# Patient Record
Sex: FEMALE | Race: WHITE | NOT HISPANIC OR LATINO | Employment: FULL TIME | URBAN - METROPOLITAN AREA
[De-identification: names, ages, dates, MRNs, and addresses within clinical notes are randomized per-mention and may not be internally consistent; named-entity substitution may affect disease eponyms.]

---

## 2017-05-05 ENCOUNTER — TRANSCRIBE ORDERS (OUTPATIENT)
Dept: ADMINISTRATIVE | Facility: HOSPITAL | Age: 42
End: 2017-05-05

## 2017-05-05 DIAGNOSIS — R06.81 APNEA: Primary | ICD-10-CM

## 2017-08-02 ENCOUNTER — OFFICE VISIT (OUTPATIENT)
Dept: URGENT CARE | Facility: CLINIC | Age: 42
End: 2017-08-02
Payer: MEDICARE

## 2017-08-02 DIAGNOSIS — N39.0 URINARY TRACT INFECTION: ICD-10-CM

## 2017-08-02 PROCEDURE — 81002 URINALYSIS NONAUTO W/O SCOPE: CPT

## 2017-08-02 PROCEDURE — G0463 HOSPITAL OUTPT CLINIC VISIT: HCPCS

## 2017-08-02 PROCEDURE — 99203 OFFICE O/P NEW LOW 30 MIN: CPT

## 2017-08-03 ENCOUNTER — APPOINTMENT (OUTPATIENT)
Dept: LAB | Facility: HOSPITAL | Age: 42
End: 2017-08-03
Payer: MEDICARE

## 2017-08-03 DIAGNOSIS — N39.0 URINARY TRACT INFECTION: ICD-10-CM

## 2017-08-03 PROCEDURE — 87086 URINE CULTURE/COLONY COUNT: CPT

## 2017-08-05 LAB — BACTERIA UR CULT: NORMAL

## 2017-11-30 ENCOUNTER — ALLSCRIPTS OFFICE VISIT (OUTPATIENT)
Dept: OTHER | Facility: OTHER | Age: 42
End: 2017-11-30

## 2017-11-30 DIAGNOSIS — N39.0 URINARY TRACT INFECTION: ICD-10-CM

## 2017-11-30 LAB
BILIRUB UR QL STRIP: NORMAL
CLARITY UR: NORMAL
COLOR UR: YELLOW
GLUCOSE (HISTORICAL): NORMAL
HGB UR QL STRIP.AUTO: NORMAL
KETONES UR STRIP-MCNC: NORMAL MG/DL
LEUKOCYTE ESTERASE UR QL STRIP: NORMAL
NITRITE UR QL STRIP: NORMAL
PH UR STRIP.AUTO: 5 [PH]
PROT UR STRIP-MCNC: NORMAL MG/DL
SP GR UR STRIP.AUTO: 1.02

## 2017-12-05 NOTE — CONSULTS
Assessment    1  Acute UTI (599 0) (N39 0)   2  Single   3  Non-smoker (V49 89) (Z78 9)   4  Social alcohol use (Z78 9)   5  History of Arthroscopy Knee   6  History of Foot Surgery    Plan  Acute UTI    · (1) URINALYSIS (will reflex a microscopy if leukocytes, occult blood, protein or nitrites are  not within normal limits); Status:Active; Requested for:30Nov2017;    Perform:[facility]; Due:30Nov2018; Ordered; For:Acute UTI; Ordered By:Swapna Gaytan;   · (1) URINE CULTURE; Source:Urine, Clean Catch; Status:Active; Requested  for:30Nov2017;    Perform:[facility]; Due:30Nov2018; Ordered; For:Acute UTI; Ordered By:Swapna Gaytan;   · Cystourethroscopy - POC; Status:Active - Perform Order; Requested for:30Nov2017;    Perform: In Office; Christin Bradshaw; Ordered; For:Acute UTI; Ordered By:Swapna Gaytan;   · Urine Dip Non-Automated- POC; Status:Complete - Retrospective By Protocol  Authorization;   Done: [id number] 09:21AM   Performed: In Office; Christin Bradshaw; Last Updated By:Dev Welsh; 11/30/2017 9:31:10 AM;Ordered; For:Acute UTI; Ordered By:Swapna Gaytan;   · US RETROPERITONEAL COMPLETE; Status:Hold For - Scheduling; Requested  for:30Nov2017;    Perform:[facility]; Order Comments:Ultrasound kidneys and bladder with postvoid residual assessment; Due:30Nov2018; Ordered; For:Acute UTI; Ordered By:Swapna Gaytan; Discussion/Summary  Discussion Summary:   My impression is recurrent urinary tract infection, stress incontinence  Her urine dip in the office today is clean  I will obtain a surveillance urinalysis and urine culture in the next few weeks  She was instructed to go immediately for urine culture if she believes that she is symptomatic  I am unsure if she is truly having culture documented infections  I will obtain a retroperitoneal ultrasound of the kidneys and bladder with postvoid residual assessment   I also recommend performing flexible cystoscopy to assess her anatomy and to evaluate her better for stress incontinence  Self Referrals:   Self Referrals: No PCP-Dr Zane Franklin      Chief Complaint  Chief Complaint Free Text Note Form: New patient consult for UTI's      History of Present Illness  HPI: Amparo is a 72-year-old female who gives a longstanding history of stress urinary incontinence  Interestingly she has never been pregnant before  She also reports recurrent urinary tract infections  There is only 1 urine culture available for my review from August 2017 which shows only mixed contaminants  She describes variable lower urinary tract symptoms when she believes that she has an infection  Although she states she has significant stress incontinence, she does not wear any pads on a daily basis  She denies ever seeing gross hematuria  She denies urgency of urination or urge incontinence    Past medical and surgical history is remarkable for diffuse endometriosis, depression, bipolar, anxiety, headaches  Medical, surgical, family, and social histories were reviewed in [employer]  Review of Systems  Complete-Female Urology:   Constitutional: No fever, no chills, feels well, no tiredness, no recent weight gain or weight loss  Respiratory: No complaints of shortness of breath, no wheezing, no cough, no SOB on exertion, no orthopnea, no PND  Cardiovascular: No complaints of slow heart rate, no fast heart rate, no chest pain, no palpitations, no leg claudication, no lower extremity edema  Gastrointestinal: No complaints of abdominal pain, no constipation, no nausea or vomiting, no diarrhea, no bloody stools  Genitourinary: Patient does not need pads for her leakage-only occasional, Empty sensation, incontinence and stream quality good, but as noted in HPI, no dysuria, no feelings of urinary urgency and no hematuria    The patient presents with complaints of occasional episodes of urinary hesitancy  The patient presents with complaints of 1 episode of nocturia  Musculoskeletal: No complaints of arthralgias, no myalgias, no joint swelling or stiffness, no limb pain or swelling  Integumentary: No complaints of skin rash or lesions, no itching, no skin wounds, no breast pain or lump  Hematologic/Lymphatic: No complaints of swollen glands, no swollen glands in the neck, does not bleed easily, does not bruise easily  Neurological: No complaints of headache, no confusion, no convulsions, no numbness, no dizziness or fainting, no tingling, no limb weakness, no difficulty walking  ROS Reviewed:   ROS reviewed  Active Problems    1  Acute UTI (599 0) (N39 0)   2  Bipolar disorder (296 80) (F31 9)   3  Constipation (564 00) (K59 00)   4  Heartburn (787 1) (R12)   5  Nausea (787 02) (R11 0)   6  Ovarian cyst (620 2) (N83 20)    Past Medical History    1  History of intestinal obstruction (V12 79) (Z87 19)   2  History of Oral contraceptive prescribed (V25 01) (Z30 011)  Active Problems And Past Medical History Reviewed: The active problems and past medical history were reviewed and updated today  Surgical History    1  History of Arthroscopy Knee   2  History of Breast Surgery Reduction Procedure   3  History of Exploratory Laparotomy   4  History of Foot Surgery  Surgical History Reviewed: The surgical history was reviewed and updated today  Family History  Maternal Grandmother    1  Family history of Breast Cancer (V16 3)  Family History Reviewed: The family history was reviewed and updated today  Social History    · Being A Social Drinker   · Never A Smoker   · Non-smoker (V49 89) (Z78 9)   · Single   · Social alcohol use (Z78 9)  Social History Reviewed: The social history was reviewed and updated today  The social history was reviewed and is unchanged  Current Meds   1  Feosol TABS; Therapy: (Recorded:30Nov2017) to Recorded   2  Gabapentin 400 MG Oral Capsule; Therapy: (Recorded:30Nov2017) to Recorded   3   Mobic 15 MG Oral Tablet; Therapy: (Recorded:30Nov2017) to Recorded   4  Omeprazole 40 MG Oral Capsule Delayed Release; Therapy: (Recorded:[id number]) to Recorded   5  Pristiq 100 MG Oral Tablet Extended Release 24 Hour; Therapy: (Recorded:30Nov2017) to Recorded   6  Topiramate 25 MG Oral Tablet; 3 tablets (75 mg) twice daily; Therapy: (Recorded:[id number]) to Recorded   7  Xanax 1 MG Oral Tablet; Therapy: (Recorded:30Nov2017) to Recorded  Medication List Reviewed: The medication list was reviewed and updated today  Allergies    1  LaMICtal TABS    Vitals  Vital Signs    Recorded: [id number] 09:14AM   Heart Rate 76   Systolic 555   Diastolic 92   Height 5 ft 5 in   Weight 194 lb 6 oz   BMI Calculated 32 35   BSA Calculated 1 95     Physical Exam    Additional Exam:  On examination she is in no acute distress  Her abdomen is soft nontender nondistended   examination reveals no CVA tenderness  Skin is warm  Extremities without edema   Neurologic is grossly intact and nonfocal  Gait normal  Affect normal       Results/Data  Urine Dip Non-Automated- POC [id number] 09:21AM Venita Henry     Test Name Result Flag Reference   Color Yellow     Clarity Transparent     Leukocytes -     Nitrite -     Blood -     Bilirubin -     Protein -     Ph 5     Specific Gravity 1 020     Ketone -     Glucose -         Signatures   Electronically signed by : Alina Taylor MD; Nov 30 2017  9:55AM EST                       (Author)

## 2018-01-12 VITALS
WEIGHT: 194.38 LBS | SYSTOLIC BLOOD PRESSURE: 146 MMHG | HEART RATE: 76 BPM | DIASTOLIC BLOOD PRESSURE: 92 MMHG | BODY MASS INDEX: 32.39 KG/M2 | HEIGHT: 65 IN

## 2018-01-24 ENCOUNTER — OFFICE VISIT (OUTPATIENT)
Dept: URGENT CARE | Facility: CLINIC | Age: 43
End: 2018-01-24
Payer: MEDICARE

## 2018-01-24 VITALS
BODY MASS INDEX: 30.82 KG/M2 | RESPIRATION RATE: 18 BRPM | OXYGEN SATURATION: 98 % | WEIGHT: 185 LBS | TEMPERATURE: 99.2 F | HEART RATE: 72 BPM | HEIGHT: 65 IN

## 2018-01-24 DIAGNOSIS — J01.90 ACUTE NON-RECURRENT SINUSITIS, UNSPECIFIED LOCATION: Primary | ICD-10-CM

## 2018-01-24 PROCEDURE — G0463 HOSPITAL OUTPT CLINIC VISIT: HCPCS | Performed by: PHYSICIAN ASSISTANT

## 2018-01-24 PROCEDURE — 99213 OFFICE O/P EST LOW 20 MIN: CPT | Performed by: PHYSICIAN ASSISTANT

## 2018-01-24 RX ORDER — ALPRAZOLAM 1 MG/1
1 TABLET ORAL
COMMUNITY
End: 2020-08-24 | Stop reason: HOSPADM

## 2018-01-24 RX ORDER — GABAPENTIN 100 MG/1
CAPSULE ORAL
COMMUNITY
Start: 2017-01-27 | End: 2018-07-26 | Stop reason: ALTCHOICE

## 2018-01-24 RX ORDER — FERROUS SULFATE 325(65) MG
325 TABLET ORAL
COMMUNITY
End: 2018-09-02

## 2018-01-24 RX ORDER — DESVENLAFAXINE 100 MG/1
100 TABLET, EXTENDED RELEASE ORAL DAILY
COMMUNITY
End: 2019-07-29 | Stop reason: HOSPADM

## 2018-01-24 RX ORDER — GABAPENTIN 300 MG/1
CAPSULE ORAL DAILY
COMMUNITY
End: 2019-01-04 | Stop reason: SDUPTHER

## 2018-01-24 RX ORDER — TOPIRAMATE 100 MG/1
25 TABLET, FILM COATED ORAL DAILY
COMMUNITY
End: 2019-06-27 | Stop reason: ALTCHOICE

## 2018-01-24 RX ORDER — VALACYCLOVIR HYDROCHLORIDE 500 MG/1
500 TABLET, FILM COATED ORAL DAILY
COMMUNITY
End: 2019-01-04 | Stop reason: ALTCHOICE

## 2018-01-24 RX ORDER — AMOXICILLIN AND CLAVULANATE POTASSIUM 875; 125 MG/1; MG/1
1 TABLET, FILM COATED ORAL 2 TIMES DAILY
Qty: 14 TABLET | Refills: 0 | Status: SHIPPED | OUTPATIENT
Start: 2018-01-24 | End: 2018-01-31

## 2018-01-24 RX ORDER — METHYLPREDNISOLONE 4 MG/1
TABLET ORAL
Qty: 1 TABLET | Refills: 0 | Status: SHIPPED | OUTPATIENT
Start: 2018-01-24 | End: 2018-04-16

## 2018-01-24 RX ORDER — CARBAMAZEPINE 200 MG/1
200 TABLET ORAL
COMMUNITY
End: 2018-04-16

## 2018-01-25 NOTE — PROGRESS NOTES
Assessment/Plan:      Diagnoses and all orders for this visit:    Acute non-recurrent sinusitis, unspecified location  -     amoxicillin-clavulanate (AUGMENTIN) 875-125 mg per tablet; Take 1 tablet by mouth 2 (two) times a day for 7 days  -     methylprednisolone (MEDROL) 4 mg tablet; Take as directed on dose pack    Other orders  -     gabapentin (NEURONTIN) 100 mg capsule; TAKE ONE CAPSULE BY MOUTH EVERY DAY AT BEDTIME  -     gabapentin (NEURONTIN) 400 mg capsule; Take by mouth  -     desvenlafaxine (PRISTIQ) 100 mg 24 hr tablet; Take by mouth  -     ferrous sulfate (FEOSOL) 325 (65 Fe) mg tablet; Take by mouth  -     carBAMazepine (TEGretol) 200 mg tablet; Take 200 mg by mouth  -     valACYclovir (VALTREX) 500 mg tablet; Take 500 mg by mouth  -     topiramate (TOPAMAX) 100 mg tablet; Take 100 mg by mouth  -     ALPRAZolam (XANAX) 1 mg tablet; Take by mouth          Subjective:     Patient ID: Filemon Patel is a 38 y o  female  Patient presents with a one-week history of cold symptoms  The past few days she is having excessive dryness in her nasal passages  She was prescribed Flonase by her PCP and just started today  She has a lot of sinus pressure and pain hand has occasional drainage from        Review of Systems   Constitutional: Negative for chills and fever  HENT: Positive for postnasal drip, sneezing and sore throat  Negative for ear pain, mouth sores, rhinorrhea, sinus pain, sinus pressure and trouble swallowing  Eyes: Negative for discharge  Respiratory: Negative for cough, chest tightness, shortness of breath and wheezing  Cardiovascular: Negative for chest pain  Gastrointestinal: Negative for diarrhea, nausea and vomiting  Musculoskeletal: Negative for myalgias  Skin: Negative for rash  Neurological: Negative for dizziness  Hematological: Negative for adenopathy  Objective:     Physical Exam   Constitutional: She is oriented to person, place, and time   She appears well-developed and well-nourished  HENT:   Head: Normocephalic and atraumatic  Right Ear: External ear normal    Left Ear: External ear normal    Nose: Nose normal    Mouth/Throat: Oropharynx is clear and moist    Eyes: Conjunctivae are normal    Neck: Normal range of motion  Neck supple  Cardiovascular: Normal rate, regular rhythm and normal heart sounds  Pulmonary/Chest: Effort normal and breath sounds normal    Musculoskeletal: She exhibits no edema  Lymphadenopathy:     She has no cervical adenopathy  Neurological: She is alert and oriented to person, place, and time  Skin: Skin is warm and dry  No rash noted  Psychiatric: She has a normal mood and affect

## 2018-01-25 NOTE — PATIENT INSTRUCTIONS

## 2018-03-22 LAB
ALBUMIN SERPL BCP-MCNC: 4.7 G/DL (ref 3.5–5.7)
ALP SERPL-CCNC: 63 IU/L (ref 40–150)
ALT SERPL W P-5'-P-CCNC: 16 IU/L (ref 0–50)
AMYLASE (HISTORICAL): 32 U/L (ref 29–103)
ANION GAP SERPL CALCULATED.3IONS-SCNC: 14.9 MM/L
APTT PPP: 29.7 SEC (ref 24.4–37.6)
AST SERPL W P-5'-P-CCNC: 14 U/L (ref 7–26)
BASOPHILS # BLD AUTO: 0.1 X3/UL (ref 0–0.3)
BASOPHILS # BLD AUTO: 0.4 % (ref 0–2)
BILIRUB SERPL-MCNC: 0.3 MG/DL (ref 0.3–1)
BUN SERPL-MCNC: 16 MG/DL (ref 7–25)
CALCIUM SERPL-MCNC: 10 MG/DL (ref 8.6–10.5)
CHLORIDE SERPL-SCNC: 105 MM/L (ref 98–107)
CO2 SERPL-SCNC: 21 MM/L (ref 21–31)
CREAT SERPL-MCNC: 0.88 MG/DL (ref 0.6–1.2)
DEPRECATED RDW RBC AUTO: 13.8 % (ref 11.5–14.5)
EGFR (HISTORICAL): > 60 GFR
EGFR AFRICAN AMERICAN (HISTORICAL): > 60 GFR
EOSINOPHIL # BLD AUTO: 0 X3/UL (ref 0–0.5)
EOSINOPHIL NFR BLD AUTO: 0.2 % (ref 0–5)
GLUCOSE (HISTORICAL): 131 MG/DL (ref 65–99)
HCT VFR BLD AUTO: 49.7 % (ref 37–47)
HGB BLD-MCNC: 17 G/DL (ref 12–16)
INR PPP: 0.99 (ref 0.9–1.5)
LIPASE SERPL-CCNC: 53 U/L (ref 11–82)
LYMPHOCYTES # BLD AUTO: 2.5 X3/UL (ref 1.2–4.2)
LYMPHOCYTES NFR BLD AUTO: 16.8 % (ref 20.5–51.1)
MCH RBC QN AUTO: 31.7 PG (ref 26–34)
MCHC RBC AUTO-ENTMCNC: 34.3 G/DL (ref 31–36)
MCV RBC AUTO: 92.4 FL (ref 81–99)
MONOCYTES # BLD AUTO: 1.2 X3/UL (ref 0–1)
MONOCYTES NFR BLD AUTO: 8 % (ref 1.7–12)
NEUTROPHILS # BLD AUTO: 10.9 X3/UL (ref 1.4–6.5)
NEUTS SEG NFR BLD AUTO: 74.6 % (ref 42.2–75.2)
OSMOLALITY, SERUM (HISTORICAL): 277 MOSM (ref 262–291)
PLATELET # BLD AUTO: 304 X3/UL (ref 130–400)
PMV BLD AUTO: 8.1 FL (ref 8.6–11.7)
POTASSIUM SERPL-SCNC: 3.9 MM/L (ref 3.5–5.5)
PROTHROMBIN TIME (HISTORICAL): 11.5 SEC (ref 10.1–12.9)
RBC # BLD AUTO: 5.38 X6/UL (ref 3.9–5.2)
SODIUM SERPL-SCNC: 137 MM/L (ref 134–143)
TOTAL PROTEIN (HISTORICAL): 7.8 G/DL (ref 6.4–8.9)
WBC # BLD AUTO: 14.6 X3/UL (ref 4.8–10.8)

## 2018-03-23 LAB
ALBUMIN SERPL BCP-MCNC: 3.8 G/DL (ref 3.5–5.7)
ALP SERPL-CCNC: 61 IU/L (ref 40–150)
ALT SERPL W P-5'-P-CCNC: 14 IU/L (ref 0–50)
AMYLASE (HISTORICAL): 33 U/L (ref 29–103)
ANION GAP SERPL CALCULATED.3IONS-SCNC: 12.5 MM/L
AST SERPL W P-5'-P-CCNC: 12 U/L (ref 7–26)
BASOPHILS # BLD AUTO: 0 X3/UL (ref 0–0.3)
BASOPHILS # BLD AUTO: 0.4 % (ref 0–2)
BILIRUB SERPL-MCNC: 0.6 MG/DL (ref 0.3–1)
BUN SERPL-MCNC: 11 MG/DL (ref 7–25)
CALCIUM SERPL-MCNC: 8.6 MG/DL (ref 8.6–10.5)
CHLORIDE SERPL-SCNC: 108 MM/L (ref 98–107)
CO2 SERPL-SCNC: 21 MM/L (ref 21–31)
CREAT SERPL-MCNC: 0.77 MG/DL (ref 0.6–1.2)
DEPRECATED RDW RBC AUTO: 13.9 %
EGFR (HISTORICAL): > 60 GFR
EGFR AFRICAN AMERICAN (HISTORICAL): > 60 GFR
EOSINOPHIL # BLD AUTO: 0.2 X3/UL (ref 0–0.5)
EOSINOPHIL NFR BLD AUTO: 2.1 % (ref 0–5)
GLUCOSE (HISTORICAL): 95 MG/DL (ref 65–99)
HCT VFR BLD AUTO: 42.2 % (ref 37–47)
HGB BLD-MCNC: 14.5 G/DL (ref 12–16)
LACTATE DEHYDROGENASE FLUID (HISTORICAL): 0.5 MM/L (ref 0.5–2)
LIPASE SERPL-CCNC: 50 U/L (ref 11–82)
LYMPHOCYTES # BLD AUTO: 2.1 X3/UL (ref 1.2–4.2)
LYMPHOCYTES NFR BLD AUTO: 22.9 % (ref 20.5–51.1)
MCH RBC QN AUTO: 31.8 PG (ref 26–34)
MCHC RBC AUTO-ENTMCNC: 34.4 G/DL (ref 31–37)
MCV RBC AUTO: 92.5 FL (ref 81–99)
MONOCYTES # BLD AUTO: 0.8 X3/UL (ref 0–1)
MONOCYTES NFR BLD AUTO: 8.9 % (ref 1.7–12)
NEUTROPHILS # BLD AUTO: 6.1 X3/UL (ref 1.4–6.5)
NEUTS SEG NFR BLD AUTO: 65.7 % (ref 42.2–75.2)
OSMOLALITY, SERUM (HISTORICAL): 275 MOSM (ref 262–291)
PLATELET # BLD AUTO: 214 X3/UL (ref 130–400)
PMV BLD AUTO: 8 FL
POTASSIUM SERPL-SCNC: 3.5 MM/L (ref 3.5–5.5)
RBC # BLD AUTO: 4.57 X6/UL (ref 3.9–5.2)
SODIUM SERPL-SCNC: 138 MM/L (ref 134–143)
TOTAL PROTEIN (HISTORICAL): 6.1 G/DL (ref 6.4–8.9)
WBC # BLD AUTO: 9.3 X3/UL (ref 4.8–10.8)

## 2018-03-24 LAB
ALBUMIN SERPL BCP-MCNC: 3.8 G/DL (ref 3.5–5.7)
ALP SERPL-CCNC: 54 IU/L (ref 40–150)
ALT SERPL W P-5'-P-CCNC: 15 IU/L (ref 0–50)
ANION GAP SERPL CALCULATED.3IONS-SCNC: 13.3 MM/L
AST SERPL W P-5'-P-CCNC: 12 U/L (ref 7–26)
BASOPHILS # BLD AUTO: 0 X3/UL (ref 0–0.3)
BASOPHILS # BLD AUTO: 0.4 % (ref 0–2)
BILIRUB SERPL-MCNC: 0.2 MG/DL (ref 0.3–1)
BUN SERPL-MCNC: 10 MG/DL (ref 7–25)
CALCIUM SERPL-MCNC: 8.5 MG/DL (ref 8.6–10.5)
CHLORIDE SERPL-SCNC: 112 MM/L (ref 98–107)
CO2 SERPL-SCNC: 18 MM/L (ref 21–31)
CREAT SERPL-MCNC: 0.76 MG/DL (ref 0.6–1.2)
DEPRECATED RDW RBC AUTO: 13.6 %
EGFR (HISTORICAL): > 60 GFR
EGFR AFRICAN AMERICAN (HISTORICAL): > 60 GFR
EOSINOPHIL # BLD AUTO: 0.2 X3/UL (ref 0–0.5)
EOSINOPHIL NFR BLD AUTO: 2.6 % (ref 0–5)
GLUCOSE (HISTORICAL): 109 MG/DL (ref 65–99)
HCT VFR BLD AUTO: 40.5 % (ref 37–47)
HGB BLD-MCNC: 13.9 G/DL (ref 12–16)
LYMPHOCYTES # BLD AUTO: 2.1 X3/UL (ref 1.2–4.2)
LYMPHOCYTES NFR BLD AUTO: 23.4 % (ref 20.5–51.1)
MCH RBC QN AUTO: 32.2 PG (ref 26–34)
MCHC RBC AUTO-ENTMCNC: 34.3 G/DL (ref 31–37)
MCV RBC AUTO: 93.8 FL (ref 81–99)
MONOCYTES # BLD AUTO: 0.7 X3/UL (ref 0–1)
MONOCYTES NFR BLD AUTO: 8.1 % (ref 1.7–12)
NEUTROPHILS # BLD AUTO: 6 X3/UL (ref 1.4–6.5)
NEUTS SEG NFR BLD AUTO: 65.5 % (ref 42.2–75.2)
OSMOLALITY, SERUM (HISTORICAL): 279 MOSM (ref 262–291)
PLATELET # BLD AUTO: 221 X3/UL (ref 130–400)
PMV BLD AUTO: 8.2 FL
POTASSIUM SERPL-SCNC: 3.3 MM/L (ref 3.5–5.5)
RBC # BLD AUTO: 4.32 X6/UL (ref 3.9–5.2)
SODIUM SERPL-SCNC: 140 MM/L (ref 134–143)
TOTAL PROTEIN (HISTORICAL): 6.3 G/DL (ref 6.4–8.9)
WBC # BLD AUTO: 9.1 X3/UL (ref 4.8–10.8)

## 2018-04-16 ENCOUNTER — PROCEDURE VISIT (OUTPATIENT)
Dept: UROLOGY | Facility: CLINIC | Age: 43
End: 2018-04-16
Payer: MEDICARE

## 2018-04-16 VITALS
SYSTOLIC BLOOD PRESSURE: 132 MMHG | DIASTOLIC BLOOD PRESSURE: 78 MMHG | HEART RATE: 72 BPM | HEIGHT: 65 IN | BODY MASS INDEX: 29.66 KG/M2 | WEIGHT: 178 LBS

## 2018-04-16 DIAGNOSIS — N39.3 STRESS INCONTINENCE OF URINE: Primary | ICD-10-CM

## 2018-04-16 LAB
SL AMB  POCT GLUCOSE, UA: NORMAL
SL AMB LEUKOCYTE ESTERASE,UA: NORMAL
SL AMB POCT BILIRUBIN,UA: NORMAL
SL AMB POCT BLOOD,UA: NORMAL
SL AMB POCT CLARITY,UA: CLEAR
SL AMB POCT COLOR,UA: YELLOW
SL AMB POCT KETONES,UA: NORMAL
SL AMB POCT NITRITE,UA: NORMAL
SL AMB POCT PH,UA: 5
SL AMB POCT SPECIFIC GRAVITY,UA: 1
SL AMB POCT URINE PROTEIN: NORMAL
SL AMB POCT UROBILINOGEN: NORMAL

## 2018-04-16 PROCEDURE — 81002 URINALYSIS NONAUTO W/O SCOPE: CPT | Performed by: UROLOGY

## 2018-04-16 PROCEDURE — 52000 CYSTOURETHROSCOPY: CPT | Performed by: UROLOGY

## 2018-04-16 RX ORDER — PANTOPRAZOLE SODIUM 40 MG/1
40 TABLET, DELAYED RELEASE ORAL 2 TIMES DAILY
Status: ON HOLD | COMMUNITY
End: 2021-06-02 | Stop reason: ALTCHOICE

## 2018-04-16 RX ORDER — CEPHALEXIN 500 MG/1
500 CAPSULE ORAL
COMMUNITY
Start: 2018-04-13 | End: 2018-04-18

## 2018-04-16 RX ORDER — HYDROCODONE BITARTRATE AND ACETAMINOPHEN 5; 325 MG/1; MG/1
1-2 TABLET ORAL EVERY 6 HOURS
COMMUNITY
End: 2018-07-26 | Stop reason: ALTCHOICE

## 2018-04-16 RX ORDER — TRAMADOL HYDROCHLORIDE 50 MG/1
50 TABLET ORAL AS NEEDED
Refills: 0 | COMMUNITY
Start: 2018-03-24 | End: 2019-01-18 | Stop reason: ALTCHOICE

## 2018-04-16 RX ORDER — LINACLOTIDE 145 UG/1
CAPSULE, GELATIN COATED ORAL
Refills: 2 | COMMUNITY
Start: 2018-03-24 | End: 2018-07-26 | Stop reason: ALTCHOICE

## 2018-04-16 NOTE — PROGRESS NOTES
Amparo is a 57-year-old female who complains of stress incontinence  She states that she leaks regularly with stress maneuvers  She presents to the office today to undergo flexible cystoscopy to better evaluate the anatomy and to assess for incontinence  She underwent a retroperitoneal ultrasound performed at [facility] in February 2018  The ultrasound reveals normal kidneys without significant postvoid residual        Cystoscopy Procedure note    Risk and benefits of flexible cystoscopy were discussed  Informed consent was obtained  A urine dip is adequate for cystoscopy  The patient was placed into the modified supine position  Her genitalia was prepped and draped in a sterile fashion  Viscous lidocaine was instilled into the urethra  Flexible cystoscopy was then performed  The bladder was thoroughly inspected  Both ureteral orifices were visualized with clear efflux of urine  The bladder mucosa was thoroughly inspected  There was no evidence of mucosal abnormalities, stones, or lesions  Retroflexion was normal   Overall this was a negative cystoscopy  A female office staff member was present throughout the entire procedure  With a full bladder, the patient was asked to cough and Valsalva with force  There was no incontinence identified  The patient was then asked to stand and performed the same maneuvers  She reported a very slight amount of leakage but none was visualized  My impression is mild stress incontinence  I had a lengthy discussion with the patient in the office today informing her that objective measurements for stress incontinence indicate that she would not be a good candidate for mid urethral sling insertion at this time  I recommend continuing Kegel exercises  The patient will return in follow-up in 6 months for reassessment  The patient was instructed to call sooner if needed

## 2018-05-14 LAB
ANION GAP SERPL CALCULATED.3IONS-SCNC: 20.7 MM/L
APTT PPP: 30 SEC (ref 24.4–37.6)
BACTERIA UR QL AUTO: ABNORMAL
BASOPHILS # BLD AUTO: 0 X3/UL (ref 0–0.3)
BASOPHILS # BLD AUTO: 0.3 % (ref 0–2)
BILIRUB UR QL STRIP: ABNORMAL
BUN SERPL-MCNC: 15 MG/DL (ref 7–25)
CALCIUM SERPL-MCNC: 10.5 MG/DL (ref 8.6–10.5)
CHLORIDE SERPL-SCNC: 99 MM/L (ref 98–107)
CLARITY UR: ABNORMAL
CO2 SERPL-SCNC: 19 MM/L (ref 21–31)
COLOR UR: YELLOW
CREAT SERPL-MCNC: 0.96 MG/DL (ref 0.6–1.2)
DEPRECATED RDW RBC AUTO: 13.5 % (ref 11.5–14.5)
EGFR (HISTORICAL): > 60 GFR
EGFR AFRICAN AMERICAN (HISTORICAL): > 60 GFR
EOSINOPHIL # BLD AUTO: 0 X3/UL (ref 0–0.5)
EOSINOPHIL NFR BLD AUTO: 0.2 % (ref 0–5)
GLUCOSE (HISTORICAL): 118 MG/DL (ref 65–99)
GLUCOSE UR STRIP-MCNC: NEGATIVE MG/DL
HCT VFR BLD AUTO: 51.8 % (ref 37–47)
HGB BLD-MCNC: 17.1 G/DL (ref 12–16)
HGB UR QL STRIP.AUTO: ABNORMAL
INR PPP: 0.98 (ref 0.9–1.5)
KETONES UR STRIP-MCNC: ABNORMAL MG/DL
LEUKOCYTE ESTERASE UR QL STRIP: NEGATIVE
LYMPHOCYTES # BLD AUTO: 2 X3/UL (ref 1.2–4.2)
LYMPHOCYTES NFR BLD AUTO: 14.4 % (ref 20.5–51.1)
MCH RBC QN AUTO: 30.8 PG (ref 26–34)
MCHC RBC AUTO-ENTMCNC: 33 G/DL (ref 31–36)
MCV RBC AUTO: 93.2 FL (ref 81–99)
MONOCYTES # BLD AUTO: 0.9 X3/UL (ref 0–1)
MONOCYTES NFR BLD AUTO: 6.6 % (ref 1.7–12)
MUCUS THREADS (HISTORICAL): PRESENT /HPF
NEUTROPHILS # BLD AUTO: 11 X3/UL (ref 1.4–6.5)
NEUTS SEG NFR BLD AUTO: 78.5 % (ref 42.2–75.2)
NITRITE UR QL STRIP: NEGATIVE
NON-SQ EPI CELLS URNS QL MICRO: ABNORMAL /HPF
OSMOLALITY, SERUM (HISTORICAL): 272 MOSM (ref 262–291)
PH UR STRIP.AUTO: 6 [PH] (ref 4.5–8)
PLATELET # BLD AUTO: 338 X3/UL (ref 130–400)
PMV BLD AUTO: 8.1 FL (ref 8.6–11.7)
POTASSIUM SERPL-SCNC: 3.7 MM/L (ref 3.5–5.5)
PREGNANCY TEST URINE (HISTORICAL): NEGATIVE
PROT UR STRIP-MCNC: ABNORMAL MG/DL
PROTHROMBIN TIME (HISTORICAL): 11.4 SEC (ref 10.1–12.9)
RBC # BLD AUTO: 5.56 X6/UL (ref 3.9–5.2)
RBC #/AREA URNS AUTO: ABNORMAL /HPF
SODIUM SERPL-SCNC: 135 MM/L (ref 134–143)
SP GR UR STRIP.AUTO: 1.02 (ref 1–1.03)
SP GR UR STRIP.AUTO: 1.02 (ref 1–1.03)
UROBILINOGEN UR QL STRIP.AUTO: 0.2 EU/DL (ref 0.2–8)
WBC # BLD AUTO: 14 X3/UL (ref 4.8–10.8)
WBC #/AREA URNS AUTO: ABNORMAL /HPF

## 2018-05-15 LAB
ALBUMIN SERPL BCP-MCNC: 3.5 G/DL (ref 3.5–5.7)
ALP SERPL-CCNC: 44 IU/L (ref 40–150)
ALT SERPL W P-5'-P-CCNC: 10 IU/L (ref 0–50)
ANION GAP SERPL CALCULATED.3IONS-SCNC: 13.2 MM/L
AST SERPL W P-5'-P-CCNC: 16 U/L (ref 7–26)
BASOPHILS # BLD AUTO: 0 X3/UL (ref 0–0.3)
BASOPHILS # BLD AUTO: 0.5 % (ref 0–2)
BILIRUB SERPL-MCNC: 0.6 MG/DL (ref 0.3–1)
BUN SERPL-MCNC: 13 MG/DL (ref 7–25)
CALCIUM SERPL-MCNC: 8.4 MG/DL (ref 8.6–10.5)
CHLORIDE SERPL-SCNC: 109 MM/L (ref 98–107)
CO2 SERPL-SCNC: 19 MM/L (ref 21–31)
CREAT SERPL-MCNC: 0.63 MG/DL (ref 0.6–1.2)
DEPRECATED RDW RBC AUTO: 13.5 % (ref 11.5–14.5)
EGFR (HISTORICAL): > 60 GFR
EGFR AFRICAN AMERICAN (HISTORICAL): > 60 GFR
EOSINOPHIL # BLD AUTO: 0 X3/UL (ref 0–0.5)
EOSINOPHIL NFR BLD AUTO: 0.4 % (ref 0–5)
GLUCOSE (HISTORICAL): 79 MG/DL (ref 65–99)
HCT VFR BLD AUTO: 38.5 % (ref 37–47)
HGB BLD-MCNC: 12.7 G/DL (ref 12–16)
LYMPHOCYTES # BLD AUTO: 1.3 X3/UL (ref 1.2–4.2)
LYMPHOCYTES NFR BLD AUTO: 19.8 % (ref 20.5–51.1)
MCH RBC QN AUTO: 30.9 PG (ref 26–34)
MCHC RBC AUTO-ENTMCNC: 32.9 G/DL (ref 31–36)
MCV RBC AUTO: 93.7 FL (ref 81–99)
MONOCYTES # BLD AUTO: 0.6 X3/UL (ref 0–1)
MONOCYTES NFR BLD AUTO: 9.1 % (ref 1.7–12)
NEUTROPHILS # BLD AUTO: 4.7 X3/UL (ref 1.4–6.5)
NEUTS SEG NFR BLD AUTO: 70.2 % (ref 42.2–75.2)
OSMOLALITY, SERUM (HISTORICAL): 273 MOSM (ref 262–291)
PLATELET # BLD AUTO: 205 X3/UL (ref 130–400)
PMV BLD AUTO: 8.5 FL (ref 8.6–11.7)
POTASSIUM SERPL-SCNC: 4.2 MM/L (ref 3.5–5.5)
RBC # BLD AUTO: 4.11 X6/UL (ref 3.9–5.2)
SODIUM SERPL-SCNC: 137 MM/L (ref 134–143)
TOTAL PROTEIN (HISTORICAL): 5.5 G/DL (ref 6.4–8.9)
WBC # BLD AUTO: 6.7 X3/UL (ref 4.8–10.8)

## 2018-05-17 LAB
ALBUMIN SERPL BCP-MCNC: 3.7 G/DL (ref 3.5–5.7)
ALP SERPL-CCNC: 48 IU/L (ref 40–150)
ALT SERPL W P-5'-P-CCNC: 12 IU/L (ref 0–50)
ANION GAP SERPL CALCULATED.3IONS-SCNC: 12.6 MM/L
AST SERPL W P-5'-P-CCNC: 13 U/L (ref 7–26)
BASOPHILS # BLD AUTO: 0 X3/UL (ref 0–0.3)
BASOPHILS # BLD AUTO: 0.5 % (ref 0–2)
BILIRUB SERPL-MCNC: 0.3 MG/DL (ref 0.3–1)
BUN SERPL-MCNC: 4 MG/DL (ref 7–25)
CALCIUM SERPL-MCNC: 8.7 MG/DL (ref 8.6–10.5)
CHLORIDE SERPL-SCNC: 110 MM/L (ref 98–107)
CO2 SERPL-SCNC: 19 MM/L (ref 21–31)
CREAT SERPL-MCNC: 0.67 MG/DL (ref 0.6–1.2)
DEPRECATED RDW RBC AUTO: 13.2 % (ref 11.5–14.5)
EGFR (HISTORICAL): > 60 GFR
EGFR AFRICAN AMERICAN (HISTORICAL): > 60 GFR
EOSINOPHIL # BLD AUTO: 0 X3/UL (ref 0–0.5)
EOSINOPHIL NFR BLD AUTO: 0.4 % (ref 0–5)
GLUCOSE (HISTORICAL): 87 MG/DL (ref 65–99)
HCT VFR BLD AUTO: 40.6 % (ref 37–47)
HGB BLD-MCNC: 13.4 G/DL (ref 12–16)
LYMPHOCYTES # BLD AUTO: 1.3 X3/UL (ref 1.2–4.2)
LYMPHOCYTES NFR BLD AUTO: 28.7 % (ref 20.5–51.1)
MCH RBC QN AUTO: 30.9 PG (ref 26–34)
MCHC RBC AUTO-ENTMCNC: 33.1 G/DL (ref 31–36)
MCV RBC AUTO: 93.6 FL (ref 81–99)
MONOCYTES # BLD AUTO: 0.5 X3/UL (ref 0–1)
MONOCYTES NFR BLD AUTO: 10.5 % (ref 1.7–12)
NEUTROPHILS # BLD AUTO: 2.7 X3/UL (ref 1.4–6.5)
NEUTS SEG NFR BLD AUTO: 59.9 % (ref 42.2–75.2)
OSMOLALITY, SERUM (HISTORICAL): 272 MOSM (ref 262–291)
PLATELET # BLD AUTO: 219 X3/UL (ref 130–400)
PMV BLD AUTO: 8.5 FL (ref 8.6–11.7)
POTASSIUM SERPL-SCNC: 3.6 MM/L (ref 3.5–5.5)
RBC # BLD AUTO: 4.34 X6/UL (ref 3.9–5.2)
SODIUM SERPL-SCNC: 138 MM/L (ref 134–143)
TOTAL PROTEIN (HISTORICAL): 5.8 G/DL (ref 6.4–8.9)
WBC # BLD AUTO: 4.5 X3/UL (ref 4.8–10.8)

## 2018-05-24 LAB
PREGNANCY TEST URINE (HISTORICAL): NEGATIVE
SP GR UR STRIP.AUTO: >= 1.03 (ref 1–1.03)

## 2018-05-24 PROCEDURE — 88304 TISSUE EXAM BY PATHOLOGIST: CPT | Performed by: PATHOLOGY

## 2018-05-25 ENCOUNTER — LAB REQUISITION (OUTPATIENT)
Dept: LAB | Facility: HOSPITAL | Age: 43
End: 2018-05-25
Payer: MEDICARE

## 2018-05-25 DIAGNOSIS — K82.8 OTHER SPECIFIED DISEASES OF GALLBLADDER: ICD-10-CM

## 2018-05-25 LAB — SURGICAL PATHOLOGY (HISTORICAL): NORMAL

## 2018-07-26 ENCOUNTER — OFFICE VISIT (OUTPATIENT)
Dept: NEUROLOGY | Facility: CLINIC | Age: 43
End: 2018-07-26
Payer: MEDICARE

## 2018-07-26 VITALS
RESPIRATION RATE: 18 BRPM | HEART RATE: 73 BPM | DIASTOLIC BLOOD PRESSURE: 96 MMHG | HEIGHT: 65 IN | WEIGHT: 165.6 LBS | SYSTOLIC BLOOD PRESSURE: 142 MMHG | BODY MASS INDEX: 27.59 KG/M2

## 2018-07-26 DIAGNOSIS — G43.009 MIGRAINE WITHOUT AURA AND WITHOUT STATUS MIGRAINOSUS, NOT INTRACTABLE: Primary | ICD-10-CM

## 2018-07-26 DIAGNOSIS — R40.0 SOMNOLENCE: ICD-10-CM

## 2018-07-26 DIAGNOSIS — G44.209 TENSION HEADACHE: ICD-10-CM

## 2018-07-26 PROCEDURE — 99214 OFFICE O/P EST MOD 30 MIN: CPT | Performed by: PSYCHIATRY & NEUROLOGY

## 2018-07-26 RX ORDER — OXCARBAZEPINE 300 MG/1
300 TABLET, FILM COATED ORAL 2 TIMES DAILY
Refills: 2 | COMMUNITY
Start: 2018-07-21 | End: 2020-01-29

## 2018-07-26 RX ORDER — MEDROXYPROGESTERONE ACETATE 150 MG/ML
INJECTION, SUSPENSION INTRAMUSCULAR
Refills: 0 | COMMUNITY
Start: 2018-06-20 | End: 2018-09-02

## 2018-07-26 NOTE — ASSESSMENT & PLAN NOTE
On her topiramate, could nightly at 100 mg twice daily, she has continued to do exceedingly well with regard to her migraine  In fact, she has had no migraine episodes in the recall recent past   However, given the borderline white count and CO2/chloride on recent blood work, and in view of her fine control, would like at this point to reduce her topiramate, and hopefully with time given her polypharmacy, eliminate if possible  --arbitrary reduction in topiramate to 50 mg in a m  and 100 mg in p m  daily  --check CBC and CMP
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
## 2018-07-26 NOTE — LETTER
July 26, 2018     Lisa Barraza DO  [address] [address]    Patient: Esdras Jim   YOB: 1975   Date of Visit: 7/26/2018       Dear Dr Marcos Fernandez: Thank you for referring Alivia Joshua to me for evaluation  Below are my notes for this consultation  If you have questions, please do not hesitate to call me  I look forward to following your patient along with you  Sincerely,        Mona Hawkins MD        CC: MD Mona Hines MD  7/26/2018  9:42 AM  Sign at close encounter  Patient is here today for a follow up for her headaches    Patient ID: Esdras Jim is a 38 y o  female  Assessment/Plan:    No problem-specific Assessment & Plan notes found for this encounter        She will follow up in ***    Subjective:    HPI  ***    Past Medical History:   Diagnosis Date    Bipolar 1 disorder ([facility])     with bordeline personality    Depression     Depression     GERD (gastroesophageal reflux disease)     Incontinence     Migraine     Urinary tract infection      Past Surgical History:   Procedure Laterality Date    FOOT SURGERY      KNEE ARTHROSCOPY      OVARIAN CYST REMOVAL      STOMACH SURGERY       Social History     Social History    Marital status: Single     Spouse name: N/A    Number of children: N/A    Years of education: N/A     Social History Main Topics    Smoking status: Never Smoker    Smokeless tobacco: Never Used    Alcohol use Yes    Drug use: No    Sexual activity: Not Asked     Other Topics Concern    None     Social History Narrative    None     Family History   Problem Relation Age of Onset    Hyperlipidemia Father     Heart disease Mother     Depression Family      Allergies   Allergen Reactions    Lamotrigine Rash and Hives     Other reaction(s): rash and itching       Current Outpatient Prescriptions:     ALPRAZolam (XANAX) 1 mg tablet, Take 1 mg by mouth daily at bedtime as needed  , Disp: , Rfl:     desvenlafaxine (PRISTIQ) 100 mg 24 hr tablet, Take 100 mg by mouth daily  , Disp: , Rfl:     ferrous sulfate (FEOSOL) 325 (65 Fe) mg tablet, Take 325 mg by mouth daily with breakfast  , Disp: , Rfl:     gabapentin (NEURONTIN) 400 mg capsule, Take 500 mg by mouth  , Disp: , Rfl:     medroxyPROGESTERone acetate (DEPO-PROVERA SYRINGE) 150 mg/mL injection, , Disp: , Rfl: 0    OXcarbazepine (TRILEPTAL) 300 mg tablet, Take 300 mg by mouth 2 (two) times a day, Disp: , Rfl: 2    pantoprazole (PROTONIX) 40 mg tablet, Take 40 mg by mouth 2 (two) times a day  , Disp: , Rfl:     PROAIR  (90 Base) MCG/ACT inhaler, , Disp: , Rfl:     topiramate (TOPAMAX) 100 mg tablet, Take 100 mg by mouth 2 (two) times a day  , Disp: , Rfl:     traMADol (ULTRAM) 50 mg tablet, 50 mg as needed  , Disp: , Rfl: 0    valACYclovir (VALTREX) 500 mg tablet, Take 500 mg by mouth, Disp: , Rfl:     Objective:    Blood pressure 142/96, pulse 73, resp  rate 18, height 5' 5" (1 651 m), weight 75 1 kg (165 lb 9 6 oz)  Physical Exam  ***    Neurological Exam  ***    ROS:    Review of Systems   Constitutional: Positive for fatigue  HENT: Negative  Eyes: Negative  Respiratory: Negative  Cardiovascular: Negative  Gastrointestinal: Negative  Endocrine: Negative  Genitourinary: Negative  Musculoskeletal: Negative  Skin: Negative for rash  Allergic/Immunologic: Negative  Neurological: Positive for headaches  As above  Hematological: Negative  Psychiatric/Behavioral: Negative  I personally reviewed the ROS that was entered by the medical assistant

## 2018-07-26 NOTE — PATIENT INSTRUCTIONS
Change gabapentin to 100 mg capsules 1 capsule 3 times daily  Arbitrary reduction in topiramate to 100 mg tablets 1/2 tablet in a m  and 1 full tablet in p m  Daily  Please discuss with your psychiatrist additional potential medication changes in view of your described sleepiness  Maintain your driving restriction until cleared by your primary physician  Please call the office in 1 week with a status report as to how you are doing with the medication changes  Continue to maintain a headache diary

## 2018-07-26 NOTE — ASSESSMENT & PLAN NOTE
She does have daily tension-type headaches, but more in keeping with her problematic dental situation  She has chronic significant pain issues involving both upper and lower dental work and is scheduled for full extraction uppers and partial extraction lowers and the feeling is that this is the primary problem with regard to this particular ongoing headache complaint  --given her already extensive medication list, no additional medications at this time  --she will continue her ongoing work with hurt a dentist       I have asked that she call the office here in 1 week with a status report as to how she is doing with the medication will change  She will also continue to maintain a headache diary

## 2018-07-30 ENCOUNTER — APPOINTMENT (OUTPATIENT)
Dept: LAB | Facility: HOSPITAL | Age: 43
End: 2018-07-30
Payer: MEDICARE

## 2018-07-30 ENCOUNTER — TRANSCRIBE ORDERS (OUTPATIENT)
Dept: ADMINISTRATIVE | Facility: HOSPITAL | Age: 43
End: 2018-07-30

## 2018-07-30 DIAGNOSIS — R40.0 SOMNOLENCE: ICD-10-CM

## 2018-07-30 DIAGNOSIS — G43.009 MIGRAINE WITHOUT AURA AND WITHOUT STATUS MIGRAINOSUS, NOT INTRACTABLE: ICD-10-CM

## 2018-07-30 DIAGNOSIS — G43.009 MIGRAINE WITHOUT AURA AND WITHOUT STATUS MIGRAINOSUS, NOT INTRACTABLE: Primary | ICD-10-CM

## 2018-07-30 LAB
ALBUMIN SERPL BCP-MCNC: 4.5 G/DL (ref 3.5–5.7)
ALP SERPL-CCNC: 56 U/L (ref 40–150)
ALT SERPL W P-5'-P-CCNC: 13 U/L (ref 7–52)
ANION GAP SERPL CALCULATED.3IONS-SCNC: 7 MMOL/L (ref 4–13)
AST SERPL W P-5'-P-CCNC: 13 U/L (ref 13–39)
BASOPHILS # BLD AUTO: 0 THOUSANDS/ΜL (ref 0–0.1)
BASOPHILS NFR BLD AUTO: 1 % (ref 0–2)
BILIRUB SERPL-MCNC: 0.5 MG/DL (ref 0.2–1)
BUN SERPL-MCNC: 11 MG/DL (ref 7–25)
CALCIUM SERPL-MCNC: 9.7 MG/DL (ref 8.6–10.5)
CHLORIDE SERPL-SCNC: 108 MMOL/L (ref 98–107)
CO2 SERPL-SCNC: 24 MMOL/L (ref 21–31)
CREAT SERPL-MCNC: 0.83 MG/DL (ref 0.6–1.2)
EOSINOPHIL # BLD AUTO: 0 THOUSAND/ΜL (ref 0–0.61)
EOSINOPHIL NFR BLD AUTO: 0 % (ref 0–5)
ERYTHROCYTE [DISTWIDTH] IN BLOOD BY AUTOMATED COUNT: 13.8 % (ref 11.5–14.5)
GFR SERPL CREATININE-BSD FRML MDRD: 88 ML/MIN/1.73SQ M
GLUCOSE SERPL-MCNC: 92 MG/DL (ref 65–99)
HCT VFR BLD AUTO: 43.3 % (ref 34.8–46.1)
HGB BLD-MCNC: 14.7 G/DL (ref 12–16)
LYMPHOCYTES # BLD AUTO: 2.2 THOUSANDS/ΜL (ref 0.6–4.47)
LYMPHOCYTES NFR BLD AUTO: 35 % (ref 21–51)
MCH RBC QN AUTO: 32.5 PG (ref 26–34)
MCHC RBC AUTO-ENTMCNC: 34 G/DL (ref 31–37)
MCV RBC AUTO: 96 FL (ref 81–99)
MONOCYTES # BLD AUTO: 0.5 THOUSAND/ΜL (ref 0.17–1.22)
MONOCYTES NFR BLD AUTO: 8 % (ref 2–12)
NEUTROPHILS # BLD AUTO: 3.6 THOUSANDS/ΜL (ref 1.4–6.5)
NEUTS SEG NFR BLD AUTO: 56 % (ref 42–75)
NRBC BLD AUTO-RTO: 0 /100 WBCS
PLATELET # BLD AUTO: 255 THOUSANDS/UL (ref 149–390)
PMV BLD AUTO: 8.9 FL (ref 8.6–11.7)
POTASSIUM SERPL-SCNC: 4.8 MMOL/L (ref 3.5–5.5)
PROT SERPL-MCNC: 6.9 G/DL (ref 6.4–8.9)
RBC # BLD AUTO: 4.53 MILLION/UL (ref 3.9–5.2)
SODIUM SERPL-SCNC: 139 MMOL/L (ref 134–143)
TSH SERPL DL<=0.05 MIU/L-ACNC: 1.12 UIU/ML (ref 0.45–5.33)
WBC # BLD AUTO: 6.3 THOUSAND/UL (ref 4.8–10.8)

## 2018-07-30 PROCEDURE — 85025 COMPLETE CBC W/AUTO DIFF WBC: CPT

## 2018-07-30 PROCEDURE — 36415 COLL VENOUS BLD VENIPUNCTURE: CPT

## 2018-07-30 PROCEDURE — 84443 ASSAY THYROID STIM HORMONE: CPT

## 2018-07-30 PROCEDURE — 80053 COMPREHEN METABOLIC PANEL: CPT

## 2018-08-03 ENCOUNTER — TELEPHONE (OUTPATIENT)
Dept: NEUROLOGY | Facility: CLINIC | Age: 43
End: 2018-08-03

## 2018-08-03 NOTE — TELEPHONE ENCOUNTER
Patient called the office this afternoon stating that she was supposed to call and let you know how she was feeling since you had her stop taking gabapentin 300 mg in the evening and start taking Gabapentin 100 mg 3 times a day  She stated that she has had a lot more movement in her legs and tingling in her arms  She stated she saw her psych doctor yesterday and informed him that she has not been sleeping well and he informed her to talk to you about taking her Xanax and her 300 mg Gabapentin all together at bedtime

## 2018-08-06 ENCOUNTER — DOCUMENTATION (OUTPATIENT)
Dept: NEUROLOGY | Facility: CLINIC | Age: 43
End: 2018-08-06

## 2018-08-06 NOTE — PROGRESS NOTES
Spoke with patient this morning and relayed Dr Jonna Rico medication instructions   Patient satisfied

## 2018-08-06 NOTE — TELEPHONE ENCOUNTER
You could let her know that it is okay to take all 300 mg of gabapentin along with her Xanax as prescribed by the psychiatrist at bedtime nightly  Have her check in with a status update at the end of the week  Thanks

## 2018-09-01 ENCOUNTER — OFFICE VISIT (OUTPATIENT)
Dept: URGENT CARE | Facility: CLINIC | Age: 43
End: 2018-09-01
Payer: MEDICARE

## 2018-09-01 VITALS
WEIGHT: 165 LBS | BODY MASS INDEX: 27.49 KG/M2 | RESPIRATION RATE: 16 BRPM | SYSTOLIC BLOOD PRESSURE: 149 MMHG | TEMPERATURE: 98.2 F | OXYGEN SATURATION: 99 % | DIASTOLIC BLOOD PRESSURE: 86 MMHG | HEART RATE: 60 BPM | HEIGHT: 65 IN

## 2018-09-01 DIAGNOSIS — J01.10 ACUTE FRONTAL SINUSITIS, RECURRENCE NOT SPECIFIED: Primary | ICD-10-CM

## 2018-09-01 PROCEDURE — 99213 OFFICE O/P EST LOW 20 MIN: CPT | Performed by: NURSE PRACTITIONER

## 2018-09-01 PROCEDURE — G0463 HOSPITAL OUTPT CLINIC VISIT: HCPCS | Performed by: NURSE PRACTITIONER

## 2018-09-01 RX ORDER — OXYCODONE HYDROCHLORIDE AND ACETAMINOPHEN 5; 325 MG/1; MG/1
TABLET ORAL
COMMUNITY
End: 2018-09-02

## 2018-09-01 RX ORDER — MELOXICAM 15 MG/1
TABLET ORAL
COMMUNITY
End: 2018-09-02

## 2018-09-01 RX ORDER — PANTOPRAZOLE SODIUM 40 MG/1
TABLET, DELAYED RELEASE ORAL
COMMUNITY
End: 2018-09-02

## 2018-09-01 RX ORDER — OXCARBAZEPINE 300 MG/1
TABLET, FILM COATED ORAL
COMMUNITY
End: 2018-09-02

## 2018-09-01 RX ORDER — AMOXICILLIN 875 MG/1
875 TABLET, COATED ORAL 2 TIMES DAILY
Qty: 20 TABLET | Refills: 0 | Status: SHIPPED | OUTPATIENT
Start: 2018-09-01 | End: 2018-09-11

## 2018-09-01 RX ORDER — CARBAMAZEPINE 200 MG/1
TABLET ORAL
COMMUNITY
End: 2018-09-02

## 2018-09-01 RX ORDER — ALPRAZOLAM 1 MG/1
TABLET ORAL
COMMUNITY
End: 2018-09-02

## 2018-09-01 RX ORDER — MEDROXYPROGESTERONE ACETATE 150 MG/ML
INJECTION, SUSPENSION INTRAMUSCULAR
Status: ON HOLD | COMMUNITY
End: 2021-06-02 | Stop reason: ALTCHOICE

## 2018-09-02 ENCOUNTER — HOSPITAL ENCOUNTER (EMERGENCY)
Facility: HOSPITAL | Age: 43
Discharge: HOME | End: 2018-09-02
Payer: MEDICARE

## 2018-09-02 VITALS
HEART RATE: 90 BPM | RESPIRATION RATE: 20 BRPM | WEIGHT: 150 LBS | BODY MASS INDEX: 24.99 KG/M2 | TEMPERATURE: 99.2 F | OXYGEN SATURATION: 96 % | SYSTOLIC BLOOD PRESSURE: 126 MMHG | HEIGHT: 65 IN | DIASTOLIC BLOOD PRESSURE: 89 MMHG

## 2018-09-02 DIAGNOSIS — J02.9 PHARYNGITIS: Primary | ICD-10-CM

## 2018-09-02 LAB — S PYO AG THROAT QL: NEGATIVE

## 2018-09-02 PROCEDURE — 87430 STREP A AG IA: CPT | Performed by: PHYSICIAN ASSISTANT

## 2018-09-02 PROCEDURE — 99283 EMERGENCY DEPT VISIT LOW MDM: CPT

## 2018-09-02 NOTE — DISCHARGE INSTRUCTIONS
Pharyngitis   WHAT YOU NEED TO KNOW:   Pharyngitis, or sore throat, is inflammation of the tissues and structures in your pharynx (throat)  Pharyngitis is most often caused by bacteria  It may also be caused by a cold or flu virus  Other causes include smoking, allergies, or acid reflux  DISCHARGE INSTRUCTIONS:   Call 911 for any of the following:   · You have trouble breathing or swallowing because your throat is swollen or sore  Return to the emergency department if:   · You are drooling because it hurts too much to swallow  · Your fever is higher than 102? F (39?C) or lasts longer than 3 days  · You are confused  · You taste blood in your throat  Contact your healthcare provider if:   · Your throat pain gets worse  · You have a painful lump in your throat that does not go away after 5 days  · Your symptoms do not improve after 5 days  · You have questions or concerns about your condition or care  Medicines:  Viral pharyngitis will go away on its own without treatment  Your sore throat should start to feel better in 3 to 5 days for both viral and bacterial infections  You may need any of the following:  · Antibiotics  treat a bacterial infection  · NSAIDs , such as ibuprofen, help decrease swelling, pain, and fever  NSAIDs can cause stomach bleeding or kidney problems in certain people  If you take blood thinner medicine, always ask your healthcare provider if NSAIDs are safe for you  Always read the medicine label and follow directions  · Acetaminophen  decreases pain and fever  It is available without a doctor's order  Ask how much to take and how often to take it  Follow directions  Acetaminophen can cause liver damage if not taken correctly  · Take your medicine as directed  Contact your healthcare provider if you think your medicine is not helping or if you have side effects  Tell him or her if you are allergic to any medicine   Keep a list of the medicines, vitamins, and herbs you take  Include the amounts, and when and why you take them  Bring the list or the pill bottles to follow-up visits  Carry your medicine list with you in case of an emergency  Manage your symptoms:   · Gargle salt water  Mix ¼ teaspoon salt in an 8 ounce glass of warm water and gargle  This may help decrease swelling in your throat  · Drink liquids as directed  You may need to drink more liquids than usual  Liquids may help soothe your throat and prevent dehydration  Ask how much liquid to drink each day and which liquids are best for you  · Use a cool-steam humidifier  to help moisten the air in your room and calm your cough  · Soothe your throat  with cough drops, ice, soft foods, or popsicles  Prevent the spread of pharyngitis:  Cover your mouth and nose when you cough or sneeze  Do not share food or drinks  Wash your hands often  Use soap and water  If soap and water are unavailable, use an alcohol based hand   Follow up with your healthcare provider as directed:  Write down your questions so you remember to ask them during your visits  © 2017 [address] [facility] Information is for End User's use only and may not be sold, redistributed or otherwise used for commercial purposes  All illustrations and images included in CareNotes® are the copyrighted property of A D A M , Inc  or Dain Wahl  The above information is an  only  It is not intended as medical advice for individual conditions or treatments  Talk to your doctor, nurse or pharmacist before following any medical regimen to see if it is safe and effective for you  Pharyngitis   WHAT YOU NEED TO KNOW:   What is pharyngitis? Pharyngitis, or sore throat, is inflammation of the tissues and structures in your pharynx (throat)  Pharyngitis is most often caused by bacteria  It may also be caused by a cold or flu virus  Other causes include smoking, allergies, or acid reflux     What signs and symptoms may occur with pharyngitis? · Sore throat or pain when you swallow    · Fever, chills, and body aches    · Hoarse or raspy voice    · Cough, runny or stuffy nose, itchy or watery eyes    · Headache    · Upset stomach and loss of appetite    · Mild neck stiffness    · Swollen glands that feel like hard lumps when you touch your neck    · White and yellow pus-filled blisters in the back of your throat  How is pharyngitis diagnosed? Tell your healthcare provider about your symptoms  He may look inside your throat and feel your neck  You may also need the following tests:  · A throat culture  may show which germ is causing your sore throat  A cotton swab is rubbed against the back of your throat  · Blood tests  may be used to show if another medical condition is causing your sore throat  How is pharyngitis treated? Viral pharyngitis will go away on its own without treatment  Your sore throat should start to feel better in 3 to 5 days for both viral and bacterial infections  You may need any of the following:  · Antibiotics  treat a bacterial infection  · NSAIDs , such as ibuprofen, help decrease swelling, pain, and fever  NSAIDs can cause stomach bleeding or kidney problems in certain people  If you take blood thinner medicine, always ask your healthcare provider if NSAIDs are safe for you  Always read the medicine label and follow directions  · Acetaminophen  decreases pain and fever  It is available without a doctor's order  Ask how much to take and how often to take it  Follow directions  Acetaminophen can cause liver damage if not taken correctly  How can I manage my symptoms? · Gargle salt water  Mix ¼ teaspoon salt in an 8 ounce glass of warm water and gargle  This may help decrease swelling in your throat  · Drink liquids as directed  You may need to drink more liquids than usual  Liquids may help soothe your throat and prevent dehydration   Ask how much liquid to drink each day and which liquids are best for you  · Use a cool-steam humidifier  to help moisten the air in your room and decrease your cough  · Soothe your throat  with cough drops, ice, soft foods, or popsicles  How can I prevent the spread of pharyngitis? Cover your mouth and nose when you cough or sneeze  Do not share food or drinks  Wash your hands often  Use soap and water  If soap and water are unavailable, use an alcohol based hand   Call 911 for any of the following:   · You have trouble breathing or swallowing because your throat is swollen or sore  When should I seek immediate care? · You are drooling because it hurts too much to swallow  · Your fever is higher than 102? F (39?C) or lasts longer than 3 days  · You are confused  · You taste blood in your throat  When should I contact my healthcare provider? · Your throat pain gets worse  · You have a painful lump in your throat that does not go away after 5 days  · Your symptoms do not improve after 5 days  · You have questions or concerns about your condition or care  CARE AGREEMENT:   You have the right to help plan your care  Learn about your health condition and how it may be treated  Discuss treatment options with your caregivers to decide what care you want to receive  You always have the right to refuse treatment  The above information is an  only  It is not intended as medical advice for individual conditions or treatments  Talk to your doctor, nurse or pharmacist before following any medical regimen to see if it is safe and effective for you  © 2017 [address] Aaron [employer] Information is for End User's use only and may not be sold, redistributed or otherwise used for commercial purposes  All illustrations and images included in CareNotes® are the copyrighted property of A D A M , Inc  or aDin Wahl      Pharyngitis   AMBULATORY CARE:   Pharyngitis , or sore throat, is inflammation of the tissues and structures in your pharynx (throat)  Pharyngitis is most often caused by bacteria  It may also be caused by a cold or flu virus  Other causes include smoking, allergies, or acid reflux  Signs and symptoms that may occur with pharyngitis:   · Sore throat or pain when you swallow    · Fever, chills, and body aches    · Hoarse or raspy voice    · Cough, runny or stuffy nose, itchy or watery eyes    · Headache    · Upset stomach and loss of appetite    · Mild neck stiffness    · Swollen glands that feel like hard lumps when you touch your neck    · White and yellow pus-filled blisters in the back of your throat  Call 911 for any of the following:   · You have trouble breathing or swallowing because your throat is swollen or sore  Seek care immediately if:   · You are drooling because it hurts too much to swallow  · Your fever is higher than 102? F (39?C) or lasts longer than 3 days  · You are confused  · You taste blood in your throat  Contact your healthcare provider if:   · Your throat pain gets worse  · You have a painful lump in your throat that does not go away after 5 days  · Your symptoms do not improve after 5 days  · You have questions or concerns about your condition or care  Treatment for pharyngitis:  Viral pharyngitis will go away on its own without treatment  Your sore throat should start to feel better in 3 to 5 days for both viral and bacterial infections  You may need any of the following:  · Antibiotics  treat a bacterial infection  · NSAIDs , such as ibuprofen, help decrease swelling, pain, and fever  NSAIDs can cause stomach bleeding or kidney problems in certain people  If you take blood thinner medicine, always ask your healthcare provider if NSAIDs are safe for you  Always read the medicine label and follow directions  · Acetaminophen  decreases pain and fever  It is available without a doctor's order   Ask how much to take and how often to take it  Follow directions  Acetaminophen can cause liver damage if not taken correctly  Manage your symptoms:   · Gargle salt water  Mix ¼ teaspoon salt in an 8 ounce glass of warm water and gargle  This may help decrease swelling in your throat  · Drink liquids as directed  You may need to drink more liquids than usual  Liquids may help soothe your throat and prevent dehydration  Ask how much liquid to drink each day and which liquids are best for you  · Use a cool-steam humidifier  to help moisten the air in your room and calm your cough  · Soothe your throat  with cough drops, ice, soft foods, or popsicles  Prevent the spread of pharyngitis:  Cover your mouth and nose when you cough or sneeze  Do not share food or drinks  Wash your hands often  Use soap and water  If soap and water are unavailable, use an alcohol based hand   Follow up with your healthcare provider as directed:  Write down your questions so you remember to ask them during your visits  © 2017 [facility] [employer] Information is for End User's use only and may not be sold, redistributed or otherwise used for commercial purposes  All illustrations and images included in CareNotes® are the copyrighted property of A D A M , Inc  or [employer]  The above information is an  only  It is not intended as medical advice for individual conditions or treatments  Talk to your doctor, nurse or pharmacist before following any medical regimen to see if it is safe and effective for you  Pharyngitis   Andrew LOPES, Lori Abdullahi, & Skip A: Appropriate Antibiotic Use for Acute Respiratory Tract Infection in Adults: Advice for High-Value Care From the [facility] [place] [address] for Disease Control and Prevention  Millie Intern Med 2016; Epub:Epub  [address] [facility] B: Pharyngitis  In: Rebel BENTON, ed  The 5-Minute Clinical Consult Standard 2016, 24th ed   [address] [facility],[address] [facility], [address] [place] [address], 1, pp [phone]  [address] BJ: Pharyngitis  In: Sissy Pritchard, Clint RM, [employer], et al, eds  [address] Emergency Medicine Consult, 5th ed  [address] [facility],[address] [facility], [address] [place] [address], 1, pp [phone]  Danielle ALEJO: Pharyngitis  In: Alverna Crimes, ed  [place] [facility] of Emergency Medicine, 6th ed  [address] [facility],7[address] Floor [facility], [address] [place] [address], 3819, pp 1218  Sony DE: Pharyngitis  In: Rebel FJ, ed  The 5-Minute Clinical Consult 2012, 20th ed  [address] [place] [address],[address] [place] [address], [address] [place] [address], 2012  Gela RM & Cely R: Avoiding sore throat morbidity and mortality: when is it not "just a sore throat?"  Am Fam Physician 2011; 83(1):26, 28-  [place] & Rosanna DJ: Pharyngitis/Tonsillitis  In: Dex FF, ed  Dex's Clinical Advisor 2012, [address] [facility], [address] [place] [address], 2012  Minesh R: Pharyngitis  In: Ana M ET, Hailey Greene RD, ed  [facility] Current Therapy 2012, [address], [address] [place] [address], 2012 © 2017 [address]  Information is for End User's use only and may not be sold, redistributed or otherwise used for commercial purposes  All illustrations and images included in CareNotes® are the copyrighted property of [employer] M , Inc  or Dain Wahl  The above information is an  only  It is not intended as medical advice for individual conditions or treatments  Talk to your doctor, nurse or pharmacist before following any medical regimen to see if it is safe and effective for you

## 2018-09-02 NOTE — ED PROVIDER NOTES
History  Chief Complaint   Patient presents with    Rash     placed on amoxicillin and believes she is having a reaction to the medication     Patient reports that she was seen yesterday at urgent care  Patient reports that they did not do strep test   Patient had poor mode [place] of  Set it is pain insert deep  Her air ways she Dr  [place] and is concerned that it is strep  Prior to Admission Medications   Prescriptions Last Dose Informant Patient Reported? Taking?    ALPRAZolam (XANAX) 1 mg tablet 2018 at Unknown time Self Yes Yes   Sig: Take 1 mg by mouth daily at bedtime as needed     OXcarbazepine (TRILEPTAL) 300 mg tablet 2018 at Unknown time  Yes Yes   Sig: Take 300 mg by mouth 2 (two) times a day   PROAIR  (90 Base) MCG/ACT inhaler  Self Yes Yes   amoxicillin (AMOXIL) 875 mg tablet 2018 at Unknown time  No Yes   Sig: Take 1 tablet (875 mg total) by mouth 2 (two) times a day for 10 days   desvenlafaxine (PRISTIQ) 100 mg 24 hr tablet 2018 at Unknown time Self Yes Yes   Sig: Take 100 mg by mouth daily     gabapentin (NEURONTIN) 300 mg capsule 2018 at Unknown time Self Yes Yes   Sig: Take 300 mg by mouth daily at bedtime     medroxyPROGESTERone (DEPO-PROVERA) 150 mg/mL injection 2018  Yes Yes   Sig: medroxyprogesterone 150 mg/mL intramuscular suspension   pantoprazole (PROTONIX) 40 mg tablet 2018 at Unknown time Self Yes Yes   Sig: Take 40 mg by mouth 2 (two) times a day     topiramate (TOPAMAX) 100 mg tablet 2018 at Unknown time Self Yes Yes   Sig: Take by mouth 2 (two) times a day     traMADol (ULTRAM) 50 mg tablet  Self Yes Yes   Si mg as needed     valACYclovir (VALTREX) 500 mg tablet 2018 at Unknown time Self Yes Yes   Sig: Take 500 mg by mouth daily        Facility-Administered Medications: None       Past Medical History:   Diagnosis Date    Bipolar 1 disorder ([facility] [address] )     with bordeline personality    Depression     Depression     GERD (gastroesophageal reflux disease)     Incontinence     Migraine     Urinary tract infection        Past Surgical History:   Procedure Laterality Date    CHOLECYSTECTOMY  05/24/2018    FOOT SURGERY      KNEE ARTHROSCOPY      OVARIAN CYST REMOVAL      STOMACH SURGERY         Family History   Problem Relation Age of Onset    Hyperlipidemia Father     Heart disease Mother     Depression Family      I have reviewed and agree with the history as documented  Social History   Substance Use Topics    Smoking status: Never Smoker    Smokeless tobacco: Never Used    Alcohol use Yes      Comment: rarely        Review of Systems   Constitutional: Negative for activity change, chills, fatigue and fever  HENT: Positive for mouth sores and sore throat  Negative for congestion, dental problem, rhinorrhea and sinus pain  Eyes: Negative for discharge, redness and itching  Respiratory: Negative for apnea, cough and shortness of breath  Cardiovascular: Negative for chest pain and leg swelling  Gastrointestinal: Negative for abdominal distention, constipation, diarrhea and vomiting  Endocrine: Negative for cold intolerance and heat intolerance  Genitourinary: Negative for difficulty urinating and frequency  Musculoskeletal: Negative for arthralgias and gait problem  Skin: Negative for color change and pallor  Neurological: Negative for dizziness and facial asymmetry  Hematological: Negative for adenopathy  Psychiatric/Behavioral: Negative for agitation, behavioral problems, confusion and sleep disturbance  Physical Exam  Physical Exam   Constitutional: She appears well-developed and well-nourished  HENT:   Head: Normocephalic  Right Ear: External ear normal    Left Ear: External ear normal    Nose: Nose normal    Mouth/Throat: Oropharynx is clear and moist    Patient has small red dots on the top of her mouth  There is no exudate  There is no drainage     Eyes: Conjunctivae and EOM are normal  Pupils are equal, round, and reactive to light  Cardiovascular: Normal rate, regular rhythm and normal heart sounds  Pulmonary/Chest: Effort normal and breath sounds normal    Abdominal: Soft  Bowel sounds are normal    Musculoskeletal: Normal range of motion  Neurological: She is alert  Skin: Skin is warm and dry  Capillary refill takes less than 2 seconds  Psychiatric: She has a normal mood and affect  Her behavior is normal  Judgment and thought content normal        Vital Signs  ED Triage Vitals [09/02/18 1341]   Temperature Pulse Respirations Blood Pressure SpO2   99 2 °F (37 3 °C) 90 20 126/89 96 %      Temp Source Heart Rate Source Patient Position - Orthostatic VS BP Location FiO2 (%)   Temporal Monitor Sitting Left arm --      Pain Score       5           Vitals:    09/02/18 1341   BP: 126/89   Pulse: 90   Patient Position - Orthostatic VS: Sitting       Visual Acuity      ED Medications  Medications - No data to display    Diagnostic Studies  Results Reviewed     None                 No orders to display              Procedures  Procedures       Phone Contacts  ED Phone Contact    ED Course                               [facility]  CritCare Time    Disposition  Final diagnoses:   None     ED Disposition     None      Follow-up Information    None         Patient's Medications   Discharge Prescriptions    No medications on file     No discharge procedures on file      ED Provider  Electronically Signed by           Kecia Beach PA-C  09/02/18 5268

## 2018-09-11 ENCOUNTER — TRANSCRIBE ORDERS (OUTPATIENT)
Dept: SLEEP CENTER | Facility: HOSPITAL | Age: 43
End: 2018-09-11

## 2018-09-11 DIAGNOSIS — R06.83 SNORING: ICD-10-CM

## 2018-09-11 DIAGNOSIS — G25.81 RESTLESS LEGS: ICD-10-CM

## 2018-09-11 DIAGNOSIS — G47.33 OSA (OBSTRUCTIVE SLEEP APNEA): Primary | ICD-10-CM

## 2018-09-11 DIAGNOSIS — G47.19 EXCESSIVE DAYTIME SLEEPINESS: Primary | ICD-10-CM

## 2018-09-13 ENCOUNTER — HOSPITAL ENCOUNTER (OUTPATIENT)
Dept: SLEEP CENTER | Facility: HOSPITAL | Age: 43
Discharge: HOME | End: 2018-09-13

## 2018-09-13 DIAGNOSIS — G47.19 EXCESSIVE DAYTIME SLEEPINESS: ICD-10-CM

## 2018-09-13 DIAGNOSIS — G25.81 RESTLESS LEGS: ICD-10-CM

## 2018-09-13 DIAGNOSIS — R06.83 SNORING: ICD-10-CM

## 2018-09-22 ENCOUNTER — HOSPITAL ENCOUNTER (EMERGENCY)
Facility: HOSPITAL | Age: 43
Discharge: HOME | End: 2018-09-22
Attending: INTERNAL MEDICINE | Admitting: INTERNAL MEDICINE
Payer: MEDICARE

## 2018-09-22 ENCOUNTER — APPOINTMENT (EMERGENCY)
Dept: RADIOLOGY | Facility: HOSPITAL | Age: 43
End: 2018-09-22
Payer: MEDICARE

## 2018-09-22 VITALS
OXYGEN SATURATION: 97 % | SYSTOLIC BLOOD PRESSURE: 144 MMHG | WEIGHT: 153 LBS | BODY MASS INDEX: 25.49 KG/M2 | RESPIRATION RATE: 18 BRPM | HEIGHT: 65 IN | HEART RATE: 68 BPM | DIASTOLIC BLOOD PRESSURE: 80 MMHG | TEMPERATURE: 97.5 F

## 2018-09-22 DIAGNOSIS — S83.90XA KNEE SPRAIN: Primary | ICD-10-CM

## 2018-09-22 PROCEDURE — 96374 THER/PROPH/DIAG INJ IV PUSH: CPT

## 2018-09-22 PROCEDURE — 96375 TX/PRO/DX INJ NEW DRUG ADDON: CPT

## 2018-09-22 PROCEDURE — 73560 X-RAY EXAM OF KNEE 1 OR 2: CPT

## 2018-09-22 PROCEDURE — 99283 EMERGENCY DEPT VISIT LOW MDM: CPT

## 2018-09-22 RX ORDER — IBUPROFEN 600 MG/1
600 TABLET ORAL EVERY 6 HOURS PRN
Qty: 20 TABLET | Refills: 0 | Status: SHIPPED | OUTPATIENT
Start: 2018-09-22 | End: 2019-01-18 | Stop reason: ALTCHOICE

## 2018-09-22 RX ORDER — KETOROLAC TROMETHAMINE 30 MG/ML
60 INJECTION, SOLUTION INTRAMUSCULAR; INTRAVENOUS ONCE
Status: COMPLETED | OUTPATIENT
Start: 2018-09-22 | End: 2018-09-22

## 2018-09-22 RX ORDER — TOPIRAMATE 50 MG/1
50 TABLET, FILM COATED ORAL EVERY MORNING
COMMUNITY
End: 2018-11-29 | Stop reason: CLARIF

## 2018-09-22 RX ORDER — OXYCODONE HYDROCHLORIDE AND ACETAMINOPHEN 5; 325 MG/1; MG/1
2 TABLET ORAL EVERY 6 HOURS PRN
Qty: 20 TABLET | Refills: 0 | Status: SHIPPED | OUTPATIENT
Start: 2018-09-22 | End: 2018-10-04 | Stop reason: ALTCHOICE

## 2018-09-22 RX ORDER — MEPERIDINE HYDROCHLORIDE 50 MG/ML
50 INJECTION INTRAMUSCULAR; INTRAVENOUS; SUBCUTANEOUS ONCE
Status: COMPLETED | OUTPATIENT
Start: 2018-09-22 | End: 2018-09-22

## 2018-09-22 RX ADMIN — MEPERIDINE HYDROCHLORIDE 50 MG: 50 INJECTION INTRAMUSCULAR; INTRAVENOUS; SUBCUTANEOUS at 04:08

## 2018-09-22 RX ADMIN — KETOROLAC TROMETHAMINE 60 MG: 30 INJECTION, SOLUTION INTRAMUSCULAR; INTRAVENOUS at 04:08

## 2018-09-22 NOTE — ED PROVIDER NOTES
History  Chief Complaint   Patient presents with    Knee Pain     Patient c/o right knee pain, states "I think I over did it on the treadmill today "  Took 2 Percocet 5/325mg around , states "didn't help at all"  States pain is an 8/10 throbbing pain which shoots up and down leg     Patient has significant right knee pain  Over did on the treadmill she thinks, 2 Percocet to not help her pain  Has history of meniscal issues  Works have a daily basis  Does not remember any acute injury, but has been aggressively working out with running  He usually wears protective sleeve during her training  History provided by:  Patient   used: No        Prior to Admission Medications   Prescriptions Last Dose Informant Patient Reported? Taking?    ALPRAZolam (XANAX) 1 mg tablet  Self Yes No   Sig: Take 1 mg by mouth daily at bedtime as needed     OXcarbazepine (TRILEPTAL) 300 mg tablet   Yes No   Sig: Take 300 mg by mouth 2 (two) times a day   PROAIR  (90 Base) MCG/ACT inhaler  Self Yes No   desvenlafaxine (PRISTIQ) 100 mg 24 hr tablet  Self Yes No   Sig: Take 100 mg by mouth daily     gabapentin (NEURONTIN) 300 mg capsule  Self Yes No   Sig: Take 300 mg by mouth daily at bedtime     medroxyPROGESTERone (DEPO-PROVERA) 150 mg/mL injection   Yes No   Sig: medroxyprogesterone 150 mg/mL intramuscular suspension   pantoprazole (PROTONIX) 40 mg tablet  Self Yes No   Sig: Take 40 mg by mouth 2 (two) times a day     topiramate (TOPAMAX) 100 mg tablet  Self Yes No   Sig: Take by mouth 2 (two) times a day     traMADol (ULTRAM) 50 mg tablet  Self Yes No   Si mg as needed     valACYclovir (VALTREX) 500 mg tablet  Self Yes No   Sig: Take 500 mg by mouth daily        Facility-Administered Medications: None       Past Medical History:   Diagnosis Date    Bipolar 1 disorder (HCC)     with bordeline personality    Depression     Depression     GERD (gastroesophageal reflux disease)     Incontinence     Migraine     Urinary tract infection        Past Surgical History:   Procedure Laterality Date    CHOLECYSTECTOMY  05/24/2018    FOOT SURGERY      KNEE ARTHROSCOPY      OVARIAN CYST REMOVAL      STOMACH SURGERY         Family History   Problem Relation Age of Onset    Hyperlipidemia Father     Heart disease Mother     Depression Family      I have reviewed and agree with the history as documented  Social History   Substance Use Topics    Smoking status: Never Smoker    Smokeless tobacco: Never Used    Alcohol use Yes      Comment: rarely        Review of Systems   Constitutional: Negative for chills and fever  HENT: Negative for rhinorrhea and sore throat  Eyes: Negative for visual disturbance  Respiratory: Negative for cough and shortness of breath  Cardiovascular: Negative for chest pain and leg swelling  Gastrointestinal: Negative for abdominal pain, diarrhea, nausea and vomiting  Genitourinary: Negative for dysuria  Musculoskeletal: Positive for joint swelling  Negative for back pain and myalgias  Skin: Negative for rash  Neurological: Negative for dizziness and headaches  Psychiatric/Behavioral: Negative for confusion  All other systems reviewed and are negative  Physical Exam  Physical Exam   Constitutional: She is oriented to person, place, and time  She appears well-developed and well-nourished  HENT:   Nose: Nose normal    Mouth/Throat: Oropharynx is clear and moist  No oropharyngeal exudate  Eyes: Conjunctivae and EOM are normal  Pupils are equal, round, and reactive to light  No scleral icterus  Neck: Normal range of motion  Neck supple  No JVD present  No tracheal deviation present  Cardiovascular: Normal rate, regular rhythm and normal heart sounds  No murmur heard  Pulmonary/Chest: Effort normal and breath sounds normal  No respiratory distress  She has no wheezes  She has no rales  Abdominal: Soft   Bowel sounds are normal  There is no tenderness  There is no guarding  Musculoskeletal: She exhibits tenderness  She exhibits no edema  Swollen tender right knee, pelvic gait, brought in by wheelchair   Neurological: She is alert and oriented to person, place, and time  No cranial nerve deficit or sensory deficit  She exhibits normal muscle tone  5/5 motor, nl sens   Skin: Skin is warm and dry  Psychiatric: She has a normal mood and affect  Her behavior is normal    Nursing note and vitals reviewed  Vital Signs  ED Triage Vitals [09/22/18 0234]   Temperature Pulse Respirations Blood Pressure SpO2   97 5 °F (36 4 °C) 68 18 144/80 97 %      Temp Source Heart Rate Source Patient Position - Orthostatic VS BP Location FiO2 (%)   Temporal Right Sitting Left arm --      Pain Score       8           Vitals:    09/22/18 0234   BP: 144/80   Pulse: 68   Patient Position - Orthostatic VS: Sitting       Visual Acuity      ED Medications  Medications - No data to display    Diagnostic Studies  Results Reviewed     None                 No orders to display              Procedures  Procedures       Phone Contacts  ED Phone Contact    ED Course                               [facility]  CritCare Time    Disposition  Final diagnoses:   None     ED Disposition     None      Follow-up Information    None         Patient's Medications   Discharge Prescriptions    No medications on file     No discharge procedures on file      ED Provider  Electronically Signed by           Ino Blackburn DO  09/22/18 0263

## 2018-09-22 NOTE — DISCHARGE INSTRUCTIONS
Knee Sprain   WHAT YOU NEED TO KNOW:   A knee sprain occurs when one or more ligaments in your knee are suddenly stretched or torn  Ligaments are tissues that hold bones together  Ligaments support the knee and keep the joint and bones in the correct position  DISCHARGE INSTRUCTIONS:   Return to the emergency department if:   · Any part of your leg feels cold, numb, or looks pale     Contact your healthcare provider if:   · You have new or increased swelling, bruising, or pain in your knee  · Your symptoms do not improve within 6 weeks, even with treatment  · You have questions or concerns about your condition or care  Medicines:   · NSAIDs , such as ibuprofen, help decrease swelling, pain, and fever  This medicine is available with or without a doctor's order  NSAIDs can cause stomach bleeding or kidney problems in certain people  If you take blood thinner medicine, always ask your healthcare provider if NSAIDs are safe for you  Always read the medicine label and follow directions  · Acetaminophen  decreases pain and fever  It is available without a doctor's order  Ask how much to take and how often to take it  Follow directions  Read the labels of all other medicines you are using to see if they also contain acetaminophen, or ask your doctor or pharmacist  Acetaminophen can cause liver damage if not taken correctly  Do not use more than 4 grams (4,000 milligrams) total of acetaminophen in one day  · Prescription pain medicine  may be given  Ask how to take this medicine safely  · Take your medicine as directed  Contact your healthcare provider if you think your medicine is not helping or if you have side effects  Tell him or her if you are allergic to any medicine  Keep a list of the medicines, vitamins, and herbs you take  Include the amounts, and when and why you take them  Bring the list or the pill bottles to follow-up visits   Carry your medicine list with you in case of an emergency  Self-care:   · Rest  your knee and do not exercise  You may be told to keep weight off your knee  This means that you should not walk on your injured leg  Rest helps decrease swelling and allows the injury to heal  You can do gentle range of motion (ROM) exercises as directed  This will prevent stiffness  · Apply ice  on your knee for 15 to 20 minutes every hour or as directed  Use an ice pack, or put crushed ice in a plastic bag  Cover it with a towel  Ice helps prevent tissue damage and decreases swelling and pain  · Apply compression to your knee as directed  You may need to wear an elastic bandage  This helps keep your injured knee from moving too much while it heals  You can loosen or tighten the elastic bandage to make it comfortable  It should be tight enough for you to feel support  It should not be so tight that it causes your toes to feel numb or tingly  If you are wearing an elastic bandage, take it off and rewrap it once a day  · Elevate your knee  above the level of your heart as often as you can  This will help decrease swelling and pain  Prop your leg on pillows or blankets to keep it elevated comfortably  Do not put pillows directly behind your knee  · Use support devices as directed:  Support devices such as a splint or brace may be needed  These devices limit movement and protect your joint while it heals  You may be given crutches to use until you can stand on your injured leg without pain  Use devices as directed  Physical therapy:  A physical therapist teaches you exercises to help improve movement and strength, and to decrease pain  Prevent another knee sprain:  Exercise your legs to keep your muscles strong  Strong leg muscles help protect your knee and prevent strain  The following may also prevent a knee sprain:  · Slowly start your exercise or training program   Slowly increase the time, distance, and intensity of your exercise   Sudden increases in training may cause you to injure your knee again  · Wear protective braces and equipment as directed  Braces may prevent your knee from moving the wrong way and causing another sprain  Protective equipment may support your bones and ligaments to prevent injury  · Warm up and stretch before exercise  Warm up by walking or using an exercise bike before starting your regular exercise  Do gentle stretches after warming up  This helps to loosen your muscles and decrease stress on your knee  Cool down and stretch after you exercise  · Wear shoes that fit correctly and support your feet  Replace your running or exercise shoes before the padding or shock absorption is worn out  Ask your healthcare provider which exercise shoes are best for you  Ask if you should wear special shoe inserts  Shoe inserts can help support your heels and arches or keep your foot lined up correctly in your shoes  Exercise on flat surfaces  Follow up with your healthcare provider as directed:  Write down your questions so you remember to ask them during your visits  © 2017 [facility] Information is for End User's use only and may not be sold, redistributed or otherwise used for commercial purposes  All illustrations and images included in CareNotes® are the copyrighted property of [employer]  or Dain Wahl  The above information is an  only  It is not intended as medical advice for individual conditions or treatments  Talk to your doctor, nurse or pharmacist before following any medical regimen to see if it is safe and effective for you    Use crutches until seen  I saw regularly

## 2018-09-24 ENCOUNTER — TELEPHONE (OUTPATIENT)
Dept: SLEEP CENTER | Facility: HOSPITAL | Age: 43
End: 2018-09-24

## 2018-09-24 ENCOUNTER — TRANSCRIBE ORDERS (OUTPATIENT)
Dept: ADMINISTRATIVE | Facility: HOSPITAL | Age: 43
End: 2018-09-24

## 2018-09-24 DIAGNOSIS — M25.561 RIGHT KNEE PAIN, UNSPECIFIED CHRONICITY: ICD-10-CM

## 2018-09-24 DIAGNOSIS — R52 PAIN: Primary | ICD-10-CM

## 2018-09-26 ENCOUNTER — HOSPITAL ENCOUNTER (OUTPATIENT)
Dept: MRI IMAGING | Facility: HOSPITAL | Age: 43
Discharge: HOME | End: 2018-09-26
Payer: MEDICARE

## 2018-09-26 DIAGNOSIS — M25.561 RIGHT KNEE PAIN, UNSPECIFIED CHRONICITY: ICD-10-CM

## 2018-09-26 DIAGNOSIS — R52 PAIN: ICD-10-CM

## 2018-09-26 PROCEDURE — 73721 MRI JNT OF LWR EXTRE W/O DYE: CPT

## 2018-10-01 ENCOUNTER — TRANSCRIBE ORDERS (OUTPATIENT)
Dept: SLEEP CENTER | Facility: CLINIC | Age: 43
End: 2018-10-01

## 2018-10-01 DIAGNOSIS — G47.19 EXCESSIVE DAYTIME SLEEPINESS: Primary | ICD-10-CM

## 2018-10-04 ENCOUNTER — OFFICE VISIT (OUTPATIENT)
Dept: NEUROLOGY | Facility: CLINIC | Age: 43
End: 2018-10-04
Payer: MEDICARE

## 2018-10-04 VITALS
HEART RATE: 81 BPM | DIASTOLIC BLOOD PRESSURE: 83 MMHG | BODY MASS INDEX: 25.99 KG/M2 | SYSTOLIC BLOOD PRESSURE: 153 MMHG | HEIGHT: 65 IN | RESPIRATION RATE: 18 BRPM | WEIGHT: 156 LBS

## 2018-10-04 DIAGNOSIS — R20.9 SENSATION OF COLD IN LOWER EXTREMITY: ICD-10-CM

## 2018-10-04 DIAGNOSIS — G44.209 TENSION HEADACHE: ICD-10-CM

## 2018-10-04 DIAGNOSIS — G43.009 MIGRAINE WITHOUT AURA AND WITHOUT STATUS MIGRAINOSUS, NOT INTRACTABLE: Primary | ICD-10-CM

## 2018-10-04 DIAGNOSIS — R40.0 SOMNOLENCE: ICD-10-CM

## 2018-10-04 PROCEDURE — 99214 OFFICE O/P EST MOD 30 MIN: CPT | Performed by: PSYCHIATRY & NEUROLOGY

## 2018-10-04 NOTE — ASSESSMENT & PLAN NOTE
Daytime hypersomnolence remains problematic  This has continued despite significant reduction in her overall medications through this office  She is scheduled for further evaluation with sleep medicine  She has had a not too distant past polysomnogram performed which did not reveal evidence of obstructive sleep apnea  She is now going to be scheduled for an MSLT and subsequent follow up with Sleep Medicine, Dr Elma Saldaña  --to follow through with her scheduled MSLT and follow-up with Dr Elma Saldaña

## 2018-10-04 NOTE — PATIENT INSTRUCTIONS
Continue topiramate 50 mg twice daily  Continue your compromised gabapentin schedule at 300 mg at bedtime nightly  To call your primary physician today with regard to your "cold" right leg      Continue your ongoing work with dental

## 2018-10-04 NOTE — PROGRESS NOTES
Patient is here today for migraine's    Patient ID: Herbert Judge is a 38 y o  female  Assessment/Plan:    Migraine without aura and without status migrainosus, not intractable  From the standpoint of her migraine only, she has not done badly  In fact, since last seen she has had only 2 migraine episodes, despite the reduction in her topiramate delivery  She was reduced as result of distal paresthesia to now only 50 mg twice daily  The paresthesias have dissipated given the reduction  --to continue the lower topiramate schedule of 50 mg twice daily  Tension headache  Her tension-type headaches remain her more significant headache issue  I suspect at this point in time there are contributing factors which include malocclusion which is hopefully going to be 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, but also a perhaps aggravated in the presence of the potential for inadequate restorative sleep  --for now to continue her lowered schedule of gabapentin 300 mg at bedtime nightly  Will not consider increasing until her situation with regard to daytime hypersomnolence is further addressed through sleep medicine given the potential for gabapentin to enhance daytime sleepiness  Somnolence  Daytime hypersomnolence remains problematic  This has continued despite significant reduction in her overall medications through this office  She is scheduled for further evaluation with sleep medicine  She has had a not too distant past polysomnogram performed which did not reveal evidence of obstructive sleep apnea  She is now going to be scheduled for an MSLT and subsequent follow up with Sleep Medicine, Dr Linden Resendiz  --to follow through with her scheduled MSLT and follow-up with Dr Linden Resendiz  Sensation of cold in lower extremity  She brought to my attention today the fact that over the past 2 weeks or so she has felt as if her right leg is globally colder than her left leg  Indeed, by touch her right leg is cooler  Her examination w ould not suggest any typical neuropathy as in origin  I was able to palpate both posterior tibial pulses and there is no color differential but clearly a temperature differential   --she will be contacting her primary care practitioner today for further evaluation      She will follow up in 8 weeks  Subjective:    HPI  Patient, now 38years of age, is generally followed here for her headaches circumstance, consisting of both migraine without aura as well as tension-type headaches  With regard to her migraine type headaches, she has had only 2 such headaches since last seen here some 2 and half months ago, despite a reduction in her topiramate to now 50 mg twice daily  This reduction was undertaken as result of distal paresthesias which have subsided with the reduction  Her major complaint with regard to headaches is in reference to her tension-type headaches  These have taken on a virtual daily presence and are generally apparent on awakening in the morning  Because of difficulties with daytime hypersomnolence, her gabapentin has been reduced to now just 300 mg at bedtime nightly, with no change in that complaint of daytime sleepiness  Recent past polysomnogram demonstrated no support for obstructive sleep apnea  She is now scheduled because of her hypersomnolence for an MSLT followed by a formal appointment with Sleep Medicine, Dr Gris Alonzo  In addition, she continues to work with dental with regard to malocclusion which more than likely is also a contributor to her muscle contraction/tension type headaches  She brought to my attention today the fact that over the past 2 or so weeks she has felt that her right leg is colder than her left  This is unassociated with any overt sensory or motor difficulties  She does now walk on crutches as result of the need for meniscal surgery right knee            Past Medical History:   Diagnosis Date    Bipolar 1 disorder ([facility] [address] )     with arpita personality    Borderline personality disorder ([facility] [address] )     Chronic headaches     Depression     Depression     GERD (gastroesophageal reflux disease)     Incontinence     Migraine     Urinary tract infection      Past Surgical History:   Procedure Laterality Date    CHOLECYSTECTOMY  05/24/2018    FOOT SURGERY Right     KNEE ARTHROSCOPY      OVARIAN CYST REMOVAL      STOMACH SURGERY       Social History     Social History    Marital status: Single     Spouse name: N/A    Number of children: N/A    Years of education: N/A     Social History Main Topics    Smoking status: Never Smoker    Smokeless tobacco: Never Used    Alcohol use Yes      Comment: rarely    Drug use: No    Sexual activity: Not Asked     Other Topics Concern    None     Social History Narrative    None     Family History   Problem Relation Age of Onset    Hyperlipidemia Father     Heart disease Mother     Migraines Mother     Depression Family      Allergies   Allergen Reactions    Lamotrigine Rash and Hives     Other reaction(s): rash and itching       Current Outpatient Prescriptions:     ALPRAZolam (XANAX) 1 mg tablet, Take 1 mg by mouth daily at bedtime  , Disp: , Rfl:     desvenlafaxine (PRISTIQ) 100 mg 24 hr tablet, Take 100 mg by mouth daily  , Disp: , Rfl:     gabapentin (NEURONTIN) 300 mg capsule, Take by mouth daily  , Disp: , Rfl:     ibuprofen (MOTRIN) 600 mg tablet, Take 1 tablet (600 mg total) by mouth every 6 (six) hours as needed for mild pain or moderate pain for up to 20 doses, Disp: 20 tablet, Rfl: 0    medroxyPROGESTERone (DEPO-PROVERA) 150 mg/mL injection, medroxyprogesterone 150 mg/mL intramuscular suspension, Disp: , Rfl:     OXcarbazepine (TRILEPTAL) 300 mg tablet, Take 300 mg by mouth 2 (two) times a day, Disp: , Rfl: 2    pantoprazole (PROTONIX) 40 mg tablet, Take 40 mg by mouth 2 (two) times a day  , Disp: , Rfl:     PROAIR  (90 Base) MCG/ACT inhaler, Inhale every 4 (four) hours as needed (as needed)  , Disp: , Rfl:     topiramate (TOPAMAX) 100 mg tablet, Take by mouth daily at bedtime  , Disp: , Rfl:     topiramate (TOPAMAX) 50 MG tablet, Take 50 mg by mouth every morning, Disp: , Rfl:     traMADol (ULTRAM) 50 mg tablet, 50 mg as needed  , Disp: , Rfl: 0    valACYclovir (VALTREX) 500 mg tablet, Take 500 mg by mouth daily  , Disp: , Rfl:     Objective:    Blood pressure 153/83, pulse 81, resp  rate 18, height 5' 5" (1 651 m), weight 70 8 kg (156 lb), last menstrual period 09/22/2018, not currently breastfeeding  Physical Exam  Lungs clear to auscultation  Rhythm regular  Right lower extremity indeed cooler to touch than the left, globally so  This was unassociated with any color differential   No significant lower extremity edema noted  I was able to palpate posterior tibial pulses bilaterally  Neurological Exam  Alert  Pleasantly interactive  Fully oriented  Utilizing crutches for gait, as nonweightbearing on right leg  Cranial nerves 2 through 12 tested and grossly intact except for a right subtle facial asymmetry, unchanged from the past   Good symmetrical strength throughout the 4 extremities including fully retained capability  Pin and position intact throughout  Muscle stretch reflexes bilaterally 2 throughout the upper extremities, at the knees and at the ankles  Toe response is downgoing bilaterally  ROS:    Review of Systems   Constitutional: Negative  Negative for appetite change and fever  HENT: Positive for tinnitus (seeing and ENT)  Negative for hearing loss, trouble swallowing and voice change  Eyes: Negative  Negative for photophobia and pain  Respiratory: Negative  Negative for shortness of breath  Cardiovascular: Negative  Negative for palpitations  Gastrointestinal: Negative  Negative for nausea and vomiting  Endocrine: Negative  Negative for cold intolerance and heat intolerance  Genitourinary: Negative    Negative for dysuria, frequency and urgency  Musculoskeletal: Negative for myalgias and neck pain  Right knee torn meniscus and bone bruise   Skin: Negative  Negative for rash  Allergic/Immunologic: Negative  Neurological: Positive for headaches  Negative for dizziness, tremors, seizures, syncope, facial asymmetry, speech difficulty, weakness, light-headedness and numbness  Hematological: Negative  Does not bruise/bleed easily  Psychiatric/Behavioral: Positive for sleep disturbance (at times)  Negative for confusion and hallucinations  I personally reviewed the ROS that was entered by the medical assistant

## 2018-10-04 NOTE — ASSESSMENT & PLAN NOTE
From the standpoint of her migraine only, she has not done badly  In fact, since last seen she has had only 2 migraine episodes, despite the reduction in her topiramate delivery  She was reduced as result of distal paresthesia to now only 50 mg twice daily  The paresthesias have dissipated given the reduction  --to continue the lower topiramate schedule of 50 mg twice daily

## 2018-10-04 NOTE — ASSESSMENT & PLAN NOTE
She brought to my attention today the fact that over the past 2 weeks or so she has felt as if her right leg is globally colder than her left leg  Indeed, by touch her right leg is cooler  Her examination w ould not suggest any typical neuropathy as in origin     I was able to palpate both posterior tibial pulses and there is no color differential but clearly a temperature differential   --she will be contacting her primary care practitioner today for further evaluation

## 2018-10-04 NOTE — LETTER
October 4, 2018     Abhilash Michel, [address] [address]    Patient: Gurvinder Santo   YOB: 1975   Date of Visit: 10/4/2018       Dear Dr Tj Patel: Thank you for referring Lexi Dunnechristophe to me for evaluation  Below are my notes for this consultation  If you have questions, please do not hesitate to call me  I look forward to following your patient along with you  Sincerely,        Huma Lizama MD        CC: MD Huma Wilkinson MD  10/4/2018 10:46 AM  Sign at close encounter  Patient is here today for migraine's    Patient ID: Gurvinder Santo is a 38 y o  female  Assessment/Plan:    Migraine without aura and without status migrainosus, not intractable  From the standpoint of her migraine only, she has not done badly  In fact, since last seen she has had only 2 migraine episodes, despite the reduction in her topiramate delivery  She was reduced as result of distal paresthesia to now only 50 mg twice daily  The paresthesias have dissipated given the reduction  --to continue the lower topiramate schedule of 50 mg twice daily  Tension headache  Her tension-type headaches remain her more significant headache issue  I suspect at this point in time there are contributing factors which include malocclusion which is hopefully going to be 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, but also a perhaps aggravated in the presence of the potential for inadequate restorative sleep  --for now to continue her lowered schedule of gabapentin 300 mg at bedtime nightly  Will not consider increasing until her situation with regard to daytime hypersomnolence is further addressed through sleep medicine given the potential for gabapentin to enhance daytime sleepiness  Somnolence  Daytime hypersomnolence remains problematic  This has continued despite significant reduction in her overall medications through this office    She is scheduled for further evaluation with sleep medicine  She has had a not too distant past polysomnogram performed which did not reveal evidence of obstructive sleep apnea  She is now going to be scheduled for an MSLT and subsequent follow up with Sleep Medicine, Dr Angela Washburn  --to follow through with her scheduled MSLT and follow-up with Dr Angela Washburn  Sensation of cold in lower extremity  She brought to my attention today the fact that over the past 2 weeks or so she has felt as if her right leg is globally colder than her left leg  Indeed, by touch her right leg is cooler  Her examination w ould not suggest any typical neuropathy as in origin  I was able to palpate both posterior tibial pulses and there is no color differential but clearly a temperature differential   --she will be contacting her primary care practitioner today for further evaluation      She will follow up in 8 weeks  Subjective:    HPI  Patient, now 38years of age, is generally followed here for her headaches circumstance, consisting of both migraine without aura as well as tension-type headaches  With regard to her migraine type headaches, she has had only 2 such headaches since last seen here some 2 and half months ago, despite a reduction in her topiramate to now 50 mg twice daily  This reduction was undertaken as result of distal paresthesias which have subsided with the reduction  Her major complaint with regard to headaches is in reference to her tension-type headaches  These have taken on a virtual daily presence and are generally apparent on awakening in the morning  Because of difficulties with daytime hypersomnolence, her gabapentin has been reduced to now just 300 mg at bedtime nightly, with no change in that complaint of daytime sleepiness  Recent past polysomnogram demonstrated no support for obstructive sleep apnea    She is now scheduled because of her hypersomnolence for an MSLT followed by a formal appointment with Sleep Medicine, Dr Jamie Ariza  In addition, she continues to work with dental with regard to malocclusion which more than likely is also a contributor to her muscle contraction/tension type headaches  She brought to my attention today the fact that over the past 2 or so weeks she has felt that her right leg is colder than her left  This is unassociated with any overt sensory or motor difficulties  She does now walk on crutches as result of the need for meniscal surgery right knee            Past Medical History:   Diagnosis Date    Bipolar 1 disorder ([facility] )     with bordeline personality    Borderline personality disorder ([facility] )     Chronic headaches     Depression     Depression     GERD (gastroesophageal reflux disease)     Incontinence     Migraine     Urinary tract infection      Past Surgical History:   Procedure Laterality Date    CHOLECYSTECTOMY  05/24/2018    FOOT SURGERY Right     KNEE ARTHROSCOPY      OVARIAN CYST REMOVAL      STOMACH SURGERY       Social History     Social History    Marital status: Single     Spouse name: N/A    Number of children: N/A    Years of education: N/A     Social History Main Topics    Smoking status: Never Smoker    Smokeless tobacco: Never Used    Alcohol use Yes      Comment: rarely    Drug use: No    Sexual activity: Not Asked     Other Topics Concern    None     Social History Narrative    None     Family History   Problem Relation Age of Onset    Hyperlipidemia Father     Heart disease Mother     Migraines Mother     Depression Family      Allergies   Allergen Reactions    Lamotrigine Rash and Hives     Other reaction(s): rash and itching       Current Outpatient Prescriptions:     ALPRAZolam (XANAX) 1 mg tablet, Take 1 mg by mouth daily at bedtime  , Disp: , Rfl:     desvenlafaxine (PRISTIQ) 100 mg 24 hr tablet, Take 100 mg by mouth daily  , Disp: , Rfl:     gabapentin (NEURONTIN) 300 mg capsule, Take by mouth daily  , Disp: , Rfl:     ibuprofen (MOTRIN) 600 mg tablet, Take 1 tablet (600 mg total) by mouth every 6 (six) hours as needed for mild pain or moderate pain for up to 20 doses, Disp: 20 tablet, Rfl: 0    medroxyPROGESTERone (DEPO-PROVERA) 150 mg/mL injection, medroxyprogesterone 150 mg/mL intramuscular suspension, Disp: , Rfl:     OXcarbazepine (TRILEPTAL) 300 mg tablet, Take 300 mg by mouth 2 (two) times a day, Disp: , Rfl: 2    pantoprazole (PROTONIX) 40 mg tablet, Take 40 mg by mouth 2 (two) times a day  , Disp: , Rfl:     PROAIR  (90 Base) MCG/ACT inhaler, Inhale every 4 (four) hours as needed (as needed)  , Disp: , Rfl:     topiramate (TOPAMAX) 100 mg tablet, Take by mouth daily at bedtime  , Disp: , Rfl:     topiramate (TOPAMAX) 50 MG tablet, Take 50 mg by mouth every morning, Disp: , Rfl:     traMADol (ULTRAM) 50 mg tablet, 50 mg as needed  , Disp: , Rfl: 0    valACYclovir (VALTREX) 500 mg tablet, Take 500 mg by mouth daily  , Disp: , Rfl:     Objective:    Blood pressure 153/83, pulse 81, resp  rate 18, height 5' 5" (1 651 m), weight 70 8 kg (156 lb), last menstrual period 09/22/2018, not currently breastfeeding  Physical Exam  Lungs clear to auscultation  Rhythm regular  Right lower extremity indeed cooler to touch than the left, globally so  This was unassociated with any color differential   No significant lower extremity edema noted  I was able to palpate posterior tibial pulses bilaterally  Neurological Exam  Alert  Pleasantly interactive  Fully oriented  Utilizing crutches for gait, as nonweightbearing on right leg  Cranial nerves 2 through 12 tested and grossly intact except for a right subtle facial asymmetry, unchanged from the past   Good symmetrical strength throughout the 4 extremities including fully retained capability  Pin and position intact throughout  Muscle stretch reflexes bilaterally 2 throughout the upper extremities, at the knees and at the ankles    Toe response is downgoing bilaterally  ROS:    Review of Systems   Constitutional: Negative  Negative for appetite change and fever  HENT: Positive for tinnitus (seeing and ENT)  Negative for hearing loss, trouble swallowing and voice change  Eyes: Negative  Negative for photophobia and pain  Respiratory: Negative  Negative for shortness of breath  Cardiovascular: Negative  Negative for palpitations  Gastrointestinal: Negative  Negative for nausea and vomiting  Endocrine: Negative  Negative for cold intolerance and heat intolerance  Genitourinary: Negative  Negative for dysuria, frequency and urgency  Musculoskeletal: Negative for myalgias and neck pain  Right knee torn meniscus and bone bruise   Skin: Negative  Negative for rash  Allergic/Immunologic: Negative  Neurological: Positive for headaches  Negative for dizziness, tremors, seizures, syncope, facial asymmetry, speech difficulty, weakness, light-headedness and numbness  Hematological: Negative  Does not bruise/bleed easily  Psychiatric/Behavioral: Positive for sleep disturbance (at times)  Negative for confusion and hallucinations  I personally reviewed the ROS that was entered by the medical assistant

## 2018-10-04 NOTE — ASSESSMENT & PLAN NOTE
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

## 2018-10-11 ENCOUNTER — APPOINTMENT (OUTPATIENT)
Dept: LAB | Facility: HOSPITAL | Age: 43
End: 2018-10-11
Payer: MEDICARE

## 2018-10-11 ENCOUNTER — TRANSCRIBE ORDERS (OUTPATIENT)
Dept: ADMINISTRATIVE | Facility: HOSPITAL | Age: 43
End: 2018-10-11

## 2018-10-11 DIAGNOSIS — N39.0 URINARY TRACT INFECTION WITHOUT HEMATURIA, SITE UNSPECIFIED: ICD-10-CM

## 2018-10-11 DIAGNOSIS — R30.0 DYSURIA: Primary | ICD-10-CM

## 2018-10-11 DIAGNOSIS — R30.0 DYSURIA: ICD-10-CM

## 2018-10-11 LAB
BACTERIA UR QL AUTO: ABNORMAL /HPF
BILIRUB UR QL STRIP: NEGATIVE
CLARITY UR: CLEAR
COLOR UR: ABNORMAL
GLUCOSE UR STRIP-MCNC: NEGATIVE MG/DL
HGB UR QL STRIP.AUTO: ABNORMAL
KETONES UR STRIP-MCNC: NEGATIVE MG/DL
LEUKOCYTE ESTERASE UR QL STRIP: NEGATIVE
NITRITE UR QL STRIP: NEGATIVE
NON-SQ EPI CELLS URNS QL MICRO: ABNORMAL /HPF
PH UR STRIP.AUTO: 6 [PH] (ref 5–8)
PROT UR STRIP-MCNC: NEGATIVE MG/DL
RBC #/AREA URNS AUTO: ABNORMAL /HPF
SP GR UR STRIP.AUTO: <=1.005 (ref 1–1.03)
UROBILINOGEN UR QL STRIP.AUTO: 0.2 E.U./DL
WBC #/AREA URNS AUTO: ABNORMAL /HPF

## 2018-10-11 PROCEDURE — 81001 URINALYSIS AUTO W/SCOPE: CPT | Performed by: FAMILY MEDICINE

## 2018-10-11 PROCEDURE — 87086 URINE CULTURE/COLONY COUNT: CPT

## 2018-10-11 PROCEDURE — 87186 SC STD MICRODIL/AGAR DIL: CPT

## 2018-10-11 PROCEDURE — 87077 CULTURE AEROBIC IDENTIFY: CPT

## 2018-10-12 ENCOUNTER — OFFICE VISIT (OUTPATIENT)
Dept: URGENT CARE | Facility: CLINIC | Age: 43
End: 2018-10-12
Payer: MEDICARE

## 2018-10-12 VITALS
WEIGHT: 155 LBS | RESPIRATION RATE: 18 BRPM | HEART RATE: 76 BPM | OXYGEN SATURATION: 98 % | TEMPERATURE: 98 F | HEIGHT: 65 IN | SYSTOLIC BLOOD PRESSURE: 128 MMHG | DIASTOLIC BLOOD PRESSURE: 78 MMHG | BODY MASS INDEX: 25.83 KG/M2

## 2018-10-12 DIAGNOSIS — N30.00 ACUTE CYSTITIS WITHOUT HEMATURIA: Primary | ICD-10-CM

## 2018-10-12 PROCEDURE — 99213 OFFICE O/P EST LOW 20 MIN: CPT | Performed by: PHYSICIAN ASSISTANT

## 2018-10-12 PROCEDURE — G0463 HOSPITAL OUTPT CLINIC VISIT: HCPCS | Performed by: PHYSICIAN ASSISTANT

## 2018-10-12 RX ORDER — BUTALBITAL/ASPIRIN/CAFFEINE 50-325-40
1 CAPSULE ORAL EVERY 4 HOURS PRN
COMMUNITY
End: 2019-04-11 | Stop reason: ALTCHOICE

## 2018-10-12 RX ORDER — GABAPENTIN 100 MG/1
CAPSULE ORAL
Refills: 2 | COMMUNITY
Start: 2018-08-18 | End: 2018-11-02

## 2018-10-12 RX ORDER — TOPIRAMATE 100 MG/1
TABLET, FILM COATED ORAL
COMMUNITY
End: 2018-11-02

## 2018-10-12 RX ORDER — SULFAMETHOXAZOLE AND TRIMETHOPRIM 800; 160 MG/1; MG/1
1 TABLET ORAL 2 TIMES DAILY
Qty: 14 TABLET | Refills: 0 | Status: SHIPPED | OUTPATIENT
Start: 2018-10-12 | End: 2018-10-19

## 2018-10-12 NOTE — PATIENT INSTRUCTIONS
Urinary Tract Infection in Women   AMBULATORY CARE:   A urinary tract infection (UTI)  is caused by bacteria that get inside your urinary tract  Most bacteria that enter your urinary tract come out when you urinate  If the bacteria stay in your urinary tract, you may get an infection  Your urinary tract includes your kidneys, ureters, bladder, and urethra  Urine is made in your kidneys, and it flows from the ureters to the bladder  Urine leaves the bladder through the urethra  A UTI is more common in your lower urinary tract, which includes your bladder and urethra  Common symptoms include the following:   · Urinating more often or waking from sleep to urinate    · Pain or burning when you urinate    · Pain or pressure in your lower abdomen     · Urine that smells bad    · Blood in your urine    · Leaking urine  Seek care immediately if:   · You are urinating very little or not at all  · You have a high fever with shaking chills  · You have side or back pain that gets worse  Contact your healthcare provider if:   · You have a fever  · You do not feel better after 2 days of taking antibiotics  · You are vomiting  · You have questions or concerns about your condition or care  Treatment for a UTI  may include medicines to treat a bacterial infection  You may also need medicines to decrease pain and burning, or decrease the urge to urinate often  Prevent a UTI:   · Empty your bladder often  Urinate and empty your bladder as soon as you feel the need  Do not hold your urine for long periods of time  · Wipe from front to back after you urinate or have a bowel movement  This will help prevent germs from getting into your urinary tract through your urethra  · Drink liquids as directed  Ask how much liquid to drink each day and which liquids are best for you  You may need to drink more liquids than usual to help flush out the bacteria  Do not drink alcohol, caffeine, or citrus juices  These can irritate your bladder and increase your symptoms  Your healthcare provider may recommend cranberry juice to help prevent a UTI  · Urinate after you have sex  This can help flush out bacteria passed during sex  · Do not douche or use feminine deodorants  These can change the chemical balance in your vagina  · Change sanitary pads or tampons often  This will help prevent germs from getting into your urinary tract  · Do pelvic muscle exercises often  Pelvic muscle exercises may help you start and stop urinating  Strong pelvic muscles may help you empty your bladder easier  Squeeze these muscles tightly for 5 seconds like you are trying to hold back urine  Then relax for 5 seconds  Gradually work up to squeezing for 10 seconds  Do 3 sets of 15 repetitions a day, or as directed  Follow up with your healthcare provider as directed:  Write down your questions so you remember to ask them during your visits  © 2017 [employer] Information is for End User's use only and may not be sold, redistributed or otherwise used for commercial purposes  All illustrations and images included in CareNotes® are the copyrighted property of [employer]  or Dain Wahl  The above information is an  only  It is not intended as medical advice for individual conditions or treatments  Talk to your doctor, nurse or pharmacist before following any medical regimen to see if it is safe and effective for you

## 2018-10-12 NOTE — PROGRESS NOTES
[facility]        NAME: Kat Lindo is a 38 y o  female  : 1975    MRN: [id number]  DATE: 2018  TIME: 4:33 PM    Assessment and Plan   Acute cystitis without hematuria [N30 00]  1  Acute cystitis without hematuria  sulfamethoxazole-trimethoprim (BACTRIM DS) 800-160 mg per tablet         Patient Instructions       Follow up with PCP in 3-5 days  Proceed to  ER if symptoms worsen  Chief Complaint     Chief Complaint   Patient presents with    Possible UTI     right lower back pain for 2 days, burning on urination for 2 days         History of Present Illness       Patient presents with a burning on urination for the past 2 days  She also has an aching in her right low back  There are no fever or chills however she does have some nausea which she is taking Zofran  She denies any abdominal pain  Her family doctor did a urinalysis and culture which was done at [facility]  Review of Systems   Review of Systems   Constitutional: Negative for chills and fever  HENT: Negative for sore throat  Eyes: Negative for redness  Respiratory: Negative for cough  Cardiovascular: Negative for chest pain  Gastrointestinal: Positive for nausea  Negative for abdominal pain, diarrhea and vomiting  Genitourinary: Positive for dysuria and frequency  Negative for difficulty urinating, hematuria and urgency  Musculoskeletal: Positive for back pain  Skin: Negative for rash  Neurological: Negative for dizziness  Hematological: Negative for adenopathy           Current Medications       Current Outpatient Prescriptions:     ALPRAZolam (XANAX) 1 mg tablet, Take 1 mg by mouth daily at bedtime  , Disp: , Rfl:     butalbital-acetaminophen-caffeine-codeine (FIORICET WITH CODEINE) -63-30 MG per capsule, Take 1 capsule by mouth every 4 (four) hours as needed for headaches, Disp: , Rfl:     desvenlafaxine (PRISTIQ) 100 mg 24 hr tablet, Take 100 mg by mouth daily  , Disp: , Rfl:     gabapentin (NEURONTIN) 300 mg capsule, Take by mouth daily  , Disp: , Rfl:     medroxyPROGESTERone (DEPO-PROVERA) 150 mg/mL injection, medroxyprogesterone 150 mg/mL intramuscular suspension, Disp: , Rfl:     OXcarbazepine (TRILEPTAL) 300 mg tablet, Take 300 mg by mouth 2 (two) times a day, Disp: , Rfl: 2    pantoprazole (PROTONIX) 40 mg tablet, Take 40 mg by mouth 2 (two) times a day  , Disp: , Rfl:     topiramate (TOPAMAX) 100 mg tablet, Take by mouth daily at bedtime  , Disp: , Rfl:     topiramate (TOPAMAX) 50 MG tablet, Take 50 mg by mouth every morning, Disp: , Rfl:     traMADol (ULTRAM) 50 mg tablet, 50 mg as needed  , Disp: , Rfl: 0    valACYclovir (VALTREX) 500 mg tablet, Take 500 mg by mouth daily  , Disp: , Rfl:     gabapentin (NEURONTIN) 100 mg capsule, , Disp: , Rfl: 2    ibuprofen (MOTRIN) 600 mg tablet, Take 1 tablet (600 mg total) by mouth every 6 (six) hours as needed for mild pain or moderate pain for up to 20 doses (Patient not taking: Reported on 10/12/2018 ), Disp: 20 tablet, Rfl: 0    PROAIR  (90 Base) MCG/ACT inhaler, Inhale every 4 (four) hours as needed (as needed)  , Disp: , Rfl:     sulfamethoxazole-trimethoprim (BACTRIM DS) 800-160 mg per tablet, Take 1 tablet by mouth 2 (two) times a day for 7 days, Disp: 14 tablet, Rfl: 0    topiramate (TOPAMAX) 100 mg tablet, topiramate 100 mg tablet, Disp: , Rfl:     Current Allergies     Allergies as of 10/12/2018 - Reviewed 10/12/2018   Allergen Reaction Noted    Lamotrigine Rash and Hives 02/24/2010            The following portions of the patient's history were reviewed and updated as appropriate: allergies, current medications, past family history, past medical history, past social history, past surgical history and problem list      Past Medical History:   Diagnosis Date    Bipolar 1 disorder (HCC)     with bordeline personality    Borderline personality disorder (HCC)     Chronic headaches     Depression     Depression     GERD (gastroesophageal reflux disease)     Incontinence     Migraine     Urinary tract infection        Past Surgical History:   Procedure Laterality Date    CHOLECYSTECTOMY  05/24/2018    FOOT SURGERY Right     KNEE ARTHROSCOPY      OVARIAN CYST REMOVAL      STOMACH SURGERY         Family History   Problem Relation Age of Onset    Hyperlipidemia Father     Heart disease Mother     Migraines Mother     Depression Family          Medications have been verified  Objective   /78   Pulse 76   Temp 98 °F (36 7 °C) (Tympanic)   Resp 18   Ht 5' 5" (1 651 m)   Wt 70 3 kg (155 lb)   LMP 09/22/2018   SpO2 98%   BMI 25 79 kg/m²        Physical Exam     Physical Exam   Constitutional: She is oriented to person, place, and time  She appears well-developed and well-nourished  HENT:   Head: Normocephalic and atraumatic  Eyes: Pupils are equal, round, and reactive to light  Neck: No tracheal deviation present  Cardiovascular: Normal rate, regular rhythm and normal heart sounds  Pulmonary/Chest: Effort normal    Abdominal:   No CVA tenderness  Neurological: She is alert and oriented to person, place, and time  Skin: Skin is warm and dry  No rash noted  Psychiatric: She has a normal mood and affect  Her behavior is normal  Judgment and thought content normal    Nursing note and vitals reviewed

## 2018-10-13 LAB — BACTERIA UR CULT: ABNORMAL

## 2018-10-17 PROBLEM — N39.3 STRESS INCONTINENCE: Status: ACTIVE | Noted: 2018-10-17

## 2018-10-18 ENCOUNTER — OFFICE VISIT (OUTPATIENT)
Dept: UROLOGY | Facility: AMBULATORY SURGERY CENTER | Age: 43
End: 2018-10-18
Payer: MEDICARE

## 2018-10-18 VITALS
HEART RATE: 75 BPM | DIASTOLIC BLOOD PRESSURE: 82 MMHG | WEIGHT: 158 LBS | SYSTOLIC BLOOD PRESSURE: 134 MMHG | BODY MASS INDEX: 26.33 KG/M2 | HEIGHT: 65 IN

## 2018-10-18 DIAGNOSIS — N30.00 ACUTE CYSTITIS WITHOUT HEMATURIA: Primary | ICD-10-CM

## 2018-10-18 PROCEDURE — 99213 OFFICE O/P EST LOW 20 MIN: CPT | Performed by: UROLOGY

## 2018-10-18 NOTE — PROGRESS NOTES
10/18/2018    Amparo ALEJO Benji York  1975  [id number]        Assessment  Mild stress incontinence, UTI      Discussion  I recommend completing the Bactrim as prescribed for her pansensitive E coli UTI  The patient was provided with a prescription for a follow-up urinalysis and urine culture  As she reports this is her 3rd or 4th UTI within the last year I will obtain a retroperitoneal ultrasound of the kidneys and bladder  History of Present Illness  38 y o  female with a history of 3-4 UTIs over the course of the last year  Most recently she has a pansensitive E coli UTI  She is currently on Bactrim  She has 1 more day of antibiotics remaining  She denies seen hematuria  She reports urgency and frequency of urination  She again has very mild stress incontinence  She wears 1 pad or less per day  She previously had evaluation for stress incontinence and this did not proved to show demonstrable incontinence that would indicate placement of a mid urethral sling  She denies history of nephrolithiasis  AUA Symptom Score      Review of Systems  Review of Systems   Constitutional: Negative  HENT: Negative  Eyes: Negative  Respiratory: Negative  Cardiovascular: Negative  Gastrointestinal: Negative  Endocrine: Negative  Genitourinary: Negative  Musculoskeletal: Negative  Skin: Negative  Allergic/Immunologic: Negative  Neurological: Negative  Hematological: Negative  Psychiatric/Behavioral: Negative            Past Medical History  Past Medical History:   Diagnosis Date    B[facility] ([facility] )     with bordeline personality    Borderline personality disorder ([facility]     Chronic headaches     Depression     Depression     GERD (gastroesophageal reflux disease)     Incontinence     Migraine     Urinary tract infection        Past Social History  Past Surgical History:   Procedure Laterality Date    CHOLECYSTECTOMY  05/24/2018    FOOT SURGERY Right  KNEE ARTHROSCOPY      OVARIAN CYST REMOVAL      STOMACH SURGERY         Past Family History  Family History   Problem Relation Age of Onset    Hyperlipidemia Father     Heart disease Mother    [place] Migraines Mother     Depression Family        Past Social history  Social History     Social History    Marital status: Single     Spouse name: N/A    Number of children: N/A    Years of education: N/A     Occupational History    Not on file       Social History Main Topics    Smoking status: Never Smoker    Smokeless tobacco: Never Used    Alcohol use Yes      Comment: rarely    Drug use: No    Sexual activity: Not on file     Other Topics Concern    Not on file     Social History Narrative    No narrative on file       Current Medications  Current Outpatient Prescriptions   Medication Sig Dispense Refill    ALPRAZolam (XANAX) 1 mg tablet Take 1 mg by mouth daily at bedtime        butalbital-acetaminophen-caffeine-codeine (FIORICET WITH CODEINE) -21-30 MG per capsule Take 1 capsule by mouth every 4 (four) hours as needed for headaches      desvenlafaxine (PRISTIQ) 100 mg 24 hr tablet Take 100 mg by mouth daily        gabapentin (NEURONTIN) 300 mg capsule Take by mouth daily        ibuprofen (MOTRIN) 600 mg tablet Take 1 tablet (600 mg total) by mouth every 6 (six) hours as needed for mild pain or moderate pain for up to 20 doses 20 tablet 0    medroxyPROGESTERone (DEPO-PROVERA) 150 mg/mL injection medroxyprogesterone 150 mg/mL intramuscular suspension      OXcarbazepine (TRILEPTAL) 300 mg tablet Take 300 mg by mouth 2 (two) times a day  2    pantoprazole (PROTONIX) 40 mg tablet Take 40 mg by mouth 2 (two) times a day        sulfamethoxazole-trimethoprim (BACTRIM DS) 800-160 mg per tablet Take 1 tablet by mouth 2 (two) times a day for 7 days 14 tablet 0    topiramate (TOPAMAX) 100 mg tablet Take by mouth daily at bedtime        topiramate (TOPAMAX) 100 mg tablet topiramate 100 mg tablet  topiramate (TOPAMAX) 50 MG tablet Take 50 mg by mouth every morning      traMADol (ULTRAM) 50 mg tablet 50 mg as needed    0    valACYclovir (VALTREX) 500 mg tablet Take 500 mg by mouth daily        gabapentin (NEURONTIN) 100 mg capsule   2    PROAIR  (90 Base) MCG/ACT inhaler Inhale every 4 (four) hours as needed (as needed)         No current facility-administered medications for this visit  Allergies  Allergies   Allergen Reactions    Lamotrigine Rash and Hives     Other reaction(s): rash and itching       Past Medical History, Social History, Family History, medications and allergies were reviewed  Vitals  Vitals:    10/18/18 1301   BP: 134/82   BP Location: Left arm   Patient Position: Sitting   Cuff Size: Adult   Pulse: 75   Weight: 71 7 kg (158 lb)   Height: 5' 5" (1 651 m)       Physical Exam  Physical Exam  On examination she is anxious but otherwise in no acute distress  Her abdomen is soft nontender nondistended   examination reveals no CVA tenderness  Skin is warm  Extremities without edema   Neurologic is grossly intact and nonfocal   Gait normal   Affect normal      Results  No results found for: PSA  Lab Results   Component Value Date    CALCIUM 9 7 07/30/2018     07/30/2018    K 4 8 07/30/2018    CO2 24 07/30/2018     (H) 07/30/2018    BUN 11 07/30/2018    CREATININE 0 83 07/30/2018     Lab Results   Component Value Date    WBC 6 30 07/30/2018    HGB 14 7 07/30/2018    HCT 43 3 07/30/2018    MCV 96 07/30/2018     07/30/2018         Office Urine Dip  No results found for this or any previous visit (from the past 1 hour(s)) ]

## 2018-10-19 ENCOUNTER — TELEPHONE (OUTPATIENT)
Dept: OTOLARYNGOLOGY | Facility: CLINIC | Age: 43
End: 2018-10-19

## 2018-10-30 ENCOUNTER — APPOINTMENT (OUTPATIENT)
Dept: LAB | Facility: HOSPITAL | Age: 43
End: 2018-10-30
Payer: MEDICARE

## 2018-10-30 ENCOUNTER — TRANSCRIBE ORDERS (OUTPATIENT)
Dept: ADMINISTRATIVE | Facility: HOSPITAL | Age: 43
End: 2018-10-30

## 2018-10-30 DIAGNOSIS — N30.00 ACUTE CYSTITIS WITHOUT HEMATURIA: ICD-10-CM

## 2018-10-30 LAB
BACTERIA UR QL AUTO: ABNORMAL /HPF
BILIRUB UR QL STRIP: NEGATIVE
CLARITY UR: ABNORMAL
COLOR UR: YELLOW
GLUCOSE UR STRIP-MCNC: NEGATIVE MG/DL
HGB UR QL STRIP.AUTO: NEGATIVE
KETONES UR STRIP-MCNC: NEGATIVE MG/DL
LEUKOCYTE ESTERASE UR QL STRIP: NEGATIVE
NITRITE UR QL STRIP: NEGATIVE
NON-SQ EPI CELLS URNS QL MICRO: ABNORMAL /HPF
PH UR STRIP.AUTO: 5.5 [PH] (ref 5–8)
PROT UR STRIP-MCNC: NEGATIVE MG/DL
RBC #/AREA URNS AUTO: ABNORMAL /HPF
SP GR UR STRIP.AUTO: >=1.03 (ref 1–1.03)
UROBILINOGEN UR QL STRIP.AUTO: 0.2 E.U./DL
WBC #/AREA URNS AUTO: ABNORMAL /HPF

## 2018-10-30 PROCEDURE — 81001 URINALYSIS AUTO W/SCOPE: CPT | Performed by: UROLOGY

## 2018-10-30 PROCEDURE — 87086 URINE CULTURE/COLONY COUNT: CPT

## 2018-10-31 LAB — BACTERIA UR CULT: NORMAL

## 2018-11-02 ENCOUNTER — HOSPITAL ENCOUNTER (EMERGENCY)
Facility: HOSPITAL | Age: 43
Discharge: HOME | End: 2018-11-02
Attending: EMERGENCY MEDICINE | Admitting: EMERGENCY MEDICINE
Payer: COMMERCIAL

## 2018-11-02 VITALS
OXYGEN SATURATION: 96 % | TEMPERATURE: 98.5 F | DIASTOLIC BLOOD PRESSURE: 91 MMHG | HEIGHT: 65 IN | SYSTOLIC BLOOD PRESSURE: 153 MMHG | WEIGHT: 158.73 LBS | HEART RATE: 79 BPM | RESPIRATION RATE: 18 BRPM | BODY MASS INDEX: 26.45 KG/M2

## 2018-11-02 DIAGNOSIS — K13.79 ORAL BLEEDING: Primary | ICD-10-CM

## 2018-11-02 PROCEDURE — 99283 EMERGENCY DEPT VISIT LOW MDM: CPT

## 2018-11-02 RX ORDER — LIDOCAINE HYDROCHLORIDE AND EPINEPHRINE 10; 10 MG/ML; UG/ML
1 INJECTION, SOLUTION INFILTRATION; PERINEURAL ONCE
Status: COMPLETED | OUTPATIENT
Start: 2018-11-02 | End: 2018-11-02

## 2018-11-02 RX ADMIN — LIDOCAINE HYDROCHLORIDE AND EPINEPHRINE 1 ML: 10; 10 INJECTION, SOLUTION INFILTRATION; PERINEURAL at 16:40

## 2018-11-02 NOTE — DISCHARGE INSTRUCTIONS
Tooth Extraction   WHAT YOU NEED TO KNOW:   A tooth extraction is a procedure to remove 1 or more teeth  A tooth extraction can cause pain, swelling, and minor bleeding  It can also cause you to have trouble opening your mouth completely  DISCHARGE INSTRUCTIONS:   Return to the emergency department if:   · You have bleeding that has not decreased within 12 hours after your tooth extraction     Contact your dentist or oral surgeon if:   · You have a fever  · You have severe, throbbing pain and swelling that do not improve with treatment  · You have a foul taste or drainage coming from the extraction site  · You feel like your teeth have moved or that your teeth are not aligned  · You have numbness or tingling in your mouth  · You still cannot fully open your mouth 1 week after your tooth extraction  · You have questions or concerns about your condition or care  Medicines: You may need any of the following:  · Acetaminophen  decreases pain and fever  It is available without a doctor's order  Ask how much to take and how often to take it  Follow directions  Acetaminophen can cause liver damage if not taken correctly  · Prescription pain medicine  may be given  Ask how to take this medicine safely  · Take your medicine as directed  Contact your healthcare provider if you think your medicine is not helping or if you have side effects  Tell him or her if you are allergic to any medicine  Keep a list of the medicines, vitamins, and herbs you take  Include the amounts, and when and why you take them  Bring the list or the pill bottles to follow-up visits  Carry your medicine list with you in case of an emergency  Follow up with your dentist or oral surgeon as directed:  Write down your questions so you remember to ask them during your visits  Self-care:   · Keep your gauze in place for 2 hours after your procedure  This helps to control bleeding by forming a blood clot      · Avoid exercise as directed  Exercise can increase your blood pressure and make your extraction site bleed  · Do not smoke or drink from a straw for 3 days  You may develop a dry socket if you smoke or use a straw  · Do not rinse your mouth for 24 hours  This helps decrease your risk for dry socket  Dry socket is a condition in which a blood clot does not form on the extraction site as it should or it gets dislodged  Dry socket can cause severe pain  · Eat only soft foods and liquids for 24 hours  This helps control pain  · Apply ice on any swollen areas of your face for 15 to 20 minutes every hour or as directed  Use an ice pack, or put crushed ice in a plastic bag  Cover it with a towel  Ice helps prevent tissue damage and decreases swelling and pain  · To help decrease swelling, sleep with your head elevated  Avoid sleeping on your side  © 2017 [address]  Information is for End User's use only and may not be sold, redistributed or otherwise used for commercial purposes  All illustrations and images included in CareNotes® are the copyrighted property of [employer] AL[employer] , Day  or Dain Wahl  The above information is an  only  It is not intended as medical advice for individual conditions or treatments  Talk to your doctor, nurse or pharmacist before following any medical regimen to see if it is safe and effective for you

## 2018-11-02 NOTE — ED PROVIDER NOTES
History  Chief Complaint   Patient presents with    Dental Problem     had all of her tooth removed this AM  Pt is concerned because her gums are still bleeding  Oral bleeding after multiple tooth extractions today, is not anticoagulated, had been biting on gauze w/mild improvement  Prior to Admission Medications   Prescriptions Last Dose Informant Patient Reported? Taking?    ALPRAZolam (XANAX) 1 mg tablet  Self Yes Yes   Sig: Take 1 mg by mouth daily at bedtime     OXcarbazepine (TRILEPTAL) 300 mg tablet  Self Yes Yes   Sig: Take 300 mg by mouth 2 (two) times a day   PROAIR  (90 Base) MCG/ACT inhaler  Self Yes Yes   Sig: Inhale every 4 (four) hours as needed (as needed)     butalbital-acetaminophen-caffeine-codeine (FIORICET WITH CODEINE) -39-30 MG per capsule  Self Yes Yes   Sig: Take 1 capsule by mouth every 4 (four) hours as needed for headaches   desvenlafaxine (PRISTIQ) 100 mg 24 hr tablet  Self Yes Yes   Sig: Take 100 mg by mouth daily     gabapentin (NEURONTIN) 300 mg capsule  Self Yes Yes   Sig: Take by mouth daily     ibuprofen (MOTRIN) 600 mg tablet  Self No Yes   Sig: Take 1 tablet (600 mg total) by mouth every 6 (six) hours as needed for mild pain or moderate pain for up to 20 doses   medroxyPROGESTERone (DEPO-PROVERA) 150 mg/mL injection  Self Yes Yes   Sig: medroxyprogesterone 150 mg/mL intramuscular suspension   pantoprazole (PROTONIX) 40 mg tablet  Self Yes Yes   Sig: Take 40 mg by mouth 2 (two) times a day     topiramate (TOPAMAX) 100 mg tablet  Self Yes Yes   Sig: Take by mouth daily at bedtime     topiramate (TOPAMAX) 50 MG tablet  Self Yes Yes   Sig: Take 50 mg by mouth every morning   traMADol (ULTRAM) 50 mg tablet  Self Yes Yes   Si mg as needed     valACYclovir (VALTREX) 500 mg tablet  Self Yes Yes   Sig: Take 500 mg by mouth daily        Facility-Administered Medications: None       Past Medical History:   Diagnosis Date    Bipolar 1 disorder ([facility] [address] ) with bordeline personality    [facility]line [facility] [address] [facility]     Chronic headaches     Depression     Depression     GERD (gastroesophageal reflux disease)     Incontinence     Migraine     Urinary tract infection        Past Surgical History:   Procedure Laterality Date    CHOLECYSTECTOMY  05/24/2018    DENTAL SURGERY      FOOT SURGERY Right     KNEE ARTHROSCOPY      OVARIAN CYST REMOVAL      STOMACH SURGERY         Family History   Problem Relation Age of Onset    Hyperlipidemia Father     Heart disease Mother     Migraines Mother     Depression Family      I have reviewed and agree with the history as documented  Social History   Substance Use Topics    Smoking status: Never Smoker    Smokeless tobacco: Never Used    Alcohol use Yes      Comment: rarely        Review of Systems   Constitutional: Negative for chills and fatigue  HENT:        Has oral bleeding   Respiratory: Negative for chest tightness and shortness of breath  Cardiovascular: Negative for chest pain and palpitations  Neurological: Negative for dizziness and light-headedness  Physical Exam  Physical Exam   Constitutional: She appears well-developed and well-nourished  No distress  HENT:   Multiple clots/thrombus noted at multiple extraction sites w/mild oozing from site of left upper molar   Cardiovascular: Normal rate and regular rhythm  Pulmonary/Chest: No respiratory distress  She has no wheezes  Skin: Skin is warm and dry  No pallor         Vital Signs  ED Triage Vitals [11/02/18 1531]   Temperature Pulse Respirations Blood Pressure SpO2   (!) 97 3 °F (36 3 °C) 81 18 139/90 97 %      Temp Source Heart Rate Source Patient Position - Orthostatic VS BP Location FiO2 (%)   Tympanic Monitor -- -- --      Pain Score       6           Vitals:    11/02/18 1531 11/02/18 1551 11/02/18 1600   BP: 139/90  153/91   Pulse: 81 79        Visual Acuity      ED Medications  Medications   lidocaine-epinephrine (XYLOCAINE/EPINEPHRINE) 1 %-1:100,000 injection 1 mL (1 mL Infiltration Given 11/2/18 1640)       Diagnostic Studies  Results Reviewed     None                 No orders to display              Procedures  Procedures       Phone Contacts  ED Phone Contact    ED Course  ED Course as of Nov 02 1815 Fri Nov 02, 2018   1802 Improved, has very mild residual slow ooze of blood; will hold continued pressure at home w/gelfoam and f/u w/dentist; advised can return to ED PRN                                 MDM  Number of Diagnoses or Management Options  Diagnosis management comments: Impression: mild oral bleeding after multiple tooth extractions, no massive hemorrhage, not anticoagulated, tranexamic acid not available, will have patient continue to bit on gauze and re-evaluate    CritCare Time    Disposition  Final diagnoses:   Oral bleeding     Time reflects when diagnosis was documented in both MDM as applicable and the Disposition within this note     Time User Action Codes Description Comment    11/2/2018  6:04 PM Lockhart Angela Add [K13 79] Oral bleeding       ED Disposition     ED Disposition Condition Comment    Discharge  Amparo ALEJO [facility] discharge to home/self care      Condition at discharge: Stable        Follow-up Information     Follow up With Specialties Details Why [address] [facility]  In 3 days  [address]  [phone]          Discharge Medication List as of 11/2/2018  6:06 PM      CONTINUE these medications which have NOT CHANGED    Details   ALPRAZolam (XANAX) 1 mg tablet Take 1 mg by mouth daily at bedtime  , Historical Med      butalbital-acetaminophen-caffeine-codeine (FIORICET WITH CODEINE) -42-30 MG per capsule Take 1 capsule by mouth every 4 (four) hours as needed for headaches, Historical Med      desvenlafaxine (PRISTIQ) 100 mg 24 hr tablet Take 100 mg by mouth daily  , Historical Med      gabapentin (NEURONTIN) 300 mg capsule Take by mouth daily  , Historical Med      ibuprofen (MOTRIN) 600 mg tablet Take 1 tablet (600 mg total) by mouth every 6 (six) hours as needed for mild pain or moderate pain for up to 20 doses, Starting Sat 9/22/2018, Normal      medroxyPROGESTERone (DEPO-PROVERA) 150 mg/mL injection medroxyprogesterone 150 mg/mL intramuscular suspension, Historical Med      OXcarbazepine (TRILEPTAL) 300 mg tablet Take 300 mg by mouth 2 (two) times a day, Starting Sat 7/21/2018, Historical Med      pantoprazole (PROTONIX) 40 mg tablet Take 40 mg by mouth 2 (two) times a day  , Historical Med      PROAIR  (90 Base) MCG/ACT inhaler Inhale every 4 (four) hours as needed (as needed)  , Starting Mon 1/22/2018, Historical Med      !! topiramate (TOPAMAX) 100 mg tablet Take by mouth daily at bedtime  , Historical Med      !! topiramate (TOPAMAX) 50 MG tablet Take 50 mg by mouth every morning, Historical Med      traMADol (ULTRAM) 50 mg tablet 50 mg as needed  , Starting Sat 3/24/2018, Historical Med      valACYclovir (VALTREX) 500 mg tablet Take 500 mg by mouth daily  , Historical Med       !! - Potential duplicate medications found  Please discuss with provider  No discharge procedures on file      ED Provider  Electronically Signed by           Lulu Cruz MD  11/02/18 2474

## 2018-11-08 ENCOUNTER — HOSPITAL ENCOUNTER (OUTPATIENT)
Dept: SLEEP CENTER | Facility: HOSPITAL | Age: 43
Discharge: HOME | End: 2018-11-08
Payer: COMMERCIAL

## 2018-11-08 DIAGNOSIS — G47.19 EXCESSIVE DAYTIME SLEEPINESS: ICD-10-CM

## 2018-11-08 PROCEDURE — 95810 POLYSOM 6/> YRS 4/> PARAM: CPT

## 2018-11-09 ENCOUNTER — HOSPITAL ENCOUNTER (OUTPATIENT)
Dept: SLEEP CENTER | Facility: HOSPITAL | Age: 43
Discharge: HOME | End: 2018-11-09
Payer: COMMERCIAL

## 2018-11-09 PROBLEM — G47.19 EXCESSIVE DAYTIME SLEEPINESS: Status: ACTIVE | Noted: 2018-07-26

## 2018-11-09 PROCEDURE — 95805 MULTIPLE SLEEP LATENCY TEST: CPT

## 2018-11-09 NOTE — PROGRESS NOTES
Sleep Study Documentation    Pre-Sleep Study       Sleep testing procedure explained to patient:YES    Patient napped prior to study:NO    Caffeine:Dayshift worker after 12PM   Caffeine use:NO    Alcohol:Dayshift workers after 5PM: Alcohol use:NO    Typical day for patient:YES       Study Documentation  MSLT  Nap 1: Lights out:0827    Lights WQ:0669     Asleep:  yes    EPOCH:N/A    LVWV:1630*     REM:  no     EPOCH:N/A     TIME:N/A      Patient reports sleeping: YES  Patient reports dreaming: NO    Nap 2: Lights out:1009    Lights on:1035     Asleep:  yes    EPOCH:N/A    KOSK:0332*     REM:  no     EPOCH:N/A     TIME:N/A      Patient reports sleeping: YES  Patient reports dreaming: NO    Nap 3: Lights out:1155    Lights on:1218     Asleep:  yes    EPOCH:N/A    MBEE:7268*     REM:  no     EPOCH:N/A     TIME:N/A      Patient reports sleeping: NO   Patient reports dreaming: NO    Nap 4: Lights out:1341    Lights on:1413     Asleep:  yes    EPOCH:N/A    YOTW:8423*     REM:  no     EPOCH:N/A     TIME:N/A      Patient reports sleeping: YES  Patient reports dreaming: YES    Nap 5: Lights out:1501    Lights on:1521     Asleep:  no    EPOCH:N/a    TIME:N/A*     REM:  no     EPOCH:N/A     TIME:N/A      Patient reports sleeping: NO   Patient reports dreaming: NO      Mode of Therapy:    EKG abnormalities: no     EEG abnormalities: no    Study Terminated:no    Patient classification: disabled       Post-Sleep Study    Medication used at bedtime or during sleep study:NO    Patient reports time it took to fall asleep:Patient reports waking up during study:    Patient reports sleeping     Patient reports sleep during study:    Patient rated sleepiness:     PAP treatment:

## 2018-11-09 NOTE — PROGRESS NOTES
Sleep Study Documentation    Pre-Sleep Study       Sleep testing procedure explained to patient:YES    Patient napped prior to study:NO    Caffeine:Dayshift worker after 12PM   Caffeine use:NO    Alcohol:Dayshift workers after 5PM: Alcohol use:NO    Typical day for patient:YES       Study Documentation  Diagnostic   Snore:Mild  Supplemental O2: no    O2 flow rate (L/min) range n/a  O2 flow rate (L/min) final n/a  Minimum SaO2 94%  Baseline SaO2 97%      EKG abnormalities: no     EEG abnormalities: no    Study Terminated:no    Patient classification: unemployed       Post-Sleep Study    Medication used at bedtime or during sleep study:YES over the counter sleep aid and other prescription medications    Patient reports time it took to fall asleep:less than 20 minutes    Patient reports waking up during study:Denied    Patient reports sleeping 4 to 6 hours with dreaming  Patient reports sleep during study:typical    Patient rated sleepiness: Very sleepy or tired    PAP treatment:no

## 2018-11-10 ENCOUNTER — OFFICE VISIT (OUTPATIENT)
Dept: URGENT CARE | Facility: CLINIC | Age: 43
End: 2018-11-10
Payer: COMMERCIAL

## 2018-11-10 VITALS
TEMPERATURE: 98.3 F | DIASTOLIC BLOOD PRESSURE: 96 MMHG | OXYGEN SATURATION: 98 % | HEART RATE: 82 BPM | SYSTOLIC BLOOD PRESSURE: 136 MMHG | RESPIRATION RATE: 18 BRPM

## 2018-11-10 DIAGNOSIS — K08.89 PAIN, DENTAL: Primary | ICD-10-CM

## 2018-11-10 PROCEDURE — 99213 OFFICE O/P EST LOW 20 MIN: CPT | Performed by: PHYSICIAN ASSISTANT

## 2018-11-10 NOTE — PROGRESS NOTES
[address] [facility]  (office) [phone]  (fax) [phone]        NAME: Severo Landry is a 38 y o  female  : 1975    MRN: [id number]  DATE: November 10, 2018  TIME: 6:14 PM    Assessment and Plan   Pain, dental [K08 89]  1  Pain, dental         Patient Instructions   No signs of infection  Recommend alternating 600mg IBU and 500mg Tylenol every 4 hours for pain  To use warm salt water rinses every 3 hours for relief  To keep apt on Tuesday for further evaluation at dentist    To present to the ER if symptoms worsen  Chief Complaint     Chief Complaint   Patient presents with    Dental Pain     Dental pain after have 15 teeth extracted  History of Present Illness   Amparo Posadas Tommy presents to the clinic c/o    15 teeth extracted over a week ago  Finished week course of amox yesterday  Dental Pain    This is a new problem  The current episode started 1 to 4 weeks ago  The problem occurs constantly  The problem has been unchanged  The pain is moderate  Pertinent negatives include no difficulty swallowing, facial pain, fever, oral bleeding or sinus pressure  She has tried NSAIDs (vicodin) for the symptoms  The treatment provided mild relief  Review of Systems   Review of Systems   Constitutional: Negative for activity change, appetite change, chills, diaphoresis, fatigue and fever  HENT: Positive for dental problem  Negative for congestion, ear discharge, ear pain, facial swelling, rhinorrhea, sinus pain, sinus pressure, sneezing and sore throat  Eyes: Negative for photophobia, pain, discharge, redness, itching and visual disturbance  Respiratory: Negative for apnea, cough, chest tightness, shortness of breath and wheezing  Cardiovascular: Negative for chest pain  Gastrointestinal: Negative for abdominal distention, abdominal pain, anal bleeding, blood in stool, constipation, diarrhea, nausea and vomiting  Genitourinary: Negative for dysuria, flank pain, frequency, hematuria and urgency  Musculoskeletal: Negative for arthralgias, back pain, gait problem, joint swelling, myalgias, neck pain and neck stiffness  Skin: Negative for color change, rash and wound  Allergic/Immunologic: Negative for immunocompromised state  Neurological: Negative for dizziness, facial asymmetry and headaches  Hematological: Negative for adenopathy  Psychiatric/Behavioral: Negative for confusion and decreased concentration           Current Medications     Long-Term Prescriptions   Medication Sig Dispense Refill    ALPRAZolam (XANAX) 1 mg tablet Take 1 mg by mouth daily at bedtime        desvenlafaxine (PRISTIQ) 100 mg 24 hr tablet Take 100 mg by mouth daily        gabapentin (NEURONTIN) 300 mg capsule Take by mouth daily        ibuprofen (MOTRIN) 600 mg tablet Take 1 tablet (600 mg total) by mouth every 6 (six) hours as needed for mild pain or moderate pain for up to 20 doses 20 tablet 0    medroxyPROGESTERone (DEPO-PROVERA) 150 mg/mL injection medroxyprogesterone 150 mg/mL intramuscular suspension      OXcarbazepine (TRILEPTAL) 300 mg tablet Take 300 mg by mouth 2 (two) times a day  2    pantoprazole (PROTONIX) 40 mg tablet Take 40 mg by mouth 2 (two) times a day        topiramate (TOPAMAX) 100 mg tablet Take by mouth daily at bedtime        topiramate (TOPAMAX) 50 MG tablet Take 50 mg by mouth every morning      valACYclovir (VALTREX) 500 mg tablet Take 500 mg by mouth daily           Current Allergies     Allergies as of 11/10/2018 - Reviewed 11/10/2018   Allergen Reaction Noted    Lamotrigine Rash and Hives 02/24/2010            The following portions of the patient's history were reviewed and updated as appropriate: allergies, current medications, past family history, past medical history, past social history, past surgical history and problem list   Past Medical History:   Diagnosis Date    Bipolar 1 disorder [facility])     with bordeline personality    Borderline personality disorder ([address] )     Chronic headaches     Depression     Depression     GERD (gastroesophageal reflux disease)     Incontinence     Migraine     Urinary tract infection      Past Surgical History:   Procedure Laterality Date    CHOLECYSTECTOMY  05/24/2018    DENTAL SURGERY      FOOT SURGERY Right     KNEE ARTHROSCOPY      OVARIAN CYST REMOVAL      STOMACH SURGERY       Social History     Social History    Marital status: Single     Spouse name: N/A    Number of children: N/A    Years of education: N/A     Occupational History    Not on file  Social History Main Topics    Smoking status: Never Smoker    Smokeless tobacco: Never Used    Alcohol use Yes      Comment: rarely    Drug use: No    Sexual activity: Not on file     Other Topics Concern    Not on file     Social History Narrative    No narrative on file       Objective   /96   Pulse 82   Temp 98 3 °F (36 8 °C)   Resp 18   SpO2 98%      Physical Exam     Physical Exam   Constitutional: She is oriented to person, place, and time  She appears well-developed and well-nourished  No distress  HENT:   Head: Normocephalic and atraumatic  Right Ear: Tympanic membrane and external ear normal    Left Ear: Tympanic membrane and external ear normal    Nose: Nose normal    Mouth/Throat: Oropharynx is clear and moist  Abnormal dentition (multiple teeth recently removed, no signs of infection)  No oropharyngeal exudate or posterior oropharyngeal erythema  Eyes: Pupils are equal, round, and reactive to light  Conjunctivae and EOM are normal  Right eye exhibits no discharge  Left eye exhibits no discharge  No scleral icterus  Neck: Normal range of motion  Neck supple  No JVD present  No tracheal deviation present  No thyromegaly present  Cardiovascular: Normal rate, regular rhythm and normal heart sounds  Exam reveals no gallop and no friction rub      No murmur heard   Pulmonary/Chest: Effort normal and breath sounds normal  No stridor  No respiratory distress  She has no wheezes  She has no rales  She exhibits no tenderness  Musculoskeletal: Normal range of motion  She exhibits no tenderness or deformity  Lymphadenopathy:     She has no cervical adenopathy  Neurological: She is alert and oriented to person, place, and time  She has normal reflexes  Coordination normal    Skin: Skin is warm and dry  No rash noted  She is not diaphoretic  No erythema  No pallor  Psychiatric: She has a normal mood and affect  Her behavior is normal  Judgment and thought content normal    Nursing note and vitals reviewed        Lan Hurst PAGaybC

## 2018-11-29 ENCOUNTER — OFFICE VISIT (OUTPATIENT)
Dept: NEUROLOGY | Facility: CLINIC | Age: 43
End: 2018-11-29
Payer: COMMERCIAL

## 2018-11-29 VITALS
HEART RATE: 72 BPM | WEIGHT: 161.6 LBS | BODY MASS INDEX: 26.92 KG/M2 | HEIGHT: 65 IN | DIASTOLIC BLOOD PRESSURE: 78 MMHG | RESPIRATION RATE: 18 BRPM | SYSTOLIC BLOOD PRESSURE: 134 MMHG

## 2018-11-29 DIAGNOSIS — G44.209 TENSION HEADACHE: ICD-10-CM

## 2018-11-29 DIAGNOSIS — G47.19 EXCESSIVE DAYTIME SLEEPINESS: ICD-10-CM

## 2018-11-29 DIAGNOSIS — G43.009 MIGRAINE WITHOUT AURA AND WITHOUT STATUS MIGRAINOSUS, NOT INTRACTABLE: Primary | ICD-10-CM

## 2018-11-29 PROCEDURE — 99213 OFFICE O/P EST LOW 20 MIN: CPT | Performed by: PSYCHIATRY & NEUROLOGY

## 2018-11-29 RX ORDER — CHLORHEXIDINE GLUCONATE 0.12 MG/ML
RINSE ORAL
Refills: 0 | COMMUNITY
Start: 2018-11-13 | End: 2019-01-18 | Stop reason: ALTCHOICE

## 2018-11-29 NOTE — PROGRESS NOTES
Patient is here today for headaches    Patient ID: Rosy Gómez is a 38 y o  female  Assessment/Plan:    Migraine without aura and without status migrainosus, not intractable  Doing extremely well with no migraine headaches reported in the recent past   Given this overall improvement, and given the fact that she is on a number of medications, she would like to see if she can begin to slowly reduce the topiramate, which was initiated and continued for the purpose of migraine control  --to reduce topiramate to 50 mg twice daily  --to continue a migraine diary  --to call the office in 3-4 weeks with a status report and discuss possible further slow reduction in her topiramate schedule  She will call before then should she have any significant increase in her migraines with a reduction in her topiramate  Tension headache  Recently also not problematic  Excessive daytime sleepiness  Still problematic   --to continue her work with Sleep Medicine, Dr Ginger Cooney  She will follow up in three months or p r n     Subjective:    HPI  Patient, 38years of age, is followed here for her headache disorder, characterized by both migraine without aura as well as intermittent but tension-type headaches  From both standpoint she has done well  She has had no migraine episodes in the recent past   Her tension-type headaches are occurring once or twice a week and are short-lived and treated with the simple over-the-counter agents or a heating pad  She for migraine prevention has been on topiramate most recently at a schedule of 50 mg in a m  and 100 mg in p m  daily  Because of her improved situation and due to the fact that she is on a lot of other medication, she would like to see if she can get by with a lower schedule of topiramate or perhaps even come off the topiramate  She also has a history of daytime somnolence  She is followed by Sleep Medicine, Dr Ginger Cooney    Recent MSLT was performed and revealed no support for a daytime hypersomnolence syndrome  Question now raised as to a possible upper airway resistance syndrome  She will be seeing Dr Cervantes Husbands in the near future for further evaluation      Past Medical History:   Diagnosis Date    [facility]     with bordeline personality    Borderline personality disorder ([facility]     Chronic headaches     Depression     Depression     GERD (gastroesophageal reflux disease)     Incontinence     Migraine     Urinary tract infection      Past Surgical History:   Procedure Laterality Date    CHOLECYSTECTOMY  05/24/2018    DENTAL SURGERY      FOOT SURGERY Right     KNEE ARTHROSCOPY      KNEE ARTHROSCOPY Right 11/15/2018    MULTIPLE TOOTH EXTRACTIONS  11/02/2018    OVARIAN CYST REMOVAL      STOMACH SURGERY       Social History     Social History    Marital status: Single     Spouse name: N/A    Number of children: N/A    Years of education: N/A     Social History Main Topics    Smoking status: Never Smoker    Smokeless tobacco: Never Used    Alcohol use Yes      Comment: rarely    Drug use: No    Sexual activity: Not Asked     Other Topics Concern    None     Social History Narrative    None     Family History   Problem Relation Age of Onset    Hyperlipidemia Father     Heart disease Mother     Migraines Mother     Depression Family      Allergies   Allergen Reactions    Lamotrigine Rash and Hives     Other reaction(s): rash and itching       Current Outpatient Prescriptions:     ALPRAZolam (XANAX) 1 mg tablet, Take 1 mg by mouth daily at bedtime  , Disp: , Rfl:     desvenlafaxine (PRISTIQ) 100 mg 24 hr tablet, Take 100 mg by mouth daily  , Disp: , Rfl:     gabapentin (NEURONTIN) 300 mg capsule, Take by mouth daily  , Disp: , Rfl:     ibuprofen (MOTRIN) 600 mg tablet, Take 1 tablet (600 mg total) by mouth every 6 (six) hours as needed for mild pain or moderate pain for up to 20 doses, Disp: 20 tablet, Rfl: 0    medroxyPROGESTERone (DEPO-PROVERA) 150 mg/mL injection, medroxyprogesterone 150 mg/mL intramuscular suspension, Disp: , Rfl:     OXcarbazepine (TRILEPTAL) 300 mg tablet, Take 300 mg by mouth 2 (two) times a day, Disp: , Rfl: 2    pantoprazole (PROTONIX) 40 mg tablet, Take 40 mg by mouth 2 (two) times a day  , Disp: , Rfl:     PROAIR  (90 Base) MCG/ACT inhaler, Inhale every 4 (four) hours as needed (as needed)  , Disp: , Rfl:     topiramate (TOPAMAX) 100 mg tablet, Take by mouth daily at bedtime  , Disp: , Rfl:     valACYclovir (VALTREX) 500 mg tablet, Take 500 mg by mouth daily  , Disp: , Rfl:     butalbital-acetaminophen-caffeine-codeine (FIORICET WITH CODEINE) -56-30 MG per capsule, Take 1 capsule by mouth every 4 (four) hours as needed for headaches, Disp: , Rfl:     chlorhexidine (PERIDEX) 0 12 % solution, , Disp: , Rfl: 0    traMADol (ULTRAM) 50 mg tablet, 50 mg as needed  , Disp: , Rfl: 0    Objective:    Blood pressure 134/78, pulse 72, resp  rate 18, height 5' 4 5" (1 638 m), weight 73 3 kg (161 lb 9 6 oz), not currently breastfeeding  Physical Exam  Lungs clear to auscultation  Rhythm regular  Neurological Exam  Alert  Pleasantly interactive  Fully oriented  Unremarkable spontaneous gait  Cranial nerves 2-12 tested and grossly intact except for a subtle right lower facial asymmetry which is unchanged from past examinations  Funduscopic examination with bilaterally marginated discs  Good symmetrical strength throughout the four extremities with no upper extremity drift  Accurate finger-to-nose bilaterally  Muscle stretch reflexes bilaterally one throughout the upper extremities and at the knees and bilaterally two at the ankles  ROS:    Review of Systems   Constitutional: Positive for fatigue  Negative for appetite change and fever  HENT: Positive for tinnitus  Negative for hearing loss, trouble swallowing and voice change  Tube dysfunction in right ear   Eyes: Negative  Negative for photophobia and pain  Respiratory: Negative  Negative for shortness of breath  Cardiovascular: Negative  Negative for palpitations  Gastrointestinal: Positive for constipation and diarrhea  Negative for nausea and vomiting  Endocrine: Negative  Negative for cold intolerance and heat intolerance  Genitourinary: Negative  Negative for dysuria, frequency and urgency  Musculoskeletal: Negative  Negative for myalgias and neck pain  Skin: Negative  Negative for rash  Allergic/Immunologic: Negative  Neurological: Negative  Negative for dizziness, tremors, seizures, syncope, facial asymmetry, speech difficulty, weakness, light-headedness, numbness and headaches  Hematological: Negative  Does not bruise/bleed easily  Psychiatric/Behavioral: Positive for sleep disturbance  Negative for confusion and hallucinations  The patient is nervous/anxious  I personally reviewed the ROS that was entered by the medical assistant  *Please note this document was created using voice recognition software and may contain sound-alike word errors  *

## 2018-11-29 NOTE — PATIENT INSTRUCTIONS
Begin to slowly reduce your topiramate using 100 mg tablets and now taking 1/2 tablet twice daily  Keep a migraine headache diary and call in 3-4 weeks with a status report and to discuss possible further reduction  Call before then should you have any increasing migraine difficulties

## 2018-11-29 NOTE — ASSESSMENT & PLAN NOTE
Doing extremely well with no migraine headaches reported in the recent past   Given this overall improvement, and given the fact that she is on a number of medications, she would like to see if she can begin to slowly reduce the topiramate, which was initiated and continued for the purpose of migraine control  --to reduce topiramate to 50 mg twice daily  --to continue a migraine diary  --to call the office in 3-4 weeks with a status report and discuss possible further slow reduction in her topiramate schedule  She will call before then should she have any significant increase in her migraines with a reduction in her topiramate

## 2018-11-29 NOTE — LETTER
November 29, 2018     Magalislia Homans, [address] [facility] [address]    Patient: Romana Aceves   YOB: 1975   Date of Visit: 11/29/2018       Dear Dr Heri Graves: Thank you for referring Olga Cortez to me for evaluation  Below are my notes for this consultation  If you have questions, please do not hesitate to call me  I look forward to following your patient along with you  Sincerely,        Alejandra Vazquez MD        CC: MD Alejandra Sauer MD  11/29/2018 10:33 AM  Sign at close encounter  Patient is here today for headaches    Patient ID: Romana Aceves is a 38 y o  female  Assessment/Plan:    Migraine without aura and without status migrainosus, not intractable  Doing extremely well with no migraine headaches reported in the recent past   Given this overall improvement, and given the fact that she is on a number of medications, she would like to see if she can begin to slowly reduce the topiramate, which was initiated and continued for the purpose of migraine control  --to reduce topiramate to 50 mg twice daily  --to continue a migraine diary  --to call the office in 3-4 weeks with a status report and discuss possible further slow reduction in her topiramate schedule  She will call before then should she have any significant increase in her migraines with a reduction in her topiramate  Tension headache  Recently also not problematic  Excessive daytime sleepiness  Still problematic   --to continue her work with Sleep Medicine, Dr Christine Hernandez  She will follow up in three months or p r n     Subjective:    HPI  Patient, 38years of age, is followed here for her headache disorder, characterized by both migraine without aura as well as intermittent but tension-type headaches  From both standpoint she has done well    She has had no migraine episodes in the recent past   Her tension-type headaches are occurring once or twice a week and are short-lived and treated with the simple over-the-counter agents or a heating pad  She for migraine prevention has been on topiramate most recently at a schedule of 50 mg in a m  and 100 mg in p m  daily  Because of her improved situation and due to the fact that she is on a lot of other medication, she would like to see if she can get by with a lower schedule of topiramate or perhaps even come off the topiramate  She also has a history of daytime somnolence  She is followed by Sleep Medicine, Dr Glenford Denver  Recent MSLT was performed and revealed no support for a daytime hypersomnolence syndrome  Question now raised as to a possible upper airway resistance syndrome  She will be seeing Dr Glenford Denver in the near future for further evaluation      Past Medical History:   Diagnosis Date    [facility]     with bordeline personality    [facility]line [facility]     Chronic headaches     Depression     Depression     GERD (gastroesophageal reflux disease)     Incontinence     Migraine     Urinary tract infection      Past Surgical History:   Procedure Laterality Date    CHOLECYSTECTOMY  05/24/2018    DENTAL SURGERY      FOOT SURGERY Right     KNEE ARTHROSCOPY      KNEE ARTHROSCOPY Right 11/15/2018    MULTIPLE TOOTH EXTRACTIONS  11/02/2018    OVARIAN CYST REMOVAL      STOMACH SURGERY       Social History     Social History    Marital status: Single     Spouse name: N/A    Number of children: N/A    Years of education: N/A     Social History Main Topics    Smoking status: Never Smoker    Smokeless tobacco: Never Used    Alcohol use Yes      Comment: rarely    Drug use: No    Sexual activity: Not Asked     Other Topics Concern    None     Social History Narrative    None     Family History   Problem Relation Age of Onset    Hyperlipidemia Father     Heart disease Mother     Migraines Mother     Depression Family      Allergies   Allergen Reactions    Lamotrigine Rash and Hives     Other reaction(s): rash and itching       Current Outpatient Prescriptions:     ALPRAZolam (XANAX) 1 mg tablet, Take 1 mg by mouth daily at bedtime  , Disp: , Rfl:     desvenlafaxine (PRISTIQ) 100 mg 24 hr tablet, Take 100 mg by mouth daily  , Disp: , Rfl:     gabapentin (NEURONTIN) 300 mg capsule, Take by mouth daily  , Disp: , Rfl:     ibuprofen (MOTRIN) 600 mg tablet, Take 1 tablet (600 mg total) by mouth every 6 (six) hours as needed for mild pain or moderate pain for up to 20 doses, Disp: 20 tablet, Rfl: 0    medroxyPROGESTERone (DEPO-PROVERA) 150 mg/mL injection, medroxyprogesterone 150 mg/mL intramuscular suspension, Disp: , Rfl:     OXcarbazepine (TRILEPTAL) 300 mg tablet, Take 300 mg by mouth 2 (two) times a day, Disp: , Rfl: 2    pantoprazole (PROTONIX) 40 mg tablet, Take 40 mg by mouth 2 (two) times a day  , Disp: , Rfl:     PROAIR  (90 Base) MCG/ACT inhaler, Inhale every 4 (four) hours as needed (as needed)  , Disp: , Rfl:     topiramate (TOPAMAX) 100 mg tablet, Take by mouth daily at bedtime  , Disp: , Rfl:     valACYclovir (VALTREX) 500 mg tablet, Take 500 mg by mouth daily  , Disp: , Rfl:     butalbital-acetaminophen-caffeine-codeine (FIORICET WITH CODEINE) -21-30 MG per capsule, Take 1 capsule by mouth every 4 (four) hours as needed for headaches, Disp: , Rfl:     chlorhexidine (PERIDEX) 0 12 % solution, , Disp: , Rfl: 0    traMADol (ULTRAM) 50 mg tablet, 50 mg as needed  , Disp: , Rfl: 0    Objective:    Blood pressure 134/78, pulse 72, resp  rate 18, height 5' 4 5" (1 638 m), weight 73 3 kg (161 lb 9 6 oz), not currently breastfeeding  Physical Exam  Lungs clear to auscultation  Rhythm regular  Neurological Exam  Alert  Pleasantly interactive  Fully oriented  Unremarkable spontaneous gait  Cranial nerves 2-12 tested and grossly intact except for a subtle right lower facial asymmetry which is unchanged from past examinations    Funduscopic examination with bilaterally marginated discs  Good symmetrical strength throughout the four extremities with no upper extremity drift  Accurate finger-to-nose bilaterally  Muscle stretch reflexes bilaterally one throughout the upper extremities and at the knees and bilaterally two at the ankles  ROS:    Review of Systems   Constitutional: Positive for fatigue  Negative for appetite change and fever  HENT: Positive for tinnitus  Negative for hearing loss, trouble swallowing and voice change  Tube dysfunction in right ear   Eyes: Negative  Negative for photophobia and pain  Respiratory: Negative  Negative for shortness of breath  Cardiovascular: Negative  Negative for palpitations  Gastrointestinal: Positive for constipation and diarrhea  Negative for nausea and vomiting  Endocrine: Negative  Negative for cold intolerance and heat intolerance  Genitourinary: Negative  Negative for dysuria, frequency and urgency  Musculoskeletal: Negative  Negative for myalgias and neck pain  Skin: Negative  Negative for rash  Allergic/Immunologic: Negative  Neurological: Negative  Negative for dizziness, tremors, seizures, syncope, facial asymmetry, speech difficulty, weakness, light-headedness, numbness and headaches  Hematological: Negative  Does not bruise/bleed easily  Psychiatric/Behavioral: Positive for sleep disturbance  Negative for confusion and hallucinations  The patient is nervous/anxious  I personally reviewed the ROS that was entered by the medical assistant  *Please note this document was created using voice recognition software and may contain sound-alike word errors  *

## 2018-11-30 ENCOUNTER — APPOINTMENT (EMERGENCY)
Dept: RADIOLOGY | Facility: HOSPITAL | Age: 43
End: 2018-11-30
Payer: COMMERCIAL

## 2018-11-30 ENCOUNTER — APPOINTMENT (EMERGENCY)
Dept: CT IMAGING | Facility: HOSPITAL | Age: 43
End: 2018-11-30
Payer: COMMERCIAL

## 2018-11-30 ENCOUNTER — HOSPITAL ENCOUNTER (EMERGENCY)
Facility: HOSPITAL | Age: 43
Discharge: HOME | End: 2018-11-30
Payer: COMMERCIAL

## 2018-11-30 VITALS
BODY MASS INDEX: 27.31 KG/M2 | DIASTOLIC BLOOD PRESSURE: 79 MMHG | OXYGEN SATURATION: 95 % | HEART RATE: 88 BPM | HEIGHT: 64 IN | WEIGHT: 160 LBS | SYSTOLIC BLOOD PRESSURE: 119 MMHG | TEMPERATURE: 98.2 F | RESPIRATION RATE: 20 BRPM

## 2018-11-30 DIAGNOSIS — I10 HYPERTENSION, UNSPECIFIED TYPE: ICD-10-CM

## 2018-11-30 DIAGNOSIS — R42 DIZZINESS: Primary | ICD-10-CM

## 2018-11-30 LAB
ALBUMIN SERPL BCP-MCNC: 4.6 G/DL (ref 3.5–5.7)
ALP SERPL-CCNC: 60 U/L (ref 40–150)
ALT SERPL W P-5'-P-CCNC: 11 U/L (ref 7–52)
ANION GAP SERPL CALCULATED.3IONS-SCNC: 10 MMOL/L (ref 4–13)
APTT PPP: 35 SECONDS (ref 26–38)
AST SERPL W P-5'-P-CCNC: 15 U/L (ref 13–39)
ATRIAL RATE: 92 BPM
BASOPHILS # BLD AUTO: 0 THOUSANDS/ΜL (ref 0–0.1)
BASOPHILS NFR BLD AUTO: 1 % (ref 0–2)
BILIRUB SERPL-MCNC: 0.3 MG/DL (ref 0.2–1)
BUN SERPL-MCNC: 9 MG/DL (ref 7–25)
CALCIUM SERPL-MCNC: 9.4 MG/DL (ref 8.6–10.5)
CHLORIDE SERPL-SCNC: 108 MMOL/L (ref 98–107)
CO2 SERPL-SCNC: 19 MMOL/L (ref 21–31)
CREAT SERPL-MCNC: 0.67 MG/DL (ref 0.6–1.2)
EOSINOPHIL # BLD AUTO: 0.1 THOUSAND/ΜL (ref 0–0.61)
EOSINOPHIL NFR BLD AUTO: 1 % (ref 0–5)
ERYTHROCYTE [DISTWIDTH] IN BLOOD BY AUTOMATED COUNT: 12.7 % (ref 11.5–14.5)
GFR SERPL CREATININE-BSD FRML MDRD: 110 ML/MIN/1.73SQ M
GLUCOSE SERPL-MCNC: 109 MG/DL (ref 65–99)
HCG SERPL QL: NEGATIVE
HCT VFR BLD AUTO: 43.9 % (ref 34.8–46.1)
HGB BLD-MCNC: 15.1 G/DL (ref 12–16)
INR PPP: 0.97 (ref 0.9–1.5)
LYMPHOCYTES # BLD AUTO: 2.1 THOUSANDS/ΜL (ref 0.6–4.47)
LYMPHOCYTES NFR BLD AUTO: 32 % (ref 21–51)
MCH RBC QN AUTO: 31.9 PG (ref 26–34)
MCHC RBC AUTO-ENTMCNC: 34.4 G/DL (ref 31–37)
MCV RBC AUTO: 93 FL (ref 81–99)
MONOCYTES # BLD AUTO: 0.4 THOUSAND/ΜL (ref 0.17–1.22)
MONOCYTES NFR BLD AUTO: 6 % (ref 2–12)
NEUTROPHILS # BLD AUTO: 4 THOUSANDS/ΜL (ref 1.4–6.5)
NEUTS SEG NFR BLD AUTO: 60 % (ref 42–75)
NRBC BLD AUTO-RTO: 0 /100 WBCS
P AXIS: 14 DEGREES
PLATELET # BLD AUTO: 250 THOUSANDS/UL (ref 149–390)
PMV BLD AUTO: 7.6 FL (ref 8.6–11.7)
POTASSIUM SERPL-SCNC: 3.8 MMOL/L (ref 3.5–5.5)
PR INTERVAL: 94 MS
PROT SERPL-MCNC: 6.9 G/DL (ref 6.4–8.9)
PROTHROMBIN TIME: 11.3 SECONDS (ref 10.2–13)
QRS AXIS: 15 DEGREES
QRSD INTERVAL: 78 MS
QT INTERVAL: 366 MS
QTC INTERVAL: 452 MS
RBC # BLD AUTO: 4.75 MILLION/UL (ref 3.9–5.2)
SODIUM SERPL-SCNC: 137 MMOL/L (ref 134–143)
T WAVE AXIS: 35 DEGREES
TROPONIN I SERPL-MCNC: <0.03 NG/ML
VENTRICULAR RATE: 92 BPM
WBC # BLD AUTO: 6.7 THOUSAND/UL (ref 4.8–10.8)

## 2018-11-30 PROCEDURE — 84484 ASSAY OF TROPONIN QUANT: CPT

## 2018-11-30 PROCEDURE — 71045 X-RAY EXAM CHEST 1 VIEW: CPT

## 2018-11-30 PROCEDURE — 93005 ELECTROCARDIOGRAM TRACING: CPT

## 2018-11-30 PROCEDURE — 85730 THROMBOPLASTIN TIME PARTIAL: CPT

## 2018-11-30 PROCEDURE — 93010 ELECTROCARDIOGRAM REPORT: CPT | Performed by: INTERNAL MEDICINE

## 2018-11-30 PROCEDURE — 99284 EMERGENCY DEPT VISIT MOD MDM: CPT

## 2018-11-30 PROCEDURE — 70450 CT HEAD/BRAIN W/O DYE: CPT

## 2018-11-30 PROCEDURE — 96374 THER/PROPH/DIAG INJ IV PUSH: CPT

## 2018-11-30 PROCEDURE — 85025 COMPLETE CBC W/AUTO DIFF WBC: CPT

## 2018-11-30 PROCEDURE — 96375 TX/PRO/DX INJ NEW DRUG ADDON: CPT

## 2018-11-30 PROCEDURE — 36415 COLL VENOUS BLD VENIPUNCTURE: CPT

## 2018-11-30 PROCEDURE — 80053 COMPREHEN METABOLIC PANEL: CPT

## 2018-11-30 PROCEDURE — 84703 CHORIONIC GONADOTROPIN ASSAY: CPT

## 2018-11-30 PROCEDURE — 85610 PROTHROMBIN TIME: CPT

## 2018-11-30 RX ORDER — FLUTICASONE PROPIONATE 50 MCG
1 SPRAY, SUSPENSION (ML) NASAL DAILY
Status: ON HOLD | COMMUNITY
End: 2019-06-27

## 2018-11-30 RX ORDER — LORAZEPAM 2 MG/ML
1 INJECTION INTRAMUSCULAR ONCE
Status: COMPLETED | OUTPATIENT
Start: 2018-11-30 | End: 2018-11-30

## 2018-11-30 RX ORDER — MECLIZINE HYDROCHLORIDE 25 MG/1
25 TABLET ORAL 3 TIMES DAILY PRN
Qty: 12 TABLET | Refills: 0 | Status: SHIPPED | OUTPATIENT
Start: 2018-11-30 | End: 2019-01-18 | Stop reason: ALTCHOICE

## 2018-11-30 RX ORDER — LABETALOL HYDROCHLORIDE 5 MG/ML
10 INJECTION, SOLUTION INTRAVENOUS ONCE
Status: COMPLETED | OUTPATIENT
Start: 2018-11-30 | End: 2018-11-30

## 2018-11-30 RX ADMIN — LABETALOL 20 MG/4 ML (5 MG/ML) INTRAVENOUS SYRINGE 10 MG: at 12:14

## 2018-11-30 RX ADMIN — LORAZEPAM 1 MG: 2 INJECTION INTRAMUSCULAR; INTRAVENOUS at 10:07

## 2018-11-30 NOTE — DISCHARGE INSTRUCTIONS
Dizziness   WHAT YOU NEED TO KNOW:   Dizziness is a feeling of being off balance or unsteady  Common causes of dizziness are an inner ear fluid imbalance or a lack of oxygen in your blood  Dizziness may be acute (lasts 3 days or less) or chronic (lasts longer than 3 days)  You may have dizzy spells that last from seconds to a few hours  DISCHARGE INSTRUCTIONS:   Return to the emergency department if:   · You have a headache and a stiff neck  · You have shaking chills and a fever  · You vomit over and over with no relief  · Your vomit or bowel movements are red or black  · You have pain in your chest, back, or abdomen  · You have numbness, especially in your face, arms, or legs  · You have trouble moving your arms or legs  · You are confused  Contact your healthcare provider if:   · You have a fever  · Your symptoms do not get better with treatment  · You have questions or concerns about your condition or care  Manage your symptoms:   · Do not drive  or operate heavy machinery when you are dizzy  · Get up slowly  from sitting or lying down  · Drink plenty of liquids  Liquids help prevent dehydration  Ask how much liquid to drink each day and which liquids are best for you  Follow up with your healthcare provider as directed:  Write down your questions so you remember to ask them during your visits  © 2017 [employer]  Information is for End User's use only and may not be sold, redistributed or otherwise used for commercial purposes  All illustrations and images included in CareNotes® are the copyrighted property of [employer]  or [employer]ain Wahl  The above information is an  only  It is not intended as medical advice for individual conditions or treatments  Talk to your doctor, nurse or pharmacist before following any medical regimen to see if it is safe and effective for you      Dizziness   WHAT YOU NEED TO KNOW:   What is dizziness? Dizziness is a feeling of being off balance or unsteady  Common causes of dizziness are an inner ear fluid imbalance or a lack of oxygen in your blood  Dizziness may be acute (lasts 3 days or less) or chronic (lasts longer than 3 days)  You may have dizzy spells that last from seconds to a few hours  What increases my risk for dizziness? Dizziness may get worse during certain activities or when you move a certain way  The following may also increase your risk for dizziness:  · Older age    · An infection, ear surgery, or an inner ear condition, such as Ménière disease    · Stroke, a brain tumor, or a recent head trauma     · An injury that causes a large amount of blood loss    · Heart or blood pressure problems     · Exposure to chemicals, or long-term alcohol use     · Medicines used to treat high blood pressure, seizure disorders, or anxiety and depression     · A nerve disorder, such as multiple sclerosis  What signs and symptoms may happen with dizziness? · A feeling that your surroundings are moving even though you are standing still    · Ringing in your ears or hearing loss     · Feeling faint or lightheaded     · Weakness or unsteadiness     · Double vision or eye movements you cannot control    · Nausea or vomiting     · Confusion  How is the cause of dizziness diagnosed? Your healthcare provider may ask when the dizziness started  Tell the provider if you have dizzy spells, and how long they last  Tell him or her what happens before you become dizzy  The provider will ask if you have other health conditions and if you take any medicines  He or she will check your blood pressure and pulse to see if your dizziness may be related to your heart  Your balance, strength, reflexes, and the way you walk may also be checked  You may need any of the following tests to help find the cause of your dizziness:  · An EKG  records the electrical activity of your heart   An EKG can be used to check for an abnormal heart beat or heart damage  · Blood tests  will check your blood sugar level, infection, and your blood cell levels  · CT or MRI  pictures check for a stroke, head injury, or brain tumor  They also check for brain bleeding or swelling  You may be given contrast liquid to help your brain show up better in the pictures  Tell the healthcare provider if you have ever had an allergic reaction to contrast liquid  Do not enter the MRI room with anything metal  Metal can cause serious injury  Tell the healthcare provider if you have any metal in or on your body  How is dizziness treated? Treatment will depend on the cause of your dizziness  Your healthcare provider may give you oxygen or medicines to decrease your dizziness and nausea  He may also refer you to a specialist  Debra Curtis may need to be admitted to the hospital for treatment  How can I manage my symptoms? · Do not drive  or operate heavy machinery when you are dizzy  · Get up slowly  from sitting or lying down  · Drink plenty of liquids  Liquids help prevent dehydration  Ask how much liquid to drink each day and which liquids are best for you  When should I seek immediate care? · You have a headache and a stiff neck  · You have shaking chills and a fever  · You vomit over and over with no relief  · Your vomit or bowel movements are red or black  · You have pain in your chest, back, or abdomen  · You have numbness, especially in your face, arms, or legs  · You have trouble moving your arms or legs  · You are confused  When should I contact my healthcare provider? · You have a fever  · Your symptoms do not get better with treatment  · You have questions or concerns about your condition or care  CARE AGREEMENT:   You have the right to help plan your care  Learn about your health condition and how it may be treated   Discuss treatment options with your caregivers to decide what care you want to receive  You always have the right to refuse treatment  The above information is an  only  It is not intended as medical advice for individual conditions or treatments  Talk to your doctor, nurse or pharmacist before following any medical regimen to see if it is safe and effective for you  © 2017 [employer] Information is for End User's use only and may not be sold, redistributed or otherwise used for commercial purposes  All illustrations and images included in CareNotes® are the copyrighted property of [employer]  or Dain Wahl  [facility]   WHAT YOU NEED TO KNOW:   Hypertension is high blood pressure (BP)  Your BP is the force of your blood moving against the walls of your arteries  Normal BP is less than 120/80  Prehypertension is between 120/80 and 139/89  Hypertension is 140/90 or higher  Hypertension causes your BP to get so high that your heart has to work much harder than normal  This can damage your heart  You can control hypertension with a healthy lifestyle or medicines  A controlled blood pressure helps protect your organs, such as your heart, lungs, brain, and kidneys  DISCHARGE INSTRUCTIONS:   Call 911 for any of the following:   · You have discomfort in your chest that feels like squeezing, pressure, fullness, or pain  · You become confused or have difficulty speaking  · You suddenly feel lightheaded or have trouble breathing  · You have pain or discomfort in your back, neck, jaw, stomach, or arm  Return to the emergency department if:   · You have a severe headache or vision loss  · You have weakness in an arm or leg  Contact your healthcare provider if:   · You feel faint, dizzy, confused, or drowsy  · You have been taking your BP medicine and your BP is still higher than your healthcare provider says it should be  · You have questions or concerns about your condition or care  Medicines:   You may  need any of the following:  · Medicine  may be used to help lower your BP  You may need more than one type of medicine  Take the medicine exactly as directed  · Diuretics  help decrease extra fluid that collects in your body  This will help lower your BP  You may urinate more often while you take this medicine  · Cholesterol medicine  helps lower your cholesterol level  A low cholesterol level helps prevent heart disease and makes it easier to control your blood pressure  · Take your medicine as directed  Contact your healthcare provider if you think your medicine is not helping or if you have side effects  Tell him or her if you are allergic to any medicine  Keep a list of the medicines, vitamins, and herbs you take  Include the amounts, and when and why you take them  Bring the list or the pill bottles to follow-up visits  Carry your medicine list with you in case of an emergency  Follow up with your healthcare provider as directed: You will need to return to have your BP checked and to have other lab tests done  Write down your questions so you remember to ask them during your visits  Manage hypertension:  Talk with your healthcare provider about these and other ways to manage hypertension:  · Check your BP at home  Sit and rest for 5 minutes before you take your BP  Extend your arm and support it on a flat surface  Your arm should be at the same level as your heart  Follow the directions that came with your BP monitor  If possible, take at least 2 BP readings each time  Take your BP at least twice a day at the same times each day, such as morning and evening  Keep a record of your BP readings and bring it to your follow-up visits  Ask your healthcare provider what your BP should be  · Limit sodium (salt) as directed  Too much sodium can affect your fluid balance  Check labels to find low-sodium or no-salt-added foods  Some low-sodium foods use potassium salts for flavor   Too much potassium can also cause health problems  Your healthcare provider will tell you how much sodium and potassium are safe for you to have in a day  He or she may recommend that you limit sodium to 2,300 mg a day  · Follow the meal plan recommended by your healthcare provider  A dietitian or your provider can give you more information on low-sodium plans or the DASH (Dietary Approaches to Stop Hypertension) eating plan  The DASH plan is low in sodium, unhealthy fats, and total fat  It is high in potassium, calcium, and fiber  · Exercise to maintain a healthy weight  Exercise at least 30 minutes per day, on most days of the week  This will help decrease your blood pressure  Ask your healthcare provider about the best exercise plan for you  · Decrease stress  This may help lower your BP  Learn ways to relax, such as deep breathing or listening to music  · Limit alcohol  Women should limit alcohol to 1 drink a day  Men should limit alcohol to 2 drinks a day  A drink of alcohol is 12 ounces of beer, 5 ounces of wine, or 1½ ounces of liquor  · Do not smoke  Nicotine and other chemicals in cigarettes and cigars can increase your BP and also cause lung damage  Ask your healthcare provider for information if you currently smoke and need help to quit  E-cigarettes or smokeless tobacco still contain nicotine  Talk to your healthcare provider before you use these products  · Manage any other health conditions you have  Health conditions such as diabetes can increase your risk for hypertension  Follow your healthcare provider's instructions and take all your medicines as directed  © 2017 [employer] [employer] Information is for End User's use only and may not be sold, redistributed or otherwise used for commercial purposes  All illustrations and images included in CareNotes® are the copyrighted property of [employer]  or [employer]ain Wahl  The above information is an  only   It is not intended as medical advice for individual conditions or treatments  Talk to your doctor, nurse or pharmacist before following any medical regimen to see if it is safe and effective for you  Hypertension   WHAT YOU NEED TO KNOW:   What is hypertension? Hypertension is high blood pressure (BP)  Your BP is the force of your blood moving against the walls of your arteries  Normal BP is less than 120/80  Prehypertension is between 120/80 and 139/89  Hypertension is 140/90 or higher  Hypertension causes your BP to get so high that your heart has to work much harder than normal  This can damage your heart  You can control hypertension with a healthy lifestyle or medicines  A controlled blood pressure helps protect your organs, such as your heart, lungs, brain, and kidneys  What causes hypertension? The cause of hypertension may not be known  This type of hypertension is called essential or primary hypertension  Hypertension can sometimes be caused by other medical conditions, such as kidney disease  This type of hypertension is called secondary hypertension  What increases my risk for hypertension? · Age older than 54 years (men)    · Age older than 72 years (women)    · A family history of hypertension or heart disease     · Obesity or lack of exercise     · Too many high-sodium foods    · Stress     · Use of tobacco, alcohol, or illegal drugs     · A medical condition, such as diabetes, kidney disease, thyroid disease, or adrenal gland disorder     · Certain medicines, such as steroids or birth control pills  What are the signs and symptoms of hypertension? You may have no signs or symptoms, or you may have any of the following:  · Headache     · Blurred vision     · Chest pain     · Dizziness or weakness     · Trouble breathing    · Nosebleeds  How is hypertension diagnosed? Your healthcare provider will ask about your symptoms and the medicines you take   He or she will also ask if you have a family history of high blood pressure and about any health conditions you have  He or she will also check your blood pressure and weight and examine your heart, lungs, and eyes  You may need any of the following tests:  · Blood tests  may help healthcare providers find the cause of your hypertension  Blood tests can also help find other health problems caused by hypertension  · Urine tests  will be done to check your kidney function  Kidney problems can increase your risk for hypertension  How is hypertension treated? Your healthcare provider will recommend lifestyle changes to lower your BP  You may also need the following medicines:  · Medicine  may be used to help lower your BP  You may need more than one type of medicine  Take the medicine exactly as directed  · Diuretics  help decrease extra fluid that collects in your body  This will help lower your BP  You may urinate more often while you take this medicine  · Cholesterol medicine  helps lower your cholesterol level  A low cholesterol level helps prevent heart disease and makes it easier to control your blood pressure  What can I do to manage hypertension? Talk with your healthcare provider about these and other ways to manage hypertension:  · Check your BP at home  Sit and rest for 5 minutes before you take your BP  Extend your arm and support it on a flat surface  Your arm should be at the same level as your heart  Follow the directions that came with your BP monitor  If possible, take at least 2 BP readings each time  Take your BP at least twice a day at the same times each day, such as morning and evening  Keep a record of your BP readings and bring it to your follow-up visits  Ask your healthcare provider what your BP should be  · Limit sodium (salt) as directed  Too much sodium can affect your fluid balance  Check labels to find low-sodium or no-salt-added foods  Some low-sodium foods use potassium salts for flavor   Too much potassium can also cause health problems  Your healthcare provider will tell you how much sodium and potassium are safe for you to have in a day  He or she may recommend that you limit sodium to 2,300 mg a day  · Follow the meal plan recommended by your healthcare provider  A dietitian or your provider can give you more information on low-sodium plans or the DASH (Dietary Approaches to Stop Hypertension) eating plan  The DASH plan is low in sodium, unhealthy fats, and total fat  It is high in potassium, calcium, and fiber  · Exercise to maintain a healthy weight  Exercise at least 30 minutes per day, on most days of the week  This will help decrease your blood pressure  Ask your healthcare provider about the best exercise plan for you  · Decrease stress  This may help lower your BP  Learn ways to relax, such as deep breathing or listening to music  · Limit alcohol  Women should limit alcohol to 1 drink a day  Men should limit alcohol to 2 drinks a day  A drink of alcohol is 12 ounces of beer, 5 ounces of wine, or 1½ ounces of liquor  · Do not smoke  Nicotine and other chemicals in cigarettes and cigars can increase your BP and also cause lung damage  Ask your healthcare provider for information if you currently smoke and need help to quit  E-cigarettes or smokeless tobacco still contain nicotine  Talk to your healthcare provider before you use these products  · Manage any other health conditions you have  Health conditions such as diabetes can increase your risk for hypertension  Follow your healthcare provider's instructions and take all your medicines as directed  Call 911 for any of the following:   · You have discomfort in your chest that feels like squeezing, pressure, fullness, or pain  · You become confused or have difficulty speaking  · You suddenly feel lightheaded or have trouble breathing      · You have pain or discomfort in your back, neck, jaw, stomach, or arm   When should I seek immediate care? · You have a severe headache or vision loss  · You have weakness in an arm or leg  When should I contact my healthcare provider? · You feel faint, dizzy, confused, or drowsy  · You have been taking your BP medicine and your BP is still higher than your healthcare provider says it should be  · You have questions or concerns about your condition or care  CARE AGREEMENT:   You have the right to help plan your care  Learn about your health condition and how it may be treated  Discuss treatment options with your caregivers to decide what care you want to receive  You always have the right to refuse treatment  The above information is an  only  It is not intended as medical advice for individual conditions or treatments  Talk to your doctor, nurse or pharmacist before following any medical regimen to see if it is safe and effective for you  © 2017 [employer] [employer]  Information is for End User's use only and may not be sold, redistributed or otherwise used for commercial purposes  All illustrations and images included in CareNotes® are the copyrighted property of [employer]  or [employer]ain Wahl

## 2018-11-30 NOTE — ED PROVIDER NOTES
History  Chief Complaint   Patient presents with    High Blood Pressure     dizzziness and confused at times started yesterday     Natty Coello is a 53-year-old female who came to the emergency department accompanied by her significant other due to intermittent dizziness and at times confusion which has been going on for the last 24-48 hours  Patient also states that her blood pressure was elevated when she was in rehabilitation center today  She was subsequently referred to the emergency department for further evaluation and treatment  Patient denies any weakness or numbness on both upper or lower extremities  She denies chest pain or shortness of breath  History provided by:  Patient and significant other   used: No    Dizziness   Quality:  Lightheadedness  Severity:  Mild  Onset quality:  Gradual  Duration:  2 days  Timing:  Intermittent  Progression:  Unchanged  Chronicity:  New  Context: standing up    Context: not when bending over, not with bowel movement, not with ear pain, not with eye movement, not with head movement, not with inactivity, not with loss of consciousness, not with medication, not with physical activity and not when urinating    Relieved by:  Nothing  Worsened by:  Nothing  Ineffective treatments:  None tried  Associated symptoms: no blood in stool, no chest pain, no diarrhea, no headaches, no hearing loss, no nausea, no palpitations, no shortness of breath, no syncope, no tinnitus, no vision changes, no vomiting and no weakness    Risk factors: multiple medications        Prior to Admission Medications   Prescriptions Last Dose Informant Patient Reported? Taking?    ALPRAZolam (XANAX) 1 mg tablet  Self Yes Yes   Sig: Take 1 mg by mouth daily at bedtime     OXcarbazepine (TRILEPTAL) 300 mg tablet  Self Yes Yes   Sig: Take 300 mg by mouth 2 (two) times a day   PROAIR  (90 Base) MCG/ACT inhaler  Self Yes Yes   Sig: Inhale every 4 (four) hours as needed (as needed)     butalbital-acetaminophen-caffeine-codeine (FIORICET WITH CODEINE) -03-30 MG per capsule  Self Yes No   Sig: Take 1 capsule by mouth every 4 (four) hours as needed for headaches   chlorhexidine (PERIDEX) 0 12 % solution   Yes Yes   desvenlafaxine (PRISTIQ) 100 mg 24 hr tablet  Self Yes Yes   Sig: Take 100 mg by mouth daily     fluticasone (FLONASE) 50 mcg/act nasal spray   Yes Yes   Si spray into each nostril daily   gabapentin (NEURONTIN) 300 mg capsule  Self Yes Yes   Sig: Take by mouth daily     ibuprofen (MOTRIN) 600 mg tablet  Self No Yes   Sig: Take 1 tablet (600 mg total) by mouth every 6 (six) hours as needed for mild pain or moderate pain for up to 20 doses   medroxyPROGESTERone (DEPO-PROVERA) 150 mg/mL injection  Self Yes Yes   Sig: medroxyprogesterone 150 mg/mL intramuscular suspension   pantoprazole (PROTONIX) 40 mg tablet  Self Yes Yes   Sig: Take 40 mg by mouth 2 (two) times a day     topiramate (TOPAMAX) 100 mg tablet  Self Yes Yes   Sig: Take by mouth daily at bedtime     traMADol (ULTRAM) 50 mg tablet  Self Yes No   Si mg as needed     valACYclovir (VALTREX) 500 mg tablet  Self Yes Yes   Sig: Take 500 mg by mouth daily        Facility-Administered Medications: None       Past Medical History:   Diagnosis Date    Bipolar 1 disorder (HCC)     with bordeline personality    Borderline personality disorder (HCC)     Chronic headaches     Depression     Depression     GERD (gastroesophageal reflux disease)     Incontinence     Migraine     Urinary tract infection        Past Surgical History:   Procedure Laterality Date    CHOLECYSTECTOMY  2018    DENTAL SURGERY      FOOT SURGERY Right     KNEE ARTHROSCOPY      KNEE ARTHROSCOPY Right 11/15/2018    MULTIPLE TOOTH EXTRACTIONS  2018    OVARIAN CYST REMOVAL      STOMACH SURGERY         Family History   Problem Relation Age of Onset    Hyperlipidemia Father     Heart disease Mother     Migraines Mother     Depression Family      I have reviewed and agree with the history as documented  Social History   Substance Use Topics    Smoking status: Never Smoker    Smokeless tobacco: Never Used    Alcohol use Yes      Comment: rarely        Review of Systems   Constitutional: Negative  HENT: Negative for hearing loss and tinnitus  Eyes: Negative  Respiratory: Negative for shortness of breath  Cardiovascular: Negative for chest pain, palpitations and syncope  Gastrointestinal: Negative for blood in stool, diarrhea, nausea and vomiting  Endocrine: Negative  Genitourinary: Negative  Musculoskeletal: Negative  Allergic/Immunologic: Negative  Neurological: Positive for dizziness  Negative for weakness and headaches  Hematological: Negative  Psychiatric/Behavioral: Negative  Physical Exam  Physical Exam   Constitutional: She is oriented to person, place, and time  She appears well-developed and well-nourished  No distress  HENT:   Head: Normocephalic and atraumatic  Right Ear: External ear normal    Left Ear: External ear normal    Nose: Nose normal    Mouth/Throat: Oropharynx is clear and moist  No oropharyngeal exudate  Eyes: Pupils are equal, round, and reactive to light  Conjunctivae and EOM are normal  Right eye exhibits no discharge  Left eye exhibits no discharge  No scleral icterus  Neck: Normal range of motion  Neck supple  No tracheal deviation present  No thyromegaly present  Cardiovascular: Normal rate, regular rhythm, normal heart sounds and intact distal pulses  Pulmonary/Chest: Effort normal and breath sounds normal  No respiratory distress  She has no wheezes  Abdominal: Soft  Bowel sounds are normal  She exhibits no distension  There is no tenderness  Musculoskeletal: Normal range of motion  She exhibits no edema, tenderness or deformity  Lymphadenopathy:     She has no cervical adenopathy     Neurological: She is alert and oriented to person, place, and time  No cranial nerve deficit or sensory deficit  She exhibits normal muscle tone  Coordination normal    Skin: Skin is warm and dry  No rash noted  She is not diaphoretic  No erythema  No pallor  Psychiatric: She has a normal mood and affect  Her behavior is normal  Judgment and thought content normal    Nursing note and vitals reviewed        Vital Signs  ED Triage Vitals [11/30/18 0929]   Temperature Pulse Respirations Blood Pressure SpO2   97 8 °F (36 6 °C) 90 16 164/100 97 %      Temp Source Heart Rate Source Patient Position - Orthostatic VS BP Location FiO2 (%)   Temporal Monitor Lying Left arm --      Pain Score       No Pain           Vitals:    11/30/18 0929   BP: 164/100   Pulse: 90   Patient Position - Orthostatic VS: Lying       Visual Acuity  Visual Acuity      Most Recent Value   L Pupil Size (mm)  4   R Pupil Size (mm)  4          ED Medications  Medications   LORazepam (ATIVAN) 2 mg/mL injection 1 mg (1 mg Intravenous Given 11/30/18 1007)   labetalol (NORMODYNE) injection 10 mg (10 mg Intravenous Given 11/30/18 1214)       Diagnostic Studies  Results Reviewed     Procedure Component Value Units Date/Time    Troponin I [[id number]]     Lab Status:  No result Specimen:  Blood     hCG, qualitative pregnancy [[id number]]  (Normal) Collected:  11/30/18 1006    Lab Status:  Final result Specimen:  Blood Updated:  11/30/18 1041     Preg, Serum Negative    Comprehensive metabolic panel [[id number]]  (Abnormal) Collected:  11/30/18 1000    Lab Status:  Final result Specimen:  Blood from Hand, Right Updated:  11/30/18 1041     Sodium 137 mmol/L      Potassium 3 8 mmol/L      Chloride 108 (H) mmol/L      CO2 19 (L) mmol/L      ANION GAP 10 mmol/L      BUN 9 mg/dL      Creatinine 0 67 mg/dL      Glucose 109 (H) mg/dL      Calcium 9 4 mg/dL      AST 15 U/L      ALT 11 U/L      Alkaline Phosphatase 60 U/L      Total Protein 6 9 g/dL      Albumin 4 6 g/dL      Total Bilirubin 0 30 mg/dL      eGFR 110 ml/min/1 73sq m     Narrative:         National Kidney Disease Education Program recommendations are as follows:  GFR calculation is accurate only with a steady state creatinine  Chronic Kidney disease less than 60 ml/min/1 73 sq  meters  Kidney failure less than 15 ml/min/1 73 sq  meters  Troponin I [[id number]]  (Normal) Collected:  11/30/18 1000    Lab Status:  Final result Specimen:  Blood from Hand, Right Updated:  11/30/18 1041     Troponin I <0 03 ng/mL     Troponin I [[id number]]     Lab Status:  No result Specimen:  Blood     Protime-INR [[id number]]  (Normal) Collected:  11/30/18 1006    Lab Status:  Final result Specimen:  Blood Updated:  11/30/18 1027     Protime 11 3 seconds      INR 0 97    APTT [[id number]]  (Normal) Collected:  11/30/18 1006    Lab Status:  Final result Specimen:  Blood Updated:  11/30/18 1027     PTT 35 seconds     CBC and differential [[id number]]  (Abnormal) Collected:  11/30/18 1000    Lab Status:  Final result Specimen:  Blood from Hand, Right Updated:  11/30/18 1016     WBC 6 70 Thousand/uL      RBC 4 75 Million/uL      Hemoglobin 15 1 g/dL      Hematocrit 43 9 %      MCV 93 fL      MCH 31 9 pg      MCHC 34 4 g/dL      RDW 12 7 %      MPV 7 6 (L) fL      Platelets 960 Thousands/uL      nRBC 0 /100 WBCs      Neutrophils Relative 60 %      Lymphocytes Relative 32 %      Monocytes Relative 6 %      Eosinophils Relative 1 %      Basophils Relative 1 %      Neutrophils Absolute 4 00 Thousands/µL      Lymphocytes Absolute 2 10 Thousands/µL      Monocytes Absolute 0 40 Thousand/µL      Eosinophils Absolute 0 10 Thousand/µL      Basophils Absolute 0 00 Thousands/µL                  CT head without contrast   Final Result by Doreen Hannon (11/30 1150)   No acute process  Signed by ALESHA Andrews       XR chest 1 view portable   Final Result by Julee William (11/30 1106)   Exam limited by moderate underaeration  No visible active cardiopulmonary   pathology           Signed by Latosha Ye MD                 Procedures  ECG 12 Lead Documentation  Date/Time: 11/30/2018 10:21 AM  Performed by: Gage Moreno  Authorized by: Lisette DAVID     Indications / Diagnosis:  Dizziness  ECG reviewed by me, the ED Provider: yes    Patient location:  ED  Previous ECG:     Previous ECG:  Unavailable    Comparison to cardiac monitor: Yes    Interpretation:     Interpretation: normal    Rate:     ECG rate:  92 beats per minute    ECG rate assessment: normal    Rhythm:     Rhythm: sinus rhythm    Ectopy:     Ectopy: none    QRS:     QRS axis:  Normal    QRS intervals:  Normal  Conduction:     Conduction: normal    ST segments:     ST segments:  Normal  T waves:     T waves: normal             Phone Contacts  ED Phone Contact    ED Course  ED Course as of Nov 30 1219   Fri Nov 30, 2018   1206 Test results were discussed with the patient and her significant other at the bedside  Patient is resting comfortably on the stretcher and she is not in any distress                                  MDM  Number of Diagnoses or Management Options  Dizziness: new and requires workup  Hypertension, unspecified type: new and requires workup     Amount and/or Complexity of Data Reviewed  Clinical lab tests: ordered and reviewed  Tests in the radiology section of CPT®: ordered and reviewed  Tests in the medicine section of CPT®: ordered and reviewed  Decide to obtain previous medical records or to obtain history from someone other than the patient: yes  Obtain history from someone other than the patient: yes  Review and summarize past medical records: yes  Independent visualization of images, tracings, or specimens: yes    Risk of Complications, Morbidity, and/or Mortality  Presenting problems: low  Diagnostic procedures: low  Management options: low    Patient Progress  Patient progress: improved    CritCare Time    Disposition  Final diagnoses:   Dizziness   Hypertension, unspecified type     Time reflects when diagnosis was documented in both MDM as applicable and the Disposition within this note     Time User Action Codes Description Comment    11/30/2018 12:07 PM Jacekgala Stallingsl Add [R42] Dizziness     11/30/2018 12:07 PM Juma Loaiza Add [I10] Hypertension, unspecified type       ED Disposition     ED Disposition Condition Comment    Discharge  [facility] discharge to home/self care  Condition at discharge: Good        Follow-up Information     Follow up With Specialties Details Why Contact Info    Asha Parry,  Family Medicine In 3 days  [address] [place] [address]  [phone]            Patient's Medications   Discharge Prescriptions    MECLIZINE (ANTIVERT) 25 MG TABLET    Take 1 tablet (25 mg total) by mouth 3 (three) times a day as needed for dizziness for up to 12 doses       Start Date: 11/30/2018End Date: --       Order Dose: 25 mg       Quantity: 12 tablet    Refills: 0     No discharge procedures on file      ED Provider  Electronically Signed by           Rebekah Yee MD  11/30/18 9219

## 2018-12-05 ENCOUNTER — TRANSCRIBE ORDERS (OUTPATIENT)
Dept: ADMINISTRATIVE | Facility: HOSPITAL | Age: 43
End: 2018-12-05

## 2018-12-05 ENCOUNTER — APPOINTMENT (OUTPATIENT)
Dept: LAB | Facility: HOSPITAL | Age: 43
End: 2018-12-05
Attending: PSYCHIATRY & NEUROLOGY
Payer: COMMERCIAL

## 2018-12-05 DIAGNOSIS — Z11.2 SCREENING EXAMINATION FOR LEPROSY (HANSEN'S DISEASE): ICD-10-CM

## 2018-12-05 DIAGNOSIS — Z79.899 NEED FOR PROPHYLACTIC CHEMOTHERAPY: ICD-10-CM

## 2018-12-05 DIAGNOSIS — R53.83 OTHER FATIGUE: ICD-10-CM

## 2018-12-05 DIAGNOSIS — R53.83 OTHER FATIGUE: Primary | ICD-10-CM

## 2018-12-05 LAB
25(OH)D3 SERPL-MCNC: 29 NG/ML (ref 30–100)
ERYTHROCYTE [SEDIMENTATION RATE] IN BLOOD: 1 MM/HOUR (ref 0–20)
FERRITIN SERPL-MCNC: 64 NG/ML (ref 8–388)
FOLATE SERPL-MCNC: 13.4 NG/ML (ref 3.1–17.5)
TSH SERPL DL<=0.05 MIU/L-ACNC: 2.8 UIU/ML (ref 0.45–5.33)
VIT B12 SERPL-MCNC: 414 PG/ML (ref 100–900)

## 2018-12-05 PROCEDURE — 84443 ASSAY THYROID STIM HORMONE: CPT

## 2018-12-05 PROCEDURE — 82607 VITAMIN B-12: CPT

## 2018-12-05 PROCEDURE — 86038 ANTINUCLEAR ANTIBODIES: CPT

## 2018-12-05 PROCEDURE — 82306 VITAMIN D 25 HYDROXY: CPT

## 2018-12-05 PROCEDURE — 82728 ASSAY OF FERRITIN: CPT

## 2018-12-05 PROCEDURE — 36415 COLL VENOUS BLD VENIPUNCTURE: CPT

## 2018-12-05 PROCEDURE — 82746 ASSAY OF FOLIC ACID SERUM: CPT

## 2018-12-05 PROCEDURE — 86618 LYME DISEASE ANTIBODY: CPT

## 2018-12-05 PROCEDURE — 85652 RBC SED RATE AUTOMATED: CPT

## 2018-12-06 LAB
B BURGDOR IGG SER IA-ACNC: 0.06
B BURGDOR IGM SER IA-ACNC: 0.26

## 2018-12-07 LAB
ATRIAL RATE: 92 BPM
P AXIS: 14 DEGREES
PR INTERVAL: 94 MS
QRS AXIS: 15 DEGREES
QRSD INTERVAL: 78 MS
QT INTERVAL: 366 MS
QTC INTERVAL: 452 MS
RYE IGE QN: NEGATIVE
T WAVE AXIS: 35 DEGREES
VENTRICULAR RATE: 92 BPM

## 2018-12-07 PROCEDURE — 93010 ELECTROCARDIOGRAM REPORT: CPT | Performed by: INTERNAL MEDICINE

## 2018-12-22 ENCOUNTER — OFFICE VISIT (OUTPATIENT)
Dept: URGENT CARE | Facility: CLINIC | Age: 43
End: 2018-12-22
Payer: COMMERCIAL

## 2018-12-22 VITALS
HEIGHT: 64 IN | OXYGEN SATURATION: 97 % | BODY MASS INDEX: 27.31 KG/M2 | DIASTOLIC BLOOD PRESSURE: 82 MMHG | SYSTOLIC BLOOD PRESSURE: 150 MMHG | WEIGHT: 160 LBS | HEART RATE: 106 BPM | RESPIRATION RATE: 16 BRPM | TEMPERATURE: 99.9 F

## 2018-12-22 DIAGNOSIS — R68.89 FLU-LIKE SYMPTOMS: Primary | ICD-10-CM

## 2018-12-22 PROCEDURE — 87631 RESP VIRUS 3-5 TARGETS: CPT | Performed by: PHYSICIAN ASSISTANT

## 2018-12-22 PROCEDURE — 99213 OFFICE O/P EST LOW 20 MIN: CPT | Performed by: PHYSICIAN ASSISTANT

## 2018-12-22 RX ORDER — OSELTAMIVIR PHOSPHATE 75 MG/1
75 CAPSULE ORAL EVERY 12 HOURS SCHEDULED
Qty: 10 CAPSULE | Refills: 0 | Status: SHIPPED | OUTPATIENT
Start: 2018-12-22 | End: 2018-12-27

## 2018-12-22 RX ORDER — BENZONATATE 100 MG/1
CAPSULE ORAL
Qty: 30 CAPSULE | Refills: 0 | Status: SHIPPED | OUTPATIENT
Start: 2018-12-22 | End: 2019-01-18 | Stop reason: ALTCHOICE

## 2018-12-22 NOTE — PATIENT INSTRUCTIONS
Influenza   AMBULATORY CARE:   Influenza  (the flu) is an infection caused by the influenza virus  The flu is easily spread when an infected person coughs, sneezes, or has close contact with others  You may be able to spread the flu to others for 1 week or longer after signs or symptoms appear  Common signs and symptoms include the following:   · Fever and chills    · Headaches, body aches, and muscle or joint pain    · Cough, runny nose, and sore throat    · Loss of appetite, nausea, vomiting, or diarrhea    · Tiredness    · Trouble breathing  Call 911 for any of the following:   · You have trouble breathing, and your lips look purple or blue  · You have a seizure  Seek care immediately if:   · You are dizzy, or you are urinating less or not at all  · You have a headache with a stiff neck, and you feel tired or confused  · You have new pain or pressure in your chest     · Your symptoms, such as shortness of breath, vomiting, or diarrhea, get worse  · Your symptoms, such as fever and coughing, seem to get better, but then get worse  Contact your healthcare provider if:   · You have new muscle pain or weakness  · You have questions or concerns about your condition or care  Treatment for influenza  may include any of the following:  · Acetaminophen  decreases pain and fever  It is available without a doctor's order  Ask how much to take and how often to take it  Follow directions  Acetaminophen can cause liver damage if not taken correctly  · NSAIDs , such as ibuprofen, help decrease swelling, pain, and fever  This medicine is available with or without a doctor's order  NSAIDs can cause stomach bleeding or kidney problems in certain people  If you take blood thinner medicine, always ask your healthcare provider if NSAIDs are safe for you  Always read the medicine label and follow directions  · Antivirals  help fight a viral infection    Manage your symptoms:   · Rest  as much as you can to help you recover  · Drink liquids as directed  to help prevent dehydration  Ask how much liquid to drink each day and which liquids are best for you  Prevent the spread of the flu:   · Wash your hands often  Use soap and water  Wash your hands after you use the bathroom, change a child's diapers, or sneeze  Wash your hands before you prepare or eat food  Use gel hand cleanser when soap and water are not available  Do not touch your eyes, nose, or mouth unless you have washed your hands first            · Cover your mouth when you sneeze or cough  Cough into a tissue or the bend of your arm  · Clean shared items with a germ-killing   Clean table surfaces, doorknobs, and light switches  Do not share towels, silverware, and dishes with people who are sick  Wash bed sheets, towels, silverware, and dishes with soap and water  · Wear a mask  over your mouth and nose if you are sick or are near anyone who is sick  · Stay away from others  if you are sick  · Influenza vaccine  helps prevent influenza (flu)  Everyone older than 6 months should get a yearly influenza vaccine  Get the vaccine as soon as it is available, usually in September or October each year  Follow up with your healthcare provider as directed:  Write down your questions so you remember to ask them during your visits  © 2017 [address] [employer] Information is for End User's use only and may not be sold, redistributed or otherwise used for commercial purposes  All illustrations and images included in CareNotes® are the copyrighted property of A [employer] M [employer]  or Dain Wahl  The above information is an  only  It is not intended as medical advice for individual conditions or treatments  Talk to your doctor, nurse or pharmacist before following any medical regimen to see if it is safe and effective for you

## 2018-12-22 NOTE — PROGRESS NOTES
[facility]        NAME: Jaylin Harris is a 41 y o  female  : 1975    MRN: [id number]  DATE: 2018  TIME: 9:26 AM    Assessment and Plan   Flu-like symptoms [R68 89]  1  Flu-like symptoms  oseltamivir (TAMIFLU) 75 mg capsule    benzonatate (TESSALON PERLES) 100 mg capsule         Patient Instructions       Follow up with PCP in 3-5 days  Proceed to  ER if symptoms worsen  Chief Complaint     Chief Complaint   Patient presents with    Cough     x 1 day    Fever         History of Present Illness       Patient presents with a dry cough and a fever as high as 102 which started yesterday  Earlier this week she started with some body aches especially low back pain  She denies any urinary symptoms  Patient states the cough is dry but he does have a burning in her chest with coughing  She denies any purulent nasal drainage or sore throat nausea vomiting diarrhea  Review of Systems   Review of Systems   Constitutional: Positive for activity change, appetite change, chills and fever  HENT: Negative for congestion, ear pain, postnasal drip, rhinorrhea, sore throat and trouble swallowing  Eyes: Negative for redness  Respiratory: Positive for cough  Negative for chest tightness, shortness of breath and wheezing  Cardiovascular: Positive for chest pain (Burning in chest with cough)  Gastrointestinal: Negative for diarrhea, nausea and vomiting  Musculoskeletal: Positive for myalgias  Negative for arthralgias  Skin: Negative for rash  Neurological: Negative for dizziness and headaches  Hematological: Negative for adenopathy           Current Medications       Current Outpatient Prescriptions:     ALPRAZolam (XANAX) 1 mg tablet, Take 1 mg by mouth daily at bedtime  , Disp: , Rfl:     butalbital-acetaminophen-caffeine-codeine (FIORICET WITH CODEINE) -13-30 MG per capsule, Take 1 capsule by mouth every 4 (four) hours as needed for headaches, Disp: , Rfl:    chlorhexidine (PERIDEX) 0 12 % solution, , Disp: , Rfl: 0    desvenlafaxine (PRISTIQ) 100 mg 24 hr tablet, Take 100 mg by mouth daily  , Disp: , Rfl:     fluticasone (FLONASE) 50 mcg/act nasal spray, 1 spray into each nostril daily, Disp: , Rfl:     gabapentin (NEURONTIN) 300 mg capsule, Take by mouth daily  , Disp: , Rfl:     ibuprofen (MOTRIN) 600 mg tablet, Take 1 tablet (600 mg total) by mouth every 6 (six) hours as needed for mild pain or moderate pain for up to 20 doses, Disp: 20 tablet, Rfl: 0    meclizine (ANTIVERT) 25 mg tablet, Take 1 tablet (25 mg total) by mouth 3 (three) times a day as needed for dizziness for up to 12 doses, Disp: 12 tablet, Rfl: 0    OXcarbazepine (TRILEPTAL) 300 mg tablet, Take 300 mg by mouth 2 (two) times a day, Disp: , Rfl: 2    pantoprazole (PROTONIX) 40 mg tablet, Take 40 mg by mouth 2 (two) times a day  , Disp: , Rfl:     PROAIR  (90 Base) MCG/ACT inhaler, Inhale every 4 (four) hours as needed (as needed)  , Disp: , Rfl:     topiramate (TOPAMAX) 100 mg tablet, Take by mouth daily at bedtime  , Disp: , Rfl:     traMADol (ULTRAM) 50 mg tablet, 50 mg as needed  , Disp: , Rfl: 0    valACYclovir (VALTREX) 500 mg tablet, Take 500 mg by mouth daily  , Disp: , Rfl:     benzonatate (TESSALON PERLES) 100 mg capsule, 1-2 tabs every 8 hrs as needed for cough, Disp: 30 capsule, Rfl: 0    medroxyPROGESTERone (DEPO-PROVERA) 150 mg/mL injection, medroxyprogesterone 150 mg/mL intramuscular suspension, Disp: , Rfl:     oseltamivir (TAMIFLU) 75 mg capsule, Take 1 capsule (75 mg total) by mouth every 12 (twelve) hours for 5 days, Disp: 10 capsule, Rfl: 0    Current Allergies     Allergies as of 12/22/2018 - Reviewed 12/22/2018   Allergen Reaction Noted    Lamotrigine Rash and Hives 02/24/2010            The following portions of the patient's history were reviewed and updated as appropriate: allergies, current medications, past family history, past medical history, past social history, past surgical history and problem list      Past Medical History:   Diagnosis Date    Bipolar 1 disorder ([facility] [address] )     with bordeline personality    Borderline personality disorder ([facility] [address] )     Chronic headaches     Depression     Depression     GERD (gastroesophageal reflux disease)     Incontinence     Migraine     Urinary tract infection        Past Surgical History:   Procedure Laterality Date    CHOLECYSTECTOMY  05/24/2018    DENTAL SURGERY      FOOT SURGERY Right     KNEE ARTHROSCOPY      KNEE ARTHROSCOPY Right 11/15/2018    MULTIPLE TOOTH EXTRACTIONS  11/02/2018    OVARIAN CYST REMOVAL      STOMACH SURGERY         Family History   Problem Relation Age of Onset    Hyperlipidemia Father     Heart disease Mother     Migraines Mother     Depression Family          Medications have been verified  Objective   /82   Pulse (!) 106   Temp 99 9 °F (37 7 °C)   Resp 16   Ht 5' 4" (1 626 m)   Wt 72 6 kg (160 lb)   SpO2 97%   BMI 27 46 kg/m²        Physical Exam     Physical Exam   Constitutional: She is oriented to person, place, and time  She appears well-developed and well-nourished  HENT:   Head: Normocephalic and atraumatic  Right Ear: External ear normal    Left Ear: External ear normal    Nose: Nose normal    Mouth/Throat: Oropharynx is clear and moist    Eyes: Conjunctivae are normal    Neck: Neck supple  Cardiovascular: Normal rate, regular rhythm and normal heart sounds  Pulmonary/Chest: Effort normal and breath sounds normal    Lymphadenopathy:     She has no cervical adenopathy  Neurological: She is alert and oriented to person, place, and time  Skin: Skin is warm and dry  No rash noted  Psychiatric: She has a normal mood and affect  Her behavior is normal  Judgment and thought content normal    Nursing note and vitals reviewed

## 2018-12-23 LAB
FLUAV AG SPEC QL: DETECTED
FLUBV AG SPEC QL: ABNORMAL
RSV B RNA SPEC QL NAA+PROBE: ABNORMAL

## 2019-01-02 ENCOUNTER — HOSPITAL ENCOUNTER (OUTPATIENT)
Dept: ULTRASOUND IMAGING | Facility: HOSPITAL | Age: 44
Discharge: HOME | End: 2019-01-02
Payer: COMMERCIAL

## 2019-01-02 ENCOUNTER — APPOINTMENT (OUTPATIENT)
Dept: LAB | Facility: HOSPITAL | Age: 44
End: 2019-01-02
Payer: COMMERCIAL

## 2019-01-02 ENCOUNTER — TRANSCRIBE ORDERS (OUTPATIENT)
Dept: ADMINISTRATIVE | Facility: HOSPITAL | Age: 44
End: 2019-01-02

## 2019-01-02 DIAGNOSIS — N30.00 ACUTE CYSTITIS WITHOUT HEMATURIA: ICD-10-CM

## 2019-01-02 DIAGNOSIS — N30.00 ACUTE CYSTITIS WITHOUT HEMATURIA: Primary | ICD-10-CM

## 2019-01-02 DIAGNOSIS — N30.01 ACUTE CYSTITIS WITH HEMATURIA: Primary | ICD-10-CM

## 2019-01-02 LAB
BACTERIA UR QL AUTO: ABNORMAL /HPF
BILIRUB UR QL STRIP: NEGATIVE
CLARITY UR: CLEAR
COLOR UR: ABNORMAL
GLUCOSE UR STRIP-MCNC: NEGATIVE MG/DL
HGB UR QL STRIP.AUTO: NEGATIVE
KETONES UR STRIP-MCNC: NEGATIVE MG/DL
LEUKOCYTE ESTERASE UR QL STRIP: NEGATIVE
NITRITE UR QL STRIP: POSITIVE
NON-SQ EPI CELLS URNS QL MICRO: ABNORMAL /HPF
PH UR STRIP.AUTO: 5.5 [PH] (ref 5–8)
PROT UR STRIP-MCNC: NEGATIVE MG/DL
RBC #/AREA URNS AUTO: ABNORMAL /HPF
SP GR UR STRIP.AUTO: 1.02 (ref 1–1.03)
UROBILINOGEN UR QL STRIP.AUTO: 0.2 E.U./DL
WBC #/AREA URNS AUTO: ABNORMAL /HPF

## 2019-01-02 PROCEDURE — 76770 US EXAM ABDO BACK WALL COMP: CPT

## 2019-01-02 PROCEDURE — 81001 URINALYSIS AUTO W/SCOPE: CPT

## 2019-01-04 ENCOUNTER — OFFICE VISIT (OUTPATIENT)
Dept: URGENT CARE | Facility: CLINIC | Age: 44
End: 2019-01-04
Payer: COMMERCIAL

## 2019-01-04 VITALS
BODY MASS INDEX: 27.31 KG/M2 | HEIGHT: 64 IN | WEIGHT: 160 LBS | HEART RATE: 76 BPM | TEMPERATURE: 98.3 F | OXYGEN SATURATION: 98 % | DIASTOLIC BLOOD PRESSURE: 76 MMHG | SYSTOLIC BLOOD PRESSURE: 133 MMHG | RESPIRATION RATE: 16 BRPM

## 2019-01-04 DIAGNOSIS — H69.81 EUSTACHIAN TUBE DYSFUNCTION, RIGHT: ICD-10-CM

## 2019-01-04 DIAGNOSIS — J01.90 ACUTE NON-RECURRENT SINUSITIS, UNSPECIFIED LOCATION: Primary | ICD-10-CM

## 2019-01-04 PROCEDURE — 99213 OFFICE O/P EST LOW 20 MIN: CPT | Performed by: PHYSICIAN ASSISTANT

## 2019-01-04 RX ORDER — AMOXICILLIN 875 MG/1
875 TABLET, COATED ORAL 2 TIMES DAILY
Qty: 20 TABLET | Refills: 0 | Status: SHIPPED | OUTPATIENT
Start: 2019-01-04 | End: 2019-01-14

## 2019-01-04 RX ORDER — GABAPENTIN 300 MG/1
CAPSULE ORAL
COMMUNITY
Start: 2018-12-01 | End: 2019-01-18 | Stop reason: ALTCHOICE

## 2019-01-04 NOTE — PATIENT INSTRUCTIONS
Sinusitis   AMBULATORY CARE:   Sinusitis  is inflammation or infection of your sinuses  It is most often caused by a virus  Acute sinusitis may last up to 12 weeks  Chronic sinusitis lasts longer than 12 weeks  Recurrent sinusitis means you have 4 or more times in 1 year  Common symptoms include the following:   · Fever    · Pain, pressure, redness, or swelling around the forehead, cheeks, or eyes    · Thick yellow or green discharge from your nose    · Tenderness when you touch your face over your sinuses    · Dry cough that happens mostly at night or when you lie down    · Headache and face pain that is worse when you lean forward    · Tooth pain, or pain when you chew  Seek care immediately if:   · Your eye and eyelid are red, swollen, and painful  · You cannot open your eye  · You have vision changes, such as double vision  · Your eyeball bulges out or you cannot move your eye  · You are more sleepy than normal, or you notice changes in your ability to think, move, or talk  · You have a stiff neck, a fever, or a bad headache  · You have swelling of your forehead or scalp  Contact your healthcare provider if:   · Your symptoms do not improve after 3 days  · Your symptoms do not go away after 10 days  · You have nausea and are vomiting  · Your nose is bleeding  · You have questions or concerns about your condition or care  Treatment for sinusitis:  Your symptoms may go away on their own  Your healthcare provider may recommend watchful waiting for up to 10 days before starting antibiotics  You may  need any of the following:  · Acetaminophen  decreases pain and fever  It is available without a doctor's order  Ask how much to take and how often to take it  Follow directions  Read the labels of all other medicines you are using to see if they also contain acetaminophen, or ask your doctor or pharmacist  Acetaminophen can cause liver damage if not taken correctly   Do not use more than 4 grams (4,000 milligrams) total of acetaminophen in one day  · NSAIDs , such as ibuprofen, help decrease swelling, pain, and fever  This medicine is available with or without a doctor's order  NSAIDs can cause stomach bleeding or kidney problems in certain people  If you take blood thinner medicine, always ask your healthcare provider if NSAIDs are safe for you  Always read the medicine label and follow directions  · Nasal steroid sprays  may help decrease inflammation in your nose and sinuses  · Decongestants  help reduce swelling and drain mucus in the nose and sinuses  They may help you breathe easier  · Antihistamines  help dry mucus in the nose and relieve sneezing  · Antibiotics  help treat or prevent a bacterial infection  · Take your medicine as directed  Contact your healthcare provider if you think your medicine is not helping or if you have side effects  Tell him or her if you are allergic to any medicine  Keep a list of the medicines, vitamins, and herbs you take  Include the amounts, and when and why you take them  Bring the list or the pill bottles to follow-up visits  Carry your medicine list with you in case of an emergency  Self-care:   · Rinse your sinuses  Use a sinus rinse device to rinse your nasal passages with a saline (salt water) solution or distilled water  Do not use tap water  This will help thin the mucus in your nose and rinse away pollen and dirt  It will also help reduce swelling so you can breathe normally  Ask your healthcare provider how often to do this  · Breathe in steam   Heat a bowl of water until you see steam  Lean over the bowl and make a tent over your head with a large towel  Breathe deeply for about 20 minutes  Be careful not to get too close to the steam or burn yourself  Do this 3 times a day  You can also breathe deeply when you take a hot shower  · Sleep with your head elevated    Place an extra pillow under your head before you go to sleep to help your sinuses drain  · Drink liquids as directed  Ask your healthcare provider how much liquid to drink each day and which liquids are best for you  Liquids will thin the mucus in your nose and help it drain  Avoid drinks that contain alcohol or caffeine  · Do not smoke, and avoid secondhand smoke  Nicotine and other chemicals in cigarettes and cigars can make your symptoms worse  Ask your healthcare provider for information if you currently smoke and need help to quit  E-cigarettes or smokeless tobacco still contain nicotine  Talk to your healthcare provider before you use these products  Prevent the spread of germs that cause sinusitis:  Wash your hands often with soap and water  Wash your hands after you use the bathroom, change a child's diaper, or sneeze  Wash your hands before you prepare or eat food  Follow up with your healthcare provider as directed: You may be referred to an ear, nose, and throat specialist  Write down your questions so you remember to ask them during your visits  © 2017 [facility] Information is for End User's use only and may not be sold, redistributed or otherwise used for commercial purposes  All illustrations and images included in CareNotes® are the copyrighted property of [employer] , [employer]  or Dain Wahl  The above information is an  only  It is not intended as medical advice for individual conditions or treatments  Talk to your doctor, nurse or pharmacist before following any medical regimen to see if it is safe and effective for you

## 2019-01-04 NOTE — PROGRESS NOTES
[facility]        NAME: Aline Lancaster is a 41 y o  female  : 1975    MRN: [id number]  DATE: 2019  TIME: 4:44 PM    Assessment and Plan   Acute non-recurrent sinusitis, unspecified location [J01 90]  1  Acute non-recurrent sinusitis, unspecified location  amoxicillin (AMOXIL) 875 mg tablet   2  Eustachian tube dysfunction, right           Patient Instructions     Try taking Mucinex in the blue box 1 tablet every 12 hours which would help with thinning any sinus secretions  Follow up with PCP in 3-5 days  Proceed to  ER if symptoms worsen  Chief Complaint     Chief Complaint   Patient presents with    Sinusitis     x 2 days    Earache         History of Present Illness       Patient presents with sinus pressure pain for the past 2 days  Patient has been takingTylenol cold and Sinus without any relief  She denies any fever chills sore throat cough chest pain shortness of breath  She did recently get over influenza  Review of Systems   Review of Systems   Constitutional: Negative for chills and fever  HENT: Positive for ear pain (Right), sinus pain and sinus pressure  Negative for rhinorrhea, sore throat and trouble swallowing  Eyes: Negative for pain  Respiratory: Negative for cough, shortness of breath and wheezing  Cardiovascular: Negative for chest pain  Gastrointestinal: Negative for nausea and vomiting  Musculoskeletal: Negative for myalgias  Skin: Negative for rash  Neurological: Positive for headaches  Negative for dizziness  Hematological: Negative for adenopathy           Current Medications       Current Outpatient Prescriptions:     ALPRAZolam (XANAX) 1 mg tablet, Take 1 mg by mouth daily at bedtime  , Disp: , Rfl:     benzonatate (TESSALON PERLES) 100 mg capsule, 1-2 tabs every 8 hrs as needed for cough, Disp: 30 capsule, Rfl: 0    butalbital-acetaminophen-caffeine-codeine (FIORICET WITH CODEINE) -82-30 MG per capsule, Take 1 capsule by mouth every 4 (four) hours as needed for headaches, Disp: , Rfl:     chlorhexidine (PERIDEX) 0 12 % solution, , Disp: , Rfl: 0    desvenlafaxine (PRISTIQ) 100 mg 24 hr tablet, Take 100 mg by mouth daily  , Disp: , Rfl:     fluticasone (FLONASE) 50 mcg/act nasal spray, 1 spray into each nostril daily, Disp: , Rfl:     ibuprofen (MOTRIN) 600 mg tablet, Take 1 tablet (600 mg total) by mouth every 6 (six) hours as needed for mild pain or moderate pain for up to 20 doses, Disp: 20 tablet, Rfl: 0    meclizine (ANTIVERT) 25 mg tablet, Take 1 tablet (25 mg total) by mouth 3 (three) times a day as needed for dizziness for up to 12 doses, Disp: 12 tablet, Rfl: 0    medroxyPROGESTERone (DEPO-PROVERA) 150 mg/mL injection, medroxyprogesterone 150 mg/mL intramuscular suspension, Disp: , Rfl:     OXcarbazepine (TRILEPTAL) 300 mg tablet, Take 300 mg by mouth 2 (two) times a day, Disp: , Rfl: 2    pantoprazole (PROTONIX) 40 mg tablet, Take 40 mg by mouth 2 (two) times a day  , Disp: , Rfl:     PROAIR  (90 Base) MCG/ACT inhaler, Inhale every 4 (four) hours as needed (as needed)  , Disp: , Rfl:     topiramate (TOPAMAX) 100 mg tablet, Take by mouth daily at bedtime  , Disp: , Rfl:     traMADol (ULTRAM) 50 mg tablet, 50 mg as needed  , Disp: , Rfl: 0    amoxicillin (AMOXIL) 875 mg tablet, Take 1 tablet (875 mg total) by mouth 2 (two) times a day for 10 days, Disp: 20 tablet, Rfl: 0    gabapentin (NEURONTIN) 300 mg capsule, , Disp: , Rfl:     Current Allergies     Allergies as of 01/04/2019 - Reviewed 01/04/2019   Allergen Reaction Noted    Lamotrigine Rash and Hives 02/24/2010            The following portions of the patient's history were reviewed and updated as appropriate: allergies, current medications, past family history, past medical history, past social history, past surgical history and problem list      Past Medical History:   Diagnosis Date    Bipolar 1 disorder ([address] )     with arpita personality    Borderline [facility] disorder [address] [address]     Chronic headaches     Depression     Depression     GERD (gastroesophageal reflux disease)     Incontinence     Migraine     Urinary tract infection        Past Surgical History:   Procedure Laterality Date    CHOLECYSTECTOMY  05/24/2018    DENTAL SURGERY      FOOT SURGERY Right     KNEE ARTHROSCOPY      KNEE ARTHROSCOPY Right 11/15/2018    MULTIPLE TOOTH EXTRACTIONS  11/02/2018    OVARIAN CYST REMOVAL      STOMACH SURGERY         Family History   Problem Relation Age of Onset    Hyperlipidemia Father     Heart disease Mother     Migraines Mother     Depression Family          Medications have been verified  Objective   /76   Pulse 76   Temp 98 3 °F (36 8 °C)   Resp 16   Ht 5' 4" (1 626 m)   Wt 72 6 kg (160 lb)   SpO2 98%   BMI 27 46 kg/m²        Physical Exam     Physical Exam   Constitutional: She is oriented to person, place, and time  She appears well-developed and well-nourished  HENT:   Head: Normocephalic and atraumatic  Right Ear: External ear normal    Left Ear: External ear normal    Nose: Nose normal    Mouth/Throat: Oropharynx is clear and moist    Eyes: Conjunctivae are normal    Neck: Neck supple  Cardiovascular: Normal rate, regular rhythm and normal heart sounds  Pulmonary/Chest: Effort normal and breath sounds normal    Lymphadenopathy:     She has no cervical adenopathy  Neurological: She is alert and oriented to person, place, and time  Skin: Skin is warm and dry  No rash noted  Psychiatric: She has a normal mood and affect  Her behavior is normal  Judgment and thought content normal    Nursing note and vitals reviewed

## 2019-01-17 PROBLEM — N39.0 RECURRENT UTI (URINARY TRACT INFECTION): Status: ACTIVE | Noted: 2019-01-17

## 2019-01-17 NOTE — PROGRESS NOTES
1/18/2019      Chief Complaint   Patient presents with    Stress incotinence with hematuria     follow up       Assessment and Plan    41 y o  female managed by Dr Pedro Brown    1  Recurrent UTI  - U/S normal  - no current UTI symptoms  - urine testing placed in EPIC to obtain when the patient has symptoms, will notify us if she drops off any samples and will treat as needed  - start cranberry tablets  - given the patient is no significantly bothered by the number of infections she is having will hold on prophylactic antibiotics at this time and start them if she continues to have frequent UTIs    2  Mild stress incontinence  - not bothersome    FU 6 months       History of Present Illness  Amparo Frank is a 41 y o  female here for follow up evaluation of recurrent UTIs and mild stress incontinence  The patient had an ultrasound obtained which revealed no abnormalities  Per her report she has had 3-4 UTIs over the last year  The last time she was symptomatic she had a urine culture revealing mixed contaminants  Previous to this was E coli  She is not sexually active        Review of Systems      Past Medical History  Past Medical History:   Diagnosis Date    Bipolar 1 disorder [address] [address]     with bordeline personality    Borderline personality disorder ([facility] [address] )     Chronic headaches     Depression     Depression     GERD (gastroesophageal reflux disease)     Incontinence     Migraine     Urinary tract infection        Past Social History  Past Surgical History:   Procedure Laterality Date    CHOLECYSTECTOMY  05/24/2018    DENTAL SURGERY      FOOT SURGERY Right     KNEE ARTHROSCOPY      KNEE ARTHROSCOPY Right 11/15/2018    MULTIPLE TOOTH EXTRACTIONS  11/02/2018    OVARIAN CYST REMOVAL      STOMACH SURGERY       History   Smoking Status    Never Smoker   Smokeless Tobacco    Never Used       Past Family History  Family History   Problem Relation Age of Onset    Hyperlipidemia Father     Heart disease Mother     Migraines Mother     Depression Family        Past Social history  Social History     Social History    Marital status: Single     Spouse name: N/A    Number of children: N/A    Years of education: N/A     Occupational History    Not on file  Social History Main Topics    Smoking status: Never Smoker    Smokeless tobacco: Never Used    Alcohol use Yes      Comment: rarely    Drug use: No    Sexual activity: Not on file     Other Topics Concern    Not on file     Social History Narrative    No narrative on file       Current Medications  Current Outpatient Prescriptions   Medication Sig Dispense Refill    ALPRAZolam (XANAX) 1 mg tablet Take 1 mg by mouth daily at bedtime        desvenlafaxine (PRISTIQ) 100 mg 24 hr tablet Take 100 mg by mouth daily        fluticasone (FLONASE) 50 mcg/act nasal spray 1 spray into each nostril daily      medroxyPROGESTERone (DEPO-PROVERA) 150 mg/mL injection medroxyprogesterone 150 mg/mL intramuscular suspension      OXcarbazepine (TRILEPTAL) 300 mg tablet Take 300 mg by mouth 2 (two) times a day  2    pantoprazole (PROTONIX) 40 mg tablet Take 40 mg by mouth 2 (two) times a day        PROAIR  (90 Base) MCG/ACT inhaler Inhale every 4 (four) hours as needed (as needed)        topiramate (TOPAMAX) 100 mg tablet Take by mouth daily at bedtime        valACYclovir (VALTREX) 500 mg tablet Take 500 mg by mouth daily      butalbital-acetaminophen-caffeine-codeine (FIORICET WITH CODEINE) -09-30 MG per capsule Take 1 capsule by mouth every 4 (four) hours as needed for headaches       No current facility-administered medications for this visit          Allergies  Allergies   Allergen Reactions    Lamotrigine Rash and Hives     Other reaction(s): rash and itching         The following portions of the patient's history were reviewed and updated as appropriate: allergies, current medications, past medical history, past social history, past surgical history and problem list       Vitals  Vitals:    01/18/19 1250   BP: 110/80   BP Location: Left arm   Patient Position: Sitting   Cuff Size: Adult   Pulse: 64   Weight: 75 8 kg (167 lb)   Height: 5' 5" (1 651 m)           Physical Exam  Constitutional   General appearance: Patient is seated and in no acute distress, well appearing and well nourished  Head and Face   Head and face: Normal     Eyes   Conjunctiva and lids: No erythema, swelling or discharge  Ears, Nose, Mouth, and Throat   Hearing: Normal     Pulmonary   Respiratory effort: No increased work of breathing or signs of respiratory distress  Cardiovascular   Examination of extremities for edema and/or varicosities: Normal     Abdomen   Abdomen: Non-tender, no masses  Musculoskeletal   Gait and station: Normal     Skin   Skin and subcutaneous tissue: Warm, dry, and intact  No visible lesions or rashes  Psychiatric   Judgment and insight: Normal  Recent and remote memory:  Normal  Mood and affect: Normal      Results  No results found for this or any previous visit (from the past 1 hour(s))  ]  No results found for: PSA  Lab Results   Component Value Date    CALCIUM 9 4 11/30/2018     05/17/2018    K 3 8 11/30/2018    CO2 19 (L) 11/30/2018     (H) 11/30/2018    BUN 9 11/30/2018    CREATININE 0 67 11/30/2018     Lab Results   Component Value Date    WBC 6 70 11/30/2018    HGB 15 1 11/30/2018    HCT 43 9 11/30/2018    MCV 93 11/30/2018     11/30/2018       RENAL ULTRASOUND   INDICATION:   N30 00: Acute cystitis without hematuria  COMPARISON: None  TECHNIQUE:   Ultrasound of the retroperitoneum was performed with a curvilinear transducer utilizing volumetric sweeps and still imaging techniques       FINDINGS:     KIDNEYS:  Symmetric and normal size  Right kidney:  12 8 cm  Left kidney:  11 8 cm      Right kidney  Normal echogenicity and contour  No suspicious masses detected  No hydronephrosis    No shadowing calculi  No perinephric fluid collections      Left kidney  Normal echogenicity and contour  No suspicious masses detected  No hydronephrosis  No shadowing calculi  No perinephric fluid collections      URETERS:  Nonvisualized      BLADDER:   Normally distended  No focal thickening or mass lesions  Bilateral ureteral jets detected      IMPRESSION:  1   Normal        Orders  Orders Placed This Encounter   Procedures    Urine culture     Standing Status:   Future     Standing Expiration Date:   1/18/2020    Urine culture     Standing Status:   Future     Standing Expiration Date:   7/18/2020    Urinalysis with microscopic     Standing Status:   Future     Standing Expiration Date:   1/18/2020    Urinalysis with microscopic     Standing Status:   Future     Standing Expiration Date:   1/18/2020

## 2019-01-18 ENCOUNTER — OFFICE VISIT (OUTPATIENT)
Dept: UROLOGY | Facility: CLINIC | Age: 44
End: 2019-01-18
Payer: COMMERCIAL

## 2019-01-18 VITALS
SYSTOLIC BLOOD PRESSURE: 110 MMHG | DIASTOLIC BLOOD PRESSURE: 80 MMHG | HEART RATE: 64 BPM | BODY MASS INDEX: 27.82 KG/M2 | HEIGHT: 65 IN | WEIGHT: 167 LBS

## 2019-01-18 DIAGNOSIS — N39.0 RECURRENT UTI (URINARY TRACT INFECTION): Primary | ICD-10-CM

## 2019-01-18 PROCEDURE — 99213 OFFICE O/P EST LOW 20 MIN: CPT | Performed by: PHYSICIAN ASSISTANT

## 2019-01-18 RX ORDER — VALACYCLOVIR HYDROCHLORIDE 500 MG/1
500 TABLET, FILM COATED ORAL DAILY
COMMUNITY

## 2019-02-21 ENCOUNTER — OFFICE VISIT (OUTPATIENT)
Dept: NEUROLOGY | Facility: CLINIC | Age: 44
End: 2019-02-21
Payer: COMMERCIAL

## 2019-02-21 VITALS
RESPIRATION RATE: 18 BRPM | WEIGHT: 165.2 LBS | HEIGHT: 65 IN | BODY MASS INDEX: 27.52 KG/M2 | SYSTOLIC BLOOD PRESSURE: 152 MMHG | HEART RATE: 85 BPM | DIASTOLIC BLOOD PRESSURE: 90 MMHG

## 2019-02-21 DIAGNOSIS — G44.209 TENSION HEADACHE: ICD-10-CM

## 2019-02-21 DIAGNOSIS — G43.009 MIGRAINE WITHOUT AURA AND WITHOUT STATUS MIGRAINOSUS, NOT INTRACTABLE: Primary | ICD-10-CM

## 2019-02-21 PROCEDURE — 99213 OFFICE O/P EST LOW 20 MIN: CPT | Performed by: PSYCHIATRY & NEUROLOGY

## 2019-02-21 RX ORDER — HYDROCODONE BITARTRATE AND ACETAMINOPHEN 5; 325 MG/1; MG/1
1 TABLET ORAL EVERY 6 HOURS PRN
COMMUNITY
End: 2019-04-11 | Stop reason: ALTCHOICE

## 2019-02-21 RX ORDER — AMOXICILLIN 500 MG/1
500 TABLET, FILM COATED ORAL 3 TIMES DAILY
COMMUNITY
End: 2019-03-12 | Stop reason: ALTCHOICE

## 2019-02-21 NOTE — ASSESSMENT & PLAN NOTE
Doing extremely well as her topiramate schedule has been reduced  Continues to have only a very, very occasional migraine  Hopeful that she can continue to reduce and eventually discontinue the topiramate without any re-emerging headache issues  --beginning today to reduce topiramate to 50 mg each morning for one month  If at that point her headaches remain well controlled she will reduce to 25 mg each morning for one month  If at that point still well controlled she will discontinue the topiramate  --asked to call the office with a status update in one month or before then should she have any difficulties with a significant return of headaches

## 2019-02-21 NOTE — PROGRESS NOTES
Patient is here today for her headaches    Patient ID: Maria Antonia Cedeño is a 41 y o  female  Assessment/Plan:    Migraine without aura and without status migrainosus, not intractable  Doing extremely well as her topiramate schedule has been reduced  Continues to have only a very, very occasional migraine  Hopeful that she can continue to reduce and eventually discontinue the topiramate without any re-emerging headache issues  --beginning today to reduce topiramate to 50 mg each morning for one month  If at that point her headaches remain well controlled she will reduce to 25 mg each morning for one month  If at that point still well controlled she will discontinue the topiramate  --asked to call the office with a status update in one month or before then should she have any difficulties with a significant return of headaches  Tension headache  Non problematic  She will follow up in six months or p r n     Subjective:    HPI  Patient, 41years of age, continues her ongoing care here given her historical headache issues  She was accompanied today by her fiance  From the standpoint of her migraines, she has been able to reduce her topiramate to 50 mg twice daily with no return of migraine issues  In fact, she has only a very, very occasional migraine and is very pleased with that  She has had no issues with return of significant tension-type headaches  She is very much interested in reducing her overall medication burden  She is now also off her gabapentin      Past Medical History:   Diagnosis Date    Bipolar 1 disorder ([facility] [address] )     with bordeline personality    Borderline personality disorder ([facility] [address] )     Chronic headaches     Depression     Depression     GERD (gastroesophageal reflux disease)     Incontinence     Migraine     Urinary tract infection      Past Surgical History:   Procedure Laterality Date    CHOLECYSTECTOMY  05/24/2018    DENTAL SURGERY      FOOT SURGERY Right     KNEE ARTHROSCOPY      KNEE ARTHROSCOPY Right 11/15/2018    MULTIPLE TOOTH EXTRACTIONS  11/02/2018    OVARIAN CYST REMOVAL      STOMACH SURGERY       Social History     Socioeconomic History    Marital status: Single     Spouse name: None    Number of children: None    Years of education: None    Highest education level: None   Occupational History    None   Social Needs    Financial resource strain: None    Food insecurity:     Worry: None     Inability: None    Transportation needs:     Medical: None     Non-medical: None   Tobacco Use    Smoking status: Never Smoker    Smokeless tobacco: Never Used   Substance and Sexual Activity    Alcohol use: Yes     Comment: rarely    Drug use: No    Sexual activity: None   Lifestyle    Physical activity:     Days per week: None     Minutes per session: None    Stress: None   Relationships    Social connections:     Talks on phone: None     Gets together: None     Attends Yazdanism service: None     Active member of club or organization: None     Attends meetings of clubs or organizations: None     Relationship status: None    Intimate partner violence:     Fear of current or ex partner: None     Emotionally abused: None     Physically abused: None     Forced sexual activity: None   Other Topics Concern    None   Social History Narrative    None     Family History   Problem Relation Age of Onset    Hyperlipidemia Father     Heart disease Mother     Migraines Mother     Depression Family      Allergies   Allergen Reactions    Lamotrigine Rash and Hives     Other reaction(s): rash and itching       Current Outpatient Medications:     ALPRAZolam (XANAX) 1 mg tablet, Take 1 mg by mouth daily at bedtime  , Disp: , Rfl:     amoxicillin (AMOXIL) 500 MG tablet, Take 500 mg by mouth 3 (three) times a day, Disp: , Rfl:     butalbital-acetaminophen-caffeine-codeine (FIORICET WITH CODEINE) -56-30 MG per capsule, Take 1 capsule by mouth every 4 (four) hours as needed for headaches, Disp: , Rfl:     desvenlafaxine (PRISTIQ) 100 mg 24 hr tablet, Take 100 mg by mouth daily  , Disp: , Rfl:     fluticasone (FLONASE) 50 mcg/act nasal spray, 1 spray into each nostril daily, Disp: , Rfl:     HYDROcodone-acetaminophen (NORCO) 5-325 mg per tablet, Take 1 tablet by mouth every 6 (six) hours as needed for pain, Disp: , Rfl:     medroxyPROGESTERone (DEPO-PROVERA) 150 mg/mL injection, medroxyprogesterone 150 mg/mL intramuscular suspension, Disp: , Rfl:     OXcarbazepine (TRILEPTAL) 300 mg tablet, Take 300 mg by mouth 2 (two) times a day, Disp: , Rfl: 2    pantoprazole (PROTONIX) 40 mg tablet, Take 40 mg by mouth 2 (two) times a day  , Disp: , Rfl:     PROAIR  (90 Base) MCG/ACT inhaler, Inhale every 4 (four) hours as needed (as needed)  , Disp: , Rfl:     topiramate (TOPAMAX) 100 mg tablet, Take 50 mg by mouth 2 (two) times a day , Disp: , Rfl:     valACYclovir (VALTREX) 500 mg tablet, Take 500 mg by mouth daily, Disp: , Rfl:     Objective:    Blood pressure 152/90, pulse 85, resp  rate 18, height 5' 5" (1 651 m), weight 74 9 kg (165 lb 3 2 oz), not currently breastfeeding  Physical Exam  Lungs clear to auscultation  Rhythm regular  Neurological Exam  Alert  Fully oriented  Pleasantly interactive  Unremarkable spontaneous gait  Cranial nerves 2-12 tested and grossly intact except for right lower facial asymmetry, unchanged from prior examinations  Funduscopic examination with bilaterally marginated discs  Accurate with finger-to-nose bilaterally  Full symmetrical strength throughout the four extremities with no upper extremity drift  Muscle stretch reflexes bilaterally 1 throughout the upper extremities and at the knees and bilaterally 1+ at the ankles  ROS:    Review of Systems   Constitutional: Negative  Negative for appetite change and fever  HENT: Negative  Negative for hearing loss, tinnitus, trouble swallowing and voice change      Eyes: Negative  Negative for photophobia and pain  Respiratory: Negative  Negative for shortness of breath  Cardiovascular: Negative  Negative for palpitations  Gastrointestinal: Negative  Negative for nausea and vomiting  Endocrine: Negative  Negative for cold intolerance and heat intolerance  Genitourinary: Negative  Negative for dysuria, frequency and urgency  Musculoskeletal: Negative  Negative for myalgias and neck pain  Skin: Negative  Negative for rash  Allergic/Immunologic: Negative  Neurological: Negative  Negative for dizziness, tremors, seizures, syncope, facial asymmetry, speech difficulty, weakness, light-headedness, numbness and headaches  Hematological: Negative  Does not bruise/bleed easily  Psychiatric/Behavioral: Negative  Negative for confusion, hallucinations and sleep disturbance  I personally reviewed the ROS that was entered by the medical assistant  *Please note this document was created using voice recognition software and may contain sound-alike word errors  *

## 2019-02-21 NOTE — PATIENT INSTRUCTIONS
Beginning today reduce topiramate to 50 mg daily in the morning for one month  If at that point headaches are still well controlled reduced to 25 mg daily in the morning for one month  If at that point they are still controlled then stop the topiramate  --please call in one month with a status update or earlier should you have any significant return of headaches

## 2019-02-21 NOTE — LETTER
February 21, 2019     Sanjay Alves, [address] [facility] [place] [address]    Patient: Shana Montesinos   YOB: 1975   Date of Visit: 2/21/2019       Dear Dr Patsy Zepeda: Thank you for referring Tejal Harp to me for evaluation  Below are my notes for this consultation  If you have questions, please do not hesitate to call me  I look forward to following your patient along with you  Sincerely,        Billy Call MD        CC: No Recipients  Billy Call MD  2/21/2019  9:24 AM  Sign at close encounter  Patient is here today for her headaches    Patient ID: Shana Montesinos is a 41 y o  female  Assessment/Plan:    Migraine without aura and without status migrainosus, not intractable  Doing extremely well as her topiramate schedule has been reduced  Continues to have only a very, very occasional migraine  Hopeful that she can continue to reduce and eventually discontinue the topiramate without any re-emerging headache issues  --beginning today to reduce topiramate to 50 mg each morning for one month  If at that point her headaches remain well controlled she will reduce to 25 mg each morning for one month  If at that point still well controlled she will discontinue the topiramate  --asked to call the office with a status update in one month or before then should she have any difficulties with a significant return of headaches  Tension headache  Non problematic  She will follow up in six months or p r n     Subjective:    HPI  Patient, 41years of age, continues her ongoing care here given her historical headache issues  She was accompanied today by her fiance  From the standpoint of her migraines, she has been able to reduce her topiramate to 50 mg twice daily with no return of migraine issues  In fact, she has only a very, very occasional migraine and is very pleased with that  She has had no issues with return of significant tension-type headaches    She is very much interested in reducing her overall medication burden  She is now also off her gabapentin      Past Medical History:   Diagnosis Date    Bipolar 1 disorder (HCC)     with bordeline personality    Borderline personality disorder ([address] )     Chronic headaches     Depression     Depression     GERD (gastroesophageal reflux disease)     Incontinence     Migraine     Urinary tract infection      Past Surgical History:   Procedure Laterality Date    CHOLECYSTECTOMY  05/24/2018    DENTAL SURGERY      FOOT SURGERY Right     KNEE ARTHROSCOPY      KNEE ARTHROSCOPY Right 11/15/2018    MULTIPLE TOOTH EXTRACTIONS  11/02/2018    OVARIAN CYST REMOVAL      STOMACH SURGERY       Social History     Socioeconomic History    Marital status: Single     Spouse name: None    Number of children: None    Years of education: None    Highest education level: None   Occupational History    None   Social Needs    Financial resource strain: None    Food insecurity:     Worry: None     Inability: None    Transportation needs:     Medical: None     Non-medical: None   Tobacco Use    Smoking status: Never Smoker    Smokeless tobacco: Never Used   Substance and Sexual Activity    Alcohol use: Yes     Comment: rarely    Drug use: No    Sexual activity: None   Lifestyle    Physical activity:     Days per week: None     Minutes per session: None    Stress: None   Relationships    Social connections:     Talks on phone: None     Gets together: None     Attends Muslim service: None     Active member of club or organization: None     Attends meetings of clubs or organizations: None     Relationship status: None    Intimate partner violence:     Fear of current or ex partner: None     Emotionally abused: None     Physically abused: None     Forced sexual activity: None   Other Topics Concern    None   Social History Narrative    None     Family History   Problem Relation Age of Onset    Hyperlipidemia Father     Heart disease Mother     Migraines Mother     Depression Family      Allergies   Allergen Reactions    Lamotrigine Rash and Hives     Other reaction(s): rash and itching       Current Outpatient Medications:     ALPRAZolam (XANAX) 1 mg tablet, Take 1 mg by mouth daily at bedtime  , Disp: , Rfl:     amoxicillin (AMOXIL) 500 MG tablet, Take 500 mg by mouth 3 (three) times a day, Disp: , Rfl:     butalbital-acetaminophen-caffeine-codeine (FIORICET WITH CODEINE) -23-30 MG per capsule, Take 1 capsule by mouth every 4 (four) hours as needed for headaches, Disp: , Rfl:     desvenlafaxine (PRISTIQ) 100 mg 24 hr tablet, Take 100 mg by mouth daily  , Disp: , Rfl:     fluticasone (FLONASE) 50 mcg/act nasal spray, 1 spray into each nostril daily, Disp: , Rfl:     HYDROcodone-acetaminophen (NORCO) 5-325 mg per tablet, Take 1 tablet by mouth every 6 (six) hours as needed for pain, Disp: , Rfl:     medroxyPROGESTERone (DEPO-PROVERA) 150 mg/mL injection, medroxyprogesterone 150 mg/mL intramuscular suspension, Disp: , Rfl:     OXcarbazepine (TRILEPTAL) 300 mg tablet, Take 300 mg by mouth 2 (two) times a day, Disp: , Rfl: 2    pantoprazole (PROTONIX) 40 mg tablet, Take 40 mg by mouth 2 (two) times a day  , Disp: , Rfl:     PROAIR  (90 Base) MCG/ACT inhaler, Inhale every 4 (four) hours as needed (as needed)  , Disp: , Rfl:     topiramate (TOPAMAX) 100 mg tablet, Take 50 mg by mouth 2 (two) times a day , Disp: , Rfl:     valACYclovir (VALTREX) 500 mg tablet, Take 500 mg by mouth daily, Disp: , Rfl:     Objective:    Blood pressure 152/90, pulse 85, resp  rate 18, height 5' 5" (1 651 m), weight 74 9 kg (165 lb 3 2 oz), not currently breastfeeding  Physical Exam  Lungs clear to auscultation  Rhythm regular  Neurological Exam  Alert  Fully oriented  Pleasantly interactive  Unremarkable spontaneous gait    Cranial nerves 2-12 tested and grossly intact except for right lower facial asymmetry, unchanged from prior examinations  Funduscopic examination with bilaterally marginated discs  Accurate with finger-to-nose bilaterally  Full symmetrical strength throughout the four extremities with no upper extremity drift  Muscle stretch reflexes bilaterally 1 throughout the upper extremities and at the knees and bilaterally 1+ at the ankles  ROS:    Review of Systems   Constitutional: Negative  Negative for appetite change and fever  HENT: Negative  Negative for hearing loss, tinnitus, trouble swallowing and voice change  Eyes: Negative  Negative for photophobia and pain  Respiratory: Negative  Negative for shortness of breath  Cardiovascular: Negative  Negative for palpitations  Gastrointestinal: Negative  Negative for nausea and vomiting  Endocrine: Negative  Negative for cold intolerance and heat intolerance  Genitourinary: Negative  Negative for dysuria, frequency and urgency  Musculoskeletal: Negative  Negative for myalgias and neck pain  Skin: Negative  Negative for rash  Allergic/Immunologic: Negative  Neurological: Negative  Negative for dizziness, tremors, seizures, syncope, facial asymmetry, speech difficulty, weakness, light-headedness, numbness and headaches  Hematological: Negative  Does not bruise/bleed easily  Psychiatric/Behavioral: Negative  Negative for confusion, hallucinations and sleep disturbance  I personally reviewed the ROS that was entered by the medical assistant  *Please note this document was created using voice recognition software and may contain sound-alike word errors  *

## 2019-02-27 ENCOUNTER — TRANSCRIBE ORDERS (OUTPATIENT)
Dept: RADIOLOGY | Facility: CLINIC | Age: 44
End: 2019-02-27

## 2019-02-27 ENCOUNTER — APPOINTMENT (OUTPATIENT)
Dept: RADIOLOGY | Facility: CLINIC | Age: 44
End: 2019-02-27
Payer: COMMERCIAL

## 2019-02-27 DIAGNOSIS — R76.11 HISTORY OF MANTOUX POSITIVE, UNTREATED: ICD-10-CM

## 2019-02-27 DIAGNOSIS — R76.11 HISTORY OF MANTOUX POSITIVE, UNTREATED: Primary | ICD-10-CM

## 2019-02-27 PROCEDURE — 71046 X-RAY EXAM CHEST 2 VIEWS: CPT

## 2019-03-12 ENCOUNTER — HOSPITAL ENCOUNTER (EMERGENCY)
Facility: HOSPITAL | Age: 44
Discharge: HOME | End: 2019-03-12
Attending: INTERNAL MEDICINE | Admitting: INTERNAL MEDICINE
Payer: COMMERCIAL

## 2019-03-12 VITALS
SYSTOLIC BLOOD PRESSURE: 122 MMHG | BODY MASS INDEX: 25.66 KG/M2 | OXYGEN SATURATION: 100 % | HEART RATE: 84 BPM | DIASTOLIC BLOOD PRESSURE: 78 MMHG | HEIGHT: 65 IN | WEIGHT: 154 LBS | TEMPERATURE: 97.3 F | RESPIRATION RATE: 20 BRPM

## 2019-03-12 DIAGNOSIS — A08.4 VIRAL GASTROENTERITIS: ICD-10-CM

## 2019-03-12 LAB
ALBUMIN SERPL BCP-MCNC: 4.3 G/DL (ref 3.5–5.7)
ALP SERPL-CCNC: 68 U/L (ref 40–150)
ALT SERPL W P-5'-P-CCNC: 80 U/L (ref 7–52)
ANION GAP SERPL CALCULATED.3IONS-SCNC: 11 MMOL/L (ref 4–13)
APTT PPP: 34 SECONDS (ref 26–38)
AST SERPL W P-5'-P-CCNC: 40 U/L (ref 13–39)
BASOPHILS # BLD AUTO: 0 THOUSANDS/ΜL (ref 0–0.1)
BASOPHILS NFR BLD AUTO: 1 % (ref 0–2)
BILIRUB SERPL-MCNC: 0.3 MG/DL (ref 0.2–1)
BUN SERPL-MCNC: 11 MG/DL (ref 7–25)
CALCIUM SERPL-MCNC: 9.1 MG/DL (ref 8.6–10.5)
CHLORIDE SERPL-SCNC: 106 MMOL/L (ref 98–107)
CO2 SERPL-SCNC: 16 MMOL/L (ref 21–31)
CREAT SERPL-MCNC: 0.68 MG/DL (ref 0.6–1.2)
EOSINOPHIL # BLD AUTO: 0 THOUSAND/ΜL (ref 0–0.61)
EOSINOPHIL NFR BLD AUTO: 1 % (ref 0–5)
ERYTHROCYTE [DISTWIDTH] IN BLOOD BY AUTOMATED COUNT: 13.6 % (ref 11.5–14.5)
GFR SERPL CREATININE-BSD FRML MDRD: 109 ML/MIN/1.73SQ M
GLUCOSE SERPL-MCNC: 92 MG/DL (ref 65–99)
HCT VFR BLD AUTO: 47.9 % (ref 42–47)
HGB BLD-MCNC: 16.4 G/DL (ref 12–16)
INR PPP: 1.12 (ref 0.9–1.5)
LIPASE SERPL-CCNC: 15 U/L (ref 11–82)
LYMPHOCYTES # BLD AUTO: 0.9 THOUSANDS/ΜL (ref 0.6–4.47)
LYMPHOCYTES NFR BLD AUTO: 19 % (ref 21–51)
MCH RBC QN AUTO: 32.5 PG (ref 26–34)
MCHC RBC AUTO-ENTMCNC: 34.2 G/DL (ref 31–37)
MCV RBC AUTO: 95 FL (ref 81–99)
MONOCYTES # BLD AUTO: 0.4 THOUSAND/ΜL (ref 0.17–1.22)
MONOCYTES NFR BLD AUTO: 9 % (ref 2–12)
NEUTROPHILS # BLD AUTO: 3.4 THOUSANDS/ΜL (ref 1.4–6.5)
NEUTS SEG NFR BLD AUTO: 72 % (ref 42–75)
NRBC BLD AUTO-RTO: 0 /100 WBCS
PLATELET # BLD AUTO: 197 THOUSANDS/UL (ref 149–390)
PMV BLD AUTO: 7.7 FL (ref 8.6–11.7)
POTASSIUM SERPL-SCNC: 3.3 MMOL/L (ref 3.5–5.5)
PROT SERPL-MCNC: 6.9 G/DL (ref 6.4–8.9)
PROTHROMBIN TIME: 13 SECONDS (ref 10.2–13)
RBC # BLD AUTO: 5.05 MILLION/UL (ref 3.9–5.2)
SODIUM SERPL-SCNC: 133 MMOL/L (ref 134–143)
WBC # BLD AUTO: 4.8 THOUSAND/UL (ref 4.8–10.8)

## 2019-03-12 PROCEDURE — 99284 EMERGENCY DEPT VISIT MOD MDM: CPT

## 2019-03-12 PROCEDURE — 96361 HYDRATE IV INFUSION ADD-ON: CPT

## 2019-03-12 PROCEDURE — 96374 THER/PROPH/DIAG INJ IV PUSH: CPT

## 2019-03-12 PROCEDURE — 36415 COLL VENOUS BLD VENIPUNCTURE: CPT | Performed by: INTERNAL MEDICINE

## 2019-03-12 PROCEDURE — 83690 ASSAY OF LIPASE: CPT | Performed by: INTERNAL MEDICINE

## 2019-03-12 PROCEDURE — 80053 COMPREHEN METABOLIC PANEL: CPT | Performed by: INTERNAL MEDICINE

## 2019-03-12 PROCEDURE — 96375 TX/PRO/DX INJ NEW DRUG ADDON: CPT

## 2019-03-12 PROCEDURE — 85610 PROTHROMBIN TIME: CPT | Performed by: INTERNAL MEDICINE

## 2019-03-12 PROCEDURE — 87493 C DIFF AMPLIFIED PROBE: CPT | Performed by: INTERNAL MEDICINE

## 2019-03-12 PROCEDURE — 85025 COMPLETE CBC W/AUTO DIFF WBC: CPT | Performed by: INTERNAL MEDICINE

## 2019-03-12 PROCEDURE — 85730 THROMBOPLASTIN TIME PARTIAL: CPT | Performed by: INTERNAL MEDICINE

## 2019-03-12 RX ORDER — ONDANSETRON 4 MG/1
4 TABLET, ORALLY DISINTEGRATING ORAL ONCE
Status: COMPLETED | OUTPATIENT
Start: 2019-03-12 | End: 2019-03-12

## 2019-03-12 RX ORDER — ONDANSETRON 2 MG/ML
4 INJECTION INTRAMUSCULAR; INTRAVENOUS ONCE
Status: COMPLETED | OUTPATIENT
Start: 2019-03-12 | End: 2019-03-12

## 2019-03-12 RX ORDER — DIPHENOXYLATE HYDROCHLORIDE AND ATROPINE SULFATE 2.5; .025 MG/1; MG/1
1 TABLET ORAL 4 TIMES DAILY PRN
Qty: 10 TABLET | Refills: 0 | Status: SHIPPED | OUTPATIENT
Start: 2019-03-12 | End: 2019-03-22

## 2019-03-12 RX ORDER — ONDANSETRON 4 MG/1
4 TABLET, FILM COATED ORAL EVERY 8 HOURS PRN
Qty: 12 TABLET | Refills: 0 | Status: SHIPPED | OUTPATIENT
Start: 2019-03-12 | End: 2019-04-03 | Stop reason: ALTCHOICE

## 2019-03-12 RX ORDER — KETOROLAC TROMETHAMINE 30 MG/ML
30 INJECTION, SOLUTION INTRAMUSCULAR; INTRAVENOUS ONCE
Status: COMPLETED | OUTPATIENT
Start: 2019-03-12 | End: 2019-03-12

## 2019-03-12 RX ORDER — DIPHENOXYLATE HYDROCHLORIDE AND ATROPINE SULFATE 2.5; .025 MG/1; MG/1
1 TABLET ORAL ONCE
Status: COMPLETED | OUTPATIENT
Start: 2019-03-12 | End: 2019-03-12

## 2019-03-12 RX ADMIN — ONDANSETRON 4 MG: 2 INJECTION INTRAMUSCULAR; INTRAVENOUS at 20:04

## 2019-03-12 RX ADMIN — HYOSCYAMINE SULFATE 0.12 MG: 0.12 TABLET ORAL at 20:03

## 2019-03-12 RX ADMIN — SODIUM CHLORIDE 1000 ML: 0.9 INJECTION, SOLUTION INTRAVENOUS at 20:43

## 2019-03-12 RX ADMIN — KETOROLAC TROMETHAMINE 30 MG: 30 INJECTION, SOLUTION INTRAMUSCULAR; INTRAVENOUS at 20:06

## 2019-03-12 RX ADMIN — SODIUM CHLORIDE 1000 ML: 0.9 INJECTION, SOLUTION INTRAVENOUS at 20:03

## 2019-03-12 RX ADMIN — ONDANSETRON 4 MG: 4 TABLET, ORALLY DISINTEGRATING ORAL at 22:36

## 2019-03-12 RX ADMIN — DIPHENOXYLATE HYDROCHLORIDE AND ATROPINE SULFATE 1 TABLET: 2.5; .025 TABLET ORAL at 22:36

## 2019-03-12 NOTE — ED PROVIDER NOTES
History  Chief Complaint   Patient presents with    Abdominal Pain     vomiting yesterday and multiple episodes diarrhea today  41year-old started vomiting yesterday and today as a diarrhea the entire day  She feels like she has lost about 5 lb  Every time she with fluid into her mouth seems to run right through her  She was on antibiotics about 3 weeks ago for dental pain and dental surgical issues  She has had fevers and chills  No back pain or radiation of pain  She describes the pain as crampy  No recent travel  No obvious sick contacts  History provided by:  Patient   used: No        Prior to Admission Medications   Prescriptions Last Dose Informant Patient Reported? Taking?    ALPRAZolam (XANAX) 1 mg tablet 3/11/2019 at Unknown time Self Yes Yes   Sig: Take 1 mg by mouth daily at bedtime     HYDROcodone-acetaminophen (NORCO) 5-325 mg per tablet   Yes No   Sig: Take 1 tablet by mouth every 6 (six) hours as needed for pain   OXcarbazepine (TRILEPTAL) 300 mg tablet 3/12/2019 at Unknown time Self Yes Yes   Sig: Take 300 mg by mouth 2 (two) times a day   PROAIR  (90 Base) MCG/ACT inhaler Past Week at Unknown time Self Yes Yes   Sig: Inhale every 4 (four) hours as needed (as needed)     butalbital-acetaminophen-caffeine-codeine (FIORICET WITH CODEINE) -62-30 MG per capsule  Self Yes No   Sig: Take 1 capsule by mouth every 4 (four) hours as needed for headaches   desvenlafaxine (PRISTIQ) 100 mg 24 hr tablet 3/12/2019 at Unknown time Self Yes Yes   Sig: Take 100 mg by mouth daily     fluticasone (FLONASE) 50 mcg/act nasal spray 3/11/2019 at Unknown time  Yes Yes   Si spray into each nostril daily   medroxyPROGESTERone (DEPO-PROVERA) 150 mg/mL injection Past Week at Unknown time Self Yes Yes   Sig: medroxyprogesterone 150 mg/mL intramuscular suspension   pantoprazole (PROTONIX) 40 mg tablet 3/12/2019 at Unknown time Self Yes Yes   Sig: Take 40 mg by mouth 2 (two) times a day     topiramate (TOPAMAX) 100 mg tablet 3/12/2019 at Unknown time Self Yes Yes   Sig: Take 50 mg by mouth 2 (two) times a day    valACYclovir (VALTREX) 500 mg tablet 3/12/2019 at Unknown time  Yes Yes   Sig: Take 500 mg by mouth daily      Facility-Administered Medications: None       Past Medical History:   Diagnosis Date    Bipolar 1 disorder (HCC)     with bordeline personality    Borderline personality disorder ([address] )     Chronic headaches     Depression     Depression     GERD (gastroesophageal reflux disease)     Incontinence     Migraine     Urinary tract infection        Past Surgical History:   Procedure Laterality Date    CHOLECYSTECTOMY  05/24/2018    DENTAL SURGERY      FOOT SURGERY Right     KNEE ARTHROSCOPY      KNEE ARTHROSCOPY Right 11/15/2018    MULTIPLE TOOTH EXTRACTIONS  11/02/2018    OVARIAN CYST REMOVAL      STOMACH SURGERY         Family History   Problem Relation Age of Onset    Hyperlipidemia Father     Heart disease Mother     Migraines Mother     Depression Family      I have reviewed and agree with the history as documented  Social History     Tobacco Use    Smoking status: Never Smoker    Smokeless tobacco: Never Used   Substance Use Topics    Alcohol use: Yes     Comment: rarely    Drug use: No        Review of Systems   Constitutional: Positive for chills and fever  HENT: Negative for rhinorrhea and sore throat  Eyes: Negative for visual disturbance  Respiratory: Negative for cough and shortness of breath  Cardiovascular: Negative for chest pain and leg swelling  Gastrointestinal: Positive for abdominal pain, diarrhea, nausea and vomiting  Genitourinary: Negative for dysuria  Musculoskeletal: Negative for back pain and myalgias  Skin: Negative for rash  Neurological: Negative for dizziness and headaches  Psychiatric/Behavioral: Negative for confusion  All other systems reviewed and are negative        Physical Exam  Physical Exam   Constitutional: She is oriented to person, place, and time  She appears well-developed  She appears ill  HENT:   Nose: Nose normal    Mouth/Throat: No oropharyngeal exudate  Dry mucous membranes   Eyes: Pupils are equal, round, and reactive to light  Conjunctivae and EOM are normal  No scleral icterus  Neck: Normal range of motion  Neck supple  No JVD present  No tracheal deviation present  Cardiovascular: Normal rate, regular rhythm and normal heart sounds  No murmur heard  Pulmonary/Chest: Effort normal and breath sounds normal  No respiratory distress  She has no wheezes  She has no rales  Abdominal: Soft  Bowel sounds are normal  There is tenderness  There is no guarding  Minimal abdominal tenderness  Bowel sounds increased pitch   Musculoskeletal: Normal range of motion  She exhibits no edema or tenderness  Neurological: She is alert and oriented to person, place, and time  No cranial nerve deficit or sensory deficit  She exhibits normal muscle tone  5/5 motor, nl sens   Skin: Skin is warm and dry  There is pallor  Psychiatric: She has a normal mood and affect  Her behavior is normal    Nursing note and vitals reviewed        Vital Signs  ED Triage Vitals [03/12/19 1916]   Temperature Pulse Respirations Blood Pressure SpO2   (!) 97 3 °F (36 3 °C) 84 16 (!) 184/103 100 %      Temp Source Heart Rate Source Patient Position - Orthostatic VS BP Location FiO2 (%)   Temporal Monitor -- Left arm --      Pain Score       6           Vitals:    03/12/19 1916   BP: (!) 184/103   Pulse: 84       qSOFA     Row Name 03/12/19 1919 03/12/19 1916             Altered mental status GCS < 15  0  --       Respiratory Rate > / =22  --  0       Systolic BP < / =518  --  0       Q Sofa Score  0  0             Visual Acuity      ED Medications  Medications - No data to display    Diagnostic Studies  Results Reviewed     None                 No orders to display              Procedures  Procedures Phone Contacts  ED Phone Contact    ED Course                               MDM    Disposition  Final diagnoses:   None     ED Disposition     None      Follow-up Information    None         Patient's Medications   Discharge Prescriptions    No medications on file     No discharge procedures on file      ED Provider  Electronically Signed by           Adalgisa Fischer DO  03/13/19 020

## 2019-03-13 LAB — C DIFF TOX GENS STL QL NAA+PROBE: NORMAL

## 2019-03-24 ENCOUNTER — OFFICE VISIT (OUTPATIENT)
Dept: URGENT CARE | Facility: CLINIC | Age: 44
End: 2019-03-24
Payer: COMMERCIAL

## 2019-03-24 VITALS
DIASTOLIC BLOOD PRESSURE: 88 MMHG | OXYGEN SATURATION: 98 % | SYSTOLIC BLOOD PRESSURE: 157 MMHG | RESPIRATION RATE: 18 BRPM | HEART RATE: 82 BPM | TEMPERATURE: 97.9 F

## 2019-03-24 DIAGNOSIS — J02.9 SORE THROAT: Primary | ICD-10-CM

## 2019-03-24 LAB — S PYO AG THROAT QL: NEGATIVE

## 2019-03-24 PROCEDURE — 99213 OFFICE O/P EST LOW 20 MIN: CPT | Performed by: PHYSICIAN ASSISTANT

## 2019-03-24 PROCEDURE — 87070 CULTURE OTHR SPECIMN AEROBIC: CPT | Performed by: PHYSICIAN ASSISTANT

## 2019-03-24 RX ORDER — AMOXICILLIN 875 MG/1
875 TABLET, COATED ORAL 2 TIMES DAILY
Qty: 14 TABLET | Refills: 0 | Status: SHIPPED | OUTPATIENT
Start: 2019-03-24 | End: 2019-03-31

## 2019-03-24 RX ORDER — PREDNISONE 10 MG/1
TABLET ORAL
Qty: 26 TABLET | Refills: 0 | Status: SHIPPED | OUTPATIENT
Start: 2019-03-24 | End: 2019-04-03 | Stop reason: ALTCHOICE

## 2019-03-24 NOTE — PATIENT INSTRUCTIONS
Rapid strep negative  Clinically appears to be strep  Will treat  I have prescribed an antibiotic for the infection  Please take the antibiotic as prescribed and finish the entire prescription  I recommend that the patient takes an over the counter probiotic or eats yogurt with live cultures in it [place]) to keep good bacteria in the gut and help prevent diarrhea  Wash hands frequently to prevent the spread of infection  Can use over the counter cough and cold medications to help with symptoms  Ibuprofen and/or tylenol as needed for pain or fever  If not improving over the next 7-10 days, follow up with PCP

## 2019-03-24 NOTE — PROGRESS NOTES
[facility]    NAME: Bairon Gonzales is a 41 y o  female  : 1975    MRN: [id number]  DATE: 2019  TIME: 12:46 PM    Assessment and Plan   Sore throat [J02 9]  1  Sore throat  POCT rapid strepA    Throat culture    amoxicillin (AMOXIL) 875 mg tablet    predniSONE 10 mg tablet       Patient Instructions   Patient Instructions   Rapid strep negative  Clinically appears to be strep  Will treat  I have prescribed an antibiotic for the infection  Please take the antibiotic as prescribed and finish the entire prescription  I recommend that the patient takes an over the counter probiotic or eats yogurt with live cultures in it [place]) to keep good bacteria in the gut and help prevent diarrhea  Wash hands frequently to prevent the spread of infection  Can use over the counter cough and cold medications to help with symptoms  Ibuprofen and/or tylenol as needed for pain or fever  If not improving over the next 7-10 days, follow up with PCP  Chief Complaint     Chief Complaint   Patient presents with    Sore Throat     Pt c/o a sore throat and swollen glands since Friday  History of Present Illness   53-year-old female here with complaint of sore throat, neck pain, enlarged lymph nodes and headache the last 3 days  Not improving with over-the-counter medications  Denies any nasal congestion or cough  Has felt feverish and had chills  Review of Systems   Review of Systems   Constitutional: Positive for chills and fever  Negative for activity change, appetite change, diaphoresis, fatigue and unexpected weight change  HENT: Positive for sore throat  Negative for congestion, dental problem, hearing loss, sinus pressure, sneezing, tinnitus, trouble swallowing and voice change  Eyes: Negative for photophobia, redness and visual disturbance  Respiratory: Negative for apnea, cough, chest tightness, shortness of breath, wheezing and stridor      Cardiovascular: Negative for chest pain, palpitations and leg swelling  Gastrointestinal: Negative for abdominal distention, abdominal pain, blood in stool, constipation, diarrhea, nausea and vomiting  Endocrine: Negative for cold intolerance, heat intolerance, polydipsia, polyphagia and polyuria  Genitourinary: Negative for difficulty urinating, dysuria, flank pain, frequency, hematuria and urgency  Musculoskeletal: Positive for neck stiffness  Negative for arthralgias, back pain, gait problem, joint swelling and myalgias  Skin: Negative for pallor, rash and wound  Neurological: Positive for headaches  Negative for dizziness, tremors, seizures, speech difficulty and weakness  Hematological: Positive for adenopathy  Does not bruise/bleed easily  Psychiatric/Behavioral: Negative for agitation, confusion, dysphoric mood and sleep disturbance  The patient is not nervous/anxious  All other systems reviewed and are negative        Current Medications     Current Outpatient Medications:     ALPRAZolam (XANAX) 1 mg tablet, Take 1 mg by mouth daily at bedtime  , Disp: , Rfl:     amoxicillin (AMOXIL) 875 mg tablet, Take 1 tablet (875 mg total) by mouth 2 (two) times a day for 7 days, Disp: 14 tablet, Rfl: 0    butalbital-acetaminophen-caffeine-codeine (FIORICET WITH CODEINE) -03-30 MG per capsule, Take 1 capsule by mouth every 4 (four) hours as needed for headaches, Disp: , Rfl:     desvenlafaxine (PRISTIQ) 100 mg 24 hr tablet, Take 100 mg by mouth daily  , Disp: , Rfl:     fluticasone (FLONASE) 50 mcg/act nasal spray, 1 spray into each nostril daily, Disp: , Rfl:     HYDROcodone-acetaminophen (NORCO) 5-325 mg per tablet, Take 1 tablet by mouth every 6 (six) hours as needed for pain, Disp: , Rfl:     medroxyPROGESTERone (DEPO-PROVERA) 150 mg/mL injection, medroxyprogesterone 150 mg/mL intramuscular suspension, Disp: , Rfl:     ondansetron (ZOFRAN) 4 mg tablet, Take 1 tablet (4 mg total) by mouth every 8 (eight) hours as needed for nausea or vomiting (Patient not taking: Reported on 3/24/2019), Disp: 12 tablet, Rfl: 0    OXcarbazepine (TRILEPTAL) 300 mg tablet, Take 300 mg by mouth 2 (two) times a day, Disp: , Rfl: 2    pantoprazole (PROTONIX) 40 mg tablet, Take 40 mg by mouth 2 (two) times a day  , Disp: , Rfl:     predniSONE 10 mg tablet, Take 3 tabs BID X 2 days, 2 tabs BID X 2 days, 1 tab BID X 2 days, 1 tab daily X 2 days, Disp: 26 tablet, Rfl: 0    PROAIR  (90 Base) MCG/ACT inhaler, Inhale every 4 (four) hours as needed (as needed)  , Disp: , Rfl:     topiramate (TOPAMAX) 100 mg tablet, Take 50 mg by mouth 2 (two) times a day , Disp: , Rfl:     valACYclovir (VALTREX) 500 mg tablet, Take 500 mg by mouth daily, Disp: , Rfl:     Current Allergies     Allergies as of 03/24/2019 - Reviewed 03/24/2019   Allergen Reaction Noted    Lamotrigine Rash and Hives 02/24/2010          The following portions of the patient's history were reviewed and updated as appropriate: allergies, current medications, past family history, past medical history, past social history, past surgical history and problem list    Past Medical History:   Diagnosis Date    Bipolar 1 disorder ([facility] [address] )     with bordeline personality    Borderline personality disorder ([facility] [address] )     Chronic headaches     Depression     Depression     GERD (gastroesophageal reflux disease)     Incontinence     Migraine     Urinary tract infection      Past Surgical History:   Procedure Laterality Date    CHOLECYSTECTOMY  05/24/2018    DENTAL SURGERY      FOOT SURGERY Right     KNEE ARTHROSCOPY      KNEE ARTHROSCOPY Right 11/15/2018    MULTIPLE TOOTH EXTRACTIONS  11/02/2018    OVARIAN CYST REMOVAL      STOMACH SURGERY       Family History   Problem Relation Age of Onset    Hyperlipidemia Father     Heart disease Mother     Migraines Mother     Depression Family      Social History     Socioeconomic History    Marital status: Single     Spouse name: Not on file    Number of children: Not on file    Years of education: Not on file    Highest education level: Not on file   Occupational History    Not on file   Social Needs    Financial resource strain: Not on file    Food insecurity:     Worry: Not on file     Inability: Not on file    Transportation needs:     Medical: Not on file     Non-medical: Not on file   Tobacco Use    Smoking status: Never Smoker    Smokeless tobacco: Never Used   Substance and Sexual Activity    Alcohol use: Yes     Comment: rarely    Drug use: No    Sexual activity: Not on file   Lifestyle    Physical activity:     Days per week: Not on file     Minutes per session: Not on file    Stress: Not on file   Relationships    Social connections:     Talks on phone: Not on file     Gets together: Not on file     Attends Che[employer] service: Not on file     Active member of club or organization: Not on file     Attends meetings of clubs or organizations: Not on file     Relationship status: Not on file    Intimate partner violence:     Fear of current or ex partner: Not on file     Emotionally abused: Not on file     Physically abused: Not on file     Forced sexual activity: Not on file   Other Topics Concern    Not on file   Social History Narrative    Not on file     Medications have been verified  Objective   /88   Pulse 82   Temp 97 9 °F (36 6 °C)   Resp 18   SpO2 98%      Physical Exam   Physical Exam   Constitutional: She appears well-developed and well-nourished  No distress  HENT:   Head: Normocephalic  Right Ear: Tympanic membrane and external ear normal    Left Ear: Tympanic membrane and external ear normal    Nose: Mucosal edema present  Mouth/Throat: Posterior oropharyngeal erythema present  No oropharyngeal exudate  Neck: Normal range of motion  Neck supple  Cardiovascular: Normal rate, regular rhythm and normal heart sounds  No murmur heard    Pulmonary/Chest: Effort normal and breath sounds normal  No respiratory distress  She has no wheezes  She has no rales  Abdominal: Soft  Bowel sounds are normal  There is no tenderness  Musculoskeletal: Normal range of motion  Lymphadenopathy:     She has no cervical adenopathy  Skin: Skin is warm  No rash noted  Nursing note and vitals reviewed

## 2019-03-26 LAB — BACTERIA THROAT CULT: NORMAL

## 2019-04-03 ENCOUNTER — OFFICE VISIT (OUTPATIENT)
Dept: URGENT CARE | Facility: CLINIC | Age: 44
End: 2019-04-03
Payer: COMMERCIAL

## 2019-04-03 VITALS
SYSTOLIC BLOOD PRESSURE: 153 MMHG | DIASTOLIC BLOOD PRESSURE: 71 MMHG | RESPIRATION RATE: 16 BRPM | OXYGEN SATURATION: 98 % | HEART RATE: 82 BPM | WEIGHT: 154 LBS | TEMPERATURE: 98.3 F | BODY MASS INDEX: 25.66 KG/M2 | HEIGHT: 65 IN

## 2019-04-03 DIAGNOSIS — J02.9 SORE THROAT: Primary | ICD-10-CM

## 2019-04-03 LAB — S PYO AG THROAT QL: NEGATIVE

## 2019-04-03 PROCEDURE — 87430 STREP A AG IA: CPT | Performed by: PHYSICIAN ASSISTANT

## 2019-04-03 PROCEDURE — 99213 OFFICE O/P EST LOW 20 MIN: CPT | Performed by: PHYSICIAN ASSISTANT

## 2019-04-03 PROCEDURE — 87070 CULTURE OTHR SPECIMN AEROBIC: CPT | Performed by: PHYSICIAN ASSISTANT

## 2019-04-03 RX ORDER — PREDNISONE 10 MG/1
TABLET ORAL
Qty: 26 TABLET | Refills: 0 | Status: SHIPPED | OUTPATIENT
Start: 2019-04-03 | End: 2019-04-11 | Stop reason: ALTCHOICE

## 2019-04-03 RX ORDER — TOPIRAMATE 50 MG/1
25 TABLET, FILM COATED ORAL
COMMUNITY
End: 2019-04-11 | Stop reason: SDUPTHER

## 2019-04-06 LAB — BACTERIA THROAT CULT: NORMAL

## 2019-04-11 ENCOUNTER — HOSPITAL ENCOUNTER (EMERGENCY)
Facility: HOSPITAL | Age: 44
Discharge: HOME | End: 2019-04-11
Attending: EMERGENCY MEDICINE | Admitting: EMERGENCY MEDICINE
Payer: COMMERCIAL

## 2019-04-11 VITALS
RESPIRATION RATE: 16 BRPM | SYSTOLIC BLOOD PRESSURE: 126 MMHG | HEART RATE: 82 BPM | WEIGHT: 158 LBS | OXYGEN SATURATION: 98 % | HEIGHT: 65 IN | DIASTOLIC BLOOD PRESSURE: 79 MMHG | TEMPERATURE: 98.4 F | BODY MASS INDEX: 26.33 KG/M2

## 2019-04-11 DIAGNOSIS — J32.9 SINUSITIS, UNSPECIFIED CHRONICITY, UNSPECIFIED LOCATION: ICD-10-CM

## 2019-04-11 DIAGNOSIS — R11.0 NAUSEA: ICD-10-CM

## 2019-04-11 DIAGNOSIS — R51.9 ACUTE INTRACTABLE HEADACHE, UNSPECIFIED HEADACHE TYPE: Primary | ICD-10-CM

## 2019-04-11 LAB
ANION GAP SERPL CALCULATED.3IONS-SCNC: 10 MMOL/L (ref 4–13)
BASOPHILS # BLD AUTO: 0 THOUSANDS/ΜL (ref 0–0.1)
BASOPHILS NFR BLD AUTO: 0 % (ref 0–2)
BUN SERPL-MCNC: 16 MG/DL (ref 7–25)
CALCIUM SERPL-MCNC: 9.3 MG/DL (ref 8.6–10.5)
CHLORIDE SERPL-SCNC: 102 MMOL/L (ref 98–107)
CO2 SERPL-SCNC: 25 MMOL/L (ref 21–31)
CREAT SERPL-MCNC: 0.65 MG/DL (ref 0.6–1.2)
EOSINOPHIL # BLD AUTO: 0.1 THOUSAND/ΜL (ref 0–0.61)
EOSINOPHIL NFR BLD AUTO: 1 % (ref 0–5)
ERYTHROCYTE [DISTWIDTH] IN BLOOD BY AUTOMATED COUNT: 13.9 % (ref 11.5–14.5)
GFR SERPL CREATININE-BSD FRML MDRD: 111 ML/MIN/1.73SQ M
GLUCOSE SERPL-MCNC: 101 MG/DL (ref 65–99)
HCT VFR BLD AUTO: 48.1 % (ref 42–47)
HGB BLD-MCNC: 16.2 G/DL (ref 12–16)
LYMPHOCYTES # BLD AUTO: 1 THOUSANDS/ΜL (ref 0.6–4.47)
LYMPHOCYTES NFR BLD AUTO: 6 % (ref 21–51)
MCH RBC QN AUTO: 31.9 PG (ref 26–34)
MCHC RBC AUTO-ENTMCNC: 33.7 G/DL (ref 31–37)
MCV RBC AUTO: 94 FL (ref 81–99)
MONOCYTES # BLD AUTO: 1 THOUSAND/ΜL (ref 0.17–1.22)
MONOCYTES NFR BLD AUTO: 6 % (ref 2–12)
NEUTROPHILS # BLD AUTO: 14.2 THOUSANDS/ΜL (ref 1.4–6.5)
NEUTS SEG NFR BLD AUTO: 86 % (ref 42–75)
PLATELET # BLD AUTO: 315 THOUSANDS/UL (ref 149–390)
PMV BLD AUTO: 7.5 FL (ref 8.6–11.7)
POTASSIUM SERPL-SCNC: 3.4 MMOL/L (ref 3.5–5.5)
RBC # BLD AUTO: 5.09 MILLION/UL (ref 3.9–5.2)
SODIUM SERPL-SCNC: 137 MMOL/L (ref 134–143)
TROPONIN I SERPL-MCNC: <0.03 NG/ML
WBC # BLD AUTO: 16.4 THOUSAND/UL (ref 4.8–10.8)

## 2019-04-11 PROCEDURE — 93005 ELECTROCARDIOGRAM TRACING: CPT

## 2019-04-11 PROCEDURE — 96374 THER/PROPH/DIAG INJ IV PUSH: CPT

## 2019-04-11 PROCEDURE — 96361 HYDRATE IV INFUSION ADD-ON: CPT

## 2019-04-11 PROCEDURE — 80048 BASIC METABOLIC PNL TOTAL CA: CPT | Performed by: PHYSICIAN ASSISTANT

## 2019-04-11 PROCEDURE — 96375 TX/PRO/DX INJ NEW DRUG ADDON: CPT

## 2019-04-11 PROCEDURE — 85025 COMPLETE CBC W/AUTO DIFF WBC: CPT | Performed by: PHYSICIAN ASSISTANT

## 2019-04-11 PROCEDURE — 99284 EMERGENCY DEPT VISIT MOD MDM: CPT

## 2019-04-11 PROCEDURE — 84484 ASSAY OF TROPONIN QUANT: CPT | Performed by: PHYSICIAN ASSISTANT

## 2019-04-11 PROCEDURE — 36415 COLL VENOUS BLD VENIPUNCTURE: CPT | Performed by: PHYSICIAN ASSISTANT

## 2019-04-11 RX ORDER — ONDANSETRON 4 MG/1
4 TABLET, ORALLY DISINTEGRATING ORAL EVERY 8 HOURS PRN
Qty: 20 TABLET | Refills: 0 | Status: SHIPPED | OUTPATIENT
Start: 2019-04-11 | End: 2022-04-25

## 2019-04-11 RX ORDER — NAPROXEN 500 MG/1
500 TABLET ORAL 2 TIMES DAILY WITH MEALS
Qty: 15 TABLET | Refills: 0 | Status: SHIPPED | OUTPATIENT
Start: 2019-04-11 | End: 2020-01-29

## 2019-04-11 RX ORDER — DIPHENHYDRAMINE HYDROCHLORIDE 50 MG/ML
25 INJECTION INTRAMUSCULAR; INTRAVENOUS ONCE
Status: COMPLETED | OUTPATIENT
Start: 2019-04-11 | End: 2019-04-11

## 2019-04-11 RX ORDER — DOXYCYCLINE HYCLATE 100 MG/1
100 CAPSULE ORAL 2 TIMES DAILY
Qty: 14 CAPSULE | Refills: 0 | Status: SHIPPED | OUTPATIENT
Start: 2019-04-11 | End: 2019-04-18

## 2019-04-11 RX ORDER — DOXYCYCLINE HYCLATE 50 MG/1
100 CAPSULE ORAL ONCE
Status: COMPLETED | OUTPATIENT
Start: 2019-04-11 | End: 2019-04-11

## 2019-04-11 RX ORDER — KETOROLAC TROMETHAMINE 30 MG/ML
30 INJECTION, SOLUTION INTRAMUSCULAR; INTRAVENOUS ONCE
Status: COMPLETED | OUTPATIENT
Start: 2019-04-11 | End: 2019-04-11

## 2019-04-11 RX ORDER — POTASSIUM CHLORIDE 20 MEQ/1
20 TABLET, EXTENDED RELEASE ORAL ONCE
Status: COMPLETED | OUTPATIENT
Start: 2019-04-11 | End: 2019-04-11

## 2019-04-11 RX ORDER — METOCLOPRAMIDE HYDROCHLORIDE 5 MG/ML
10 INJECTION INTRAMUSCULAR; INTRAVENOUS ONCE
Status: COMPLETED | OUTPATIENT
Start: 2019-04-11 | End: 2019-04-11

## 2019-04-11 RX ADMIN — SODIUM CHLORIDE 1000 ML: 0.9 INJECTION, SOLUTION INTRAVENOUS at 16:35

## 2019-04-11 RX ADMIN — POTASSIUM CHLORIDE 20 MEQ: 1500 TABLET, EXTENDED RELEASE ORAL at 17:41

## 2019-04-11 RX ADMIN — DIPHENHYDRAMINE HYDROCHLORIDE 25 MG: 50 INJECTION, SOLUTION INTRAMUSCULAR; INTRAVENOUS at 16:34

## 2019-04-11 RX ADMIN — KETOROLAC TROMETHAMINE 30 MG: 30 INJECTION, SOLUTION INTRAMUSCULAR; INTRAVENOUS at 16:34

## 2019-04-11 RX ADMIN — DOXYCYCLINE HYCLATE 100 MG: 50 CAPSULE ORAL at 17:41

## 2019-04-11 RX ADMIN — METOCLOPRAMIDE 10 MG: 5 INJECTION, SOLUTION INTRAMUSCULAR; INTRAVENOUS at 16:35

## 2019-04-12 LAB
ATRIAL RATE: 83 BPM
PR INTERVAL: 160 MS
QRS AXIS: 12 DEGREES
QRSD INTERVAL: 84 MS
QT INTERVAL: 366 MS
QTC INTERVAL: 430 MS
T WAVE AXIS: 24 DEGREES
VENTRICULAR RATE: 83 BPM

## 2019-04-12 PROCEDURE — 93010 ELECTROCARDIOGRAM REPORT: CPT | Performed by: INTERNAL MEDICINE

## 2019-05-10 ENCOUNTER — TELEPHONE (OUTPATIENT)
Dept: NEUROLOGY | Facility: CLINIC | Age: 44
End: 2019-05-10

## 2019-06-11 ENCOUNTER — TRANSCRIBE ORDERS (OUTPATIENT)
Dept: ADMINISTRATIVE | Facility: HOSPITAL | Age: 44
End: 2019-06-11

## 2019-06-11 DIAGNOSIS — M76.62 TENDONITIS, ACHILLES, LEFT: Primary | ICD-10-CM

## 2019-06-11 DIAGNOSIS — M25.572 ACUTE LEFT ANKLE PAIN: ICD-10-CM

## 2019-06-11 DIAGNOSIS — M72.2 PLANTAR FASCIAL FIBROMATOSIS: ICD-10-CM

## 2019-06-17 ENCOUNTER — HOSPITAL ENCOUNTER (OUTPATIENT)
Dept: MRI IMAGING | Facility: HOSPITAL | Age: 44
Discharge: HOME | End: 2019-06-17
Payer: COMMERCIAL

## 2019-06-17 DIAGNOSIS — M72.2 PLANTAR FASCIAL FIBROMATOSIS: ICD-10-CM

## 2019-06-17 DIAGNOSIS — M25.572 ACUTE LEFT ANKLE PAIN: ICD-10-CM

## 2019-06-17 DIAGNOSIS — M76.62 TENDONITIS, ACHILLES, LEFT: ICD-10-CM

## 2019-06-17 PROCEDURE — 73721 MRI JNT OF LWR EXTRE W/O DYE: CPT

## 2019-06-27 ENCOUNTER — HOSPITAL ENCOUNTER (INPATIENT)
Facility: HOSPITAL | Age: 44
LOS: 3 days | Discharge: HOME | DRG: 390 | End: 2019-06-30
Attending: EMERGENCY MEDICINE | Admitting: SURGERY
Payer: COMMERCIAL

## 2019-06-27 ENCOUNTER — APPOINTMENT (EMERGENCY)
Dept: CT IMAGING | Facility: HOSPITAL | Age: 44
DRG: 390 | End: 2019-06-27
Payer: COMMERCIAL

## 2019-06-27 DIAGNOSIS — K56.609 SMALL BOWEL OBSTRUCTION (HCC): Primary | ICD-10-CM

## 2019-06-27 LAB
ALBUMIN SERPL BCP-MCNC: 4.7 G/DL (ref 3.5–5.7)
ALP SERPL-CCNC: 66 U/L (ref 40–150)
ALT SERPL W P-5'-P-CCNC: 15 U/L (ref 7–52)
ANION GAP SERPL CALCULATED.3IONS-SCNC: 13 MMOL/L (ref 4–13)
APTT PPP: 33 SECONDS (ref 23–37)
AST SERPL W P-5'-P-CCNC: 16 U/L (ref 13–39)
BACTERIA UR QL AUTO: ABNORMAL /HPF
BACTERIA UR QL AUTO: ABNORMAL /HPF
BASOPHILS # BLD AUTO: 0 THOUSANDS/ΜL (ref 0–0.1)
BASOPHILS NFR BLD AUTO: 0 % (ref 0–2)
BILIRUB SERPL-MCNC: 0.6 MG/DL (ref 0.2–1)
BILIRUB UR QL STRIP: ABNORMAL
BILIRUB UR QL STRIP: NEGATIVE
BUN SERPL-MCNC: 17 MG/DL (ref 7–25)
CALCIUM SERPL-MCNC: 9.5 MG/DL (ref 8.6–10.5)
CHLORIDE SERPL-SCNC: 103 MMOL/L (ref 98–107)
CLARITY UR: CLEAR
CLARITY UR: CLEAR
CO2 SERPL-SCNC: 22 MMOL/L (ref 21–31)
COLOR UR: YELLOW
COLOR UR: YELLOW
CREAT SERPL-MCNC: 0.67 MG/DL (ref 0.6–1.2)
EOSINOPHIL # BLD AUTO: 0 THOUSAND/ΜL (ref 0–0.61)
EOSINOPHIL NFR BLD AUTO: 0 % (ref 0–5)
ERYTHROCYTE [DISTWIDTH] IN BLOOD BY AUTOMATED COUNT: 13.7 % (ref 11.5–14.5)
EXT PREG TEST URINE: NEGATIVE
EXT. CONTROL ED NAV: NORMAL
GFR SERPL CREATININE-BSD FRML MDRD: 110 ML/MIN/1.73SQ M
GLUCOSE SERPL-MCNC: 116 MG/DL (ref 65–99)
GLUCOSE UR STRIP-MCNC: NEGATIVE MG/DL
GLUCOSE UR STRIP-MCNC: NEGATIVE MG/DL
HCT VFR BLD AUTO: 45.5 % (ref 42–47)
HGB BLD-MCNC: 15.3 G/DL (ref 12–16)
HGB UR QL STRIP.AUTO: NEGATIVE
HGB UR QL STRIP.AUTO: NEGATIVE
HYALINE CASTS #/AREA URNS LPF: ABNORMAL /LPF
INR PPP: 1.16 (ref 0.9–1.5)
KETONES UR STRIP-MCNC: ABNORMAL MG/DL
KETONES UR STRIP-MCNC: ABNORMAL MG/DL
LACTATE SERPL-SCNC: 0.6 MMOL/L (ref 0.5–2)
LEUKOCYTE ESTERASE UR QL STRIP: NEGATIVE
LEUKOCYTE ESTERASE UR QL STRIP: NEGATIVE
LIPASE SERPL-CCNC: 27 U/L (ref 11–82)
LYMPHOCYTES # BLD AUTO: 1.2 THOUSANDS/ΜL (ref 0.6–4.47)
LYMPHOCYTES NFR BLD AUTO: 9 % (ref 21–51)
MCH RBC QN AUTO: 31.7 PG (ref 26–34)
MCHC RBC AUTO-ENTMCNC: 33.6 G/DL (ref 31–37)
MCV RBC AUTO: 94 FL (ref 81–99)
MONOCYTES # BLD AUTO: 0.9 THOUSAND/ΜL (ref 0.17–1.22)
MONOCYTES NFR BLD AUTO: 7 % (ref 2–12)
NEUTROPHILS # BLD AUTO: 11.8 THOUSANDS/ΜL (ref 1.4–6.5)
NEUTS SEG NFR BLD AUTO: 84 % (ref 42–75)
NITRITE UR QL STRIP: NEGATIVE
NITRITE UR QL STRIP: NEGATIVE
NON-SQ EPI CELLS URNS QL MICRO: ABNORMAL /HPF
NON-SQ EPI CELLS URNS QL MICRO: ABNORMAL /HPF
PH UR STRIP.AUTO: 6.5 [PH]
PH UR STRIP.AUTO: 8 [PH]
PLATELET # BLD AUTO: 276 THOUSANDS/UL (ref 149–390)
PMV BLD AUTO: 8.5 FL (ref 8.6–11.7)
POTASSIUM SERPL-SCNC: 3.6 MMOL/L (ref 3.5–5.5)
PROT SERPL-MCNC: 7 G/DL (ref 6.4–8.9)
PROT UR STRIP-MCNC: ABNORMAL MG/DL
PROT UR STRIP-MCNC: ABNORMAL MG/DL
PROTHROMBIN TIME: 13.5 SECONDS (ref 10.2–13)
RBC # BLD AUTO: 4.82 MILLION/UL (ref 3.9–5.2)
RBC #/AREA URNS AUTO: ABNORMAL /HPF
RBC #/AREA URNS AUTO: ABNORMAL /HPF
SODIUM SERPL-SCNC: 138 MMOL/L (ref 134–143)
SP GR UR STRIP.AUTO: 1.01 (ref 1–1.03)
SP GR UR STRIP.AUTO: 1.01 (ref 1–1.03)
UROBILINOGEN UR QL STRIP.AUTO: 1 E.U./DL
UROBILINOGEN UR QL STRIP.AUTO: 1 E.U./DL
WBC # BLD AUTO: 14 THOUSAND/UL (ref 4.8–10.8)
WBC #/AREA URNS AUTO: ABNORMAL /HPF
WBC #/AREA URNS AUTO: ABNORMAL /HPF

## 2019-06-27 PROCEDURE — 0D9670Z DRAINAGE OF STOMACH WITH DRAINAGE DEVICE, VIA NATURAL OR ARTIFICIAL OPENING: ICD-10-PCS | Performed by: EMERGENCY MEDICINE

## 2019-06-27 PROCEDURE — 85610 PROTHROMBIN TIME: CPT | Performed by: EMERGENCY MEDICINE

## 2019-06-27 PROCEDURE — 83690 ASSAY OF LIPASE: CPT | Performed by: EMERGENCY MEDICINE

## 2019-06-27 PROCEDURE — 81025 URINE PREGNANCY TEST: CPT | Performed by: EMERGENCY MEDICINE

## 2019-06-27 PROCEDURE — 96375 TX/PRO/DX INJ NEW DRUG ADDON: CPT

## 2019-06-27 PROCEDURE — 80053 COMPREHEN METABOLIC PANEL: CPT | Performed by: EMERGENCY MEDICINE

## 2019-06-27 PROCEDURE — 96376 TX/PRO/DX INJ SAME DRUG ADON: CPT

## 2019-06-27 PROCEDURE — 74177 CT ABD & PELVIS W/CONTRAST: CPT

## 2019-06-27 PROCEDURE — 99222 1ST HOSP IP/OBS MODERATE 55: CPT | Performed by: SURGERY

## 2019-06-27 PROCEDURE — 83605 ASSAY OF LACTIC ACID: CPT | Performed by: SURGERY

## 2019-06-27 PROCEDURE — 36415 COLL VENOUS BLD VENIPUNCTURE: CPT | Performed by: EMERGENCY MEDICINE

## 2019-06-27 PROCEDURE — 96361 HYDRATE IV INFUSION ADD-ON: CPT

## 2019-06-27 PROCEDURE — 85730 THROMBOPLASTIN TIME PARTIAL: CPT | Performed by: EMERGENCY MEDICINE

## 2019-06-27 PROCEDURE — 81001 URINALYSIS AUTO W/SCOPE: CPT | Performed by: SURGERY

## 2019-06-27 PROCEDURE — 85025 COMPLETE CBC W/AUTO DIFF WBC: CPT | Performed by: EMERGENCY MEDICINE

## 2019-06-27 PROCEDURE — 81001 URINALYSIS AUTO W/SCOPE: CPT | Performed by: EMERGENCY MEDICINE

## 2019-06-27 PROCEDURE — 99285 EMERGENCY DEPT VISIT HI MDM: CPT

## 2019-06-27 PROCEDURE — 96374 THER/PROPH/DIAG INJ IV PUSH: CPT

## 2019-06-27 RX ORDER — LISINOPRIL 10 MG/1
10 TABLET ORAL DAILY
COMMUNITY

## 2019-06-27 RX ORDER — ONDANSETRON 2 MG/ML
4 INJECTION INTRAMUSCULAR; INTRAVENOUS ONCE
Status: COMPLETED | OUTPATIENT
Start: 2019-06-27 | End: 2019-06-27

## 2019-06-27 RX ORDER — SODIUM CHLORIDE 9 MG/ML
125 INJECTION, SOLUTION INTRAVENOUS CONTINUOUS
Status: DISCONTINUED | OUTPATIENT
Start: 2019-06-27 | End: 2019-06-27

## 2019-06-27 RX ORDER — SODIUM CHLORIDE, SODIUM LACTATE, POTASSIUM CHLORIDE, CALCIUM CHLORIDE 600; 310; 30; 20 MG/100ML; MG/100ML; MG/100ML; MG/100ML
125 INJECTION, SOLUTION INTRAVENOUS CONTINUOUS
Status: DISCONTINUED | OUTPATIENT
Start: 2019-06-27 | End: 2019-06-30

## 2019-06-27 RX ORDER — ONDANSETRON 2 MG/ML
4 INJECTION INTRAMUSCULAR; INTRAVENOUS EVERY 6 HOURS PRN
Status: DISCONTINUED | OUTPATIENT
Start: 2019-06-27 | End: 2019-06-30 | Stop reason: HOSPADM

## 2019-06-27 RX ORDER — HYDROMORPHONE HCL/PF 1 MG/ML
1 SYRINGE (ML) INJECTION ONCE
Status: COMPLETED | OUTPATIENT
Start: 2019-06-27 | End: 2019-06-27

## 2019-06-27 RX ORDER — PANTOPRAZOLE SODIUM 40 MG/1
40 INJECTION, POWDER, FOR SOLUTION INTRAVENOUS
Status: DISCONTINUED | OUTPATIENT
Start: 2019-06-28 | End: 2019-06-28

## 2019-06-27 RX ORDER — ONDANSETRON 2 MG/ML
1 INJECTION INTRAMUSCULAR; INTRAVENOUS ONCE
Status: DISCONTINUED | OUTPATIENT
Start: 2019-06-27 | End: 2019-06-27

## 2019-06-27 RX ORDER — ONDANSETRON 2 MG/ML
1 INJECTION INTRAMUSCULAR; INTRAVENOUS ONCE
Status: COMPLETED | OUTPATIENT
Start: 2019-06-27 | End: 2019-06-27

## 2019-06-27 RX ADMIN — HYDROMORPHONE HYDROCHLORIDE 1 MG: 1 INJECTION, SOLUTION INTRAMUSCULAR; INTRAVENOUS; SUBCUTANEOUS at 16:35

## 2019-06-27 RX ADMIN — ONDANSETRON 4 MG: 2 INJECTION INTRAMUSCULAR; INTRAVENOUS at 17:02

## 2019-06-27 RX ADMIN — ONDANSETRON 4 MG: 2 INJECTION INTRAMUSCULAR; INTRAVENOUS at 13:23

## 2019-06-27 RX ADMIN — IOHEXOL 100 ML: 350 INJECTION, SOLUTION INTRAVENOUS at 15:06

## 2019-06-27 RX ADMIN — SODIUM CHLORIDE 1000 ML: 0.9 INJECTION, SOLUTION INTRAVENOUS at 12:45

## 2019-06-27 RX ADMIN — SODIUM CHLORIDE, POTASSIUM CHLORIDE, SODIUM LACTATE AND CALCIUM CHLORIDE 125 ML/HR: 600; 310; 30; 20 INJECTION, SOLUTION INTRAVENOUS at 18:35

## 2019-06-27 RX ADMIN — HYDROMORPHONE HYDROCHLORIDE 1 MG: 1 INJECTION, SOLUTION INTRAMUSCULAR; INTRAVENOUS; SUBCUTANEOUS at 13:47

## 2019-06-27 RX ADMIN — MORPHINE SULFATE 2 MG: 2 INJECTION, SOLUTION INTRAMUSCULAR; INTRAVENOUS at 13:23

## 2019-06-27 NOTE — PLAN OF CARE
Problem: Potential for Falls  Goal: Patient will remain free of falls  Description  INTERVENTIONS:  - Assess patient frequently for physical needs  -  Identify cognitive and physical deficits and behaviors that affect risk of falls    -  [place] fall precautions as indicated by assessment   - Educate patient/family on patient safety including physical limitations  - Instruct patient to call for assistance with activity based on assessment  - Modify environment to reduce risk of injury  - Consider OT/PT consult to assist with strengthening/mobility  Outcome: Progressing     Problem: GASTROINTESTINAL - ADULT  Goal: Minimal or absence of nausea and/or vomiting  Description  INTERVENTIONS:  - Administer IV fluids as ordered to ensure adequate hydration  - Maintain NPO status until nausea and vomiting are resolved  - Nasogastric tube as ordered  - Administer ordered antiemetic medications as needed  - Provide nonpharmacologic comfort measures as appropriate  - Advance diet as tolerated, if ordered  - Nutrition services referral to assist patient with adequate nutrition and appropriate food choices  Outcome: Progressing  Goal: Maintains or returns to baseline bowel function  Description  INTERVENTIONS:  - Assess bowel function  - Encourage oral fluids to ensure adequate hydration  - Administer IV fluids as ordered to ensure adequate hydration  - Administer ordered medications as needed  - Encourage mobilization and activity  - Nutrition services referral to assist patient with appropriate food choices  Outcome: Progressing  Goal: Maintains adequate nutritional intake  Description  INTERVENTIONS:  - Monitor percentage of each meal consumed  - Identify factors contributing to decreased intake, treat as appropriate  - Assist with meals as needed  - Monitor I&O, WT and lab values  - Obtain nutrition services referral as needed  Outcome: Progressing  Goal: Establish and maintain optimal ostomy function  Description  INTERVENTIONS:  - Assess bowel function  - Encourage oral fluids to ensure adequate hydration  - Administer IV fluids as ordered to ensure adequate hydration  - Administer ordered medications as needed  - Encourage mobilization and activity  - Nutrition services referral to assist patient with appropriate food choices  - Assess stoma site  Outcome: Progressing     Problem: PAIN - ADULT  Goal: Verbalizes/displays adequate comfort level or baseline comfort level  Description  Interventions:  - Encourage patient to monitor pain and request assistance  - Assess pain using appropriate pain scale  - Administer analgesics based on type and severity of pain and evaluate response  - Implement non-pharmacological measures as appropriate and evaluate response  - Consider cultural and social influences on pain and pain management  - Notify physician/advanced practitioner if interventions unsuccessful or patient reports new pain  Outcome: Progressing     Problem: SAFETY ADULT  Goal: Maintain or return to baseline ADL function  Description  INTERVENTIONS:  -  Assess patient's ability to carry out ADLs; assess patient's baseline for ADL function and identify physical deficits which impact ability to perform ADLs (bathing, care of mouth/teeth, toileting, grooming, dressing, etc )  - Assess/evaluate cause of self-care deficits   - Assess range of motion  - Assess patient's mobility; develop plan if impaired  - Assess patient's need for assistive devices and provide as appropriate  - Encourage maximum independence but intervene and supervise when necessary  ¯ Involve family in performance of ADLs  ¯ Assess for home care needs following discharge   ¯ Request OT consult to assist with ADL evaluation and planning for discharge  ¯ Provide patient education as appropriate  Outcome: Progressing  Goal: Maintain or return mobility status to optimal level  Description  INTERVENTIONS:  - Assess patient's baseline mobility status (ambulation, transfers, stairs, etc )    - Identify cognitive and physical deficits and behaviors that affect mobility  - Identify mobility aids required to assist with transfers and/or ambulation (gait belt, sit-to-stand, lift, walker, cane, etc )  - [place] fall precautions as indicated by assessment  - Record patient progress and toleration of activity level on Mobility SBAR; progress patient to next Phase/Stage  - Instruct patient to call for assistance with activity based on assessment  - Request Rehabilitation consult to assist with strengthening/weightbearing, etc   Outcome: Progressing     Problem: DISCHARGE PLANNING  Goal: Discharge to home or other facility with appropriate resources  Description  INTERVENTIONS:  - Identify barriers to discharge w/patient and caregiver  - Arrange for needed discharge resources and transportation as appropriate  - Identify discharge learning needs (meds, wound care, etc )  - Arrange for interpretive services to assist at discharge as needed  - Refer to Case Management Department for coordinating discharge planning if the patient needs post-hospital services based on physician/advanced practitioner order or complex needs related to functional status, cognitive ability, or social support system  Outcome: Progressing     Problem: Knowledge Deficit  Goal: Patient/family/caregiver demonstrates understanding of disease process, treatment plan, medications, and discharge instructions  Description  Complete learning assessment and assess knowledge base    Interventions:  - Provide teaching at level of understanding  - Provide teaching via preferred learning methods  Outcome: Progressing

## 2019-06-27 NOTE — H&P
History and physical- General Surgery   Eliseo Dela Cruz 41 y o  female MRN: [id number]  Unit/Bed#: -01 Encounter: [id number]    Assessment/Plan     Assessment:  46-year-old female with history of exploratory laparotomy, small bowel obstruction, now admitted with recurrent small-bowel obstruction  NG tube in place  Plan:    Small-bowel obstruction  - IV fluids  - NG tube to low intermittent wall suction  - ambulate / at about  - obstruction series in the morning  - repeat labs in the morning  - obtain lactate level  - DVT prophylaxis  - pain control    GERD  - IV Protonix    Hypertension  - hold p o  Meds for now    Psychiatric morbidities  - hold meds for now as currently NPO    Bacteria within the urine  - patient currently asymptomatic  - urine sample showing moderate epithelial cells  - will repeat urine sample      History of Present Illness     HPI:  Eliseo Dela Cruz is a 41 y o  female with a history of GERD, hypertension, psychiatric issues to include bipolar disorder, history of foreign body ingestion with small bowel obstruction status post exploratory laparotomy many years ago, now presents to the ER with one-day history of worsening abdominal pain associated nausea vomiting  Patient felt nauseous 2 days ago but then last evening late at night the nausea was worse and she also had worsening abdominal pain  States the pain was more in the lower abdomen on the left-hand side  It was sharp/achy and did not radiate  At that time she felt that she was constipated as she had not gone to the bathroom in a couple days and therefore took Mag citrate to help her bowel movement  At that time she had a small bowel movement but no flatus  Subsequently she started to have severe nausea vomiting all throughout the night  As things become worse throughout the day she decided to go to the ER  While in the ER NG tube was placed she was given IV fluids  She denies any fevers or chills    Her nausea is improved  No chest pain or shortness of breath  Denies any dysuria  Consults    Review of Systems     A review systems was completed, all negative except as noted above HPI      Historical Information   Past Medical History:   Diagnosis Date    B[facility] 1 disorder ([facility] )     with bordeline personality    Borderline personality disorder ([facility] )     Chronic headaches     Depression     Depression     GERD (gastroesophageal reflux disease)     Hypertension     Incontinence     Migraine     Urinary tract infection      Past Surgical History:   Procedure Laterality Date    CHOLECYSTECTOMY  05/24/2018    DENTAL SURGERY      FOOT SURGERY Right     KNEE ARTHROSCOPY      KNEE ARTHROSCOPY Right 11/15/2018    MULTIPLE TOOTH EXTRACTIONS  11/02/2018    OVARIAN CYST REMOVAL      REDUCTION MAMMAPLASTY      STOMACH SURGERY       Social History   Social History     Substance and Sexual Activity   Alcohol Use Not Currently    Comment: rarely     Social History     Substance and Sexual Activity   Drug Use Not Currently     Social History     Tobacco Use   Smoking Status Never Smoker   Smokeless Tobacco Never Used     Family History: non-contributory    Meds/Allergies   all current active meds have been reviewed  Allergies   Allergen Reactions    Lamotrigine Rash and Hives     Other reaction(s): rash and itching  Other reaction(s): rash and itching  Other reaction(s): rash and itching       Objective   First Vitals:   Blood Pressure: 137/98 (06/27/19 1215)  Pulse: 81 (06/27/19 1215)  Temperature: 98 8 °F (37 1 °C) (06/27/19 1219)  Temp Source: Temporal (06/27/19 1219)  Respirations: 18 (06/27/19 1219)  Height: 5' 5" (165 1 cm) (06/27/19 1219)  Weight - Scale: 72 6 kg (160 lb) (06/27/19 1219)  SpO2: 94 % (06/27/19 1215)    Current Vitals:   Blood Pressure: 123/74 (06/27/19 1700)  Pulse: 68 (06/27/19 1700)  Temperature: (!) 97 1 °F (36 2 °C) (06/27/19 1700)  Temp Source: Tympanic (06/27/19 1700)  Respirations: 16 (06/27/19 1700)  Height: 5' 5" (165 1 cm) (06/27/19 1700)  Weight - Scale: 74 4 kg (164 lb 0 4 oz) (06/27/19 1700)  SpO2: 91 % (06/27/19 1700)      Intake/Output Summary (Last 24 hours) at 6/27/2019 1810  Last data filed at 6/27/2019 1700  Gross per 24 hour   Intake 1200 ml   Output --   Net 1200 ml       Invasive Devices     Peripheral Intravenous Line            Peripheral IV 06/27/19 Right Arm less than 1 day          Drain            NG/OG/Enteral Tube Nasogastric 16 Fr Left nares less than 1 day                Physical Exam  General:  No acute distress resting comfortably  HEENT:  Normocephalic, atraumatic, trachea midline, NG tube in place  CV:  S1-S2, regular rhythm no murmurs rubs  Pulmonary:  Clear auscultation bilaterally, no wheezes rales or rhonchi  GI:  Abdomen is soft, mildly distended, mild tender palpation in the left lower quadrant, no rebound or guarding  MSK:  Extremities without clubbing cyanosis or edema  Neurologic:  Alert oriented x3, cranial 2 through 12 grossly intact  Psych:  Mood and affect appropriate  Lab Results:   I have personally reviewed pertinent lab results    , CBC:   Lab Results   Component Value Date    WBC 14 00 (H) 06/27/2019    HGB 15 3 06/27/2019    HCT 45 5 06/27/2019    MCV 94 06/27/2019     06/27/2019    MCH 31 7 06/27/2019    MCHC 33 6 06/27/2019    RDW 13 7 06/27/2019    MPV 8 5 (L) 06/27/2019   , CMP:   Lab Results   Component Value Date    SODIUM 138 06/27/2019    K 3 6 06/27/2019     06/27/2019    CO2 22 06/27/2019    BUN 17 06/27/2019    CREATININE 0 67 06/27/2019    CALCIUM 9 5 06/27/2019    AST 16 06/27/2019    ALT 15 06/27/2019    ALKPHOS 66 06/27/2019    EGFR 110 06/27/2019   , Coagulation:   Lab Results   Component Value Date    INR 1 16 06/27/2019   , Urinalysis:   Lab Results   Component Value Date    COLORU Yellow 06/27/2019    CLARITYU Clear 06/27/2019    SPECGRAV 1 015 06/27/2019    PHUR 8 0 (A) 06/27/2019    LEUKOCYTESUR Negative 06/27/2019    NITRITE Negative 06/27/2019    GLUCOSEU Negative 06/27/2019    KETONESU 15 (1+) (A) 06/27/2019    BILIRUBINUR 1+ (A) 06/27/2019    BLOODU Negative 06/27/2019   , Amylase: No results found for: AMYLASE, Lipase:   Lab Results   Component Value Date    LIPASE 27 06/27/2019     Imaging: I have personally reviewed pertinent films in PACS  EKG, Pathology, and Other Studies: I have personally reviewed pertinent reports

## 2019-06-27 NOTE — ED PROVIDER NOTES
History  Chief Complaint   Patient presents with    Nausea     N/V, started overnight, "bile", no blood, w/left lower abdominal pain, no recent bowel/bladder changes  Pain in LLQ is moderate, dull, constant, non-radiating, w/o relieving factors  Has hx SBO w/prior surgery for intentional foreign body ingestion  Prior to Admission Medications   Prescriptions Last Dose Informant Patient Reported? Taking?    ALPRAZolam (XANAX) 1 mg tablet Past Week at Unknown time Self Yes Yes   Sig: Take 1 mg by mouth daily at bedtime     OXcarbazepine (TRILEPTAL) 300 mg tablet 6/26/2019 at Unknown time Self Yes Yes   Sig: Take 300 mg by mouth 2 (two) times a day   PROAIR  (90 Base) MCG/ACT inhaler Past Month at Unknown time Self Yes Yes   Sig: Inhale every 4 (four) hours as needed (as needed)     desvenlafaxine (PRISTIQ) 100 mg 24 hr tablet 6/26/2019 at Unknown time Self Yes Yes   Sig: Take 100 mg by mouth daily     lisinopril (ZESTRIL) 10 mg tablet   Yes Yes   Sig: Take 10 mg by mouth daily   medroxyPROGESTERone (DEPO-PROVERA) 150 mg/mL injection More than a month at Unknown time Self Yes No   Sig: medroxyprogesterone 150 mg/mL intramuscular suspension   naproxen (NAPROSYN) 500 mg tablet 6/26/2019 at Unknown time  No Yes   Sig: Take 1 tablet (500 mg total) by mouth 2 (two) times a day with meals   ondansetron (ZOFRAN-ODT) 4 mg disintegrating tablet 6/26/2019 at Unknown time  No Yes   Sig: Take 1 tablet (4 mg total) by mouth every 8 (eight) hours as needed for nausea or vomiting   pantoprazole (PROTONIX) 40 mg tablet 6/26/2019 at Unknown time Self Yes Yes   Sig: Take 40 mg by mouth 2 (two) times a day     valACYclovir (VALTREX) 500 mg tablet 6/26/2019 at Unknown time  Yes Yes   Sig: Take 500 mg by mouth daily      Facility-Administered Medications: None       Past Medical History:   Diagnosis Date    Bipolar 1 disorder (HCC)     with bordeline personality    Borderline personality disorder (HCC)     Chronic headaches     Depression     Depression     GERD (gastroesophageal reflux disease)     Incontinence     Migraine     Urinary tract infection        Past Surgical History:   Procedure Laterality Date    CHOLECYSTECTOMY  05/24/2018    DENTAL SURGERY      FOOT SURGERY Right     KNEE ARTHROSCOPY      KNEE ARTHROSCOPY Right 11/15/2018    MULTIPLE TOOTH EXTRACTIONS  11/02/2018    OVARIAN CYST REMOVAL      STOMACH SURGERY         Family History   Problem Relation Age of Onset    Hyperlipidemia Father     Heart disease Mother     Migraines Mother     Depression Family      I have reviewed and agree with the history as documented  Social History     Tobacco Use    Smoking status: Never Smoker    Smokeless tobacco: Never Used   Substance Use Topics    Alcohol use: Yes     Comment: rarely    Drug use: No        Review of Systems   Constitutional: Negative for chills and fever  HENT: Negative for rhinorrhea and sore throat  Eyes: Negative for visual disturbance  Respiratory: Negative for cough and shortness of breath  Cardiovascular: Negative for chest pain and leg swelling  Gastrointestinal: Positive for abdominal pain, nausea and vomiting  Negative for diarrhea  Genitourinary: Negative for dysuria  Musculoskeletal: Negative for back pain and myalgias  Skin: Negative for rash  Neurological: Negative for dizziness and headaches  Psychiatric/Behavioral: Negative for confusion  All other systems reviewed and are negative  Physical Exam  Physical Exam   Constitutional: She is oriented to person, place, and time  She appears well-developed and well-nourished  HENT:   Nose: Nose normal    Mouth/Throat: Oropharynx is clear and moist  No oropharyngeal exudate  Eyes: Pupils are equal, round, and reactive to light  Conjunctivae and EOM are normal  No scleral icterus  Neck: Normal range of motion  Neck supple  No JVD present  No tracheal deviation present     Cardiovascular: Normal rate, regular rhythm and normal heart sounds  No murmur heard  Pulmonary/Chest: Effort normal and breath sounds normal  No respiratory distress  She has no wheezes  She has no rales  Abdominal: Soft  Bowel sounds are normal  There is tenderness in the left lower quadrant  There is no guarding  Musculoskeletal: Normal range of motion  She exhibits no edema or tenderness  Neurological: She is alert and oriented to person, place, and time  No cranial nerve deficit or sensory deficit  She exhibits normal muscle tone  5/5 motor, nl sens   Skin: Skin is warm and dry  Psychiatric: She has a normal mood and affect  Her behavior is normal    Nursing note and vitals reviewed        Vital Signs  ED Triage Vitals   Temperature Pulse Respirations Blood Pressure SpO2   06/27/19 1219 06/27/19 1215 06/27/19 1219 06/27/19 1215 06/27/19 1215   98 8 °F (37 1 °C) 81 18 137/98 94 %      Temp Source Heart Rate Source Patient Position - Orthostatic VS BP Location FiO2 (%)   06/27/19 1219 06/27/19 1219 06/27/19 1219 06/27/19 1219 --   Temporal Monitor Lying Left arm       Pain Score       06/27/19 1219       8           Vitals:    06/27/19 1530 06/27/19 1600 06/27/19 1615 06/27/19 1700   BP:    123/74   Pulse: 72 86 100 68   Patient Position - Orthostatic VS:    Lying         Visual Acuity      ED Medications  Medications   sodium chloride 0 9 % infusion (has no administration in time range)   ondansetron (FOR EMS ONLY) (ZOFRAN) 4 mg/2 mL injection 4 mg (0 mg Does not apply Given to EMS 6/27/19 1224)   sodium chloride 0 9 % bolus 1,000 mL (0 mL Intravenous Stopped 6/27/19 1318)   ondansetron (ZOFRAN) injection 4 mg (4 mg Intravenous Given 6/27/19 1323)   morphine injection 2 mg (2 mg Intravenous Given 6/27/19 1323)   HYDROmorphone (DILAUDID) injection 1 mg (1 mg Intravenous Given 6/27/19 1347)   iohexol (OMNIPAQUE) 350 MG/ML injection (MULTI-DOSE) 100 mL (100 mL Intravenous Given 6/27/19 1506)   HYDROmorphone (DILAUDID) injection 1 mg (1 mg Intravenous Given 6/27/19 1635)   ondansetron (ZOFRAN) injection 4 mg (4 mg Intravenous Given 6/27/19 1702)       Diagnostic Studies  Results Reviewed     Procedure Component Value Units Date/Time    Comprehensive metabolic panel [[id number]]  (Abnormal) Collected:  06/27/19 1347    Lab Status:  Final result Specimen:  Blood from Arm, Right Updated:  06/27/19 1433     Sodium 138 mmol/L      Potassium 3 6 mmol/L      Chloride 103 mmol/L      CO2 22 mmol/L      ANION GAP 13 mmol/L      BUN 17 mg/dL      Creatinine 0 67 mg/dL      Glucose 116 mg/dL      Calcium 9 5 mg/dL      AST 16 U/L      ALT 15 U/L      Alkaline Phosphatase 66 U/L      Total Protein 7 0 g/dL      Albumin 4 7 g/dL      Total Bilirubin 0 60 mg/dL      eGFR 110 ml/min/1 73sq m     Narrative:       [place] guidelines for Chronic Kidney Disease (CKD):     Stage 1 with normal or high GFR (GFR > 90 mL/min/1 73 square meters)    Stage 2 Mild CKD (GFR = 60-89 mL/min/1 73 square meters)    Stage 3A Moderate CKD (GFR = 45-59 mL/min/1 73 square meters)    Stage 3B Moderate CKD (GFR = 30-44 mL/min/1 73 square meters)    Stage 4 Severe CKD (GFR = 15-29 mL/min/1 73 square meters)    Stage 5 End Stage CKD (GFR <15 mL/min/1 73 square meters)  Note: GFR calculation is accurate only with a steady state creatinine    Lipase [[id number]]  (Normal) Collected:  06/27/19 1347    Lab Status:  Final result Specimen:  Blood from Arm, Right Updated:  06/27/19 1433     Lipase 27 u/L     Urine Microscopic [[id number]]  (Abnormal) Collected:  06/27/19 1319    Lab Status:  Final result Specimen:  Urine, Clean Catch Updated:  06/27/19 1345     RBC, UA 2-4 /hpf      WBC, UA None Seen /hpf      Epithelial Cells Moderate /hpf      Bacteria, UA Moderate /hpf      Hyaline Casts, UA 2-4 /lpf     UA w Reflex to Microscopic [[id number]]  (Abnormal) Collected:  06/27/19 1319    Lab Status:  Final result Specimen:  Urine, Clean Catch Updated:  06/27/19 1331     Color, UA Yellow     Clarity, UA Clear     Specific Gravity, UA 1 015     pH, UA 8 0     Leukocytes, UA Negative     Nitrite, UA Negative     Protein, UA Trace mg/dl      Glucose, UA Negative mg/dl      Ketones, UA 15 (1+) mg/dl      Urobilinogen, UA 1 0 E U /dl      Bilirubin, UA 1+     Blood, UA Negative    POCT pregnancy, urine [[id number]]  (Normal) Resulted:  06/27/19 1319    Lab Status:  Final result Updated:  06/27/19 1319     EXT PREG TEST UR (Ref: Negative) negative     Control [id number] exp10/31/2020    CBC and differential [[id number]]  (Abnormal) Collected:  06/27/19 1249    Lab Status:  Final result Specimen:  Blood from Arm, Right Updated:  06/27/19 1319     WBC 14 00 Thousand/uL      RBC 4 82 Million/uL      Hemoglobin 15 3 g/dL      Hematocrit 45 5 %      MCV 94 fL      MCH 31 7 pg      MCHC 33 6 g/dL      RDW 13 7 %      MPV 8 5 fL      Platelets 001 Thousands/uL      Neutrophils Relative 84 %      Lymphocytes Relative 9 %      Monocytes Relative 7 %      Eosinophils Relative 0 %      Basophils Relative 0 %      Neutrophils Absolute 11 80 Thousands/µL      Lymphocytes Absolute 1 20 Thousands/µL      Monocytes Absolute 0 90 Thousand/µL      Eosinophils Absolute 0 00 Thousand/µL      Basophils Absolute 0 00 Thousands/µL     Protime-INR [[id number]]  (Abnormal) Collected:  06/27/19 1249    Lab Status:  Final result Specimen:  Blood from Arm, Right Updated:  06/27/19 1318     Protime 13 5 seconds      INR 1 16    APTT [[id number]]  (Normal) Collected:  06/27/19 1249    Lab Status:  Final result Specimen:  Blood from Arm, Right Updated:  06/27/19 1318     PTT 33 seconds                  CT abdomen pelvis with contrast   Final Result by Frandy Reinoso MD (06/27 1601)      1  Multiple dilated small bowel loops in the left hemiabdomen with transition point identified centrally suspicious for small bowel obstruction     2  Possible right breast nodule laterally, only partially visualized, measures up to 1 2 cm in size  Recommend follow-up with nonemergent mammographic imaging  I personally discussed this study with Mayra Dennis on 6/27/2019 at 3:51 PM                Workstation performed: [id number]                    Procedures  Procedures       ED Course  ED Course as of Jun 27 1731   u Jun 27, 2019   1257 Initial Impression/MDM: N/V w/abdominal pain, hx SBO, no fever; will screen with labs and treat symptomatically and get CT ab/pelvis to r/o recurrent SBO      1334 Can't tolerated PO contrast, will get CT ab/pelvis w/IV only contrast      1610 Has SBO; RN will place NGT and will get Surgical Consultation       [phone] CONSULT: Dr Chin Cummins (Surgery), agrees with NGT and will admit and see patient today                                  MDM    Disposition  Final diagnoses:   Small bowel obstruction ([address] )     Time reflects when diagnosis was documented in both MDM as applicable and the Disposition within this note     Time User Action Codes Description Comment    6/27/2019  4:29 PM Isabel Mayen [K56 609] Small bowel obstruction [facility])       ED Disposition     ED Disposition Condition Date/Time Comment    Admit Stable Thu Jun 27, 2019  4:29 PM Case was discussed with Dr Chin Cummins and the patient's admission status was agreed to be Admission Status: inpatient status to the service of Dr Chin Cummins           Follow-up Information    None         Current Discharge Medication List      CONTINUE these medications which have NOT CHANGED    Details   ALPRAZolam (XANAX) 1 mg tablet Take 1 mg by mouth daily at bedtime        desvenlafaxine (PRISTIQ) 100 mg 24 hr tablet Take 100 mg by mouth daily        lisinopril (ZESTRIL) 10 mg tablet Take 10 mg by mouth daily      naproxen (NAPROSYN) 500 mg tablet Take 1 tablet (500 mg total) by mouth 2 (two) times a day with meals  Qty: 15 tablet, Refills: 0    Associated Diagnoses: Acute intractable headache, unspecified headache type; Sinusitis, unspecified chronicity, unspecified location      ondansetron (ZOFRAN-ODT) 4 mg disintegrating tablet Take 1 tablet (4 mg total) by mouth every 8 (eight) hours as needed for nausea or vomiting  Qty: 20 tablet, Refills: 0    Associated Diagnoses: Nausea      OXcarbazepine (TRILEPTAL) 300 mg tablet Take 300 mg by mouth 2 (two) times a day  Refills: 2      pantoprazole (PROTONIX) 40 mg tablet Take 40 mg by mouth 2 (two) times a day        PROAIR  (90 Base) MCG/ACT inhaler Inhale every 4 (four) hours as needed (as needed)        valACYclovir (VALTREX) 500 mg tablet Take 500 mg by mouth daily      medroxyPROGESTERone (DEPO-PROVERA) 150 mg/mL injection medroxyprogesterone 150 mg/mL intramuscular suspension           No discharge procedures on file      ED Provider  Electronically Signed by           Gustabo Chiu MD  06/27/19 6204

## 2019-06-28 ENCOUNTER — APPOINTMENT (INPATIENT)
Dept: RADIOLOGY | Facility: HOSPITAL | Age: 44
DRG: 390 | End: 2019-06-28
Payer: COMMERCIAL

## 2019-06-28 LAB
ANION GAP SERPL CALCULATED.3IONS-SCNC: 8 MMOL/L (ref 4–13)
BASOPHILS # BLD AUTO: 0 THOUSANDS/ΜL (ref 0–0.1)
BASOPHILS NFR BLD AUTO: 1 % (ref 0–2)
BUN SERPL-MCNC: 16 MG/DL (ref 7–25)
CALCIUM SERPL-MCNC: 9 MG/DL (ref 8.6–10.5)
CHLORIDE SERPL-SCNC: 107 MMOL/L (ref 98–107)
CO2 SERPL-SCNC: 25 MMOL/L (ref 21–31)
CREAT SERPL-MCNC: 0.66 MG/DL (ref 0.6–1.2)
EOSINOPHIL # BLD AUTO: 0.2 THOUSAND/ΜL (ref 0–0.61)
EOSINOPHIL NFR BLD AUTO: 2 % (ref 0–5)
ERYTHROCYTE [DISTWIDTH] IN BLOOD BY AUTOMATED COUNT: 13.5 % (ref 11.5–14.5)
GFR SERPL CREATININE-BSD FRML MDRD: 110 ML/MIN/1.73SQ M
GLUCOSE SERPL-MCNC: 105 MG/DL (ref 65–99)
HCT VFR BLD AUTO: 41.6 % (ref 42–47)
HGB BLD-MCNC: 14.2 G/DL (ref 12–16)
LYMPHOCYTES # BLD AUTO: 1.6 THOUSANDS/ΜL (ref 0.6–4.47)
LYMPHOCYTES NFR BLD AUTO: 18 % (ref 21–51)
MAGNESIUM SERPL-MCNC: 2.2 MG/DL (ref 1.9–2.7)
MCH RBC QN AUTO: 32.2 PG (ref 26–34)
MCHC RBC AUTO-ENTMCNC: 34.1 G/DL (ref 31–37)
MCV RBC AUTO: 94 FL (ref 81–99)
MONOCYTES # BLD AUTO: 0.7 THOUSAND/ΜL (ref 0.17–1.22)
MONOCYTES NFR BLD AUTO: 8 % (ref 2–12)
NEUTROPHILS # BLD AUTO: 6.3 THOUSANDS/ΜL (ref 1.4–6.5)
NEUTS SEG NFR BLD AUTO: 71 % (ref 42–75)
PLATELET # BLD AUTO: 244 THOUSANDS/UL (ref 149–390)
PMV BLD AUTO: 8.6 FL (ref 8.6–11.7)
POTASSIUM SERPL-SCNC: 4.2 MMOL/L (ref 3.5–5.5)
RBC # BLD AUTO: 4.41 MILLION/UL (ref 3.9–5.2)
SODIUM SERPL-SCNC: 140 MMOL/L (ref 134–143)
WBC # BLD AUTO: 8.8 THOUSAND/UL (ref 4.8–10.8)

## 2019-06-28 PROCEDURE — 80048 BASIC METABOLIC PNL TOTAL CA: CPT | Performed by: SURGERY

## 2019-06-28 PROCEDURE — 99232 SBSQ HOSP IP/OBS MODERATE 35: CPT | Performed by: SPECIALIST

## 2019-06-28 PROCEDURE — C9113 INJ PANTOPRAZOLE SODIUM, VIA: HCPCS | Performed by: SPECIALIST

## 2019-06-28 PROCEDURE — C9113 INJ PANTOPRAZOLE SODIUM, VIA: HCPCS | Performed by: SURGERY

## 2019-06-28 PROCEDURE — 83735 ASSAY OF MAGNESIUM: CPT | Performed by: SURGERY

## 2019-06-28 PROCEDURE — 85025 COMPLETE CBC W/AUTO DIFF WBC: CPT | Performed by: SURGERY

## 2019-06-28 PROCEDURE — 74022 RADEX COMPL AQT ABD SERIES: CPT

## 2019-06-28 RX ORDER — LORAZEPAM 2 MG/ML
1 INJECTION INTRAMUSCULAR EVERY 6 HOURS PRN
Status: DISCONTINUED | OUTPATIENT
Start: 2019-06-28 | End: 2019-06-30 | Stop reason: HOSPADM

## 2019-06-28 RX ORDER — PANTOPRAZOLE SODIUM 40 MG/1
40 INJECTION, POWDER, FOR SOLUTION INTRAVENOUS EVERY 12 HOURS SCHEDULED
Status: DISCONTINUED | OUTPATIENT
Start: 2019-06-28 | End: 2019-06-30 | Stop reason: HOSPADM

## 2019-06-28 RX ORDER — MAGNESIUM CARB/ALUMINUM HYDROX 105-160MG
30 TABLET,CHEWABLE ORAL ONCE
Status: COMPLETED | OUTPATIENT
Start: 2019-06-28 | End: 2019-06-28

## 2019-06-28 RX ADMIN — Medication 2 SPRAY: at 21:33

## 2019-06-28 RX ADMIN — SODIUM CHLORIDE, POTASSIUM CHLORIDE, SODIUM LACTATE AND CALCIUM CHLORIDE 125 ML/HR: 600; 310; 30; 20 INJECTION, SOLUTION INTRAVENOUS at 13:15

## 2019-06-28 RX ADMIN — PANTOPRAZOLE SODIUM 40 MG: 40 INJECTION, POWDER, FOR SOLUTION INTRAVENOUS at 08:10

## 2019-06-28 RX ADMIN — SODIUM CHLORIDE, POTASSIUM CHLORIDE, SODIUM LACTATE AND CALCIUM CHLORIDE 125 ML/HR: 600; 310; 30; 20 INJECTION, SOLUTION INTRAVENOUS at 21:30

## 2019-06-28 RX ADMIN — LORAZEPAM 1 MG: 2 INJECTION INTRAMUSCULAR; INTRAVENOUS at 18:03

## 2019-06-28 RX ADMIN — ONDANSETRON 4 MG: 2 INJECTION INTRAMUSCULAR; INTRAVENOUS at 12:21

## 2019-06-28 RX ADMIN — MINERAL OIL 30 ML: 1000 SOLUTION ORAL at 18:09

## 2019-06-28 RX ADMIN — Medication 2 SPRAY: at 18:09

## 2019-06-28 RX ADMIN — ENOXAPARIN SODIUM 40 MG: 40 INJECTION SUBCUTANEOUS at 08:10

## 2019-06-28 RX ADMIN — PANTOPRAZOLE SODIUM 40 MG: 40 INJECTION, POWDER, LYOPHILIZED, FOR SOLUTION INTRAVENOUS at 21:30

## 2019-06-28 RX ADMIN — SODIUM CHLORIDE, POTASSIUM CHLORIDE, SODIUM LACTATE AND CALCIUM CHLORIDE 125 ML/HR: 600; 310; 30; 20 INJECTION, SOLUTION INTRAVENOUS at 03:46

## 2019-06-28 RX ADMIN — ONDANSETRON 4 MG: 2 INJECTION INTRAMUSCULAR; INTRAVENOUS at 00:12

## 2019-06-28 RX ADMIN — MORPHINE SULFATE 2 MG: 2 INJECTION, SOLUTION INTRAMUSCULAR; INTRAVENOUS at 05:45

## 2019-06-28 NOTE — PROGRESS NOTES
Progress Note - General Surgery   Phil [place] 41 y o  female MRN: [id number]  Unit/Bed#: -01 Encounter: [id number]    Assessment:  Resting quietly, visiting with family  Feels somewhat better with NGT, Did have some flatus earlier, but no BM  Obstruction series still with dilated small bowel, but some gas in the rectum    Plan:  Min[place] Oil via NG, and intermittently clamp tube  Requests Chloraseptic spray for NGT discomfort    Subjective/Objective   Chief Complaint: Irritation from the NG tube    Subjective: Appears to be in no distress    Objective:     Blood pressure 130/82, pulse 58, temperature 99 3 °F (37 4 °C), temperature source Tympanic, resp  rate 16, height 5' 5" (1 651 m), weight 74 kg (163 lb 2 3 oz), SpO2 97 %, not currently breastfeeding  ,Body mass index is 27 15 kg/m²  No intake or output data in the 24 hours ending 06/28/19 1835    Invasive Devices     Peripheral Intravenous Line            Peripheral IV 06/27/19 Left Hand 1 day          Drain            NG/OG/Enteral Tube Nasogastric 16 Fr Left nares 1 day                Physical Exam:   With bilious drainage from NG tube  Resting Quietly  Abdomen soft, with hypoactive BS, minimally tender to palpation    Lab, Imaging and other studies:  I have personally reviewed pertinent lab results    , CBC:   Lab Results   Component Value Date    WBC 8 80 06/28/2019    HGB 14 2 06/28/2019    HCT 41 6 (L) 06/28/2019    MCV 94 06/28/2019     06/28/2019    MCH 32 2 06/28/2019    MCHC 34 1 06/28/2019    RDW 13 5 06/28/2019    MPV 8 6 06/28/2019   , CMP:   Lab Results   Component Value Date    SODIUM 140 06/28/2019    K 4 2 06/28/2019     06/28/2019    CO2 25 06/28/2019    BUN 16 06/28/2019    CREATININE 0 66 06/28/2019    CALCIUM 9 0 06/28/2019    EGFR 110 06/28/2019     VTE Pharmacologic Prophylaxis: Enoxaparin (Lovenox)  VTE Mechanical Prophylaxis: sequential compression device

## 2019-06-28 NOTE — PLAN OF CARE
Problem: DISCHARGE PLANNING - CARE MANAGEMENT  Goal: Discharge to post-acute care or home with appropriate resources  Description  INTERVENTIONS:  - Conduct assessment to determine patient/family and health care team treatment goals, and need for post-acute services based on payer coverage, community resources, and patient preferences, and barriers to discharge  - Address psychosocial, clinical, and financial barriers to discharge as identified in assessment in conjunction with the patient/family and health care team  - Arrange appropriate level of post-acute services according to patients   needs and preference and payer coverage in collaboration with the physician and health care team  - Communicate with and update the patient/family, physician, and health care team regarding progress on the discharge plan  - Arrange appropriate transportation to post-acute venues  - Patients goal is to return home upon discharge   Outcome: Progressing

## 2019-06-28 NOTE — UTILIZATION REVIEW
Initial Clinical Review    Admission: Date/Time/Statement: 6/27/19 @ 1654     Orders Placed This Encounter   Procedures    Inpatient Admission (expected length of stay for this patient Order details is greater than two midnights)     Standing Status:   Standing     Number of Occurrences:   1     Order Specific Question:   Admitting Physician     Answer:   Milena Duvall [[id number]]     Order Specific Question:   Level of Care     Answer:   Med Surg [[id number]]     Order Specific Question:   Estimated length of stay     Answer:   More than 2 Midnights     Order Specific Question:   Certification     Answer:   I certify that inpatient services are medically necessary for this patient for a duration of greater than two midnights  See H&P and MD Progress Notes for additional information about the patient's course of treatment  ED Arrival Information     Expected Arrival Acuity Means of Arrival Escorted By Service Admission Type    - 6/27/2019 12:13 Urgent Ambulance [facility] Urgent    Arrival Complaint    -        Chief Complaint   Patient presents with    Nausea     Assessment/Plan: 40 y/o female with PMhx GERD, HTN, bipolar d/o with h/o foreign body ingestion with SBO s/p exploratory lap many years ago, now presents to ED with 1 day h/o worsening abdominal pain a/w N/V  In ED, WBC 14, CT a/p (+) SBO  NGT wss placed, IVF given  Dx:  SBO  Plan:  Admit to M/S unit under inpatient status, continue IVF, Npo, NGT to low intermittent wall suction, oob ambulate, repeat labs, obtain lactate level, analgesics prn, hold po bp & pscyh meds for now        ED Triage Vitals   Temperature Pulse Respirations Blood Pressure SpO2   06/27/19 1219 06/27/19 1215 06/27/19 1219 06/27/19 1215 06/27/19 1215   98 8 °F (37 1 °C) 81 18 137/98 94 %      Temp Source Heart Rate Source Patient Position - Orthostatic VS BP Location FiO2 (%)   06/27/19 1219 06/27/19 1219 06/27/19 1219 06/27/19 1219 --   Temporal Monitor Lying Left arm       Pain Score       06/27/19 1219       8        Wt Readings from Last 1 Encounters:   06/27/19 74 4 kg (164 lb 0 4 oz)     Additional Vital Signs:   Date/Time Temp Pulse  Resp  BP  SpO2  O2 Device   06/28/19 0815 -- --  --  --  --  None (Room air)   06/28/19 0750 99 °F  5  16  123/73  97 %  None (Room air)   06/27/19 2207 97 9 °F  64  16  99/57  94 %  None (Room air)   06/27/19 1700 97 1 °F  68  16  123/74  91 %  None (Room air)   06/27/19 1615 -- 100  --  --  98 %  --   06/27/19 1600 -- 86  --  --  96 %  --   06/27/19 1530 -- 72  --  --  94 %  --   06/27/19 1506 -- 78  16  137/83  97 %  None (Room air)   06/27/19 1427 -- 84  16  --  98 %  None (Room air)   06/27/19 1400 -- --  --  145/87  --  --   06/27/19 1349 -- 85  16  145/87  --  --   06/27/19 1330 -- 93  --  --  100 %  --   06/27/19 1245 -- 69  --  --  98 %  --   06/27/19 1230 -- 78  --  139/90  98 %  --   06/27/19 1226 -- --  --  --  --  None (Room air)   06/27/19 1219 98 8 °F  76  18  137/98  96 %  None (Room air)   06/27/19 1215 -- 81  --  137/98  94 %  --       Pertinent Labs/Diagnostic Test Results:   Results from last 7 days   Lab Units 06/28/19  0553 06/27/19  1249   WBC Thousand/uL 8 80 14 00*   HEMOGLOBIN g/dL 14 2 15 3   HEMATOCRIT % 41 6* 45 5   PLATELETS Thousands/uL 244 276   NEUTROS ABS Thousands/µL 6 30 11 80*     Results from last 7 days   Lab Units 06/28/19  0553 06/27/19  1347   SODIUM mmol/L 140 138   POTASSIUM mmol/L 4 2 3 6   CHLORIDE mmol/L 107 103   CO2 mmol/L 25 22   ANION GAP mmol/L 8 13   BUN mg/dL 16 17   CREATININE mg/dL 0 66 0 67   EGFR ml/min/1 73sq m 110 110   CALCIUM mg/dL 9 0 9 5   MAGNESIUM mg/dL 2 2  --      Results from last 7 days   Lab Units 06/27/19  1347   AST U/L 16   ALT U/L 15   ALK PHOS U/L 66   TOTAL PROTEIN g/dL 7 0   ALBUMIN g/dL 4 7   TOTAL BILIRUBIN mg/dL 0 60     Results from last 7 days   Lab Units 06/28/19  0553 06/27/19  1347   GLUCOSE RANDOM mg/dL 105* 116*     Results from last 7 days   Lab Units 06/27/19  1249   PROTIME seconds 13 5*   INR  1 16   PTT seconds 33     Results from last 7 days   Lab Units 06/27/19  1821   LACTIC ACID mmol/L 0 6     Results from last 7 days   Lab Units 06/27/19  1347   LIPASE u/L 27     Results from last 7 days   Lab Units 06/27/19  2122 06/27/19  1319   CLARITY UA  Clear Clear   COLOR UA  Yellow Yellow   SPEC GRAV UA  1 015 1 015   PH UA  6 5 8 0*   GLUCOSE UA mg/dl Negative Negative   KETONES UA mg/dl Trace* 15 (1+)*   BLOOD UA  Negative Negative   PROTEIN UA mg/dl Trace* Trace*   NITRITE UA  Negative Negative   BILIRUBIN UA  Negative 1+*   UROBILINOGEN UA E U /dl 1 0 1 0   LEUKOCYTES UA  Negative Negative   WBC UA /hpf 1-2* None Seen   RBC UA /hpf 1-2* 2-4*   BACTERIA UA /hpf Occasional Moderate*   EPITHELIAL CELLS WET PREP /hpf Moderate* Moderate*     CT a/p 6/27 -- 1  Multiple dilated small bowel loops in the left hemiabdomen with transition point identified centrally suspicious for small bowel obstruction  2  Possible right breast nodule laterally, only partially visualized, measures up to 1 2 cm in size  Recommend follow-up with nonemergent mammographic imaging        Intake/Output Summary (Last 24 hours) at 6/27/2019 1810  Last data filed at 6/27/2019 1700      Gross per 24 hour   Intake 1200 ml   Output --   Net 1200 ml       ED Treatment:   Medication Administration from 06/27/2019 1213 to 06/27/2019 1718       Date/Time Order Dose Route Action     06/27/2019 1245 sodium chloride 0 9 % bolus 1,000 mL 1,000 mL Intravenous New Bag     06/27/2019 1323 ondansetron (ZOFRAN) injection 4 mg 4 mg Intravenous Given     06/27/2019 1323 morphine injection 2 mg 2 mg Intravenous Given     06/27/2019 1347 HYDROmorphone (DILAUDID) injection 1 mg 1 mg Intravenous Given     06/27/2019 1635 HYDROmorphone (DILAUDID) injection 1 mg 1 mg Intravenous Given     06/27/2019 1702 ondansetron (ZOFRAN) injection 4 mg 4 mg Intravenous Given     Past Medical History:   Diagnosis Date    Bipolar 1 disorder (HCC)     with bordeline personality    [facility]     Chronic headaches     Depression     Depression     GERD (gastroesophageal reflux disease)     Hypertension     Incontinence     Migraine     Urinary tract infection      Admitting Diagnosis: Nausea [R11 0]  Small bowel obstruction ([facility] ) [K56 609]  Age/Sex: 41 y o  female  Admission Orders:  Npo  I/O  NGT to low intermittent wall suction  Up as [place]  SCD's  oob to ambulate    Current Facility-Administered Medications:  acetaminophen 650 mg Rectal Q4H PRN   enoxaparin 40 mg Subcutaneous Daily   lactated ringers 125 mL/hr Intravenous Continuous   morphine injection 2 mg Intravenous Q4H PRN  6/28 x1   ondansetron 4 mg Intravenous Q6H PRN  6/28 x1   pantoprazole 40 mg Intravenous Q24H [address]       IP CONSULT TO CASE MANAGEMENT    Network Utilization Review Department  Phone: [phone]; Fax [phone]  [email or web address]  ATTENTION: Please call with any questions or concerns to [phone]  and carefully listen to the prompts so that you are directed to the right person  Send all requests for admission clinical reviews, approved or denied determinations and any other requests to fax [phone]   All voicemails are confidential

## 2019-06-28 NOTE — SOCIAL WORK
CM met with pt at bedside and explained role  Pt lives alone in a 1st floor apartment; 0 FAY  Pt reports she is indpedendent with ADLs  She relies on her fiance' for transportation  Pt PCP is Dr Katherine Dominguez  She uses [facility] for medications  She denies any difficulty obtaining he medications  Pt reports she has support form her fianceMarti Crane  Pt has  History of Bipolar Disorder and is in medication management with psychiatrist Dr Ruma Pfeiffer at present  Pt also has an ICM through [address]  Pt reports a history of prior inpatient admission to [facility] in 2014  No other treatment reported  Pt denies any history of D&A treatment  She further denied any history of HHC or STR  Pt denies any discharge needs at this time  No discharge needs have been identified by the treatment team at this time  She indicates her fimiguel' will transport there home upon discharge  CM to follow  CM reviewed d/c planning process including the following: identifying help at home, patient preference for d/c planning needs, availability of treatment team to discuss questions or concerns patient and/or family may have regarding understanding medications and recognizing signs and symptoms once discharged  CM also encouraged patient to follow up with all recommended appointments after discharge  Patient advised of importance for patient and family to participate in managing patients medical well being  Patient/caregiver received discharge checklist   Content reviewed  Patient/caregiver encouraged to participate in discharge plan of care prior to discharge home

## 2019-06-29 ENCOUNTER — APPOINTMENT (INPATIENT)
Dept: RADIOLOGY | Facility: HOSPITAL | Age: 44
DRG: 390 | End: 2019-06-29
Payer: COMMERCIAL

## 2019-06-29 LAB
ANION GAP SERPL CALCULATED.3IONS-SCNC: 7 MMOL/L (ref 4–13)
BASOPHILS # BLD AUTO: 0.1 THOUSANDS/ΜL (ref 0–0.1)
BASOPHILS NFR BLD AUTO: 1 % (ref 0–2)
BUN SERPL-MCNC: 13 MG/DL (ref 7–25)
CALCIUM SERPL-MCNC: 9 MG/DL (ref 8.6–10.5)
CHLORIDE SERPL-SCNC: 105 MMOL/L (ref 98–107)
CO2 SERPL-SCNC: 27 MMOL/L (ref 21–31)
CREAT SERPL-MCNC: 0.64 MG/DL (ref 0.6–1.2)
EOSINOPHIL # BLD AUTO: 0.2 THOUSAND/ΜL (ref 0–0.61)
EOSINOPHIL NFR BLD AUTO: 3 % (ref 0–5)
ERYTHROCYTE [DISTWIDTH] IN BLOOD BY AUTOMATED COUNT: 13.2 % (ref 11.5–14.5)
GFR SERPL CREATININE-BSD FRML MDRD: 111 ML/MIN/1.73SQ M
GLUCOSE SERPL-MCNC: 67 MG/DL (ref 65–99)
HCT VFR BLD AUTO: 43 % (ref 42–47)
HGB BLD-MCNC: 14.6 G/DL (ref 12–16)
LYMPHOCYTES # BLD AUTO: 2.6 THOUSANDS/ΜL (ref 0.6–4.47)
LYMPHOCYTES NFR BLD AUTO: 37 % (ref 21–51)
MAGNESIUM SERPL-MCNC: 2 MG/DL (ref 1.9–2.7)
MCH RBC QN AUTO: 32.3 PG (ref 26–34)
MCHC RBC AUTO-ENTMCNC: 34 G/DL (ref 31–37)
MCV RBC AUTO: 95 FL (ref 81–99)
MONOCYTES # BLD AUTO: 0.6 THOUSAND/ΜL (ref 0.17–1.22)
MONOCYTES NFR BLD AUTO: 8 % (ref 2–12)
NEUTROPHILS # BLD AUTO: 3.5 THOUSANDS/ΜL (ref 1.4–6.5)
NEUTS SEG NFR BLD AUTO: 50 % (ref 42–75)
PLATELET # BLD AUTO: 209 THOUSANDS/UL (ref 149–390)
PMV BLD AUTO: 9.1 FL (ref 8.6–11.7)
POTASSIUM SERPL-SCNC: 3.8 MMOL/L (ref 3.5–5.5)
RBC # BLD AUTO: 4.53 MILLION/UL (ref 3.9–5.2)
SODIUM SERPL-SCNC: 139 MMOL/L (ref 134–143)
WBC # BLD AUTO: 6.9 THOUSAND/UL (ref 4.8–10.8)

## 2019-06-29 PROCEDURE — 99231 SBSQ HOSP IP/OBS SF/LOW 25: CPT | Performed by: SURGERY

## 2019-06-29 PROCEDURE — 85025 COMPLETE CBC W/AUTO DIFF WBC: CPT | Performed by: SURGERY

## 2019-06-29 PROCEDURE — 80048 BASIC METABOLIC PNL TOTAL CA: CPT | Performed by: SURGERY

## 2019-06-29 PROCEDURE — C9113 INJ PANTOPRAZOLE SODIUM, VIA: HCPCS | Performed by: SPECIALIST

## 2019-06-29 PROCEDURE — 74022 RADEX COMPL AQT ABD SERIES: CPT

## 2019-06-29 PROCEDURE — 83735 ASSAY OF MAGNESIUM: CPT | Performed by: SURGERY

## 2019-06-29 RX ORDER — DESVENLAFAXINE 50 MG/1
100 TABLET, EXTENDED RELEASE ORAL DAILY
Status: DISCONTINUED | OUTPATIENT
Start: 2019-06-29 | End: 2019-06-30 | Stop reason: HOSPADM

## 2019-06-29 RX ORDER — OXCARBAZEPINE 150 MG/1
300 TABLET, FILM COATED ORAL EVERY 12 HOURS SCHEDULED
Status: DISCONTINUED | OUTPATIENT
Start: 2019-06-29 | End: 2019-06-30 | Stop reason: HOSPADM

## 2019-06-29 RX ORDER — LISINOPRIL 10 MG/1
10 TABLET ORAL DAILY
Status: DISCONTINUED | OUTPATIENT
Start: 2019-06-29 | End: 2019-06-30 | Stop reason: HOSPADM

## 2019-06-29 RX ORDER — VALACYCLOVIR HYDROCHLORIDE 500 MG/1
500 TABLET, FILM COATED ORAL DAILY
Status: DISCONTINUED | OUTPATIENT
Start: 2019-06-29 | End: 2019-06-30 | Stop reason: HOSPADM

## 2019-06-29 RX ADMIN — SODIUM CHLORIDE, POTASSIUM CHLORIDE, SODIUM LACTATE AND CALCIUM CHLORIDE 125 ML/HR: 600; 310; 30; 20 INJECTION, SOLUTION INTRAVENOUS at 17:40

## 2019-06-29 RX ADMIN — OXCARBAZEPINE 300 MG: 150 TABLET, FILM COATED ORAL at 22:23

## 2019-06-29 RX ADMIN — LORAZEPAM 1 MG: 2 INJECTION INTRAMUSCULAR; INTRAVENOUS at 16:04

## 2019-06-29 RX ADMIN — SODIUM CHLORIDE, POTASSIUM CHLORIDE, SODIUM LACTATE AND CALCIUM CHLORIDE 125 ML/HR: 600; 310; 30; 20 INJECTION, SOLUTION INTRAVENOUS at 05:41

## 2019-06-29 RX ADMIN — VALACYCLOVIR HYDROCHLORIDE 500 MG: 500 TABLET, FILM COATED ORAL at 17:40

## 2019-06-29 RX ADMIN — PANTOPRAZOLE SODIUM 40 MG: 40 INJECTION, POWDER, LYOPHILIZED, FOR SOLUTION INTRAVENOUS at 11:55

## 2019-06-29 RX ADMIN — LISINOPRIL 10 MG: 10 TABLET ORAL at 17:40

## 2019-06-29 RX ADMIN — ONDANSETRON 4 MG: 2 INJECTION INTRAMUSCULAR; INTRAVENOUS at 14:41

## 2019-06-29 RX ADMIN — Medication 2 SPRAY: at 07:52

## 2019-06-29 RX ADMIN — DESVENLAFAXINE 100 MG: 50 TABLET, EXTENDED RELEASE ORAL at 17:40

## 2019-06-29 RX ADMIN — Medication 2 SPRAY: at 22:22

## 2019-06-29 RX ADMIN — Medication 2 SPRAY: at 02:48

## 2019-06-29 RX ADMIN — Medication 2 SPRAY: at 17:40

## 2019-06-29 RX ADMIN — PANTOPRAZOLE SODIUM 40 MG: 40 INJECTION, POWDER, LYOPHILIZED, FOR SOLUTION INTRAVENOUS at 22:23

## 2019-06-29 RX ADMIN — LORAZEPAM 1 MG: 2 INJECTION INTRAMUSCULAR; INTRAVENOUS at 02:48

## 2019-06-29 RX ADMIN — Medication 2 SPRAY: at 11:59

## 2019-06-29 RX ADMIN — LORAZEPAM 1 MG: 2 INJECTION INTRAMUSCULAR; INTRAVENOUS at 22:23

## 2019-06-29 NOTE — PLAN OF CARE
Problem: Potential for Falls  Goal: Patient will remain free of falls  Description  INTERVENTIONS:  - Assess patient frequently for physical needs  -  Identify cognitive and physical deficits and behaviors that affect risk of falls    -  [place] fall precautions as indicated by assessment   - Educate patient/family on patient safety including physical limitations  - Instruct patient to call for assistance with activity based on assessment  - Modify environment to reduce risk of injury  - Consider OT/PT consult to assist with strengthening/mobility  Outcome: Progressing     Problem: GASTROINTESTINAL - ADULT  Goal: Minimal or absence of nausea and/or vomiting  Description  INTERVENTIONS:  - Administer IV fluids as ordered to ensure adequate hydration  - Maintain NPO status until nausea and vomiting are resolved  - Nasogastric tube as ordered  - Administer ordered antiemetic medications as needed  - Provide nonpharmacologic comfort measures as appropriate  - Advance diet as tolerated, if ordered  - Nutrition services referral to assist patient with adequate nutrition and appropriate food choices  Outcome: Progressing  Goal: Maintains or returns to baseline bowel function  Description  INTERVENTIONS:  - Assess bowel function  - Encourage oral fluids to ensure adequate hydration  - Administer IV fluids as ordered to ensure adequate hydration  - Administer ordered medications as needed  - Encourage mobilization and activity  - Nutrition services referral to assist patient with appropriate food choices  Outcome: Progressing  Goal: Maintains adequate nutritional intake  Description  INTERVENTIONS:  - Monitor percentage of each meal consumed  - Identify factors contributing to decreased intake, treat as appropriate  - Assist with meals as needed  - Monitor I&O, WT and lab values  - Obtain nutrition services referral as needed  Outcome: Progressing  Goal: Establish and maintain optimal ostomy function  Description  INTERVENTIONS:  - Assess bowel function  - Encourage oral fluids to ensure adequate hydration  - Administer IV fluids as ordered to ensure adequate hydration  - Administer ordered medications as needed  - Encourage mobilization and activity  - Nutrition services referral to assist patient with appropriate food choices  - Assess stoma site  Outcome: Progressing     Problem: PAIN - ADULT  Goal: Verbalizes/displays adequate comfort level or baseline comfort level  Description  Interventions:  - Encourage patient to monitor pain and request assistance  - Assess pain using appropriate pain scale  - Administer analgesics based on type and severity of pain and evaluate response  - Implement non-pharmacological measures as appropriate and evaluate response  - Consider cultural and social influences on pain and pain management  - Notify physician/advanced practitioner if interventions unsuccessful or patient reports new pain  Outcome: Progressing     Problem: SAFETY ADULT  Goal: Maintain or return to baseline ADL function  Description  INTERVENTIONS:  -  Assess patient's ability to carry out ADLs; assess patient's baseline for ADL function and identify physical deficits which impact ability to perform ADLs (bathing, care of mouth/teeth, toileting, grooming, dressing, etc )  - Assess/evaluate cause of self-care deficits   - Assess range of motion  - Assess patient's mobility; develop plan if impaired  - Assess patient's need for assistive devices and provide as appropriate  - Encourage maximum independence but intervene and supervise when necessary  ¯ Involve family in performance of ADLs  ¯ Assess for home care needs following discharge   ¯ Request OT consult to assist with ADL evaluation and planning for discharge  ¯ Provide patient education as appropriate  Outcome: Progressing  Goal: Maintain or return mobility status to optimal level  Description  INTERVENTIONS:  - Assess patient's baseline mobility status (ambulation, transfers, stairs, etc )    - Identify cognitive and physical deficits and behaviors that affect mobility  - Identify mobility aids required to assist with transfers and/or ambulation (gait belt, sit-to-stand, lift, walker, cane, etc )  - [place] fall precautions as indicated by assessment  - Record patient progress and toleration of activity level on Mobility SBAR; progress patient to next Phase/Stage  - Instruct patient to call for assistance with activity based on assessment  - Request Rehabilitation consult to assist with strengthening/weightbearing, etc   Outcome: Progressing     Problem: DISCHARGE PLANNING  Goal: Discharge to home or other facility with appropriate resources  Description  INTERVENTIONS:  - Identify barriers to discharge w/patient and caregiver  - Arrange for needed discharge resources and transportation as appropriate  - Identify discharge learning needs (meds, wound care, etc )  - Arrange for interpretive services to assist at discharge as needed  - Refer to Case Management Department for coordinating discharge planning if the patient needs post-hospital services based on physician/advanced practitioner order or complex needs related to functional status, cognitive ability, or social support system  Outcome: Progressing     Problem: Knowledge Deficit  Goal: Patient/family/caregiver demonstrates understanding of disease process, treatment plan, medications, and discharge instructions  Description  Complete learning assessment and assess knowledge base    Interventions:  - Provide teaching at level of understanding  - Provide teaching via preferred learning methods  Outcome: Progressing     Problem: DISCHARGE PLANNING - CARE MANAGEMENT  Goal: Discharge to post-acute care or home with appropriate resources  Description  INTERVENTIONS:  - Conduct assessment to determine patient/family and health care team treatment goals, and need for post-acute services based on payer coverage, community resources, and patient preferences, and barriers to discharge  - Address psychosocial, clinical, and financial barriers to discharge as identified in assessment in conjunction with the patient/family and health care team  - Arrange appropriate level of post-acute services according to patients   needs and preference and payer coverage in collaboration with the physician and health care team  - Communicate with and update the patient/family, physician, and health care team regarding progress on the discharge plan  - Arrange appropriate transportation to post-acute venues  Outcome: Progressing     Problem: Nutrition/Hydration-ADULT  Goal: Nutrient/Hydration intake appropriate for improving, restoring or maintaining nutritional needs  Description  Monitor and assess patient's nutrition/hydration status for malnutrition (ex- brittle hair, bruises, dry skin, pale skin and conjunctiva, muscle wasting, smooth red tongue, and disorientation)  Collaborate with interdisciplinary team and initiate plan and interventions as ordered  Monitor patient's weight and dietary intake as ordered or per policy  Utilize nutrition screening tool and intervene per policy  Determine patient's food preferences and provide high-protein, high-caloric foods as appropriate       INTERVENTIONS:  - Monitor oral intake, urinary output, labs, and treatment plans  - Assess nutrition and hydration status and recommend course of action  - Evaluate amount of meals eaten  - Assist patient with eating if necessary   - Allow adequate time for meals  - Recommend/ encourage appropriate diets, oral nutritional supplements, and vitamin/mineral supplements  - Order, calculate, and assess calorie counts as needed  - Recommend, monitor, and adjust tube feedings and TPN/PPN based on assessed needs  - Assess need for intravenous fluids  - Provide specific nutrition/hydration education as appropriate  - Include patient/family/caregiver in decisions related to nutrition  Outcome: Progressing

## 2019-06-29 NOTE — PROGRESS NOTES
Progress Note - General Surgery   Harvinder Monahan 41 y o  female MRN: [id number]  Unit/Bed#: -01 Encounter: [id number]    Assessment:  Hospital day 2 with resolving small bowel obstruction  The patient reports passage of small amount of flatus per rectum  She denies nausea or vomiting  She denies pain at rest   She does note increased abdominal discomfort with movement  On physical examination her T-max over the past 24 hours was 100 2 currently 99 9 vital signs otherwise stable  She is resting comfortably in her bed  She is awake alert and oriented  She is competent reliable historian  Her abdomen is soft with mild tenderness to palpation in 4 quadrants in a nonspecific pattern  No true guarding rebound or peritoneal signs  Review of the obstruction series from today reveals large amount of retained stool in the colon  No evidence for mechanical small-bowel obstruction  Plan:  In light of the patient's clinical radiographic stability I believe she is safe to have the nasogastric tube removed in the morning in her diet advanced  Subjective/Objective   Chief Complaint:  Small-bowel obstruction    Subjective:  Patient reports feeling relatively better post placement of the nasogastric tube 2 days ago    Objective:  Abdomen soft with mild nonspecific tenderness to percussion and palpation no true guarding rebound or peritoneal signs  No masses noted no hernias appreciated  Blood pressure 156/75, pulse 72, temperature 99 9 °F (37 7 °C), temperature source Temporal, resp  rate 20, height 5' 5" (1 651 m), weight 74 kg (163 lb 2 3 oz), SpO2 98 %, not currently breastfeeding  ,Body mass index is 27 15 kg/m²        Intake/Output Summary (Last 24 hours) at 6/29/2019 1846  Last data filed at 6/29/2019 0618  Gross per 24 hour   Intake --   Output 100 ml   Net -100 ml       Invasive Devices     Peripheral Intravenous Line            Peripheral IV 06/27/19 Left Hand 2 days          Drain NG/OG/Enteral Tube Nasogastric 16 Fr Left nares 2 days                Physical Exam: /75 (BP Location: Left arm)   Pulse 72   Temp 99 9 °F (37 7 °C) (Temporal)   Resp 20   Ht 5' 5" (1 651 m)   Wt 74 kg (163 lb 2 3 oz)   SpO2 98%   Breastfeeding? No   BMI 27 15 kg/m²     General Appearance:    Alert, cooperative, no distress, appears stated age   Head:    Normocephalic, without obvious abnormality, atraumatic   Eyes:    PERRL, conjunctiva/corneas clear, EOM's intact, fundi     benign, both eyes   Ears:    Normal TM's and external ear canals, both ears   Nose:   Nares normal, septum midline, mucosa normal, no drainage    or sinus tenderness   Throat:   Lips, mucosa, and tongue normal; teeth and gums normal   Neck:   Supple, symmetrical, trachea midline, no adenopathy;     thyroid:  no enlargement/tenderness/nodules; no carotid    bruit or JVD   Back:     Symmetric, no curvature, ROM normal, no CVA tenderness   Lungs:     Clear to auscultation bilaterally, respirations unlabored   Chest Wall:    No tenderness or deformity    Heart:    Regular rate and rhythm, S1 and S2 normal, no murmur, rub   or gallop                   Extremities:   Extremities normal, atraumatic, no cyanosis or edema   Pulses:   2+ and symmetric all extremities   Skin:   Skin color, texture, turgor normal, no rashes or lesions   Lymph nodes:   Cervical, supraclavicular, and axillary nodes normal   Neurologic:   CNII-XII intact, normal strength, sensation and reflexes     throughout       Lab, Imaging and other studies:I have personally reviewed pertinent lab results

## 2019-06-29 NOTE — NURSING NOTE
Patient assisted to bathroom and NG tube disconnected momentarily  NG Tube draining brown stomach contents  Patient denies pain or discomfort at current time  Chloroseptic spray administered per physician order and patient request  Will continue to monitor

## 2019-06-30 VITALS
RESPIRATION RATE: 16 BRPM | SYSTOLIC BLOOD PRESSURE: 139 MMHG | BODY MASS INDEX: 27.18 KG/M2 | OXYGEN SATURATION: 96 % | DIASTOLIC BLOOD PRESSURE: 83 MMHG | TEMPERATURE: 98.4 F | HEIGHT: 65 IN | HEART RATE: 84 BPM | WEIGHT: 163.14 LBS

## 2019-06-30 LAB
ANION GAP SERPL CALCULATED.3IONS-SCNC: 12 MMOL/L (ref 4–13)
BASOPHILS # BLD AUTO: 0.1 THOUSANDS/ΜL (ref 0–0.1)
BASOPHILS NFR BLD AUTO: 1 % (ref 0–2)
BUN SERPL-MCNC: 13 MG/DL (ref 7–25)
CALCIUM SERPL-MCNC: 8.8 MG/DL (ref 8.6–10.5)
CHLORIDE SERPL-SCNC: 104 MMOL/L (ref 98–107)
CO2 SERPL-SCNC: 22 MMOL/L (ref 21–31)
CREAT SERPL-MCNC: 0.6 MG/DL (ref 0.6–1.2)
EOSINOPHIL # BLD AUTO: 0.2 THOUSAND/ΜL (ref 0–0.61)
EOSINOPHIL NFR BLD AUTO: 3 % (ref 0–5)
ERYTHROCYTE [DISTWIDTH] IN BLOOD BY AUTOMATED COUNT: 12.9 % (ref 11.5–14.5)
GFR SERPL CREATININE-BSD FRML MDRD: 114 ML/MIN/1.73SQ M
GLUCOSE SERPL-MCNC: 58 MG/DL (ref 65–99)
HCT VFR BLD AUTO: 43.8 % (ref 42–47)
HGB BLD-MCNC: 15 G/DL (ref 12–16)
LYMPHOCYTES # BLD AUTO: 2 THOUSANDS/ΜL (ref 0.6–4.47)
LYMPHOCYTES NFR BLD AUTO: 28 % (ref 21–51)
MAGNESIUM SERPL-MCNC: 2 MG/DL (ref 1.9–2.7)
MCH RBC QN AUTO: 31.8 PG (ref 26–34)
MCHC RBC AUTO-ENTMCNC: 34.2 G/DL (ref 31–37)
MCV RBC AUTO: 93 FL (ref 81–99)
MONOCYTES # BLD AUTO: 0.5 THOUSAND/ΜL (ref 0.17–1.22)
MONOCYTES NFR BLD AUTO: 8 % (ref 2–12)
NEUTROPHILS # BLD AUTO: 4.3 THOUSANDS/ΜL (ref 1.4–6.5)
NEUTS SEG NFR BLD AUTO: 60 % (ref 42–75)
PLATELET # BLD AUTO: 228 THOUSANDS/UL (ref 149–390)
PMV BLD AUTO: 8.6 FL (ref 8.6–11.7)
POTASSIUM SERPL-SCNC: 3.8 MMOL/L (ref 3.5–5.5)
RBC # BLD AUTO: 4.7 MILLION/UL (ref 3.9–5.2)
SODIUM SERPL-SCNC: 138 MMOL/L (ref 134–143)
WBC # BLD AUTO: 7.1 THOUSAND/UL (ref 4.8–10.8)

## 2019-06-30 PROCEDURE — 85025 COMPLETE CBC W/AUTO DIFF WBC: CPT | Performed by: SURGERY

## 2019-06-30 PROCEDURE — 99238 HOSP IP/OBS DSCHRG MGMT 30/<: CPT | Performed by: SURGERY

## 2019-06-30 PROCEDURE — 80048 BASIC METABOLIC PNL TOTAL CA: CPT | Performed by: SURGERY

## 2019-06-30 PROCEDURE — C9113 INJ PANTOPRAZOLE SODIUM, VIA: HCPCS | Performed by: SPECIALIST

## 2019-06-30 PROCEDURE — 83735 ASSAY OF MAGNESIUM: CPT | Performed by: SURGERY

## 2019-06-30 PROCEDURE — NC001 PR NO CHARGE: Performed by: SURGERY

## 2019-06-30 RX ORDER — SODIUM CHLORIDE, SODIUM LACTATE, POTASSIUM CHLORIDE, CALCIUM CHLORIDE 600; 310; 30; 20 MG/100ML; MG/100ML; MG/100ML; MG/100ML
50 INJECTION, SOLUTION INTRAVENOUS CONTINUOUS
Status: DISCONTINUED | OUTPATIENT
Start: 2019-06-30 | End: 2019-06-30

## 2019-06-30 RX ADMIN — SODIUM CHLORIDE, POTASSIUM CHLORIDE, SODIUM LACTATE AND CALCIUM CHLORIDE 50 ML/HR: 600; 310; 30; 20 INJECTION, SOLUTION INTRAVENOUS at 12:05

## 2019-06-30 RX ADMIN — DESVENLAFAXINE 100 MG: 50 TABLET, EXTENDED RELEASE ORAL at 09:33

## 2019-06-30 RX ADMIN — SODIUM CHLORIDE, POTASSIUM CHLORIDE, SODIUM LACTATE AND CALCIUM CHLORIDE 125 ML/HR: 600; 310; 30; 20 INJECTION, SOLUTION INTRAVENOUS at 03:33

## 2019-06-30 RX ADMIN — OXCARBAZEPINE 300 MG: 150 TABLET, FILM COATED ORAL at 09:33

## 2019-06-30 RX ADMIN — Medication 2 SPRAY: at 09:33

## 2019-06-30 RX ADMIN — VALACYCLOVIR HYDROCHLORIDE 500 MG: 500 TABLET, FILM COATED ORAL at 09:33

## 2019-06-30 RX ADMIN — Medication 2 SPRAY: at 05:04

## 2019-06-30 RX ADMIN — PANTOPRAZOLE SODIUM 40 MG: 40 INJECTION, POWDER, LYOPHILIZED, FOR SOLUTION INTRAVENOUS at 09:33

## 2019-06-30 RX ADMIN — LISINOPRIL 10 MG: 10 TABLET ORAL at 09:33

## 2019-06-30 NOTE — DISCHARGE SUMMARY
Discharge Summary - Monetta Koyanagi 41 y o  female MRN: [id number]    Unit/Bed#: -01 Encounter: [id number]    Admission Date:   Admission Orders (From admission, onward)    Ordered        06/27/19 1654  Inpatient Admission (expected length of stay for this patient Order details is greater than two midnights)  Once               Admitting Diagnosis: Nausea [R11 0]  Small bowel obstruction ([address] ) [K56 609]    HPI:  Patient was admitted with signs and symptoms of small-bowel obstruction for which inpatient admission was indicated  Procedures Performed: No orders of the defined types were placed in this encounter  Summary of Hospital Course:  Nasogastric tube placed  The GI tract decompressed  She responded to this intervention  Her abdomen is soft and  She began passing flatus and stool per rectum  The nasogastric tube removed  Her diet advanced which she tolerated well  On the evening of June 30th she was tolerating regular diet ambulating independently and discharged home in stable condition  Significant Findings, Care, Treatment and Services Provided:  Nasogastric tube decompression of the GI tract    Complications:  None    Discharge Diagnosis:  Small bowel obstruction    Resolved Problems  Date Reviewed: 6/30/2019    None          Condition at Discharge: good         Discharge instructions/Information to patient and family:   See after visit summary for information provided to patient and family  Provisions for Follow-Up Care:  See after visit summary for information related to follow-up care and any pertinent home health orders  PCP: Devan Ayers DO    Disposition: Home    Planned Readmission: No    Discharge Statement   I spent 30 minutes discharging the patient  This time was spent on the day of discharge  I had direct contact with the patient on the day of discharge  Additional documentation is required if more than 30 minutes were spent on discharge       Discharge Medications:  See after visit summary for reconciled discharge medications provided to patient and family

## 2019-06-30 NOTE — NURSING NOTE
Patient tolerated clear liquid tray for lunch  Patient requesting regular diet and if tolerated asked if she can be discharged  Dr Jazzy Jensen called  Diet upgraded to regular and he is agreeable to sending patient home if diet tolerated and stated he will put in the orders  Regular tray ordered and given to patient  Patient tolerating well  Will discharge patient when order in and discharge instructions ready

## 2019-06-30 NOTE — DISCHARGE INSTRUCTIONS
General Surgery discharge instructions    1  No driving 1 week     2  No strenuous activity for 2 weeks  3   If you have a dressing on your skin, remove it in 2 days and then you may shower over the wound with soap and water  4   Notify the office for development of fevers, chills, worsening pain, loss of appetite  5   Call Dr Maranda Carlos with any questions or concerns

## 2019-06-30 NOTE — NURSING NOTE
NG tube removed  No output noted this shift  Clear liquids ordered and given to patient  Patient denies any nausea or abdominal discomfort at current time  Will continue to monitor

## 2019-07-01 NOTE — UTILIZATION REVIEW
Additional clinical as requested  Date: 7/1/19           (Ad;mitted 6/27-6/30/19)             Patient Class:  Inpatient  Level of Care: Med Surg    HPI:43 y o  female initially admitted on 6/27 with small bowel obstruction  NPO, NG tube to low intermittent wall suction, IV fluid  6/28 Surgery Assessment/Plan: Obstruction series still with dilated small bowel, but some gas in rectum  Bilious drainage from NG tube  Abdomen soft with hypoactive bowel sounds  Feels somewhat better with NGT  Mineral oil via NG tube and intermittently clamp tube  6/29 Surgery Assessment/Plan: Hospital Day 2 with resolving small bowel obstruction  The patient reports passage of small amount of flatus per rectum  She denies nausea or vomiting  She denies pain at rest   She does note increased abdominal discomfort with movement  On physical examination her T-max over the past 24 hours was 100 2 currently 99 9 vital signs otherwise stable  She is resting comfortably in her bed  She is awake alert and oriented  She is competent reliable historian  Her abdomen is soft with mild tenderness to palpation in 4 quadrants in a nonspecific pattern  No true guarding rebound or peritoneal signs  Review of the obstruction series from today reveals large amount of retained stool in the colon  No evidence for mechanical small-bowel obstruction  Plan: remove NG tube in the a m and advance diet  On 6/30, patient reported passage of flatus and stool per rectum  NGT was removed, patient tolerated clear liquids, then advanced to Regular diet, tolerated well  Discharged to home  Pertinent Labs/Diagnostic Results:     6/29 Abdominal Obstruction Series:   Resolution of small bowel obstruction  Moderate constipation  6/28 Abdominal Obstruction Series: Dilated proximal small bowel loops in the left upper abdomen on prior CT are no longer seen  Large amount of colonic stool      Results from last 7 days   Lab Units 06/30/19  0450 06/29/19  0445 06/28/19  0553 06/27/19  1249   WBC Thousand/uL 7 10 6 90 8 80 14 00*   HEMOGLOBIN g/dL 15 0 14 6 14 2 15 3   HEMATOCRIT % 43 8 43 0 41 6* 45 5   PLATELETS Thousands/uL 228 209 244 276   NEUTROS ABS Thousands/µL 4 30 3 50 6 30 11 80*         Results from last 7 days   Lab Units 06/30/19  0456 06/29/19  0445 06/28/19  0553 06/27/19  1347   SODIUM mmol/L 138 139 140 138   POTASSIUM mmol/L 3 8 3 8 4 2 3 6   CHLORIDE mmol/L 104 105 107 103   CO2 mmol/L 22 27 25 22   ANION GAP mmol/L 12 7 8 13   BUN mg/dL 13 13 16 17   CREATININE mg/dL 0 60 0 64 0 66 0 67   EGFR ml/min/1 73sq m 114 111 110 110   CALCIUM mg/dL 8 8 9 0 9 0 9 5   MAGNESIUM mg/dL 2 0 2 0 2 2  --      Results from last 7 days   Lab Units 06/27/19  1347   AST U/L 16   ALT U/L 15   ALK PHOS U/L 66   TOTAL PROTEIN g/dL 7 0   ALBUMIN g/dL 4 7   TOTAL BILIRUBIN mg/dL 0 60         Results from last 7 days   Lab Units 06/30/19  0456 06/29/19  0445 06/28/19  0553 06/27/19  1347   GLUCOSE RANDOM mg/dL 58* 67 105* 116*     Results from last 7 days   Lab Units 06/27/19  1249   PROTIME seconds 13 5*   INR  1 16   PTT seconds 33         Results from last 7 days   Lab Units 06/27/19  1821   LACTIC ACID mmol/L 0 6       Results from last 7 days   Lab Units 06/27/19  1347   LIPASE u/L 27         Vital Signs:     Date/Time  Temp  Pulse  Resp  BP  SpO2    06/30/19 1542  98 4   84  16  139/83  96 %    06/30/19 0717  99   72  18  121/74  97 %    06/29/19 2348  99 5   84  20  140/77  96 %    06/29/19 1527  99 9   72  20  156/75  98 %    06/29/19 0945  --  --  --  --  --    06/29/19 0750  100 2   98  17  144/93  98 %    06/29/19 0359  99 1   57  16  136/74  99 %    06/28/19 2238  99 9   60  16  114/66  96 %        Medications:   Scheduled Meds:       enoxaparin (LOVENOX) subcutaneous injection 40 mg   Dose: 40 mg  Freq: Daily Route: SC  Start: 06/28/19 0900 End: 06/30/19 2116    desvenlafaxine succinate (PRISTIQ) 24 hr tablet 100 mg   Dose: 100 mg  Freq: Daily Route: PO  Start: 06/29/19 1600 End: 06/30/19 2116    lisinopril (ZESTRIL) tablet 10 mg   Dose: 10 mg  Freq: Daily Route: PO  Start: 06/29/19 1600 End: 06/30/19 2116    mineral oil liquid 30 mL   Dose: 30 mL  Freq: Once Route: PO  Indications of Use: CONSTIPATION  Start: 06/28/19 1745 End: 06/28/19 1809    OXcarbazepine (TRILEPTAL) tablet 300 mg   Dose: 300 mg  Freq: Every 12 hours scheduled Route: PO  Start: 06/29/19 2100 End: 06/30/19 2116    pantoprazole (PROTONIX) injection 40 mg   Dose: 40 mg  Freq: Every 12 hours scheduled Route: IV  Start: 06/28/19 2100 End: 06/30/19 2116    valACYclovir (VALTREX) tablet 500 mg   Dose: 500 mg  Freq: Daily Route: PO  Start: 06/29/19 1600 End: 06/30/19 2116    LORazepam (ATIVAN) 2 mg/mL injection 1 mg x 1 dose 6/28 and 3 doses 6/29  Dose: 1 mg  Freq: Every 6 hours PRN Route: IV  PRN Reasons: anxiety,agitation  Start: 06/28/19 1728 End: 06/30/19 2116    ondansetron (ZOFRAN) injection 4 mg x 2 doses 6/28 and one dose 6/29  Dose: 4 mg  Freq: Every 6 hours PRN Route: IV  PRN Reasons: nausea,vomiting  Start: 06/27/19 1826 End: 06/30/19 2116      morphine injection 2 mg x 1 dose 6/28  Dose: 2 mg  Freq: Every 4 hours PRN Route: IV  PRN Reason: severe pain  Start: 06/27/19 1829 End: 06/30/19 2116  Continuous Infusion:    lactated ringers infusion   Rate: 50 mL/hr Dose: 50 mL/hr  Freq: Continuous Route: IV  Indications of Use: IV Hydration  Last Dose: 50 mL/hr (06/30/19 1205)  Start: 06/30/19 1115 End: 06/30/19 1801      lactated ringers infusion   Rate: 125 mL/hr Dose: 125 mL/hr  Freq: Continuous Route: IV  Last Dose: 125 mL/hr (06/30/19 0333)  Start: 06/27/19 1845 End: 06/30/19 1114              Network Utilization Review Department  Phone: [phone]; Fax [phone]  [email or web address]  [email or web address]  ATTENTION: Please call with any questions or concerns to [phone]  and carefully listen to the prompts so that you are directed to the right person     Send all requests for admission clinical reviews, approved or denied determinations and any other requests to fax [phone]   All voicemails are confidential

## 2019-07-01 NOTE — UTILIZATION REVIEW
Notification of Discharge  This is a Notification of Discharge from our facility [address]  Please be advised that this patient has been discharge from our facility  Below you will find the admission and discharge date and time including the patients disposition  PRESENTATION DATE: 6/27/2019 12:13 PM  OBS ADMISSION DATE: [unfilled]  IP ADMISSION DATE: [unfilled] [unfilled]  DISCHARGE DATE: 6/30/2019  7:10 PM  DISPOSITION: Home/Self Care  Admission Orders (From admission, onward)    Ordered        06/27/19 1654  Inpatient Admission (expected length of stay for this patient Order details is greater than two midnights)  Once               Please contact the UR Department if additional information is required to close this patient's authorization/case  [address]  Utilization Review Department  Phone: [phone]; Fax [phone]  [email or web address]  ATTENTION: Please call with any questions or concerns to [phone]  and carefully listen to the prompts so that you are directed to the right person  Send all requests for admission clinical reviews, approved or denied determinations and any other requests to fax [phone]   All voicemails are confidential

## 2019-07-23 ENCOUNTER — HOSPITAL ENCOUNTER (INPATIENT)
Facility: HOSPITAL | Age: 44
LOS: 6 days | Discharge: HOME | DRG: 885 | End: 2019-07-29
Attending: PSYCHIATRY & NEUROLOGY | Admitting: PSYCHIATRY & NEUROLOGY
Payer: COMMERCIAL

## 2019-07-23 ENCOUNTER — HOSPITAL ENCOUNTER (EMERGENCY)
Facility: HOSPITAL | Age: 44
Discharge: OTHER INSTITUTION | DRG: 885 | End: 2019-07-23
Attending: EMERGENCY MEDICINE | Admitting: PSYCHIATRY & NEUROLOGY
Payer: COMMERCIAL

## 2019-07-23 VITALS
RESPIRATION RATE: 20 BRPM | WEIGHT: 160 LBS | HEART RATE: 88 BPM | SYSTOLIC BLOOD PRESSURE: 132 MMHG | BODY MASS INDEX: 26.66 KG/M2 | HEIGHT: 65 IN | TEMPERATURE: 98.4 F | OXYGEN SATURATION: 99 % | DIASTOLIC BLOOD PRESSURE: 86 MMHG

## 2019-07-23 DIAGNOSIS — I10 HYPERTENSION: ICD-10-CM

## 2019-07-23 DIAGNOSIS — F33.2 SEVERE EPISODE OF RECURRENT MAJOR DEPRESSIVE DISORDER, WITHOUT PSYCHOTIC FEATURES (HCC): Primary | ICD-10-CM

## 2019-07-23 DIAGNOSIS — R45.851 DEPRESSION WITH SUICIDAL IDEATION: Primary | ICD-10-CM

## 2019-07-23 DIAGNOSIS — F32.A DEPRESSION WITH SUICIDAL IDEATION: Primary | ICD-10-CM

## 2019-07-23 DIAGNOSIS — Z00.8 MEDICAL CLEARANCE FOR PSYCHIATRIC ADMISSION: ICD-10-CM

## 2019-07-23 DIAGNOSIS — F41.9 ANXIETY: ICD-10-CM

## 2019-07-23 DIAGNOSIS — Z00.8 MEDICAL CLEARANCE FOR PSYCHIATRIC ADMISSION: Primary | ICD-10-CM

## 2019-07-23 LAB
AMPHETAMINES SERPL QL SCN: NEGATIVE
BARBITURATES UR QL: NEGATIVE
BENZODIAZ UR QL: POSITIVE
COCAINE UR QL: NEGATIVE
ETHANOL SERPL-MCNC: <10 MG/DL
EXT PREG TEST URINE: NEGATIVE
EXT. CONTROL ED NAV: NORMAL
METHADONE UR QL: NEGATIVE
OPIATES UR QL SCN: NEGATIVE
PCP UR QL: NEGATIVE
THC UR QL: NEGATIVE

## 2019-07-23 PROCEDURE — 80320 DRUG SCREEN QUANTALCOHOLS: CPT | Performed by: PHYSICIAN ASSISTANT

## 2019-07-23 PROCEDURE — 36415 COLL VENOUS BLD VENIPUNCTURE: CPT | Performed by: PHYSICIAN ASSISTANT

## 2019-07-23 PROCEDURE — 99285 EMERGENCY DEPT VISIT HI MDM: CPT

## 2019-07-23 PROCEDURE — 81025 URINE PREGNANCY TEST: CPT | Performed by: PHYSICIAN ASSISTANT

## 2019-07-23 PROCEDURE — 80307 DRUG TEST PRSMV CHEM ANLYZR: CPT | Performed by: PHYSICIAN ASSISTANT

## 2019-07-23 RX ORDER — DESVENLAFAXINE 50 MG/1
100 TABLET, EXTENDED RELEASE ORAL DAILY
Status: CANCELLED | OUTPATIENT
Start: 2019-07-24

## 2019-07-23 RX ORDER — OLANZAPINE 10 MG/1
5 INJECTION, POWDER, LYOPHILIZED, FOR SOLUTION INTRAMUSCULAR
Status: CANCELLED | OUTPATIENT
Start: 2019-07-23

## 2019-07-23 RX ORDER — BENZTROPINE MESYLATE 1 MG/ML
1 INJECTION INTRAMUSCULAR; INTRAVENOUS EVERY 6 HOURS PRN
Status: CANCELLED | OUTPATIENT
Start: 2019-07-23

## 2019-07-23 RX ORDER — MAGNESIUM HYDROXIDE/ALUMINUM HYDROXICE/SIMETHICONE 120; 1200; 1200 MG/30ML; MG/30ML; MG/30ML
30 SUSPENSION ORAL EVERY 4 HOURS PRN
Status: CANCELLED | OUTPATIENT
Start: 2019-07-23

## 2019-07-23 RX ORDER — TRAZODONE HYDROCHLORIDE 50 MG/1
25 TABLET ORAL
Status: CANCELLED | OUTPATIENT
Start: 2019-07-23

## 2019-07-23 RX ORDER — ACETAMINOPHEN 325 MG/1
975 TABLET ORAL EVERY 6 HOURS PRN
Status: CANCELLED | OUTPATIENT
Start: 2019-07-23

## 2019-07-23 RX ORDER — RISPERIDONE 1 MG/1
1 TABLET, ORALLY DISINTEGRATING ORAL EVERY 8 HOURS PRN
Status: CANCELLED | OUTPATIENT
Start: 2019-07-23

## 2019-07-23 RX ORDER — LISINOPRIL 10 MG/1
10 TABLET ORAL DAILY
Status: CANCELLED | OUTPATIENT
Start: 2019-07-24

## 2019-07-23 RX ORDER — ACETAMINOPHEN 325 MG/1
650 TABLET ORAL EVERY 4 HOURS PRN
Status: CANCELLED | OUTPATIENT
Start: 2019-07-23

## 2019-07-23 RX ORDER — PANTOPRAZOLE SODIUM 40 MG/1
40 TABLET, DELAYED RELEASE ORAL 2 TIMES DAILY
Status: CANCELLED | OUTPATIENT
Start: 2019-07-23

## 2019-07-23 RX ORDER — LORAZEPAM 1 MG/1
1 TABLET ORAL EVERY 4 HOURS PRN
Status: CANCELLED | OUTPATIENT
Start: 2019-07-23

## 2019-07-23 RX ORDER — ACETAMINOPHEN 325 MG/1
650 TABLET ORAL EVERY 6 HOURS PRN
Status: CANCELLED | OUTPATIENT
Start: 2019-07-23

## 2019-07-23 RX ORDER — OLANZAPINE 10 MG/1
10 INJECTION, POWDER, LYOPHILIZED, FOR SOLUTION INTRAMUSCULAR 2 TIMES DAILY PRN
Status: CANCELLED | OUTPATIENT
Start: 2019-07-23

## 2019-07-23 RX ORDER — BENZTROPINE MESYLATE 1 MG/1
1 TABLET ORAL EVERY 6 HOURS PRN
Status: CANCELLED | OUTPATIENT
Start: 2019-07-23

## 2019-07-23 RX ORDER — HYDROXYZINE 50 MG/1
50 TABLET, FILM COATED ORAL EVERY 4 HOURS PRN
Status: CANCELLED | OUTPATIENT
Start: 2019-07-23

## 2019-07-23 RX ORDER — RISPERIDONE 1 MG/1
2 TABLET, ORALLY DISINTEGRATING ORAL
Status: CANCELLED | OUTPATIENT
Start: 2019-07-23

## 2019-07-23 RX ORDER — LORAZEPAM 1 MG/1
1 TABLET ORAL ONCE
Status: COMPLETED | OUTPATIENT
Start: 2019-07-23 | End: 2019-07-23

## 2019-07-23 RX ORDER — HALOPERIDOL 5 MG
5 TABLET ORAL EVERY 8 HOURS PRN
Status: CANCELLED | OUTPATIENT
Start: 2019-07-23

## 2019-07-23 RX ORDER — ALBUTEROL SULFATE 90 UG/1
2 AEROSOL, METERED RESPIRATORY (INHALATION) EVERY 4 HOURS PRN
Status: CANCELLED | OUTPATIENT
Start: 2019-07-23

## 2019-07-23 RX ORDER — TRAZODONE HYDROCHLORIDE 50 MG/1
50 TABLET ORAL
Status: CANCELLED | OUTPATIENT
Start: 2019-07-23

## 2019-07-23 RX ORDER — HALOPERIDOL 5 MG
5 TABLET ORAL EVERY 6 HOURS PRN
Status: CANCELLED | OUTPATIENT
Start: 2019-07-23

## 2019-07-23 RX ORDER — VALACYCLOVIR HYDROCHLORIDE 500 MG/1
500 TABLET, FILM COATED ORAL DAILY
Status: CANCELLED | OUTPATIENT
Start: 2019-07-24

## 2019-07-23 RX ADMIN — LORAZEPAM 1 MG: 1 TABLET ORAL at 11:59

## 2019-07-23 NOTE — ED PROVIDER NOTES
History  Chief Complaint   Patient presents with    Psychiatric Evaluation     Moods are up and down  Thoughts of harming self without plan  "I'm feeling antsy     Patient presents to emergency room with complaints of increased depression worsening over the past month with suicidal ideation no plan  She states her moods are up and down and she is seeking help before they get more severe  Patient has history of psychiatric evaluations and takes medication daily compliant last dose of her in normal medications this morning  Patient denies homicidal ideation denies auditory visual hallucinations  History provided by:  Patient   used: No    Psychiatric Evaluation   Presenting symptoms: depression and suicidal thoughts    Presenting symptoms: no aggressive behavior, no homicidal ideas and no suicide attempt    Patient accompanied by:   Degree of incapacity (severity): Moderate  Onset quality:  Gradual  Duration:  1 month  Timing:  Intermittent  Progression:  Worsening  Chronicity:  Recurrent  Context: not alcohol use and not drug abuse    Treatment compliance:  Most of the time  Time since last dose of psychoactive medication: this morning as scheduled  Relieved by:  Nothing  Worsened by:  Nothing  Ineffective treatments:  Anti-anxiety medications and antidepressants  Associated symptoms: anxiety and hyperventilating    Associated symptoms: no abdominal pain, no chest pain, no fatigue and no headaches    Risk factors: hx of mental illness          Allergies   Allergen Reactions    Lamotrigine Rash and Hives     Other reaction(s): rash and itching  Other reaction(s): rash and itching  Other reaction(s): rash and itching         Prior to Admission Medications   Prescriptions Last Dose Informant Patient Reported? Taking?    ALPRAZolam (XANAX) 1 mg tablet  Self Yes Yes   Sig: Take 1 mg by mouth daily at bedtime     OXcarbazepine (TRILEPTAL) 300 mg tablet  Self Yes Yes   Sig: Take 300 mg by mouth 2 (two) times a day   PROAIR  (90 Base) MCG/ACT inhaler  Self Yes Yes   Sig: Inhale every 4 (four) hours as needed (as needed)     desvenlafaxine (PRISTIQ) 100 mg 24 hr tablet  Self Yes Yes   Sig: Take 100 mg by mouth daily     lisinopril (ZESTRIL) 10 mg tablet   Yes Yes   Sig: Take 10 mg by mouth daily   medroxyPROGESTERone (DEPO-PROVERA) 150 mg/mL injection  Self Yes Yes   Sig: medroxyprogesterone 150 mg/mL intramuscular suspension   naproxen (NAPROSYN) 500 mg tablet   No Yes   Sig: Take 1 tablet (500 mg total) by mouth 2 (two) times a day with meals   ondansetron (ZOFRAN-ODT) 4 mg disintegrating tablet Not Taking at Unknown time  No No   Sig: Take 1 tablet (4 mg total) by mouth every 8 (eight) hours as needed for nausea or vomiting   Patient not taking: Reported on 7/23/2019   pantoprazole (PROTONIX) 40 mg tablet  Self Yes Yes   Sig: Take 40 mg by mouth 2 (two) times a day     valACYclovir (VALTREX) 500 mg tablet   Yes Yes   Sig: Take 500 mg by mouth daily      Facility-Administered Medications: None       Past Medical History:   Diagnosis Date    Bipolar 1 disorder (HCC)     with bordeline personality    Borderline personality disorder (HCC)     Chronic headaches     Depression     Depression     GERD (gastroesophageal reflux disease)     Hypertension     Incontinence     Migraine     Urinary tract infection        Past Surgical History:   Procedure Laterality Date    CHOLECYSTECTOMY  05/24/2018    DENTAL SURGERY      EXPLORATORY LAPAROTOMY      FOOT SURGERY Right     KNEE ARTHROSCOPY      KNEE ARTHROSCOPY Right 11/15/2018    MULTIPLE TOOTH EXTRACTIONS  11/02/2018    OVARIAN CYST REMOVAL      REDUCTION MAMMAPLASTY         Family History   Problem Relation Age of Onset    Hyperlipidemia Father     Heart disease Mother     Migraines Mother     Depression Family      I have reviewed and agree with the history as documented      Social History     Tobacco Use    Smoking status: Never Smoker    Smokeless tobacco: Never Used   Substance Use Topics    Alcohol use: Not Currently     Comment: rarely    Drug use: Not Currently        Review of Systems   Constitutional: Negative for chills, diaphoresis, fatigue and fever  HENT: Negative for congestion, ear pain, rhinorrhea, sneezing and sore throat  Respiratory: Negative for cough, shortness of breath, wheezing and stridor  Cardiovascular: Negative for chest pain, palpitations and leg swelling  Gastrointestinal: Negative for abdominal distention, abdominal pain, blood in stool, constipation, diarrhea, nausea and vomiting  Genitourinary: Negative for difficulty urinating, dysuria, frequency, hematuria and urgency  Musculoskeletal: Negative for gait problem, myalgias and neck pain  Skin: Negative for rash  Neurological: Negative for dizziness, syncope, weakness, light-headedness and headaches  Psychiatric/Behavioral: Positive for suicidal ideas  Negative for homicidal ideas  The patient is nervous/anxious  All other systems reviewed and are negative  Physical Exam  Physical Exam   Constitutional: She is oriented to person, place, and time  She appears well-developed and well-nourished  HENT:   Head: Normocephalic and atraumatic  Eyes: Pupils are equal, round, and reactive to light  Conjunctivae and EOM are normal    Neck: Normal range of motion  Neck supple  No tracheal deviation present  Cardiovascular: Normal rate, regular rhythm, normal heart sounds and intact distal pulses  Pulmonary/Chest: Effort normal and breath sounds normal  No respiratory distress  She has no wheezes  Abdominal: Soft  Bowel sounds are normal  She exhibits no distension  There is no tenderness  Musculoskeletal: Normal range of motion  She exhibits no edema or tenderness  Neurological: She is alert and oriented to person, place, and time  Skin: Skin is warm and dry  No rash noted  No erythema     Psychiatric: Her speech is normal  Her mood appears anxious  Cognition and memory are normal  She exhibits a depressed mood  She expresses suicidal ideation  She expresses no homicidal ideation  Tearful during exam   Nursing note and vitals reviewed  Vital Signs  ED Triage Vitals   Temperature Pulse Respirations Blood Pressure SpO2   07/23/19 1134 07/23/19 1130 07/23/19 1134 07/23/19 1130 07/23/19 1130   98 4 °F (36 9 °C) (!) 114 20 (!) 192/108 96 %      Temp Source Heart Rate Source Patient Position - Orthostatic VS BP Location FiO2 (%)   07/23/19 1134 07/23/19 1134 07/23/19 1134 07/23/19 1134 --   Temporal Monitor Sitting Left arm       Pain Score       07/23/19 1134       No Pain           Vitals:    07/23/19 1130 07/23/19 1134 07/23/19 1332   BP: (!) 192/108 (!) 192/108 132/86   Pulse: (!) 114 (!) 114 88   Patient Position - Orthostatic VS:  Sitting Lying         Visual Acuity      ED Medications  Medications   LORazepam (ATIVAN) tablet 1 mg (1 mg Oral Given 7/23/19 1159)       Diagnostic Studies  Results Reviewed     Procedure Component Value Units Date/Time    Ethanol [[id number]]  (Normal) Collected:  07/23/19 1147    Lab Status:  Final result Specimen:  Blood from Arm, Left Updated:  07/23/19 1214     Ethanol Lvl <10 mg/dL     POCT pregnancy, urine [[id number]]  (Normal) Resulted:  07/23/19 1209    Lab Status:  Final result Updated:  07/23/19 1210     EXT PREG TEST UR (Ref: Negative) negative     Control Valid    LOT   [id number]   EXP 10/31/20    Rapid drug screen, urine [[id number]]  (Abnormal) Collected:  07/23/19 1148    Lab Status:  Final result Specimen:  Urine, Clean Catch Updated:  07/23/19 1209     Amph/Meth UR Negative     Barbiturate Ur Negative     Benzodiazepine Urine Positive     Cocaine Urine Negative     Methadone Urine Negative     Opiate Urine Negative     PCP Ur Negative     THC Urine Negative    Narrative:       Presumptive report  If requested, specimen will be sent to reference lab for confirmation    FOR MEDICAL PURPOSES ONLY  IF CONFIRMATION NEEDED PLEASE CONTACT THE LAB WITHIN 5 DAYS  Drug Screen Cutoff Levels:  AMPHETAMINE/METHAMPHETAMINES  1000 ng/mL  BARBITURATES     200 ng/mL  BENZODIAZEPINES     200 ng/mL  COCAINE      300 ng/mL  METHADONE      300 ng/mL  OPIATES      300 ng/mL  PHENCYCLIDINE     25 ng/mL  THC       50 ng/mL                   No orders to display              Procedures  Procedures       ED Course  ED Course as of Jul 23 2023   Tue Jul 23, 2019   1242 Patient seen by crisis, 201 signed, awaiting placement  1643 Bed placement is still in progress likely awaiting placement tomorrow  MDM  Number of Diagnoses or Management Options  Anxiety: established and worsening  Depression with suicidal ideation: established and worsening  Hypertension:      Amount and/or Complexity of Data Reviewed  Clinical lab tests: ordered and reviewed  Independent visualization of images, tracings, or specimens: yes    Risk of Complications, Morbidity, and/or Mortality  Presenting problems: moderate  Diagnostic procedures: moderate  Management options: moderate    Patient Progress  Patient progress: stable      Disposition  Final diagnoses:   Depression with suicidal ideation   Hypertension   Anxiety     Time reflects when diagnosis was documented in both MDM as applicable and the Disposition within this note     Time User Action Codes Description Comment    7/23/2019  1:30 PM Laddie Cable Add [F32 9,  R45 851] Depression with suicidal ideation     7/23/2019  1:30 PM Laddie Cable Add [I10] Hypertension     7/23/2019  1:30 PM Laddie Cable Add [F41 9] Anxiety       ED Disposition     ED Disposition Condition Date/Time Comment    Transfer to [address] [place] [address] Jul 23, 2019  1:17 PM Patient medically cleared for inpatient mental health        MD Documentation      Most Recent Value   Sending MD  Dr Gloria Westfall MD      Follow-up Information    None         Discharge Medication List as of 7/23/2019  8:07 PM      CONTINUE these medications which have NOT CHANGED    Details   ALPRAZolam (XANAX) 1 mg tablet Take 1 mg by mouth daily at bedtime  , Historical Med      desvenlafaxine (PRISTIQ) 100 mg 24 hr tablet Take 100 mg by mouth daily  , Historical Med      lisinopril (ZESTRIL) 10 mg tablet Take 10 mg by mouth daily, Historical Med      medroxyPROGESTERone (DEPO-PROVERA) 150 mg/mL injection medroxyprogesterone 150 mg/mL intramuscular suspension, Historical Med      naproxen (NAPROSYN) 500 mg tablet Take 1 tablet (500 mg total) by mouth 2 (two) times a day with meals, Starting Thu 4/11/2019, Print      OXcarbazepine (TRILEPTAL) 300 mg tablet Take 300 mg by mouth 2 (two) times a day, Starting Sat 7/21/2018, Historical Med      pantoprazole (PROTONIX) 40 mg tablet Take 40 mg by mouth 2 (two) times a day  , Historical Med      PROAIR  (90 Base) MCG/ACT inhaler Inhale every 4 (four) hours as needed (as needed)  , Starting Mon 1/22/2018, Historical Med      valACYclovir (VALTREX) 500 mg tablet Take 500 mg by mouth daily, Historical Med      ondansetron (ZOFRAN-ODT) 4 mg disintegrating tablet Take 1 tablet (4 mg total) by mouth every 8 (eight) hours as needed for nausea or vomiting, Starting Thu 4/11/2019, Print           No discharge procedures on file      ED Provider  Electronically Signed by           Pantera Garza PA-C  07/23/19 2023

## 2019-07-23 NOTE — ED NOTES
Insurance Authorization for admission:   Phone call placed to [employer] [facility])  Phone number: [phone]  Spoke to Blaze Gomez  3 days approved  Level of care: I/P psych  Review on 07/25/2019  Authorization # [id number]  UR w/ Pao Cagle @ [phone] ext [phone]    EVS (Eligibility Verification System) called - [phone]  Automated system indicates: Pt is eligible for MA, behavioral health - Rec # [id number]    Insurance Authorization for Transportation:    Phone call placed to [facility]   Phone number [phone]  Spoke to Blaze Gomez     Authorization #: transport to higher [facility] included in [address] auth

## 2019-07-23 NOTE — ED NOTES
Pt did state a preference to remain here at [facility] and if no beds are available her 2nd choice is [facility] as she has been to both in the past    [facility] was called, [phone], spoke to [facility] who stated they are not in-network w/ this pt's insurance  Referral faxed to intake for possible bed in-network or hold til tomorrow for [facility] BHU bed  Crisis will speak to pt about possible in-network bed

## 2019-07-23 NOTE — ED NOTES
Patient is accepted at [facility]  Patient is accepted by [facility] per Sonja Burks with Intake  Patient may go to the floor after shift change  Nurse report is to be called to [phone] prior to patient transfer

## 2019-07-23 NOTE — ED NOTES
Pt presents due to SI w/ no specific plan at this time  Pt is A & O, very anxious throughout the assessment but was cooperative and answered all assessment questions w/ appropriate elaboration  Pt continues to endorse SI and cannot contract for safety  Pt denies any HI or thoughts to hurt others  Pt was brought to the ED by her ICM from [facility] after discussing the possibility of [facility] and determining that level of care would not suffice and that she is in need of AIP psych  Pt denies any AH, VH or delusions  Pt is experiencing increased depression and increased anxiety  pt also experiencing increased irritability but denies having acted out in any way  Pt states her appetite and sleep are both poor  Pt has O/P services for med mgt in place w/ Dr Ashlyn Aviles and is seeking O/P therapy services w/ [facility] but has not yet started her therapy sessions  Pt has an ICM w/ [facility] and states her last AIP psych admission was nearly 5 yrs ago here at [facility]  Pt is in agreement w/ I/P psych admission  Pt's voluntary paperwork, e g  201, and her rights were explained in [address] [facility]  Pt signed her 12 and was given a copy of her rights along w/ a copy of the 72 hr withdraw from tx explanation

## 2019-07-23 NOTE — ED NOTES
Crisis received a call back from [place],  stating the pt has been denied at [facility] because they no longer have a F bed

## 2019-07-23 NOTE — ED NOTES
Pt is in agreement w/ placement in-network since there are no beds at [facility]   Intake reviewed and sent referral to [address] [place] [address]

## 2019-07-24 PROBLEM — F41.0 PANIC DISORDER: Status: ACTIVE | Noted: 2019-07-24

## 2019-07-24 PROBLEM — F33.2 MAJOR DEPRESSIVE DISORDER, RECURRENT EPISODE, SEVERE (HCC): Status: ACTIVE | Noted: 2019-07-24

## 2019-07-24 PROBLEM — N39.0 RECURRENT UTI (URINARY TRACT INFECTION): Status: RESOLVED | Noted: 2019-01-17 | Resolved: 2019-07-24

## 2019-07-24 PROCEDURE — 99223 1ST HOSP IP/OBS HIGH 75: CPT | Performed by: PSYCHIATRY & NEUROLOGY

## 2019-07-24 RX ORDER — NAPROXEN 500 MG/1
500 TABLET ORAL 2 TIMES DAILY WITH MEALS
Status: DISCONTINUED | OUTPATIENT
Start: 2019-07-24 | End: 2019-07-29 | Stop reason: HOSPADM

## 2019-07-24 RX ORDER — OLANZAPINE 10 MG/1
5 INJECTION, POWDER, LYOPHILIZED, FOR SOLUTION INTRAMUSCULAR
Status: DISCONTINUED | OUTPATIENT
Start: 2019-07-24 | End: 2019-07-24

## 2019-07-24 RX ORDER — ALPRAZOLAM 0.5 MG/1
0.25 TABLET ORAL EVERY MORNING
Status: DISCONTINUED | OUTPATIENT
Start: 2019-07-24 | End: 2019-07-29 | Stop reason: HOSPADM

## 2019-07-24 RX ORDER — DESVENLAFAXINE 50 MG/1
50 TABLET, EXTENDED RELEASE ORAL DAILY
Status: DISCONTINUED | OUTPATIENT
Start: 2019-07-24 | End: 2019-07-29 | Stop reason: HOSPADM

## 2019-07-24 RX ORDER — ACETAMINOPHEN 325 MG/1
975 TABLET ORAL EVERY 6 HOURS PRN
Status: DISCONTINUED | OUTPATIENT
Start: 2019-07-24 | End: 2019-07-29 | Stop reason: HOSPADM

## 2019-07-24 RX ORDER — TRAZODONE HYDROCHLORIDE 50 MG/1
50 TABLET ORAL
Status: DISCONTINUED | OUTPATIENT
Start: 2019-07-24 | End: 2019-07-29 | Stop reason: HOSPADM

## 2019-07-24 RX ORDER — OLANZAPINE 10 MG/1
5 INJECTION, POWDER, LYOPHILIZED, FOR SOLUTION INTRAMUSCULAR
Status: DISCONTINUED | OUTPATIENT
Start: 2019-07-24 | End: 2019-07-29 | Stop reason: HOSPADM

## 2019-07-24 RX ORDER — DESVENLAFAXINE 50 MG/1
100 TABLET, EXTENDED RELEASE ORAL DAILY
Status: DISCONTINUED | OUTPATIENT
Start: 2019-07-24 | End: 2019-07-24

## 2019-07-24 RX ORDER — RISPERIDONE 2 MG/1
2 TABLET, ORALLY DISINTEGRATING ORAL
Status: DISCONTINUED | OUTPATIENT
Start: 2019-07-24 | End: 2019-07-29 | Stop reason: HOSPADM

## 2019-07-24 RX ORDER — PANTOPRAZOLE SODIUM 40 MG/1
40 TABLET, DELAYED RELEASE ORAL 2 TIMES DAILY
Status: DISCONTINUED | OUTPATIENT
Start: 2019-07-24 | End: 2019-07-29 | Stop reason: HOSPADM

## 2019-07-24 RX ORDER — OXCARBAZEPINE 150 MG/1
300 TABLET, FILM COATED ORAL 2 TIMES DAILY
Status: DISCONTINUED | OUTPATIENT
Start: 2019-07-24 | End: 2019-07-29 | Stop reason: HOSPADM

## 2019-07-24 RX ORDER — VILAZODONE HYDROCHLORIDE 10 MG/1
10 TABLET ORAL
Status: DISCONTINUED | OUTPATIENT
Start: 2019-07-25 | End: 2019-07-29 | Stop reason: HOSPADM

## 2019-07-24 RX ORDER — ALBUTEROL SULFATE 90 UG/1
2 AEROSOL, METERED RESPIRATORY (INHALATION) EVERY 4 HOURS PRN
Status: DISCONTINUED | OUTPATIENT
Start: 2019-07-24 | End: 2019-07-24

## 2019-07-24 RX ORDER — ALBUTEROL SULFATE 90 UG/1
2 AEROSOL, METERED RESPIRATORY (INHALATION) EVERY 4 HOURS PRN
Status: DISCONTINUED | OUTPATIENT
Start: 2019-07-24 | End: 2019-07-29 | Stop reason: HOSPADM

## 2019-07-24 RX ORDER — HALOPERIDOL 5 MG
5 TABLET ORAL EVERY 6 HOURS PRN
Status: DISCONTINUED | OUTPATIENT
Start: 2019-07-24 | End: 2019-07-29 | Stop reason: HOSPADM

## 2019-07-24 RX ORDER — LISINOPRIL 10 MG/1
10 TABLET ORAL DAILY
Status: DISCONTINUED | OUTPATIENT
Start: 2019-07-24 | End: 2019-07-29 | Stop reason: HOSPADM

## 2019-07-24 RX ORDER — ONDANSETRON 4 MG/1
4 TABLET, ORALLY DISINTEGRATING ORAL EVERY 8 HOURS PRN
Status: DISCONTINUED | OUTPATIENT
Start: 2019-07-24 | End: 2019-07-29 | Stop reason: HOSPADM

## 2019-07-24 RX ORDER — ALPRAZOLAM 1 MG/1
1 TABLET ORAL
Status: DISCONTINUED | OUTPATIENT
Start: 2019-07-24 | End: 2019-07-29 | Stop reason: HOSPADM

## 2019-07-24 RX ORDER — MAGNESIUM HYDROXIDE/ALUMINUM HYDROXICE/SIMETHICONE 120; 1200; 1200 MG/30ML; MG/30ML; MG/30ML
30 SUSPENSION ORAL EVERY 4 HOURS PRN
Status: DISCONTINUED | OUTPATIENT
Start: 2019-07-24 | End: 2019-07-29 | Stop reason: HOSPADM

## 2019-07-24 RX ORDER — HYDROXYZINE HYDROCHLORIDE 25 MG/1
50 TABLET, FILM COATED ORAL EVERY 4 HOURS PRN
Status: DISCONTINUED | OUTPATIENT
Start: 2019-07-24 | End: 2019-07-29 | Stop reason: HOSPADM

## 2019-07-24 RX ORDER — VALACYCLOVIR HYDROCHLORIDE 500 MG/1
500 TABLET, FILM COATED ORAL DAILY
Status: DISCONTINUED | OUTPATIENT
Start: 2019-07-24 | End: 2019-07-29 | Stop reason: HOSPADM

## 2019-07-24 RX ORDER — ACETAMINOPHEN 325 MG/1
650 TABLET ORAL EVERY 4 HOURS PRN
Status: DISCONTINUED | OUTPATIENT
Start: 2019-07-24 | End: 2019-07-29 | Stop reason: HOSPADM

## 2019-07-24 RX ORDER — ACETAMINOPHEN 325 MG/1
650 TABLET ORAL EVERY 6 HOURS PRN
Status: DISCONTINUED | OUTPATIENT
Start: 2019-07-24 | End: 2019-07-29 | Stop reason: HOSPADM

## 2019-07-24 RX ADMIN — LISINOPRIL 10 MG: 10 TABLET ORAL at 12:32

## 2019-07-24 RX ADMIN — NAPROXEN 500 MG: 500 TABLET ORAL at 16:04

## 2019-07-24 RX ADMIN — OXCARBAZEPINE 300 MG: 150 TABLET, FILM COATED ORAL at 12:31

## 2019-07-24 RX ADMIN — OXCARBAZEPINE 300 MG: 150 TABLET, FILM COATED ORAL at 17:37

## 2019-07-24 RX ADMIN — PANTOPRAZOLE SODIUM 40 MG: 40 TABLET, DELAYED RELEASE ORAL at 12:31

## 2019-07-24 RX ADMIN — DESVENLAFAXINE 50 MG: 50 TABLET, FILM COATED, EXTENDED RELEASE ORAL at 13:21

## 2019-07-24 RX ADMIN — VALACYCLOVIR HYDROCHLORIDE 500 MG: 500 TABLET, FILM COATED ORAL at 13:11

## 2019-07-24 RX ADMIN — PANTOPRAZOLE SODIUM 40 MG: 40 TABLET, DELAYED RELEASE ORAL at 17:37

## 2019-07-24 RX ADMIN — ALPRAZOLAM 1 MG: 1 TABLET ORAL at 21:35

## 2019-07-24 RX ADMIN — ALPRAZOLAM 0.25 MG: 0.5 TABLET ORAL at 12:31

## 2019-07-24 NOTE — PLAN OF CARE
Problem: SELF HARM/SUICIDALITY  Goal: Will have no self-injury during hospital stay  Description  INTERVENTIONS:  - Q 15 MINUTES: Routine safety checks  - Q WAKING SHIFT & PRN: Assess risk to determine if routine checks are adequate to maintain patient safety  - Encourage patient to participate actively in care by formulating a plan to combat response to suicidal ideation, identify supports and resources  Outcome: Progressing     Problem: DEPRESSION  Goal: Will be euthymic at discharge  Description  INTERVENTIONS:  - Administer medication as ordered  - Provide emotional support via 1:1 interaction with staff  - Encourage involvement in milieu/groups/activities  - Monitor for social isolation  Outcome: Progressing     Problem: PRAVEENA  Goal: Will exhibit normal sleep and speech and no impulsivity  Description  INTERVENTIONS:  - Administer medication as ordered  - Set limits on impulsive behavior  - Make attempts to decrease external stimuli as possible  Outcome: Progressing     Problem: Ineffective Coping  Goal: Participates in unit activities  Description  Interventions:  - Provide therapeutic environment   - Provide required programming   - Redirect inappropriate behaviors   Outcome: Progressing     Problem: DISCHARGE PLANNING - CARE MANAGEMENT  Goal: Discharge to post-acute care or home with appropriate resources  Description  INTERVENTIONS:  - Conduct assessment to determine patient/family and health care team treatment goals, and need for post-acute services based on payer coverage, community resources, and patient preferences, and barriers to discharge  - Address psychosocial, clinical, and financial barriers to discharge as identified in assessment in conjunction with the patient/family and health care team  - Arrange appropriate level of post-acute services according to patient's   needs and preference and payer coverage in collaboration with the physician and health care team  - Communicate with and update the patient/family, physician, and health care team regarding progress on the discharge plan  - Arrange appropriate transportation to post-acute venues   Outcome: Progressing

## 2019-07-24 NOTE — PROGRESS NOTES
Admission Note  Patient admitted to [facility] from [facility] ED and is a 201  She was admitted after having SI but upon arrival denies SI and CFS  She reports having had a surgery on her bowels last month and while admitted, did not receive any medications  She states that since then, her moods have been rapidly cycling from shailesh to severe depression with suicidal thoughts  She signed herself in to get her medications straightened out  She has a history of self harm and many scars on BL arms and upper thighs  She reports having not self harmed in five years  Reports no HI or hallucinations  PTA medications reviewed with patient and seem to be accurate  Will continue monitoring

## 2019-07-24 NOTE — PLAN OF CARE
Problem: SELF HARM/SUICIDALITY  Goal: Will have no self-injury during hospital stay  Description  INTERVENTIONS:  - Q 15 MINUTES: Routine safety checks  - Q WAKING SHIFT & PRN: Assess risk to determine if routine checks are adequate to maintain patient safety  - Encourage patient to participate actively in care by formulating a plan to combat response to suicidal ideation, identify supports and resources  Outcome: Progressing     Problem: DEPRESSION  Goal: Will be euthymic at discharge  Description  INTERVENTIONS:  - Administer medication as ordered  - Provide emotional support via 1:1 interaction with staff  - Encourage involvement in milieu/groups/activities  - Monitor for social isolation  Outcome: Progressing     Problem: PRAVEENA  Goal: Will exhibit normal sleep and speech and no impulsivity  Description  INTERVENTIONS:  - Administer medication as ordered  - Set limits on impulsive behavior  - Make attempts to decrease external stimuli as possible  Outcome: Progressing     Care plan initiated, monitoring is ongoing

## 2019-07-24 NOTE — PROGRESS NOTES
Pt has been visible on unit, attending groups but not social with peers  Appears anxious  Tearful at times  Pt shows good insight into illness  Believes that this setback was caused by not having her psych meds during a recent medical hospitalization  Pt has fleeting SI but is able to CFS on unit  Pt states she just wants her life to get back on track, as she has had several years of stability  Denies HI, A/T/V  Monitoring continues

## 2019-07-24 NOTE — TREATMENT PLAN
Treatment Plan [facility] [place] Goes 41 y o  female MRN: [id number]    [facility] [id number] Encounter: [id number]          Admit Date/Time:  7/23/2019  8:16 PM    Treatment Team: Attending Provider: Rosenda Aguilar MD; Consulting Physician: Praful Haney; Patient Care Technician: Sharon Bazzi; Patient Care Assistant: Ashlie Fonseca; Patient Care Assistant: Zoya Soares; Patient Care Assistant: Luisito Her; Registered Nurse: Ben Casper RN; Registered Nurse: Kaylyn Campuzano, MARGOT; Patient Care Assistant: Fidelia Santiago; Care Manager: Debo Naik RN; Nursing Student: Estella Aguilar; Recreational Therapist: Etelvina Nichlos;  Registered Nurse: Kindra Gray RN    Diagnosis: Principal Problem:    Major depressive disorder, recurrent episode, severe ([address] )  Active Problems:    Panic disorder      Mental Status Evaluation:  Appearance:  casually dressed   Behavior:  guarded   Speech:  normal volume   Mood:  constricted   Affect:  mood-congruent   Language: repeating phrases   Thought Process:  circumstantial   Thought Content:  obsessions   Perceptual Disturbances: None   Risk Potential: Suicidal Ideations without plan   Sensorium:  person and place   Cognition:  grossly intact   Consciousness:  awake    Attention: attention span appeared shorter than expected for age   Intellect: not examined   Fund of Knowledge: vocabulary: fair   Insight:  limited   Judgment: limited   Muscle Location: face and neck   Gait/Station: slow   Motor Activity: no abnormal movements     Patient Strengths: supportive family/friends     Patient Barriers: low self esteem    Progress Toward Goals: ongoing    Recommended Treatment: discharge planning     Treatment Frequency: 2-3 times per week     Expected Discharge Date: 7/26/2019    Discharge Plan: return to previous living arrangement     Treatment Plan Created/Updated By: Rosenda Aguilar MD

## 2019-07-24 NOTE — H&P
Psychiatric Evaluation - Behavioral Health   Jason Hays 41 y o  female MRN: [id number]  Unit/Bed#: [facility] 251-01 Encounter: [id number]    Assessment/Plan   Principal Problem:    Major depressive disorder, recurrent episode, severe ([address] )  Active Problems:    Panic disorder    Plan:   Risks, benefits and possible side effects of Medications:   Risks, benefits, and possible side effects of medications explained to patient and patient verbalizes understanding  1  Admit to acute psychiatric level of care for safety stability  2  Q 7 minutes checks  3  Individual, group, and milieu therapy  4  Patient will be weaned off Pristiq and started on Viibryd  5  Add 0 25 mg of Xanax in the morning to help with her increased anxiety  6  Proper safety and discharge planning      Chief Complaint: "I wanted to die"    History of Present Illness     Patient is a 41 y o  female presents with worsening depression and suicidal thoughts  Patient denies having any current stressors but reports she has a long psychiatric history and has been to numerous inpatient psychiatric hospitalizations since age 17 and has been to 3 stays in [facility] when she was younger  She reports she has not been in any psychiatric hospital for the past 5 years and currently is working as a certified peer specialist   Patient reports that she is currently engaged and is getting  in few months  She reports her relationship with her fiance is very good and there are no current issues  She reports he was with her in the emergency room prior to this admission  Patient reports lately she has been getting increasingly depressed  and once she started having suicidal thoughts she came into the ER    Patient reports she has an episode of gastrointestinal pain a month ago and was hospitalized, and she feels that might have been a precipitating factor for recurrence of her depression, especially that the stopped her psychotropic medications when she was on the medical floors  Patient denies any drug or alcohol use and reports she has been compliant with her medications        Psychiatric Review Of Systems:  sleep: no  appetite changes: no  weight changes: no  energy/anergy: no  interest/pleasure/anhedonia: no  somatic symptoms: no  anxiety/panic: no  shailesh: no  guilty/hopeless: no  self injurious behavior/risky behavior: no    Historical Information     Past Psychiatric History: In Patient multiple    Substance Abuse History:  Social History     Tobacco History     Smoking Status  Never Smoker    Smokeless Tobacco Use  Never Used          Alcohol History     Alcohol Use Status  Not Currently Comment  rarely          Drug Use     Drug Use Status  Not Currently          Sexual Activity     Sexually Active  Not Currently          Activities of Daily Living    Not Asked                 I have assessed this patient for substance use within the past 12 months      Past Medical History:   Diagnosis Date    Arthritis     Bipolar 1 disorder ([facility] )     with bordeline personality    Borderline personality disorder ([facility] )     Chronic headaches     Depression     Depression     GERD (gastroesophageal reflux disease)     Hypertension     Incontinence     Migraine     Urinary tract infection        Medical Review Of Systems:  Pertinent items are noted in HPI      Meds/Allergies   all current active meds have been reviewed  Allergies   Allergen Reactions    Lamotrigine Rash and Hives     Other reaction(s): rash and itching  Other reaction(s): rash and itching  Other reaction(s): rash and itching       Objective   Vital signs in last 24 hours:  Temp:  [97 1 °F (36 2 °C)-97 9 °F (36 6 °C)] 97 9 °F (36 6 °C)  HR:  [81-89] 89  Resp:  [16] 16  BP: (133-136)/(70-79) 133/70    No intake or output data in the 24 hours ending 07/24/19 1510    Mental Status Evaluation:  Appearance:  disheveled   Behavior:  restless and fidgety   Speech:  soft   Mood:  anxious and depressed Affect:  mood-congruent   Language: repeating phrases   Thought Process:  circumstantial   Thought Content:  obsessions   Perceptual Disturbances: None   Risk Potential: Suicidal Ideations without plan   Sensorium:  person and place   Cognition:  grossly intact   Consciousness:  alert and awake    Attention: attention span and concentration were age appropriate   Intellect: not examined   Fund of Knowledge: vocabulary: fair   Insight:  limited   Judgment: limited   Muscle Strength and Tone: face and neck   Gait/Station: normal gait/station   Motor Activity: no abnormal movements     Lab Results: I have personally reviewed pertinent lab results  Patient Strengths/Assets: resourceful    Patient Barriers/Limitations: low self esteem    Counseling / Coordination of Care  Total floor / unit time spent today 60 minutes  Greater than 50% of total time was spent with the patient and / or family counseling and / or coordination of care   A description of the counseling / coordination of care:

## 2019-07-24 NOTE — CONSULTS
Consultation - Aaron Espinal 41 y o  female MRN: [id number]    Unit/Bed#: [facility] 251-01 Encounter: [id number]      Assessment/Plan     Assessment:  Suicidal ideation  Bipolar I  Borderline personality disorder  Paresthesias    Plan:  Patient to see nurse practitioner and psychiatrist today  Patient advised to notify nursing if numbness and tingling in hands returns  Patient currently medically stable for continued inpatient evaluation  History of Present Illness     HPI: Aaron Espinal is a 41y o  year old female who was admitted to inpatient behavioral health unit with suicidal ideation  Patient admits to intermittent numbness and tingling of bilateral hands, no other complaints today  [facility]  Consult performed by: Vivian Burnett PA-C  Consult ordered by: Karen Lew MD          Review of Systems   Constitutional: Negative for chills and fever  HENT: Negative for congestion, rhinorrhea and sore throat  Eyes: Negative for visual disturbance  Respiratory: Negative for cough and shortness of breath  Cardiovascular: Negative for chest pain  Gastrointestinal: Negative for abdominal pain, diarrhea, nausea and vomiting  Genitourinary: Negative for difficulty urinating  Musculoskeletal: Negative for back pain  Skin: Negative for rash  Neurological: Negative for headaches         Historical Information   Past Medical History:   Diagnosis Date    Arthritis     Bipolar 1 disorder ([facility] )     with bordeline personality    Borderline personality disorder ([facility] )     Chronic headaches     Depression     Depression     GERD (gastroesophageal reflux disease)     Hypertension     Incontinence     Migraine     Urinary tract infection      Past Surgical History:   Procedure Laterality Date    CHOLECYSTECTOMY  05/24/2018    DENTAL SURGERY      EXPLORATORY LAPAROTOMY      FOOT SURGERY Right     KNEE ARTHROSCOPY      KNEE ARTHROSCOPY Right 11/15/2018    MULTIPLE TOOTH EXTRACTIONS  11/02/2018    OVARIAN CYST REMOVAL      REDUCTION MAMMAPLASTY       Social History   Social History     Substance and Sexual Activity   Alcohol Use Not Currently    Comment: rarely     Social History     Substance and Sexual Activity   Drug Use Not Currently     Social History     Tobacco Use   Smoking Status Never Smoker   Smokeless Tobacco Never Used     Family History: non-contributory    Meds/Allergies   all current active meds have been reviewed  Allergies   Allergen Reactions    Lamotrigine Rash and Hives     Other reaction(s): rash and itching  Other reaction(s): rash and itching  Other reaction(s): rash and itching       Objective   Vitals: Blood pressure 133/70, pulse 89, temperature 97 9 °F (36 6 °C), temperature source Temporal, resp  rate 16, height 5' 5" (1 651 m), weight 72 6 kg (160 lb), SpO2 99 %, not currently breastfeeding  No intake or output data in the 24 hours ending 07/24/19 0939  Invasive Devices     None                 Physical Exam   Constitutional: She is oriented to person, place, and time  She appears well-developed and well-nourished  HENT:   Head: Normocephalic and atraumatic  Right Ear: External ear normal    Left Ear: External ear normal    Nose: Nose normal    Mouth/Throat: Oropharynx is clear and moist    Eyes: Pupils are equal, round, and reactive to light  Conjunctivae and EOM are normal    Neck: Normal range of motion  Neck supple  Cardiovascular: Normal rate, regular rhythm, normal heart sounds and intact distal pulses  Pulmonary/Chest: Effort normal and breath sounds normal    Abdominal: Soft  Bowel sounds are normal    Neurological: She is alert and oriented to person, place, and time  Skin: Skin is warm and dry  Capillary refill takes less than 2 seconds  Psychiatric: She has a normal mood and affect  Her behavior is normal    Nursing note and vitals reviewed        Lab Results: I have personally reviewed pertinent reports  Imaging Studies: I have personally reviewed pertinent reports  EKG, Pathology, and Other Studies: I have personally reviewed pertinent reports  VTE Prophylaxis: none  ambulatory    Code Status: Prior  Advance Directive and Living Will:      Power of :    POLST:      Counseling / Coordination of Care  Total floor / unit time spent today 30 minutes  Greater than 50% of total time was spent with the patient and / or family counseling and / or coordination of care   A description of the counseling / coordination of care: H&P

## 2019-07-24 NOTE — PROGRESS NOTES
Daily Rounds Documentation    Team Members Present:   Dr Orlena Olmsted, MD Lesle Dancer, LEATHA Godinez, MARGOT  [facility], [address] [place] [address]  Arturo Brewster, PharmD    075  Past AIP here in the past   Recent GI surgery and they stopped giving her medications and now she doesn't feel right  SI no plan, increased depression, and mood swings  No GI issues now; had bowel movement yesterday  Hx of self harm

## 2019-07-25 PROCEDURE — 99231 SBSQ HOSP IP/OBS SF/LOW 25: CPT | Performed by: PSYCHIATRY & NEUROLOGY

## 2019-07-25 RX ADMIN — ALPRAZOLAM 0.25 MG: 0.5 TABLET ORAL at 08:29

## 2019-07-25 RX ADMIN — RISPERIDONE 2 MG: 2 TABLET, ORALLY DISINTEGRATING ORAL at 17:56

## 2019-07-25 RX ADMIN — NAPROXEN 500 MG: 500 TABLET ORAL at 08:29

## 2019-07-25 RX ADMIN — OXCARBAZEPINE 300 MG: 150 TABLET, FILM COATED ORAL at 08:30

## 2019-07-25 RX ADMIN — LISINOPRIL 10 MG: 10 TABLET ORAL at 08:30

## 2019-07-25 RX ADMIN — PANTOPRAZOLE SODIUM 40 MG: 40 TABLET, DELAYED RELEASE ORAL at 08:30

## 2019-07-25 RX ADMIN — NAPROXEN 500 MG: 500 TABLET ORAL at 17:25

## 2019-07-25 RX ADMIN — OXCARBAZEPINE 300 MG: 150 TABLET, FILM COATED ORAL at 17:44

## 2019-07-25 RX ADMIN — PANTOPRAZOLE SODIUM 40 MG: 40 TABLET, DELAYED RELEASE ORAL at 17:44

## 2019-07-25 RX ADMIN — ALUMINUM HYDROXIDE, MAGNESIUM HYDROXIDE, AND SIMETHICONE 30 ML: 200; 200; 20 SUSPENSION ORAL at 13:55

## 2019-07-25 RX ADMIN — DESVENLAFAXINE 50 MG: 50 TABLET, FILM COATED, EXTENDED RELEASE ORAL at 08:30

## 2019-07-25 RX ADMIN — VALACYCLOVIR HYDROCHLORIDE 500 MG: 500 TABLET, FILM COATED ORAL at 08:31

## 2019-07-25 RX ADMIN — ALPRAZOLAM 1 MG: 1 TABLET ORAL at 21:19

## 2019-07-25 RX ADMIN — VILAZODONE HYDROCHLORIDE 10 MG: 10 TABLET ORAL at 08:30

## 2019-07-25 NOTE — PROGRESS NOTES
Daily Rounds Documentation    Team Members Present:   MD Mehran Luque, LEATHA Collins, RN  Christian Miranda, MARGOT Quiroz, W   [facility], [facility]    Pleasant  Taking her medications  Social   No issues  Good insight

## 2019-07-25 NOTE — PLAN OF CARE
Problem: SELF HARM/SUICIDALITY  Goal: Will have no self-injury during hospital stay  Description  INTERVENTIONS:  - Q 15 MINUTES: Routine safety checks  - Q WAKING SHIFT & PRN: Assess risk to determine if routine checks are adequate to maintain patient safety  - Encourage patient to participate actively in care by formulating a plan to combat response to suicidal ideation, identify supports and resources  Outcome: Progressing     Problem: DEPRESSION  Goal: Will be euthymic at discharge  Description  INTERVENTIONS:  - Administer medication as ordered  - Provide emotional support via 1:1 interaction with staff  - Encourage involvement in milieu/groups/activities  - Monitor for social isolation  Outcome: Progressing     Problem: PRAVEENA  Goal: Will exhibit normal sleep and speech and no impulsivity  Description  INTERVENTIONS:  - Administer medication as ordered  - Set limits on impulsive behavior  - Make attempts to decrease external stimuli as possible  Outcome: Progressing     Problem: Ineffective Coping  Goal: Participates in unit activities  Description  Interventions:  - Provide therapeutic environment   - Provide required programming   - Redirect inappropriate behaviors   Outcome: Progressing

## 2019-07-25 NOTE — PROGRESS NOTES
Patient  Was out in the milieu this evening but keeps to herself  Tearful during assessment  Discussed her long hx of depression and self injurious behaviors  States she has been good for over 4 years and attributes this setback due to recent surgery and being off her meds  Has very supportive freedom  Denies any current SI/HI/AH/VH  States she just wants to get back on the right meds  Will continue to observe

## 2019-07-25 NOTE — PLAN OF CARE
PT continues to attend most of the art therapy groups offered and does actively engage in group directives and in projects of choice  PT is familiar with art therapy groups and with AT from multiple prior stays on the unit  PT displays a depressed mood, flat affect, however does have good eye contact and positive social interactions when prompted  PT is mostly quiet and guarded among peers, however does seem more comfortable in seeking out staff 1:1 for processing  PT will continue to attend at least 75% of AT groups to improve self esteem, to identify at least 3 healthy coping skills and to allow for creative expression of feelings and emotions       Problem: Ineffective Coping  Goal: Participates in unit activities  Description  Interventions:  - Provide therapeutic environment   - Provide required programming   - Redirect inappropriate behaviors   Outcome: Progressing

## 2019-07-25 NOTE — PROGRESS NOTES
Progress Note - Behavioral Health   Neville Phoenix 41 y o  female MRN: [id number]  Unit/Bed#: [facility] 251-01 Encounter: [id number]    Assessment/Plan   Principal Problem:    Major depressive disorder, recurrent episode, severe ([address] )  Active Problems:    Panic disorder      Behavior over the last 24 hours:  Unchanged  Patient reports she gets irritated by other peers who are disruptive and using foul language  Patient is motivated to get better and is annoyed by distractions  She still ambivalent about going home and does not feel safe yet  She reports on and off suicidal thoughts  She reports her fiance came to visit last night and he is very supportive of her  She is hoping to go back to her job soon and afraid she would lose it if she stays here long  Sleep: hypersomnia  Appetite: poor  Medication side effects: No  ROS: no complaints    Mental Status Evaluation:  Appearance:  well dressed   Behavior:  normal   Speech:  soft   Mood:  anxious and depressed   Affect:  constricted   Thought Process:  normal   Thought Content:  normal   Perceptual Disturbances: None   Risk Potential: Suicidal Ideations without plan   Sensorium:  person and place   Cognition:  grossly intact   Consciousness:  awake    Attention: attention span appeared shorter than expected for age   Insight:  age appropriate   Judgment: limited   Gait/Station: normal gait/station   Motor Activity: no abnormal movements     Progress Toward Goals: ongoing    Recommended Treatment: Continue with group therapy, milieu therapy and occupational therapy  Risks, benefits and possible side effects of Medications:   Risks, benefits, and possible side effects of medications explained to patient and patient verbalizes understanding  Medications: all current active meds have been reviewed and continue current psychiatric medications  Labs: reviewed    Counseling / Coordination of Care  Total floor / unit time spent today 15 minutes   Greater than 50% of total time was spent with the patient and / or family counseling and / or coordination of care   A description of the counseling / coordination of care:

## 2019-07-25 NOTE — PROGRESS NOTES
Pt participated in [facility] meeting, having contributed to goal development  Pt signed [facility]
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
## 2019-07-25 NOTE — PROGRESS NOTES
Pt present in the milieu for meals, groups, cooperative and pleasant  Denies SI/HI/AH,VH  Pt reports mild anxiety and depression, 3/10  Reported upset stomach after lunch  RN administered PRN Maalox  Pt reports stomach cramping improved  Will continue to monitor

## 2019-07-26 PROCEDURE — 99232 SBSQ HOSP IP/OBS MODERATE 35: CPT | Performed by: PSYCHIATRY & NEUROLOGY

## 2019-07-26 RX ORDER — AMMONIUM LACTATE 12 G/100G
LOTION TOPICAL 2 TIMES DAILY PRN
Status: DISCONTINUED | OUTPATIENT
Start: 2019-07-26 | End: 2019-07-29 | Stop reason: HOSPADM

## 2019-07-26 RX ADMIN — NAPROXEN 500 MG: 500 TABLET ORAL at 16:09

## 2019-07-26 RX ADMIN — ALPRAZOLAM 1 MG: 1 TABLET ORAL at 21:34

## 2019-07-26 RX ADMIN — PANTOPRAZOLE SODIUM 40 MG: 40 TABLET, DELAYED RELEASE ORAL at 17:52

## 2019-07-26 RX ADMIN — VILAZODONE HYDROCHLORIDE 10 MG: 10 TABLET ORAL at 08:32

## 2019-07-26 RX ADMIN — LINACLOTIDE 1 CAPSULE: 290 CAPSULE, GELATIN COATED ORAL at 11:21

## 2019-07-26 RX ADMIN — DESVENLAFAXINE 50 MG: 50 TABLET, FILM COATED, EXTENDED RELEASE ORAL at 08:32

## 2019-07-26 RX ADMIN — VALACYCLOVIR HYDROCHLORIDE 500 MG: 500 TABLET, FILM COATED ORAL at 08:34

## 2019-07-26 RX ADMIN — ALPRAZOLAM 0.25 MG: 0.5 TABLET ORAL at 08:32

## 2019-07-26 RX ADMIN — OXCARBAZEPINE 300 MG: 150 TABLET, FILM COATED ORAL at 17:52

## 2019-07-26 RX ADMIN — LISINOPRIL 10 MG: 10 TABLET ORAL at 08:31

## 2019-07-26 RX ADMIN — OXCARBAZEPINE 300 MG: 150 TABLET, FILM COATED ORAL at 08:31

## 2019-07-26 RX ADMIN — NAPROXEN 500 MG: 500 TABLET ORAL at 08:32

## 2019-07-26 RX ADMIN — PANTOPRAZOLE SODIUM 40 MG: 40 TABLET, DELAYED RELEASE ORAL at 08:32

## 2019-07-26 NOTE — PROGRESS NOTES
Pt visible on unit  Social with select peers  Appears anxious and nervous  Requesting various changes to home medications  All concerns have been addressed  Pt started back on linzess and medication from home profiled from pharmacy  New medication ammonium lactate 12% lotion provided patient self applied  Safety precautions maintained  Will continue to monitor and assess

## 2019-07-26 NOTE — PROGRESS NOTES
Progress Note - Behavioral Health     Leyda Pérez 41 y o  female MRN: [id number]   Unit/Bed#: [facility] 251-02 Encounter: [id number]    Behavior over the last 24 hours:  Amparo was seen for an inpatient follow-up psychiatric visit this date  She relates at today's visit that she is still feeling very anxious but is denying any suicidal or homicidal ideations  She is able to contract for safety on the unit and relates she will inform staff if her symptoms worsen  She is taking her medications as prescribed and denies side effects  Appetite and sleep are within normal limits  She is looking for to being discharged Monday and offers good insight regarding her mental illness  She is tolerating Viibryd without side effects thus far  ROS: no complaints    Mental Status Evaluation:    Appearance:  casually dressed, dressed appropriately   Behavior:  normal, pleasant, cooperative   Speech:  normal rate and volume   Mood:  dysphoric, anxious   Affect:  normal range and intensity   Thought Process:  organized, logical, coherent   Associations: intact associations   Thought Content:  normal, no overt delusions   Perceptual Disturbances: none   Risk Potential: Suicidal ideation - None  Homicidal ideation - None  Potential for aggression - No   Sensorium:  oriented to person, place and time/date   Memory:  recent and remote memory grossly intact   Consciousness:  alert and awake   Attention: attention span and concentration are age appropriate   Insight:  good   Judgment: good   Gait/Station: normal gait/station, normal balance   Motor Activity: no abnormal movements     Vital signs in last 24 hours:    Temp:  [97 7 °F (36 5 °C)-97 9 °F (36 6 °C)] 97 9 °F (36 6 °C)  HR:  [] 101  Resp:  [18] 18  BP: (107-117)/(63-68) 116/68    Laboratory results:  I have personally reviewed all pertinent laboratory/tests results      Progress Toward Goals: progressing    Assessment/Plan   Principal Problem:    Major depressive disorder, recurrent episode, severe ([facility] )  Active Problems:    Panic disorder    Recommended Treatment:   Continue current medication as prescribed  Continue to monitor  Discharge disposition and planning are ongoing  All current active medications have been reviewed  Encourage group therapy, milieu therapy and occupational therapy  Behavioral Health checks every 7 minutes      Current Facility-Administered Medications:  acetaminophen 650 mg Oral Q6H PRN Damien Ibarra MD   acetaminophen 650 mg Oral Q4H PRN Damien Ibarra MD   acetaminophen 975 mg Oral Q6H PRN Tima Diamond MD   albuterol 2 puff Inhalation Q4H PRN Pelon Root MD   ALPRAZolam 0 25 mg Oral QAM Pelon Root MD   ALPRAZolam 1 mg Oral HS Pelon Root MD   aluminum-magnesium hydroxide-simethicone 30 mL Oral Q4H PRN Tima Diamond MD   desvenlafaxine 50 mg Oral Daily Pelon Root MD   haloperidol 5 mg Oral Q6H PRN Tima Diamond MD   hydrOXYzine HCL 50 mg Oral Q4H PRN Damien Ibarra MD   linaCLOtide 290 mcg Oral Daily Before Breakfast LEATHA Goodwin   lisinopril 10 mg Oral Daily Pelon Root MD   magnesium hydroxide 30 mL Oral Daily PRN Tima Diamond MD   naproxen 500 mg Oral BID With Meals Pelon Root MD   OLANZapine 5 mg Intramuscular Q3H PRN Pelon Root MD   ondansetron 4 mg Oral Q8H PRN Pelon Root MD   OXcarbazepine 300 mg Oral BID Pelon Root MD   pantoprazole 40 mg Oral BID Pelon Root MD   risperiDONE 2 mg Oral Q3H PRN Damien Ibarra MD   traZODone 50 mg Oral HS PRN Damien Ibarra MD   valACYclovir 500 mg Oral Daily Pelon Root MD   vilazodone 10 mg Oral Daily With Breakfast Pelon Root MD       Risks / Benefits of Treatment:    Risks, benefits, and possible side effects of medications explained to patient and patient verbalizes understanding and agreement for treatment      Counseling / Coordination of Care:      Patient's progress discussed with staff in treatment team meeting  Medications, treatment progress and treatment plan reviewed with patient

## 2019-07-26 NOTE — PROGRESS NOTES
Patient was withdrawn to her room most of the evening only coming out for snack and meds  Presents with flat affect and depressed mood  Denies any current SI  Minimal interactions with peers  Retired to bed early  Complaint with meds  Will continue to observe

## 2019-07-26 NOTE — PROGRESS NOTES
Daily Rounds Documentation    Team Members Present:  Dr Cydne Part, MD Arlene Siemens, LEATHA Christianson, RN  Nicki Sears, [place]   Sharon Hernández, [place]    Long psych hx (self-harm and swallowing)  Visible but anxious  Trying to use coping skills  Has good insights    Fleeting SI

## 2019-07-26 NOTE — PROGRESS NOTES
[id number]  Patient sleeping without difficulty throughout the night  No behaviors or complaints  Will continue monitoring

## 2019-07-27 PROCEDURE — 99232 SBSQ HOSP IP/OBS MODERATE 35: CPT | Performed by: PSYCHIATRY & NEUROLOGY

## 2019-07-27 RX ADMIN — LINACLOTIDE 1 CAPSULE: 290 CAPSULE, GELATIN COATED ORAL at 07:46

## 2019-07-27 RX ADMIN — LISINOPRIL 10 MG: 10 TABLET ORAL at 08:37

## 2019-07-27 RX ADMIN — ALPRAZOLAM 0.25 MG: 0.5 TABLET ORAL at 08:38

## 2019-07-27 RX ADMIN — PANTOPRAZOLE SODIUM 40 MG: 40 TABLET, DELAYED RELEASE ORAL at 08:38

## 2019-07-27 RX ADMIN — ALPRAZOLAM 1 MG: 1 TABLET ORAL at 21:37

## 2019-07-27 RX ADMIN — VALACYCLOVIR HYDROCHLORIDE 500 MG: 500 TABLET, FILM COATED ORAL at 08:38

## 2019-07-27 RX ADMIN — PANTOPRAZOLE SODIUM 40 MG: 40 TABLET, DELAYED RELEASE ORAL at 17:47

## 2019-07-27 RX ADMIN — OXCARBAZEPINE 300 MG: 150 TABLET, FILM COATED ORAL at 08:36

## 2019-07-27 RX ADMIN — NAPROXEN 500 MG: 500 TABLET ORAL at 17:47

## 2019-07-27 RX ADMIN — DESVENLAFAXINE 50 MG: 50 TABLET, FILM COATED, EXTENDED RELEASE ORAL at 08:37

## 2019-07-27 RX ADMIN — VILAZODONE HYDROCHLORIDE 10 MG: 10 TABLET ORAL at 08:30

## 2019-07-27 RX ADMIN — Medication: at 17:47

## 2019-07-27 RX ADMIN — OXCARBAZEPINE 300 MG: 150 TABLET, FILM COATED ORAL at 17:47

## 2019-07-27 RX ADMIN — NAPROXEN 500 MG: 500 TABLET ORAL at 08:36

## 2019-07-27 NOTE — PROGRESS NOTES
Pt visible on unit   Appears anxious and nervous  More withdrawn and seclusive than previous shift  Calm and cooperative with staff and peers  Compliant with medications and meals  Denies SI, HI, or hallucinations  Safety precautions maintained  Will continue to monitor and assess

## 2019-07-27 NOTE — PROGRESS NOTES
C/O"there was  situation   last night and it was not handled well by a nurse "    Report from staff regarding this patient received and record reviewed  prior to seeing this patient   Behavior over the last 24 hours:    Sleep:took while to fall sleep, when finally fell sleep it was ok "  Appetite:ok  Medication side effects:denied  ROS:depressed  Mental Status Evaluation:  Appearance:  Dressed appropraitely   Behavior:  cooperative   Speech:  normal   Mood:  euthymic   Affect:  blunted   Thought Process:  disorganzied   Thought Content:  normal   Perceptual Disturbances: Denied AV hallucination   Risk Potential: NO KECIA    Sensorium:  normal   Cognition:  intact   Consciousness:  Alert, OX3   Attention: Fair   Insight:  limited   Judgment: limited   Gait/Station: With in normal range   Motor Activity: With in normal range     Progress Toward Goals: working on current treatment goals, no changes  Made in treatment plan   Recommended Treatment: Continue with group therapy, milieu therapy and occupational therapy  Risks, benefits and possible side effects of Medications:   Risks, benefits, and possible side effects of medications explained to patient and patient verbalizes understanding        Medications:   current meds:   Current Facility-Administered Medications   Medication Dose Route Frequency    acetaminophen (TYLENOL) tablet 650 mg  650 mg Oral Q6H PRN    acetaminophen (TYLENOL) tablet 650 mg  650 mg Oral Q4H PRN    acetaminophen (TYLENOL) tablet 975 mg  975 mg Oral Q6H PRN    albuterol (PROVENTIL HFA,VENTOLIN HFA) inhaler 2 puff  2 puff Inhalation Q4H PRN    ALPRAZolam (XANAX) tablet 0 25 mg  0 25 mg Oral QAM    ALPRAZolam (XANAX) tablet 1 mg  1 mg Oral HS    aluminum-magnesium hydroxide-simethicone (MYLANTA) 200-200-20 mg/5 mL oral suspension 30 mL  30 mL Oral Q4H PRN    ammonium lactate (LAC-HYDRIN) 12 % lotion   Topical BID PRN    desvenlafaxine succinate (PRISTIQ) 24 hr tablet 50 mg  50 mg Oral Daily    haloperidol (HALDOL) tablet 5 mg  5 mg Oral Q6H PRN    hydrOXYzine HCL (ATARAX) tablet 50 mg  50 mg Oral Q4H PRN    linaCLOtide CAPS 1 capsule  290 mcg Oral Daily Before Breakfast    lisinopril (ZESTRIL) tablet 10 mg  10 mg Oral Daily    magnesium hydroxide (MILK OF MAGNESIA) 400 mg/5 mL oral suspension 30 mL  30 mL Oral Daily PRN    naproxen (NAPROSYN) tablet 500 mg  500 mg Oral BID With Meals    OLANZapine (ZyPREXA) IM injection 5 mg  5 mg Intramuscular Q3H PRN    ondansetron (ZOFRAN-ODT) dispersible tablet 4 mg  4 mg Oral Q8H PRN    OXcarbazepine (TRILEPTAL) tablet 300 mg  300 mg Oral BID    pantoprazole (PROTONIX) EC tablet 40 mg  40 mg Oral BID    risperiDONE (RisperDAL M-TABS) dispersible tablet 2 mg  2 mg Oral Q3H PRN    traZODone (DESYREL) tablet 50 mg  50 mg Oral HS PRN    valACYclovir (VALTREX) tablet 500 mg  500 mg Oral Daily    vilazodone (VIIBRYD) tablet 10 mg  10 mg Oral Daily With Breakfast        Labs: NA    Assessment, Diagnosis  and Plan: continue with current meds and goals, F/U tomorrow    Counseling / Coordination of Care  Total floor / unit time spent today20 minutes  minutes  Greater than 50% of total time was spent with the patient and / or family counseling and / or coordination of care  A description of the counseling / coordination of care:      Bella Herzog MD

## 2019-07-27 NOTE — PROGRESS NOTES
07/27/19 1000   Team Meeting   Meeting Type Daily Rounds   Initial Conference Date 07/27/19   Team Members Present   Team Members Present Physician;Nurse   Physician Team Member Dr Marielena Benton Team Member Marie Alonso RN   Patient/Family Present   Patient Present No   Patient's Family Present No     Daily Psychiatric Rounding    Team Members Present    MD Marie Cruz RN         07/27/19 1000   Team Meeting   Meeting Type Daily Rounds   Initial Conference Date 07/27/19   Team Members Present   Team Members Present Physician;Nurse   Physician Team Member Dr Marielena Benton Team Member Marie Alonso RN   Patient/Family Present   Patient Present No   Patient's Family Present No

## 2019-07-27 NOTE — PLAN OF CARE
Problem: SELF HARM/SUICIDALITY  Goal: Will have no self-injury during hospital stay  Description  INTERVENTIONS:  - Q 15 MINUTES: Routine safety checks  - Q WAKING SHIFT & PRN: Assess risk to determine if routine checks are adequate to maintain patient safety  - Encourage patient to participate actively in care by formulating a plan to combat response to suicidal ideation, identify supports and resources  7/27/2019 1455 by Zana Jasso RN  Outcome: Progressing  7/27/2019 1411 by Zana Jasso RN  Outcome: Progressing     Problem: DEPRESSION  Goal: Will be euthymic at discharge  Description  INTERVENTIONS:  - Administer medication as ordered  - Provide emotional support via 1:1 interaction with staff  - Encourage involvement in milieu/groups/activities  - Monitor for social isolation  7/27/2019 1455 by Zana Jasso RN  Outcome: Progressing  7/27/2019 1411 by Zana Jasso RN  Outcome: Progressing     Problem: PRAVEENA  Goal: Will exhibit normal sleep and speech and no impulsivity  Description  INTERVENTIONS:  - Administer medication as ordered  - Set limits on impulsive behavior  - Make attempts to decrease external stimuli as possible  7/27/2019 1455 by Zana Jasso RN  Outcome: Progressing  7/27/2019 1411 by Zana Jasso RN  Outcome: Progressing     Problem: Ineffective Coping  Goal: Participates in unit activities  Description  Interventions:  - Provide therapeutic environment   - Provide required programming   - Redirect inappropriate behaviors   7/27/2019 1455 by Zana Jasso RN  Outcome: Progressing  7/27/2019 1411 by Zana Jasso RN  Outcome: Progressing
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
## 2019-07-28 PROCEDURE — 99232 SBSQ HOSP IP/OBS MODERATE 35: CPT | Performed by: PHYSICIAN ASSISTANT

## 2019-07-28 RX ADMIN — ALPRAZOLAM 1 MG: 1 TABLET ORAL at 21:48

## 2019-07-28 RX ADMIN — OXCARBAZEPINE 300 MG: 150 TABLET, FILM COATED ORAL at 08:37

## 2019-07-28 RX ADMIN — VALACYCLOVIR HYDROCHLORIDE 500 MG: 500 TABLET, FILM COATED ORAL at 08:35

## 2019-07-28 RX ADMIN — ALPRAZOLAM 0.25 MG: 0.5 TABLET ORAL at 08:37

## 2019-07-28 RX ADMIN — NAPROXEN 500 MG: 500 TABLET ORAL at 17:05

## 2019-07-28 RX ADMIN — LINACLOTIDE 1 CAPSULE: 290 CAPSULE, GELATIN COATED ORAL at 07:36

## 2019-07-28 RX ADMIN — VILAZODONE HYDROCHLORIDE 10 MG: 10 TABLET ORAL at 08:30

## 2019-07-28 RX ADMIN — NAPROXEN 500 MG: 500 TABLET ORAL at 08:30

## 2019-07-28 RX ADMIN — OXCARBAZEPINE 300 MG: 150 TABLET, FILM COATED ORAL at 17:05

## 2019-07-28 RX ADMIN — LISINOPRIL 10 MG: 10 TABLET ORAL at 08:35

## 2019-07-28 RX ADMIN — PANTOPRAZOLE SODIUM 40 MG: 40 TABLET, DELAYED RELEASE ORAL at 17:05

## 2019-07-28 RX ADMIN — PANTOPRAZOLE SODIUM 40 MG: 40 TABLET, DELAYED RELEASE ORAL at 08:37

## 2019-07-28 RX ADMIN — DESVENLAFAXINE 50 MG: 50 TABLET, FILM COATED, EXTENDED RELEASE ORAL at 08:38

## 2019-07-28 NOTE — PROGRESS NOTES
Pt visible on unit  Social with select peers  Waiting anxiously to be discharged to home  Pt has good support from finance  Denies SI, HI, or hallucinations  Safety precautions maintained  Will continue to monitor and assess

## 2019-07-28 NOTE — PROGRESS NOTES
Progress Note - Behavioral Health     Nestor Linares 41 y o  female MRN: [id number]   Unit/Bed#: [facility] 251-02 Encounter: [id number]    Behavior over the last 24 hours: kristen Christensen was seen today for continuation of care and case was reviewed with nursing  Today patient is pleasant, cooperative, and calm upon approach  She reports that she is has a decrease in depression and anxiety and rates depression as a 1/10 and anxiety is 2/10    She reports anxiety is intermittent, but she currently denies any  She denies any SI/HI or AH/VH  She is complaining of chronic bilateral knee pain from meniscus tear and reports she is trying to rest at this point  Sleep: normal  Appetite: normal  Medication side effects: No   ROS: reports knee pain    Mental Status Evaluation:    Appearance:  casually dressed, marginal hygiene   Behavior:  pleasant, cooperative, calm   Speech:  normal rate, normal volume, normal pitch   Mood:  improved   Affect:  appropriate   Thought Process:  logical   Associations: intact associations   Thought Content:  no overt delusions   Perceptual Disturbances: denies auditory or visual hallucinations when asked, does not appear responding to internal stimuli   Risk Potential: Suicidal ideation - None at present  Homicidal ideation - None at present  Potential for aggression - Not at present   Sensorium:  oriented to person, place and time/date   Memory:  recent and remote memory grossly intact   Consciousness:  alert and awake   Attention: attention span and concentration are age appropriate   Insight:  partial   Judgment: partial   Gait/Station: normal gait/station   Motor Activity: no abnormal movements     Vital signs in last 24 hours:    Temp:  [97 9 °F (36 6 °C)-98 3 °F (36 8 °C)] 98 3 °F (36 8 °C)  HR:  [64-68] 64  Resp:  [16-18] 16  BP: (118-126)/(74) 118/74    Laboratory results:    I have personally reviewed all pertinent laboratory/tests results    Most Recent Labs:   Lab Results Component Value Date    WBC 7 10 06/30/2019    RBC 4 70 06/30/2019    HGB 15 0 06/30/2019    HCT 43 8 06/30/2019     06/30/2019    RDW 12 9 06/30/2019    NEUTROABS 4 30 06/30/2019    SODIUM 138 06/30/2019    K 3 8 06/30/2019     06/30/2019    CO2 22 06/30/2019    BUN 13 06/30/2019    CREATININE 0 60 06/30/2019    GLUC 58 (L) 06/30/2019    CALCIUM 8 8 06/30/2019    AST 16 06/27/2019    ALT 15 06/27/2019    ALKPHOS 66 06/27/2019    TP 7 0 06/27/2019    ALB 4 7 06/27/2019    TBILI 0 60 06/27/2019    CDF5JTAPEWNM 2 800 12/05/2018    PREGSERUM Negative 11/30/2018       Progress Toward Goals: making gradual progress    Assessment/Plan   Principal Problem:    Major depressive disorder, recurrent episode, severe (HCC)  Active Problems:    Panic disorder    Recommended Treatment:     Planned medication and treatment changes: All current active medications have been reviewed    Encourage group therapy, milieu therapy and occupational therapy  Behavioral Health checks every 7 minutes  Continue all medications as prescribed      Current Facility-Administered Medications:  acetaminophen 650 mg Oral Q6H PRN Malia Rios MD   acetaminophen 650 mg Oral Q4H PRN Tima Diamond MD   acetaminophen 975 mg Oral Q6H PRN Tima Diamond MD   albuterol 2 puff Inhalation Q4H PRN Mike Morrow MD   ALPRAZolam 0 25 mg Oral QAM Mike Morrow MD   ALPRAZolam 1 mg Oral HS Mike Morrow MD   aluminum-magnesium hydroxide-simethicone 30 mL Oral Q4H PRN Tima Diamond MD   ammonium lactate  Topical BID PRN LEATHA Goodwin   desvenlafaxine 50 mg Oral Daily Mike Morrow MD   haloperidol 5 mg Oral Q6H PRN Tima Diamond MD   hydrOXYzine HCL 50 mg Oral Q4H PRN Tima Diamond MD   linaCLOtide 290 mcg Oral Daily Before Breakfast LEATHA Goodwin   lisinopril 10 mg Oral Daily Mike Morrow MD   magnesium hydroxide 30 mL Oral Daily PRN Tima Diamond MD   naproxen 500 mg Oral BID With Sims Nageotte, MD   OLANZapine 5 mg Intramuscular Q3H PRN Bipin Fernández MD   ondansetron 4 mg Oral Q8H PRN Bipin Fernández MD   OXcarbazepine 300 mg Oral BID Bipin Fernández MD   pantoprazole 40 mg Oral BID Bipin Fernández MD   risperiDONE 2 mg Oral Q3H PRN Dev Finch MD   traZODone 50 mg Oral HS PRN Dev Finch MD   valACYclovir 500 mg Oral Daily Bipin Fernández MD   vilazodone 10 mg Oral Daily With Breakfast Bipin Fernández MD       Risks / Benefits of Treatment:    Risks, benefits, and possible side effects of medications explained to patient and patient verbalizes understanding and agreement for treatment  Counseling / Coordination of Care: Total floor / unit time spent today 15 minutes  Greater than 50% of total time was spent with the patient and / or family counseling and / or coordination of care  A description of counseling / coordination of care:  Patient's progress discussed with staff in treatment team meeting  Medications, treatment progress and treatment plan reviewed with patient

## 2019-07-28 NOTE — PROGRESS NOTES
Patient has been visible on the unit  Attending and participating in evening unit activities  Social with peers  Very pleasant and upbeat  Denies s/i and states she had a "good day "  Rates depression as down to 2/10 and denies any anxiety    Reports she is scheduled for dc on Monday and she feels ready to go

## 2019-07-28 NOTE — PROGRESS NOTES
07/28/19 1800   Team Meeting   Meeting Type Daily Rounds   Initial Conference Date 07/28/19   Team Members Present   Team Members Present Physician;Nurse   Physician Team Member Pj GORDILLO   Nursing Team Member Hiro Koenig RN   Patient/Family Present   Patient Present No   Patient's Family Present No     Daily Psychiatric Rounding    Team Members Present    Royer Valerio, MARGOT

## 2019-07-29 VITALS
HEART RATE: 75 BPM | OXYGEN SATURATION: 99 % | DIASTOLIC BLOOD PRESSURE: 72 MMHG | BODY MASS INDEX: 26.99 KG/M2 | RESPIRATION RATE: 16 BRPM | SYSTOLIC BLOOD PRESSURE: 117 MMHG | TEMPERATURE: 97.6 F | WEIGHT: 162 LBS | HEIGHT: 65 IN

## 2019-07-29 PROCEDURE — 99239 HOSP IP/OBS DSCHRG MGMT >30: CPT | Performed by: PSYCHIATRY & NEUROLOGY

## 2019-07-29 RX ORDER — VILAZODONE HYDROCHLORIDE 10 MG/1
20 TABLET ORAL
Qty: 60 TABLET | Refills: 0 | Status: SHIPPED | OUTPATIENT
Start: 2019-07-30 | End: 2020-03-10 | Stop reason: HOSPADM

## 2019-07-29 RX ADMIN — NAPROXEN 500 MG: 500 TABLET ORAL at 07:41

## 2019-07-29 RX ADMIN — ALPRAZOLAM 0.25 MG: 0.5 TABLET ORAL at 08:29

## 2019-07-29 RX ADMIN — PANTOPRAZOLE SODIUM 40 MG: 40 TABLET, DELAYED RELEASE ORAL at 08:29

## 2019-07-29 RX ADMIN — VALACYCLOVIR HYDROCHLORIDE 500 MG: 500 TABLET, FILM COATED ORAL at 08:29

## 2019-07-29 RX ADMIN — VILAZODONE HYDROCHLORIDE 10 MG: 10 TABLET ORAL at 07:42

## 2019-07-29 RX ADMIN — LISINOPRIL 10 MG: 10 TABLET ORAL at 08:29

## 2019-07-29 RX ADMIN — OXCARBAZEPINE 300 MG: 150 TABLET, FILM COATED ORAL at 08:29

## 2019-07-29 RX ADMIN — LINACLOTIDE 1 CAPSULE: 290 CAPSULE, GELATIN COATED ORAL at 07:42

## 2019-07-29 RX ADMIN — DESVENLAFAXINE 50 MG: 50 TABLET, FILM COATED, EXTENDED RELEASE ORAL at 08:29

## 2019-07-29 NOTE — PLAN OF CARE
Problem: SELF HARM/SUICIDALITY  Goal: Will have no self-injury during hospital stay  Description  INTERVENTIONS:  - Q 15 MINUTES: Routine safety checks  - Q WAKING SHIFT & PRN: Assess risk to determine if routine checks are adequate to maintain patient safety  - Encourage patient to participate actively in care by formulating a plan to combat response to suicidal ideation, identify supports and resources  Outcome: Completed     Problem: DEPRESSION  Goal: Will be euthymic at discharge  Description  INTERVENTIONS:  - Administer medication as ordered  - Provide emotional support via 1:1 interaction with staff  - Encourage involvement in milieu/groups/activities  - Monitor for social isolation  Outcome: Completed     Problem: PRAVEENA  Goal: Will exhibit normal sleep and speech and no impulsivity  Description  INTERVENTIONS:  - Administer medication as ordered  - Set limits on impulsive behavior  - Make attempts to decrease external stimuli as possible  Outcome: Completed     Problem: Ineffective Coping  Goal: Participates in unit activities  Description  Interventions:  - Provide therapeutic environment   - Provide required programming   - Redirect inappropriate behaviors   Outcome: Completed     Problem: DISCHARGE PLANNING - CARE MANAGEMENT  Goal: Discharge to post-acute care or home with appropriate resources  Description  INTERVENTIONS:  - Conduct assessment to determine patient/family and health care team treatment goals, and need for post-acute services based on payer coverage, community resources, and patient preferences, and barriers to discharge  - Address psychosocial, clinical, and financial barriers to discharge as identified in assessment in conjunction with the patient/family and health care team  - Arrange appropriate level of post-acute services according to patient's   needs and preference and payer coverage in collaboration with the physician and health care team  - Communicate with and update the patient/family, physician, and health care team regarding progress on the discharge plan  - Arrange appropriate transportation to post-acute venues   Outcome: Completed

## 2019-07-29 NOTE — BH TRANSITION RECORD
Contact Information: If you have any questions, concerns, pended studies, tests and/or procedures, or emergencies regarding your inpatient behavioral health visit  Please contact [facility] behavioral health unit [phone] and ask to speak to a , nurse or physician  A contact is available 24 hours/ 7 days a week at this number  Summary of Procedures Performed During your Stay:  Below is a list of major procedures performed during your hospital stay and a summary of results:  - No major procedures performed  Pending Studies (From admission, onward)    None        If studies are pending at discharge, follow up with your PCP and/or referring provider

## 2019-07-29 NOTE — PLAN OF CARE
Problem: Ineffective Coping  Goal: Participates in unit activities  Description  Interventions:  - Provide therapeutic environment   - Provide required programming   - Redirect inappropriate behaviors    7/29/2019 1205 by Nivia Hoffman  Outcome: Adequate for Discharge  7/29/2019 1205 by Nivia Hoffman  Reactivated

## 2019-07-29 NOTE — SOCIAL WORK
SW met with pt who denies SI and feels ready for discharge  She intends to keep appointments with Dr Luis Lackey, and Matt Whitney, therapist at [facility] that SW scheduled in her behalf  Pt expressed appreciation for [facility] services

## 2019-07-29 NOTE — PROGRESS NOTES
Pt visible on unit, social with select peers and attending groups  Pleasant and cooperative  Denies SI, HI, A/T/V  Compliant with meals and meds  Pt is scheduled for DC today and has no acute questions or concerns  Monitoring continues

## 2019-07-29 NOTE — DISCHARGE INSTR - OTHER ORDERS
You will discharge home to [address], [place], [address]; Phone:        Crisis Plan:     Triggers you identified during your hospital stay that led to contemplation of self-harm included: disrupted receipt of psychotropic medication; increased anxiety; and desire to prevent suicidal attempt      Coping skills you identified during your hospital stay include:     After discharge, if you find your coping skills are not effective and you continue to feel distressed, please contact your therapist at [facility]      If that is not effective and you feel you are a danger to yourself or others, please contact [address]: [phone], National Suicide Prevention Lifeline:  2-492.438.6974, Peer Support Talk Line (Seven days a week, 1:00 PM  9:00 PM) Call: 183.387.1244 or Text: 694.883.8821, Alcohol Anonymous: 148.230.8035, [place] [facility]: 662.147.5547, Lou on [address] [place] [address] ([facility]) [employer]: 337.730.9950/Email: www rosana  org, or Substance Abuse and Mental Health Services Administration([facility]) [employer], which is a confidential, free, 24-hour-a-day, 365-day-a-year, information service for individuals and family members facing mental health and/or substance use disorders  This service provides referrals to local treatment facilities, support groups, and community-based organizations   Callers can also order free publications and other information   Call 8-708.821.1301/Email: www [facility] gov

## 2019-07-29 NOTE — PROGRESS NOTES
Patient has been visible on the unit  Attending evening activities  Social with peers and staff  Reports her depression is down from 8/10 on admission to 1/10 and only occasionally  Denies any anxiety other than "good anxiety" related to her stated dc tomorrow    Feels ready to go

## 2019-07-29 NOTE — DISCHARGE INSTR - APPOINTMENTS
The treatment recommends that you continue receipt of targeted case management, individual therapy, and medication management services      You have an appointment scheduled with your targeted  from [facility] / [place] , [address] [facility], [facility] [address], [address]; Phone: [phone]; Fax: [phone], [place] [facility], who will make a home visit to you on Tuesday, July 30, 2019 at 9:30 am  Your summary of care will be faxed to this provider      An appointment has been scheduled in your behalf with Diane Nielsen, on Wednesday, August 14, 2019, at 9 am, at [facility], [facility], [facility] [address], [address]; Phone: [phone]; Fax: [phone]  Please arrive early and bring your photo ID and insurance cards  Your summary of care will be faxed to this provider       An appointment has been scheduled in your behalf with Dr Ziyad Rubio, on Thursday, August 15, 2019, at 1:30 pm, at [facility], [address], [facility], [address]; Phone: [phone]; Fax: [phone]  Please arrive early and bring your photo ID and insurance cards  Your summary of care will be faxed to this provider  Also, please note that, as you have an unpaid balance of $16, please bring the payment that is due immediately

## 2019-07-29 NOTE — DISCHARGE SUMMARY
Discharge Summary - [facility] 41 y o  female MRN: [id number]  Unit/Bed#: [facility] [place] 251-02 Encounter: [id number]     Admission Date: 7/23/2019         Discharge Date: 7/29/2019 12:30 PM    Attending Psychiatrist: Latha Valencia MD    Reason for Admission/HPI: Principal Problem:    Major depressive disorder, recurrent episode, severe ([address] )  Active Problems:    Panic disorder    Amparo is a 61-year-old female patient admitted to the [address] [place] [address] unit on a voluntary 201 commitment basis due to depression with suicidal ideation  Per crisis evaluation completed by crisis worker Hannah Khoury:    "Pt presents due to SI w/ no specific plan at this time  Pt is A & O, very anxious throughout the assessment but was cooperative and answered all assessment questions w/ appropriate elaboration  Pt continues to endorse SI and cannot contract for safety  Pt denies any HI or thoughts to hurt others  Pt was brought to the ED by her ICM from [address] [place] [address] after discussing the possibility of [facility] and determining that level of care would not suffice and that she is in need of AIP psych  Pt denies any AH, VH or delusions  Pt is experiencing increased depression and increased anxiety  pt also experiencing increased irritability but denies having acted out in any way  Pt states her appetite and sleep are both poor  Pt has O/P services for med mgt in place w/ Dr Meng Magallon and is seeking O/P therapy services w/ [facility] but has not yet started her therapy sessions  Pt has an ICM w/ [facility] and states her last AIP psych admission was nearly 5 yrs ago here at [facility]  Pt is in agreement w/ I/P psych admission  Pt's voluntary paperwork, e g  201, and her rights were explained in [facility]  Pt signed her 12 and was given a copy of her rights along w/ a copy of the 72 hr withdraw from tx explanation "    Per initial psychiatric evaluation completed by Dr Latha Valencia:    "Patient is a 41 y o  female presents with worsening depression and suicidal thoughts  Patient denies having any current stressors but reports she has a long psychiatric history and has been to numerous inpatient psychiatric hospitalizations since age 17 and has been to 3 stays in state hospitals when she was younger  She reports she has not been in any psychiatric hospital for the past 5 years and currently is working as a certified peer specialist   Patient reports that she is currently engaged and is getting  in few months  She reports her relationship with her fiance is very good and there are no current issues  She reports he was with her in the emergency room prior to this admission  Patient reports lately she has been getting increasingly depressed  and once she started having suicidal thoughts she came into the ER  Patient reports she has an episode of gastrointestinal pain a month ago and was hospitalized, and she feels that might have been a precipitating factor for recurrence of her depression, especially that the stopped her psychotropic medications when she was on the medical floors  Patient denies any drug or alcohol use and reports she has been compliant with her medications "    Amparo has an extensive psychiatric history of bipolar disorder  She reports that he she has been hospitalized more than 100 times in the past including state hospitalization  She currently receives outpatient psychiatric care at [facility] psychiatry by Dr Ashlyn Aviles  She is compliant with her outpatient treatment and medications and has been stable for years  Hospital Course:   Amparo was admitted to the [address] [facility] unit after being medically cleared  Once on the unit, she was seen by medical doctor for physical examination  She was placed on 7 minutes behavioral health checks for patient safety  She was encouraged to attend both group and occupational therapy    Psychiatric medications were initiated and titrated to appropriate doses  Before any medications were administered, risk versus benefit was discussed  Amparo agreed to take medications as prescribed and participate in psychiatric treatment  Initially after admission, Amparo remained anxious and tearful  She was basically withdrawn but did participate with groups  She believes her setback was caused by not receiving her psychiatric medications during a recent medical admission  Amparo was already taking Trileptal which was continued for mood stabilization and Xanax which was slightly increased for anxiety and panic  She was also taking Pristiq which was discontinued after being titrated  Tilda Laity was initiated  Once she began her new medication regimen and adjusted to the therapeutic milieu of the unit, Amparo's mood stabilized without complications  She was no longer feeling panicked and suicidal and was able to participate with groups and activities in the milieu  Her appetite and sleep returned to normal   She tolerated her medications without side effects and believed they were helpful  Because she was doing so much better, the Psychiatric Care Team felt it would be both safe and appropriate to discharge her to care on an outpatient basis  She will continue her outpatient care at [facility] for counseling and at [facility] for medication management  Her  will also be following up with her once she is discharged  Arrangements were made by the unit case management team   On the day of discharge, Amparo denied any suicidal or homicidal ideation as well as any auditory or visual hallucinations  Her mood was stable, and she was looking for to going home      Mental Status at time of Discharge:     Appearance:  age appropriate and casually dressed   Behavior:  normal   Speech:  normal pitch and normal volume   Mood:  normal   Affect:  normal   Thought Process:  normal   Thought Content:  normal   Perceptual Disturbances: None   Risk Potential: Patient denies any suicidal or homicidal ideations  Sensorium:  person, place, time/date and situation   Cognition:  grossly intact   Consciousness:  alert and awake    Attention: attention span and concentration were age appropriate   Insight:  fair   Judgment: fair   Gait/Station: normal gait/station and normal balance   Motor Activity: no abnormal movements     Admission Diagnosis:Major depressive disorder, single episode, unspecified [F32 9]    Discharge Diagnosis:   Principal Problem:    Major depressive disorder, recurrent episode, severe ([address] )  Active Problems:    Panic disorder  Resolved Problems:    * No resolved hospital problems  *        Lab results:  No visits with results within 1 Day(s) from this visit     Latest known visit with results is:   Admission on 07/23/2019, Discharged on 07/23/2019   Component Date Value    EXT PREG TEST UR (Ref: N* 07/23/2019 negative     Control 07/23/2019 Valid    LOT   [id number]   EXP 10/31/20     Amph/Meth UR 07/23/2019 Negative     Barbiturate Ur 07/23/2019 Negative     Benzodiazepine Urine 07/23/2019 Positive*    Cocaine Urine 07/23/2019 Negative     Methadone Urine 07/23/2019 Negative     Opiate Urine 07/23/2019 Negative     PCP Ur 07/23/2019 Negative     THC Urine 07/23/2019 Negative     Ethanol Lvl 07/23/2019 <10        Discharge Medications:  Discharge Medication List as of 7/29/2019 11:44 AM      START taking these medications    Details   vilazodone (VIIBRYD) 10 mg tablet Take 2 tablets (20 mg total) by mouth daily with breakfast for 30 days, Starting Tue 7/30/2019, Until Thu 8/29/2019, Print            Discharge Medication List as of 7/29/2019 11:44 AM      STOP taking these medications       desvenlafaxine (PRISTIQ) 100 mg 24 hr tablet Comments:   Reason for Stopping:              Discharge Medication List as of 7/29/2019 11:44 AM         Discharge Medication List as of 7/29/2019 11:44 AM      CONTINUE these medications which have NOT CHANGED    Details   ALPRAZolam (XANAX) 1 mg tablet Take 1 mg by mouth daily at bedtime  , Historical Med      linaCLOtide (LINZESS PO) Take 290 mcg by mouth daily before breakfast, Historical Med      lisinopril (ZESTRIL) 10 mg tablet Take 10 mg by mouth daily, Historical Med      medroxyPROGESTERone (DEPO-PROVERA) 150 mg/mL injection medroxyprogesterone 150 mg/mL intramuscular suspension, Historical Med      naproxen (NAPROSYN) 500 mg tablet Take 1 tablet (500 mg total) by mouth 2 (two) times a day with meals, Starting Thu 4/11/2019, Print      OXcarbazepine (TRILEPTAL) 300 mg tablet Take 300 mg by mouth 2 (two) times a day, Starting Sat 7/21/2018, Historical Med      pantoprazole (PROTONIX) 40 mg tablet Take 40 mg by mouth 2 (two) times a day  , Historical Med      PROAIR  (90 Base) MCG/ACT inhaler Inhale every 4 (four) hours as needed (as needed)  , Starting Mon 1/22/2018, Historical Med      valACYclovir (VALTREX) 500 mg tablet Take 500 mg by mouth daily, Historical Med      ondansetron (ZOFRAN-ODT) 4 mg disintegrating tablet Take 1 tablet (4 mg total) by mouth every 8 (eight) hours as needed for nausea or vomiting, Starting Thu 4/11/2019, Print              Discharge instructions/Information to patient and family:   See after visit summary for information provided to patient and family  Provisions for Follow-Up Care:  See after visit summary for information related to follow-up care and any pertinent home health orders  Discharge Statement   I spent 36 discharging the patient  This time was spent on the day of discharge  I had direct contact with the patient on the day of discharge  Additional documentation is required if more than 30 minutes were spent on discharge

## 2019-07-29 NOTE — DISCHARGE INSTRUCTIONS
Anxiety   WHAT YOU SHOULD KNOW:   Anxiety is a condition that causes you to feel excessive worry, uneasiness, or fear  Family or work stress, smoking, caffeine, and alcohol can increase your risk for anxiety  Certain medicines or health conditions can also increase your risk  Anxiety may begin gradually, and can become a long-term condition if it is not managed or treated  AFTER YOU LEAVE:   Medicines:   · Medicines  can help you feel more calm and relaxed, and decrease your symptoms  · Take your medicine as directed  Contact your healthcare provider if you think your medicine is not helping or if you have side effects  Tell him if you are allergic to any medicine  Keep a list of the medicines, vitamins, and herbs you take  Include the amounts, and when and why you take them  Bring the list or the pill bottles to follow-up visits  Carry your medicine list with you in case of an emergency  Follow up with your healthcare provider within 2 weeks or as directed:  Write down your questions so you remember to ask them during your visits  Manage anxiety:   · Go to counseling as directed  Cognitive behavioral therapy can help you understand and change how you react to events that trigger your symptoms  · Find ways to manage your symptoms  Activities such as exercise, meditation, or listening to music can help you relax  · Practice deep breathing  Breathing can change how your body reacts to stress  Focus on taking slow, deep breaths several times a day, or during an anxiety attack  Breathe in through your nose, and out through your mouth  · Avoid caffeine  Caffeine can make your symptoms worse  Avoid foods or drinks that are meant to increase your energy level  · Limit or avoid alcohol  Ask your healthcare provider if alcohol is safe for you  You may not be able to drink alcohol if you take certain anxiety or depression medicines  Limit alcohol to 1 drink per day if you are a woman   Limit alcohol to 2 drinks per day if you are a man  A drink of alcohol is 12 ounces of beer, 5 ounces of wine, or 1½ ounces of liquor  Contact your healthcare provider if:   · Your symptoms get worse or do not get better with treatment  · You think your medicine may be causing side effects  · Your anxiety keeps you from doing your regular daily activities  · You have new symptoms since your last visit  · You have questions or concerns about your condition or care  Seek care immediately or call 911 if:   · You have chest pain, tightness, or heaviness that may spread to your shoulders, arms, jaw, neck, or back  · You feel like hurting yourself or someone else  · You feel dizzy, lightheaded, or faint  © 2014 [address] is for End User's use only and may not be sold, redistributed or otherwise used for commercial purposes  All illustrations and images included in CareNotes® are the copyrighted property of [employer]  or Dain Wahl  The above information is an  only  It is not intended as medical advice for individual conditions or treatments  Talk to your doctor, nurse or pharmacist before following any medical regimen to see if it is safe and effective for you  Depression   WHAT YOU NEED TO KNOW:   Depression is a medical condition that causes feelings of sadness or hopelessness that do not go away  Depression may cause you to lose interest in things you used to enjoy  These feelings may interfere with your daily life  DISCHARGE INSTRUCTIONS:   Call 911 for any of the following:   · You think about harming yourself or someone else  Contact your healthcare provider if:   · Your symptoms do not improve  · You cannot make it to your next appointment  · You have new symptoms  · You have questions or concerns about your condition or care  Medicines:   · Antidepressants  may be given to improve or balance your mood   You may need to take this medicine for several weeks before you begin to feel better  · Take your medicine as directed  Contact your healthcare provider if you think your medicine is not helping or if you have side effects  Tell him of her if you are allergic to any medicine  Keep a list of the medicines, vitamins, and herbs you take  Include the amounts, and when and why you take them  Bring the list or the pill bottles to follow-up visits  Carry your medicine list with you in case of an emergency  Therapy  may be used to treat your depression  A therapist will help you learn to cope with your thoughts and feelings  This can be done alone or in a group  It may also be done with family members or a significant other  Self-care:   · Get regular physical activity  Try to exercise for 30 minutes, 3 to 5 days a week  Work with your healthcare provider to develop an exercise plan that you enjoy  Physical activity may improve your symptoms  · Get enough sleep  Create a routine to help you relax before bed  You can listen to music, read, or do yoga  Try to go to bed and wake up at the same time every day  Sleep is important for emotional health  · Eat a variety of healthy foods from all of the food groups  A healthy meal plan is low in fat, salt, and added sugar  Ask your healthcare provider for more information about a meal plan that is right for you  · Do not drink alcohol or use drugs  Alcohol and drugs can make your symptoms worse  Follow up with your healthcare provider as directed: Your healthcare provider will monitor your progress at follow-up visits  He or she will also monitor your medicine if you take antidepressants  Your healthcare provider will ask if the medicine is helping  Tell him or her about any side effects or problems you may have with your medicine  The type or amount of medicine may need to be changed  Write down your questions so you remember to ask them during your visits    © 2017 [employer] [address] is for End User's use only and may not be sold, redistributed or otherwise used for commercial purposes  All illustrations and images included in CareNotes® are the copyrighted property of [employer]  or Dain Wahl  The above information is an  only  It is not intended as medical advice for individual conditions or treatments  Talk to your doctor, nurse or pharmacist before following any medical regimen to see if it is safe and effective for you  Panic Disorder   WHAT YOU NEED TO KNOW:   Panic disorder is a type of anxiety disorder that causes you to have sudden panic attacks  The panic attacks may occur anywhere at any time, even while you are asleep  You may begin to worry when the panic attacks will happen again  Your behavior may change, and you may not want to go out with family and friends  DISCHARGE INSTRUCTIONS:   Medicines:   · Medicines , such as antianxiety and antidepressants, may be given to treat panic disorder  You may need to take antidepressants for several weeks before you begin to feel better  Tell your healthcare provider about any side effects or problems you have with your medicine  Sometimes the type or amount of medicine may need to be changed  · Take your medicine as directed  Contact your healthcare provider if you think your medicine is not helping or if you have side effects  Tell him or her if you are allergic to any medicine  Keep a list of the medicines, vitamins, and herbs you take  Include the amounts, and when and why you take them  Bring the list or the pill bottles to follow-up visits  Carry your medicine list with you in case of an emergency  Follow up with your healthcare provider or psychiatrist as directed: Your healthcare provider will monitor your progress at follow-up visits  He will also monitor your medicine if you are taking antidepressants   He will ask if medicine is helping to reduce your symptoms  Tell him about any side effects or problems you have with your medicine  Sometimes the type or amount of medicine may need to be changed  Write down your questions so you remember to ask them during your visits  Keep a diary of your panic attacks:  Write down how often you have panic attacks, how long they last, and the symptoms you had  Write down whether or not there was anything that happened right before the panic attack  Write down whether there were things that helped to ease or stop your panic attack  Bring your diary with you every time you see your healthcare provider  Self-care:  · Avoid foods and drinks that have caffeine  These may include coffee, tea, soda, energy drinks, and chocolate  · Avoid or limit alcohol  Ask your healthcare provider how much alcohol is safe for you to drink  A drink of alcohol is 12 ounces of beer, 5 ounces of wine, or 1½ ounces of liquor  · Get regular physical activity  Exercise can help decrease stress and anxiety  Talk to your healthcare provider before you start to exercise  Together you can plan the best exercise program for you  · Manage your stress  Learn ways to control stress, such as relaxation or deep breathing  Talk to someone about things that upset you  Contact your healthcare provider or psychiatrist if:   · You have new symptoms since you last saw your healthcare provider  · Your worry keeps you from doing daily tasks such as work or caring for yourself or your family  · Your symptoms are getting worse  · You have questions or concerns about your condition or care  Seek care immediately or call 911 if:   · You feel lightheaded, too dizzy to stand up, or you faint  · You feel like harming yourself  · You have chest pain, tightness, or heaviness that spreads to your shoulders, arms, jaw, neck, or back    © 2017 [address] Information is for End User's use only and may not be sold, redistributed or otherwise used for commercial purposes  All illustrations and images included in CareNotes® are the copyrighted property of A [employer] M [employer] Inc  or Dain Wahl  The above information is an  only  It is not intended as medical advice for individual conditions or treatments  Talk to your doctor, nurse or pharmacist before following any medical regimen to see if it is safe and effective for you  Linaclotide (By mouth)   Linaclotide (fki-ND-rkk-tide)  Treats irritable bowel syndrome with constipation and chronic idiopathic constipation  Brand Name(s): Linzess   There may be other brand names for this medicine  When This Medicine Should Not Be Used: This medicine is not right for everyone  Do not use it if you had an allergic reaction to linaclotide, or if you have a bowel or stomach blockage  How to Use This Medicine:   Capsule  · Your doctor will tell you how much medicine to use  Do not use more than directed  · Take this medicine on an empty stomach, at least 30 minutes before breakfast or your first meal of the day  · Swallow the capsule whole  Do not crush, break, or chew it  · If you have trouble swallowing the capsule, you may mix the contents with applesauce or water  ¨ To mix with applesauce: Open the capsule and sprinkle the beads on 1 teaspoonful of applesauce  Swallow the mixture immediately without chewing  Do not store it for later use  ¨ To mix with water: Open the capsule and sprinkle the beads into a cup with 30 mL (1 ounce) of water  Gently swirl for at least 10 seconds  Swallow the entire mixture immediately  Add another 30 mL (1 ounce) of water to the cup and swirl  Drink the water right away to make sure all of the medicine is taken  Do not store it for later use  ¨ The water mixture may also be used with a nasogastric or gastric feeding tube   After the mixture is given, flush the tube with an additional 10 mL (2 teaspoons) of water   · This medicine should come with a Medication Guide  Ask your pharmacist for a copy if you do not have one  · Missed dose: Skip the missed dose and go back to your regular dosing schedule  Do not double doses  · Store the medicine in a closed container at room temperature, away from heat, moisture, and direct light  Keep the medicine in the original bottle until you are ready to use it  The bottle contains a desiccant packet that helps protect the capsules from moisture  Do not remove the packet from the bottle  Drugs and Foods to Avoid:      Ask your doctor or pharmacist before using any other medicine, including over-the-counter medicines, vitamins, and herbal products  Warnings While Using This Medicine:   · Tell your doctor if you are pregnant or breastfeeding, or if you have other stomach or bowel problems  · Your doctor will check your progress and the effects of this medicine at regular visits  Keep all appointments  · Keep all medicine out of the reach of children  Never share your medicine with anyone  Possible Side Effects While Using This Medicine:   Call your doctor right away if you notice any of these side effects:  · Allergic reaction: Itching or hives, swelling in your face or hands, swelling or tingling in your mouth or throat, chest tightness, trouble breathing  · Bloody or black, tarry stools  · Lightheadedness, dizziness, fainting  · Severe diarrhea or stomach pain  If you notice these less serious side effects, talk with your doctor:   · Mild diarrhea  If you notice other side effects that you think are caused by this medicine, tell your doctor  Call your doctor for medical advice about side effects  You may report side effects to FDA at 3-677-FDA-9993  © 2017 [address] Information is for End User's use only and may not be sold, redistributed or otherwise used for commercial purposes  The above information is an  only   It is not intended as medical advice for individual conditions or treatments  Talk to your doctor, nurse or pharmacist before following any medical regimen to see if it is safe and effective for you  Lisinopril (By mouth)   Lisinopril (lye-SIN-oh-pril)  Treats high blood pressure and heart failure  Also given to reduce the risk of death after a heart attack  This medicine is an ACE inhibitor  Brand Name(s): Prinivil, Qbrelis, Zestril   There may be other brand names for this medicine  When This Medicine Should Not Be Used: This medicine is not right for everyone  Do not use it if you had an allergic reaction to lisinopril or another ACE inhibitor, or if you are pregnant  How to Use This Medicine:   Liquid, Tablet  · Take your medicine as directed  Your dose may need to be changed several times to find what works best for you  · Oral liquid: Measure the oral liquid medicine with a marked measuring spoon, oral syringe, or medicine cup  · Missed dose: Take a dose as soon as you remember  If it is almost time for your next dose, wait until then and take a regular dose  Do not take extra medicine to make up for a missed dose  · Store the medicine in a closed container at room temperature, away from heat, moisture, and direct light  Drugs and Foods to Avoid:   Ask your doctor or pharmacist before using any other medicine, including over-the-counter medicines, vitamins, and herbal products  · Do not use this medicine together with aliskiren if you have diabetes  · Some foods and medicines may affect how lisinopril works   Tell your doctor if you are using any of the following:   ¨ Aliskiren, everolimus, lithium, sirolimus, temsirolimus  ¨ Another blood pressure medicine, including an angiotensin receptor blocker (ARB)  ¨ Diuretic (water pill, including amiloride, spironolactone, triamterene)  ¨ Insulin or diabetes medicine  ¨ NSAID pain or arthritis medicine (including aspirin, celecoxib, diclofenac, ibuprofen, naproxen)  · Ask your doctor before you use any medicine, supplement, or salt substitute that contains potassium  Warnings While Using This Medicine:   · It is not safe to take this medicine during pregnancy  It could harm an unborn baby  Tell your doctor right away if you become pregnant  · Tell your doctor if you are breastfeeding, or if you have kidney disease, liver disease, diabetes, or heart or blood vessel disease  · This medicine may cause the following problems:  ¨ Angioedema (severe swelling)  ¨ Kidney problems  ¨ Serious liver problems  · This medicine could lower your blood pressure too much, especially when you first use it or if you are dehydrated  Stand or sit up slowly if you feel lightheaded or dizzy  · Do not stop using this medicine without asking your doctor, even if you feel well  This medicine will not cure your high blood pressure, but it will help keep it in a normal range  You may have to take blood pressure medicine for the rest of your life  · Tell any doctor or dentist who treats you that you are using this medicine  · Your doctor will do lab tests at regular visits to check on the effects of this medicine  Keep all appointments  · Keep all medicine out of the reach of children  Never share your medicine with anyone    Possible Side Effects While Using This Medicine:   Call your doctor right away if you notice any of these side effects:  · Allergic reaction: Itching or hives, swelling in your face or hands, swelling or tingling in your mouth or throat, chest tightness, trouble breathing  · Blistering, peeling, or red skin rash  · Change in how much or how often you urinate  · Confusion, weakness, uneven heartbeat, trouble breathing, numbness or tingling in your hands, feet, or lips  · Dark urine or pale stools, nausea, vomiting, loss of appetite, stomach pain, yellow skin or eyes  · Fever, chills, sore throat, body aches  · Lightheadedness, dizziness, fainting  · Severe stomach pain (with or without nausea or vomiting)  If you notice these less serious side effects, talk with your doctor:   · Dry cough  If you notice other side effects that you think are caused by this medicine, tell your doctor  Call your doctor for medical advice about side effects  You may report side effects to FDA at 3-103-FDA-8325  © 2017 [address] Information is for End User's use only and may not be sold, redistributed or otherwise used for commercial purposes  The above information is an  only  It is not intended as medical advice for individual conditions or treatments  Talk to your doctor, nurse or pharmacist before following any medical regimen to see if it is safe and effective for you  Naproxen (By mouth)   Naproxen (na-PROX-en)  Treats fever and pain  Also treats arthritis, gout, and menstrual cramps or pain  This medicine is an NSAID  Brand Name(s): Aleve, Aleve Arthritis, All Day Pain Relief, All Day Relief, Anaprox, Anaprox DS, EC Naprosyn, Flanax Pain Relief Kit, [facility] All Day Pain Relief, Good Sense All Day Pain Relief, Good Sense Naproxen Sodium, Leader All Day Pain Relief, Mediproxen, Naprelan, NaproPax   There may be other brand names for this medicine  When This Medicine Should Not Be Used: This medicine is not right for everyone  Do not use it if you had an allergic reaction (including asthma) to naproxen, aspirin, or other NSAIDs  Do not use it if you have had a heart surgery (such as coronary artery bypass graft)  How to Use This Medicine:   Liquid Filled Capsule, Liquid, Tablet, Coated Tablet, Long Acting Tablet  · Your doctor will tell you how much medicine to use  Do not use more than directed  · Take this medicine with food or milk so it does not upset your stomach  Drink a full glass of water after each dose  · Delayed-release tablet: Swallow whole  Do not crush, break, or chew it  · Oral liquid: Shake well just before you measure the dose  Measure the oral liquid medicine with a marked measuring spoon, oral syringe, or medicine cup  · Follow the instructions on the medicine label if you are using this medicine without a prescription  · This medicine should come with a Medication Guide  Ask your pharmacist for a copy if you do not have one  · Missed dose: Take a dose as soon as you remember  If it is almost time for your next dose, wait until then and take a regular dose  Do not take extra medicine to make up for a missed dose  · Store the medicine in a closed container at room temperature, away from heat, moisture, and direct light  Oral liquid: Do not freeze  Drugs and Foods to Avoid:   Ask your doctor or pharmacist before using any other medicine, including over-the-counter medicines, vitamins, and herbal products  · Do not use any other NSAID medicine unless your doctor says it is okay  Some other NSAIDs are aspirin, celecoxib, diclofenac, diflunisal, ibuprofen, or salsalate  · Some medicines can affect how naproxen works  Tell your doctor if you are using any of the following:  ¨ Cholestyramine, cyclosporine, digoxin, lithium, methotrexate, probenecid, sucralfate  ¨ Antacids  ¨ Blood pressure medicine  ¨ Blood thinner (including warfarin)  ¨ Diuretic (water pill)  ¨ Medicine to treat depression  ¨ Steroid medicine  · Do not drink alcohol while you are using this medicine  Warnings While Using This Medicine:   · Tell your doctor if you are pregnant or breastfeeding  Do not use this medicine during the later part of a pregnancy, unless your doctor tells you to  · Tell your doctor if you have kidney disease, liver disease, anemia, asthma, bleeding problems, high blood pressure, heart failure, a recent heart attack, or a history of stomach or bowel problems (including ulcers or bleeding)  Tell your doctor if you smoke or drink alcohol    · This medicine may cause the following problems:  ¨ Higher risk of blood clots, heart attack, stroke, or heart failure  ¨ Bleeding and ulcers in the stomach or intestines  ¨ Liver damage  ¨ Kidney damage  ¨ High potassium levels in the blood  ¨ Serious skin reactions  · Call your doctor if symptoms get worse, pain lasts more than 10 days, or fever lasts more than 3 days  · Tell any doctor or dentist who treats that you are using this medicine, especially if you have surgery or a procedure  · This medicine may make you dizzy or drowsy  Do not drive or do anything else that could be dangerous until you know how this medicine affects you  · Ovulation may be delayed in some women while this medicine is being used  Talk to your doctor if you have concerns about this  · Tell any doctor or dentist who treats you that you are using this medicine  This medicine may affect certain medical test results  · Your doctor will do lab tests at regular visits to check on the effects of this medicine  Keep all appointments  · Keep all medicine out of the reach of children  Never share your medicine with anyone    Possible Side Effects While Using This Medicine:   Call your doctor right away if you notice any of these side effects:  · Allergic reaction: Itching or hives, swelling in your face or hands, swelling or tingling in your mouth or throat, chest tightness, trouble breathing  · Blistering, peeling, red skin rash  · Bloody or black, tarry stools, severe stomach pain, vomiting blood or something that looks like coffee grounds  · Change in how much or how often you urinate  · Chest pain that may spread, trouble breathing, unusual sweating, fainting  · Dark urine or pale stools, nausea, vomiting, loss of appetite, stomach pain, yellow skin or eyes  · Numbness or weakness on one side of your body, sudden or severe headache, problems with vision, speech, or walking  · Rapid weight gain, swelling in your hands, ankles, or feet  · Unusual bleeding, bruising, or weakness  · Vision changes  If you notice these less serious side effects, talk with your doctor:   · Mild nausea, diarrhea, or constipation  · Ringing in your ears, dizziness, headache  If you notice other side effects that you think are caused by this medicine, tell your doctor  Call your doctor for medical advice about side effects  You may report side effects to FDA at 8-841-FDA-4993  © 2017 [address] Information is for End User's use only and may not be sold, redistributed or otherwise used for commercial purposes  The above information is an  only  It is not intended as medical advice for individual conditions or treatments  Talk to your doctor, nurse or pharmacist before following any medical regimen to see if it is safe and effective for you  Oxcarbazepine (By mouth)   Oxcarbazepine (nu-flz-DNL-e-peen)  Treats seizures  Brand Name(s): Oxtellar XR, Trileptal   There may be other brand names for this medicine  When This Medicine Should Not Be Used: This medicine is not right for everyone  Do not use it if you had an allergic reaction to oxcarbazepine  How to Use This Medicine:   Liquid, Tablet, Long Acting Tablet  · Take your medicine as directed  Your dose may need to be changed several times to find what works best for you  · This medicine should come with a Medication Guide  Ask your pharmacist for a copy if you do not have one  · Swallow the extended-release tablet whole  Do not crush, break, or chew it  · Oral liquid: Measure the oral liquid medicine with a marked measuring spoon, oral syringe, or medicine cup  Shake well just before you measure a dose  Swallow the medicine directly, or mix it in a glass with a small amount of water and drink all of the mixture right away  · Food:   ¨ Take the extended-release tablet on an empty stomach at least 1 hour before or 2 hours after a meal   ¨ You may take the oral liquid or regular tablet with or without food  · Missed dose: Take a dose as soon as you remember   If it is almost time for your next dose, wait until then and take a regular dose  Do not take extra medicine to make up for a missed dose  · Store the medicine in a closed container at room temperature, away from heat, moisture, and direct light  Store the oral liquid in the original container and use within 7 weeks after you first open the bottle  Throw away any unused liquid  Drugs and Foods to Avoid:   Ask your doctor or pharmacist before using any other medicine, including over-the-counter medicines, vitamins, and herbal products  · Some medicines and foods can affect how this drug works  Tell your doctor if you are also using any of the following:  ¨ Calcium channel blocker medicine, including felodipine or verapamil  ¨ Other medicine to control seizures  · Tell your doctor if you use anything else that makes you sleepy  Some examples are allergy medicine, narcotic pain medicine, and alcohol  Warnings While Using This Medicine:   · Tell your doctor if you are pregnant or breastfeeding, or if you have kidney disease or liver disease  Also tell your doctor if you are allergic to carbamazepine or tartrazine  · This medicine may cause the following problems:  ¨ Drug reaction with eosinophilia and systemic symptoms (DRESS), which may damage organs such as the liver, kidney, or heart  ¨ Low sodium levels in the blood  ¨ Serious skin or allergic reactions  ¨ Decreased levels of blood cells, which may cause bleeding problems or increase your risk for infection  · This medicine may cause depression, thoughts of suicide, or changes in mood or behavior  Tell your doctor if you have a history of depression or mental health problems  · Do not stop using this medicine suddenly  Your doctor will need to slowly decrease your dose before you stop it completely  The seizures might become more frequent if you suddenly stop using this medicine  · Birth control that uses hormones may not work as well while you are using this medicine   Use a different type of birth control if you need to  Talk with your doctor if you have questions  · This medicine may make you dizzy or drowsy  Do not drive or do anything else that could be dangerous until you know how this medicine affects you  · Your doctor will do lab tests at regular visits to check on the effects of this medicine  Keep all appointments  · Keep all medicine out of the reach of children  Never share your medicine with anyone  Possible Side Effects While Using This Medicine:   Call your doctor right away if you notice any of these side effects:  · Allergic reaction: Itching or hives, swelling in your face or hands, swelling or tingling in your mouth or throat, chest tightness, trouble breathing  · Blistering, peeling, red skin rash  · Confusion, weakness, muscle twitching  · Fever, skin rash, or swollen glands in your armpits, neck, or groin  · Unusual thoughts or behaviors, thoughts of hurting yourself, or feeling depressed, irritable, nervous, restless  · Unusual bleeding, bruising, or weakness  If you notice these less serious side effects, talk with your doctor:   · Dizziness, headache  · Nausea, vomiting, stomach pain  · Trouble concentrating or speaking, tiredness, clumsiness, trouble walking  · Vision changes, double vision  If you notice other side effects that you think are caused by this medicine, tell your doctor  Call your doctor for medical advice about side effects  You may report side effects to FDA at 5-527-FDA-6091  © 2017 [facility] Information is for End User's use only and may not be sold, redistributed or otherwise used for commercial purposes  The above information is an  only  It is not intended as medical advice for individual conditions or treatments  Talk to your doctor, nurse or pharmacist before following any medical regimen to see if it is safe and effective for you        Pantoprazole (By mouth)   Pantoprazole (pan-TOE-pra-zole)  Treats gastroesophageal reflux disease (GERD), a damaged esophagus, and high levels of stomach acid  This medicine is a proton pump inhibitor (PPI)  Brand Name(s): Protonix   There may be other brand names for this medicine  When This Medicine Should Not Be Used: This medicine is not right for everyone  Do not use it if you had an allergic reaction to pantoprazole or similar medicines  How to Use This Medicine:   Packet, Tablet, Delayed Release Tablet, Long Acting Tablet  · Your doctor will tell you how much medicine to use  Do not use more than directed  Take the medicine at least 30 minutes before a meal   · Delayed-release tablet: Swallow the tablet whole  Do not crush, break, or chew it  · Delayed-release packet:   ¨ To prepare with applesauce:   § Mix the packet contents with 1 teaspoon of applesauce  Do not mix with water, or other liquids or food  Do not divide the packet contents to make smaller doses  § Swallow the mixture within 10 minutes after you mix it  Do not chew or crush the granules  § Sip some water after you take the mixture to make sure you swallow all of the medicine  ¨ To prepare with apple juice:   § Mix the packet contents with 1 teaspoon of apple juice in a small cup  Do not divide the packet contents to make smaller doses  § Stir for 5 seconds and drink the mixture immediately  Do not chew or crush the granules  § To make sure you get all of the medicine, add more apple juice to the cup  Drink it immediately  ¨ To prepare for a feeding tube:   § Pour the packet contents in a 2-ounce (60 milliliter [mL]) catheter-tip syringe  § Add 10 mL of apple juice to the syringe  Add the mixture to the tube  Gently tap or shake the barrel of the syringe to help empty it  § Add 10 mL of apple juice to the syringe and put it in the tube  Do this at least 2 times  There should be no granules left in the syringe  · This medicine should come with a Medication Guide   Ask your pharmacist for a copy if you do not have one  · Missed dose: Take a dose as soon as you remember  If it is almost time for your next dose, wait until then and take a regular dose  Do not take extra medicine to make up for a missed dose  · Store the medicine in a closed container at room temperature, away from heat, moisture, and direct light  Drugs and Foods to Avoid:   Ask your doctor or pharmacist before using any other medicine, including over-the-counter medicines, vitamins, and herbal products  · Some foods and medicines can affect how pantoprazole works  Tell your doctor if you are using any of the following:   ¨ Ampicillin, atazanavir, digoxin, erlotinib, ketoconazole, methotrexate, mycophenolate mofetil, nelfinavir  ¨ Blood thinner (including warfarin)  ¨ Diuretic (water pill)  ¨ Iron supplements  Warnings While Using This Medicine:   · Tell your doctor if you are pregnant or breastfeeding, or if you have liver disease, lupus, or osteoporosis  · This medicine may cause the following problems:  ¨ Kidney problems  ¨ Low vitamin B12 or magnesium levels  ¨ Increased risk of broken bones in the hip, wrist, or spine  · This medicine can cause diarrhea  Call your doctor if the diarrhea becomes severe, does not stop, or is bloody  Do not take any medicine to stop diarrhea until you have talked to your doctor  Diarrhea can occur 2 months or more after you stop taking this medicine  · Tell any doctor or dentist who treats you that you are using this medicine  This medicine may affect certain medical test results  · Your doctor will check your progress and the effects of this medicine at regular visits  Keep all appointments  · Keep all medicine out of the reach of children  Never share your medicine with anyone    Possible Side Effects While Using This Medicine:   Call your doctor right away if you notice any of these side effects:  · Allergic reaction: Itching or hives, swelling in your face or hands, swelling or tingling in your mouth or throat, chest tightness, trouble breathing  · Blistering, peeling, red skin rash  · Fever, joint pain, swelling in your body, unusual weight gain, change in how much or how often you urinate  · Joint pain, rash on your cheeks or arms that gets worse in the sun  · Seizures, dizziness, uneven heartbeat, muscle cramps or twitching  · Severe diarrhea, stomach cramps, fever  · Stomach pain, nausea, vomiting, weight loss  If you notice other side effects that you think are caused by this medicine, tell your doctor  Call your doctor for medical advice about side effects  You may report side effects to FDA at 5-965-ICW-3016  © 2017 [facility] [place] Information is for End User's use only and may not be sold, redistributed or otherwise used for commercial purposes  The above information is an  only  It is not intended as medical advice for individual conditions or treatments  Talk to your doctor, nurse or pharmacist before following any medical regimen to see if it is safe and effective for you  Valacyclovir (By mouth)   Valacyclovir (ugc-lf-OQC-kloe-vir)  Treats herpes virus infections, including shingles, cold sores, and genital herpes  This medicine will not cure herpes, but may prevent a breakout of herpes sores or blisters  Also treats chicken pox  Brand Name(s): Valtrex   There may be other brand names for this medicine  When This Medicine Should Not Be Used: This medicine is not right for everyone  Do not use it if you had an allergic reaction to valacyclovir or acyclovir  How to Use This Medicine:   Tablet  · Your doctor will tell you how much medicine to use  Do not use more than directed  · This medicine works best when you take it at the first sign of a herpes breakout  · Drink extra fluids so you will urinate more often and help prevent kidney problems  · Missed dose: Take a dose as soon as you remember   If it is almost time for your next dose, wait until then and take a regular dose  Do not take extra medicine to make up for a missed dose  · Store the medicine in a closed container at room temperature, away from heat, moisture, and direct light  Store the oral liquid in the refrigerator  Discard any unused medicine after 28 days  Drugs and Foods to Avoid:      Ask your doctor or pharmacist before using any other medicine, including over-the-counter medicines, vitamins, and herbal products  Warnings While Using This Medicine:   · Tell your doctor if you are pregnant or breastfeeding, or if you have kidney disease, HIV or AIDS, or had a bone marrow or kidney transplant  · This medicine may cause the following problems:   ¨ Kidney problems  ¨ Nervous system problems  ¨ Thrombotic thrombocytopenic purpura/hemolytic uremic syndrome (in patients who have HIV or had a bone marrow or kidney transplant)  · Do not have sex while you have herpes sores  Valacyclovir will not stop the spread of herpes during sex  · Even if you have no signs of a herpes infection, it is still possible to spread the virus to others  Always use condoms made from latex or polyurethane when you have sexual contact  · Call your doctor if your symptoms do not improve or if they get worse  · Tell any doctor or dentist who treats you that you are using this medicine  This medicine may affect certain medical test results  · Do not stop using this medicine suddenly, or change how you take it, without talking to your doctor  · Keep all medicine out of the reach of children  Never share your medicine with anyone    Possible Side Effects While Using This Medicine:   Call your doctor right away if you notice any of these side effects:  · Allergic reaction: Itching or hives, swelling in your face or hands, swelling or tingling in your mouth or throat, chest tightness, trouble breathing  · Confusion, agitation, depression, or other behavior changes  · Decrease in how much or how often you urinate  · Fast heartbeat, fainting, or extreme weakness  · Pinpoint red spots on your skin, unusual bleeding or bruising, blood in your urine or stools  · Problems with walking, speaking, or coordination, seeing or hearing things that are not there  · Seizures or tremors  · Yellow skin or eyes  If you notice these less serious side effects, talk with your doctor:   · Headache or dizziness  · Nausea, vomiting, diarrhea, stomach pain  If you notice other side effects that you think are caused by this medicine, tell your doctor  Call your doctor for medical advice about side effects  You may report side effects to FDA at 3-968-FDA-3864  © 2017 [address] Information is for End User's use only and may not be sold, redistributed or otherwise used for commercial purposes  The above information is an  only  It is not intended as medical advice for individual conditions or treatments  Talk to your doctor, nurse or pharmacist before following any medical regimen to see if it is safe and effective for you  Vilazodone (By mouth)   Vilazodone (wak-MV-bl-done)  Treats depression  Brand Name(s): Viibryd, [employer] Pack   There may be other brand names for this medicine  When This Medicine Should Not Be Used: This medicine is not right for everyone  Do not use it if you had an allergic reaction to vilazodone  How to Use This Medicine:   Tablet  · Take your medicine as directed  Your dose may need to be changed several times to find what works best for you  · It is best to take this medicine with food or milk  · This medicine should come with a Medication Guide  Ask your pharmacist for a copy if you do not have one  · Missed dose: Take a dose as soon as you remember  If it is almost time for your next dose, wait until then and take a regular dose  Do not take extra medicine to make up for a missed dose    · Store the medicine in a closed container at room temperature, away from heat, moisture, and direct light  Drugs and Foods to Avoid:   Ask your doctor or pharmacist before using any other medicine, including over-the-counter medicines, vitamins, and herbal products  · Do not use this medicine if you have used an MAO inhibitor within the past 14 days  · Some medicines can affect how vilazodone works  Tell your doctor if you are using any of the following:   ¨ Buspirone, carbamazepine, clarithromycin, digoxin, erythromycin, fentanyl, itraconazole, ketoconazole, lithium, phenytoin, rifampin, Ashtyn's wort, tramadol, tryptophan supplements, or voriconazole  ¨ Amphetamines  ¨ Diuretic (water pill)  ¨ Blood thinner (including warfarin)  ¨ NSAID pain or arthritis medicine (including aspirin, celecoxib, diclofenac, ibuprofen, naproxen)  ¨ Triptan medicine to treat migraine headaches  Warnings While Using This Medicine:   · Tell your doctor if you are pregnant or breastfeeding, or if you have kidney disease, bleeding problems, glaucoma, or a history of seizures  · For some children, teenagers, and young adults, this medicine may increase mental or emotional problems  This may lead to thoughts of suicide and violence  Talk with your doctor right away if you have any thoughts or behavior changes that concern you  Tell your doctor if you or anyone in your family has a history of bipolar disorder or suicide attempts  · This medicine may cause the following problems:  ¨ Serious drug reaction called serotonin syndrome (more likely when used with certain other medicines)  ¨ Increased risk of bleeding problems  ¨ Low sodium levels in the blood  · This medicine may make you dizzy or drowsy  Do not drive or do anything else that could be dangerous until you know how this medicine affects you  · Do not stop using this medicine suddenly  Your doctor will need to slowly decrease your dose before you stop it completely    · Your doctor will check your progress and the effects of this medicine at regular visits  Keep all appointments  · Keep all medicine out of the reach of children  Never share your medicine with anyone  Possible Side Effects While Using This Medicine:   Call your doctor right away if you notice any of these side effects:  · Allergic reaction: Itching or hives, swelling in your face or hands, swelling or tingling in your mouth or throat, chest tightness, trouble breathing  · Agitation, irritability, sudden increase in energy, trouble sleeping  · Anxiety, restlessness, fast heartbeat, fever, sweating, muscle spasms, nausea, vomiting, diarrhea, seeing or hearing things that are not there  · Confusion, weakness, muscle twitching  · Thoughts of hurting yourself or others, worsening depression, unusual behavior  · Unusual bleeding or bruising  · Vision changes, eye pain, seeing halos around lights  If you notice these less serious side effects, talk with your doctor:   · Mild diarrhea or nausea  If you notice other side effects that you think are caused by this medicine, tell your doctor  Call your doctor for medical advice about side effects  You may report side effects to FDA at 7-911-FDA-0978  © 2017 [facility] Information is for End User's use only and may not be sold, redistributed or otherwise used for commercial purposes  The above information is an  only  It is not intended as medical advice for individual conditions or treatments  Talk to your doctor, nurse or pharmacist before following any medical regimen to see if it is safe and effective for you  Alprazolam (By mouth)   Alprazolam (tz-WWI-pxl-de jesus)  Treats anxiety and panic disorder  Brand Name(s): ALPRAZolam Intensol, Niravam, Xanax, Xanax XR   There may be other brand names for this medicine  When This Medicine Should Not Be Used: This medicine is not right for everyone   Do not use it if you had an allergic reaction to alprazolam or similar medicines, or if you are pregnant or breastfeeding, or you have narrow-angle glaucoma  How to Use This Medicine:   Liquid, Tablet, Dissolving Tablet, Long Acting Tablet  · Take your medicine as directed  Your dose may need to be changed several times to find what works best for you  · Disintegrating tablet: Dry your hands before you handle the tablet  Place the tablet on your tongue and let it dissolve  · Extended-release tablet: Swallow the extended-release tablet whole  Do not crush, break, or chew it  · Oral liquid: Measure the oral liquid medicine with a marked measuring spoon, oral syringe, or medicine cup  · This medicine should come with a Medication Guide  Ask your pharmacist for a copy if you do not have one  · Missed dose: Take a dose as soon as you remember  If it is almost time for your next dose, wait until then and take a regular dose  Do not take extra medicine to make up for a missed dose  · Store the medicine in a closed container at room temperature, away from heat, moisture, and direct light  Protect the orally disintegrating tablets from moisture  Throw away any cotton that was in the bottle and reseal it tightly after each use  Drugs and Foods to Avoid:   Ask your doctor or pharmacist before using any other medicine, including over-the-counter medicines, vitamins, and herbal products  · Do not use this medicine if you are also using ketoconazole or itraconazole  · Some foods and medicines can affect how alprazolam works  Tell your doctor if you are using any of the following:  ¨ Amiodarone, carbamazepine, clarithromycin, cimetidine, cyclosporine, desipramine, diltiazem, ergotamine, erythromycin, fluconazole, fluoxetine, fluvoxamine, imipramine, isoniazid, nefazodone, nicardipine, nifedipine, paroxetine, propoxyphene, sertraline, or theophylline  ¨ Birth control pills  ¨ Seizure medicine  · Tell your doctor if you use anything else that makes you sleepy   Some examples are allergy medicine, narcotic pain medicine, and alcohol  · Do not drink alcohol while you are using this medicine  · Do not eat grapefruit or drink grapefruit juice while you are using this medicine  Warnings While Using This Medicine:   · It is not safe to take this medicine during pregnancy  It could harm an unborn baby  Tell your doctor right away if you become pregnant  · Tell your doctor if you have kidney disease, liver disease, glaucoma, lung problems, or a history of drug or alcohol abuse, depression, mental health problems, or seizures  · This medicine may cause the following problems:  ¨ Respiratory depression (serious breathing problem that can be life-threatening), when used with narcotic pain medicines  · This medicine can increase thoughts of suicide  Tell your doctor right away if you start to feel depressed and have thoughts about hurting yourself  · This medicine may make you dizzy or drowsy  Do not drive or do anything else that could be dangerous until you know how this medicine affects you  · This medicine can be habit-forming  Do not use more than your prescribed dose  Call your doctor if you think your medicine is not working  · Do not stop using this medicine suddenly  Your doctor will need to slowly decrease your dose before you stop it completely  · Your doctor will do lab tests at regular visits to check on the effects of this medicine  Keep all appointments  · Keep all medicine out of the reach of children  Never share your medicine with anyone    Possible Side Effects While Using This Medicine:   Call your doctor right away if you notice any of these side effects:  · Allergic reaction: Itching or hives, swelling in your face or hands, swelling or tingling in your mouth or throat, chest tightness, trouble breathing  · Blistering, peeling, red skin rash  · Blue lips, fingernails, or skin  · Confusion, lightheadedness, dizziness, problems with coordination or memory  · Extreme drowsiness or weakness, slow heartbeat, trouble breathing  · Seizure  If you notice these less serious side effects, talk with your doctor:   · Change in appetite or weight  · Constipation  If you notice other side effects that you think are caused by this medicine, tell your doctor  Call your doctor for medical advice about side effects  You may report side effects to FDA at 0-149-FDA-5524  © 2017 [address] Information is for End User's use only and may not be sold, redistributed or otherwise used for commercial purposes  The above information is an  only  It is not intended as medical advice for individual conditions or treatments  Talk to your doctor, nurse or pharmacist before following any medical regimen to see if it is safe and effective for you

## 2019-07-29 NOTE — PROGRESS NOTES
07/29/19 0952   Team Meeting   Meeting Type Daily Rounds   Patient/Family Present   Patient Present No   Patient's Family Present No     Daily Psychiatric Rounds    Team Members Present:    MD Monique Lomas, LEATHA Orozco, SOULEYMANE Stephens [facility] Rex Fisher, MARGOT Henry, MARGOT    Discussion:     Pleasant appropriate  Bright and social  Good weekend  Will discharge home today at 1230   Will follow up with [facility]

## 2019-08-07 ENCOUNTER — APPOINTMENT (OUTPATIENT)
Dept: RADIOLOGY | Facility: CLINIC | Age: 44
End: 2019-08-07
Payer: COMMERCIAL

## 2019-08-07 ENCOUNTER — HOSPITAL ENCOUNTER (EMERGENCY)
Facility: HOSPITAL | Age: 44
Discharge: HOME | End: 2019-08-07
Attending: EMERGENCY MEDICINE | Admitting: EMERGENCY MEDICINE
Payer: COMMERCIAL

## 2019-08-07 ENCOUNTER — OFFICE VISIT (OUTPATIENT)
Dept: URGENT CARE | Facility: CLINIC | Age: 44
End: 2019-08-07
Payer: COMMERCIAL

## 2019-08-07 ENCOUNTER — APPOINTMENT (EMERGENCY)
Dept: CT IMAGING | Facility: HOSPITAL | Age: 44
End: 2019-08-07
Payer: COMMERCIAL

## 2019-08-07 VITALS
OXYGEN SATURATION: 99 % | TEMPERATURE: 97.4 F | HEART RATE: 87 BPM | RESPIRATION RATE: 18 BRPM | SYSTOLIC BLOOD PRESSURE: 173 MMHG | DIASTOLIC BLOOD PRESSURE: 98 MMHG

## 2019-08-07 VITALS
RESPIRATION RATE: 16 BRPM | BODY MASS INDEX: 27.49 KG/M2 | SYSTOLIC BLOOD PRESSURE: 131 MMHG | TEMPERATURE: 98 F | DIASTOLIC BLOOD PRESSURE: 84 MMHG | WEIGHT: 165 LBS | OXYGEN SATURATION: 99 % | HEIGHT: 65 IN | HEART RATE: 68 BPM

## 2019-08-07 DIAGNOSIS — K59.00 CONSTIPATION: ICD-10-CM

## 2019-08-07 DIAGNOSIS — R10.32 LEFT LOWER QUADRANT PAIN: ICD-10-CM

## 2019-08-07 DIAGNOSIS — M79.671 RIGHT FOOT PAIN: ICD-10-CM

## 2019-08-07 DIAGNOSIS — S90.31XA CONTUSION OF RIGHT FOOT, INITIAL ENCOUNTER: Primary | ICD-10-CM

## 2019-08-07 DIAGNOSIS — R10.9 ABDOMINAL PAIN: Primary | ICD-10-CM

## 2019-08-07 LAB
ALBUMIN SERPL BCP-MCNC: 3.8 G/DL (ref 3.5–5.7)
ALP SERPL-CCNC: 45 U/L (ref 40–150)
ALT SERPL W P-5'-P-CCNC: 23 U/L (ref 7–52)
ANION GAP SERPL CALCULATED.3IONS-SCNC: 7 MMOL/L (ref 4–13)
APTT PPP: 30 SECONDS (ref 23–37)
AST SERPL W P-5'-P-CCNC: 32 U/L (ref 13–39)
BASOPHILS # BLD AUTO: 0.1 THOUSANDS/ΜL (ref 0–0.1)
BASOPHILS NFR BLD AUTO: 1 % (ref 0–2)
BILIRUB SERPL-MCNC: 0.3 MG/DL (ref 0.2–1)
BILIRUB UR QL STRIP: NEGATIVE
BUN SERPL-MCNC: 11 MG/DL (ref 7–25)
CALCIUM SERPL-MCNC: 8.4 MG/DL (ref 8.6–10.5)
CHLORIDE SERPL-SCNC: 108 MMOL/L (ref 98–107)
CLARITY UR: CLEAR
CO2 SERPL-SCNC: 24 MMOL/L (ref 21–31)
COLOR UR: ABNORMAL
CREAT SERPL-MCNC: 0.6 MG/DL (ref 0.6–1.2)
EOSINOPHIL # BLD AUTO: 0.2 THOUSAND/ΜL (ref 0–0.61)
EOSINOPHIL NFR BLD AUTO: 3 % (ref 0–5)
ERYTHROCYTE [DISTWIDTH] IN BLOOD BY AUTOMATED COUNT: 13.8 % (ref 11.5–14.5)
EXT PREG TEST URINE: NORMAL
EXT. CONTROL ED NAV: NORMAL
GFR SERPL CREATININE-BSD FRML MDRD: 114 ML/MIN/1.73SQ M
GLUCOSE SERPL-MCNC: 100 MG/DL (ref 65–99)
GLUCOSE UR STRIP-MCNC: NEGATIVE MG/DL
HCT VFR BLD AUTO: 41.6 % (ref 42–47)
HGB BLD-MCNC: 14.1 G/DL (ref 12–16)
HGB UR QL STRIP.AUTO: NEGATIVE
INR PPP: 0.99 (ref 0.9–1.5)
KETONES UR STRIP-MCNC: NEGATIVE MG/DL
LEUKOCYTE ESTERASE UR QL STRIP: NEGATIVE
LIPASE SERPL-CCNC: 29 U/L (ref 11–82)
LYMPHOCYTES # BLD AUTO: 2.7 THOUSANDS/ΜL (ref 0.6–4.47)
LYMPHOCYTES NFR BLD AUTO: 34 % (ref 21–51)
MCH RBC QN AUTO: 31.8 PG (ref 26–34)
MCHC RBC AUTO-ENTMCNC: 33.9 G/DL (ref 31–37)
MCV RBC AUTO: 94 FL (ref 81–99)
MONOCYTES # BLD AUTO: 0.8 THOUSAND/ΜL (ref 0.17–1.22)
MONOCYTES NFR BLD AUTO: 10 % (ref 2–12)
NEUTROPHILS # BLD AUTO: 4.2 THOUSANDS/ΜL (ref 1.4–6.5)
NEUTS SEG NFR BLD AUTO: 52 % (ref 42–75)
NITRITE UR QL STRIP: NEGATIVE
PH UR STRIP.AUTO: 6 [PH]
PLATELET # BLD AUTO: 274 THOUSANDS/UL (ref 149–390)
PMV BLD AUTO: 8.2 FL (ref 8.6–11.7)
POTASSIUM SERPL-SCNC: 3.8 MMOL/L (ref 3.5–5.5)
PROT SERPL-MCNC: 6.1 G/DL (ref 6.4–8.9)
PROT UR STRIP-MCNC: NEGATIVE MG/DL
PROTHROMBIN TIME: 11.5 SECONDS (ref 10.2–13)
RBC # BLD AUTO: 4.44 MILLION/UL (ref 3.9–5.2)
SODIUM SERPL-SCNC: 139 MMOL/L (ref 134–143)
SP GR UR STRIP.AUTO: <=1.005 (ref 1–1.03)
UROBILINOGEN UR QL STRIP.AUTO: 0.2 E.U./DL
WBC # BLD AUTO: 8.1 THOUSAND/UL (ref 4.8–10.8)

## 2019-08-07 PROCEDURE — 36415 COLL VENOUS BLD VENIPUNCTURE: CPT | Performed by: EMERGENCY MEDICINE

## 2019-08-07 PROCEDURE — 85730 THROMBOPLASTIN TIME PARTIAL: CPT | Performed by: EMERGENCY MEDICINE

## 2019-08-07 PROCEDURE — 85025 COMPLETE CBC W/AUTO DIFF WBC: CPT | Performed by: EMERGENCY MEDICINE

## 2019-08-07 PROCEDURE — 99213 OFFICE O/P EST LOW 20 MIN: CPT | Performed by: PHYSICIAN ASSISTANT

## 2019-08-07 PROCEDURE — 85610 PROTHROMBIN TIME: CPT | Performed by: EMERGENCY MEDICINE

## 2019-08-07 PROCEDURE — 80053 COMPREHEN METABOLIC PANEL: CPT | Performed by: EMERGENCY MEDICINE

## 2019-08-07 PROCEDURE — 81025 URINE PREGNANCY TEST: CPT | Performed by: EMERGENCY MEDICINE

## 2019-08-07 PROCEDURE — 81003 URINALYSIS AUTO W/O SCOPE: CPT | Performed by: EMERGENCY MEDICINE

## 2019-08-07 PROCEDURE — 96361 HYDRATE IV INFUSION ADD-ON: CPT

## 2019-08-07 PROCEDURE — 96374 THER/PROPH/DIAG INJ IV PUSH: CPT

## 2019-08-07 PROCEDURE — 99284 EMERGENCY DEPT VISIT MOD MDM: CPT

## 2019-08-07 PROCEDURE — 74177 CT ABD & PELVIS W/CONTRAST: CPT

## 2019-08-07 PROCEDURE — 96375 TX/PRO/DX INJ NEW DRUG ADDON: CPT

## 2019-08-07 PROCEDURE — 73630 X-RAY EXAM OF FOOT: CPT

## 2019-08-07 PROCEDURE — 83690 ASSAY OF LIPASE: CPT | Performed by: EMERGENCY MEDICINE

## 2019-08-07 RX ORDER — KETOROLAC TROMETHAMINE 30 MG/ML
30 INJECTION, SOLUTION INTRAMUSCULAR; INTRAVENOUS ONCE
Status: COMPLETED | OUTPATIENT
Start: 2019-08-07 | End: 2019-08-07

## 2019-08-07 RX ORDER — HYDROMORPHONE HCL/PF 1 MG/ML
1 SYRINGE (ML) INJECTION ONCE
Status: COMPLETED | OUTPATIENT
Start: 2019-08-07 | End: 2019-08-07

## 2019-08-07 RX ORDER — ONDANSETRON 2 MG/ML
4 INJECTION INTRAMUSCULAR; INTRAVENOUS ONCE
Status: COMPLETED | OUTPATIENT
Start: 2019-08-07 | End: 2019-08-07

## 2019-08-07 RX ORDER — POLYETHYLENE GLYCOL 3350 17 G/17G
17 POWDER, FOR SOLUTION ORAL DAILY
Qty: 14 EACH | Refills: 0 | Status: ON HOLD | OUTPATIENT
Start: 2019-08-07 | End: 2020-03-09

## 2019-08-07 RX ADMIN — IOHEXOL 100 ML: 350 INJECTION, SOLUTION INTRAVENOUS at 13:46

## 2019-08-07 RX ADMIN — ONDANSETRON 4 MG: 2 INJECTION INTRAMUSCULAR; INTRAVENOUS at 11:55

## 2019-08-07 RX ADMIN — SODIUM CHLORIDE 1000 ML: 0.9 INJECTION, SOLUTION INTRAVENOUS at 11:56

## 2019-08-07 RX ADMIN — HYDROMORPHONE HYDROCHLORIDE 1 MG: 1 INJECTION, SOLUTION INTRAMUSCULAR; INTRAVENOUS; SUBCUTANEOUS at 13:02

## 2019-08-07 RX ADMIN — MORPHINE SULFATE 2 MG: 2 INJECTION, SOLUTION INTRAMUSCULAR; INTRAVENOUS at 11:56

## 2019-08-07 RX ADMIN — KETOROLAC TROMETHAMINE 30 MG: 30 INJECTION, SOLUTION INTRAMUSCULAR; INTRAVENOUS at 11:55

## 2019-08-07 NOTE — ED PROVIDER NOTES
History  Chief Complaint   Patient presents with    Abdominal Pain     Patient is a 73-year-old with a history of small bowel obstruction prior exploratory laparotomy presents the emergency department for left lower quadrant abdominal pain started about 2 days ago she reports she was at the gym doing ABS workout when she felt a pain in her lower abdomen this subsided but then recurred again today  Patient has had associated nausea and decrease stool  No diarrhea has been able to defecate but does feel constipated  Patient reports vomiting once over the weekend  History provided by:  Patient  Abdominal Pain   Pain location:  LLQ  Pain quality: aching and cramping    Pain radiates to:  Does not radiate  Pain severity:  Moderate  Onset quality:  Gradual  Duration:  3 days  Timing:  Constant  Progression:  Worsening  Chronicity:  Recurrent  Associated symptoms: constipation, nausea and vomiting    Associated symptoms: no chest pain, no chills, no cough, no diarrhea, no dysuria, no fatigue, no fever, no hematuria, no shortness of breath and no sore throat        Prior to Admission Medications   Prescriptions Last Dose Informant Patient Reported? Taking?    ALPRAZolam (XANAX) 1 mg tablet 8/6/2019 at Unknown time Self Yes Yes   Sig: Take 1 mg by mouth daily at bedtime     OXcarbazepine (TRILEPTAL) 300 mg tablet 8/6/2019 at Unknown time Self Yes Yes   Sig: Take 300 mg by mouth 2 (two) times a day   PROAIR  (90 Base) MCG/ACT inhaler 8/6/2019 at Unknown time Self Yes Yes   Sig: Inhale every 4 (four) hours as needed (as needed)     linaCLOtide (LINZESS PO) 8/7/2019 at Unknown time  Yes Yes   Sig: Take 290 mcg by mouth daily before breakfast   lisinopril (ZESTRIL) 10 mg tablet 8/6/2019 at Unknown time  Yes Yes   Sig: Take 10 mg by mouth daily   medroxyPROGESTERone (DEPO-PROVERA) 150 mg/mL injection Past Month at Unknown time Self Yes Yes   Sig: medroxyprogesterone 150 mg/mL intramuscular suspension   naproxen (NAPROSYN) 500 mg tablet 8/6/2019 at Unknown time  No Yes   Sig: Take 1 tablet (500 mg total) by mouth 2 (two) times a day with meals   ondansetron (ZOFRAN-ODT) 4 mg disintegrating tablet Not Taking at Unknown time  No No   Sig: Take 1 tablet (4 mg total) by mouth every 8 (eight) hours as needed for nausea or vomiting   Patient not taking: Reported on 7/23/2019   pantoprazole (PROTONIX) 40 mg tablet 8/6/2019 at Unknown time Self Yes Yes   Sig: Take 40 mg by mouth 2 (two) times a day     valACYclovir (VALTREX) 500 mg tablet 8/6/2019 at Unknown time  Yes Yes   Sig: Take 500 mg by mouth daily   vilazodone (VIIBRYD) 10 mg tablet 8/6/2019 at Unknown time  No Yes   Sig: Take 2 tablets (20 mg total) by mouth daily with breakfast for 30 days      Facility-Administered Medications: None       Past Medical History:   Diagnosis Date    Arthritis     Bipolar 1 disorder (HCC)     with bordeline personality    Borderline personality disorder ([facility] [address] )     Chronic headaches     Depression     Depression     GERD (gastroesophageal reflux disease)     Hypertension     Incontinence     Migraine     Urinary tract infection        Past Surgical History:   Procedure Laterality Date    CHOLECYSTECTOMY  05/24/2018    DENTAL SURGERY      EXPLORATORY LAPAROTOMY      exlap    FOOT SURGERY Right     JOINT REPLACEMENT      KNEE ARTHROSCOPY      KNEE ARTHROSCOPY Right 11/15/2018    MULTIPLE TOOTH EXTRACTIONS  11/02/2018    OVARIAN CYST REMOVAL      REDUCTION MAMMAPLASTY      reduction        Family History   Problem Relation Age of Onset    Hyperlipidemia Father     Heart disease Mother     Migraines Mother     Depression Family      I have reviewed and agree with the history as documented      Social History     Tobacco Use    Smoking status: Never Smoker    Smokeless tobacco: Never Used   Substance Use Topics    Alcohol use: Not Currently     Comment: rarely    Drug use: Not Currently        Review of Systems Constitutional: Negative for activity change, appetite change, chills, fatigue and fever  HENT: Negative for congestion, ear pain, rhinorrhea and sore throat  Eyes: Negative for discharge, redness and visual disturbance  Respiratory: Negative for cough, chest tightness, shortness of breath and wheezing  Cardiovascular: Negative for chest pain and palpitations  Gastrointestinal: Positive for abdominal pain, constipation, nausea and vomiting  Negative for diarrhea  Endocrine: Negative for polydipsia and polyuria  Genitourinary: Negative for difficulty urinating, dysuria, frequency, hematuria and urgency  Musculoskeletal: Negative for arthralgias and myalgias  Skin: Negative for color change, pallor and rash  Neurological: Negative for dizziness, weakness, light-headedness, numbness and headaches  Hematological: Negative for adenopathy  Does not bruise/bleed easily  All other systems reviewed and are negative  Physical Exam  Physical Exam   Constitutional: She is oriented to person, place, and time  She appears well-developed and well-nourished  HENT:   Head: Normocephalic and atraumatic  Right Ear: External ear normal    Left Ear: External ear normal    Nose: Nose normal    Mouth/Throat: Oropharynx is clear and moist    Eyes: Pupils are equal, round, and reactive to light  Conjunctivae and EOM are normal    Neck: Normal range of motion  Neck supple  Cardiovascular: Normal rate, regular rhythm, normal heart sounds and intact distal pulses  Pulmonary/Chest: Effort normal and breath sounds normal  No respiratory distress  She has no wheezes  She has no rales  She exhibits no tenderness  Abdominal: Soft  Bowel sounds are normal  She exhibits no distension  There is tenderness in the periumbilical area, suprapubic area and left lower quadrant  There is guarding  There is no rigidity and no rebound  Musculoskeletal: Normal range of motion     Neurological: She is alert and oriented to person, place, and time  No cranial nerve deficit or sensory deficit  Skin: Skin is warm and dry  Psychiatric: She has a normal mood and affect  Nursing note and vitals reviewed        Vital Signs  ED Triage Vitals [08/07/19 1130]   Temperature Pulse Respirations Blood Pressure SpO2   98 °F (36 7 °C) 71 16 148/89 99 %      Temp Source Heart Rate Source Patient Position - Orthostatic VS BP Location FiO2 (%)   Temporal Monitor Sitting Left arm --      Pain Score       7           Vitals:    08/07/19 1130 08/07/19 1145 08/07/19 1300 08/07/19 1330   BP: 148/89 163/91 132/81    Pulse: 71 72  85   Patient Position - Orthostatic VS: Sitting            Visual Acuity      ED Medications  Medications   sodium chloride 0 9 % bolus 1,000 mL (0 mL Intravenous Stopped 8/7/19 1418)   ondansetron (ZOFRAN) injection 4 mg (4 mg Intravenous Given 8/7/19 1155)   ketorolac (TORADOL) injection 30 mg (30 mg Intravenous Given 8/7/19 1155)   morphine injection 2 mg (2 mg Intravenous Given 8/7/19 1156)   HYDROmorphone (DILAUDID) injection 1 mg (1 mg Intravenous Given 8/7/19 1302)   iohexol (OMNIPAQUE) 350 MG/ML injection (MULTI-DOSE) 100 mL (100 mL Intravenous Given 8/7/19 1346)       Diagnostic Studies  Results Reviewed     Procedure Component Value Units Date/Time    Lipase [[id number]]  (Normal) Collected:  08/07/19 1246    Lab Status:  Final result Specimen:  Blood from Arm, Right Updated:  08/07/19 1319     Lipase 29 u/L     Comprehensive metabolic panel [[id number]]  (Abnormal) Collected:  08/07/19 1246    Lab Status:  Final result Specimen:  Blood from Arm, Right Updated:  08/07/19 1319     Sodium 139 mmol/L      Potassium 3 8 mmol/L      Chloride 108 mmol/L      CO2 24 mmol/L      ANION GAP 7 mmol/L      BUN 11 mg/dL      Creatinine 0 60 mg/dL      Glucose 100 mg/dL      Calcium 8 4 mg/dL      AST 32 U/L      ALT 23 U/L      Alkaline Phosphatase 45 U/L      Total Protein 6 1 g/dL      Albumin 3 8 g/dL      Total Bilirubin 0 30 mg/dL      eGFR 114 ml/min/1 73sq m     Narrative:       [place] guidelines for Chronic Kidney Disease (CKD):     Stage 1 with normal or high GFR (GFR > 90 mL/min/1 73 square meters)    Stage 2 Mild CKD (GFR = 60-89 mL/min/1 73 square meters)    Stage 3A Moderate CKD (GFR = 45-59 mL/min/1 73 square meters)    Stage 3B Moderate CKD (GFR = 30-44 mL/min/1 73 square meters)    Stage 4 Severe CKD (GFR = 15-29 mL/min/1 73 square meters)    Stage 5 End Stage CKD (GFR <15 mL/min/1 73 square meters)  Note: GFR calculation is accurate only with a steady state creatinine    Protime-INR [[id number]]  (Normal) Collected:  08/07/19 1246    Lab Status:  Final result Specimen:  Blood from Arm, Right Updated:  08/07/19 1318     Protime 11 5 seconds      INR 0 99    APTT [[id number]]  (Normal) Collected:  08/07/19 1246    Lab Status:  Final result Specimen:  Blood from Arm, Right Updated:  08/07/19 1318     PTT 30 seconds     UA w Reflex to Microscopic w Reflex to Culture [[id number]]  (Abnormal) Collected:  08/07/19 1238    Lab Status:  Final result Specimen:  Urine, Other Updated:  08/07/19 1256     Color, UA Straw     Clarity, UA Clear     Specific Gravity, UA <=1 005     pH, UA 6 0     Leukocytes, UA Negative     Nitrite, UA Negative     Protein, UA Negative mg/dl      Glucose, UA Negative mg/dl      Ketones, UA Negative mg/dl      Urobilinogen, UA 0 2 E U /dl      Bilirubin, UA Negative     Blood, UA Negative    POCT pregnancy, urine [[id number]]  (Normal) Resulted:  08/07/19 1241    Lab Status:  Final result Updated:  08/07/19 1242     EXT PREG TEST UR (Ref: Negative) negative lot [id number] exp 12/31/20     Control valid    CBC and differential [[id number]]  (Abnormal) Collected:  08/07/19 1158    Lab Status:  Final result Specimen:  Blood from Arm, Right Updated:  08/07/19 1210     WBC 8 10 Thousand/uL      RBC 4 44 Million/uL      Hemoglobin 14 1 g/dL      Hematocrit 41 6 %      MCV 94 fL      MCH 31 8 pg      MCHC 33 9 g/dL      RDW 13 8 %      MPV 8 2 fL      Platelets 843 Thousands/uL      Neutrophils Relative 52 %      Lymphocytes Relative 34 %      Monocytes Relative 10 %      Eosinophils Relative 3 %      Basophils Relative 1 %      Neutrophils Absolute 4 20 Thousands/µL      Lymphocytes Absolute 2 70 Thousands/µL      Monocytes Absolute 0 80 Thousand/µL      Eosinophils Absolute 0 20 Thousand/µL      Basophils Absolute 0 10 Thousands/µL                  CT abdomen pelvis with contrast   Final Result by Aaron Werner DO (08/07 4142)   Limited evaluation of GI tract without oral contrast   Moderate amounts of retained stool the colon suggesting mild constipation  No evidence of small bowel obstruction on today's exam   Postop changes of the stomach  Gallbladder has been removed with    resulting biliary ductal dilatation which is stable from prior exam             Workstation performed: [id number]                    Procedures  Procedures       ED Course                               MDM  Number of Diagnoses or Management Options  Abdominal pain: new and requires workup  Constipation: new and requires workup  Diagnosis management comments:  Patient remained clinically and hemodynamically stable in the emergency department pain was improved with rest and IV fluids  Workup in the ED shows no evidence of acute intra-abdominal pathology I suspect her abdominal pain may be related to abdominal muscle strain and also  Constipation which is seen on CT scan  I advised rest plenty of fluids supportive care MiraLax for now continue Linzess and prompt follow-up with PCP and gastroenterologist for further evaluation and treatment and obtain test results return precautions and anticipatory guidance discussed           Amount and/or Complexity of Data Reviewed  Clinical lab tests: ordered and reviewed  Tests in the radiology section of CPT®: reviewed and ordered  Tests in the medicine section of CPT®: ordered and reviewed  Decide to obtain previous medical records or to obtain history from someone other than the patient: yes  Review and summarize past medical records: yes  Independent visualization of images, tracings, or specimens: yes    Risk of Complications, Morbidity, and/or Mortality  Presenting problems: moderate  Diagnostic procedures: moderate  Management options: moderate    Patient Progress  Patient progress: stable      Disposition  Final diagnoses:   Abdominal pain   Constipation     Time reflects when diagnosis was documented in both MDM as applicable and the Disposition within this note     Time User Action Codes Description Comment    8/7/2019  2:56 PM Shayna Theresa Add [R10 9] Abdominal pain     8/7/2019  2:56 PM Shayna Theresa Add [K59 00] Constipation       ED Disposition     ED Disposition Condition Date/Time Comment    Discharge Stable Wed Aug 7, 2019  2:56 PM Amparo Harrison discharge to home/self care  Follow-up Information     Follow up With Specialties Details Why Contact Info    Fermin Drake DO Family Medicine Schedule an appointment as soon as possible for a visit in 2 days  [address] [address]  [phone]            Patient's Medications   Discharge Prescriptions    POLYETHYLENE GLYCOL (MIRALAX) 17 G PACKET    Take 17 g by mouth daily       Start Date: 8/7/2019  End Date: --       Order Dose: 17 g       Quantity: 14 each    Refills: 0     No discharge procedures on file      ED Provider  Electronically Signed by           Spike Shah DO  08/07/19 1322

## 2019-08-07 NOTE — PROGRESS NOTES
[facility]    NAME: Ivan Basilio is a 41 y o  female  : 1975    MRN: [id number]  DATE: 2019  TIME: 11:38 AM    Assessment and Plan   Contusion of right foot, initial encounter [S90 31XA]  1  Contusion of right foot, initial encounter     2  Right foot pain  XR foot 3+ vw right   3  Left lower quadrant pain  Transfer to other facility       Patient Instructions     Patient Instructions   Apply Ice to right foot, elevate  Should improve over the next week  If not improving, follow up with ortho  Recommend you go to the emergency room for further evaluation of left lower quadrant pain  Patient agrees to go to [facility] [address]  [employer]  Chief Complaint     Chief Complaint   Patient presents with    Abdominal Pain     Pt c/o abdominal pain for two days   Foot Pain     Pt c/o right foot pain  History of Present Illness   72-year-old female here with complaint of left lower quadrant pain and right foot pain  Patient reports starting with left lower quadrant pain 2 nights ago  States that pain started after she was doing abdominal exercises at the gym  The night before that she had nausea and vomiting which had subsided  Pain is sharp in nature  States she has nausea and vomiting last evening as well  No fever or chills  She is concerned because she had a small bowel obstruction a little over a month ago which was relieved by NG tube placement  She reports that the pain is similar in nature  He is having bowel movements but they do not seem as frequent or the same as they are normally  Patient does state that she takes medication to help with constipation but her bowel movements now seem different  Denies any chance of being pregnant  Pain is more in the left lower quadrant with no radiation  She is able to eat  Patient states she dropped a chair on her right foot the other day    Area on the top of her foot is still swollen, bruised and painful  Review of Systems   Review of Systems   Constitutional: Negative for activity change, appetite change, chills, fatigue and fever  Respiratory: Negative for cough  Cardiovascular: Negative for chest pain  Gastrointestinal: Positive for abdominal pain, nausea and vomiting (See HPI)  Negative for constipation and diarrhea  Bowel movements seem less frequent   Genitourinary: Negative for difficulty urinating, dysuria, flank pain, frequency, hematuria and urgency  Musculoskeletal: Negative for back pain and myalgias  Right foot pain, swelling and bruising   All other systems reviewed and are negative        Current Medications     Current Outpatient Medications:     ALPRAZolam (XANAX) 1 mg tablet, Take 1 mg by mouth daily at bedtime  , Disp: , Rfl:     linaCLOtide (LINZESS PO), Take 290 mcg by mouth daily before breakfast, Disp: , Rfl:     lisinopril (ZESTRIL) 10 mg tablet, Take 10 mg by mouth daily, Disp: , Rfl:     medroxyPROGESTERone (DEPO-PROVERA) 150 mg/mL injection, medroxyprogesterone 150 mg/mL intramuscular suspension, Disp: , Rfl:     naproxen (NAPROSYN) 500 mg tablet, Take 1 tablet (500 mg total) by mouth 2 (two) times a day with meals, Disp: 15 tablet, Rfl: 0    ondansetron (ZOFRAN-ODT) 4 mg disintegrating tablet, Take 1 tablet (4 mg total) by mouth every 8 (eight) hours as needed for nausea or vomiting (Patient not taking: Reported on 7/23/2019), Disp: 20 tablet, Rfl: 0    OXcarbazepine (TRILEPTAL) 300 mg tablet, Take 300 mg by mouth 2 (two) times a day, Disp: , Rfl: 2    pantoprazole (PROTONIX) 40 mg tablet, Take 40 mg by mouth 2 (two) times a day  , Disp: , Rfl:     PROAIR  (90 Base) MCG/ACT inhaler, Inhale every 4 (four) hours as needed (as needed)  , Disp: , Rfl:     valACYclovir (VALTREX) 500 mg tablet, Take 500 mg by mouth daily, Disp: , Rfl:     vilazodone (VIIBRYD) 10 mg tablet, Take 2 tablets (20 mg total) by mouth daily with breakfast for 30 days, Disp: 60 tablet, Rfl: 0    Current Allergies     Allergies as of 08/07/2019 - Reviewed 08/07/2019   Allergen Reaction Noted    Lamotrigine Rash and Hives 02/24/2010          The following portions of the patient's history were reviewed and updated as appropriate: allergies, current medications, past family history, past medical history, past social history, past surgical history and problem list    Past Medical History:   Diagnosis Date    Arthritis     Bipolar 1 disorder ([facility] )     with bordeline personality    Borderline personality disorder ([facility] )     Chronic headaches     Depression     Depression     GERD (gastroesophageal reflux disease)     Hypertension     Incontinence     Migraine     Urinary tract infection      Past Surgical History:   Procedure Laterality Date    CHOLECYSTECTOMY  05/24/2018    DENTAL SURGERY      EXPLORATORY LAPAROTOMY      exlap    FOOT SURGERY Right     JOINT REPLACEMENT      KNEE ARTHROSCOPY      KNEE ARTHROSCOPY Right 11/15/2018    MULTIPLE TOOTH EXTRACTIONS  11/02/2018    OVARIAN CYST REMOVAL      REDUCTION MAMMAPLASTY      reduction      Family History   Problem Relation Age of Onset    Hyperlipidemia Father     Heart disease Mother     Migraines Mother     Depression Family      Social History     Socioeconomic History    Marital status: Single     Spouse name: Not on file    Number of children: Not on file    Years of education: Not on file    Highest education level: Not on file   Occupational History    Not on file   Social Needs    Financial resource strain: Not on file    Food insecurity:     Worry: Not on file     Inability: Not on file    Transportation needs:     Medical: Not on file     Non-medical: Not on file   Tobacco Use    Smoking status: Never Smoker    Smokeless tobacco: Never Used   Substance and Sexual Activity    Alcohol use: Not Currently     Comment: rarely    Drug use: Not Currently    Sexual activity: Not Currently Lifestyle    Physical activity:     Days per week: Not on file     Minutes per session: Not on file    Stress: Not on file   Relationships    Social connections:     Talks on phone: Not on file     Gets together: Not on file     Attends [employer] service: Not on file     Active member of club or organization: Not on file     Attends meetings of clubs or organizations: Not on file     Relationship status: Not on file    Intimate partner violence:     Fear of current or ex partner: Not on file     Emotionally abused: Not on file     Physically abused: Not on file     Forced sexual activity: Not on file   Other Topics Concern    Not on file   Social History Narrative    Not on file     Medications have been verified  Objective   BP (!) 173/98   Pulse 87   Temp (!) 97 4 °F (36 3 °C)   Resp 18   SpO2 99%      Physical Exam   Physical Exam   Constitutional: She appears well-developed and well-nourished  No distress  HENT:   Head: Normocephalic and atraumatic  Cardiovascular: Normal rate, regular rhythm and normal heart sounds  No murmur heard  Pulmonary/Chest: Effort normal and breath sounds normal  No respiratory distress  Abdominal: Normal appearance  Bowel sounds are increased  There is tenderness in the left lower quadrant  There is guarding  There is no CVA tenderness  Musculoskeletal:        Right foot: There is normal range of motion and normal capillary refill  Feet:    Nursing note and vitals reviewed

## 2019-08-07 NOTE — PATIENT INSTRUCTIONS
Apply Ice to right foot, elevate  Should improve over the next week  If not improving, follow up with ortho  Recommend you go to the emergency room for further evaluation of left lower quadrant pain  Patient agrees to go to [facility]

## 2019-08-07 NOTE — ED TRIAGE NOTES
Patient reports abd pain on Monday after doing abdominal work out and has sever lower abd pain  Patient went to urgent care today after dropping a chair on her foot  Patient states she has a hx of small bowel obstructions  Patient reports vomiting on Sunday

## 2019-08-12 ENCOUNTER — TRANSCRIBE ORDERS (OUTPATIENT)
Dept: ADMINISTRATIVE | Facility: HOSPITAL | Age: 44
End: 2019-08-12

## 2019-08-12 ENCOUNTER — HOSPITAL ENCOUNTER (OUTPATIENT)
Dept: RADIOLOGY | Facility: HOSPITAL | Age: 44
Discharge: HOME | End: 2019-08-12
Payer: COMMERCIAL

## 2019-08-12 DIAGNOSIS — M25.531 BILATERAL WRIST PAIN: ICD-10-CM

## 2019-08-12 DIAGNOSIS — M25.521 PAIN OF BOTH ELBOWS: ICD-10-CM

## 2019-08-12 DIAGNOSIS — M25.532 BILATERAL WRIST PAIN: ICD-10-CM

## 2019-08-12 DIAGNOSIS — M54.2 NECK PAIN: Primary | ICD-10-CM

## 2019-08-12 DIAGNOSIS — M25.522 PAIN OF BOTH ELBOWS: ICD-10-CM

## 2019-08-12 DIAGNOSIS — M54.2 NECK PAIN: ICD-10-CM

## 2019-08-12 PROCEDURE — 73080 X-RAY EXAM OF ELBOW: CPT

## 2019-08-12 PROCEDURE — 72050 X-RAY EXAM NECK SPINE 4/5VWS: CPT

## 2019-08-12 PROCEDURE — 73100 X-RAY EXAM OF WRIST: CPT

## 2019-08-26 ENCOUNTER — TRANSCRIBE ORDERS (OUTPATIENT)
Dept: ADMINISTRATIVE | Facility: HOSPITAL | Age: 44
End: 2019-08-26

## 2019-08-26 DIAGNOSIS — R10.32 LLQ PAIN: ICD-10-CM

## 2019-08-26 DIAGNOSIS — K56.690 OTHER PARTIAL INTESTINAL OBSTRUCTION (HCC): Primary | ICD-10-CM

## 2019-08-26 DIAGNOSIS — K21.9 GASTROESOPHAGEAL REFLUX DISEASE WITHOUT ESOPHAGITIS: ICD-10-CM

## 2019-08-26 DIAGNOSIS — R14.0 ABDOMINAL DISTENSION: ICD-10-CM

## 2019-08-26 DIAGNOSIS — K59.00 CONSTIPATION, UNSPECIFIED CONSTIPATION TYPE: ICD-10-CM

## 2019-09-12 ENCOUNTER — APPOINTMENT (EMERGENCY)
Dept: RADIOLOGY | Facility: HOSPITAL | Age: 44
End: 2019-09-12
Payer: COMMERCIAL

## 2019-09-12 ENCOUNTER — HOSPITAL ENCOUNTER (EMERGENCY)
Facility: HOSPITAL | Age: 44
Discharge: HOME | End: 2019-09-12
Attending: FAMILY MEDICINE
Payer: COMMERCIAL

## 2019-09-12 VITALS
RESPIRATION RATE: 20 BRPM | DIASTOLIC BLOOD PRESSURE: 84 MMHG | OXYGEN SATURATION: 98 % | WEIGHT: 170 LBS | HEART RATE: 70 BPM | BODY MASS INDEX: 28.32 KG/M2 | TEMPERATURE: 98.4 F | SYSTOLIC BLOOD PRESSURE: 146 MMHG | HEIGHT: 65 IN

## 2019-09-12 DIAGNOSIS — M25.561 ACUTE PAIN OF RIGHT KNEE: Primary | ICD-10-CM

## 2019-09-12 PROCEDURE — 96372 THER/PROPH/DIAG INJ SC/IM: CPT

## 2019-09-12 PROCEDURE — 99284 EMERGENCY DEPT VISIT MOD MDM: CPT

## 2019-09-12 PROCEDURE — 73560 X-RAY EXAM OF KNEE 1 OR 2: CPT

## 2019-09-12 RX ORDER — DEXAMETHASONE SODIUM PHOSPHATE 4 MG/ML
4 INJECTION, SOLUTION INTRA-ARTICULAR; INTRALESIONAL; INTRAMUSCULAR; INTRAVENOUS; SOFT TISSUE ONCE
Status: COMPLETED | OUTPATIENT
Start: 2019-09-12 | End: 2019-09-12

## 2019-09-12 RX ORDER — HYDROCODONE BITARTRATE AND ACETAMINOPHEN 5; 325 MG/1; MG/1
1 TABLET ORAL ONCE
Status: COMPLETED | OUTPATIENT
Start: 2019-09-12 | End: 2019-09-12

## 2019-09-12 RX ORDER — HYDROCODONE BITARTRATE AND ACETAMINOPHEN 5; 325 MG/1; MG/1
1 TABLET ORAL EVERY 6 HOURS PRN
Qty: 10 TABLET | Refills: 0 | Status: SHIPPED | OUTPATIENT
Start: 2019-09-12 | End: 2019-09-15

## 2019-09-12 RX ORDER — HYDROMORPHONE HCL/PF 1 MG/ML
0.5 SYRINGE (ML) INJECTION ONCE
Status: COMPLETED | OUTPATIENT
Start: 2019-09-12 | End: 2019-09-12

## 2019-09-12 RX ADMIN — DEXAMETHASONE SODIUM PHOSPHATE 4 MG: 4 INJECTION, SOLUTION INTRAMUSCULAR; INTRAVENOUS at 16:25

## 2019-09-12 RX ADMIN — HYDROMORPHONE HYDROCHLORIDE 0.5 MG: 1 INJECTION, SOLUTION INTRAMUSCULAR; INTRAVENOUS; SUBCUTANEOUS at 16:24

## 2019-09-12 RX ADMIN — HYDROCODONE BITARTRATE AND ACETAMINOPHEN 1 TABLET: 5; 325 TABLET ORAL at 15:15

## 2019-09-12 NOTE — ED PROVIDER NOTES
History  Chief Complaint   Patient presents with    Knee Injury     right knee injury  Turned and twisted knee this am       History provided by:  Patient   used: No      This is a 32-year-old female presented with complain of right knee pain that started this morning  Patient states she was jogging to start any and since then she has been having any pain  Patient also complain of any swelling that started 1-2 hours after the injury  Patient states that she has an appointment with her orthopedic on Monday morning however unable to tolerate the pain which was this ED visit  Patient states that her orthopedic recommended that she should go to the urgent care and urgent care center to the ED for MRI  Patient states she is ambulatory however does have difficulty walking  She is currently rating her pain 8/10 at this time  She states she took naproxen for pain without any improvement  Prior to Admission Medications   Prescriptions Last Dose Informant Patient Reported? Taking?    ALPRAZolam (XANAX) 1 mg tablet  Self Yes No   Sig: Take 1 mg by mouth daily at bedtime     OXcarbazepine (TRILEPTAL) 300 mg tablet  Self Yes No   Sig: Take 300 mg by mouth 2 (two) times a day   PROAIR  (90 Base) MCG/ACT inhaler  Self Yes No   Sig: Inhale every 4 (four) hours as needed (as needed)     linaCLOtide (LINZESS PO)   Yes No   Sig: Take 290 mcg by mouth daily before breakfast   lisinopril (ZESTRIL) 10 mg tablet   Yes No   Sig: Take 10 mg by mouth daily   medroxyPROGESTERone (DEPO-PROVERA) 150 mg/mL injection  Self Yes No   Sig: medroxyprogesterone 150 mg/mL intramuscular suspension   naproxen (NAPROSYN) 500 mg tablet   No No   Sig: Take 1 tablet (500 mg total) by mouth 2 (two) times a day with meals   ondansetron (ZOFRAN-ODT) 4 mg disintegrating tablet   No No   Sig: Take 1 tablet (4 mg total) by mouth every 8 (eight) hours as needed for nausea or vomiting   Patient not taking: Reported on 7/23/2019 pantoprazole (PROTONIX) 40 mg tablet  Self Yes No   Sig: Take 40 mg by mouth 2 (two) times a day     polyethylene glycol (MIRALAX) 17 g packet   No No   Sig: Take 17 g by mouth daily   valACYclovir (VALTREX) 500 mg tablet   Yes No   Sig: Take 500 mg by mouth daily   vilazodone (VIIBRYD) 10 mg tablet   No No   Sig: Take 2 tablets (20 mg total) by mouth daily with breakfast for 30 days      Facility-Administered Medications: None       Past Medical History:   Diagnosis Date    Arthritis     Bipolar 1 disorder (HCC)     with bordeline personality    Borderline personality disorder ([facility] [address] )     Chronic headaches     Depression     Depression     GERD (gastroesophageal reflux disease)     Hypertension     Incontinence     Migraine     Urinary tract infection        Past Surgical History:   Procedure Laterality Date    CHOLECYSTECTOMY  05/24/2018    DENTAL SURGERY      EXPLORATORY LAPAROTOMY      exlap    FOOT SURGERY Right     JOINT REPLACEMENT      KNEE ARTHROSCOPY      KNEE ARTHROSCOPY Right 11/15/2018    MULTIPLE TOOTH EXTRACTIONS  11/02/2018    OVARIAN CYST REMOVAL      REDUCTION MAMMAPLASTY      reduction        Family History   Problem Relation Age of Onset    Hyperlipidemia Father     Heart disease Mother     Migraines Mother     Depression Family      I have reviewed and agree with the history as documented  Social History     Tobacco Use    Smoking status: Never Smoker    Smokeless tobacco: Never Used   Substance Use Topics    Alcohol use: Not Currently     Comment: rarely    Drug use: Not Currently        Review of Systems   Constitutional: Negative  Respiratory: Negative  Cardiovascular: Negative  Musculoskeletal:        Right knee pain and swelling       Physical Exam  Physical Exam   Constitutional: She appears well-developed and well-nourished  HENT:   Head: Normocephalic and atraumatic  Cardiovascular: Normal rate, regular rhythm and normal heart sounds  Pulmonary/Chest: Effort normal and breath sounds normal    Musculoskeletal: She exhibits tenderness (Decreased range of motion secondary to pain and there is mild swelling noted  )  Neurological: She is alert  Nursing note and vitals reviewed  Vital Signs  ED Triage Vitals [09/12/19 1446]   Temperature Pulse Respirations Blood Pressure SpO2   98 °F (36 7 °C) 72 18 145/77 98 %      Temp Source Heart Rate Source Patient Position - Orthostatic VS BP Location FiO2 (%)   Temporal Monitor Sitting Left arm --      Pain Score       7           Vitals:    09/12/19 1446   BP: 145/77   Pulse: 72   Patient Position - Orthostatic VS: Sitting         Visual Acuity      ED Medications  Medications   HYDROcodone-acetaminophen (NORCO) 5-325 mg per tablet 1 tablet (1 tablet Oral Given 9/12/19 1515)   HYDROmorphone (DILAUDID) injection 0 5 mg (0 5 mg Intramuscular Given 9/12/19 1624)   dexamethasone (DECADRON) injection 4 mg (4 mg Intramuscular Given 9/12/19 1625)       Diagnostic Studies  Results Reviewed     None                 XR knee 1 or 2 views right    (Results Pending)   MRI knee right  wo contrast    (Results Pending)              Procedures  Procedures       ED Course  ED Course as of Sep 12 1641   Thu Sep 12, 2019   1606 Patient states her pain is worse patient is given MRI order recommend to call and make an appointment  Will start her on some pain medication and crutches  1640 Patient states she is feeling better  Patient is given referral to get an MRI  Return to the ED symptoms are worse  Patient states she does have crutches at home that she will use                                    MDM    Disposition  Final diagnoses:   Acute pain of right knee     Time reflects when diagnosis was documented in both MDM as applicable and the Disposition within this note     Time User Action Codes Description Comment    9/12/2019  4:03 PM Fran Jeffrey Add [M25 561] Acute pain of right knee     9/12/2019  4:03 PM Alea Hood Modify [L52 032] Acute pain of right knee       ED Disposition     ED Disposition Condition Date/Time Comment    Discharge Stable Thu Sep 12, 2019  4:40 PM Devan Briggs discharge to home/self care  Follow-up Information     Follow up With Specialties Details Why Contact Info    Haresh Escobar DO Family Medicine Schedule an appointment as soon as possible for a visit in 2 days If symptoms worsen [address] [facility]  [phone]            Patient's Medications   Discharge Prescriptions    HYDROCODONE-ACETAMINOPHEN (NORCO) 5-325 MG PER TABLET    Take 1 tablet by mouth every 6 (six) hours as needed for pain for up to 3 daysMax Daily Amount: 4 tablets       Start Date: 9/12/2019 End Date: 9/15/2019       Order Dose: 1 tablet       Quantity: 10 tablet    Refills: 0     Outpatient Discharge Orders   MRI knee right  wo contrast   Standing Status: Future Standing Exp   Date: 09/12/23       ED Provider  Electronically Signed by           Asia Cui MD  09/12/19 2268

## 2019-09-16 ENCOUNTER — TRANSCRIBE ORDERS (OUTPATIENT)
Dept: ADMINISTRATIVE | Facility: HOSPITAL | Age: 44
End: 2019-09-16

## 2019-09-16 DIAGNOSIS — M25.561 RIGHT KNEE PAIN, UNSPECIFIED CHRONICITY: Primary | ICD-10-CM

## 2019-09-19 ENCOUNTER — HOSPITAL ENCOUNTER (OUTPATIENT)
Dept: MRI IMAGING | Facility: HOSPITAL | Age: 44
Discharge: HOME | End: 2019-09-19
Payer: COMMERCIAL

## 2019-09-19 DIAGNOSIS — K56.690 OTHER PARTIAL INTESTINAL OBSTRUCTION (HCC): ICD-10-CM

## 2019-09-19 DIAGNOSIS — R10.32 LLQ PAIN: ICD-10-CM

## 2019-09-19 DIAGNOSIS — R14.0 ABDOMINAL DISTENSION: ICD-10-CM

## 2019-09-19 DIAGNOSIS — K59.00 CONSTIPATION, UNSPECIFIED CONSTIPATION TYPE: ICD-10-CM

## 2019-09-19 DIAGNOSIS — K21.9 GASTROESOPHAGEAL REFLUX DISEASE WITHOUT ESOPHAGITIS: ICD-10-CM

## 2019-09-19 PROCEDURE — 74183 MRI ABD W/O CNTR FLWD CNTR: CPT

## 2019-09-19 PROCEDURE — A9585 GADOBUTROL INJECTION: HCPCS | Performed by: RADIOLOGY

## 2019-09-19 PROCEDURE — 72197 MRI PELVIS W/O & W/DYE: CPT

## 2019-09-19 RX ADMIN — GADOBUTROL 7 ML: 604.72 INJECTION INTRAVENOUS at 09:30

## 2019-09-19 RX ADMIN — GLUCAGON HYDROCHLORIDE 1 MG: KIT at 09:48

## 2019-09-20 ENCOUNTER — HOSPITAL ENCOUNTER (OUTPATIENT)
Dept: MRI IMAGING | Facility: HOSPITAL | Age: 44
Discharge: HOME | End: 2019-09-20
Payer: COMMERCIAL

## 2019-09-20 DIAGNOSIS — M25.561 RIGHT KNEE PAIN, UNSPECIFIED CHRONICITY: ICD-10-CM

## 2019-09-20 PROCEDURE — 73721 MRI JNT OF LWR EXTRE W/O DYE: CPT

## 2019-10-10 ENCOUNTER — APPOINTMENT (OUTPATIENT)
Dept: LAB | Facility: HOSPITAL | Age: 44
End: 2019-10-10
Payer: COMMERCIAL

## 2019-10-10 ENCOUNTER — TRANSCRIBE ORDERS (OUTPATIENT)
Dept: LAB | Facility: HOSPITAL | Age: 44
End: 2019-10-10

## 2019-10-10 DIAGNOSIS — Z13.89 SCREENING FOR RHEUMATIC DISORDER: ICD-10-CM

## 2019-10-10 DIAGNOSIS — M54.50 LOW BACK PAIN, UNSPECIFIED BACK PAIN LATERALITY, UNSPECIFIED CHRONICITY, UNSPECIFIED WHETHER SCIATICA PRESENT: ICD-10-CM

## 2019-10-10 DIAGNOSIS — M79.642 PAIN IN BOTH HANDS: ICD-10-CM

## 2019-10-10 DIAGNOSIS — M76.62 ACHILLES TENDINITIS OF LEFT LOWER EXTREMITY: Primary | ICD-10-CM

## 2019-10-10 DIAGNOSIS — R53.83 FATIGUE, UNSPECIFIED TYPE: ICD-10-CM

## 2019-10-10 DIAGNOSIS — M76.62 ACHILLES TENDINITIS OF LEFT LOWER EXTREMITY: ICD-10-CM

## 2019-10-10 DIAGNOSIS — M79.641 PAIN IN BOTH HANDS: ICD-10-CM

## 2019-10-10 LAB
25(OH)D3 SERPL-MCNC: 39.8 NG/ML (ref 30–100)
ALBUMIN SERPL BCP-MCNC: 4.4 G/DL (ref 3.5–5.7)
ALP SERPL-CCNC: 56 U/L (ref 40–150)
ALT SERPL W P-5'-P-CCNC: 14 U/L (ref 7–52)
ANION GAP SERPL CALCULATED.3IONS-SCNC: 7 MMOL/L (ref 4–13)
AST SERPL W P-5'-P-CCNC: 17 U/L (ref 13–39)
BASOPHILS # BLD AUTO: 0 THOUSANDS/ΜL (ref 0–0.1)
BASOPHILS NFR BLD AUTO: 1 % (ref 0–2)
BILIRUB SERPL-MCNC: 0.3 MG/DL (ref 0.2–1)
BUN SERPL-MCNC: 12 MG/DL (ref 7–25)
CALCIUM SERPL-MCNC: 9.3 MG/DL (ref 8.6–10.5)
CHLORIDE SERPL-SCNC: 103 MMOL/L (ref 98–107)
CK SERPL-CCNC: 68 U/L (ref 30–192)
CO2 SERPL-SCNC: 26 MMOL/L (ref 21–31)
CREAT SERPL-MCNC: 0.74 MG/DL (ref 0.6–1.2)
CRP SERPL QL: <5 MG/L
EOSINOPHIL # BLD AUTO: 0 THOUSAND/ΜL (ref 0–0.61)
EOSINOPHIL NFR BLD AUTO: 0 % (ref 0–5)
ERYTHROCYTE [DISTWIDTH] IN BLOOD BY AUTOMATED COUNT: 12.8 % (ref 11.5–14.5)
ERYTHROCYTE [SEDIMENTATION RATE] IN BLOOD: 5 MM/HOUR (ref 0–20)
GFR SERPL CREATININE-BSD FRML MDRD: 101 ML/MIN/1.73SQ M
GLUCOSE P FAST SERPL-MCNC: 94 MG/DL (ref 65–99)
HAV IGM SER QL: NORMAL
HBV CORE IGM SER QL: NORMAL
HBV SURFACE AG SER QL: NORMAL
HCT VFR BLD AUTO: 43.4 % (ref 42–47)
HCV AB SER QL: NORMAL
HGB BLD-MCNC: 14.7 G/DL (ref 12–16)
LYMPHOCYTES # BLD AUTO: 1.7 THOUSANDS/ΜL (ref 0.6–4.47)
LYMPHOCYTES NFR BLD AUTO: 20 % (ref 21–51)
MCH RBC QN AUTO: 31.9 PG (ref 26–34)
MCHC RBC AUTO-ENTMCNC: 33.8 G/DL (ref 31–37)
MCV RBC AUTO: 94 FL (ref 81–99)
MONOCYTES # BLD AUTO: 0.5 THOUSAND/ΜL (ref 0.17–1.22)
MONOCYTES NFR BLD AUTO: 6 % (ref 2–12)
NEUTROPHILS # BLD AUTO: 6.2 THOUSANDS/ΜL (ref 1.4–6.5)
NEUTS SEG NFR BLD AUTO: 73 % (ref 42–75)
PLATELET # BLD AUTO: 300 THOUSANDS/UL (ref 149–390)
PMV BLD AUTO: 7.6 FL (ref 8.6–11.7)
POTASSIUM SERPL-SCNC: 3.9 MMOL/L (ref 3.5–5.5)
PROT SERPL-MCNC: 7.2 G/DL (ref 6.4–8.9)
RBC # BLD AUTO: 4.6 MILLION/UL (ref 3.9–5.2)
SODIUM SERPL-SCNC: 136 MMOL/L (ref 134–143)
TSH SERPL DL<=0.05 MIU/L-ACNC: 1.1 UIU/ML (ref 0.45–5.33)
WBC # BLD AUTO: 8.5 THOUSAND/UL (ref 4.8–10.8)

## 2019-10-10 PROCEDURE — 86140 C-REACTIVE PROTEIN: CPT

## 2019-10-10 PROCEDURE — 85652 RBC SED RATE AUTOMATED: CPT

## 2019-10-10 PROCEDURE — 85025 COMPLETE CBC W/AUTO DIFF WBC: CPT

## 2019-10-10 PROCEDURE — 86200 CCP ANTIBODY: CPT

## 2019-10-10 PROCEDURE — 81374 HLA I TYPING 1 ANTIGEN LR: CPT

## 2019-10-10 PROCEDURE — 84165 PROTEIN E-PHORESIS SERUM: CPT

## 2019-10-10 PROCEDURE — 80074 ACUTE HEPATITIS PANEL: CPT

## 2019-10-10 PROCEDURE — 82550 ASSAY OF CK (CPK): CPT

## 2019-10-10 PROCEDURE — 84443 ASSAY THYROID STIM HORMONE: CPT

## 2019-10-10 PROCEDURE — 86235 NUCLEAR ANTIGEN ANTIBODY: CPT

## 2019-10-10 PROCEDURE — 84165 PROTEIN E-PHORESIS SERUM: CPT | Performed by: PATHOLOGY

## 2019-10-10 PROCEDURE — 82306 VITAMIN D 25 HYDROXY: CPT

## 2019-10-10 PROCEDURE — 36415 COLL VENOUS BLD VENIPUNCTURE: CPT

## 2019-10-10 PROCEDURE — 86618 LYME DISEASE ANTIBODY: CPT

## 2019-10-10 PROCEDURE — 86430 RHEUMATOID FACTOR TEST QUAL: CPT

## 2019-10-10 PROCEDURE — 86038 ANTINUCLEAR ANTIBODIES: CPT

## 2019-10-10 PROCEDURE — 80053 COMPREHEN METABOLIC PANEL: CPT

## 2019-10-11 LAB
ALBUMIN SERPL ELPH-MCNC: 4.35 G/DL (ref 3.5–5)
ALBUMIN SERPL ELPH-MCNC: 62.1 % (ref 52–65)
ALPHA1 GLOB SERPL ELPH-MCNC: 0.34 G/DL (ref 0.1–0.4)
ALPHA1 GLOB SERPL ELPH-MCNC: 4.9 % (ref 2.5–5)
ALPHA2 GLOB SERPL ELPH-MCNC: 0.7 G/DL (ref 0.4–1.2)
ALPHA2 GLOB SERPL ELPH-MCNC: 10 % (ref 7–13)
B BURGDOR IGG+IGM SER-ACNC: <0.91 ISR (ref 0–0.9)
BETA GLOB ABNORMAL SERPL ELPH-MCNC: 0.44 G/DL (ref 0.4–0.8)
BETA1 GLOB SERPL ELPH-MCNC: 6.3 % (ref 5–13)
BETA2 GLOB SERPL ELPH-MCNC: 4.9 % (ref 2–8)
BETA2+GAMMA GLOB SERPL ELPH-MCNC: 0.34 G/DL (ref 0.2–0.5)
CCP IGA+IGG SERPL IA-ACNC: 7 UNITS (ref 0–19)
ENA SS-A AB SER-ACNC: <0.2 AI (ref 0–0.9)
ENA SS-B AB SER-ACNC: <0.2 AI (ref 0–0.9)
GAMMA GLOB ABNORMAL SERPL ELPH-MCNC: 0.83 G/DL (ref 0.5–1.6)
GAMMA GLOB SERPL ELPH-MCNC: 11.8 % (ref 12–22)
IGG/ALB SER: 1.64 {RATIO} (ref 1.1–1.8)
PROT PATTERN SERPL ELPH-IMP: ABNORMAL
PROT SERPL-MCNC: 7 G/DL (ref 6.4–8.2)
RHEUMATOID FACT SER QL LA: NEGATIVE
RYE IGE QN: NEGATIVE

## 2019-10-15 LAB — HLA-B27 QL NAA+PROBE: POSITIVE

## 2019-10-21 ENCOUNTER — TRANSCRIBE ORDERS (OUTPATIENT)
Dept: ADMINISTRATIVE | Facility: HOSPITAL | Age: 44
End: 2019-10-21

## 2019-10-21 DIAGNOSIS — R78.89 ABNORMAL BLOOD LEVEL OF LITHIUM: Primary | ICD-10-CM

## 2019-10-21 DIAGNOSIS — M54.50 LOW BACK PAIN WITHOUT SCIATICA, UNSPECIFIED BACK PAIN LATERALITY, UNSPECIFIED CHRONICITY: ICD-10-CM

## 2019-10-29 ENCOUNTER — HOSPITAL ENCOUNTER (OUTPATIENT)
Dept: MRI IMAGING | Facility: HOSPITAL | Age: 44
Discharge: HOME | End: 2019-10-29
Payer: COMMERCIAL

## 2019-10-29 DIAGNOSIS — M54.50 LOW BACK PAIN WITHOUT SCIATICA, UNSPECIFIED BACK PAIN LATERALITY, UNSPECIFIED CHRONICITY: ICD-10-CM

## 2019-10-29 DIAGNOSIS — R78.89 ABNORMAL BLOOD LEVEL OF LITHIUM: ICD-10-CM

## 2019-10-29 PROCEDURE — 72197 MRI PELVIS W/O & W/DYE: CPT

## 2019-10-29 PROCEDURE — A9585 GADOBUTROL INJECTION: HCPCS | Performed by: RADIOLOGY

## 2019-10-29 RX ADMIN — GADOBUTROL 10 ML: 604.72 INJECTION INTRAVENOUS at 12:27

## 2019-11-18 ENCOUNTER — TRANSCRIBE ORDERS (OUTPATIENT)
Dept: ADMINISTRATIVE | Facility: HOSPITAL | Age: 44
End: 2019-11-18

## 2019-11-18 DIAGNOSIS — R09.89 OTHER SPECIFIED SYMPTOMS AND SIGNS INVOLVING THE CIRCULATORY AND RESPIRATORY SYSTEMS: Primary | ICD-10-CM

## 2019-11-18 DIAGNOSIS — R20.9 BILATERAL COLD FEET: ICD-10-CM

## 2019-11-19 ENCOUNTER — HOSPITAL ENCOUNTER (OUTPATIENT)
Dept: NON INVASIVE DIAGNOSTICS | Facility: HOSPITAL | Age: 44
Discharge: HOME | End: 2019-11-19
Payer: COMMERCIAL

## 2019-11-19 DIAGNOSIS — R20.9 BILATERAL COLD FEET: ICD-10-CM

## 2019-11-19 DIAGNOSIS — R09.89 OTHER SPECIFIED SYMPTOMS AND SIGNS INVOLVING THE CIRCULATORY AND RESPIRATORY SYSTEMS: ICD-10-CM

## 2019-11-19 PROCEDURE — 93923 UPR/LXTR ART STDY 3+ LVLS: CPT | Performed by: SURGERY

## 2019-11-19 PROCEDURE — 93923 UPR/LXTR ART STDY 3+ LVLS: CPT

## 2020-01-29 ENCOUNTER — HOSPITAL ENCOUNTER (EMERGENCY)
Facility: HOSPITAL | Age: 45
Discharge: HOME | End: 2020-01-29
Attending: EMERGENCY MEDICINE
Payer: COMMERCIAL

## 2020-01-29 VITALS
TEMPERATURE: 97.3 F | SYSTOLIC BLOOD PRESSURE: 151 MMHG | BODY MASS INDEX: 31.65 KG/M2 | WEIGHT: 190 LBS | HEIGHT: 65 IN | OXYGEN SATURATION: 99 % | DIASTOLIC BLOOD PRESSURE: 92 MMHG | HEART RATE: 93 BPM | RESPIRATION RATE: 20 BRPM

## 2020-01-29 DIAGNOSIS — G47.00 INSOMNIA: Primary | ICD-10-CM

## 2020-01-29 PROCEDURE — 99284 EMERGENCY DEPT VISIT MOD MDM: CPT | Performed by: EMERGENCY MEDICINE

## 2020-01-29 PROCEDURE — 99283 EMERGENCY DEPT VISIT LOW MDM: CPT

## 2020-01-29 RX ORDER — AMITRIPTYLINE HYDROCHLORIDE 10 MG/1
10 TABLET, FILM COATED ORAL
Status: ON HOLD | COMMUNITY
End: 2020-03-09

## 2020-01-29 RX ORDER — ZOLPIDEM TARTRATE 10 MG/1
10 TABLET ORAL
Qty: 10 TABLET | Refills: 0 | Status: SHIPPED | OUTPATIENT
Start: 2020-01-29 | End: 2020-08-24 | Stop reason: HOSPADM

## 2020-01-29 NOTE — ED PROVIDER NOTES
History  Chief Complaint   Patient presents with    Insomnia     i only sleep 4 hours a night  [facility]     Patient is a 82-year-old female  She has a history of anxiety  She also has a history of insomnia  Lately she goes to bed around 10 30 at night and only sleeps 9:00 p m  Taylor Mckeon She is unable to get back to bed  She feels as if she is getting worn down  She is finding it difficult to work  She denies depression currently or any suicidal ideation  She has tried amitriptyline without relief  Been on melatonin without relief  No relief with doxepin either  She does have an appointment next month at a sleep center  She might be able to get in with her psychiatrist next week  She just wants to get a good night sleep  She has had some success with Ambien in the past           Prior to Admission Medications   Prescriptions Last Dose Informant Patient Reported? Taking?    ALPRAZolam (XANAX) 1 mg tablet  Self Yes No   Sig: Take 1 mg by mouth daily at bedtime     PROAIR  (90 Base) MCG/ACT inhaler  Self Yes No   Sig: Inhale every 4 (four) hours as needed (as needed)     amitriptyline (ELAVIL) 10 mg tablet   Yes Yes   Sig: Take 10 mg by mouth daily at bedtime   cariprazine (VRAYLAR) 1 5 MG capsule   Yes Yes   Sig: Take 1 5 mg by mouth daily   linaCLOtide (LINZESS PO)   Yes No   Sig: Take 290 mcg by mouth daily before breakfast   lisinopril (ZESTRIL) 10 mg tablet   Yes No   Sig: Take 10 mg by mouth daily   medroxyPROGESTERone (DEPO-PROVERA) 150 mg/mL injection  Self Yes No   Sig: medroxyprogesterone 150 mg/mL intramuscular suspension   ondansetron (ZOFRAN-ODT) 4 mg disintegrating tablet   No No   Sig: Take 1 tablet (4 mg total) by mouth every 8 (eight) hours as needed for nausea or vomiting   Patient not taking: Reported on 7/23/2019   pantoprazole (PROTONIX) 40 mg tablet  Self Yes No   Sig: Take 40 mg by mouth 2 (two) times a day     polyethylene glycol (MIRALAX) 17 g packet   No No   Sig: Take 17 g by mouth daily   valACYclovir (VALTREX) 500 mg tablet   Yes No   Sig: Take 500 mg by mouth daily   vilazodone (VIIBRYD) 10 mg tablet   No No   Sig: Take 2 tablets (20 mg total) by mouth daily with breakfast for 30 days      Facility-Administered Medications: None       Past Medical History:   Diagnosis Date    Arthritis     Bipolar 1 disorder (HCC)     with bordeline personality    Borderline personality disorder ([address] )     Chronic headaches     Depression     Depression     GERD (gastroesophageal reflux disease)     Hypertension     Incontinence     Migraine     Urinary tract infection        Past Surgical History:   Procedure Laterality Date    CHOLECYSTECTOMY  05/24/2018    DENTAL SURGERY      EXPLORATORY LAPAROTOMY      exlap    FOOT SURGERY Right     JOINT REPLACEMENT      KNEE ARTHROSCOPY      KNEE ARTHROSCOPY Right 11/15/2018    MULTIPLE TOOTH EXTRACTIONS  11/02/2018    OVARIAN CYST REMOVAL      REDUCTION MAMMAPLASTY      reduction        Family History   Problem Relation Age of Onset    Hyperlipidemia Father     Heart disease Mother     Migraines Mother     Depression Family      I have reviewed and agree with the history as documented  Social History     Tobacco Use    Smoking status: Never Smoker    Smokeless tobacco: Never Used   Substance Use Topics    Alcohol use: Not Currently     Comment: rarely    Drug use: Not Currently        Review of Systems   Constitutional: Negative for chills and fever  HENT: Negative for rhinorrhea and sore throat  Eyes: Negative for pain, redness and visual disturbance  Respiratory: Negative for cough and shortness of breath  Cardiovascular: Negative for chest pain and leg swelling  Gastrointestinal: Negative for abdominal pain, diarrhea and vomiting  Endocrine: Negative for polydipsia and polyuria     Genitourinary: Negative for dysuria, frequency, hematuria, vaginal bleeding and vaginal discharge  Musculoskeletal: Negative for back pain and neck pain  Skin: Negative for rash and wound  Allergic/Immunologic: Negative for immunocompromised state  Neurological: Negative for weakness, numbness and headaches  Hematological: Does not bruise/bleed easily  Psychiatric/Behavioral: Positive for sleep disturbance  Negative for dysphoric mood, hallucinations and suicidal ideas  The patient is nervous/anxious  All other systems reviewed and are negative  Physical Exam  Physical Exam   Constitutional: She is oriented to person, place, and time  She appears well-developed and well-nourished  HENT:   Head: Normocephalic and atraumatic  Mouth/Throat: Oropharynx is clear and moist    Eyes: Pupils are equal, round, and reactive to light  Conjunctivae and EOM are normal  Right eye exhibits no discharge  Left eye exhibits no discharge  No scleral icterus  Neck: Normal range of motion  Neck supple  Cardiovascular: Normal rate, regular rhythm, normal heart sounds and intact distal pulses  Exam reveals no gallop and no friction rub  No murmur heard  Pulmonary/Chest: Effort normal and breath sounds normal  No stridor  No respiratory distress  She has no wheezes  She has no rales  Abdominal: Soft  Bowel sounds are normal  She exhibits no distension  There is no tenderness  There is no rebound and no guarding  Musculoskeletal: Normal range of motion  She exhibits no edema, tenderness or deformity  No CVA tenderness  No calf tenderness/palpable cords  Neurological: She is alert and oriented to person, place, and time  She has normal strength  No sensory deficit  GCS eye subscore is 4  GCS verbal subscore is 5  GCS motor subscore is 6  Skin: Skin is warm and dry  No rash noted  Psychiatric:   Anxious female  Crying  Vitals reviewed        Vital Signs  ED Triage Vitals [01/29/20 1744]   Temperature Pulse Respirations Blood Pressure SpO2   (!) 97 3 °F (36 3 °C) 93 20 151/92 99 %      Temp Source Heart Rate Source Patient Position - Orthostatic VS BP Location FiO2 (%)   Temporal Monitor Sitting Left arm --      Pain Score       2           Vitals:    01/29/20 1744   BP: 151/92   Pulse: 93   Patient Position - Orthostatic VS: Sitting         Visual Acuity      ED Medications  Medications - No data to display    Diagnostic Studies  Results Reviewed     None                 No orders to display              Procedures  Procedures         ED Course                               MDM  Number of Diagnoses or Management Options  Diagnosis management comments: Has had some success with Ambien in the past   Will try that  Recommended follow up with her psychiatrist and sleep specialist   Appropriate for discharge and outpatient management        Disposition  Final diagnoses:   None     ED Disposition     None      Follow-up Information    None         Patient's Medications   Discharge Prescriptions    No medications on file     No discharge procedures on file      ED Provider  Electronically Signed by           Arta Carrel, MD  01/29/20 3260

## 2020-02-21 ENCOUNTER — APPOINTMENT (OUTPATIENT)
Dept: LAB | Facility: HOSPITAL | Age: 45
End: 2020-02-21
Payer: COMMERCIAL

## 2020-02-21 ENCOUNTER — TRANSCRIBE ORDERS (OUTPATIENT)
Dept: LAB | Facility: HOSPITAL | Age: 45
End: 2020-02-21

## 2020-02-21 DIAGNOSIS — K31.84 GASTROPARALYSIS: ICD-10-CM

## 2020-02-21 DIAGNOSIS — R11.2 NAUSEA AND VOMITING, INTRACTABILITY OF VOMITING NOT SPECIFIED, UNSPECIFIED VOMITING TYPE: ICD-10-CM

## 2020-02-21 DIAGNOSIS — R10.9 ABDOMINAL PAIN, UNSPECIFIED ABDOMINAL LOCATION: ICD-10-CM

## 2020-02-21 DIAGNOSIS — R10.9 ABDOMINAL PAIN, UNSPECIFIED ABDOMINAL LOCATION: Primary | ICD-10-CM

## 2020-02-21 DIAGNOSIS — R63.5 WEIGHT GAIN: ICD-10-CM

## 2020-02-21 LAB
EST. AVERAGE GLUCOSE BLD GHB EST-MCNC: 103 MG/DL
HBA1C MFR BLD: 5.2 %
T4 FREE SERPL-MCNC: 1.08 NG/DL (ref 0.76–1.46)
TSH SERPL DL<=0.05 MIU/L-ACNC: 1.23 UIU/ML (ref 0.45–5.33)

## 2020-02-21 PROCEDURE — 36415 COLL VENOUS BLD VENIPUNCTURE: CPT

## 2020-02-21 PROCEDURE — 84439 ASSAY OF FREE THYROXINE: CPT

## 2020-02-21 PROCEDURE — 83036 HEMOGLOBIN GLYCOSYLATED A1C: CPT

## 2020-02-21 PROCEDURE — 84443 ASSAY THYROID STIM HORMONE: CPT

## 2020-02-22 ENCOUNTER — OFFICE VISIT (OUTPATIENT)
Dept: URGENT CARE | Facility: CLINIC | Age: 45
End: 2020-02-22
Payer: COMMERCIAL

## 2020-02-22 VITALS
HEIGHT: 65 IN | RESPIRATION RATE: 18 BRPM | OXYGEN SATURATION: 99 % | WEIGHT: 190 LBS | TEMPERATURE: 98 F | HEART RATE: 60 BPM | BODY MASS INDEX: 31.65 KG/M2 | SYSTOLIC BLOOD PRESSURE: 140 MMHG | DIASTOLIC BLOOD PRESSURE: 82 MMHG

## 2020-02-22 DIAGNOSIS — J02.9 ACUTE PHARYNGITIS, UNSPECIFIED ETIOLOGY: Primary | ICD-10-CM

## 2020-02-22 LAB — S PYO AG THROAT QL: NEGATIVE

## 2020-02-22 PROCEDURE — G0463 HOSPITAL OUTPT CLINIC VISIT: HCPCS | Performed by: PHYSICIAN ASSISTANT

## 2020-02-22 PROCEDURE — 87070 CULTURE OTHR SPECIMN AEROBIC: CPT | Performed by: PHYSICIAN ASSISTANT

## 2020-02-22 PROCEDURE — 87880 STREP A ASSAY W/OPTIC: CPT | Performed by: PHYSICIAN ASSISTANT

## 2020-02-22 PROCEDURE — 99213 OFFICE O/P EST LOW 20 MIN: CPT | Performed by: PHYSICIAN ASSISTANT

## 2020-02-22 RX ORDER — ATENOLOL 25 MG/1
25 TABLET ORAL DAILY
Status: ON HOLD | COMMUNITY
End: 2020-03-09

## 2020-02-22 RX ORDER — PREDNISONE 10 MG/1
TABLET ORAL
Qty: 26 TABLET | Refills: 0 | Status: ON HOLD | OUTPATIENT
Start: 2020-02-22 | End: 2020-03-09

## 2020-02-22 RX ORDER — ATENOLOL 25 MG/1
25 TABLET ORAL
COMMUNITY
Start: 2020-02-12 | End: 2020-08-01 | Stop reason: HOSPADM

## 2020-02-22 RX ORDER — AMOXICILLIN 875 MG/1
875 TABLET, COATED ORAL 2 TIMES DAILY
Qty: 20 TABLET | Refills: 0 | Status: SHIPPED | OUTPATIENT
Start: 2020-02-22 | End: 2020-03-03

## 2020-02-22 NOTE — LETTER
February 22, 2020     Patient: Lisette Dugan   YOB: 1975   Date of Visit: 2/22/2020       To Whom It May Concern: It is my medical opinion that Macy Acevedo should not return to work until 2/25/2020  If you have any questions or concerns, please don't hesitate to call  Sincerely,        Arline Corea PA-C    CC: Amparo Curtis

## 2020-02-22 NOTE — PROGRESS NOTES
[facility]    NAME: Fernando Eddy is a 45 y o  female  : 1975    MRN: [id number]  DATE: 2020  TIME: 1:38 PM    Assessment and Plan   Acute pharyngitis, unspecified etiology [J02 9]  1  Acute pharyngitis, unspecified etiology  amoxicillin (AMOXIL) 875 mg tablet    predniSONE 10 mg tablet    POCT rapid strepA    Throat culture       Patient Instructions     Patient Instructions   I have prescribed an antibiotic for the infection  Please take the antibiotic as prescribed and finish the entire prescription  I recommend that the patient takes an over the counter probiotic or eats yogurt with live cultures in it [place]) to keep good bacteria in the gut and help prevent diarrhea  Wash hands frequently to prevent the spread of infection  Can use over the counter cough and cold medications to help with symptoms  Ibuprofen and/or tylenol as needed for pain or fever  If not improving over the next 7-10 days, follow up with PCP  Chief Complaint     Chief Complaint   Patient presents with    Earache     right earache and headache for 1 week    Nausea     for 3 days    Sore Throat     sore throat for 1 day       History of Present Illness   82-year-old female here with of sore throat for the last day  Had headache for the last week with a right earache  Has been nauseated as well  No fever, chills  Review of Systems   Review of Systems   Constitutional: Positive for chills and fever  Negative for appetite change  HENT: Positive for ear pain and sore throat  Negative for congestion, ear discharge, facial swelling, postnasal drip, sinus pressure and sneezing  Respiratory: Negative for cough, shortness of breath and wheezing  Gastrointestinal: Positive for nausea  Neurological: Positive for headaches         Current Medications     Current Outpatient Medications:     ALPRAZolam (XANAX) 1 mg tablet, Take 1 mg by mouth daily at bedtime  , Disp: , Rfl:     atenolol (TENORMIN) 25 mg tablet, Take 25 mg by mouth daily, Disp: , Rfl:     atenolol (TENORMIN) 25 mg tablet, Take 25 mg by mouth daily, Disp: , Rfl:     cariprazine (VRAYLAR) 1 5 MG capsule, Take 1 5 mg by mouth daily, Disp: , Rfl:     linaCLOtide (LINZESS PO), Take 290 mcg by mouth daily before breakfast, Disp: , Rfl:     lisinopril (ZESTRIL) 10 mg tablet, Take 10 mg by mouth daily, Disp: , Rfl:     medroxyPROGESTERone (DEPO-PROVERA) 150 mg/mL injection, medroxyprogesterone 150 mg/mL intramuscular suspension, Disp: , Rfl:     ondansetron (ZOFRAN-ODT) 4 mg disintegrating tablet, Take 1 tablet (4 mg total) by mouth every 8 (eight) hours as needed for nausea or vomiting, Disp: 20 tablet, Rfl: 0    pantoprazole (PROTONIX) 40 mg tablet, Take 40 mg by mouth 2 (two) times a day  , Disp: , Rfl:     PROAIR  (90 Base) MCG/ACT inhaler, Inhale every 4 (four) hours as needed (as needed)  , Disp: , Rfl:     valACYclovir (VALTREX) 500 mg tablet, Take 500 mg by mouth daily, Disp: , Rfl:     zolpidem (AMBIEN) 10 mg tablet, Take 1 tablet (10 mg total) by mouth daily at bedtime as needed for sleep, Disp: 10 tablet, Rfl: 0    amitriptyline (ELAVIL) 10 mg tablet, Take 10 mg by mouth daily at bedtime, Disp: , Rfl:     amoxicillin (AMOXIL) 875 mg tablet, Take 1 tablet (875 mg total) by mouth 2 (two) times a day for 10 days, Disp: 20 tablet, Rfl: 0    polyethylene glycol (MIRALAX) 17 g packet, Take 17 g by mouth daily, Disp: 14 each, Rfl: 0    predniSONE 10 mg tablet, Take 3 tabs BID X 2 days, 2 tabs BID X 2 days, 1 tab BID X 2 days, 1 tab daily X 2 days, Disp: 26 tablet, Rfl: 0    vilazodone (VIIBRYD) 10 mg tablet, Take 2 tablets (20 mg total) by mouth daily with breakfast for 30 days, Disp: 60 tablet, Rfl: 0    Current Allergies     Allergies as of 02/22/2020 - Reviewed 02/22/2020   Allergen Reaction Noted    Celecoxib Hives 11/11/2019    Lamotrigine Rash and Hives 02/24/2010          The following portions of the patient's history were reviewed and updated as appropriate: allergies, current medications, past family history, past medical history, past social history, past surgical history and problem list    Past Medical History:   Diagnosis Date    Arthritis     Bipolar 1 disorder ([facility] )     with bordeline personality    Borderline personality disorder ([facility] )     Chronic headaches     Depression     Depression     GERD (gastroesophageal reflux disease)     Hypertension     Incontinence     Migraine     Urinary tract infection      Past Surgical History:   Procedure Laterality Date    CHOLECYSTECTOMY  05/24/2018    DENTAL SURGERY      EXPLORATORY LAPAROTOMY      exlap    FOOT SURGERY Right     JOINT REPLACEMENT      KNEE ARTHROSCOPY      KNEE ARTHROSCOPY Right 11/15/2018    MULTIPLE TOOTH EXTRACTIONS  11/02/2018    OVARIAN CYST REMOVAL      REDUCTION MAMMAPLASTY      reduction      Family History   Problem Relation Age of Onset    Hyperlipidemia Father     Heart disease Mother     Migraines Mother     Depression Family      Social History     Socioeconomic History    Marital status: Single     Spouse name: Not on file    Number of children: Not on file    Years of education: Not on file    Highest education level: Not on file   Occupational History    Not on file   Social Needs    Financial resource strain: Not on file    Food insecurity:     Worry: Not on file     Inability: Not on file    Transportation needs:     Medical: Not on file     Non-medical: Not on file   Tobacco Use    Smoking status: Never Smoker    Smokeless tobacco: Never Used   Substance and Sexual Activity    Alcohol use: Not Currently     Comment: rarely    Drug use: Not Currently    Sexual activity: Not Currently   Lifestyle    Physical activity:     Days per week: Not on file     Minutes per session: Not on file    Stress: Not on file   Relationships    Social connections:     Talks on phone: Not on file     Gets together: Not on file     Attends [employer] service: Not on file     Active member of club or organization: Not on file     Attends meetings of clubs or organizations: Not on file     Relationship status: Not on file    Intimate partner violence:     Fear of current or ex partner: Not on file     Emotionally abused: Not on file     Physically abused: Not on file     Forced sexual activity: Not on file   Other Topics Concern    Not on file   Social History Narrative    Not on file     Medications have been verified  Objective   /82   Pulse 60   Temp 98 °F (36 7 °C) (Tympanic)   Resp 18   Ht 5' 5" (1 651 m)   Wt 86 2 kg (190 lb)   SpO2 99%   BMI 31 62 kg/m²      Physical Exam   Physical Exam   Constitutional: She appears well-developed and well-nourished  No distress  HENT:   Head: Normocephalic and atraumatic  Right Ear: A middle ear effusion (right) is present  Left Ear: Tympanic membrane normal    Nose: Nose normal  No mucosal edema or rhinorrhea  Right sinus exhibits no maxillary sinus tenderness and no frontal sinus tenderness  Left sinus exhibits no maxillary sinus tenderness and no frontal sinus tenderness  Mouth/Throat: Posterior oropharyngeal edema and posterior oropharyngeal erythema present  No oropharyngeal exudate  Eyes: Conjunctivae are normal    Cardiovascular: Normal rate, regular rhythm and normal heart sounds  No murmur heard  Nursing note and vitals reviewed

## 2020-02-24 ENCOUNTER — HOSPITAL ENCOUNTER (OUTPATIENT)
Dept: RADIOLOGY | Facility: HOSPITAL | Age: 45
Discharge: HOME | End: 2020-02-24
Payer: COMMERCIAL

## 2020-02-24 ENCOUNTER — TRANSCRIBE ORDERS (OUTPATIENT)
Dept: ADMINISTRATIVE | Facility: HOSPITAL | Age: 45
End: 2020-02-24

## 2020-02-24 DIAGNOSIS — K56.690 OTHER PARTIAL INTESTINAL OBSTRUCTION (HCC): ICD-10-CM

## 2020-02-24 DIAGNOSIS — K59.00 CONSTIPATION, UNSPECIFIED CONSTIPATION TYPE: ICD-10-CM

## 2020-02-24 DIAGNOSIS — R11.2 NAUSEA AND VOMITING, INTRACTABILITY OF VOMITING NOT SPECIFIED, UNSPECIFIED VOMITING TYPE: ICD-10-CM

## 2020-02-24 DIAGNOSIS — K31.84 GASTRIC PARESIS: ICD-10-CM

## 2020-02-24 DIAGNOSIS — K21.9 GASTROESOPHAGEAL REFLUX DISEASE WITHOUT ESOPHAGITIS: ICD-10-CM

## 2020-02-24 DIAGNOSIS — K59.00 CONSTIPATION, UNSPECIFIED CONSTIPATION TYPE: Primary | ICD-10-CM

## 2020-02-24 LAB — BACTERIA THROAT CULT: NORMAL

## 2020-02-24 PROCEDURE — 74018 RADEX ABDOMEN 1 VIEW: CPT

## 2020-02-26 ENCOUNTER — HOSPITAL ENCOUNTER (OUTPATIENT)
Dept: RADIOLOGY | Facility: HOSPITAL | Age: 45
Discharge: HOME | End: 2020-02-26
Payer: COMMERCIAL

## 2020-02-26 DIAGNOSIS — R11.2 NAUSEA AND VOMITING, INTRACTABILITY OF VOMITING NOT SPECIFIED, UNSPECIFIED VOMITING TYPE: ICD-10-CM

## 2020-02-26 DIAGNOSIS — K31.84 GASTRIC PARESIS: ICD-10-CM

## 2020-02-26 DIAGNOSIS — K21.9 GASTROESOPHAGEAL REFLUX DISEASE WITHOUT ESOPHAGITIS: ICD-10-CM

## 2020-02-26 DIAGNOSIS — K59.00 CONSTIPATION, UNSPECIFIED CONSTIPATION TYPE: ICD-10-CM

## 2020-02-26 DIAGNOSIS — K56.690 OTHER PARTIAL INTESTINAL OBSTRUCTION (HCC): ICD-10-CM

## 2020-02-26 PROCEDURE — 74018 RADEX ABDOMEN 1 VIEW: CPT

## 2020-02-28 ENCOUNTER — HOSPITAL ENCOUNTER (OUTPATIENT)
Dept: RADIOLOGY | Facility: HOSPITAL | Age: 45
Discharge: HOME | End: 2020-02-28
Payer: COMMERCIAL

## 2020-02-28 ENCOUNTER — TRANSCRIBE ORDERS (OUTPATIENT)
Dept: ADMINISTRATIVE | Facility: HOSPITAL | Age: 45
End: 2020-02-28

## 2020-02-28 DIAGNOSIS — K21.9 GASTROESOPHAGEAL REFLUX DISEASE WITHOUT ESOPHAGITIS: ICD-10-CM

## 2020-02-28 DIAGNOSIS — K31.84 GASTROPARESIS: ICD-10-CM

## 2020-02-28 DIAGNOSIS — K56.690 OTHER PARTIAL INTESTINAL OBSTRUCTION (HCC): ICD-10-CM

## 2020-02-28 DIAGNOSIS — K59.00 CONSTIPATION, UNSPECIFIED CONSTIPATION TYPE: Primary | ICD-10-CM

## 2020-02-28 DIAGNOSIS — R10.9 STOMACH ACHE: ICD-10-CM

## 2020-02-28 DIAGNOSIS — K59.00 CONSTIPATION, UNSPECIFIED CONSTIPATION TYPE: ICD-10-CM

## 2020-02-28 DIAGNOSIS — R11.2 NAUSEA AND VOMITING, INTRACTABILITY OF VOMITING NOT SPECIFIED, UNSPECIFIED VOMITING TYPE: ICD-10-CM

## 2020-02-28 PROCEDURE — 74018 RADEX ABDOMEN 1 VIEW: CPT

## 2020-03-09 ENCOUNTER — APPOINTMENT (INPATIENT)
Dept: RADIOLOGY | Facility: HOSPITAL | Age: 45
DRG: 390 | End: 2020-03-09
Attending: EMERGENCY MEDICINE
Payer: COMMERCIAL

## 2020-03-09 ENCOUNTER — APPOINTMENT (EMERGENCY)
Dept: CT IMAGING | Facility: HOSPITAL | Age: 45
DRG: 390 | End: 2020-03-09
Payer: COMMERCIAL

## 2020-03-09 ENCOUNTER — HOSPITAL ENCOUNTER (INPATIENT)
Facility: HOSPITAL | Age: 45
LOS: 1 days | Discharge: HOME | DRG: 390 | End: 2020-03-10
Attending: EMERGENCY MEDICINE | Admitting: SPECIALIST
Payer: COMMERCIAL

## 2020-03-09 DIAGNOSIS — K56.609 SMALL BOWEL OBSTRUCTION (HCC): Primary | ICD-10-CM

## 2020-03-09 DIAGNOSIS — F33.2 SEVERE EPISODE OF RECURRENT MAJOR DEPRESSIVE DISORDER, WITHOUT PSYCHOTIC FEATURES (HCC): ICD-10-CM

## 2020-03-09 LAB
ALBUMIN SERPL BCP-MCNC: 4.6 G/DL (ref 3.5–5.7)
ALP SERPL-CCNC: 51 U/L (ref 40–150)
ALT SERPL W P-5'-P-CCNC: 46 U/L (ref 7–52)
ANION GAP SERPL CALCULATED.3IONS-SCNC: 13 MMOL/L (ref 4–13)
APTT PPP: 30 SECONDS (ref 23–37)
AST SERPL W P-5'-P-CCNC: 40 U/L (ref 13–39)
ATRIAL RATE: 78 BPM
BACTERIA UR QL AUTO: ABNORMAL /HPF
BILIRUB SERPL-MCNC: 0.6 MG/DL (ref 0.2–1)
BILIRUB UR QL STRIP: ABNORMAL
BILIRUB UR QL STRIP: NEGATIVE
BUN SERPL-MCNC: 19 MG/DL (ref 7–25)
CALCIUM SERPL-MCNC: 9.6 MG/DL (ref 8.6–10.5)
CHLORIDE SERPL-SCNC: 103 MMOL/L (ref 98–107)
CLARITY UR: CLEAR
CLARITY UR: CLEAR
CO2 SERPL-SCNC: 20 MMOL/L (ref 21–31)
COLOR UR: YELLOW
COLOR UR: YELLOW
CREAT SERPL-MCNC: 0.85 MG/DL (ref 0.6–1.2)
ERYTHROCYTE [DISTWIDTH] IN BLOOD BY AUTOMATED COUNT: 13.6 % (ref 11.5–14.5)
EXT PREG TEST URINE: NEGATIVE
EXT. CONTROL ED NAV: NORMAL
GFR SERPL CREATININE-BSD FRML MDRD: 85 ML/MIN/1.73SQ M
GLUCOSE SERPL-MCNC: 155 MG/DL (ref 65–99)
GLUCOSE UR STRIP-MCNC: NEGATIVE MG/DL
GLUCOSE UR STRIP-MCNC: NEGATIVE MG/DL
HCT VFR BLD AUTO: 50.5 % (ref 42–47)
HGB BLD-MCNC: 16.8 G/DL (ref 12–16)
HGB UR QL STRIP.AUTO: NEGATIVE
HGB UR QL STRIP.AUTO: NEGATIVE
INR PPP: 0.92 (ref 0.9–1.5)
KETONES UR STRIP-MCNC: NEGATIVE MG/DL
KETONES UR STRIP-MCNC: NEGATIVE MG/DL
LEUKOCYTE ESTERASE UR QL STRIP: NEGATIVE
LEUKOCYTE ESTERASE UR QL STRIP: NEGATIVE
LIPASE SERPL-CCNC: 19 U/L (ref 11–82)
LYMPHOCYTES # BLD AUTO: 1.25 THOUSAND/UL (ref 0.6–4.47)
LYMPHOCYTES # BLD AUTO: 6 % (ref 20–51)
MCH RBC QN AUTO: 31.1 PG (ref 26–34)
MCHC RBC AUTO-ENTMCNC: 33.2 G/DL (ref 31–37)
MCV RBC AUTO: 94 FL (ref 81–99)
MONOCYTES # BLD AUTO: 1.05 THOUSAND/UL (ref 0–1.22)
MONOCYTES NFR BLD AUTO: 5 % (ref 4–12)
MUCOUS THREADS UR QL AUTO: ABNORMAL
NEUTS SEG # BLD: 18.6 THOUSAND/UL (ref 1.81–6.82)
NEUTS SEG NFR BLD AUTO: 89 % (ref 42–75)
NITRITE UR QL STRIP: NEGATIVE
NITRITE UR QL STRIP: NEGATIVE
NON-SQ EPI CELLS URNS QL MICRO: ABNORMAL /HPF
P AXIS: 12 DEGREES
PH UR STRIP.AUTO: 5.5 [PH]
PH UR STRIP.AUTO: 5.5 [PH]
PLATELET # BLD AUTO: 319 THOUSANDS/UL (ref 149–390)
PLATELET BLD QL SMEAR: ADEQUATE
PMV BLD AUTO: 7.8 FL (ref 8.6–11.7)
POTASSIUM SERPL-SCNC: 3.6 MMOL/L (ref 3.5–5.5)
PR INTERVAL: 120 MS
PROT SERPL-MCNC: 7.3 G/DL (ref 6.4–8.9)
PROT UR STRIP-MCNC: ABNORMAL MG/DL
PROT UR STRIP-MCNC: NEGATIVE MG/DL
PROTHROMBIN TIME: 10.7 SECONDS (ref 10.2–13)
QRS AXIS: 31 DEGREES
QRSD INTERVAL: 80 MS
QT INTERVAL: 388 MS
QTC INTERVAL: 442 MS
RBC # BLD AUTO: 5.39 MILLION/UL (ref 3.9–5.2)
RBC #/AREA URNS AUTO: ABNORMAL /HPF
RBC MORPH BLD: NORMAL
SODIUM SERPL-SCNC: 136 MMOL/L (ref 134–143)
SP GR UR STRIP.AUTO: >=1.03 (ref 1–1.03)
SP GR UR STRIP.AUTO: >=1.03 (ref 1–1.03)
T WAVE AXIS: 40 DEGREES
TOTAL CELLS COUNTED SPEC: 100
TROPONIN I SERPL-MCNC: <0.03 NG/ML
UROBILINOGEN UR QL STRIP.AUTO: 0.2 E.U./DL
UROBILINOGEN UR QL STRIP.AUTO: 0.2 E.U./DL
VENTRICULAR RATE: 78 BPM
WBC # BLD AUTO: 20.9 THOUSAND/UL (ref 4.8–10.8)
WBC #/AREA URNS AUTO: ABNORMAL /HPF

## 2020-03-09 PROCEDURE — 71045 X-RAY EXAM CHEST 1 VIEW: CPT

## 2020-03-09 PROCEDURE — 96372 THER/PROPH/DIAG INJ SC/IM: CPT

## 2020-03-09 PROCEDURE — 84484 ASSAY OF TROPONIN QUANT: CPT | Performed by: EMERGENCY MEDICINE

## 2020-03-09 PROCEDURE — C9113 INJ PANTOPRAZOLE SODIUM, VIA: HCPCS | Performed by: SPECIALIST

## 2020-03-09 PROCEDURE — 81003 URINALYSIS AUTO W/O SCOPE: CPT | Performed by: EMERGENCY MEDICINE

## 2020-03-09 PROCEDURE — 74177 CT ABD & PELVIS W/CONTRAST: CPT

## 2020-03-09 PROCEDURE — 81025 URINE PREGNANCY TEST: CPT | Performed by: EMERGENCY MEDICINE

## 2020-03-09 PROCEDURE — 93005 ELECTROCARDIOGRAM TRACING: CPT

## 2020-03-09 PROCEDURE — 96374 THER/PROPH/DIAG INJ IV PUSH: CPT

## 2020-03-09 PROCEDURE — 85007 BL SMEAR W/DIFF WBC COUNT: CPT | Performed by: EMERGENCY MEDICINE

## 2020-03-09 PROCEDURE — 85027 COMPLETE CBC AUTOMATED: CPT | Performed by: EMERGENCY MEDICINE

## 2020-03-09 PROCEDURE — 99285 EMERGENCY DEPT VISIT HI MDM: CPT | Performed by: EMERGENCY MEDICINE

## 2020-03-09 PROCEDURE — 80053 COMPREHEN METABOLIC PANEL: CPT | Performed by: EMERGENCY MEDICINE

## 2020-03-09 PROCEDURE — 83690 ASSAY OF LIPASE: CPT | Performed by: EMERGENCY MEDICINE

## 2020-03-09 PROCEDURE — 81001 URINALYSIS AUTO W/SCOPE: CPT | Performed by: EMERGENCY MEDICINE

## 2020-03-09 PROCEDURE — 99285 EMERGENCY DEPT VISIT HI MDM: CPT

## 2020-03-09 PROCEDURE — 93010 ELECTROCARDIOGRAM REPORT: CPT | Performed by: INTERNAL MEDICINE

## 2020-03-09 PROCEDURE — 85730 THROMBOPLASTIN TIME PARTIAL: CPT | Performed by: EMERGENCY MEDICINE

## 2020-03-09 PROCEDURE — 96361 HYDRATE IV INFUSION ADD-ON: CPT

## 2020-03-09 PROCEDURE — 85610 PROTHROMBIN TIME: CPT | Performed by: EMERGENCY MEDICINE

## 2020-03-09 PROCEDURE — 99222 1ST HOSP IP/OBS MODERATE 55: CPT | Performed by: SPECIALIST

## 2020-03-09 PROCEDURE — 36415 COLL VENOUS BLD VENIPUNCTURE: CPT | Performed by: EMERGENCY MEDICINE

## 2020-03-09 RX ORDER — FENTANYL CITRATE 50 UG/ML
50 INJECTION, SOLUTION INTRAMUSCULAR; INTRAVENOUS ONCE
Status: COMPLETED | OUTPATIENT
Start: 2020-03-09 | End: 2020-03-09

## 2020-03-09 RX ORDER — LISINOPRIL 10 MG/1
10 TABLET ORAL DAILY
Status: DISCONTINUED | OUTPATIENT
Start: 2020-03-09 | End: 2020-03-10 | Stop reason: HOSPADM

## 2020-03-09 RX ORDER — LIDOCAINE HYDROCHLORIDE 20 MG/ML
1 JELLY TOPICAL ONCE
Status: COMPLETED | OUTPATIENT
Start: 2020-03-09 | End: 2020-03-09

## 2020-03-09 RX ORDER — VILAZODONE HYDROCHLORIDE 10 MG/1
20 TABLET ORAL
Status: DISCONTINUED | OUTPATIENT
Start: 2020-03-10 | End: 2020-03-09

## 2020-03-09 RX ORDER — ATENOLOL 25 MG/1
25 TABLET ORAL
Status: DISCONTINUED | OUTPATIENT
Start: 2020-03-09 | End: 2020-03-10 | Stop reason: HOSPADM

## 2020-03-09 RX ORDER — VILAZODONE HYDROCHLORIDE 10 MG/1
10 TABLET ORAL
Status: DISCONTINUED | OUTPATIENT
Start: 2020-03-10 | End: 2020-03-10 | Stop reason: HOSPADM

## 2020-03-09 RX ORDER — ALPRAZOLAM 0.25 MG/1
TABLET ORAL 4 TIMES DAILY PRN
COMMUNITY
End: 2020-08-24 | Stop reason: HOSPADM

## 2020-03-09 RX ORDER — DEXTROSE, SODIUM CHLORIDE, AND POTASSIUM CHLORIDE 5; .45; .15 G/100ML; G/100ML; G/100ML
125 INJECTION INTRAVENOUS CONTINUOUS
Status: DISCONTINUED | OUTPATIENT
Start: 2020-03-09 | End: 2020-03-10 | Stop reason: HOSPADM

## 2020-03-09 RX ORDER — ZOLPIDEM TARTRATE 5 MG/1
10 TABLET ORAL
Status: DISCONTINUED | OUTPATIENT
Start: 2020-03-09 | End: 2020-03-10 | Stop reason: HOSPADM

## 2020-03-09 RX ORDER — ALPRAZOLAM 0.25 MG/1
0.25 TABLET ORAL EVERY 6 HOURS PRN
Status: DISCONTINUED | OUTPATIENT
Start: 2020-03-09 | End: 2020-03-10 | Stop reason: HOSPADM

## 2020-03-09 RX ORDER — ALPRAZOLAM 0.5 MG/1
1 TABLET ORAL
Status: DISCONTINUED | OUTPATIENT
Start: 2020-03-09 | End: 2020-03-10 | Stop reason: HOSPADM

## 2020-03-09 RX ORDER — OLANZAPINE 10 MG/1
5 INJECTION, POWDER, LYOPHILIZED, FOR SOLUTION INTRAMUSCULAR ONCE
Status: COMPLETED | OUTPATIENT
Start: 2020-03-09 | End: 2020-03-09

## 2020-03-09 RX ORDER — ALBUTEROL SULFATE 90 UG/1
2 AEROSOL, METERED RESPIRATORY (INHALATION) EVERY 6 HOURS PRN
Status: DISCONTINUED | OUTPATIENT
Start: 2020-03-09 | End: 2020-03-10 | Stop reason: HOSPADM

## 2020-03-09 RX ORDER — METOCLOPRAMIDE HYDROCHLORIDE 5 MG/ML
10 INJECTION INTRAMUSCULAR; INTRAVENOUS ONCE
Status: DISCONTINUED | OUTPATIENT
Start: 2020-03-09 | End: 2020-03-09

## 2020-03-09 RX ORDER — PANTOPRAZOLE SODIUM 40 MG/1
40 INJECTION, POWDER, FOR SOLUTION INTRAVENOUS
Status: DISCONTINUED | OUTPATIENT
Start: 2020-03-09 | End: 2020-03-10

## 2020-03-09 RX ORDER — LIDOCAINE HYDROCHLORIDE 20 MG/ML
5 INJECTION, SOLUTION EPIDURAL; INFILTRATION; INTRACAUDAL; PERINEURAL ONCE
Status: COMPLETED | OUTPATIENT
Start: 2020-03-09 | End: 2020-03-09

## 2020-03-09 RX ADMIN — SODIUM CHLORIDE 1000 ML: 0.9 INJECTION, SOLUTION INTRAVENOUS at 04:49

## 2020-03-09 RX ADMIN — MORPHINE SULFATE 2 MG: 2 INJECTION, SOLUTION INTRAMUSCULAR; INTRAVENOUS at 20:29

## 2020-03-09 RX ADMIN — PANTOPRAZOLE SODIUM 40 MG: 40 INJECTION, POWDER, FOR SOLUTION INTRAVENOUS at 11:44

## 2020-03-09 RX ADMIN — DEXTROSE, SODIUM CHLORIDE, AND POTASSIUM CHLORIDE 125 ML/HR: 5; .45; .15 INJECTION INTRAVENOUS at 16:54

## 2020-03-09 RX ADMIN — Medication 1 SPRAY: at 21:51

## 2020-03-09 RX ADMIN — OLANZAPINE 5 MG: 10 INJECTION, POWDER, FOR SOLUTION INTRAMUSCULAR at 04:52

## 2020-03-09 RX ADMIN — IOHEXOL 100 ML: 350 INJECTION, SOLUTION INTRAVENOUS at 06:33

## 2020-03-09 RX ADMIN — FENTANYL CITRATE 50 MCG: 50 INJECTION INTRAMUSCULAR; INTRAVENOUS at 04:49

## 2020-03-09 RX ADMIN — ALPRAZOLAM 1 MG: 0.5 TABLET ORAL at 21:44

## 2020-03-09 RX ADMIN — MORPHINE SULFATE 2 MG: 2 INJECTION, SOLUTION INTRAMUSCULAR; INTRAVENOUS at 14:44

## 2020-03-09 RX ADMIN — TOPICAL ANESTHETIC 1 SPRAY: 200 SPRAY DENTAL; PERIODONTAL at 07:45

## 2020-03-09 RX ADMIN — LISINOPRIL 10 MG: 10 TABLET ORAL at 11:43

## 2020-03-09 RX ADMIN — LIDOCAINE HYDROCHLORIDE 1 APPLICATION: 20 JELLY TOPICAL at 07:45

## 2020-03-09 RX ADMIN — DEXTROSE, SODIUM CHLORIDE, AND POTASSIUM CHLORIDE 125 ML/HR: 5; .45; .15 INJECTION INTRAVENOUS at 11:43

## 2020-03-09 RX ADMIN — LIDOCAINE HYDROCHLORIDE 5 ML: 20 INJECTION, SOLUTION EPIDURAL; INFILTRATION; INTRACAUDAL; PERINEURAL at 07:45

## 2020-03-10 ENCOUNTER — APPOINTMENT (INPATIENT)
Dept: RADIOLOGY | Facility: HOSPITAL | Age: 45
DRG: 390 | End: 2020-03-10
Payer: COMMERCIAL

## 2020-03-10 VITALS
HEART RATE: 70 BPM | OXYGEN SATURATION: 95 % | SYSTOLIC BLOOD PRESSURE: 119 MMHG | WEIGHT: 196.87 LBS | BODY MASS INDEX: 32.8 KG/M2 | TEMPERATURE: 98.8 F | RESPIRATION RATE: 18 BRPM | DIASTOLIC BLOOD PRESSURE: 63 MMHG | HEIGHT: 65 IN

## 2020-03-10 LAB
ANION GAP SERPL CALCULATED.3IONS-SCNC: 8 MMOL/L (ref 4–13)
BUN SERPL-MCNC: 10 MG/DL (ref 7–25)
CALCIUM SERPL-MCNC: 8.7 MG/DL (ref 8.6–10.5)
CHLORIDE SERPL-SCNC: 107 MMOL/L (ref 98–107)
CO2 SERPL-SCNC: 25 MMOL/L (ref 21–31)
CREAT SERPL-MCNC: 0.87 MG/DL (ref 0.6–1.2)
ERYTHROCYTE [DISTWIDTH] IN BLOOD BY AUTOMATED COUNT: 13.9 % (ref 11.5–14.5)
GFR SERPL CREATININE-BSD FRML MDRD: 82 ML/MIN/1.73SQ M
GLUCOSE SERPL-MCNC: 93 MG/DL (ref 65–99)
HCT VFR BLD AUTO: 43.4 % (ref 42–47)
HGB BLD-MCNC: 14.3 G/DL (ref 12–16)
MCH RBC QN AUTO: 31.4 PG (ref 26–34)
MCHC RBC AUTO-ENTMCNC: 33 G/DL (ref 31–37)
MCV RBC AUTO: 95 FL (ref 81–99)
PLATELET # BLD AUTO: 255 THOUSANDS/UL (ref 149–390)
PMV BLD AUTO: 7.9 FL (ref 8.6–11.7)
POTASSIUM SERPL-SCNC: 4 MMOL/L (ref 3.5–5.5)
RBC # BLD AUTO: 4.56 MILLION/UL (ref 3.9–5.2)
SODIUM SERPL-SCNC: 140 MMOL/L (ref 134–143)
WBC # BLD AUTO: 9.5 THOUSAND/UL (ref 4.8–10.8)

## 2020-03-10 PROCEDURE — C9113 INJ PANTOPRAZOLE SODIUM, VIA: HCPCS | Performed by: SPECIALIST

## 2020-03-10 PROCEDURE — NC001 PR NO CHARGE: Performed by: SPECIALIST

## 2020-03-10 PROCEDURE — 74250 X-RAY XM SM INT 1CNTRST STD: CPT

## 2020-03-10 PROCEDURE — 99238 HOSP IP/OBS DSCHRG MGMT 30/<: CPT | Performed by: SPECIALIST

## 2020-03-10 PROCEDURE — 85027 COMPLETE CBC AUTOMATED: CPT | Performed by: SPECIALIST

## 2020-03-10 PROCEDURE — 80048 BASIC METABOLIC PNL TOTAL CA: CPT | Performed by: SPECIALIST

## 2020-03-10 RX ORDER — ACETAMINOPHEN 325 MG/1
650 TABLET ORAL EVERY 6 HOURS PRN
Status: DISCONTINUED | OUTPATIENT
Start: 2020-03-10 | End: 2020-03-10 | Stop reason: HOSPADM

## 2020-03-10 RX ORDER — MAGNESIUM CARB/ALUMINUM HYDROX 105-160MG
30 TABLET,CHEWABLE ORAL ONCE
Status: COMPLETED | OUTPATIENT
Start: 2020-03-10 | End: 2020-03-10

## 2020-03-10 RX ORDER — POLYETHYLENE GLYCOL 3350 17 G/17G
119 POWDER, FOR SOLUTION ORAL ONCE
Status: COMPLETED | OUTPATIENT
Start: 2020-03-10 | End: 2020-03-10

## 2020-03-10 RX ORDER — VILAZODONE HYDROCHLORIDE 10 MG/1
10 TABLET ORAL
Qty: 30 TABLET | Refills: 0 | Status: SHIPPED | OUTPATIENT
Start: 2020-03-11

## 2020-03-10 RX ORDER — HEPARIN SODIUM 5000 [USP'U]/ML
5000 INJECTION, SOLUTION INTRAVENOUS; SUBCUTANEOUS EVERY 8 HOURS SCHEDULED
Status: DISCONTINUED | OUTPATIENT
Start: 2020-03-10 | End: 2020-03-10 | Stop reason: HOSPADM

## 2020-03-10 RX ADMIN — ACETAMINOPHEN 325 MG: 325 SUPPOSITORY RECTAL at 10:53

## 2020-03-10 RX ADMIN — VILAZODONE HYDROCHLORIDE 10 MG: 10 TABLET ORAL at 07:51

## 2020-03-10 RX ADMIN — MORPHINE SULFATE 2 MG: 2 INJECTION, SOLUTION INTRAMUSCULAR; INTRAVENOUS at 08:27

## 2020-03-10 RX ADMIN — POLYETHYLENE GLYCOL 3350 119 G: 17 POWDER, FOR SOLUTION ORAL at 14:06

## 2020-03-10 RX ADMIN — PANTOPRAZOLE SODIUM 40 MG: 40 INJECTION, POWDER, FOR SOLUTION INTRAVENOUS at 10:39

## 2020-03-10 RX ADMIN — CARIPRAZINE 3 MG: 3 CAPSULE, GELATIN COATED ORAL at 10:39

## 2020-03-10 RX ADMIN — IOHEXOL 200 ML: 300 INJECTION, SOLUTION INTRAVENOUS at 10:42

## 2020-03-10 RX ADMIN — DEXTROSE, SODIUM CHLORIDE, AND POTASSIUM CHLORIDE 125 ML/HR: 5; .45; .15 INJECTION INTRAVENOUS at 01:04

## 2020-03-10 RX ADMIN — MINERAL OIL 30 ML: 1000 SOLUTION ORAL at 12:04

## 2020-03-10 RX ADMIN — LISINOPRIL 10 MG: 10 TABLET ORAL at 10:39

## 2020-05-09 ENCOUNTER — HOSPITAL ENCOUNTER (OUTPATIENT)
Dept: RADIOLOGY | Facility: HOSPITAL | Age: 45
Discharge: HOME | End: 2020-05-09
Payer: COMMERCIAL

## 2020-05-09 ENCOUNTER — TRANSCRIBE ORDERS (OUTPATIENT)
Dept: ADMINISTRATIVE | Facility: HOSPITAL | Age: 45
End: 2020-05-09

## 2020-05-09 DIAGNOSIS — R76.11 PPD POSITIVE: ICD-10-CM

## 2020-05-09 DIAGNOSIS — R76.11 PPD POSITIVE: Primary | ICD-10-CM

## 2020-05-09 PROCEDURE — 71046 X-RAY EXAM CHEST 2 VIEWS: CPT

## 2020-05-11 ENCOUNTER — TRANSCRIBE ORDERS (OUTPATIENT)
Dept: ADMINISTRATIVE | Facility: HOSPITAL | Age: 45
End: 2020-05-11

## 2020-05-11 DIAGNOSIS — M76.62 TENDONITIS, ACHILLES, LEFT: Primary | ICD-10-CM

## 2020-05-15 ENCOUNTER — HOSPITAL ENCOUNTER (OUTPATIENT)
Dept: MRI IMAGING | Facility: HOSPITAL | Age: 45
Discharge: HOME | End: 2020-05-15
Payer: COMMERCIAL

## 2020-05-15 DIAGNOSIS — M76.62 TENDONITIS, ACHILLES, LEFT: ICD-10-CM

## 2020-05-15 PROCEDURE — 73721 MRI JNT OF LWR EXTRE W/O DYE: CPT

## 2020-07-31 ENCOUNTER — APPOINTMENT (EMERGENCY)
Dept: CT IMAGING | Facility: HOSPITAL | Age: 45
End: 2020-07-31
Payer: COMMERCIAL

## 2020-07-31 ENCOUNTER — HOSPITAL ENCOUNTER (OUTPATIENT)
Facility: HOSPITAL | Age: 45
Setting detail: OBSERVATION
Discharge: HOME | End: 2020-08-01
Attending: EMERGENCY MEDICINE | Admitting: INTERNAL MEDICINE
Payer: COMMERCIAL

## 2020-07-31 DIAGNOSIS — R00.1 SYMPTOMATIC BRADYCARDIA: ICD-10-CM

## 2020-07-31 DIAGNOSIS — R42 LIGHTHEADEDNESS: Primary | ICD-10-CM

## 2020-07-31 LAB
ALBUMIN SERPL BCP-MCNC: 4.1 G/DL (ref 3.5–5.7)
ALP SERPL-CCNC: 50 U/L (ref 40–150)
ALT SERPL W P-5'-P-CCNC: 32 U/L (ref 7–52)
ANION GAP SERPL CALCULATED.3IONS-SCNC: 9 MMOL/L (ref 4–13)
AST SERPL W P-5'-P-CCNC: 25 U/L (ref 13–39)
ATRIAL RATE: 41 BPM
BASOPHILS # BLD AUTO: 0 THOUSANDS/ΜL (ref 0–0.1)
BASOPHILS NFR BLD AUTO: 1 % (ref 0–2)
BILIRUB SERPL-MCNC: 0.4 MG/DL (ref 0.2–1)
BILIRUB UR QL STRIP: NEGATIVE
BUN SERPL-MCNC: 8 MG/DL (ref 7–25)
CALCIUM SERPL-MCNC: 9 MG/DL (ref 8.6–10.5)
CHLORIDE SERPL-SCNC: 109 MMOL/L (ref 98–107)
CLARITY UR: ABNORMAL
CO2 SERPL-SCNC: 23 MMOL/L (ref 21–31)
COLOR UR: YELLOW
CREAT SERPL-MCNC: 0.88 MG/DL (ref 0.6–1.2)
EOSINOPHIL # BLD AUTO: 0.1 THOUSAND/ΜL (ref 0–0.61)
EOSINOPHIL NFR BLD AUTO: 2 % (ref 0–5)
ERYTHROCYTE [DISTWIDTH] IN BLOOD BY AUTOMATED COUNT: 14.4 % (ref 11.5–14.5)
GFR SERPL CREATININE-BSD FRML MDRD: 81 ML/MIN/1.73SQ M
GLUCOSE SERPL-MCNC: 119 MG/DL (ref 65–99)
GLUCOSE UR STRIP-MCNC: NEGATIVE MG/DL
HCG SERPL QL: NEGATIVE
HCT VFR BLD AUTO: 42.6 % (ref 42–47)
HGB BLD-MCNC: 14.5 G/DL (ref 12–16)
HGB UR QL STRIP.AUTO: NEGATIVE
KETONES UR STRIP-MCNC: NEGATIVE MG/DL
LEUKOCYTE ESTERASE UR QL STRIP: NEGATIVE
LYMPHOCYTES # BLD AUTO: 2.5 THOUSANDS/ΜL (ref 0.6–4.47)
LYMPHOCYTES NFR BLD AUTO: 31 % (ref 21–51)
MCH RBC QN AUTO: 31.7 PG (ref 26–34)
MCHC RBC AUTO-ENTMCNC: 34 G/DL (ref 31–37)
MCV RBC AUTO: 93 FL (ref 81–99)
MONOCYTES # BLD AUTO: 0.6 THOUSAND/ΜL (ref 0.17–1.22)
MONOCYTES NFR BLD AUTO: 8 % (ref 2–12)
NEUTROPHILS # BLD AUTO: 4.9 THOUSANDS/ΜL (ref 1.4–6.5)
NEUTS SEG NFR BLD AUTO: 60 % (ref 42–75)
NITRITE UR QL STRIP: NEGATIVE
P AXIS: 48 DEGREES
PH UR STRIP.AUTO: 6 [PH]
PLATELET # BLD AUTO: 224 THOUSANDS/UL (ref 149–390)
PMV BLD AUTO: 8.9 FL (ref 8.6–11.7)
POTASSIUM SERPL-SCNC: 3.5 MMOL/L (ref 3.5–5.5)
PR INTERVAL: 126 MS
PROT SERPL-MCNC: 6.3 G/DL (ref 6.4–8.9)
PROT UR STRIP-MCNC: NEGATIVE MG/DL
QRS AXIS: 25 DEGREES
QRSD INTERVAL: 86 MS
QT INTERVAL: 460 MS
QTC INTERVAL: 379 MS
RBC # BLD AUTO: 4.56 MILLION/UL (ref 3.9–5.2)
SODIUM SERPL-SCNC: 141 MMOL/L (ref 134–143)
SP GR UR STRIP.AUTO: 1.01 (ref 1–1.03)
T WAVE AXIS: 56 DEGREES
T4 FREE SERPL-MCNC: 1.21 NG/DL (ref 0.76–1.46)
TROPONIN I SERPL-MCNC: <0.03 NG/ML
TSH SERPL DL<=0.05 MIU/L-ACNC: 0.36 UIU/ML (ref 0.45–5.33)
UROBILINOGEN UR QL STRIP.AUTO: 0.2 E.U./DL
VENTRICULAR RATE: 41 BPM
WBC # BLD AUTO: 8.3 THOUSAND/UL (ref 4.8–10.8)

## 2020-07-31 PROCEDURE — 70450 CT HEAD/BRAIN W/O DYE: CPT

## 2020-07-31 PROCEDURE — 84439 ASSAY OF FREE THYROXINE: CPT | Performed by: EMERGENCY MEDICINE

## 2020-07-31 PROCEDURE — 84703 CHORIONIC GONADOTROPIN ASSAY: CPT | Performed by: EMERGENCY MEDICINE

## 2020-07-31 PROCEDURE — 80053 COMPREHEN METABOLIC PANEL: CPT | Performed by: EMERGENCY MEDICINE

## 2020-07-31 PROCEDURE — 96361 HYDRATE IV INFUSION ADD-ON: CPT

## 2020-07-31 PROCEDURE — 99285 EMERGENCY DEPT VISIT HI MDM: CPT

## 2020-07-31 PROCEDURE — 85025 COMPLETE CBC W/AUTO DIFF WBC: CPT | Performed by: EMERGENCY MEDICINE

## 2020-07-31 PROCEDURE — 96374 THER/PROPH/DIAG INJ IV PUSH: CPT

## 2020-07-31 PROCEDURE — 93005 ELECTROCARDIOGRAM TRACING: CPT

## 2020-07-31 PROCEDURE — 93010 ELECTROCARDIOGRAM REPORT: CPT | Performed by: INTERNAL MEDICINE

## 2020-07-31 PROCEDURE — 84484 ASSAY OF TROPONIN QUANT: CPT | Performed by: EMERGENCY MEDICINE

## 2020-07-31 PROCEDURE — 36415 COLL VENOUS BLD VENIPUNCTURE: CPT | Performed by: EMERGENCY MEDICINE

## 2020-07-31 PROCEDURE — 99220 PR INITIAL OBSERVATION CARE/DAY 70 MINUTES: CPT | Performed by: NURSE PRACTITIONER

## 2020-07-31 PROCEDURE — 84443 ASSAY THYROID STIM HORMONE: CPT | Performed by: EMERGENCY MEDICINE

## 2020-07-31 PROCEDURE — 99285 EMERGENCY DEPT VISIT HI MDM: CPT | Performed by: EMERGENCY MEDICINE

## 2020-07-31 PROCEDURE — 81003 URINALYSIS AUTO W/O SCOPE: CPT | Performed by: EMERGENCY MEDICINE

## 2020-07-31 RX ORDER — ALBUTEROL SULFATE 90 UG/1
1 AEROSOL, METERED RESPIRATORY (INHALATION) EVERY 4 HOURS PRN
Status: DISCONTINUED | OUTPATIENT
Start: 2020-07-31 | End: 2020-08-01 | Stop reason: HOSPADM

## 2020-07-31 RX ORDER — MECLIZINE HCL 12.5 MG/1
25 TABLET ORAL EVERY 8 HOURS SCHEDULED
Status: DISCONTINUED | OUTPATIENT
Start: 2020-07-31 | End: 2020-08-01 | Stop reason: HOSPADM

## 2020-07-31 RX ORDER — VALACYCLOVIR HYDROCHLORIDE 500 MG/1
500 TABLET, FILM COATED ORAL DAILY
Status: DISCONTINUED | OUTPATIENT
Start: 2020-08-01 | End: 2020-08-01 | Stop reason: HOSPADM

## 2020-07-31 RX ORDER — MECLIZINE HCL 12.5 MG/1
25 TABLET ORAL ONCE
Status: COMPLETED | OUTPATIENT
Start: 2020-07-31 | End: 2020-07-31

## 2020-07-31 RX ORDER — PANTOPRAZOLE SODIUM 40 MG/1
40 TABLET, DELAYED RELEASE ORAL 2 TIMES DAILY
Status: DISCONTINUED | OUTPATIENT
Start: 2020-07-31 | End: 2020-08-01 | Stop reason: HOSPADM

## 2020-07-31 RX ORDER — ALPRAZOLAM 0.5 MG/1
1 TABLET ORAL
Status: DISCONTINUED | OUTPATIENT
Start: 2020-07-31 | End: 2020-08-01 | Stop reason: HOSPADM

## 2020-07-31 RX ORDER — ZOLPIDEM TARTRATE 5 MG/1
10 TABLET ORAL
Status: DISCONTINUED | OUTPATIENT
Start: 2020-07-31 | End: 2020-08-01 | Stop reason: HOSPADM

## 2020-07-31 RX ORDER — ONDANSETRON 2 MG/ML
4 INJECTION INTRAMUSCULAR; INTRAVENOUS ONCE
Status: COMPLETED | OUTPATIENT
Start: 2020-07-31 | End: 2020-07-31

## 2020-07-31 RX ORDER — ACETAMINOPHEN 325 MG/1
650 TABLET ORAL 4 TIMES DAILY PRN
Status: DISCONTINUED | OUTPATIENT
Start: 2020-07-31 | End: 2020-08-01 | Stop reason: HOSPADM

## 2020-07-31 RX ORDER — SODIUM CHLORIDE 9 MG/ML
100 INJECTION, SOLUTION INTRAVENOUS CONTINUOUS
Status: DISCONTINUED | OUTPATIENT
Start: 2020-07-31 | End: 2020-07-31

## 2020-07-31 RX ORDER — LISINOPRIL 5 MG/1
10 TABLET ORAL DAILY
Status: DISCONTINUED | OUTPATIENT
Start: 2020-08-01 | End: 2020-08-01 | Stop reason: HOSPADM

## 2020-07-31 RX ORDER — ALPRAZOLAM 0.25 MG/1
0.25 TABLET ORAL 4 TIMES DAILY PRN
Status: DISCONTINUED | OUTPATIENT
Start: 2020-07-31 | End: 2020-08-01 | Stop reason: HOSPADM

## 2020-07-31 RX ORDER — SODIUM CHLORIDE 9 MG/ML
100 INJECTION, SOLUTION INTRAVENOUS CONTINUOUS
Status: DISCONTINUED | OUTPATIENT
Start: 2020-07-31 | End: 2020-08-01 | Stop reason: HOSPADM

## 2020-07-31 RX ORDER — VILAZODONE HYDROCHLORIDE 10 MG/1
10 TABLET ORAL
Status: DISCONTINUED | OUTPATIENT
Start: 2020-08-01 | End: 2020-08-01 | Stop reason: HOSPADM

## 2020-07-31 RX ADMIN — PANTOPRAZOLE SODIUM 40 MG: 40 TABLET, DELAYED RELEASE ORAL at 17:22

## 2020-07-31 RX ADMIN — SODIUM CHLORIDE 1000 ML: 0.9 INJECTION, SOLUTION INTRAVENOUS at 10:13

## 2020-07-31 RX ADMIN — MECLIZINE 25 MG: 12.5 TABLET ORAL at 21:52

## 2020-07-31 RX ADMIN — MECLIZINE 25 MG: 12.5 TABLET ORAL at 08:39

## 2020-07-31 RX ADMIN — ALPRAZOLAM 1 MG: 0.5 TABLET ORAL at 21:52

## 2020-07-31 RX ADMIN — SODIUM CHLORIDE 100 ML/HR: 0.9 INJECTION, SOLUTION INTRAVENOUS at 17:22

## 2020-07-31 RX ADMIN — SODIUM CHLORIDE 1000 ML: 0.9 INJECTION, SOLUTION INTRAVENOUS at 08:34

## 2020-07-31 RX ADMIN — ONDANSETRON 4 MG: 2 INJECTION INTRAMUSCULAR; INTRAVENOUS at 08:34

## 2020-07-31 NOTE — ED PROVIDER NOTES
History  Chief Complaint   Patient presents with    Dizziness     A 53-year-old female who presents for lightheadedness, dizziness and nausea  Patient says the symptoms have been off and on over the past 3 days  Says she insistent dehydration after moving by herself over the weekend    She says that the dizziness feels like a lightheadedness as well as room spinning sensation  She says that the room is spinning sensation is worse with movement and when she lays down  She admits to associated nausea but denies any vomiting  She admits to a mild headache which is similar to headaches that she has had in the past   She denies any visual changes, difficulty speaking, swallowing, slurred speech, numbness or tingling or weakness in her extremities  She denies chest pain, shortness of breath, abdominal pain or urinary symptoms  Prior to Admission Medications   Prescriptions Last Dose Informant Patient Reported? Taking?    ALPRAZolam (XANAX) 0 25 mg tablet More than a month at Unknown time  Yes No   Sig: Take by mouth 4 (four) times a day as needed for anxiety   ALPRAZolam (XANAX) 1 mg tablet 7/30/2020 at Unknown time Self Yes Yes   Sig: Take 1 mg by mouth daily at bedtime     PROAIR  (90 Base) MCG/ACT inhaler More than a month at Unknown time Self Yes No   Sig: Inhale every 4 (four) hours as needed (as needed)     atenolol (TENORMIN) 25 mg tablet 7/30/2020 at Unknown time  Yes Yes   Sig: Take 25 mg by mouth daily at bedtime    cariprazine (Vraylar) 3 MG capsule 7/31/2020 at Unknown time  Yes Yes   Sig: Take 3 mg by mouth daily   linaCLOtide (LINZESS PO) Past Week at Unknown time  Yes Yes   Sig: Take 290 mcg by mouth daily before breakfast   lisinopril (ZESTRIL) 10 mg tablet 7/31/2020 at Unknown time  Yes Yes   Sig: Take 10 mg by mouth daily   medroxyPROGESTERone (DEPO-PROVERA) 150 mg/mL injection Past Month at Unknown time Self Yes Yes   Sig: medroxyprogesterone 150 mg/mL intramuscular suspension ondansetron (ZOFRAN-ODT) 4 mg disintegrating tablet Past Week at Unknown time  No Yes   Sig: Take 1 tablet (4 mg total) by mouth every 8 (eight) hours as needed for nausea or vomiting   pantoprazole (PROTONIX) 40 mg tablet 7/31/2020 at Unknown time Self Yes Yes   Sig: Take 40 mg by mouth 2 (two) times a day     valACYclovir (VALTREX) 500 mg tablet 7/31/2020 at Unknown time  Yes Yes   Sig: Take 500 mg by mouth daily   vilazodone (VIIBRYD) 10 mg tablet 7/31/2020 at Unknown time  No Yes   Sig: Take 1 tablet (10 mg total) by mouth daily with breakfast   zolpidem (AMBIEN) 10 mg tablet Past Week at Unknown time  No Yes   Sig: Take 1 tablet (10 mg total) by mouth daily at bedtime as needed for sleep      Facility-Administered Medications: None       Past Medical History:   Diagnosis Date    Arthritis     Bipolar 1 disorder (HCC)     with bordeline personality    Borderline personality disorder ([facility] [address] )     Chronic headaches     Depression     Depression     GERD (gastroesophageal reflux disease)     Hypertension     Incontinence     Migraine     Urinary tract infection        Past Surgical History:   Procedure Laterality Date    CHOLECYSTECTOMY  05/24/2018    DENTAL SURGERY      EXPLORATORY LAPAROTOMY      exlap    FOOT SURGERY Right     JOINT REPLACEMENT      KNEE ARTHROSCOPY      KNEE ARTHROSCOPY Right 11/15/2018    MULTIPLE TOOTH EXTRACTIONS  11/02/2018    OVARIAN CYST REMOVAL      REDUCTION MAMMAPLASTY      reduction        Family History   Problem Relation Age of Onset    Hyperlipidemia Father     Heart disease Mother     Migraines Mother     Depression Family      I have reviewed and agree with the history as documented      E-Cigarette/Vaping    E-Cigarette Use Never User      E-Cigarette/Vaping Substances     Social History     Tobacco Use    Smoking status: Never Smoker    Smokeless tobacco: Never Used   Substance Use Topics    Alcohol use: Never     Frequency: Never     Comment: rarely  Drug use: Not Currently       Review of Systems   Constitutional: Negative for chills, diaphoresis and fever  HENT: Negative for congestion, sinus pressure, sore throat and trouble swallowing  Eyes: Negative for pain, discharge and itching  Respiratory: Negative for cough, chest tightness, shortness of breath and wheezing  Cardiovascular: Negative for chest pain, palpitations and leg swelling  Gastrointestinal: Positive for nausea  Negative for abdominal distention, abdominal pain, blood in stool, diarrhea and vomiting  Endocrine: Negative for polyphagia and polyuria  Genitourinary: Negative for difficulty urinating, dysuria, flank pain, hematuria, pelvic pain and vaginal bleeding  Musculoskeletal: Negative for arthralgias and back pain  Skin: Negative for rash  Neurological: Positive for dizziness, light-headedness and headaches  Negative for syncope and weakness  Physical Exam  Physical Exam   Constitutional: She is oriented to person, place, and time  She appears well-developed and well-nourished  No distress  HENT:   Head: Normocephalic and atraumatic  Right Ear: External ear normal    Left Ear: External ear normal    Mouth/Throat: No oropharyngeal exudate  Eyes: Pupils are equal, round, and reactive to light  Conjunctivae are normal    Mild, leftward beating nystagmus, fatigable   Neck: Normal range of motion  Neck supple  Cardiovascular: Normal rate, regular rhythm, normal heart sounds and intact distal pulses  Exam reveals no gallop and no friction rub  No murmur heard  Pulmonary/Chest: Effort normal and breath sounds normal  No respiratory distress  She has no wheezes  She has no rales  Abdominal: Soft  She exhibits no distension  There is no tenderness  There is no guarding  Musculoskeletal: Normal range of motion  She exhibits no edema, tenderness or deformity  Lymphadenopathy:     She has no cervical adenopathy     Neurological: She is alert and oriented to person, place, and time  No cranial nerve deficit or sensory deficit  She exhibits normal muscle tone  5/5 muscle strength in all extremity  Patient able to ambulate in department without difficulty   Skin: Skin is warm and dry  Psychiatric: She has a normal mood and affect  Nursing note and vitals reviewed        Vital Signs  ED Triage Vitals   Temperature Pulse Respirations Blood Pressure SpO2   07/31/20 0803 07/31/20 0803 07/31/20 0803 07/31/20 0803 07/31/20 0803   (!) 96 9 °F (36 1 °C) (!) 47 18 130/70 96 %      Temp Source Heart Rate Source Patient Position - Orthostatic VS BP Location FiO2 (%)   07/31/20 1421 07/31/20 0803 07/31/20 0803 07/31/20 1421 --   Temporal Monitor Lying Left arm       Pain Score       07/31/20 1435       2           Vitals:    07/31/20 0930 07/31/20 1000 07/31/20 1030 07/31/20 1421   BP: 126/75 137/72 126/74 132/72   Pulse: (!) 47 (!) 42 (!) 46 (!) 44   Patient Position - Orthostatic VS:    Lying         Visual Acuity  Visual Acuity      Most Recent Value   L Pupil Size (mm)  3   R Pupil Size (mm)  3          ED Medications  Medications   ALPRAZolam (XANAX) tablet 1 mg (has no administration in time range)   lisinopril (ZESTRIL) tablet 10 mg (has no administration in time range)   pantoprazole (PROTONIX) EC tablet 40 mg (40 mg Oral Given 7/31/20 1722)   valACYclovir (VALTREX) tablet 500 mg (has no administration in time range)   vilazodone (VIIBRYD) tablet 10 mg (has no administration in time range)   ALPRAZolam (XANAX) tablet 0 25 mg (has no administration in time range)   albuterol (PROVENTIL HFA,VENTOLIN HFA) inhaler 1 puff (has no administration in time range)   zolpidem (AMBIEN) tablet 10 mg (has no administration in time range)   enoxaparin (LOVENOX) subcutaneous injection 40 mg (has no administration in time range)   sodium chloride 0 9 % infusion (100 mL/hr Intravenous New Bag 7/31/20 1722)   acetaminophen (TYLENOL) tablet 650 mg (has no administration in time range) meclizine (ANTIVERT) tablet 25 mg (has no administration in time range)   ondansetron (ZOFRAN) injection 4 mg (4 mg Intravenous Given 7/31/20 0834)   sodium chloride 0 9 % bolus 1,000 mL (0 mL Intravenous Stopped 7/31/20 1012)   meclizine (ANTIVERT) tablet 25 mg (25 mg Oral Given 7/31/20 0839)   sodium chloride 0 9 % bolus 1,000 mL (1,000 mL Intravenous New Bag 7/31/20 1013)       Diagnostic Studies  Results Reviewed     Procedure Component Value Units Date/Time    T4, free [[id number]]  (Normal) Collected:  07/31/20 0833    Lab Status:  Final result Specimen:  Blood from Hand, Right Updated:  07/31/20 1503     Free T4 1 21 ng/dL     TSH, 3rd generation with Free T4 reflex [[id number]]  (Abnormal) Collected:  07/31/20 0833    Lab Status:  Final result Specimen:  Blood from Hand, Right Updated:  07/31/20 0955     TSH 3RD GENERATON 0 360 uIU/mL     Narrative:       Patients undergoing fluorescein dye angiography may retain small amounts of fluorescein in the body for 48-72 hours post procedure  Samples containing fluorescein can produce falsely depressed TSH values  If the patient had this procedure,a specimen should be resubmitted post fluorescein clearance        Comprehensive metabolic panel [[id number]]  (Abnormal) Collected:  07/31/20 0833    Lab Status:  Final result Specimen:  Blood from Hand, Right Updated:  07/31/20 0909     Sodium 141 mmol/L      Potassium 3 5 mmol/L      Chloride 109 mmol/L      CO2 23 mmol/L      ANION GAP 9 mmol/L      BUN 8 mg/dL      Creatinine 0 88 mg/dL      Glucose 119 mg/dL      Calcium 9 0 mg/dL      AST 25 U/L      ALT 32 U/L      Alkaline Phosphatase 50 U/L      Total Protein 6 3 g/dL      Albumin 4 1 g/dL      Total Bilirubin 0 40 mg/dL      eGFR 81 ml/min/1 73sq m     Narrative:       [place] guidelines for Chronic Kidney Disease (CKD):     Stage 1 with normal or high GFR (GFR > 90 mL/min/1 73 square meters)    Stage 2 Mild CKD (GFR = 60-89 mL/min/1 73 square meters)    Stage 3A Moderate CKD (GFR = 45-59 mL/min/1 73 square meters)    Stage 3B Moderate CKD (GFR = 30-44 mL/min/1 73 square meters)    Stage 4 Severe CKD (GFR = 15-29 mL/min/1 73 square meters)    Stage 5 End Stage CKD (GFR <15 mL/min/1 73 square meters)  Note: GFR calculation is accurate only with a steady state creatinine    hCG, qualitative pregnancy [[id number]]  (Normal) Collected:  07/31/20 0833    Lab Status:  Final result Specimen:  Blood from Hand, Right Updated:  07/31/20 0908     Preg, Serum Negative    Troponin I [[id number]]  (Normal) Collected:  07/31/20 0833    Lab Status:  Final result Specimen:  Blood from Hand, Right Updated:  07/31/20 0903     Troponin I <0 03 ng/mL     CBC and differential [[id number]]  (Normal) Collected:  07/31/20 0833    Lab Status:  Final result Specimen:  Blood from Hand, Right Updated:  07/31/20 0902     WBC 8 30 Thousand/uL      RBC 4 56 Million/uL      Hemoglobin 14 5 g/dL      Hematocrit 42 6 %      MCV 93 fL      MCH 31 7 pg      MCHC 34 0 g/dL      RDW 14 4 %      MPV 8 9 fL      Platelets 867 Thousands/uL      Neutrophils Relative 60 %      Lymphocytes Relative 31 %      Monocytes Relative 8 %      Eosinophils Relative 2 %      Basophils Relative 1 %      Neutrophils Absolute 4 90 Thousands/µL      Lymphocytes Absolute 2 50 Thousands/µL      Monocytes Absolute 0 60 Thousand/µL      Eosinophils Absolute 0 10 Thousand/µL      Basophils Absolute 0 00 Thousands/µL     UA (URINE) with reflex to Scope [[id number]]  (Abnormal) Collected:  07/31/20 0833    Lab Status:  Final result Specimen:  Urine, Clean Catch Updated:  07/31/20 0850     Color, UA Yellow     Clarity, UA Slightly Cloudy     Specific Gravity, UA 1 015     pH, UA 6 0     Leukocytes, UA Negative     Nitrite, UA Negative     Protein, UA Negative mg/dl      Glucose, UA Negative mg/dl      Ketones, UA Negative mg/dl      Urobilinogen, UA 0 2 E U /dl      Bilirubin, UA Negative     Blood, UA Negative                 CT head without contrast   Final Result by Saul Rahman MD (07/31 1316)      No acute intracranial abnormality  Workstation performed: [id number]                    Procedures  Procedures         ED Course  ED Course as of Jul 31 1803 Fri Jul 31, 2020   0801 Patient able to walk into ED without difficulty      0856 HR as low as 39 in department, BP WNL      1900 Patient with persistent lightheadedness despite fluids and meds      1152 Patient still has some mild lightheadedness  BP has remained WNL  Spoke with patient about admission vs stopping her Atenolol and monitoring symptoms at home  Patient would prefer to go home  I told her to follow up with her PCP  I told her to reutrn to the ED if her symptoms persist or worsen in anyway   Patient able to ambulate to the bathroom       1208 After further discussion, both patient and I agree that she should stay in the hospital for monitoring  MDM  Number of Diagnoses or Management Options  Lightheadedness:   Symptomatic bradycardia:   Diagnosis management comments: 46-year-old female presenting for lightheadedness, dizziness and nausea  Has been off and on over the past 3 days  Worse with movement when she lays flat  Has mild associated headache similar to headaches she has had before  No focal neurologic deficits on exam   Does have some 50 will left would be nystagmus  Believe symptoms are secondary to vertigo  Given nonfocal neuro exam and no neuro symptoms, do not believe CT head is required at this time  Will obtain cardiac workup  Will give IV fluids and meclizine  Will reassess  Patient with sustained bradycardia in the 40s while in the department  Blood pressures have remained within normal limits  Patient has persistent lightheadedness    Patient admitted to [facility] as labs for symptomatic bradycardia        Disposition  Final diagnoses: Lightheadedness   Symptomatic bradycardia     Time reflects when diagnosis was documented in both MDM as applicable and the Disposition within this note     Time User Action Codes Description Comment    7/31/2020 11:54 AM Cecy Forrest Add [R42] Lightheadedness     7/31/2020 11:54 AM Cecy Lon Add [R00 1] Bradycardia     7/31/2020  1:30 PM Cecy Tatyanaks Add [R00 1] Symptomatic bradycardia     7/31/2020  1:30 PM Cecynorma Forrest Remove [R00 1] Bradycardia       ED Disposition     ED Disposition Condition Date/Time Comment    Admit Stable Fri Jul 31, 2020  1:30 PM Case was discussed with MILE and the patient's admission status was agreed to be Admission Status: observation status to the service of Dr Diane Silva           Follow-up Information     Follow up With Specialties Details Why [address] [place] [address]  Emergency Department Emergency Medicine Go to  If symptoms worsen [place] [id number]  [phone]    Asha Parry DO Family Medicine Schedule an appointment as soon as possible for a visit  For follow up of symptoms [phone]            Current Discharge Medication List      CONTINUE these medications which have NOT CHANGED    Details   !! ALPRAZolam (XANAX) 1 mg tablet Take 1 mg by mouth daily at bedtime        atenolol (TENORMIN) 25 mg tablet Take 25 mg by mouth daily at bedtime       cariprazine (Vraylar) 3 MG capsule Take 3 mg by mouth daily      linaCLOtide (LINZESS PO) Take 290 mcg by mouth daily before breakfast      lisinopril (ZESTRIL) 10 mg tablet Take 10 mg by mouth daily      medroxyPROGESTERone (DEPO-PROVERA) 150 mg/mL injection medroxyprogesterone 150 mg/mL intramuscular suspension      ondansetron (ZOFRAN-ODT) 4 mg disintegrating tablet Take 1 tablet (4 mg total) by mouth every 8 (eight) hours as needed for nausea or vomiting  Qty: 20 tablet, Refills: 0    Associated Diagnoses: Nausea pantoprazole (PROTONIX) 40 mg tablet Take 40 mg by mouth 2 (two) times a day        valACYclovir (VALTREX) 500 mg tablet Take 500 mg by mouth daily      vilazodone (VIIBRYD) 10 mg tablet Take 1 tablet (10 mg total) by mouth daily with breakfast  Qty: 30 tablet, Refills: 0    Associated Diagnoses: Severe episode of recurrent major depressive disorder, without psychotic features (HCC)      zolpidem (AMBIEN) 10 mg tablet Take 1 tablet (10 mg total) by mouth daily at bedtime as needed for sleep  Qty: 10 tablet, Refills: 0    Associated Diagnoses: Insomnia      !! ALPRAZolam (XANAX) 0 25 mg tablet Take by mouth 4 (four) times a day as needed for anxiety      PROAIR  (90 Base) MCG/ACT inhaler Inhale every 4 (four) hours as needed (as needed)         ! ! - Potential duplicate medications found  Please discuss with provider  No discharge procedures on file      PDMP Review     None          ED Provider  Electronically Signed by           Harjinder George DO  07/31/20 1512

## 2020-07-31 NOTE — PLAN OF CARE
Problem: PAIN - ADULT  Goal: Verbalizes/displays adequate comfort level or baseline comfort level  Description  Interventions:  - Encourage patient to monitor pain and request assistance  - Assess pain using appropriate pain scale  - Administer analgesics based on type and severity of pain and evaluate response  - Implement non-pharmacological measures as appropriate and evaluate response  - Consider cultural and social influences on pain and pain management  - Notify physician/advanced practitioner if interventions unsuccessful or patient reports new pain  Outcome: Progressing     Problem: INFECTION - ADULT  Goal: Absence or prevention of progression during hospitalization  Description  INTERVENTIONS:  - Assess and monitor for signs and symptoms of infection  - Monitor lab/diagnostic results  - Monitor all insertion sites, i e  indwelling lines, tubes, and drains  - Monitor endotracheal if appropriate and nasal secretions for changes in amount and color  - [place] appropriate cooling/warming therapies per order  - Administer medications as ordered  - Instruct and encourage patient and family to use good hand hygiene technique  - Identify and instruct in appropriate isolation precautions for identified infection/condition  Outcome: Progressing     Problem: SAFETY ADULT  Goal: Patient will remain free of falls  Description  INTERVENTIONS:  - Assess patient frequently for physical needs  -  Identify cognitive and physical deficits and behaviors that affect risk of falls    -  [place] fall precautions as indicated by assessment   - Educate patient/family on patient safety including physical limitations  - Instruct patient to call for assistance with activity based on assessment  - Modify environment to reduce risk of injury  - Consider OT/PT consult to assist with strengthening/mobility  Outcome: Progressing     Problem: DISCHARGE PLANNING  Goal: Discharge to home or other facility with appropriate resources  Description  INTERVENTIONS:  - Identify barriers to discharge w/patient and caregiver  - Arrange for needed discharge resources and transportation as appropriate  - Identify discharge learning needs (meds, wound care, etc )  - Refer to Case Management Department for coordinating discharge planning if the patient needs post-hospital services based on physician/advanced practitioner order or complex needs related to functional status, cognitive ability, or social support system   Outcome: Progressing     Problem: Knowledge Deficit  Goal: Patient/family/caregiver demonstrates understanding of disease process, treatment plan, medications, and discharge instructions  Description  Complete learning assessment and assess knowledge base    Interventions:  - Provide teaching at level of understanding  - Provide teaching via preferred learning methods  Outcome: Progressing     Problem: CARDIOVASCULAR - ADULT  Goal: Maintains optimal cardiac output and hemodynamic stability  Description  INTERVENTIONS:  - Monitor I/O, vital signs and rhythm  - Monitor for S/S and trends of decreased cardiac output  - Administer and titrate ordered vasoactive medications to optimize hemodynamic stability  - Assess quality of pulses, skin color and temperature  - Assess for signs of decreased coronary artery perfusion  - Instruct patient to report change in severity of symptoms  Outcome: Progressing  Goal: Absence of cardiac dysrhythmias or at baseline rhythm  Description  INTERVENTIONS:  - Continuous cardiac monitoring, vital signs, obtain 12 lead EKG if ordered  - Administer antiarrhythmic and heart rate control medications as ordered  - Monitor electrolytes and administer replacement therapy as ordered  Outcome: Progressing     Problem: RESPIRATORY - ADULT  Goal: Achieves optimal ventilation and oxygenation  Description  INTERVENTIONS:  - Assess for changes in respiratory status  - Assess for changes in mentation and behavior  - Position to facilitate oxygenation and minimize respiratory effort  - Oxygen administered by appropriate delivery if ordered  - Initiate smoking cessation education as indicated  - Encourage broncho-pulmonary hygiene including cough, deep breathe, Incentive Spirometry  - Assess the need for suctioning and aspirate as needed  - Assess and instruct to report SOB or any respiratory difficulty  - Respiratory Therapy support as indicated  Outcome: Progressing     Problem: GASTROINTESTINAL - ADULT  Goal: Minimal or absence of nausea and/or vomiting  Description  INTERVENTIONS:  - Administer IV fluids if ordered to ensure adequate hydration  - Maintain NPO status until nausea and vomiting are resolved  - Nasogastric tube if ordered  - Administer ordered antiemetic medications as needed  - Provide nonpharmacologic comfort measures as appropriate  - Advance diet as tolerated, if ordered  - Consider nutrition services referral to assist patient with adequate nutrition and appropriate food choices  Outcome: Progressing     Problem: SKIN/TISSUE INTEGRITY - ADULT  Goal: Skin integrity remains intact  Description  INTERVENTIONS  - Identify patients at risk for skin breakdown  - Assess and monitor skin integrity  - Assess and monitor nutrition and hydration status  - Monitor labs (i e  albumin)  - Assess for incontinence   - Turn and reposition patient  - Assist with mobility/ambulation  - Relieve pressure over bony prominences  - Avoid friction and shearing  - Provide appropriate hygiene as needed including keeping skin clean and dry  - Evaluate need for skin moisturizer/barrier cream  - Collaborate with interdisciplinary team (i e  Nutrition, Rehabilitation, etc )   - Patient/family teaching  Outcome: Progressing     Problem: MUSCULOSKELETAL - ADULT  Goal: Maintain or return mobility to safest level of function  Description  INTERVENTIONS:  - Assess patient's ability to carry out ADLs; assess patient's baseline for ADL function and identify physical deficits which impact ability to perform ADLs (bathing, care of mouth/teeth, toileting, grooming, dressing, etc )  - Assess/evaluate cause of self-care deficits   - Assess range of motion  - Assess patient's mobility  - Assess patient's need for assistive devices and provide as appropriate  - Encourage maximum independence but intervene and supervise when necessary  - Involve family in performance of ADLs  - Assess for home care needs following discharge   - Consider OT consult to assist with ADL evaluation and planning for discharge  - Provide patient education as appropriate  Outcome: Progressing

## 2020-07-31 NOTE — H&P
H&P- Cody Garvey 1975, 45 y o  female MRN: [id number]    Unit/Bed#: -01 Encounter: [id number]    Primary Care Provider: Estella Alvarez DO   Date and time admitted to hospital: 7/31/2020  8:03 AM        * Symptomatic bradycardia  Assessment & Plan  · The patient was noted to have heart rate in the 40s in the ED  · She has been on atenolol 25 mg daily for palpitation; this was started by her PCP approximately 8 months ago  · The patient was admitted here in March of 2020 and at that time her heart rate was in the high 70s  · Unclear with the significant dropped of her heart rate ? Volume depletion  · The patient denies ingesting any food or herbal substances or any new medications or any drugs other than her prescriptions medication  · Will check urine drug screen  · Monitor on telemetry  · Will hold atenolol for now  · Consult Cardiology    Major depressive disorder, recurrent episode, severe ([address] )  Assessment & Plan  · Continue with current home regimen    Migraine without aura and without status migrainosus, not intractable  Assessment & Plan  · Continue supportive measures  · The patient has not had migraine for quite some time        VTE Prophylaxis: Enoxaparin (Lovenox)  Code Status: Full Code   POLST: POLST is not applicable to this patient  Discussion with family: none present during admission     Anticipated Length of Stay:  Patient will be admitted on an Observation basis with an anticipated length of stay of  < 2 midnights  Justification for Hospital Stay:  Symptomatic bradycardia    Chief Complaint:   Dizziness    History of Present Illness:    Cody Garvey is a 45 y o  female who presents with dizziness, lightheadedness, and nausea  The patient states that she has been in her normal state of health up until Tuesday when she developed dizziness and lightheadedness with some nausea without vomiting  She states that she was moving over the weekend and might have been dehydrated  She did not go to work on Tuesday  She went to work on Wednesday and Thursday with intermittent episodes of dizziness  However this a m  When she woke up the patient states that she was having the feeling of "feeling like when you get a morphine or pain medication and start having this weird feeling"  Additionally every time she moved she feels that the room is spinning  She denies any loss of consciousness, trauma, or fall  She denies any visual disturbances, palpitation, chest pain  She denies any new medications or ingesting any unknown food or drugs  In the ER, the patient was noted to have heart rate in the low 40s  Review of Systems:  Review of Systems   Constitutional: Negative for activity change, appetite change, chills, diaphoresis and fever  HENT: Negative for congestion, ear pain, hearing loss, tinnitus and trouble swallowing  Eyes: Negative for photophobia, pain, discharge, itching and visual disturbance  Respiratory: Negative for cough, shortness of breath, wheezing and stridor  Cardiovascular: Negative for chest pain, palpitations and leg swelling  Gastrointestinal: Negative for abdominal pain, blood in stool, constipation, diarrhea, nausea and vomiting  Endocrine: Negative for cold intolerance, heat intolerance, polydipsia, polyphagia and polyuria  Genitourinary: Negative for difficulty urinating, dysuria, frequency, hematuria and urgency  Musculoskeletal: Negative for back pain, gait problem and neck stiffness  Skin: Negative for pallor, rash and wound  Allergic/Immunologic: Negative for environmental allergies, food allergies and immunocompromised state  Neurological: Positive for dizziness, weakness and light-headedness  Negative for tremors, speech difficulty, numbness and headaches  Hematological: Negative for adenopathy  Does not bruise/bleed easily  Psychiatric/Behavioral: Negative for confusion, hallucinations and sleep disturbance         Past Medical and Surgical History:   Past Medical History:   Diagnosis Date    Arthritis     Bipolar 1 disorder ([facility] [address] )     with bordeline personality    Borderline personality disorder ([facility] [address]     Chronic headaches     Depression     Depression     GERD (gastroesophageal reflux disease)     Hypertension     Incontinence     Migraine     Urinary tract infection        Past Surgical History:   Procedure Laterality Date    CHOLECYSTECTOMY  05/24/2018    DENTAL SURGERY      EXPLORATORY LAPAROTOMY      exlap    FOOT SURGERY Right     JOINT REPLACEMENT      KNEE ARTHROSCOPY      KNEE ARTHROSCOPY Right 11/15/2018    MULTIPLE TOOTH EXTRACTIONS  11/02/2018    OVARIAN CYST REMOVAL      REDUCTION MAMMAPLASTY      reduction        Meds/Allergies:  Prior to Admission medications    Medication Sig Start Date End Date Taking?  Authorizing Provider   ALPRAZolam Lana Buys) 1 mg tablet Take 1 mg by mouth daily at bedtime     Yes Historical Provider, MD   atenolol (TENORMIN) 25 mg tablet Take 25 mg by mouth daily at bedtime  2/12/20 2/11/21 Yes Historical Provider, MD   cariprazine (Vraylar) 3 MG capsule Take 3 mg by mouth daily   Yes Historical Provider, MD   linaCLOtide (LINZESS PO) Take 290 mcg by mouth daily before breakfast   Yes Historical Provider, MD   lisinopril (ZESTRIL) 10 mg tablet Take 10 mg by mouth daily   Yes Historical Provider, MD   medroxyPROGESTERone (DEPO-PROVERA) 150 mg/mL injection medroxyprogesterone 150 mg/mL intramuscular suspension   Yes Historical Provider, MD   ondansetron (ZOFRAN-ODT) 4 mg disintegrating tablet Take 1 tablet (4 mg total) by mouth every 8 (eight) hours as needed for nausea or vomiting 4/11/19  Yes Sydni Bay PA-C   pantoprazole (PROTONIX) 40 mg tablet Take 40 mg by mouth 2 (two) times a day     Yes Historical Provider, MD   valACYclovir (VALTREX) 500 mg tablet Take 500 mg by mouth daily   Yes Historical Provider, MD   vilazodone (VIIBRYD) 10 mg tablet Take 1 tablet (10 mg total) by mouth daily with breakfast 3/11/20  Yes Radha Vizcarra MD   zolpidem (AMBIEN) 10 mg tablet Take 1 tablet (10 mg total) by mouth daily at bedtime as needed for sleep 1/29/20  Yes Omari Vegas MD   ALPRAZolam Marvie See) 0 25 mg tablet Take by mouth 4 (four) times a day as needed for anxiety    Historical Provider, MD   PROAIR  (78 Base) MCG/ACT inhaler Inhale every 4 (four) hours as needed (as needed)   1/22/18   Historical Provider, MD     I have reviewed home medications with patient personally  Allergies: Allergies   Allergen Reactions    Celecoxib Hives    Lamotrigine Rash and Hives     Other reaction(s): rash and itching  Other reaction(s): rash and itching  Other reaction(s): rash and itching       Social History:  Marital Status: Single   Occupation: works in a [employer]   Patient Pre-hospital Living Situation: family   Patient Pre-hospital Level of Mobility: independent   Patient Pre-hospital Diet Restrictions: none   Substance Use History:   Social History     Substance and Sexual Activity   Alcohol Use Never    Frequency: Never    Comment: rarely     Social History     Tobacco Use   Smoking Status Never Smoker   Smokeless Tobacco Never Used     Social History     Substance and Sexual Activity   Drug Use Not Currently       Family History:  I have reviewed the patients family history    Physical Exam:   Vitals:   Blood Pressure: 132/72 (07/31/20 1421)  Pulse: (!) 44 (07/31/20 1421)  Temperature: (!) 96 8 °F (36 °C) (07/31/20 1421)  Temp Source: Temporal (07/31/20 1421)  Respirations: 20 (07/31/20 1421)  Height: 5' 5" (165 1 cm) (07/31/20 1421)  Weight - Scale: 89 8 kg (198 lb) (07/31/20 0803)  SpO2: 95 % (07/31/20 1421)    Physical Exam   Constitutional: She is oriented to person, place, and time  She appears well-developed and well-nourished  No distress  HENT:   Head: Normocephalic and atraumatic     Mouth/Throat: Oropharynx is clear and moist    Eyes: Pupils are equal, round, and reactive to light  Conjunctivae and EOM are normal    Neck: Normal range of motion  Neck supple  No thyromegaly present  Cardiovascular: Normal rate, regular rhythm and normal heart sounds  Exam reveals no gallop and no friction rub  No murmur heard  Pulmonary/Chest: Effort normal and breath sounds normal  She has no wheezes  She has no rales  Abdominal: Soft  Bowel sounds are normal  She exhibits no distension  There is no tenderness  There is no rebound  Musculoskeletal: Normal range of motion  She exhibits no edema, tenderness or deformity  Neurological: She is alert and oriented to person, place, and time  No cranial nerve deficit  Skin: Skin is warm and dry  No rash noted  No erythema  Psychiatric: She has a normal mood and affect  Her behavior is normal  Judgment and thought content normal    Vitals reviewed  Additional Data:   Lab Results: I have personally reviewed pertinent reports  Results from last 7 days   Lab Units 07/31/20  0833   WBC Thousand/uL 8 30   HEMOGLOBIN g/dL 14 5   HEMATOCRIT % 42 6   PLATELETS Thousands/uL 224   NEUTROS PCT % 60   LYMPHS PCT % 31   MONOS PCT % 8   EOS PCT % 2     Results from last 7 days   Lab Units 07/31/20  0833   SODIUM mmol/L 141   POTASSIUM mmol/L 3 5   CHLORIDE mmol/L 109*   CO2 mmol/L 23   BUN mg/dL 8   CREATININE mg/dL 0 88   CALCIUM mg/dL 9 0   ALK PHOS U/L 50   ALT U/L 32   AST U/L 25                   Imaging: I have personally reviewed pertinent reports  CT head without contrast   Final Result by Trang Horta MD (07/31 1316)      No acute intracranial abnormality  Workstation performed: [id number]             EKG, Pathology, and Other Studies Reviewed on Admission:   · EKG: sinus bradycardia HR 41    Epic Records Reviewed: Yes     ** Please Note: This note has been constructed using a voice recognition system   **

## 2020-07-31 NOTE — ASSESSMENT & PLAN NOTE
· The patient was noted to have heart rate in the 40s in the ED  · She has been on atenolol 25 mg daily for palpitation; this was started by her PCP approximately 8 months ago  · The patient was admitted here in March of 2020 and at that time her heart rate was in the high 70s  · Unclear with the significant dropped of her heart rate ?   Volume depletion  · The patient denies ingesting any food or herbal substances or any new medications or any drugs other than her prescriptions medication  · Will check urine drug screen  · Monitor on telemetry  · Will hold atenolol for now  · Consult Cardiology

## 2020-08-01 VITALS
OXYGEN SATURATION: 97 % | TEMPERATURE: 97.4 F | RESPIRATION RATE: 16 BRPM | WEIGHT: 198 LBS | HEIGHT: 65 IN | HEART RATE: 58 BPM | BODY MASS INDEX: 32.99 KG/M2 | DIASTOLIC BLOOD PRESSURE: 73 MMHG | SYSTOLIC BLOOD PRESSURE: 138 MMHG

## 2020-08-01 LAB
AMPHETAMINES SERPL QL SCN: NEGATIVE
ANION GAP SERPL CALCULATED.3IONS-SCNC: 7 MMOL/L (ref 4–13)
BARBITURATES UR QL: NEGATIVE
BENZODIAZ UR QL: POSITIVE
BUN SERPL-MCNC: 8 MG/DL (ref 7–25)
CALCIUM SERPL-MCNC: 8.4 MG/DL (ref 8.6–10.5)
CHLORIDE SERPL-SCNC: 111 MMOL/L (ref 98–107)
CO2 SERPL-SCNC: 24 MMOL/L (ref 21–31)
COCAINE UR QL: NEGATIVE
CREAT SERPL-MCNC: 0.82 MG/DL (ref 0.6–1.2)
ERYTHROCYTE [DISTWIDTH] IN BLOOD BY AUTOMATED COUNT: 14.7 % (ref 11.5–14.5)
GFR SERPL CREATININE-BSD FRML MDRD: 89 ML/MIN/1.73SQ M
GLUCOSE SERPL-MCNC: 97 MG/DL (ref 65–99)
HCT VFR BLD AUTO: 39.3 % (ref 42–47)
HGB BLD-MCNC: 13.6 G/DL (ref 12–16)
MAGNESIUM SERPL-MCNC: 2 MG/DL (ref 1.9–2.7)
MCH RBC QN AUTO: 32.1 PG (ref 26–34)
MCHC RBC AUTO-ENTMCNC: 34.5 G/DL (ref 31–37)
MCV RBC AUTO: 93 FL (ref 81–99)
METHADONE UR QL: NEGATIVE
OPIATES UR QL SCN: NEGATIVE
OXYCODONE+OXYMORPHONE UR QL SCN: NEGATIVE
PCP UR QL: NEGATIVE
PLATELET # BLD AUTO: 191 THOUSANDS/UL (ref 149–390)
PMV BLD AUTO: 8.8 FL (ref 8.6–11.7)
POTASSIUM SERPL-SCNC: 3.8 MMOL/L (ref 3.5–5.5)
RBC # BLD AUTO: 4.23 MILLION/UL (ref 3.9–5.2)
SODIUM SERPL-SCNC: 142 MMOL/L (ref 134–143)
T3FREE SERPL-MCNC: 2.14 PG/ML (ref 2.3–4.2)
THC UR QL: NEGATIVE
WBC # BLD AUTO: 6.6 THOUSAND/UL (ref 4.8–10.8)

## 2020-08-01 PROCEDURE — 83735 ASSAY OF MAGNESIUM: CPT | Performed by: NURSE PRACTITIONER

## 2020-08-01 PROCEDURE — 85027 COMPLETE CBC AUTOMATED: CPT | Performed by: NURSE PRACTITIONER

## 2020-08-01 PROCEDURE — 80307 DRUG TEST PRSMV CHEM ANLYZR: CPT | Performed by: NURSE PRACTITIONER

## 2020-08-01 PROCEDURE — 99217 PR OBSERVATION CARE DISCHARGE MANAGEMENT: CPT | Performed by: NURSE PRACTITIONER

## 2020-08-01 PROCEDURE — 84481 FREE ASSAY (FT-3): CPT | Performed by: NURSE PRACTITIONER

## 2020-08-01 PROCEDURE — 99204 OFFICE O/P NEW MOD 45 MIN: CPT | Performed by: INTERNAL MEDICINE

## 2020-08-01 PROCEDURE — 97161 PT EVAL LOW COMPLEX 20 MIN: CPT

## 2020-08-01 PROCEDURE — 80048 BASIC METABOLIC PNL TOTAL CA: CPT | Performed by: NURSE PRACTITIONER

## 2020-08-01 RX ADMIN — MECLIZINE 25 MG: 12.5 TABLET ORAL at 06:10

## 2020-08-01 RX ADMIN — VALACYCLOVIR HYDROCHLORIDE 500 MG: 500 TABLET, FILM COATED ORAL at 08:24

## 2020-08-01 RX ADMIN — SODIUM CHLORIDE 100 ML/HR: 0.9 INJECTION, SOLUTION INTRAVENOUS at 06:14

## 2020-08-01 RX ADMIN — PANTOPRAZOLE SODIUM 40 MG: 40 TABLET, DELAYED RELEASE ORAL at 08:24

## 2020-08-01 RX ADMIN — MECLIZINE 25 MG: 12.5 TABLET ORAL at 13:14

## 2020-08-01 RX ADMIN — LISINOPRIL 10 MG: 5 TABLET ORAL at 08:24

## 2020-08-01 RX ADMIN — VILAZODONE HYDROCHLORIDE 10 MG: 10 TABLET ORAL at 08:25

## 2020-08-01 NOTE — ASSESSMENT & PLAN NOTE
· The patient was noted to have heart rate in the 40s in the ED  · She has been on atenolol 25 mg daily for palpitation; this was started by her PCP approximately 8 months ago  · The patient was admitted here in March of 2020 and at that time her heart rate was in the high 70s    · Bradycardia is likely due to atenolol   · The patient denies ingesting any food or herbal substances or any new medications or any drugs other than her prescriptions medications  · Cardiology input appreciated   · Will make an outpatient referral to cardiology for outpatient echocardiogram, event monitor if indicated

## 2020-08-01 NOTE — SOCIAL WORK
Evaluated the pt at the bedside with friend present  Pt was admitted to the hospital for dizziness  Explained the role of CM and the options of discharge planning with the pt  Pt states she is independent and able to drive  Pt PCP is Dr Clelia Homans  Pt uses [address]  Pt drove to the hospital, however friend indicated he could drive the pt home  Explained the Observation level of care with the pt who verbalized understanding of same  Pt is awaiting a cardiology consult  Patient/caregiver received discharge checklist   Content reviewed  Patient/caregiver encouraged to participate in discharge plan of care prior to discharge home  CM reviewed d/c planning process including the following: identifying help at home, patient preference for d/c planning needs, availability of treatment team to discuss questions or concerns patient and/or family may have regarding understanding medications and recognizing signs and symptoms once discharged  CM also encouraged patient to follow up with all recommended appointments after discharge  Patient advised of importance for patient and family to participate in managing patients medical well being 
no

## 2020-08-01 NOTE — CONSULTS
Consultation - Cardiology   Filemon Patel 45 y o  female MRN: [id number]  Unit/Bed#: -01 Encounter: [id number]    Assessment/Plan     Assessment:  Sinus bradycardia  Hypertension  Major depression    Plan:  -patient presented with dizziness, heart rate found to be in the 40s  Atenolol recently started for palpitations was held and her heart rate is now in the 50s  No arrhythmias on telemetry  She does have significant PACs  Tele monitor shows sinus bradycardia with heart rate in the 50s with occasional PACs which are asymptomatic at this time   -agree with continuing to hold atenolol; anxiety/depression also contribute to her palpitations; will defer management of this to primary team   -we will plan for outpatient transthoracic echocardiogram, event monitor and cardiology follow-up to assess LV/RV function, assess burden of PACs/PVCs to see if that may be causing her symptoms  -she is otherwise stable for discharge from the cardiac standpoint  Please call us as needed    History of Present Illness   Physician Requesting Consult: Orin Chahal DO  Reason for Consult / Principal Problem:  Dizziness  HPI: Filemon Patel is a 45y o  year old female with a history of hypertension who presented with complaints of dizziness, fatigue for the last few days  Patient attributes her symptoms to dehydration as she had been out in the hot weather and had not had enough water to drink  She was found to have a heart rate in the 40s  Of note she was started on atenolol as an outpatient for 'palpitations'  Her palpitations have since resolved  Patient was given IV hydration on presentation to the hospital in now feels back to normal and is eager to go home  She denies any palpitations at this time  She says her dizziness has resolved  There is no history of chest pain, worsening dyspnea on exertion, orthopnea, PND  She has had no syncopal episodes  She feels comfortable when seen by me      Inpatient consult to Cardiology  Consult performed by: Leon Pinzon MD  Consult ordered by: LEATHA Vargas          Review of Systems   All other systems reviewed and are negative        Historical Information   Past Medical History:   Diagnosis Date    Arthritis     Bipolar 1 disorder ([facility] [address] )     with bordeline personality    Borderline personality disorder ([facility] [address] )     Chronic headaches     Depression     Depression     GERD (gastroesophageal reflux disease)     Hypertension     Incontinence     Migraine     Urinary tract infection      Past Surgical History:   Procedure Laterality Date    CHOLECYSTECTOMY  05/24/2018    DENTAL SURGERY      EXPLORATORY LAPAROTOMY      exlap    FOOT SURGERY Right     JOINT REPLACEMENT      KNEE ARTHROSCOPY      KNEE ARTHROSCOPY Right 11/15/2018    MULTIPLE TOOTH EXTRACTIONS  11/02/2018    OVARIAN CYST REMOVAL      REDUCTION MAMMAPLASTY      reduction      Social History     Substance and Sexual Activity   Alcohol Use Never    Frequency: Never    Comment: rarely     Social History     Substance and Sexual Activity   Drug Use Not Currently     E-Cigarette/Vaping    E-Cigarette Use Never User      E-Cigarette/Vaping Substances     Social History     Tobacco Use   Smoking Status Never Smoker   Smokeless Tobacco Never Used     Family History: non-contributory    Meds/Allergies   all current active meds have been reviewed, current meds:   Current Facility-Administered Medications   Medication Dose Route Frequency    acetaminophen (TYLENOL) tablet 650 mg  650 mg Oral 4x Daily PRN    albuterol (PROVENTIL HFA,VENTOLIN HFA) inhaler 1 puff  1 puff Inhalation Q4H PRN    ALPRAZolam (XANAX) tablet 0 25 mg  0 25 mg Oral 4x Daily PRN    ALPRAZolam (XANAX) tablet 1 mg  1 mg Oral HS    enoxaparin (LOVENOX) subcutaneous injection 40 mg  40 mg Subcutaneous Q24H ADAM    lisinopril (ZESTRIL) tablet 10 mg  10 mg Oral Daily    meclizine (ANTIVERT) tablet 25 mg  25 mg Oral Q8H [address]    pantoprazole (PROTONIX) EC tablet 40 mg  40 mg Oral BID    sodium chloride 0 9 % infusion  100 mL/hr Intravenous Continuous    valACYclovir (VALTREX) tablet 500 mg  500 mg Oral Daily    vilazodone (VIIBRYD) tablet 10 mg  10 mg Oral Daily With Breakfast    zolpidem (AMBIEN) tablet 10 mg  10 mg Oral HS PRN    and PTA meds:   Prior to Admission Medications   Prescriptions Last Dose Informant Patient Reported? Taking?    ALPRAZolam (XANAX) 0 25 mg tablet More than a month at Unknown time  Yes No   Sig: Take by mouth 4 (four) times a day as needed for anxiety   ALPRAZolam (XANAX) 1 mg tablet 7/30/2020 at Unknown time Self Yes Yes   Sig: Take 1 mg by mouth daily at bedtime     PROAIR  (90 Base) MCG/ACT inhaler More than a month at Unknown time Self Yes No   Sig: Inhale every 4 (four) hours as needed (as needed)     atenolol (TENORMIN) 25 mg tablet 7/30/2020 at Unknown time  Yes Yes   Sig: Take 25 mg by mouth daily at bedtime    cariprazine (Vraylar) 3 MG capsule 7/31/2020 at Unknown time  Yes Yes   Sig: Take 3 mg by mouth daily   linaCLOtide (LINZESS PO) Past Week at Unknown time  Yes Yes   Sig: Take 290 mcg by mouth daily before breakfast   lisinopril (ZESTRIL) 10 mg tablet 7/31/2020 at Unknown time  Yes Yes   Sig: Take 10 mg by mouth daily   medroxyPROGESTERone (DEPO-PROVERA) 150 mg/mL injection Past Month at Unknown time Self Yes Yes   Sig: medroxyprogesterone 150 mg/mL intramuscular suspension   ondansetron (ZOFRAN-ODT) 4 mg disintegrating tablet Past Week at Unknown time  No Yes   Sig: Take 1 tablet (4 mg total) by mouth every 8 (eight) hours as needed for nausea or vomiting   pantoprazole (PROTONIX) 40 mg tablet 7/31/2020 at Unknown time Self Yes Yes   Sig: Take 40 mg by mouth 2 (two) times a day     valACYclovir (VALTREX) 500 mg tablet 7/31/2020 at Unknown time  Yes Yes   Sig: Take 500 mg by mouth daily   vilazodone (VIIBRYD) 10 mg tablet 7/31/2020 at Unknown time  No Yes   Sig: Take 1 tablet (10 mg total) by mouth daily with breakfast   zolpidem (AMBIEN) 10 mg tablet Past Week at Unknown time  No Yes   Sig: Take 1 tablet (10 mg total) by mouth daily at bedtime as needed for sleep      Facility-Administered Medications: None     Allergies   Allergen Reactions    Celecoxib Hives    Lamotrigine Rash and Hives     Other reaction(s): rash and itching  Other reaction(s): rash and itching  Other reaction(s): rash and itching       Objective   Vitals: Blood pressure 138/73, pulse 58, temperature (!) 97 4 °F (36 3 °C), temperature source Temporal, resp  rate 16, height 5' 5" (1 651 m), weight 89 8 kg (198 lb), SpO2 97 %, not currently breastfeeding  Orthostatic Blood Pressures      Most Recent Value   Blood Pressure  138/73 filed at 08/01/2020 1117   Patient Position - Orthostatic VS  Lying filed at 08/01/2020 1117            Intake/Output Summary (Last 24 hours) at 8/1/2020 1426  Last data filed at 8/1/2020 1300  Gross per 24 hour   Intake 1960 ml   Output 1200 ml   Net 760 ml       Invasive Devices     Peripheral Intravenous Line            Peripheral IV 07/31/20 Right Hand 1 day                Physical Exam   Constitutional: She appears well-developed and well-nourished  HENT:   Head: Normocephalic and atraumatic  Eyes: Pupils are equal, round, and reactive to light  EOM are normal    Neck: Neck supple  Cardiovascular: Normal rate and regular rhythm  Pulmonary/Chest: Effort normal and breath sounds normal    Abdominal: Soft  Musculoskeletal: She exhibits no edema  Skin: Skin is warm and dry  Psychiatric: She has a normal mood and affect  Nursing note and vitals reviewed  Lab Results:   I have personally reviewed pertinent lab results      CBC with diff:   Results from last 7 days   Lab Units 08/01/20  0529   WBC Thousand/uL 6 60   RBC Million/uL 4 23   HEMOGLOBIN g/dL 13 6   HEMATOCRIT % 39 3*   MCV fL 93   MCH pg 32 1   MCHC g/dL 34 5   RDW % 14 7*   MPV fL 8 8   PLATELETS Thousands/uL 191 CMP:   Results from last 7 days   Lab Units 08/01/20  0529 07/31/20  0833   SODIUM mmol/L 142 141   POTASSIUM mmol/L 3 8 3 5   CHLORIDE mmol/L 111* 109*   CO2 mmol/L 24 23   BUN mg/dL 8 8   CREATININE mg/dL 0 82 0 88   CALCIUM mg/dL 8 4* 9 0   AST U/L  --  25   ALT U/L  --  32   ALK PHOS U/L  --  50   EGFR ml/min/1 73sq m 89 81     Troponin:   0   Lab Value Date/Time    TROPONINI <0 03 07/31/2020 0833    TROPONINI <0 03 03/09/2020 0444    TROPONINI <0 03 04/11/2019 1617    TROPONINI <0 03 11/30/2018 1000     BNP:   Results from last 7 days   Lab Units 08/01/20  0529   POTASSIUM mmol/L 3 8   CHLORIDE mmol/L 111*   CO2 mmol/L 24   BUN mg/dL 8   CREATININE mg/dL 0 82   CALCIUM mg/dL 8 4*   EGFR ml/min/1 73sq m 89     Coags:     TSH:   Results from last 7 days   Lab Units 07/31/20  0833   TSH 3RD GENERATON uIU/mL 0 360*     Magnesium:   Results from last 7 days   Lab Units 08/01/20  0529   MAGNESIUM mg/dL 2 0     Lipid Profile:     Imaging: I have personally reviewed pertinent reports  EKG: Sinus bradycardia  VTE Prophylaxis: Sequential compression device (Venodyne)     Code Status: Level 1 - Full Code  Advance Directive and Living Will:      Power of :    POLST:      Counseling / Coordination of Care  Total floor / unit time spent today 45 minutes  Greater than 50% of total time was spent with the patient and / or family counseling and / or coordination of care

## 2020-08-01 NOTE — PHYSICAL THERAPY NOTE
Physical Therapy Evaluation     Patient's Name: Suleman Mahmood    Admitting Diagnosis  Dizziness [R42]  Lightheadedness [R42]  Symptomatic bradycardia [R00 1]    Problem List  Patient Active Problem List   Diagnosis    Excessive daytime sleepiness    Migraine without aura and without status migrainosus, not intractable    Tension headache    Sensation of cold in lower extremity    Stress incontinence    Recurrent UTI (urinary tract infection)    Small bowel obstruction (HCC)    Major depressive disorder, recurrent episode, severe (HCC)    Panic disorder    Symptomatic bradycardia       Past Medical History  Past Medical History:   Diagnosis Date    Arthritis     Bipolar 1 disorder (HCC)     with bordeline personality    Borderline personality disorder ([address] )     Chronic headaches     Depression     Depression     GERD (gastroesophageal reflux disease)     Hypertension     Incontinence     Migraine     Urinary tract infection        Past Surgical History  Past Surgical History:   Procedure Laterality Date    CHOLECYSTECTOMY  05/24/2018    DENTAL SURGERY      EXPLORATORY LAPAROTOMY      exlap    FOOT SURGERY Right     JOINT REPLACEMENT      KNEE ARTHROSCOPY      KNEE ARTHROSCOPY Right 11/15/2018    MULTIPLE TOOTH EXTRACTIONS  11/02/2018    OVARIAN CYST REMOVAL      REDUCTION MAMMAPLASTY      reduction           08/01/20 1250   Note Type   Note type Eval only   Pain Assessment   Pain Assessment Tool Pain Assessment not indicated - pt denies pain   Home Living   Type of [address]   ([place]; 12 FAY)   Bathroom Shower/Tub Walk-in shower   [address]   (none)   Prior Function   Level of [place] Independent with ADLs and functional mobility   Lives With [facility] Help From Family   ADL Assistance Independent   IADLs Independent   Falls in the last 6 months 0   Vocational Full time employment   Restrictions/Precautions   [facility] Bearing Precautions Per Order No   Other Precautions Multiple lines   General   Family/Caregiver Present No   Cognition   Overall Cognitive Status WFL   RLE Assessment   RLE Assessment WFL   LLE Assessment   LLE Assessment WFL   Coordination   Movements are Fluid and Coordinated 1   Sensation WFL   Bed Mobility   Supine to Sit 7  Independent   Sit to Supine 7  Independent   Transfers   Sit to Stand 7  Independent   Stand to Sit 7  Independent   Ambulation/Elevation   Gait pattern WNL   Gait Assistance 7  Independent   Assistive Device None   Distance 200 ft   Balance   Static Sitting Normal   Dynamic Sitting Normal   Static Standing Normal   Dynamic Standing Good   Ambulatory Good   Endurance Deficit   Endurance Deficit No   Activity Tolerance   Activity Tolerance Patient tolerated treatment well   Assessment   Prognosis Excellent   Problem List   (none)   Assessment Pt is 45 y o  female seen for PT evaluation s/p admit to [id number] on 7/31/2020 w/ Symptomatic bradycardia  PT consulted to assess pt's functional mobility and d/c needs  Order placed for PT eval and tx, w/ activity as tolerated order  Comorbidities affecting pt's physical performance at time of assessment include: bipolar, depression and foot disorder  PTA, pt was has 12 FAY, lives w/ family in 1 level house and works full time  Personal factors affecting pt at time of IE include: anxiety and depression  Please find objective findings from PT assessment regarding body systems outlined above with impairments and limitations including none  Pt's clinical presentation is currently stable  seen in pt's presentation of lack of deficts  Pt to benefit from continued PT tx to address deficits as defined above and maximize level of functional independent mobility and consistency  From PT/mobility standpoint, recommendation at time of d/c would be anticipate no needs pending progress in order to facilitate return to PLOF     Barriers to Discharge None   Goals   Patient Goals to go home today   PT Treatment Day 0   Recommendation   PT Discharge Recommendation Return to previous environment with no needs   Equipment Recommended   (none)   PT - OK to Discharge Yes   Additional Comments upon conclusion pt supine in bed with all needs in reach         Maya Reynolds PT

## 2020-08-01 NOTE — DISCHARGE INSTR - AVS FIRST PAGE
Dear Nino Topete,     It was our pleasure to care for you here at [facility]  It is our hope that we were always able to exceed the expected standards for your care during your stay  You were hospitalized due to bradycardia  You were cared for on the 1st floor by LEATHA Herrera with the [facility] who covers for your primary care physician (PCP), Zeinab Lim DO, while you were hospitalized  If you have any questions or concerns related to this hospitalization, you may contact us at [phone]  For follow up as well as any medication refills, we recommend that you follow up with your primary care physician  A registered nurse will reach out to you by phone within a few days after your discharge to answer any additional questions that you may have after going home  However, at this time we provide for you here, the most important instructions / recommendations at discharge:     · Notable Medication Adjustments -   · Atenolol- stopped   · Testing Required after Discharge -   · Possible echocardiogram outpatient   · Important follow up information -   · Follow up with PCP, cardiology   · Other Instructions -   · Please refer to discharge instruction  · Please review this entire after visit summary as additional general instructions including medication list, appointments, activity, diet, any pertinent wound care, and other additional recommendations from your care team that may be provided for you        Sincerely,     LEATHA Herrera and Vicky Gurrola RN

## 2020-08-01 NOTE — DISCHARGE SUMMARY
Discharge- Kinga Cobos 1975, 45 y o  female MRN: [id number]    Unit/Bed#: -01 Encounter: [id number]    Primary Care Provider: Katlyn Warner DO   Date and time admitted to hospital: 7/31/2020  8:03 AM        * Symptomatic bradycardia  Assessment & Plan  · The patient was noted to have heart rate in the 40s in the ED  · She has been on atenolol 25 mg daily for palpitation; this was started by her PCP approximately 8 months ago  · The patient was admitted here in March of 2020 and at that time her heart rate was in the high 70s  · Bradycardia is likely due to atenolol   · The patient denies ingesting any food or herbal substances or any new medications or any drugs other than her prescriptions medications  · Cardiology input appreciated   · Will make an outpatient referral to cardiology for outpatient echocardiogram, event monitor if indicated    Major depressive disorder, recurrent episode, severe ([address] )  Assessment & Plan  · Continue with current home regimen      Migraine without aura and without status migrainosus, not intractable  Assessment & Plan  · Continue supportive measures  · The patient has not had migraine for quite some time            Discharging Physician / Practitioner: LEATHA Bolton  PCP: Katlyn Warner DO  Admission Date:   Admission Orders (From admission, onward)     Ordered        07/31/20 1333  Place in Observation  Once                   Discharge Date: 08/01/20    Resolved Problems  Date Reviewed: 8/1/2020    None          Consultations During Hospital Stay:  · Cardiology     Procedures Performed:   · None     Significant Findings / Test Results:   · CT head no acute intracranial abnormality    Incidental Findings:   · None      Test Results Pending at Discharge (will require follow up):    · None      Outpatient Tests Requested:  · Follow-up PCP and Cardiology    Complications:     None    Reason for Admission:  Dizziness    Hospital Course:     Kinga Toñokyle is a 45 y o  female patient who originally presented to the hospital on 7/31/2020 due to dizziness  Please refer to H&P for initial presenting complaint  Brief the patient presented with dizziness, lightheadedness, and nausea and subsequently was admitted for symptomatic bradycardia  She was started on IV fluid  Feels slightly improved with fluid resuscitation  The patient was noted to have heart rate in the low 40s  She was monitor on telemetry  Cardiology evaluation was done  She does have significant PACs  Heart rate remained in the mid to high 50s when the patient is awake and symptoms are much improved  Cardiology at this time recommend for an outpatient TTE, possible event monitor and cardiology follow-up  Will stop atenolol this time  Overall clinically the patient is feeling improved and is medically cleared to be discharged home today  Please see above list of diagnoses and related plan for additional information  Condition at Discharge: good     Discharge Day Visit / Exam:     Subjective:  Feeling much improved  Would like to go home  Vitals: Blood Pressure: 138/73 (08/01/20 1117)  Pulse: 58 (08/01/20 1117)  Temperature: (!) 97 4 °F (36 3 °C) (08/01/20 1117)  Temp Source: Temporal (08/01/20 1117)  Respirations: 16 (08/01/20 1117)  Height: 5' 5" (165 1 cm) (07/31/20 1421)  Weight - Scale: 89 8 kg (198 lb) (07/31/20 0803)  SpO2: 97 % (08/01/20 1117)  Exam:   Physical Exam   Constitutional: She is oriented to person, place, and time  She appears well-developed  No distress  HENT:   Head: Normocephalic and atraumatic  Mouth/Throat: Oropharynx is clear and moist    Eyes: Pupils are equal, round, and reactive to light  Conjunctivae and EOM are normal    Neck: Normal range of motion  Neck supple  No thyromegaly present  Cardiovascular: Regular rhythm and normal heart sounds  Exam reveals no gallop and no friction rub  No murmur heard    Bradycardia     Pulmonary/Chest: Effort normal and breath sounds normal  She has no wheezes  She has no rales  Abdominal: Soft  Bowel sounds are normal  She exhibits no distension  There is no tenderness  There is no rebound  Musculoskeletal: Normal range of motion  She exhibits no edema, tenderness or deformity  Neurological: She is alert and oriented to person, place, and time  No cranial nerve deficit  Skin: Skin is warm and dry  No rash noted  No erythema  Psychiatric: She has a normal mood and affect  Her behavior is normal  Thought content normal    Vitals reviewed  Discussion with Family: none present during exam     Discharge instructions/Information to patient and family:   See after visit summary for information provided to patient and family  Provisions for Follow-Up Care:  See after visit summary for information related to follow-up care and any pertinent home health orders  Disposition:     Home    For Discharges to [facility]:   · Not Applicable to this Patient - Not Applicable to this Patient    Planned Readmission:    No      Discharge Statement:  I spent 38 minutes discharging the patient  This time was spent on the day of discharge  I had direct contact with the patient on the day of discharge  Greater than 50% of the total time was spent examining patient, answering all patient questions, arranging and discussing plan of care with patient as well as directly providing post-discharge instructions  Additional time then spent on discharge activities  Discharge Medications:  See after visit summary for reconciled discharge medications provided to patient and family        ** Please Note: This note has been constructed using a voice recognition system **

## 2020-08-17 ENCOUNTER — OFFICE VISIT (OUTPATIENT)
Dept: CARDIOLOGY CLINIC | Facility: CLINIC | Age: 45
End: 2020-08-17
Payer: COMMERCIAL

## 2020-08-17 VITALS
DIASTOLIC BLOOD PRESSURE: 80 MMHG | BODY MASS INDEX: 33.32 KG/M2 | HEIGHT: 65 IN | WEIGHT: 200 LBS | HEART RATE: 58 BPM | SYSTOLIC BLOOD PRESSURE: 110 MMHG

## 2020-08-17 DIAGNOSIS — R00.1 SYMPTOMATIC BRADYCARDIA: Primary | ICD-10-CM

## 2020-08-17 DIAGNOSIS — E03.9 HYPOTHYROIDISM, UNSPECIFIED TYPE: ICD-10-CM

## 2020-08-17 DIAGNOSIS — R42 LIGHTHEADED: ICD-10-CM

## 2020-08-17 PROCEDURE — 99214 OFFICE O/P EST MOD 30 MIN: CPT | Performed by: PHYSICIAN ASSISTANT

## 2020-08-17 RX ORDER — PREGABALIN 150 MG/1
150 CAPSULE ORAL DAILY
COMMUNITY

## 2020-08-17 NOTE — PROGRESS NOTES
[address] [facility]   Outpatient Note  Severo Landry  1975  [id number]  [facility] [address] [address]  Charlie Gamez1    Severo Landry is a 45 y o  female    Assessment and Plan:   Symptomatic bradycardia  Marked bradycardia on EKG  Atenolol held   Now with palpitations   TSH low as well as T3: Referred to endocrinologist     Echo ordered to assess LV fucntion    Lightheaded  With marked bradycardia  Monitor ordered to assess for possible SSS  Patient may require PPM      Hypothyroidism  TSH is low, but T3 is low as well  Will refer to endocrine for their input  Additional Plan:   No medication changes made today  Testing ordered as above  Return visit will be in one month or earlier if there are problems  The patient is encouraged to call in the meantime if there are questions or concerns  Subjective: The patient is referred to the office for symptomatic bradycardia and lightheadedness  She had had several episodes of extreme fatigue and lightheadedness  She went to the ED and was found to be significantly bradycardic  She was told to stop her atenolol which she was taking for palpitations  The palpitations occur at rest and are a pounding sensation that takes her breath away  She is having more episodes of palpitations now that she stopped the atenolol  Otherwise, From a cardiac perspective, she is without complaints of chest pain or exertional dyspnea  She has no syncope, and denies edema orthopnea or PND  She does not complain of TIA or CVA symptoms             Social History  Social History     Tobacco Use   Smoking Status Never Smoker   Smokeless Tobacco Never Used   ,   Social History     Substance and Sexual Activity   Alcohol Use Never    Frequency: Never    Comment: rarely   ,   Social History     Substance and Sexual Activity   Drug Use Not Currently     Family History   Problem Relation Age of Onset    Hyperlipidemia Father     Heart disease Mother     Migraines Mother     Depression Family        Medical and Surgical History  Past Medical History:   Diagnosis Date    Arthritis     Bipolar 1 disorder ([facility] )     with bordeline personality    Borderline personality disorder ([facility] )     Chronic headaches     Depression     Depression     GERD (gastroesophageal reflux disease)     Hypertension     Incontinence     Migraine     Symptomatic bradycardia     Urinary tract infection      Past Surgical History:   Procedure Laterality Date    CHOLECYSTECTOMY  05/24/2018    DENTAL SURGERY      EXPLORATORY LAPAROTOMY      exlap    FOOT SURGERY Right     JOINT REPLACEMENT      KNEE ARTHROSCOPY      KNEE ARTHROSCOPY Right 11/15/2018    MULTIPLE TOOTH EXTRACTIONS  11/02/2018    OVARIAN CYST REMOVAL      REDUCTION MAMMAPLASTY      reduction          Current Outpatient Medications:     ALPRAZolam (XANAX) 0 25 mg tablet, Take by mouth 4 (four) times a day as needed for anxiety, Disp: , Rfl:     ALPRAZolam (XANAX) 1 mg tablet, Take 1 mg by mouth daily at bedtime  , Disp: , Rfl:     cariprazine (Vraylar) 3 MG capsule, Take 3 mg by mouth daily, Disp: , Rfl:     linaCLOtide (LINZESS PO), Take 290 mcg by mouth daily before breakfast, Disp: , Rfl:     lisinopril (ZESTRIL) 10 mg tablet, Take 10 mg by mouth daily, Disp: , Rfl:     medroxyPROGESTERone (DEPO-PROVERA) 150 mg/mL injection, medroxyprogesterone 150 mg/mL intramuscular suspension, Disp: , Rfl:     ondansetron (ZOFRAN-ODT) 4 mg disintegrating tablet, Take 1 tablet (4 mg total) by mouth every 8 (eight) hours as needed for nausea or vomiting, Disp: 20 tablet, Rfl: 0    pantoprazole (PROTONIX) 40 mg tablet, Take 40 mg by mouth 2 (two) times a day  , Disp: , Rfl:     pregabalin (LYRICA) 150 mg capsule, Take 150 mg by mouth 3 (three) times a day, Disp: , Rfl:     PROAIR  (90 Base) MCG/ACT inhaler, Inhale every 4 (four) hours as needed (as needed)  , Disp: , Rfl:     valACYclovir (VALTREX) 500 mg tablet, Take 500 mg by mouth daily, Disp: , Rfl:     vilazodone (VIIBRYD) 10 mg tablet, Take 1 tablet (10 mg total) by mouth daily with breakfast, Disp: 30 tablet, Rfl: 0    zolpidem (AMBIEN) 10 mg tablet, Take 1 tablet (10 mg total) by mouth daily at bedtime as needed for sleep, Disp: 10 tablet, Rfl: 0  Allergies   Allergen Reactions    Celecoxib Hives    Lamotrigine Rash and Hives     Other reaction(s): rash and itching  Other reaction(s): rash and itching  Other reaction(s): rash and itching       Review of Systems   Constitution: Negative  HENT: Negative  Eyes: Negative  Cardiovascular: Positive for near-syncope and palpitations  Negative for chest pain, claudication, cyanosis, dyspnea on exertion, irregular heartbeat, leg swelling, orthopnea, paroxysmal nocturnal dyspnea and syncope  Respiratory: Negative  Negative for cough, hemoptysis, shortness of breath, sleep disturbances due to breathing, snoring, sputum production and wheezing  Endocrine: Negative  Hematologic/Lymphatic: Negative  Skin: Negative  Musculoskeletal: Negative  Gastrointestinal: Negative  Genitourinary: Negative  Neurological: Positive for light-headedness  Psychiatric/Behavioral: Negative  Allergic/Immunologic: Negative  Objective:   /80   Pulse 58   Ht 5' 5" (1 651 m)   Wt 90 7 kg (200 lb)   BMI 33 28 kg/m²   Physical Exam   Constitutional: She is oriented to person, place, and time  She appears well-developed and well-nourished  HENT:   Head: Normocephalic and atraumatic  Mouth/Throat: Oropharynx is clear and moist    Eyes: Conjunctivae and EOM are normal  No scleral icterus  Neck: Normal range of motion  Neck supple  No JVD present  No tracheal deviation present  Cardiovascular: Regular rhythm, S1 normal, S2 normal, normal heart sounds and intact distal pulses  Bradycardia present  Exam reveals no gallop and no friction rub  No murmur heard  Pulmonary/Chest: Effort normal and breath sounds normal  No respiratory distress  She has no wheezes  She has no rales  She exhibits no tenderness  Abdominal: Soft  Bowel sounds are normal  She exhibits no distension  There is no abdominal tenderness  Aorta not palpable    Musculoskeletal: Normal range of motion  General: No tenderness or edema  Neurological: She is alert and oriented to person, place, and time  Skin: Skin is warm and dry  No rash noted  No erythema  No pallor  Psychiatric: She has a normal mood and affect  Her behavior is normal    Nursing note and vitals reviewed        Lab Review:   No results found for: CHOL  No results found for: HDL  No results found for: LDLCALC  No results found for: TRIG  Results Reviewed     None        Results Reviewed     None        Results Reviewed     None          Recent Cardiovascular Testing:   Echo and holter ordered     ECG Review:   Marked sinus bradycardia

## 2020-08-17 NOTE — ASSESSMENT & PLAN NOTE
Marked bradycardia on EKG  Atenolol held   Now with palpitations   TSH low as well as T3: Referred to endocrinologist     Echo ordered to assess LV fucntion

## 2020-08-18 ENCOUNTER — HOSPITAL ENCOUNTER (INPATIENT)
Facility: HOSPITAL | Age: 45
LOS: 6 days | Discharge: HOME | DRG: 885 | End: 2020-08-24
Attending: EMERGENCY MEDICINE | Admitting: PSYCHIATRY & NEUROLOGY
Payer: COMMERCIAL

## 2020-08-18 DIAGNOSIS — G43.009 MIGRAINE WITHOUT AURA AND WITHOUT STATUS MIGRAINOSUS, NOT INTRACTABLE: ICD-10-CM

## 2020-08-18 DIAGNOSIS — F41.9 ANXIETY: ICD-10-CM

## 2020-08-18 DIAGNOSIS — F31.4 BIPOLAR 1 DISORDER, DEPRESSED, SEVERE (HCC): Primary | ICD-10-CM

## 2020-08-18 DIAGNOSIS — R45.851 SUICIDAL IDEATIONS: ICD-10-CM

## 2020-08-18 PROBLEM — K56.609 SMALL BOWEL OBSTRUCTION (HCC): Status: RESOLVED | Noted: 2019-06-27 | Resolved: 2020-08-18

## 2020-08-18 PROBLEM — F31.77 BIPOLAR DISORDER, IN PARTIAL REMISSION, MOST RECENT EPISODE MIXED (HCC): Status: ACTIVE | Noted: 2020-03-12

## 2020-08-18 PROBLEM — Z00.8 MEDICAL CLEARANCE FOR PSYCHIATRIC ADMISSION: Status: ACTIVE | Noted: 2020-08-18

## 2020-08-18 LAB
AMPHETAMINES SERPL QL SCN: NEGATIVE
BARBITURATES UR QL: NEGATIVE
BENZODIAZ UR QL: POSITIVE
COCAINE UR QL: NEGATIVE
ETHANOL EXG-MCNC: 0 MG/DL
EXT PREG TEST URINE: NEGATIVE
EXT. CONTROL ED NAV: NEGATIVE
METHADONE UR QL: NEGATIVE
OPIATES UR QL SCN: NEGATIVE
OXYCODONE+OXYMORPHONE UR QL SCN: NEGATIVE
PCP UR QL: NEGATIVE
SARS-COV-2 RNA RESP QL NAA+PROBE: NEGATIVE
THC UR QL: NEGATIVE

## 2020-08-18 PROCEDURE — 82075 ASSAY OF BREATH ETHANOL: CPT | Performed by: EMERGENCY MEDICINE

## 2020-08-18 PROCEDURE — 80307 DRUG TEST PRSMV CHEM ANLYZR: CPT | Performed by: EMERGENCY MEDICINE

## 2020-08-18 PROCEDURE — 81025 URINE PREGNANCY TEST: CPT | Performed by: EMERGENCY MEDICINE

## 2020-08-18 PROCEDURE — 99285 EMERGENCY DEPT VISIT HI MDM: CPT

## 2020-08-18 PROCEDURE — 87635 SARS-COV-2 COVID-19 AMP PRB: CPT | Performed by: EMERGENCY MEDICINE

## 2020-08-18 PROCEDURE — 99283 EMERGENCY DEPT VISIT LOW MDM: CPT | Performed by: EMERGENCY MEDICINE

## 2020-08-18 RX ORDER — ACETAMINOPHEN 325 MG/1
650 TABLET ORAL EVERY 4 HOURS PRN
Status: DISCONTINUED | OUTPATIENT
Start: 2020-08-18 | End: 2020-08-24 | Stop reason: HOSPADM

## 2020-08-18 RX ORDER — PREGABALIN 75 MG/1
150 CAPSULE ORAL DAILY
Status: DISCONTINUED | OUTPATIENT
Start: 2020-08-19 | End: 2020-08-24 | Stop reason: HOSPADM

## 2020-08-18 RX ORDER — LORAZEPAM 1 MG/1
1 TABLET ORAL EVERY 4 HOURS PRN
Status: DISCONTINUED | OUTPATIENT
Start: 2020-08-18 | End: 2020-08-18

## 2020-08-18 RX ORDER — ACETAMINOPHEN 325 MG/1
650 TABLET ORAL ONCE
Status: COMPLETED | OUTPATIENT
Start: 2020-08-18 | End: 2020-08-18

## 2020-08-18 RX ORDER — LISINOPRIL 10 MG/1
10 TABLET ORAL DAILY
Status: DISCONTINUED | OUTPATIENT
Start: 2020-08-19 | End: 2020-08-18

## 2020-08-18 RX ORDER — PANTOPRAZOLE SODIUM 40 MG/1
40 TABLET, DELAYED RELEASE ORAL 2 TIMES DAILY
Status: DISCONTINUED | OUTPATIENT
Start: 2020-08-18 | End: 2020-08-18

## 2020-08-18 RX ORDER — HYDROXYZINE HYDROCHLORIDE 25 MG/1
50 TABLET, FILM COATED ORAL EVERY 4 HOURS PRN
Status: DISCONTINUED | OUTPATIENT
Start: 2020-08-18 | End: 2020-08-19

## 2020-08-18 RX ORDER — VILAZODONE HYDROCHLORIDE 10 MG/1
10 TABLET ORAL
Status: DISCONTINUED | OUTPATIENT
Start: 2020-08-19 | End: 2020-08-18

## 2020-08-18 RX ORDER — ALPRAZOLAM 0.5 MG/1
1 TABLET ORAL
Status: DISCONTINUED | OUTPATIENT
Start: 2020-08-18 | End: 2020-08-18

## 2020-08-18 RX ORDER — VALACYCLOVIR HYDROCHLORIDE 500 MG/1
500 TABLET, FILM COATED ORAL DAILY
Status: DISCONTINUED | OUTPATIENT
Start: 2020-08-19 | End: 2020-08-18

## 2020-08-18 RX ORDER — ACETAMINOPHEN 325 MG/1
650 TABLET ORAL EVERY 6 HOURS PRN
Status: DISCONTINUED | OUTPATIENT
Start: 2020-08-18 | End: 2020-08-24 | Stop reason: HOSPADM

## 2020-08-18 RX ORDER — LORAZEPAM 1 MG/1
1 TABLET ORAL ONCE
Status: COMPLETED | OUTPATIENT
Start: 2020-08-18 | End: 2020-08-18

## 2020-08-18 RX ORDER — ZOLPIDEM TARTRATE 5 MG/1
10 TABLET ORAL
Status: DISCONTINUED | OUTPATIENT
Start: 2020-08-18 | End: 2020-08-19

## 2020-08-18 RX ORDER — ALPRAZOLAM 0.5 MG/1
1 TABLET ORAL
Status: DISCONTINUED | OUTPATIENT
Start: 2020-08-18 | End: 2020-08-24 | Stop reason: HOSPADM

## 2020-08-18 RX ORDER — RISPERIDONE 1 MG/1
2 TABLET, ORALLY DISINTEGRATING ORAL
Status: DISCONTINUED | OUTPATIENT
Start: 2020-08-18 | End: 2020-08-24 | Stop reason: HOSPADM

## 2020-08-18 RX ADMIN — ALPRAZOLAM 1 MG: 0.5 TABLET ORAL at 21:09

## 2020-08-18 RX ADMIN — LORAZEPAM 1 MG: 1 TABLET ORAL at 12:43

## 2020-08-18 RX ADMIN — PANTOPRAZOLE SODIUM 40 MG: 40 TABLET, DELAYED RELEASE ORAL at 13:24

## 2020-08-18 RX ADMIN — ACETAMINOPHEN 650 MG: 325 TABLET ORAL at 16:27

## 2020-08-18 RX ADMIN — ZOLPIDEM TARTRATE 10 MG: 5 TABLET, FILM COATED ORAL at 21:09

## 2020-08-18 RX ADMIN — LORAZEPAM 1 MG: 1 TABLET ORAL at 17:52

## 2020-08-18 NOTE — ASSESSMENT & PLAN NOTE
· Patient was seen by Cardiology, workup is in progress    Patient is off propanolol heart rate in the ER was 68

## 2020-08-18 NOTE — ED NOTES
Patient is accepted at [facility]  Patient is accepted by Dr Jose Cruz Pickens  per Bay Morales at intake          Nurse report is to be called to [phone] prior to patient transfer  Unit will call for report when ready for patient

## 2020-08-18 NOTE — ED NOTES
Insurance Authorization for admission:   [facility] [address]  Phone number: [phone]  Spoke to Ade Berry  4 days approved  Level of care: I/P psych  (201)  Review on 08/21/2020  Authorization # [id number]  COB: secondary  [facility] Dual Complete Medicare Rep  Phone: [phone]  Spoke to: Denny Avitia  Ref # [id number] was updated and per Denny Avitia once the payments from [facility] start they will follow along  COB: [facility])  [address] to [facility] Manual requested that UR fax upon D/C      EVS (Eligibility Verification System) called - [phone]  Automated system indicates: EVS called, Patient is eligible for M/A - Behavioral health services, Managed Care   Rec# [id number]    Insurance Authorization for Transportation:    No transport needed

## 2020-08-18 NOTE — ED NOTES
Information faxed to [phone] [facility] at intake for review when a bed become available

## 2020-08-18 NOTE — ED NOTES
Met with patient and completed the crisis intake assessment as well as the safety risk assessment  Patient arrived to ER via private vehicle  Patient presents as increasingly depressed and anxious  Patient maintained eye contact throughout assessment and speech was within normal limits  Patient reports SI with plan to take all of her BP medications to stop her heart or to lay on the train tracks " I could never do it, Im just overwhelmed and those are the things that cross my mind " Patient states she had a lot of big changes over the last few months, a recent break up (was to be  next month),a new job, and bought her own house  Patient reports some disturbances with sleep and lack of concentration and motivation  Patient reports compliance with medications as well as outpatient providers  Patient in agreement with voluntary admission  Voluntary rights and 72 hour notice explained to patient, patient verbalized an understanding  A copy of both were placed on patients chart  Bed search and insurance in progress

## 2020-08-18 NOTE — ED PROVIDER NOTES
History  Chief Complaint   Patient presents with    Psychiatric Evaluation     states she has been having panic attacks a lot (several times daily) and has had a lot of life changes over the past 6 months; [facility] and was told they do not have bed availability at this time and pt should present to ER for evaluation and treatment     HPI    45 y o  F w/ hx of anxiety on xanax, bipolar on vraylar and viibryd, presetning to the ED for eval of anxiety  SI HI: no, she gets SI at night when her panic attacks  Plan: overdose on meds  Any particular triggers?: patient has had a lot of life stressors, including difficulty with family relationships, and as well got out of along term relationship  Hallucinations:  no   Guns at home:  no   drugs:  no  Alcohol: no   previous hospitalizations: last hospitalization for SI one year ago   previous suicide attempt:  Yes, patient cuts, and overdosed on meds (2008)   What psych meds does patient take: xanax, bipolar on vraylar and viibryd  Any changes to those meds: no  Taking psych meds regularly: yes  Would like to sign self in?: yes    Any Medical complaints? no      Prior to Admission Medications   Prescriptions Last Dose Informant Patient Reported? Taking?    ALPRAZolam (XANAX) 0 25 mg tablet Not Taking at Unknown time  Yes No   Sig: Take by mouth 4 (four) times a day as needed for anxiety   ALPRAZolam (XANAX) 1 mg tablet 8/17/2020 at Unknown time Self Yes Yes   Sig: Take 1 mg by mouth daily at bedtime     PROAIR  (90 Base) MCG/ACT inhaler More than a month at Unknown time Self Yes No   Sig: Inhale every 4 (four) hours as needed (as needed)     cariprazine (Vraylar) 3 MG capsule 8/18/2020 at Unknown time  Yes Yes   Sig: Take 3 mg by mouth daily   linaCLOtide (LINZESS PO) Past Week at Unknown time  Yes Yes   Sig: Take 290 mcg by mouth daily before breakfast   lisinopril (ZESTRIL) 10 mg tablet 8/18/2020 at Unknown time  Yes Yes   Sig: Take 10 mg by mouth daily   medroxyPROGESTERone (DEPO-PROVERA) 150 mg/mL injection Past Month at Unknown time Self Yes Yes   Sig: medroxyprogesterone 150 mg/mL intramuscular suspension   ondansetron (ZOFRAN-ODT) 4 mg disintegrating tablet Past Month at Unknown time  No Yes   Sig: Take 1 tablet (4 mg total) by mouth every 8 (eight) hours as needed for nausea or vomiting   pantoprazole (PROTONIX) 40 mg tablet 8/18/2020 at Unknown time Self Yes Yes   Sig: Take 40 mg by mouth 2 (two) times a day     pregabalin (LYRICA) 150 mg capsule 8/17/2020 at Unknown time  Yes Yes   Sig: Take 150 mg by mouth daily Daily at 1600   valACYclovir (VALTREX) 500 mg tablet 8/18/2020 at Unknown time  Yes Yes   Sig: Take 500 mg by mouth daily   vilazodone (VIIBRYD) 10 mg tablet 8/18/2020 at Unknown time  No Yes   Sig: Take 1 tablet (10 mg total) by mouth daily with breakfast   zolpidem (AMBIEN) 10 mg tablet 8/17/2020 at Unknown time  No Yes   Sig: Take 1 tablet (10 mg total) by mouth daily at bedtime as needed for sleep      Facility-Administered Medications: None       Past Medical History:   Diagnosis Date    Anxiety     Arthritis     Bipolar 1 disorder (HCC)     with bordeline personality    [facility]line [facility] disorder [facility]     Chronic headaches     Depression     Depression     GERD (gastroesophageal reflux disease)     Hypertension     Incontinence     Migraine     [facility] bowel obstruction ([facility] ) 6/27/2019    Suicide attempt ([facility] )     Symptomatic bradycardia     Urinary tract infection        Past Surgical History:   Procedure Laterality Date    CHOLECYSTECTOMY  05/24/2018    DENTAL SURGERY      EXPLORATORY LAPAROTOMY      exlap    FOOT SURGERY Right     JOINT REPLACEMENT      KNEE ARTHROSCOPY      KNEE ARTHROSCOPY Right 11/15/2018    MULTIPLE TOOTH EXTRACTIONS  11/02/2018    OVARIAN CYST REMOVAL      REDUCTION MAMMAPLASTY      reduction        Family History   Problem Relation Age of Onset    Hyperlipidemia Father  Heart disease Mother     Migraines Mother     Depression Family      I have reviewed and agree with the history as documented  E-Cigarette/Vaping    E-Cigarette Use Never User      E-Cigarette/Vaping Substances    Nicotine No     THC No     CBD No     Flavoring No     Other No     Unknown No      Social History     Tobacco Use    Smoking status: Never Smoker    Smokeless tobacco: Never Used   Substance Use Topics    Alcohol use: Never     Frequency: Never     Comment: rarely    Drug use: Never       Review of Systems   Constitutional: Negative for chills, fatigue and fever  HENT: Negative for sore throat  Eyes: Negative for redness and visual disturbance  Respiratory: Negative for cough and shortness of breath  Cardiovascular: Negative for chest pain  Gastrointestinal: Negative for abdominal pain, diarrhea and nausea  Genitourinary: Negative for difficulty urinating, dysuria and pelvic pain  Musculoskeletal: Negative for back pain  Skin: Negative for rash  Neurological: Negative for syncope, weakness and headaches  Psychiatric/Behavioral: Positive for suicidal ideas  All other systems reviewed and are negative  Physical Exam  Physical Exam  Vitals signs and nursing note reviewed  Constitutional:       General: She is not in acute distress  Appearance: She is well-developed  HENT:      Head: Normocephalic and atraumatic  Eyes:      Conjunctiva/sclera: Conjunctivae normal    Neck:      Musculoskeletal: Normal range of motion  Cardiovascular:      Rate and Rhythm: Normal rate and regular rhythm  Heart sounds: Normal heart sounds  Pulmonary:      Effort: Pulmonary effort is normal  No respiratory distress  Breath sounds: Normal breath sounds  Abdominal:      General: Bowel sounds are normal       Palpations: Abdomen is soft  Tenderness: There is no abdominal tenderness  Musculoskeletal: Normal range of motion     Skin:     General: Skin is warm and dry  Findings: No rash  Neurological:      Mental Status: She is alert and oriented to person, place, and time  Cranial Nerves: No cranial nerve deficit  Sensory: No sensory deficit  Motor: No abnormal muscle tone  Coordination: Coordination normal    Psychiatric:         Thought Content: Thought content includes suicidal ideation  Thought content includes suicidal plan           Vital Signs  ED Triage Vitals   Temperature Pulse Respirations Blood Pressure SpO2   08/18/20 1148 08/18/20 1148 08/18/20 1148 08/18/20 1148 08/18/20 1148   97 8 °F (36 6 °C) 68 18 143/87 98 %      Temp Source Heart Rate Source Patient Position - Orthostatic VS BP Location FiO2 (%)   08/18/20 1148 08/18/20 1148 08/18/20 1148 08/18/20 1148 --   Temporal Monitor Sitting Left arm       Pain Score       08/18/20 1627       6           Vitals:    08/18/20 1148 08/18/20 2020 08/19/20 0732 08/19/20 1500   BP: 143/87 146/82 117/57 102/55   Pulse: 68 58 76 68   Patient Position - Orthostatic VS: Sitting Sitting Lying Lying         Visual Acuity      ED Medications  Medications   ALPRAZolam (XANAX) tablet 1 mg (1 mg Oral Given 8/18/20 2109)   risperiDONE (RisperDAL M-TABS) dispersible tablet 2 mg (2 mg Oral Given 8/19/20 1042)   acetaminophen (TYLENOL) tablet 650 mg (has no administration in time range)   acetaminophen (TYLENOL) tablet 650 mg (has no administration in time range)   pregabalin (LYRICA) capsule 150 mg (150 mg Oral Given 8/19/20 0842)   acetaminophen (TYLENOL) tablet 975 mg (has no administration in time range)   hydrOXYzine HCL (ATARAX) tablet 50 mg (has no administration in time range)   hydrOXYzine HCL (ATARAX) tablet 25 mg (has no administration in time range)   LORazepam (ATIVAN) injection 2 mg (has no administration in time range)   haloperidol (HALDOL) tablet 5 mg (has no administration in time range)   haloperidol lactate (HALDOL) injection 5 mg (has no administration in time range) OLANZapine (ZyPREXA) IM injection 10 mg (has no administration in time range)   magnesium hydroxide (MILK OF MAGNESIA) 400 mg/5 mL oral suspension 30 mL (has no administration in time range)   aluminum-magnesium hydroxide-simethicone (MYLANTA) 200-200-20 mg/5 mL oral suspension 15 mL (has no administration in time range)   benztropine (COGENTIN) tablet 1 mg (has no administration in time range)   benztropine (COGENTIN) injection 1 mg (has no administration in time range)   vilazodone (VIIBRYD) tablet 20 mg (has no administration in time range)   zolpidem (AMBIEN) tablet 10 mg (has no administration in time range)   melatonin tablet 12 mg (has no administration in time range)   lisinopril (ZESTRIL) tablet 10 mg (has no administration in time range)   pantoprazole (PROTONIX) EC tablet 40 mg (40 mg Oral Given 8/19/20 1731)   LORazepam (ATIVAN) tablet 1 mg (1 mg Oral Given 8/18/20 1243)   acetaminophen (TYLENOL) tablet 650 mg (650 mg Oral Given 8/18/20 1627)       Diagnostic Studies  Results Reviewed     Procedure Component Value Units Date/Time    POCT pregnancy, urine [[id number]]  (Normal) Resulted:  08/18/20 1448    Lab Status:  Final result Updated:  08/18/20 1448     EXT PREG TEST UR (Ref: Negative) negative     Control negative    Novel Coronavirus (Covid-19),PCR [facility] [[id number]]  (Normal) Collected:  08/18/20 1208    Lab Status:  Final result Specimen:  Nares from Nose Updated:  08/18/20 1308     SARS-CoV-2 Negative    Narrative: The specimen collection materials, transport medium, and/or testing methodology utilized in the production of these test results have been proven to be reliable in a limited validation with an abbreviated program under the Emergency Utilization Authorization provided by the FDA  Testing reported as "Presumptive positive" will be confirmed with secondary testing with a reference laboratory to ensure result accuracy    Clinical caution and judgement should be used with the interpretation of these results with consideration of the clinical impression and other laboratory testing  Testing reported as "Positive" or "Negative" has been proven to be accurate according to standard laboratory validation requirements  All testing is performed with control materials showing appropriate reactivity at standard intervals  Rapid drug screen, urine [[id number]]  (Abnormal) Collected:  08/18/20 1208    Lab Status:  Final result Specimen:  Urine, Clean Catch Updated:  08/18/20 1249     Amph/Meth UR Negative     Barbiturate Ur Negative     Benzodiazepine Urine Positive     Cocaine Urine Negative     Methadone Urine Negative     Opiate Urine Negative     PCP Ur Negative     THC Urine Negative     Oxycodone Urine Negative    Narrative:       Presumptive report  If requested, specimen will be sent to reference lab for confirmation  FOR MEDICAL PURPOSES ONLY  IF CONFIRMATION NEEDED PLEASE CONTACT THE LAB WITHIN 5 DAYS  Drug Screen Cutoff Levels:  AMPHETAMINE/METHAMPHETAMINES  1000 ng/mL  BARBITURATES     200 ng/mL  BENZODIAZEPINES     200 ng/mL  COCAINE      300 ng/mL  METHADONE      300 ng/mL  OPIATES      300 ng/mL  PHENCYCLIDINE     25 ng/mL  THC       50 ng/mL  OXYCODONE      100 ng/mL    POCT alcohol breath test [[id number]]  (Normal) Resulted:  08/18/20 1153    Lab Status:  Final result Updated:  08/18/20 1153     EXTBreath Alcohol 0 000                 No orders to display              Procedures  Procedures         ED Course  ED Course as of Aug 19 1850   Tue Aug 18, 2020   1555 Signed 201          US AUDIT      Most Recent Value   Initial Alcohol Screen: US AUDIT-C    1  How often do you have a drink containing alcohol?  0 Filed at: 08/18/2020 1148   2  How many drinks containing alcohol do you have on a typical day you are drinking? 0 Filed at: 08/18/2020 1148   3a  Male UNDER 65: How often do you have five or more drinks on one occasion? 0 Filed at: 08/18/2020 1148   3b   FEMALE Any Age, or MALE 65+: How often do you have 4 or more drinks on one occassion? 0 Filed at: 08/18/2020 1148   Audit-C Score  0 Filed at: 08/18/2020 1148                  NARCISA/DAST-10      Most Recent Value   How many times in the past year have you    Used an illegal drug or used a prescription medication for non-medical reasons? Never Filed at: 08/18/2020 1148            MDM    46-year-old female who presents to the ED for evaluation of suicidal ideation, history of anxiety  [facility]DS, crisis eval   Patient medically clear for inpatient psych treatment  Signed 201  Transferred to behavioral health  Disposition  Final diagnoses:   Suicidal ideations   Anxiety     Time reflects when diagnosis was documented in both MDM as applicable and the Disposition within this note     Time User Action Codes Description Comment    8/18/2020  3:03 PM Francisco Stone Add [G43 009] Migraine without aura and without status migrainosus, not intractable     8/18/2020  3:03 PM Francisco Sotne Modify [G43 009] Migraine without aura and without status migrainosus, not intractable     8/18/2020  5:35 PM Reid Garcia 68 Suicidal ideations     8/18/2020  5:35 PM Zavala Shirts [F41 9] Anxiety     8/18/2020  5:35  [employer] [F05 [id number]] Migraine without aura and without status migrainosus, not intractable     8/18/2020  5:35 PM Tamanna Garcia 132 Suicidal ideations       ED Disposition     ED Disposition Condition Date/Time Comment    Transfer to [address] [place] [address] Aug 18, 2020  5:36 PM Amparo Harrell Saloni should be transferred out to [facility] and has been medically cleared             MD Documentation      Most Recent Value   Sending MD Ralph Aguilar MD      Follow-up Information    None         Current Discharge Medication List      CONTINUE these medications which have NOT CHANGED    Details   !! ALPRAZolam (XANAX) 1 mg tablet Take 1 mg by mouth daily at bedtime        cariprazine (Vraylar) 3 MG capsule Take 3 mg by mouth daily      linaCLOtide (LINZESS PO) Take 290 mcg by mouth daily before breakfast      lisinopril (ZESTRIL) 10 mg tablet Take 10 mg by mouth daily      medroxyPROGESTERone (DEPO-PROVERA) 150 mg/mL injection medroxyprogesterone 150 mg/mL intramuscular suspension      ondansetron (ZOFRAN-ODT) 4 mg disintegrating tablet Take 1 tablet (4 mg total) by mouth every 8 (eight) hours as needed for nausea or vomiting  Qty: 20 tablet, Refills: 0    Associated Diagnoses: Nausea      pantoprazole (PROTONIX) 40 mg tablet Take 40 mg by mouth 2 (two) times a day        pregabalin (LYRICA) 150 mg capsule Take 150 mg by mouth daily Daily at 1600      valACYclovir (VALTREX) 500 mg tablet Take 500 mg by mouth daily      vilazodone (VIIBRYD) 10 mg tablet Take 1 tablet (10 mg total) by mouth daily with breakfast  Qty: 30 tablet, Refills: 0    Associated Diagnoses: Severe episode of recurrent major depressive disorder, without psychotic features ([facility])      zolpidem (AMBIEN) 10 mg tablet Take 1 tablet (10 mg total) by mouth daily at bedtime as needed for sleep  Qty: 10 tablet, Refills: 0    Associated Diagnoses: Insomnia      !! ALPRAZolam (XANAX) 0 25 mg tablet Take by mouth 4 (four) times a day as needed for anxiety      PROAIR  (90 Base) MCG/ACT inhaler Inhale every 4 (four) hours as needed (as needed)         ! ! - Potential duplicate medications found  Please discuss with provider  No discharge procedures on file      PDMP Review       Value Time User    PDMP Reviewed  Yes 8/18/2020  2:59 PM Nereida Raya MD          ED Provider  Electronically Signed by           Damián Munoz MD  08/19/20 5647

## 2020-08-18 NOTE — ASSESSMENT & PLAN NOTE
· Patient was medically cleared for inpatient behavior health admission in the emergency room  · Patient remains medically cleared for inpatient behavior health admission  · Patient signed a 201 for treatment

## 2020-08-19 PROBLEM — I10 ESSENTIAL HYPERTENSION: Status: ACTIVE | Noted: 2020-08-19

## 2020-08-19 PROBLEM — F31.4 BIPOLAR 1 DISORDER, DEPRESSED, SEVERE (HCC): Status: ACTIVE | Noted: 2019-07-24

## 2020-08-19 PROBLEM — K21.9 GASTROESOPHAGEAL REFLUX DISEASE WITHOUT ESOPHAGITIS: Status: ACTIVE | Noted: 2020-08-19

## 2020-08-19 PROCEDURE — 99253 IP/OBS CNSLTJ NEW/EST LOW 45: CPT | Performed by: HOSPITALIST

## 2020-08-19 PROCEDURE — 99221 1ST HOSP IP/OBS SF/LOW 40: CPT | Performed by: PSYCHIATRY & NEUROLOGY

## 2020-08-19 RX ORDER — PANTOPRAZOLE SODIUM 40 MG/1
40 TABLET, DELAYED RELEASE ORAL
Status: DISCONTINUED | OUTPATIENT
Start: 2020-08-19 | End: 2020-08-24 | Stop reason: HOSPADM

## 2020-08-19 RX ORDER — OLANZAPINE 10 MG/1
10 INJECTION, POWDER, LYOPHILIZED, FOR SOLUTION INTRAMUSCULAR
Status: DISCONTINUED | OUTPATIENT
Start: 2020-08-19 | End: 2020-08-24 | Stop reason: HOSPADM

## 2020-08-19 RX ORDER — VILAZODONE HYDROCHLORIDE 10 MG/1
20 TABLET ORAL
Status: DISCONTINUED | OUTPATIENT
Start: 2020-08-20 | End: 2020-08-24 | Stop reason: HOSPADM

## 2020-08-19 RX ORDER — LISINOPRIL 10 MG/1
10 TABLET ORAL DAILY
Status: DISCONTINUED | OUTPATIENT
Start: 2020-08-20 | End: 2020-08-24 | Stop reason: HOSPADM

## 2020-08-19 RX ORDER — ZOLPIDEM TARTRATE 5 MG/1
10 TABLET ORAL
Status: DISCONTINUED | OUTPATIENT
Start: 2020-08-19 | End: 2020-08-24 | Stop reason: HOSPADM

## 2020-08-19 RX ORDER — LORAZEPAM 2 MG/ML
2 INJECTION INTRAMUSCULAR EVERY 4 HOURS PRN
Status: DISCONTINUED | OUTPATIENT
Start: 2020-08-19 | End: 2020-08-24 | Stop reason: HOSPADM

## 2020-08-19 RX ORDER — HALOPERIDOL 5 MG/ML
5 INJECTION INTRAMUSCULAR EVERY 6 HOURS PRN
Status: DISCONTINUED | OUTPATIENT
Start: 2020-08-19 | End: 2020-08-24 | Stop reason: HOSPADM

## 2020-08-19 RX ORDER — HYDROXYZINE HYDROCHLORIDE 25 MG/1
25 TABLET, FILM COATED ORAL EVERY 6 HOURS PRN
Status: DISCONTINUED | OUTPATIENT
Start: 2020-08-19 | End: 2020-08-24 | Stop reason: HOSPADM

## 2020-08-19 RX ORDER — LANOLIN ALCOHOL/MO/W.PET/CERES
12 CREAM (GRAM) TOPICAL
Status: DISCONTINUED | OUTPATIENT
Start: 2020-08-19 | End: 2020-08-24 | Stop reason: HOSPADM

## 2020-08-19 RX ORDER — BENZTROPINE MESYLATE 1 MG/1
1 TABLET ORAL EVERY 6 HOURS PRN
Status: DISCONTINUED | OUTPATIENT
Start: 2020-08-19 | End: 2020-08-24 | Stop reason: HOSPADM

## 2020-08-19 RX ORDER — BENZTROPINE MESYLATE 1 MG/ML
1 INJECTION INTRAMUSCULAR; INTRAVENOUS EVERY 6 HOURS PRN
Status: DISCONTINUED | OUTPATIENT
Start: 2020-08-19 | End: 2020-08-24 | Stop reason: HOSPADM

## 2020-08-19 RX ORDER — HALOPERIDOL 5 MG
5 TABLET ORAL EVERY 6 HOURS PRN
Status: DISCONTINUED | OUTPATIENT
Start: 2020-08-19 | End: 2020-08-24 | Stop reason: HOSPADM

## 2020-08-19 RX ORDER — HYDROXYZINE HYDROCHLORIDE 25 MG/1
50 TABLET, FILM COATED ORAL EVERY 4 HOURS PRN
Status: DISCONTINUED | OUTPATIENT
Start: 2020-08-19 | End: 2020-08-24 | Stop reason: HOSPADM

## 2020-08-19 RX ORDER — ACETAMINOPHEN 325 MG/1
975 TABLET ORAL EVERY 6 HOURS PRN
Status: DISCONTINUED | OUTPATIENT
Start: 2020-08-19 | End: 2020-08-24 | Stop reason: HOSPADM

## 2020-08-19 RX ORDER — MAGNESIUM HYDROXIDE/ALUMINUM HYDROXICE/SIMETHICONE 120; 1200; 1200 MG/30ML; MG/30ML; MG/30ML
15 SUSPENSION ORAL EVERY 4 HOURS PRN
Status: DISCONTINUED | OUTPATIENT
Start: 2020-08-19 | End: 2020-08-24 | Stop reason: HOSPADM

## 2020-08-19 RX ADMIN — PANTOPRAZOLE SODIUM 40 MG: 40 TABLET, DELAYED RELEASE ORAL at 17:31

## 2020-08-19 RX ADMIN — MELATONIN 12 MG: at 21:15

## 2020-08-19 RX ADMIN — HYDROXYZINE HYDROCHLORIDE 50 MG: 25 TABLET, FILM COATED ORAL at 23:35

## 2020-08-19 RX ADMIN — RISPERIDONE 2 MG: 1 TABLET, ORALLY DISINTEGRATING ORAL at 19:26

## 2020-08-19 RX ADMIN — ALPRAZOLAM 1 MG: 0.5 TABLET ORAL at 21:15

## 2020-08-19 RX ADMIN — RISPERIDONE 2 MG: 1 TABLET, ORALLY DISINTEGRATING ORAL at 10:42

## 2020-08-19 RX ADMIN — PREGABALIN 150 MG: 75 CAPSULE ORAL at 08:42

## 2020-08-19 NOTE — H&P
Psychiatric Evaluation - Behavioral Health   Herbert Judge 45 y o  female MRN: [id number]  Unit/Bed#: [facility] 222-01 Encounter: [id number]    Assessment/Plan   Principal Problem:    Bipolar 1 disorder, depressed, severe ([facility] )  Active Problems:    Migraine without aura and without status migrainosus, not intractable    Lightheaded    Medical clearance for psychiatric admission    Borderline personality disorder ([facility] )    Plan:   Risks, benefits and possible side effects of Medications:   Risks, benefits, and possible side effects of medications explained to patient and patient verbalizes understanding  1  Admit to acute psychiatric level of care for safety and stability  2  Continue Xanax, Ambien and Lyrica as prescribed by outpatient providers  Patient aware of long term risks of these medications  3  Advised the patient to consider being weaned off Xanax and Ambien on the long run and patient is receptive but very apprehensive  4  Increase Viibryd to 20 milligram daily for depression  5  Psychotherapy  6  Safety and discharge planning    Chief Complaint: "I don't want to go on living like this"    History of Present Illness     Patient is a 45 y o  female presents with extensive psychiatric history and numerous psychiatric admissions including multiple [facility] stays  Patient was diagnosed with bipolar illness, borderline personality disorder, eating disorder and panic disorder    Patient reports there were a lot of stressors this year that are making her very depressed and suicidal   She reports that she was living with her boyfriend but they broke up in April and she moved back with her parents which was stressful for her  Patient then got a new job and moved to her own place but was over 1 from the job and starting getting more depressed  Patient feels overwhelmed, very irritable, having a lot of mood swings and frequent suicidal thoughts  She has multiple plans but no acts of furtherance    She reports her last suicide attempt was in 2014  She reports she had a serious OD in 2008 and stayed in the ICU for weeks  Patient reports that she gets prescribed Xanax, and Ambien to good sleep and for her panic attacks  She reports that she has been on them for years and is unable to sleep without them  Patient reports having multiple sleep studies but nothing came out of them and she does not have sleep apnea  Patient reports getting Lyrica for her fibromyalgia  Patient denies any substance use  Psychiatric Review Of Systems:  sleep: yes  appetite changes: no  weight changes: yes  energy/anergy: no  interest/pleasure/anhedonia: yes  somatic symptoms: yes  anxiety/panic: yes  shailesh: no  guilty/hopeless: yes  self injurious behavior/risky behavior: yes    Historical Information     Past Psychiatric History:    In Patient multiple  Substance Abuse History:  E-Cigarette/Vaping    E-Cigarette Use Never User       E-Cigarette/Vaping Substances    Nicotine No     THC No     CBD No     Flavoring No     Other No     Unknown No        Social History     Tobacco History     Smoking Status  Never Smoker    Smokeless Tobacco Use  Never Used          Alcohol History     Alcohol Use Status  Never Comment  rarely          Drug Use     Drug Use Status  Never          Sexual Activity     Sexually Active  Not Currently          Activities of Daily Living    Not Asked                 I have assessed this patient for substance use within the past 12 months    Family Psychiatric History:   Psychiatric Illness Mother  Illness: depression    Social History:  Education: high school diploma/GED  Learning Disabilities: special education  Marital history: single      Past Medical History:   Diagnosis Date    Anxiety     Arthritis     Bipolar 1 disorder ([facility] )     with bordeline personality    Borderline personality disorder ([facility] )     Chronic headaches     Depression     Depression     GERD (gastroesophageal reflux disease)     Hypertension     Incontinence     Migraine     Small bowel obstruction ([facility] ) 6/27/2019    Suicide attempt ([facility] )     Symptomatic bradycardia     Urinary tract infection        Medical Review Of Systems:  Pertinent items are noted in HPI  Meds/Allergies   all current active meds have been reviewed  Allergies   Allergen Reactions    Celecoxib Hives    Lamotrigine Rash and Hives     Other reaction(s): rash and itching  Other reaction(s): rash and itching  Other reaction(s): rash and itching       Objective   Vital signs in last 24 hours:  Temp:  [97 6 °F (36 4 °C)-97 7 °F (36 5 °C)] 97 6 °F (36 4 °C)  HR:  [58-76] 76  Resp:  [16-20] 16  BP: (117-146)/(57-82) 117/57    No intake or output data in the 24 hours ending 08/19/20 1307    Mental Status Evaluation:  Appearance:  casually dressed   Behavior:  guarded   Speech:  normal volume   Mood:  anxious   Affect:  constricted   Language: repeating phrases   Thought Process:  circumstantial   Thought Content:  obsessions   Perceptual Disturbances: None   Risk Potential: Suicidal Ideations YES  Homicidal Ideations none  Potential for Aggression No   Sensorium:  person and place   Memory:  recent and remote memory grossly intact   Consciousness:  awake    Attention: attention span appeared shorter than expected for age   Intellect: not examined   Fund of Knowledge: vocabulary: fair   Insight:  limited   Judgment: limited   Muscle Strength and Tone: face and neck   Gait/Station: slow   Motor Activity: no abnormal movements     Lab Results: I have personally reviewed all pertinent laboratory/tests results     Admission Labs:   Admission on 08/18/2020   Component Date Value    Amph/Meth UR 08/18/2020 Negative     Barbiturate Ur 08/18/2020 Negative     Benzodiazepine Urine 08/18/2020 Positive*    Cocaine Urine 08/18/2020 Negative     Methadone Urine 08/18/2020 Negative     Opiate Urine 08/18/2020 Negative     PCP Ur 08/18/2020 Negative     THC Urine 08/18/2020 Negative     Oxycodone Urine 08/18/2020 Negative     EXT PREG TEST UR (Ref: N* 08/18/2020 negative     Control 08/18/2020 negative     EXTBreath Alcohol 08/18/2020 0 000     SARS-CoV-2 08/18/2020 Negative          Patient Strengths/Assets: ability for insight, special hobby/interest, stable/recent employment    Patient Barriers/Limitations: low self esteem    Counseling / Coordination of Care  Total floor / unit time spent today 60 minutes  Greater than 50% of total time was spent with the patient and / or family counseling and / or coordination of care   A description of the counseling / coordination of care:

## 2020-08-19 NOTE — PROGRESS NOTES
08/19/20 0929   Team Meeting   Meeting Type Daily Rounds   Initial Conference Date 08/19/20   Patient/Family Present   Patient Present No   Patient's Family Present No     Team Members Present:   LEATHA Eng RN Suanne Cooley, PA-C Dyanne Gores, LCSW    Broke up with [employer]; has new job, new residence - all stressors  HX of swallowing  Anxiety attacks  Many meds

## 2020-08-19 NOTE — PROGRESS NOTES
Pt slept throughout the night without awakening  No sign of distress or labored breathing  Safety precautions maintained  Continuous safety checks performed throughout the night  Will continue to monitor

## 2020-08-19 NOTE — PROGRESS NOTES
Patient visible on the unit  Withdrawn to room mostly  Appears flat and depressed  Denies S/I H/I harmful behaviors  Able to make needs known  Complaint with meals and medications  Continual monitoring maintained
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
## 2020-08-19 NOTE — TREATMENT PLAN
Treatment Plan [facility] Aline [place] 45 y o  female MRN: [id number]    [address] [facility] [address]49 Encounter: [id number]          Admit Date/Time:  8/18/2020 11:41 AM    Treatment Team: Patient Care Technician: Dequan Alfredo; Registered Nurse: Cesilia Zacarias RN; Recreational Therapist: Luke Brasher; :  Sandy Saint    Diagnosis: Principal Problem:    Major depressive disorder, recurrent episode, severe ([address] )  Active Problems:    Lightheaded    Medical clearance for psychiatric admission      Patient Strengths: ability for insight, special hobby/interest, stable/recent employment     Patient Barriers: self-care deficit    Progress Toward Goals: ongoing    Recommended Treatment: discharge planning     Treatment Frequency: 1-2 times per week     Expected Discharge Date:     Discharge Plan: return to previous living arrangement     Treatment Plan Created/Updated By: Trinna Canavan, MD

## 2020-08-19 NOTE — PROGRESS NOTES
08/19/20 0730   Activity/Group Checklist   Group   (Goal Setting and Communications)   Attendance Attended   Attendance Duration (min) 46-60   Interactions Did not interact   Affect/Mood Blunted/flat;Constricted   Goals Achieved Able to listen to others

## 2020-08-19 NOTE — PROGRESS NOTES
Patient admitted onto the unit from [facility] ED  Cooperative and calm with assessment  Patient states that she is admitted due to increased stressors  Recently started a new full time job, bought a house, and had a break up after a four year relationship  Patient has had previous admissions  She has a history of cutting and swallowing along with OD  Multiple old scars on bilateral arms and wrists from cutting  Also a large surgical scar on abdomen due to her swallowing a razor blade   Admitted for thought to take her blood pressure medications and not wake up ever  Currently denies S/I, feels safe on the unit  Given tour of unit, all questions answered Currently resting Continual monitoring maintained

## 2020-08-19 NOTE — PLAN OF CARE
Problem: Alteration in Thoughts and Perception  Goal: Treatment Goal: Gain control of psychotic behaviors/thinking, reduce/eliminate presenting symptoms and demonstrate improved reality functioning upon discharge  Outcome: Progressing  Goal: Verbalize thoughts and feelings  Description: Interventions:  - Promote a nonjudgmental and trusting relationship with the patient through active listening and therapeutic communication  - Assess patient's level of functioning, behavior and potential for risk  - Engage patient in 1 on 1 interactions  - Encourage patient to express fears, feelings, frustrations, and discuss symptoms    - [place] patient to reality, help patient recognize reality-based thinking   - Administer medications as ordered and assess for potential side effects  - Provide the patient education related to the signs and symptoms of the illness and desired effects of prescribed medications  Interventions:  - Assess and re-assess patient's level of risk   - Engage patient in 1:1 interactions, daily, for a minimum of 15 minutes   - Encourage patient to express feelings, fears, frustrations, hopes   Outcome: Progressing  Goal: Refrain from acting on delusional thinking/internal stimuli  Description: Interventions:  - Monitor patient closely, per order   - Utilize least restrictive measures   - Set reasonable limits, give positive feedback for acceptable   - Administer medications as ordered and monitor of potential side effects  Outcome: Progressing  Goal: Agree to be compliant with medication regime, as prescribed and report medication side effects  Description: Interventions:  - Offer appropriate PRN medication and supervise ingestion; conduct AIMS, as needed   Outcome: Progressing  Goal: Attend and participate in unit activities, including therapeutic, recreational, and educational groups  Description: Interventions:  -Encourage Visitation and family involvement in care  Interventions:  - Provide therapeutic and educational activities daily, encourage attendance and participation, and document same in the medical record   Outcome: Progressing  Goal: Recognize dysfunctional thoughts, communicate reality-based thoughts at the time of discharge  Description: Interventions:  - Provide medication and psycho-education to assist patient in compliance and developing insight into his/her illness   Outcome: Progressing  Goal: Complete daily ADLs, including personal hygiene independently, as able  Description: Interventions:  - Observe, teach, and assist patient with ADLS  - Monitor and promote a balance of rest/activity, with adequate nutrition and elimination   Interventions:  - Observe, teach, and assist patient with ADLS  -  Monitor and promote a balance of rest/activity, with adequate nutrition and elimination   Outcome: Progressing

## 2020-08-19 NOTE — PROGRESS NOTES
08/19/20 1000   Activity/Group Checklist   Group   (Creative Writing and Art Therapy Processing)   Attendance Attended   Attendance Duration (min) 46-60   Interactions Did not interact   Affect/Mood Blunted/flat;Constricted   Goals Achieved Able to listen to others

## 2020-08-19 NOTE — PROGRESS NOTES
08/19/20 1400   Team Meeting   Meeting Type Tx Team Meeting   Initial Conference Date 08/19/20   Next Conference Date 09/17/20   Team Members Present   Team Members Present Physician;Nurse;   Physician Team Member Dr Malika Wiseman Team Member Dieter Nichole, MARGOT   Social Work Team Member Calista Dandy, [place]   Patient/Family Present   Patient Present Yes   Patient's Family Present No     Pt participated in [facility], tearfully having expressed that she is attempting to use her coping skills when faced with difficulties  Pt agreed with goals and signed [address]

## 2020-08-19 NOTE — PLAN OF CARE
Problem: Alteration in Thoughts and Perception  Goal: Verbalize thoughts and feelings  Description: Interventions:  - Promote a nonjudgmental and trusting relationship with the patient through active listening and therapeutic communication  - Assess patient's level of functioning, behavior and potential for risk  - Engage patient in 1 on 1 interactions  - Encourage patient to express fears, feelings, frustrations, and discuss symptoms    - [place] patient to reality, help patient recognize reality-based thinking   - Administer medications as ordered and assess for potential side effects  - Provide the patient education related to the signs and symptoms of the illness and desired effects of prescribed medications  Outcome: Progressing     Problem: Ineffective Coping  Goal: Cooperates with admission process  Description: Interventions:   - Complete admission process  Outcome: Progressing

## 2020-08-19 NOTE — PROGRESS NOTES
Pt visible on unit, quiet but social with select peers  Anxious and tearful  Pt unable to name the source of her anxiety  Reports sleeping and eating well  Shows good insight into illness  Denies current SI, HI, A/T/V  Pleasant and cooperative  Monitoring continues

## 2020-08-19 NOTE — CONSULTS
Consult- Hieu Potter 1975, 45 y o  female MRN: [id number]    Unit/Bed#: [facility] 222-01 Encounter: [id number]    Primary Care Provider: Phylicia Ventura DO   Date and time admitted to hospital: 8/18/2020 11:41 AM      Inpatient consult for Medical Clearance for [address] patient  Consult performed by: Zena Colón MD  Consult ordered by: Jeannie Russell MD          Medical clearance for psychiatric admission  Assessment & Plan  · Patient was medically cleared for inpatient behavior health admission in the emergency room  · Patient remains medically cleared for inpatient behavior health admission  · Patient signed a 201 for treatment    * Bipolar 1 disorder, depressed, severe ([facility] [address] )  Assessment & Plan  · Management per inpatient behavior health    Essential hypertension  Assessment & Plan  · Continue lisinopril    Gastroesophageal reflux disease without esophagitis  Assessment & Plan  · Continue Protonix    Lightheaded  Assessment & Plan  · Patient was seen by Cardiology, workup is in progress  Patient is off propanolol heart rate in the ER was 68      Patient's medical conditions are chronic and stable  Hospital Medicine will sign off  Please do not hesitate to call with any questions, queries and or concerns  Recommendations for Discharge:  · Per Primary Service      History of Present Illness:  Hieu Potter is a 45 y o  female who is originally admitted to the psychiatry service due to the need for inpatient management of her bipolar disorder  We are consulted for medical clearance and medical management  Currently, the patient has been seen and examined in room 222 bed 1  She is resting in bed and is comfortable otherwise  She denies any medical complaints whatsoever  She denies any chest pain, nausea, vomiting, diarrhea, fevers, chills, palpitations, shortness of breath, numbness, tingling and/or weakness  She feels sad    Of note she does state that when she is in the hospital she likes to sleep a lot  She has a past medical history of hypertension in acid reflux disease both of which are very well controlled with lisinopril and Protonix respectively  She was recently on a beta-blocker for hypertension, that was discontinued in view of episodes of bradycardia  After being seen on the behavioral health unit, patient is medically stable for continued psychiatric management on the inpatient behavioral health unit  Her medical conditions are chronic and stable  Hospital Medicine will sign off  Review of Systems:  Review of Systems   Psychiatric/Behavioral: Positive for behavioral problems and sleep disturbance  The patient is nervous/anxious  All other systems reviewed and are negative  Past Medical and Surgical History:   Past Medical History:   Diagnosis Date    Anxiety     Arthritis     Bipolar 1 disorder (HCC)     with bordeline personality    Borderline personality disorder ([facility] [address] )     Chronic headaches     Depression     Depression     GERD (gastroesophageal reflux disease)     Hypertension     Incontinence     Migraine     Small bowel obstruction ([facility] ) 6/27/2019    Suicide attempt ([facility] )     Symptomatic bradycardia     Urinary tract infection        Past Surgical History:   Procedure Laterality Date    CHOLECYSTECTOMY  05/24/2018    DENTAL SURGERY      EXPLORATORY LAPAROTOMY      exlap    FOOT SURGERY Right     JOINT REPLACEMENT      KNEE ARTHROSCOPY      KNEE ARTHROSCOPY Right 11/15/2018    MULTIPLE TOOTH EXTRACTIONS  11/02/2018    OVARIAN CYST REMOVAL      REDUCTION MAMMAPLASTY      reduction        Meds/Allergies:  all medications and allergies reviewed    Allergies:    Allergies   Allergen Reactions    Celecoxib Hives    Lamotrigine Rash and Hives     Other reaction(s): rash and itching  Other reaction(s): rash and itching  Other reaction(s): rash and itching       Social History:     Marital Status: Single    Substance Use History:   Social History Substance and Sexual Activity   Alcohol Use Never    Frequency: Never    Comment: rarely     Social History     Tobacco Use   Smoking Status Never Smoker   Smokeless Tobacco Never Used     Social History     Substance and Sexual Activity   Drug Use Never       Family History:  I have reviewed the patients family history    Physical Exam:   Vitals:   Blood Pressure: 102/55 (08/19/20 1500)  Pulse: 68 (08/19/20 1500)  Temperature: 97 6 °F (36 4 °C) (08/19/20 1500)  Temp Source: Temporal (08/19/20 1500)  Respirations: 16 (08/19/20 1500)  Height: 5' 5" (165 1 cm) (08/18/20 2020)  Weight - Scale: 89 8 kg (198 lb) (08/18/20 2020)  SpO2: 98 % (08/19/20 1500)    Physical Exam  Constitutional:       General: She is not in acute distress  Appearance: Normal appearance  She is normal weight  She is not ill-appearing  HENT:      Head: Normocephalic and atraumatic  Nose: Nose normal       Mouth/Throat:      Mouth: Mucous membranes are moist    Eyes:      Extraocular Movements: Extraocular movements intact  Pupils: Pupils are equal, round, and reactive to light  Neck:      Musculoskeletal: Normal range of motion and neck supple  No neck rigidity  Cardiovascular:      Rate and Rhythm: Normal rate and regular rhythm  Pulses: Normal pulses  Heart sounds: Normal heart sounds  No murmur  No friction rub  No gallop  Pulmonary:      Effort: Pulmonary effort is normal  No respiratory distress  Breath sounds: Normal breath sounds  No wheezing, rhonchi or rales  Abdominal:      General: There is no distension  Palpations: Abdomen is soft  There is no mass  Tenderness: There is no abdominal tenderness  Hernia: No hernia is present  Musculoskeletal: Normal range of motion  General: No swelling or tenderness  Right lower leg: No edema  Left lower leg: No edema  Skin:     General: Skin is warm  Capillary Refill: Capillary refill takes less than 2 seconds  Findings: No erythema or rash  Neurological:      General: No focal deficit present  Mental Status: She is alert and oriented to person, place, and time  Mental status is at baseline  Cranial Nerves: No cranial nerve deficit  Motor: No weakness  Psychiatric:         Mood and Affect: Mood normal          Behavior: Behavior normal          Additional Data:   Lab Results: I have personally reviewed pertinent reports  Invalid input(s): LABALBU          Lab Results   Component Value Date/Time    HGBA1C 5 2 02/21/2020 10:22 AM           Imaging: I have personally reviewed pertinent reports  No orders to display       EKG, Pathology, and Other Studies Reviewed on Admission:   · EKG:  None reviewed    ** Please Note: This note has been constructed using a voice recognition system   **

## 2020-08-19 NOTE — PROGRESS NOTES
PT came to [address] [facility] from [facility] [place] ED with the following items:    Items given to patient:   Pair of leggings x 2  Sports bra x 3  Underwear x 5  Pairs of socks x 3  Shirts x 3  Pair of shorts x 1      Items sent to [employer]:  Cellphone x 1  Pair of shorts x 1  Smart watch x 1  Eye glass case x 1    Items secured in unit safe in bag # [id number]    Check book x 1  Purse x 1  Car keys x 1  Wallet x 1  PA DL x 1  DL update x 2  SSN CARD x 1  Independent personal choice card x 1  [employer] [place] x 1  United healthcare card x 1  [employer] bank visa x 1  Via benefits visa card x 1  [employer] bank visa x 1  Pa access x 1  Voter reg   Card x 1  $1 USD

## 2020-08-20 LAB
ALBUMIN SERPL BCP-MCNC: 3.8 G/DL (ref 3.5–5.7)
ALP SERPL-CCNC: 45 U/L (ref 40–150)
ALT SERPL W P-5'-P-CCNC: 18 U/L (ref 7–52)
ANION GAP SERPL CALCULATED.3IONS-SCNC: 8 MMOL/L (ref 4–13)
AST SERPL W P-5'-P-CCNC: 9 U/L (ref 13–39)
BASOPHILS # BLD AUTO: 0.1 THOUSANDS/ΜL (ref 0–0.1)
BASOPHILS NFR BLD AUTO: 1 % (ref 0–2)
BILIRUB SERPL-MCNC: 0.4 MG/DL (ref 0.2–1)
BUN SERPL-MCNC: 14 MG/DL (ref 7–25)
CALCIUM SERPL-MCNC: 9 MG/DL (ref 8.6–10.5)
CHLORIDE SERPL-SCNC: 106 MMOL/L (ref 98–107)
CHOLEST SERPL-MCNC: 140 MG/DL (ref 0–200)
CO2 SERPL-SCNC: 25 MMOL/L (ref 21–31)
CREAT SERPL-MCNC: 0.72 MG/DL (ref 0.6–1.2)
EOSINOPHIL # BLD AUTO: 0.1 THOUSAND/ΜL (ref 0–0.61)
EOSINOPHIL NFR BLD AUTO: 1 % (ref 0–5)
ERYTHROCYTE [DISTWIDTH] IN BLOOD BY AUTOMATED COUNT: 14.5 % (ref 11.5–14.5)
GFR SERPL CREATININE-BSD FRML MDRD: 104 ML/MIN/1.73SQ M
GLUCOSE P FAST SERPL-MCNC: 97 MG/DL (ref 65–99)
GLUCOSE SERPL-MCNC: 97 MG/DL (ref 65–99)
HCT VFR BLD AUTO: 45.4 % (ref 42–47)
HDLC SERPL-MCNC: 50 MG/DL
HGB BLD-MCNC: 15.5 G/DL (ref 12–16)
LDLC SERPL CALC-MCNC: 64 MG/DL (ref 0–100)
LYMPHOCYTES # BLD AUTO: 2.8 THOUSANDS/ΜL (ref 0.6–4.47)
LYMPHOCYTES NFR BLD AUTO: 28 % (ref 21–51)
MCH RBC QN AUTO: 32.2 PG (ref 26–34)
MCHC RBC AUTO-ENTMCNC: 34.1 G/DL (ref 31–37)
MCV RBC AUTO: 95 FL (ref 81–99)
MONOCYTES # BLD AUTO: 0.9 THOUSAND/ΜL (ref 0.17–1.22)
MONOCYTES NFR BLD AUTO: 9 % (ref 2–12)
NEUTROPHILS # BLD AUTO: 6.2 THOUSANDS/ΜL (ref 1.4–6.5)
NEUTS SEG NFR BLD AUTO: 61 % (ref 42–75)
NONHDLC SERPL-MCNC: 90 MG/DL
PLATELET # BLD AUTO: 239 THOUSANDS/UL (ref 149–390)
PMV BLD AUTO: 7.9 FL (ref 8.6–11.7)
POTASSIUM SERPL-SCNC: 3.8 MMOL/L (ref 3.5–5.5)
PROT SERPL-MCNC: 6.2 G/DL (ref 6.4–8.9)
RBC # BLD AUTO: 4.8 MILLION/UL (ref 3.9–5.2)
SODIUM SERPL-SCNC: 139 MMOL/L (ref 134–143)
TRIGL SERPL-MCNC: 129 MG/DL (ref 44–166)
TSH SERPL DL<=0.05 MIU/L-ACNC: 1.26 UIU/ML (ref 0.45–5.33)
WBC # BLD AUTO: 10.1 THOUSAND/UL (ref 4.8–10.8)

## 2020-08-20 PROCEDURE — 80061 LIPID PANEL: CPT | Performed by: PSYCHIATRY & NEUROLOGY

## 2020-08-20 PROCEDURE — 84443 ASSAY THYROID STIM HORMONE: CPT | Performed by: NURSE PRACTITIONER

## 2020-08-20 PROCEDURE — 99232 SBSQ HOSP IP/OBS MODERATE 35: CPT | Performed by: PSYCHIATRY & NEUROLOGY

## 2020-08-20 PROCEDURE — 80053 COMPREHEN METABOLIC PANEL: CPT | Performed by: NURSE PRACTITIONER

## 2020-08-20 PROCEDURE — 85025 COMPLETE CBC W/AUTO DIFF WBC: CPT | Performed by: NURSE PRACTITIONER

## 2020-08-20 RX ORDER — ROPINIROLE 0.25 MG/1
0.5 TABLET, FILM COATED ORAL
Status: DISCONTINUED | OUTPATIENT
Start: 2020-08-20 | End: 2020-08-24 | Stop reason: HOSPADM

## 2020-08-20 RX ORDER — VALACYCLOVIR HYDROCHLORIDE 500 MG/1
500 TABLET, FILM COATED ORAL DAILY
Status: DISCONTINUED | OUTPATIENT
Start: 2020-08-20 | End: 2020-08-24 | Stop reason: HOSPADM

## 2020-08-20 RX ADMIN — RISPERIDONE 2 MG: 1 TABLET, ORALLY DISINTEGRATING ORAL at 20:25

## 2020-08-20 RX ADMIN — PANTOPRAZOLE SODIUM 40 MG: 40 TABLET, DELAYED RELEASE ORAL at 16:02

## 2020-08-20 RX ADMIN — PREGABALIN 150 MG: 75 CAPSULE ORAL at 08:44

## 2020-08-20 RX ADMIN — VALACYCLOVIR HYDROCHLORIDE 500 MG: 500 TABLET, FILM COATED ORAL at 12:52

## 2020-08-20 RX ADMIN — MELATONIN 12 MG: at 21:00

## 2020-08-20 RX ADMIN — LISINOPRIL 10 MG: 10 TABLET ORAL at 08:44

## 2020-08-20 RX ADMIN — PANTOPRAZOLE SODIUM 40 MG: 40 TABLET, DELAYED RELEASE ORAL at 05:47

## 2020-08-20 RX ADMIN — ALPRAZOLAM 1 MG: 0.5 TABLET ORAL at 21:00

## 2020-08-20 RX ADMIN — VILAZODONE HYDROCHLORIDE 20 MG: 10 TABLET ORAL at 05:53

## 2020-08-20 NOTE — PROGRESS NOTES
No further complaints of restless legs  Patient observed sleeping during most Q 7 minute safety checks  No SI/HI/AH/VH noted  Patient shows no s/s of distress  Non-labored breathing  Monitoring continues  Fluids at beside to promote hydration

## 2020-08-20 NOTE — PROGRESS NOTES
08/20/20 0761   Activity/Group Checklist   Group   (Goal Setting and Communications)   Attendance Did not attend  (AT group offered, pt elected to remain in room)

## 2020-08-20 NOTE — PROGRESS NOTES
08/20/20 1000   Activity/Group Checklist   Group   (Personal Strengths and Art Therapy Processing)   Attendance Attended   Attendance Duration (min) Greater than 60   Interactions Interacted appropriately   Affect/Mood Appropriate   Goals Achieved Identified feelings; Identified triggers; Discussed coping strategies; Able to listen to others; Able to engage in interactions; Able to manage/cope with feelings

## 2020-08-20 NOTE — PLAN OF CARE
Problem: Alteration in Thoughts and Perception  Goal: Treatment Goal: Gain control of psychotic behaviors/thinking, reduce/eliminate presenting symptoms and demonstrate improved reality functioning upon discharge  Outcome: Progressing  Goal: Verbalize thoughts and feelings  Description: Interventions:  - Promote a nonjudgmental and trusting relationship with the patient through active listening and therapeutic communication  - Assess patient's level of functioning, behavior and potential for risk  - Engage patient in 1 on 1 interactions  - Encourage patient to express fears, feelings, frustrations, and discuss symptoms    - [place] patient to reality, help patient recognize reality-based thinking   - Administer medications as ordered and assess for potential side effects  - Provide the patient education related to the signs and symptoms of the illness and desired effects of prescribed medications  Interventions:  - Assess and re-assess patient's level of risk   - Engage patient in 1:1 interactions, daily, for a minimum of 15 minutes   - Encourage patient to express feelings, fears, frustrations, hopes   Outcome: Progressing  Goal: Refrain from acting on delusional thinking/internal stimuli  Description: Interventions:  - Monitor patient closely, per order   - Utilize least restrictive measures   - Set reasonable limits, give positive feedback for acceptable   - Administer medications as ordered and monitor of potential side effects  Outcome: Progressing  Goal: Agree to be compliant with medication regime, as prescribed and report medication side effects  Description: Interventions:  - Offer appropriate PRN medication and supervise ingestion; conduct AIMS, as needed   Outcome: Progressing  Goal: Attend and participate in unit activities, including therapeutic, recreational, and educational groups  Description: Interventions:  -Encourage Visitation and family involvement in care  Interventions:  - Provide therapeutic and educational activities daily, encourage attendance and participation, and document same in the medical record   Outcome: Progressing  Goal: Recognize dysfunctional thoughts, communicate reality-based thoughts at the time of discharge  Description: Interventions:  - Provide medication and psycho-education to assist patient in compliance and developing insight into his/her illness   Outcome: Progressing  Goal: Complete daily ADLs, including personal hygiene independently, as able  Description: Interventions:  - Observe, teach, and assist patient with ADLS  - Monitor and promote a balance of rest/activity, with adequate nutrition and elimination   Interventions:  - Observe, teach, and assist patient with ADLS  -  Monitor and promote a balance of rest/activity, with adequate nutrition and elimination   Outcome: Progressing

## 2020-08-20 NOTE — PROGRESS NOTES
Progress Note - Behavioral Health   Jonathan Condon 45 y o  female MRN: [id number]  Unit/Bed#: [facility] 222-01 Encounter: [id number]    Assessment/Plan   Principal Problem:    Bipolar 1 disorder, depressed, severe ([facility] )  Active Problems:    Migraine without aura and without status migrainosus, not intractable    Lightheaded    Medical clearance for psychiatric admission    Borderline personality disorder ([facility] )    Essential hypertension    Gastroesophageal reflux disease without esophagitis    P:  Start ropinirole 0 5 mg hs for restless leg syndrome    Behavior over the last 24 hours:  Patient complained of feeling anxious and depressed  She reports having poor sleep last night and struggling with restless leg  Patient reported that she opted not to use the Ambien after our discussion yesterday about the harms of using benzodiazepines and Z drugs  Patient seems a bit motivated to start tapering off the Xanax  Sleep: insomnia  Appetite: poor  Medication side effects: No  ROS: no complaints    Mental Status Evaluation:  Appearance:  casually dressed   Behavior:  guarded   Speech:  soft   Mood:  anxious and depressed   Affect:  constricted   Thought Process:  circumstantial   Thought Content:  obsessions   Perceptual Disturbances: None   Risk Potential: Suicidal Ideations without plan  Homicidal Ideations none  Potential for Aggression No   Sensorium:  person and place   Memory:  recent and remote memory grossly intact   Consciousness:  awake    Attention: attention span appeared shorter than expected for age   Insight:  limited   Judgment: limited   Gait/Station: normal gait/station   Motor Activity: no abnormal movements     Progress Toward Goals: ongoing    Recommended Treatment: Continue with group therapy, milieu therapy and occupational therapy        Risks, benefits and possible side effects of Medications:   Risks, benefits, and possible side effects of medications explained to patient and patient verbalizes understanding  Medications: continue current psychiatric medications  Labs: I have personally reviewed all pertinent laboratory/tests results  Counseling / Coordination of Care  Total floor / unit time spent today 25 minutes  Greater than 50% of total time was spent with the patient and / or family counseling and / or coordination of care   A description of the counseling / coordination of care:

## 2020-08-20 NOTE — PROGRESS NOTES
Patient visible on the unit for medications and meals  Using the phone appropriately  Denies S/I H/I anxiety and depression  Able to make needs known  Complaint with medications and meals  Continual monitoring maintained

## 2020-08-20 NOTE — PROGRESS NOTES
Pt withdrawn to self but is visible for meals and meds  C/O restless legs and poor sleep as a result  Denies current SI, HI, A/T/V  Flat and depressed  Compliant with meals and meds  No behavioral issues  Monitoring continues

## 2020-08-20 NOTE — PROGRESS NOTES
Received patient at 2300  Out to desk complaining of restless legs  Pinzon Scale 19  Atarax 50 mg given for moderate anxiety  Reassured that this issue will be addressed in morning by MD Osiris Etienne on doctor's board  Returned to be and appears to be sleeping at present  Maintained on q 7 minute checks  Fluids at bedside to promote hydration  Will continue to monitor

## 2020-08-20 NOTE — PROGRESS NOTES
08/20/20 0925   Team Meeting   Initial Conference Date 08/20/20   Patient/Family Present   Patient Present No   Patient's Family Present No     Team Members Present:   MD Kishore Ruiz CRNP Sharlon Dunnings, RN  ALENA Ivorys Lyrica change to afternoon  Hopes to return to work after recovery  Depressed / tearful  Experiences RLS that caused sleep difficulties  Med-seeking  Long HX of self-destructive behaviors

## 2020-08-21 PROCEDURE — 99232 SBSQ HOSP IP/OBS MODERATE 35: CPT | Performed by: NURSE PRACTITIONER

## 2020-08-21 RX ADMIN — PANTOPRAZOLE SODIUM 40 MG: 40 TABLET, DELAYED RELEASE ORAL at 06:03

## 2020-08-21 RX ADMIN — PREGABALIN 150 MG: 75 CAPSULE ORAL at 08:42

## 2020-08-21 RX ADMIN — ACETAMINOPHEN 650 MG: 325 TABLET ORAL at 08:42

## 2020-08-21 RX ADMIN — MELATONIN 12 MG: at 21:15

## 2020-08-21 RX ADMIN — VILAZODONE HYDROCHLORIDE 20 MG: 10 TABLET ORAL at 06:04

## 2020-08-21 RX ADMIN — ROPINIROLE HYDROCHLORIDE 0.5 MG: 0.25 TABLET, FILM COATED ORAL at 21:15

## 2020-08-21 RX ADMIN — LINACLOTIDE 1 CAPSULE: 290 CAPSULE, GELATIN COATED ORAL at 06:03

## 2020-08-21 RX ADMIN — ALPRAZOLAM 1 MG: 0.5 TABLET ORAL at 21:15

## 2020-08-21 RX ADMIN — HYDROXYZINE HYDROCHLORIDE 50 MG: 25 TABLET, FILM COATED ORAL at 01:11

## 2020-08-21 RX ADMIN — ROPINIROLE HYDROCHLORIDE 0.5 MG: 0.25 TABLET, FILM COATED ORAL at 00:02

## 2020-08-21 RX ADMIN — PANTOPRAZOLE SODIUM 40 MG: 40 TABLET, DELAYED RELEASE ORAL at 16:31

## 2020-08-21 RX ADMIN — VALACYCLOVIR HYDROCHLORIDE 500 MG: 500 TABLET, FILM COATED ORAL at 08:43

## 2020-08-21 NOTE — PROGRESS NOTES
Progress Note - Behavioral Health     Suleman Gravely 45 y o  female MRN: [id number]   Unit/Bed#: [facility] 222-01 Encounter: [id number]    Behavior over the last 24 hours:      Amparo was seen for an inpatient follow-up psychiatric visit this date  She appeared somewhat anxious but states she feels much better today  She called her boyfriend and made arrangements to move in with him  She will be moving out of her new apartment and intends to start looking for a different job  She feels she took on more change than she can handle at this time  She is taking her medications as prescribed without side effects  Her appetite and sleep are within normal limits  Her insight and judgment are good and her mood is stabilizing  ROS: no complaints, all other systems are negative    Mental Status Evaluation:    Appearance:  casually dressed, dressed appropriately   Behavior:  pleasant, cooperative, calm   Speech:  normal rate and volume   Mood:  less anxious, less depressed, less dysphoric   Affect:  normal range and intensity   Thought Process:  organized   Associations: intact associations   Thought Content:  normal, no overt delusions   Perceptual Disturbances: none   Risk Potential: Suicidal ideation - None  Homicidal ideation - None  Potential for aggression - No   Sensorium:  oriented to person, place and time/date   Memory:  recent and remote memory grossly intact   Consciousness:  alert and awake   Attention: attention span and concentration are age appropriate   Insight:  good   Judgment: good   Gait/Station: normal gait/station, normal balance   Motor Activity: no abnormal movements     Vital signs in last 24 hours:    Temp:  [97 9 °F (36 6 °C)] 97 9 °F (36 6 °C)  HR:  [] 98  Resp:  [16] 16  BP: ()/(55-76) 110/76    Laboratory results:  I have personally reviewed all pertinent laboratory/tests results      Progress Toward Goals: progressing    Assessment/Plan   Principal Problem:    Bipolar 1 disorder, depressed, severe ([facility] )  Active Problems:    Migraine without aura and without status migrainosus, not intractable    Lightheaded    Medical clearance for psychiatric admission    Borderline personality disorder ([facility] )    Essential hypertension    Gastroesophageal reflux disease without esophagitis    Recommended Treatment:     Continue current medications as prescribed  Continue to monitor  Discharge disposition and planning are ongoing      All current active medications have been reviewed  Encourage group therapy, milieu therapy and occupational therapy  Behavioral Health checks every 7 minutes    Current Facility-Administered Medications   Medication Dose Route Frequency Provider Last Rate    acetaminophen  650 mg Oral Q6H PRN Nereida Raya MD      acetaminophen  650 mg Oral Q4H PRN Nereida Raya MD      acetaminophen  975 mg Oral Q6H PRN Anuradha Marti, CRNP      ALPRAZolam  1 mg Oral HS Nereida Raya MD      aluminum-magnesium hydroxide-simethicone  15 mL Oral Q4H PRN Anuradha GIBSON Lodics, CRNP      benztropine  1 mg Intramuscular Q6H PRN Anuradha GIBSON Lodics, CRNP      benztropine  1 mg Oral Q6H PRN Anuradha GIBSON Lodics, CRNP      haloperidol  5 mg Oral Q6H PRN Anuradha GIBSON Lodics, CRNP      haloperidol lactate  5 mg Intramuscular Q6H PRN Anuradha GIBSON Lodics, CRNP      hydrOXYzine HCL  25 mg Oral Q6H PRN Anuradha GIBSON Lodics, CRNP      hydrOXYzine HCL  50 mg Oral Q4H PRN Rush Greyics, CRNP      linaCLOtide  290 mcg Oral Daily Before Breakfast Thong Titus PA-C      lisinopril  10 mg Oral Daily Gabriel Be MD      LORazepam  2 mg Intramuscular Q4H PRN Anuradha GIBSON Lodics, CRNP      magnesium hydroxide  30 mL Oral Daily PRN Anuradha Greyics, CRNP      melatonin  12 mg Oral HS Nereida Raya MD      OLANZapine  10 mg Intramuscular Q3H PRN Rush Marti, CRNP      pantoprazole  40 mg Oral BID AC Gabriel Be MD      pregabalin  150 mg Oral Daily Damián Munoz MD  risperiDONE  2 mg Oral Q3H PRN Cricket Simpson MD      rOPINIRole  0 5 mg Oral HS Cricket Simpson MD      valACYclovir  500 mg Oral Daily Reji Felix PA-C      vilazodone  20 mg Oral Daily With Breakfast Cricket Simpson MD      zolpidem  10 mg Oral HS PRN Cricket Simpson MD         Risks / Benefits of Treatment:    Risks, benefits, and possible side effects of medications explained to patient and patient verbalizes understanding and agreement for treatment  Counseling / Coordination of Care:      Patient's progress discussed with staff in treatment team meeting  Medications, treatment progress and treatment plan reviewed with patient

## 2020-08-21 NOTE — PROGRESS NOTES
Patient visible on the unit  Pleasant and cooperative with staff  Social with peers  Complaint with medications and meals  Denies S/I H/I A/H V/H  Able to make needs known  Continual monitoring maintained

## 2020-08-21 NOTE — PLAN OF CARE
Problem: Alteration in Thoughts and Perception  Goal: Treatment Goal: Gain control of psychotic behaviors/thinking, reduce/eliminate presenting symptoms and demonstrate improved reality functioning upon discharge  Outcome: Progressing  Goal: Verbalize thoughts and feelings  Description: Interventions:  - Promote a nonjudgmental and trusting relationship with the patient through active listening and therapeutic communication  - Assess patient's level of functioning, behavior and potential for risk  - Engage patient in 1 on 1 interactions  - Encourage patient to express fears, feelings, frustrations, and discuss symptoms    - [place] patient to reality, help patient recognize reality-based thinking   - Administer medications as ordered and assess for potential side effects  - Provide the patient education related to the signs and symptoms of the illness and desired effects of prescribed medications  Interventions:  - Assess and re-assess patient's level of risk   - Engage patient in 1:1 interactions, daily, for a minimum of 15 minutes   - Encourage patient to express feelings, fears, frustrations, hopes   Outcome: Progressing  Goal: Refrain from acting on delusional thinking/internal stimuli  Description: Interventions:  - Monitor patient closely, per order   - Utilize least restrictive measures   - Set reasonable limits, give positive feedback for acceptable   - Administer medications as ordered and monitor of potential side effects  Outcome: Progressing  Goal: Agree to be compliant with medication regime, as prescribed and report medication side effects  Description: Interventions:  - Offer appropriate PRN medication and supervise ingestion; conduct AIMS, as needed   Outcome: Progressing  Goal: Attend and participate in unit activities, including therapeutic, recreational, and educational groups  Description: Interventions:  -Encourage Visitation and family involvement in care  Interventions:  - Provide therapeutic and educational activities daily, encourage attendance and participation, and document same in the medical record   Outcome: Progressing  Goal: Recognize dysfunctional thoughts, communicate reality-based thoughts at the time of discharge  Description: Interventions:  - Provide medication and psycho-education to assist patient in compliance and developing insight into his/her illness   Outcome: Progressing  Goal: Complete daily ADLs, including personal hygiene independently, as able  Description: Interventions:  - Observe, teach, and assist patient with ADLS  - Monitor and promote a balance of rest/activity, with adequate nutrition and elimination   Interventions:  - Observe, teach, and assist patient with ADLS  -  Monitor and promote a balance of rest/activity, with adequate nutrition and elimination   Outcome: Progressing     Problem: Ineffective Coping  Goal: Cooperates with admission process  Description: Interventions:   - Complete admission process  Outcome: Progressing  Goal: Identifies ineffective coping skills  Outcome: Progressing  Goal: Identifies healthy coping skills  Outcome: Progressing  Goal: Demonstrates healthy coping skills  Outcome: Progressing  Goal: Participates in unit activities  Description: Interventions:  - Provide therapeutic environment   - Provide required programming   - Redirect inappropriate behaviors   Outcome: Progressing  Goal: Patient/Family participate in treatment and DC plans  Description: Interventions:  - Provide therapeutic environment  Outcome: Progressing  Goal: Patient/Family verbalizes awareness of resources  Outcome: Progressing  Goal: Understands least restrictive measures  Description: Interventions:  - Utilize least restrictive behavior  Outcome: Progressing  Goal: Free from restraint events  Description: - Utilize least restrictive measures   - Provide behavioral interventions   - Redirect inappropriate behaviors   Outcome: Progressing     Problem: Risk for Self Injury/Neglect  Goal: Treatment Goal: Remain safe during length of stay, learn and adopt new coping skills, and be free of self-injurious ideation, impulses and acts at the time of discharge  Outcome: Progressing  Goal: Verbalize thoughts and feelings  Description: Interventions:  - Assess and re-assess patient's lethality and potential for self-injury  - Engage patient in 1:1 interactions, daily, for a minimum of 15 minutes  - Encourage patient to express feelings, fears, frustrations, hopes  - Establish rapport/trust with patient   Outcome: Progressing  Goal: Refrain from harming self  Description: Interventions:  - Monitor patient closely, per order  - Develop a trusting relationship  - Supervise medication ingestion, monitor effects and side effects   Outcome: Progressing  Goal: Attend and participate in unit activities, including therapeutic, recreational, and educational groups  Description: Interventions:  - Provide therapeutic and educational activities daily, encourage attendance and participation, and document same in the medical record  - Obtain collateral information, encourage visitation and family involvement in care   Outcome: Progressing  Goal: Recognize maladaptive responses and adopt new coping mechanisms  Outcome: Progressing  Goal: Complete daily ADLs, including personal hygiene independently, as able  Description: Interventions:  - Observe, teach, and assist patient with ADLS  - Monitor and promote a balance of rest/activity, with adequate nutrition and elimination  Outcome: Progressing     Problem: Anxiety  Goal: Anxiety is at manageable level  Description: Interventions:  - Assess and monitor patient's anxiety level  - Monitor for signs and symptoms (heart palpitations, chest pain, shortness of breath, headaches, nausea, feeling jumpy, restlessness, irritable, apprehensive)  - Collaborate with interdisciplinary team and initiate plan and interventions as ordered    - [place] patient to unit/surroundings  - Explain treatment plan  - Encourage participation in care  - Encourage verbalization of concerns/fears  - Identify coping mechanisms  - Assist in developing anxiety-reducing skills  - Administer/offer alternative therapies  - Limit or eliminate stimulants  Outcome: Progressing     Problem: Ineffective Coping  Goal: Participates in unit activities  Description: Interventions:  - Provide therapeutic environment   - Provide required programming   - Redirect inappropriate behaviors   Outcome: Progressing     Problem: Depression  Goal: Verbalize thoughts and feelings  Description: Interventions:  - Promote a nonjudgmental and trusting relationship with the patient through active listening and therapeutic communication  - Assess patient's level of functioning, behavior and potential for risk  - Engage patient in 1 on 1 interactions  - Encourage patient to express fears, feelings, frustrations, and discuss symptoms    - [place] patient to reality, help patient recognize reality-based thinking   - Administer medications as ordered and assess for potential side effects  - Provide the patient education related to the signs and symptoms of the illness and desired effects of prescribed medications  Interventions:  - Assess and re-assess patient's level of risk   - Engage patient in 1:1 interactions, daily, for a minimum of 15 minutes   - Encourage patient to express feelings, fears, frustrations, hopes   Outcome: Progressing  Goal: Attend and participate in unit activities, including therapeutic, recreational, and educational groups  Description: Interventions:  -Encourage Visitation and family involvement in care  Interventions:  - Provide therapeutic and educational activities daily, encourage attendance and participation, and document same in the medical record   Outcome: Progressing  Goal: Complete daily ADLs, including personal hygiene independently, as able  Description: Interventions:  - Observe, teach, and assist patient with ADLS  - Monitor and promote a balance of rest/activity, with adequate nutrition and elimination   Interventions:  - Observe, teach, and assist patient with ADLS  -  Monitor and promote a balance of rest/activity, with adequate nutrition and elimination   Outcome: Progressing  Goal: Treatment Goal: Demonstrate behavioral control of depressive symptoms, verbalize feelings of improved mood/affect, and adopt new coping skills prior to discharge  Outcome: Progressing  Goal: Refrain from harming self  Description: Interventions:  - Monitor patient closely, per order   - Supervise medication ingestion, monitor effects and side effects   Outcome: Progressing  Goal: Refrain from isolation  Description: Interventions:  - Develop a trusting relationship   - Encourage socialization   Outcome: Progressing  Goal: Refrain from self-neglect  Outcome: Progressing     Problem: DISCHARGE PLANNING - CARE MANAGEMENT  Goal: Discharge to post-acute care or home with appropriate resources  Description: INTERVENTIONS:  - Conduct assessment to determine patient/family and health care team treatment goals, and need for post-acute services based on payer coverage, community resources, and patient preferences, and barriers to discharge  - Address psychosocial, clinical, and financial barriers to discharge as identified in assessment in conjunction with the patient/family and health care team  - Arrange appropriate level of post-acute services according to patients   needs and preference and payer coverage in collaboration with the physician and health care team  - Communicate with and update the patient/family, physician, and health care team regarding progress on the discharge plan  - Arrange appropriate transportation to post-acute venues  Outcome: Progressing     Problem: SUBSTANCE USE/ABUSE  Goal: By discharge, will develop insight into their chemical dependency and sustain motivation to continue in recovery  Description: INTERVENTIONS:  - Attends all daily group sessions and scheduled AA groups  - Actively practices coping skills through participation in the therapeutic community and adherence to program rules  - Reviews and completes assignments from individual treatment plan  - Assist patient development of understanding of their personal cycle of addiction and relapse triggers  Outcome: Progressing  Goal: By discharge, patient will have ongoing treatment plan addressing chemical dependency  Description: INTERVENTIONS:  - Assist patient with resources and/or appointments for ongoing recovery based living  Outcome: Progressing

## 2020-08-21 NOTE — PROGRESS NOTES
hydrOXYzine HCL (ATARAX) tablet 50 mg given at 0111 for moderate anxiety PO  Pinzon Scale 19  Also complained of continued restless legs and tremors in bilateral legs  Will continue to monitor

## 2020-08-21 NOTE — PROGRESS NOTES
Slept well during most q 7 minute safety checks  Amparo restless ('tweitching" per patient) till 0230  No respiratory distress or changes in medical condition  Sleep has been sound and undisturbed after 0230  No suicidal ideations noted  Safety maintained via clutter-free environment, ID band, and given non-skid socks  Will continue to monitor

## 2020-08-21 NOTE — PROGRESS NOTES
08/21/20 0928   Team Meeting   Meeting Type Daily Rounds   Initial Conference Date 08/21/20   Patient/Family Present   Patient Present No   Patient's Family Present No       Team Members Present:   LEATHA Call, MARGOT  [facility] [place], LCSW    Started on Requip  Many neurological meds  Seems less depressed than yesterday  Aware of her meds  Decompensating, at present

## 2020-08-21 NOTE — PROGRESS NOTES
08/21/20 1000   Activity/Group Checklist   Group   (Creative Expression and Art Therapy Processing)   Attendance Attended   Attendance Duration (min) Greater than 60   Interactions Interacted appropriately   Affect/Mood Appropriate;Bright;Calm   Goals Achieved Identified feelings; Identified triggers; Discussed coping strategies; Able to listen to others; Able to engage in interactions

## 2020-08-21 NOTE — PROGRESS NOTES
Pt more visible and cheerful today  Pt has plan to move in with her boyfriend and terminate her current job, where she has been feeling overwhelmed  Denies current SI, HI, A/T/V  Compliant with meals and meds  Monitoring continues

## 2020-08-21 NOTE — PROGRESS NOTES
08/21/20 0900   Activity/Group Checklist   Group Other (Comment)  (Goals Group/Coffee )   Attendance Attended   Attendance Duration (min) 31-45   Interactions Interacted appropriately   Affect/Mood Appropriate;Bright;Normal range   Goals Achieved Identified feelings; Discussed coping strategies; Able to listen to others; Able to engage in interactions; Able to self-disclose

## 2020-08-22 PROCEDURE — 99232 SBSQ HOSP IP/OBS MODERATE 35: CPT | Performed by: NURSE PRACTITIONER

## 2020-08-22 RX ADMIN — VILAZODONE HYDROCHLORIDE 20 MG: 10 TABLET ORAL at 06:35

## 2020-08-22 RX ADMIN — LINACLOTIDE 1 CAPSULE: 290 CAPSULE, GELATIN COATED ORAL at 06:36

## 2020-08-22 RX ADMIN — MELATONIN 12 MG: at 21:10

## 2020-08-22 RX ADMIN — PANTOPRAZOLE SODIUM 40 MG: 40 TABLET, DELAYED RELEASE ORAL at 17:00

## 2020-08-22 RX ADMIN — VALACYCLOVIR HYDROCHLORIDE 500 MG: 500 TABLET, FILM COATED ORAL at 09:53

## 2020-08-22 RX ADMIN — PREGABALIN 150 MG: 75 CAPSULE ORAL at 09:52

## 2020-08-22 RX ADMIN — PANTOPRAZOLE SODIUM 40 MG: 40 TABLET, DELAYED RELEASE ORAL at 06:35

## 2020-08-22 RX ADMIN — HYDROXYZINE HYDROCHLORIDE 50 MG: 25 TABLET, FILM COATED ORAL at 18:14

## 2020-08-22 RX ADMIN — ROPINIROLE HYDROCHLORIDE 0.5 MG: 0.25 TABLET, FILM COATED ORAL at 21:10

## 2020-08-22 RX ADMIN — LISINOPRIL 10 MG: 10 TABLET ORAL at 09:52

## 2020-08-22 RX ADMIN — ALPRAZOLAM 1 MG: 0.5 TABLET ORAL at 21:10

## 2020-08-22 NOTE — PROGRESS NOTES
Progress Note - Behavioral Health     Gely Guzman 45 y o  female MRN: [id number]   Unit/Bed#: [facility]-01 Encounter: [id number]    Behavior over the last 24 hours:      Amparo continues to progress and is interacting appropriately with staff and peers  She states she is feeling less anxious and was able to sleep through the night last night without p r n  Ambien  She denies any suicidal or homicidal ideation  She is taking her medications as prescribed without side effects  Her appetite is within normal limits  ROS: no complaints, all other systems are negative    Mental Status Evaluation:    Appearance:  casually dressed, dressed appropriately   Behavior:  pleasant, cooperative, calm   Speech:  normal rate and volume   Mood:  improved   Affect:  normal range and intensity   Thought Process:  organized, logical, coherent   Associations: intact associations   Thought Content:  normal, no overt delusions   Perceptual Disturbances: none   Risk Potential: Suicidal ideation - None  Homicidal ideation - None  Potential for aggression - No   Sensorium:  oriented to person, place and time/date   Memory:  recent and remote memory grossly intact   Consciousness:  alert and awake   Attention: attention span and concentration are age appropriate   Insight:  good   Judgment: good   Gait/Station: normal gait/station, normal balance   Motor Activity: no abnormal movements     Vital signs in last 24 hours:    Temp:  [97 5 °F (36 4 °C)-98 3 °F (36 8 °C)] 98 3 °F (36 8 °C)  HR:  [64-91] 91  Resp:  [16-18] 18  BP: (117-126)/(69-87) 126/87    Laboratory results:  I have personally reviewed all pertinent laboratory/tests results      Progress Toward Goals: progressing    Assessment/Plan   Principal Problem:    Bipolar 1 disorder, depressed, severe (HCC)  Active Problems:    Migraine without aura and without status migrainosus, not intractable    Lightheaded    Medical clearance for psychiatric admission    Borderline personality disorder ([facility] [address] )    Essential hypertension    Gastroesophageal reflux disease without esophagitis    Recommended Treatment:     Continue current medications as prescribed  Continue to monitor  Discharge disposition and planning are ongoing        All current active medications have been reviewed  Encourage group therapy, milieu therapy and occupational therapy  Behavioral Health checks every 7 minutes    Current Facility-Administered Medications   Medication Dose Route Frequency Provider Last Rate    acetaminophen  650 mg Oral Q6H PRN Anuradha Quiroz MD      acetaminophen  650 mg Oral Q4H PRN Anuradha Quiroz MD      acetaminophen  975 mg Oral Q6H PRN Anuradha Greyics, CRNP      ALPRAZolam  1 mg Oral HS Anuradha Quiroz MD      aluminum-magnesium hydroxide-simethicone  15 mL Oral Q4H PRN Anuradha GIBSON Lodics, CRNP      benztropine  1 mg Intramuscular Q6H PRN Anuradha GIBSON Lodics, CRNP      benztropine  1 mg Oral Q6H PRN Anuradha GIBSON Lodics, CRNP      haloperidol  5 mg Oral Q6H PRN Anuradha GIBSON Lodics, CRNP      haloperidol lactate  5 mg Intramuscular Q6H PRN Anuradha GIBSON Lodics, CRNP      hydrOXYzine HCL  25 mg Oral Q6H PRN Anuradha GIBSON Lodics, CRNP      hydrOXYzine HCL  50 mg Oral Q4H PRN Wyatt Marvel Lodics, CRNP      linaCLOtide  290 mcg Oral Daily Before Breakfast Damian Payan PA-C      lisinopril  10 mg Oral Daily Farhan Vidal MD      LORazepam  2 mg Intramuscular Q4H PRN Anuradha GIBSON Lodics, CRNP      magnesium hydroxide  30 mL Oral Daily PRN Anuradha Greyics, CRNP      melatonin  12 mg Oral HS Anuradha Quiroz MD      OLANZapine  10 mg Intramuscular Q3H PRN Wyatt Greyics, CRNP      pantoprazole  40 mg Oral BID AC Farhan Vidal MD      pregabalin  150 mg Oral Daily Erica Pineda MD      risperiDONE  2 mg Oral Q3H PRN Anuradha Quiroz MD      rOPINIRole  0 5 mg Oral HS Anuradha Quiroz MD      valACYclovir  500 mg Oral Daily Damian Payan PA-C      vilazodone  20 mg Oral Daily With Breakfast Anuradha Quiroz MD      zolpidem  10 mg Oral HS PRN Anuradha Quiroz MD         Risks / Benefits of Treatment:    Risks, benefits, and possible side effects of medications explained to patient and patient verbalizes understanding and agreement for treatment  Counseling / Coordination of Care:      Patient's progress discussed with staff in treatment team meeting  Medications, treatment progress and treatment plan reviewed with patient

## 2020-08-22 NOTE — PROGRESS NOTES
Pt is visible on the unit, quiet and controled  Cooperative with medications  Positive for morning meal  Good eye contact  Pt has good insight and verbalized having a lot of recent stressors that all came down on her at once  Pt expressed that she took on a full time job instead of her part time job because she felt ready for it, but realized she had more control with less hours  Pt denies SI and HI currently and has plans to move in with boyfriend on discharge  Withdrawn but will make conversation with peers when out for meals and groups  Alert and oriented  Able to make needs known  No signs or symptoms of distress  Continual monitoring maintained for safety  Will continue to monitor

## 2020-08-22 NOTE — PLAN OF CARE
Problem: Alteration in Thoughts and Perception  Goal: Treatment Goal: Gain control of psychotic behaviors/thinking, reduce/eliminate presenting symptoms and demonstrate improved reality functioning upon discharge  Outcome: Progressing  Goal: Verbalize thoughts and feelings  Description: Interventions:  - Promote a nonjudgmental and trusting relationship with the patient through active listening and therapeutic communication  - Assess patient's level of functioning, behavior and potential for risk  - Engage patient in 1 on 1 interactions  - Encourage patient to express fears, feelings, frustrations, and discuss symptoms    - [place] patient to reality, help patient recognize reality-based thinking   - Administer medications as ordered and assess for potential side effects  - Provide the patient education related to the signs and symptoms of the illness and desired effects of prescribed medications  Interventions:  - Assess and re-assess patient's level of risk   - Engage patient in 1:1 interactions, daily, for a minimum of 15 minutes   - Encourage patient to express feelings, fears, frustrations, hopes   Outcome: Progressing  Goal: Refrain from acting on delusional thinking/internal stimuli  Description: Interventions:  - Monitor patient closely, per order   - Utilize least restrictive measures   - Set reasonable limits, give positive feedback for acceptable   - Administer medications as ordered and monitor of potential side effects  Outcome: Progressing  Goal: Agree to be compliant with medication regime, as prescribed and report medication side effects  Description: Interventions:  - Offer appropriate PRN medication and supervise ingestion; conduct AIMS, as needed   Outcome: Progressing  Goal: Attend and participate in unit activities, including therapeutic, recreational, and educational groups  Description: Interventions:  -Encourage Visitation and family involvement in care  Interventions:  - Provide therapeutic and educational activities daily, encourage attendance and participation, and document same in the medical record   Outcome: Progressing  Goal: Recognize dysfunctional thoughts, communicate reality-based thoughts at the time of discharge  Description: Interventions:  - Provide medication and psycho-education to assist patient in compliance and developing insight into his/her illness   Outcome: Progressing  Goal: Complete daily ADLs, including personal hygiene independently, as able  Description: Interventions:  - Observe, teach, and assist patient with ADLS  - Monitor and promote a balance of rest/activity, with adequate nutrition and elimination   Interventions:  - Observe, teach, and assist patient with ADLS  -  Monitor and promote a balance of rest/activity, with adequate nutrition and elimination   Outcome: Progressing     Problem: Ineffective Coping  Goal: Cooperates with admission process  Description: Interventions:   - Complete admission process  Outcome: Progressing  Goal: Identifies ineffective coping skills  Outcome: Progressing  Goal: Identifies healthy coping skills  Outcome: Progressing  Goal: Demonstrates healthy coping skills  Outcome: Progressing  Goal: Participates in unit activities  Description: Interventions:  - Provide therapeutic environment   - Provide required programming   - Redirect inappropriate behaviors   Outcome: Progressing  Goal: Patient/Family participate in treatment and DC plans  Description: Interventions:  - Provide therapeutic environment  Outcome: Progressing  Goal: Patient/Family verbalizes awareness of resources  Outcome: Progressing  Goal: Understands least restrictive measures  Description: Interventions:  - Utilize least restrictive behavior  Outcome: Progressing  Goal: Free from restraint events  Description: - Utilize least restrictive measures   - Provide behavioral interventions   - Redirect inappropriate behaviors   Outcome: Progressing     Problem: Risk for Self Injury/Neglect  Goal: Treatment Goal: Remain safe during length of stay, learn and adopt new coping skills, and be free of self-injurious ideation, impulses and acts at the time of discharge  Outcome: Progressing  Goal: Verbalize thoughts and feelings  Description: Interventions:  - Assess and re-assess patient's lethality and potential for self-injury  - Engage patient in 1:1 interactions, daily, for a minimum of 15 minutes  - Encourage patient to express feelings, fears, frustrations, hopes  - Establish rapport/trust with patient   Outcome: Progressing  Goal: Refrain from harming self  Description: Interventions:  - Monitor patient closely, per order  - Develop a trusting relationship  - Supervise medication ingestion, monitor effects and side effects   Outcome: Progressing  Goal: Attend and participate in unit activities, including therapeutic, recreational, and educational groups  Description: Interventions:  - Provide therapeutic and educational activities daily, encourage attendance and participation, and document same in the medical record  - Obtain collateral information, encourage visitation and family involvement in care   Outcome: Progressing  Goal: Recognize maladaptive responses and adopt new coping mechanisms  Outcome: Progressing  Goal: Complete daily ADLs, including personal hygiene independently, as able  Description: Interventions:  - Observe, teach, and assist patient with ADLS  - Monitor and promote a balance of rest/activity, with adequate nutrition and elimination  Outcome: Progressing     Problem: Anxiety  Goal: Anxiety is at manageable level  Description: Interventions:  - Assess and monitor patient's anxiety level  - Monitor for signs and symptoms (heart palpitations, chest pain, shortness of breath, headaches, nausea, feeling jumpy, restlessness, irritable, apprehensive)  - Collaborate with interdisciplinary team and initiate plan and interventions as ordered    - [place] patient to unit/surroundings  - Explain treatment plan  - Encourage participation in care  - Encourage verbalization of concerns/fears  - Identify coping mechanisms  - Assist in developing anxiety-reducing skills  - Administer/offer alternative therapies  - Limit or eliminate stimulants  Outcome: Progressing     Problem: Ineffective Coping  Goal: Participates in unit activities  Description: Interventions:  - Provide therapeutic environment   - Provide required programming   - Redirect inappropriate behaviors   Outcome: Progressing     Problem: Depression  Goal: Verbalize thoughts and feelings  Description: Interventions:  - Promote a nonjudgmental and trusting relationship with the patient through active listening and therapeutic communication  - Assess patient's level of functioning, behavior and potential for risk  - Engage patient in 1 on 1 interactions  - Encourage patient to express fears, feelings, frustrations, and discuss symptoms    - [place] patient to reality, help patient recognize reality-based thinking   - Administer medications as ordered and assess for potential side effects  - Provide the patient education related to the signs and symptoms of the illness and desired effects of prescribed medications  Interventions:  - Assess and re-assess patient's level of risk   - Engage patient in 1:1 interactions, daily, for a minimum of 15 minutes   - Encourage patient to express feelings, fears, frustrations, hopes   Outcome: Progressing  Goal: Attend and participate in unit activities, including therapeutic, recreational, and educational groups  Description: Interventions:  -Encourage Visitation and family involvement in care  Interventions:  - Provide therapeutic and educational activities daily, encourage attendance and participation, and document same in the medical record   Outcome: Progressing  Goal: Complete daily ADLs, including personal hygiene independently, as able  Description: Interventions:  - Observe, teach, and assist patient with ADLS  - Monitor and promote a balance of rest/activity, with adequate nutrition and elimination   Interventions:  - Observe, teach, and assist patient with ADLS  -  Monitor and promote a balance of rest/activity, with adequate nutrition and elimination   Outcome: Progressing  Goal: Treatment Goal: Demonstrate behavioral control of depressive symptoms, verbalize feelings of improved mood/affect, and adopt new coping skills prior to discharge  Outcome: Progressing  Goal: Refrain from harming self  Description: Interventions:  - Monitor patient closely, per order   - Supervise medication ingestion, monitor effects and side effects   Outcome: Progressing  Goal: Refrain from isolation  Description: Interventions:  - Develop a trusting relationship   - Encourage socialization   Outcome: Progressing  Goal: Refrain from self-neglect  Outcome: Progressing     Problem: DISCHARGE PLANNING - CARE MANAGEMENT  Goal: Discharge to post-acute care or home with appropriate resources  Description: INTERVENTIONS:  - Conduct assessment to determine patient/family and health care team treatment goals, and need for post-acute services based on payer coverage, community resources, and patient preferences, and barriers to discharge  - Address psychosocial, clinical, and financial barriers to discharge as identified in assessment in conjunction with the patient/family and health care team  - Arrange appropriate level of post-acute services according to patients   needs and preference and payer coverage in collaboration with the physician and health care team  - Communicate with and update the patient/family, physician, and health care team regarding progress on the discharge plan  - Arrange appropriate transportation to post-acute venues  Outcome: Progressing     Problem: SUBSTANCE USE/ABUSE  Goal: By discharge, will develop insight into their chemical dependency and sustain motivation to continue in recovery  Description: INTERVENTIONS:  - Attends all daily group sessions and scheduled AA groups  - Actively practices coping skills through participation in the therapeutic community and adherence to program rules  - Reviews and completes assignments from individual treatment plan  - Assist patient development of understanding of their personal cycle of addiction and relapse triggers  Outcome: Progressing  Goal: By discharge, patient will have ongoing treatment plan addressing chemical dependency  Description: INTERVENTIONS:  - Assist patient with resources and/or appointments for ongoing recovery based living  Outcome: Progressing

## 2020-08-23 PROCEDURE — 99232 SBSQ HOSP IP/OBS MODERATE 35: CPT | Performed by: NURSE PRACTITIONER

## 2020-08-23 RX ORDER — HYDROXYZINE HYDROCHLORIDE 25 MG/1
25 TABLET, FILM COATED ORAL 3 TIMES DAILY
Status: DISCONTINUED | OUTPATIENT
Start: 2020-08-23 | End: 2020-08-23

## 2020-08-23 RX ORDER — HYDROXYZINE HYDROCHLORIDE 25 MG/1
25 TABLET, FILM COATED ORAL 3 TIMES DAILY
Status: DISCONTINUED | OUTPATIENT
Start: 2020-08-23 | End: 2020-08-24 | Stop reason: HOSPADM

## 2020-08-23 RX ADMIN — MELATONIN 12 MG: at 21:05

## 2020-08-23 RX ADMIN — PANTOPRAZOLE SODIUM 40 MG: 40 TABLET, DELAYED RELEASE ORAL at 17:00

## 2020-08-23 RX ADMIN — HYDROXYZINE HYDROCHLORIDE 50 MG: 25 TABLET, FILM COATED ORAL at 19:28

## 2020-08-23 RX ADMIN — HYDROXYZINE HYDROCHLORIDE 25 MG: 25 TABLET, FILM COATED ORAL at 12:46

## 2020-08-23 RX ADMIN — ROPINIROLE HYDROCHLORIDE 0.5 MG: 0.25 TABLET, FILM COATED ORAL at 21:05

## 2020-08-23 RX ADMIN — PREGABALIN 150 MG: 75 CAPSULE ORAL at 08:42

## 2020-08-23 RX ADMIN — ALPRAZOLAM 1 MG: 0.5 TABLET ORAL at 21:04

## 2020-08-23 RX ADMIN — LINACLOTIDE 1 CAPSULE: 290 CAPSULE, GELATIN COATED ORAL at 07:50

## 2020-08-23 RX ADMIN — VALACYCLOVIR HYDROCHLORIDE 500 MG: 500 TABLET, FILM COATED ORAL at 08:42

## 2020-08-23 RX ADMIN — PANTOPRAZOLE SODIUM 40 MG: 40 TABLET, DELAYED RELEASE ORAL at 07:48

## 2020-08-23 RX ADMIN — VILAZODONE HYDROCHLORIDE 20 MG: 10 TABLET ORAL at 08:30

## 2020-08-23 NOTE — NURSING NOTE
[phone]  Pt found to be resting quietly on most of authors rounds  No acute behavioral issues noted  Q 7 min checks maintained  Fall protocol in place

## 2020-08-23 NOTE — NURSING NOTE
[id number] [phone]     Pt present in the milieu; Affect flat mood depressed and anxious  Thoughts well organized  Reports improvement in mood since admission  Denies SI or HI  NO acute behavioral issues noted  Q 7 min checks ongoing

## 2020-08-23 NOTE — PLAN OF CARE
Problem: Alteration in Thoughts and Perception  Goal: Treatment Goal: Gain control of psychotic behaviors/thinking, reduce/eliminate presenting symptoms and demonstrate improved reality functioning upon discharge  Outcome: Progressing  Goal: Verbalize thoughts and feelings  Description: Interventions:  - Promote a nonjudgmental and trusting relationship with the patient through active listening and therapeutic communication  - Assess patient's level of functioning, behavior and potential for risk  - Engage patient in 1 on 1 interactions  - Encourage patient to express fears, feelings, frustrations, and discuss symptoms    - [place] patient to reality, help patient recognize reality-based thinking   - Administer medications as ordered and assess for potential side effects  - Provide the patient education related to the signs and symptoms of the illness and desired effects of prescribed medications  Interventions:  - Assess and re-assess patient's level of risk   - Engage patient in 1:1 interactions, daily, for a minimum of 15 minutes   - Encourage patient to express feelings, fears, frustrations, hopes   Outcome: Progressing  Goal: Refrain from acting on delusional thinking/internal stimuli  Description: Interventions:  - Monitor patient closely, per order   - Utilize least restrictive measures   - Set reasonable limits, give positive feedback for acceptable   - Administer medications as ordered and monitor of potential side effects  Outcome: Progressing  Goal: Agree to be compliant with medication regime, as prescribed and report medication side effects  Description: Interventions:  - Offer appropriate PRN medication and supervise ingestion; conduct AIMS, as needed   Outcome: Progressing  Goal: Attend and participate in unit activities, including therapeutic, recreational, and educational groups  Description: Interventions:  -Encourage Visitation and family involvement in care  Interventions:  - Provide therapeutic and educational activities daily, encourage attendance and participation, and document same in the medical record   Outcome: Progressing  Goal: Recognize dysfunctional thoughts, communicate reality-based thoughts at the time of discharge  Description: Interventions:  - Provide medication and psycho-education to assist patient in compliance and developing insight into his/her illness   Outcome: Progressing  Goal: Complete daily ADLs, including personal hygiene independently, as able  Description: Interventions:  - Observe, teach, and assist patient with ADLS  - Monitor and promote a balance of rest/activity, with adequate nutrition and elimination   Interventions:  - Observe, teach, and assist patient with ADLS  -  Monitor and promote a balance of rest/activity, with adequate nutrition and elimination   Outcome: Progressing     Problem: Ineffective Coping  Goal: Cooperates with admission process  Description: Interventions:   - Complete admission process  Outcome: Progressing  Goal: Identifies ineffective coping skills  Outcome: Progressing  Goal: Identifies healthy coping skills  Outcome: Progressing  Goal: Demonstrates healthy coping skills  Outcome: Progressing  Goal: Participates in unit activities  Description: Interventions:  - Provide therapeutic environment   - Provide required programming   - Redirect inappropriate behaviors   Outcome: Progressing  Goal: Patient/Family participate in treatment and DC plans  Description: Interventions:  - Provide therapeutic environment  Outcome: Progressing  Goal: Patient/Family verbalizes awareness of resources  Outcome: Progressing  Goal: Understands least restrictive measures  Description: Interventions:  - Utilize least restrictive behavior  Outcome: Progressing  Goal: Free from restraint events  Description: - Utilize least restrictive measures   - Provide behavioral interventions   - Redirect inappropriate behaviors   Outcome: Progressing     Problem: Risk for Self Injury/Neglect  Goal: Treatment Goal: Remain safe during length of stay, learn and adopt new coping skills, and be free of self-injurious ideation, impulses and acts at the time of discharge  Outcome: Progressing  Goal: Verbalize thoughts and feelings  Description: Interventions:  - Assess and re-assess patient's lethality and potential for self-injury  - Engage patient in 1:1 interactions, daily, for a minimum of 15 minutes  - Encourage patient to express feelings, fears, frustrations, hopes  - Establish rapport/trust with patient   Outcome: Progressing  Goal: Refrain from harming self  Description: Interventions:  - Monitor patient closely, per order  - Develop a trusting relationship  - Supervise medication ingestion, monitor effects and side effects   Outcome: Progressing  Goal: Attend and participate in unit activities, including therapeutic, recreational, and educational groups  Description: Interventions:  - Provide therapeutic and educational activities daily, encourage attendance and participation, and document same in the medical record  - Obtain collateral information, encourage visitation and family involvement in care   Outcome: Progressing  Goal: Recognize maladaptive responses and adopt new coping mechanisms  Outcome: Progressing  Goal: Complete daily ADLs, including personal hygiene independently, as able  Description: Interventions:  - Observe, teach, and assist patient with ADLS  - Monitor and promote a balance of rest/activity, with adequate nutrition and elimination  Outcome: Progressing     Problem: Anxiety  Goal: Anxiety is at manageable level  Description: Interventions:  - Assess and monitor patient's anxiety level  - Monitor for signs and symptoms (heart palpitations, chest pain, shortness of breath, headaches, nausea, feeling jumpy, restlessness, irritable, apprehensive)  - Collaborate with interdisciplinary team and initiate plan and interventions as ordered    - [place] patient to unit/surroundings  - Explain treatment plan  - Encourage participation in care  - Encourage verbalization of concerns/fears  - Identify coping mechanisms  - Assist in developing anxiety-reducing skills  - Administer/offer alternative therapies  - Limit or eliminate stimulants  Outcome: Progressing     Problem: Ineffective Coping  Goal: Participates in unit activities  Description: Interventions:  - Provide therapeutic environment   - Provide required programming   - Redirect inappropriate behaviors   Outcome: Progressing     Problem: Depression  Goal: Verbalize thoughts and feelings  Description: Interventions:  - Promote a nonjudgmental and trusting relationship with the patient through active listening and therapeutic communication  - Assess patient's level of functioning, behavior and potential for risk  - Engage patient in 1 on 1 interactions  - Encourage patient to express fears, feelings, frustrations, and discuss symptoms    - [place] patient to reality, help patient recognize reality-based thinking   - Administer medications as ordered and assess for potential side effects  - Provide the patient education related to the signs and symptoms of the illness and desired effects of prescribed medications  Interventions:  - Assess and re-assess patient's level of risk   - Engage patient in 1:1 interactions, daily, for a minimum of 15 minutes   - Encourage patient to express feelings, fears, frustrations, hopes   Outcome: Progressing  Goal: Attend and participate in unit activities, including therapeutic, recreational, and educational groups  Description: Interventions:  -Encourage Visitation and family involvement in care  Interventions:  - Provide therapeutic and educational activities daily, encourage attendance and participation, and document same in the medical record   Outcome: Progressing  Goal: Complete daily ADLs, including personal hygiene independently, as able  Description: Interventions:  - Observe, teach, and assist patient with ADLS  - Monitor and promote a balance of rest/activity, with adequate nutrition and elimination   Interventions:  - Observe, teach, and assist patient with ADLS  -  Monitor and promote a balance of rest/activity, with adequate nutrition and elimination   Outcome: Progressing  Goal: Treatment Goal: Demonstrate behavioral control of depressive symptoms, verbalize feelings of improved mood/affect, and adopt new coping skills prior to discharge  Outcome: Progressing  Goal: Refrain from harming self  Description: Interventions:  - Monitor patient closely, per order   - Supervise medication ingestion, monitor effects and side effects   Outcome: Progressing  Goal: Refrain from isolation  Description: Interventions:  - Develop a trusting relationship   - Encourage socialization   Outcome: Progressing  Goal: Refrain from self-neglect  Outcome: Progressing     Problem: DISCHARGE PLANNING - CARE MANAGEMENT  Goal: Discharge to post-acute care or home with appropriate resources  Description: INTERVENTIONS:  - Conduct assessment to determine patient/family and health care team treatment goals, and need for post-acute services based on payer coverage, community resources, and patient preferences, and barriers to discharge  - Address psychosocial, clinical, and financial barriers to discharge as identified in assessment in conjunction with the patient/family and health care team  - Arrange appropriate level of post-acute services according to patients   needs and preference and payer coverage in collaboration with the physician and health care team  - Communicate with and update the patient/family, physician, and health care team regarding progress on the discharge plan  - Arrange appropriate transportation to post-acute venues  Outcome: Progressing     Problem: SUBSTANCE USE/ABUSE  Goal: By discharge, will develop insight into their chemical dependency and sustain motivation to continue in recovery  Description: INTERVENTIONS:  - Attends all daily group sessions and scheduled AA groups  - Actively practices coping skills through participation in the therapeutic community and adherence to program rules  - Reviews and completes assignments from individual treatment plan  - Assist patient development of understanding of their personal cycle of addiction and relapse triggers  Outcome: Progressing  Goal: By discharge, patient will have ongoing treatment plan addressing chemical dependency  Description: INTERVENTIONS:  - Assist patient with resources and/or appointments for ongoing recovery based living  Outcome: Progressing

## 2020-08-23 NOTE — PROGRESS NOTES
Patient is visible on the unit, smiles and brightens on approach  Pt verbalized looking forward to discharge soon and expressed that she was ready to go  Good eye contact  Good hygiene  Positive for morning meal  Cooperative with medications  Patient is able to convey positive out look for future and plans to go back to part time working  Pt denies SI and HI currently, no behavioral problems  Alert and oriented  Able to make needs known  No signs or symptoms of distress  Continual monitoring maintained

## 2020-08-23 NOTE — PROGRESS NOTES
Progress Note - Behavioral Health     Aline Lancaster 45 y o  female MRN: [id number]   Unit/Bed#: [facility] [place] [facility]-01 Encounter: [id number]    Behavior over the last 24 hours:      Amparo was seen for an inpatient follow-up psychiatric visit this date  She relates she is no longer feeling depressed but feels very anxious since her Xanax has been reduced  She is sleeping well without Ambien  She is taking her medications as prescribed  Her appetite is good  She denies any suicidal or homicidal ideation  She feels ready to be discharged tomorrow  She needs to pack her things and move out as soon as possible in order to discontinue the lease  Her mood and behavior stable at this time  ROS: no complaints, all other systems are negative    Mental Status Evaluation:    Appearance:  casually dressed, dressed appropriately   Behavior:  pleasant, cooperative   Speech:  normal rate and volume   Mood:  improved, still anxious   Affect:  normal range and intensity   Thought Process:  organized, logical, coherent   Associations: intact associations   Thought Content:  normal, no overt delusions   Perceptual Disturbances: none   Risk Potential: Suicidal ideation - None  Homicidal ideation - None  Potential for aggression - No   Sensorium:  oriented to person, place and time/date   Memory:  recent and remote memory grossly intact   Consciousness:  alert and awake   Attention: attention span and concentration are age appropriate   Insight:  good   Judgment: good   Gait/Station: normal gait/station, normal balance   Motor Activity: no abnormal movements     Vital signs in last 24 hours:    Temp:  [97 6 °F (36 4 °C)-97 9 °F (36 6 °C)] 97 9 °F (36 6 °C)  HR:  [74] 74  Resp:  [16-18] 18  BP: (105-107)/(58-61) 105/61    Laboratory results:  I have personally reviewed all pertinent laboratory/tests results      Progress Toward Goals: progressing    Assessment/Plan   Principal Problem:    Bipolar 1 disorder, depressed, severe ([facility] )  Active Problems:    Migraine without aura and without status migrainosus, not intractable    Lightheaded    Medical clearance for psychiatric admission    Borderline personality disorder ([facility] )    Essential hypertension    Gastroesophageal reflux disease without esophagitis    Recommended Treatment:     Continue current medications as prescribed  Continue to monitor  Discharge disposition and planning are ongoing        All current active medications have been reviewed  Encourage group therapy, milieu therapy and occupational therapy  Behavioral Health checks every 7 minutes    Current Facility-Administered Medications   Medication Dose Route Frequency Provider Last Rate    acetaminophen  650 mg Oral Q6H PRN Christian Wheeler MD      acetaminophen  650 mg Oral Q4H PRN Christian Wheeler MD      acetaminophen  975 mg Oral Q6H PRN Anuradha Marti, CRNP      ALPRAZolam  1 mg Oral HS Christian Wheeler MD      aluminum-magnesium hydroxide-simethicone  15 mL Oral Q4H PRN Anuradha GIBSON Lodics, CRNP      benztropine  1 mg Intramuscular Q6H PRN Anuradha Greyics, CRNP      benztropine  1 mg Oral Q6H PRN Anuradha Greyics, CRNP      haloperidol  5 mg Oral Q6H PRN Anuradha GIBSON Lodics, CRNP      haloperidol lactate  5 mg Intramuscular Q6H PRN Anuradha Greyics, CRNP      hydrOXYzine HCL  25 mg Oral Q6H PRN Anuradha Greyics, CRNP      hydrOXYzine HCL  25 mg Oral TID Anuradha Greyics, CRNP      hydrOXYzine HCL  50 mg Oral Q4H PRN Zulaythalia Greyics, CRNP      linaCLOtide  290 mcg Oral Daily Before Breakfast Susana SAMIA Tyson      lisinopril  10 mg Oral Daily Lena Deleon MD      LORazepam  2 mg Intramuscular Q4H PRN Anuradha GIBSON Lodics, CRNP      magnesium hydroxide  30 mL Oral Daily PRN Anuradha Marti, CRNP      melatonin  12 mg Oral HS Christian Wheeler MD      OLANZapine  10 mg Intramuscular Q3H PRN Zulay Marti, CRNP      pantoprazole  40 mg Oral BID AC Lena Deleon MD      pregabalin  150 mg Oral Daily Gabby Oates MD      risperiDONE  2 mg Oral Q3H PRN Sada Correa MD      rOPINIRole  0 5 mg Oral HS Sada Correa MD      valACYclovir  500 mg Oral Daily Charlie Kruger PA-C      vilazodone  20 mg Oral Daily With Breakfast Sada Correa MD      zolpidem  10 mg Oral HS PRN Sada Correa MD         Risks / Benefits of Treatment:    Risks, benefits, and possible side effects of medications explained to patient and patient verbalizes understanding and agreement for treatment  Counseling / Coordination of Care:      Patient's progress discussed with staff in treatment team meeting  Medications, treatment progress and treatment plan reviewed with patient

## 2020-08-24 VITALS
RESPIRATION RATE: 18 BRPM | HEART RATE: 80 BPM | SYSTOLIC BLOOD PRESSURE: 114 MMHG | OXYGEN SATURATION: 96 % | HEIGHT: 65 IN | BODY MASS INDEX: 33.49 KG/M2 | DIASTOLIC BLOOD PRESSURE: 73 MMHG | WEIGHT: 201 LBS | TEMPERATURE: 98.6 F

## 2020-08-24 PROBLEM — Z00.8 MEDICAL CLEARANCE FOR PSYCHIATRIC ADMISSION: Status: RESOLVED | Noted: 2020-08-18 | Resolved: 2020-08-24

## 2020-08-24 PROCEDURE — 99238 HOSP IP/OBS DSCHRG MGMT 30/<: CPT | Performed by: NURSE PRACTITIONER

## 2020-08-24 RX ORDER — ROPINIROLE 0.5 MG/1
0.5 TABLET, FILM COATED ORAL
Qty: 30 TABLET | Refills: 0 | Status: ON HOLD | OUTPATIENT
Start: 2020-08-24 | End: 2021-06-02 | Stop reason: ALTCHOICE

## 2020-08-24 RX ORDER — HYDROXYZINE HYDROCHLORIDE 25 MG/1
25 TABLET, FILM COATED ORAL 3 TIMES DAILY
Qty: 30 TABLET | Refills: 0 | Status: ON HOLD | OUTPATIENT
Start: 2020-08-24 | End: 2021-06-02 | Stop reason: ALTCHOICE

## 2020-08-24 RX ORDER — ALPRAZOLAM 1 MG/1
1 TABLET ORAL
Qty: 10 TABLET | Refills: 0 | Status: SHIPPED | OUTPATIENT
Start: 2020-08-24 | End: 2021-02-22 | Stop reason: SDUPTHER

## 2020-08-24 RX ORDER — ALPRAZOLAM 1 MG/1
1 TABLET ORAL
Qty: 30 TABLET | Refills: 0 | Status: SHIPPED | OUTPATIENT
Start: 2020-08-24 | End: 2020-08-24

## 2020-08-24 RX ORDER — CHLORHEXIDINE GLUCONATE 4 %
12 LIQUID (ML) TOPICAL
Qty: 30 TABLET | Refills: 0 | Status: SHIPPED | OUTPATIENT
Start: 2020-08-24 | End: 2020-09-23

## 2020-08-24 RX ADMIN — VALACYCLOVIR HYDROCHLORIDE 500 MG: 500 TABLET, FILM COATED ORAL at 08:11

## 2020-08-24 RX ADMIN — LINACLOTIDE 1 CAPSULE: 290 CAPSULE, GELATIN COATED ORAL at 06:31

## 2020-08-24 RX ADMIN — LISINOPRIL 10 MG: 10 TABLET ORAL at 08:11

## 2020-08-24 RX ADMIN — PANTOPRAZOLE SODIUM 40 MG: 40 TABLET, DELAYED RELEASE ORAL at 06:30

## 2020-08-24 RX ADMIN — HYDROXYZINE HYDROCHLORIDE 25 MG: 25 TABLET, FILM COATED ORAL at 08:11

## 2020-08-24 RX ADMIN — VILAZODONE HYDROCHLORIDE 20 MG: 10 TABLET ORAL at 08:11

## 2020-08-24 RX ADMIN — PREGABALIN 150 MG: 75 CAPSULE ORAL at 08:11

## 2020-08-24 NOTE — DISCHARGE INSTRUCTIONS
Anxiety   WHAT YOU SHOULD KNOW:   Anxiety is a condition that causes you to feel excessive worry, uneasiness, or fear  Family or work stress, smoking, caffeine, and alcohol can increase your risk for anxiety  Certain medicines or health conditions can also increase your risk  Anxiety may begin gradually, and can become a long-term condition if it is not managed or treated  AFTER YOU LEAVE:   Medicines:   · Medicines  can help you feel more calm and relaxed, and decrease your symptoms  · Take your medicine as directed  Contact your healthcare provider if you think your medicine is not helping or if you have side effects  Tell him if you are allergic to any medicine  Keep a list of the medicines, vitamins, and herbs you take  Include the amounts, and when and why you take them  Bring the list or the pill bottles to follow-up visits  Carry your medicine list with you in case of an emergency  Follow up with your healthcare provider within 2 weeks or as directed:  Write down your questions so you remember to ask them during your visits  Manage anxiety:   · Go to counseling as directed  Cognitive behavioral therapy can help you understand and change how you react to events that trigger your symptoms  · Find ways to manage your symptoms  Activities such as exercise, meditation, or listening to music can help you relax  · Practice deep breathing  Breathing can change how your body reacts to stress  Focus on taking slow, deep breaths several times a day, or during an anxiety attack  Breathe in through your nose, and out through your mouth  · Avoid caffeine  Caffeine can make your symptoms worse  Avoid foods or drinks that are meant to increase your energy level  · Limit or avoid alcohol  Ask your healthcare provider if alcohol is safe for you  You may not be able to drink alcohol if you take certain anxiety or depression medicines  Limit alcohol to 1 drink per day if you are a woman  Limit alcohol to 2 drinks per day if you are a man  A drink of alcohol is 12 ounces of beer, 5 ounces of wine, or 1½ ounces of liquor  Contact your healthcare provider if:   · Your symptoms get worse or do not get better with treatment  · You think your medicine may be causing side effects  · Your anxiety keeps you from doing your regular daily activities  · You have new symptoms since your last visit  · You have questions or concerns about your condition or care  Seek care immediately or call 911 if:   · You have chest pain, tightness, or heaviness that may spread to your shoulders, arms, jaw, neck, or back  · You feel like hurting yourself or someone else  · You feel dizzy, lightheaded, or faint  © 2014 [employer] [employer] is for End User's use only and may not be sold, redistributed or otherwise used for commercial purposes  All illustrations and images included in CareNotes® are the copyrighted property of [employer]  or Dain Wahl  The above information is an  only  It is not intended as medical advice for individual conditions or treatments  Talk to your doctor, nurse or pharmacist before following any medical regimen to see if it is safe and effective for you  Depression   WHAT YOU NEED TO KNOW:   Depression is a medical condition that causes feelings of sadness or hopelessness that do not go away  Depression may cause you to lose interest in things you used to enjoy  These feelings may interfere with your daily life  DISCHARGE INSTRUCTIONS:   Call 911 for any of the following:   · You think about harming yourself or someone else  Contact your healthcare provider if:   · Your symptoms do not improve  · You cannot make it to your next appointment  · You have new symptoms  · You have questions or concerns about your condition or care  Medicines:   · Antidepressants  may be given to improve or balance your mood   You may need to take this medicine for several weeks before you begin to feel better  · Take your medicine as directed  Contact your healthcare provider if you think your medicine is not helping or if you have side effects  Tell him of her if you are allergic to any medicine  Keep a list of the medicines, vitamins, and herbs you take  Include the amounts, and when and why you take them  Bring the list or the pill bottles to follow-up visits  Carry your medicine list with you in case of an emergency  Therapy  may be used to treat your depression  A therapist will help you learn to cope with your thoughts and feelings  This can be done alone or in a group  It may also be done with family members or a significant other  Self-care:   · Get regular physical activity  Try to exercise for 30 minutes, 3 to 5 days a week  Work with your healthcare provider to develop an exercise plan that you enjoy  Physical activity may improve your symptoms  · Get enough sleep  Create a routine to help you relax before bed  You can listen to music, read, or do yoga  Try to go to bed and wake up at the same time every day  Sleep is important for emotional health  · Eat a variety of healthy foods from all of the food groups  A healthy meal plan is low in fat, salt, and added sugar  Ask your healthcare provider for more information about a meal plan that is right for you  · Do not drink alcohol or use drugs  Alcohol and drugs can make your symptoms worse  Follow up with your healthcare provider as directed: Your healthcare provider will monitor your progress at follow-up visits  He or she will also monitor your medicine if you take antidepressants  Your healthcare provider will ask if the medicine is helping  Tell him or her about any side effects or problems you may have with your medicine  The type or amount of medicine may need to be changed  Write down your questions so you remember to ask them during your visits    © 2017 [facility] [facility] Information is for End User's use only and may not be sold, redistributed or otherwise used for commercial purposes  All illustrations and images included in CareNotes® are the copyrighted property of [employer]  or [employer]ain Wahl  The above information is an  only  It is not intended as medical advice for individual conditions or treatments  Talk to your doctor, nurse or pharmacist before following any medical regimen to see if it is safe and effective for you  Alprazolam (By mouth)   Alprazolam ([id number])  Treats anxiety and panic disorder  Brand Name(s): ALPRAZolam Intensol, Niravam, Xanax, Xanax XR   There may be other brand names for this medicine  When This Medicine Should Not Be Used: This medicine is not right for everyone  Do not use it if you had an allergic reaction to alprazolam or similar medicines, or if you are pregnant or breastfeeding, or you have narrow-angle glaucoma  How to Use This Medicine:   Liquid, Tablet, Dissolving Tablet, Long Acting Tablet  · Take your medicine as directed  Your dose may need to be changed several times to find what works best for you  · Disintegrating tablet: Dry your hands before you handle the tablet  Place the tablet on your tongue and let it dissolve  · Extended-release tablet: Swallow the extended-release tablet whole  Do not crush, break, or chew it  · Oral liquid: Measure the oral liquid medicine with a marked measuring spoon, oral syringe, or medicine cup  · This medicine should come with a Medication Guide  Ask your pharmacist for a copy if you do not have one  · Missed dose: Take a dose as soon as you remember  If it is almost time for your next dose, wait until then and take a regular dose  Do not take extra medicine to make up for a missed dose  · Store the medicine in a closed container at room temperature, away from heat, moisture, and direct light   Protect the orally disintegrating tablets from moisture  Throw away any cotton that was in the bottle and reseal it tightly after each use  Drugs and Foods to Avoid:   Ask your doctor or pharmacist before using any other medicine, including over-the-counter medicines, vitamins, and herbal products  · Do not use this medicine if you are also using ketoconazole or itraconazole  · Some foods and medicines can affect how alprazolam works  Tell your doctor if you are using any of the following:  ¨ Amiodarone, carbamazepine, clarithromycin, cimetidine, cyclosporine, desipramine, diltiazem, ergotamine, erythromycin, fluconazole, fluoxetine, fluvoxamine, imipramine, isoniazid, nefazodone, nicardipine, nifedipine, paroxetine, propoxyphene, sertraline, or theophylline  ¨ Birth control pills  ¨ Seizure medicine  · Tell your doctor if you use anything else that makes you sleepy  Some examples are allergy medicine, narcotic pain medicine, and alcohol  · Do not drink alcohol while you are using this medicine  · Do not eat grapefruit or drink grapefruit juice while you are using this medicine  Warnings While Using This Medicine:   · It is not safe to take this medicine during pregnancy  It could harm an unborn baby  Tell your doctor right away if you become pregnant  · Tell your doctor if you have kidney disease, liver disease, glaucoma, lung problems, or a history of drug or alcohol abuse, depression, mental health problems, or seizures  · This medicine may cause the following problems:  ¨ Respiratory depression (serious breathing problem that can be life-threatening), when used with narcotic pain medicines  · This medicine can increase thoughts of suicide  Tell your doctor right away if you start to feel depressed and have thoughts about hurting yourself  · This medicine may make you dizzy or drowsy  Do not drive or do anything else that could be dangerous until you know how this medicine affects you    · This medicine can be habit-forming  Do not use more than your prescribed dose  Call your doctor if you think your medicine is not working  · Do not stop using this medicine suddenly  Your doctor will need to slowly decrease your dose before you stop it completely  · Your doctor will do lab tests at regular visits to check on the effects of this medicine  Keep all appointments  · Keep all medicine out of the reach of children  Never share your medicine with anyone  Possible Side Effects While Using This Medicine:   Call your doctor right away if you notice any of these side effects:  · Allergic reaction: Itching or hives, swelling in your face or hands, swelling or tingling in your mouth or throat, chest tightness, trouble breathing  · Blistering, peeling, red skin rash  · Blue lips, fingernails, or skin  · Confusion, lightheadedness, dizziness, problems with coordination or memory  · Extreme drowsiness or weakness, slow heartbeat, trouble breathing  · Seizure  If you notice these less serious side effects, talk with your doctor:   · Change in appetite or weight  · Constipation  If you notice other side effects that you think are caused by this medicine, tell your doctor  Call your doctor for medical advice about side effects  You may report side effects to FDA at 2-914-FDA-7317  © 2017 [address] [employer] Information is for End User's use only and may not be sold, redistributed or otherwise used for commercial purposes  The above information is an  only  It is not intended as medical advice for individual conditions or treatments  Talk to your doctor, nurse or pharmacist before following any medical regimen to see if it is safe and effective for you

## 2020-08-24 NOTE — PROGRESS NOTES
Patient c/o increased anxiety at the start of the shift, medicated with prn atarax with mild relief  States she is looking forward to discharge tomorrow but has a lot to do which is causing the anxiety  Denies SI/HI  Med compliant  Will continue to observe

## 2020-08-24 NOTE — DISCHARGE SUMMARY
Discharge Summary - [address] [facility] 45 y o  female MRN: [id number]  Unit/Bed#: Sherlyn Tello 222-01 Encounter: [id number]     Admission Date: 8/18/2020         Discharge Date: 8/24/2020 11:55 AM    Attending Psychiatrist: Ibis Durand MD    Reason for Admission/HPI:     Principal Problem:    Bipolar 1 disorder, depressed, severe ([facility] )  Active Problems:    Migraine without aura and without status migrainosus, not intractable    Lightheaded    Medical clearance for psychiatric admission    Borderline personality disorder ([facility] )    Essential hypertension    Gastroesophageal reflux disease without esophagitis    Amparo is a 62-year-old female patient admitted to the [address] [place] [address] unit on a voluntary 201 commitment basis due to depression with suicidal ideation  Per crisis evaluation completed by Crisis Worker Ayleen Rosales:    Patient arrived to ER via private vehicle  Patient presents as increasingly depressed and anxious  Patient maintained eye contact throughout assessment and speech was within normal limits  Patient reports SI with plan to take all of her BP medications to stop her heart or to lay on the train tracks " I could never do it, Im just overwhelmed and those are the things that cross my mind " Patient states she had a lot of big changes over the last few months, a recent break up (was to be  next month),a new job, and bought her own house  Patient reports some disturbances with sleep and lack of concentration and motivation  Patient reports compliance with medications as well as outpatient providers      Per initial psychiatric evaluation completed by Dr Ibis Durand:    Patient is a 45 y o  female presents with extensive psychiatric history and numerous psychiatric admissions including multiple [facility] stays  Patient was diagnosed with bipolar illness, borderline personality disorder, eating disorder and panic disorder    Patient reports there were a lot of stressors this year that are making her very depressed and suicidal   She reports that she was living with her boyfriend but they broke up in April and she moved back with her parents which was stressful for her  Patient then got a new job and moved to her own place but was over 1 from the job and starting getting more depressed  Patient feels overwhelmed, very irritable, having a lot of mood swings and frequent suicidal thoughts  She has multiple plans but no acts of furtherance  She reports her last suicide attempt was in 2014  She reports she had a serious OD in 2008 and stayed in the ICU for weeks  Patient reports that she gets prescribed Xanax, and Ambien to good sleep and for her panic attacks  She reports that she has been on them for years and is unable to sleep without them  Patient reports having multiple sleep studies but nothing came out of them and she does not have sleep apnea  Patient reports getting Lyrica for her fibromyalgia  Patient denies any substance use  Amparo is in extensive psychiatric history of bipolar disorder as well as borderline personality disorder  She also suffers from panic disorder  She has been admitted to an adult inpatient psychiatric unit several times  She is well known to this facility  She has been doing very well for several years but has recently experienced a lot of stressors  Hospital Course: The patient was admitted to the inpatient psychiatric unit and started on every 7 minutes precautions  During the hospitalization the patient was attending individual therapy, group therapy, milieu therapy and occupational therapy  Psychiatric medications were titrated over the hospital stay  Due to daytime sedation and sleepiness, her Ambien was discontinued  Her Xanax was decreased to 1 mg q h s  With instructions to wean  Atarax 25 mg t i d  Was added for anxiety  Her Viibryd was increased to 20 mg daily for depression    Her other home medications were continued as prescribed  Amparo feels she benefited from the therapeutic milieu of the unit  She was able to work out some of the issues that were causing her stress including discontinuing her expensive lease, quitting her job in order to find a less stressful position, and moving in with her boyfriend  She did not present any dangerous behavior on the unit  Her insight and judgment remained good  Patient's symptoms improved gradually over the hospital course  At the end of treatment the patient was doing well  Mood was stable at the time of discharge  The patient denied suicidal ideation, intent or plan at the time of discharge and denied homicidal ideation, intent or plan at the time of discharge  There was no overt psychosis at the time of discharge  Sleep and appetite were improved  The patient was tolerating medications and was not reporting any significant side effects at the time of discharge  Since the patient was doing well at the end of the hospitalization, treatment team felt that the patient could be safely discharged to outpatient care  Since Amparo was doing well at the end of the hospitalization, treatment team felt that she could be safely discharged to outpatient care  The outpatient follow up was arranged by the unit  upon discharge and is as follows:    Call Dr Devan Aragon at [address]   Please call to schedule an appointment with your primary care physician, as needed    [place], [address]   Tel:  [phone]   Fax:  [phone]     Aug24 Follow up with Florence Feliz at [facility]   Monday Aug 24, 2020   Please promptly answer your therapist's call to you, scheduled for 8:00 am   [address] [place] [address], [address]   Tel:  [phone]   Fax:  [phone]    Aug26 Follow up with Sabas Ni at [facility]   Wednesday Aug 26, 2020   Please promptly answer the call from your targeted  (TCM), scheduled at 9:00 am   [address] [facility]   [place], [address]   Tel:  [phone]   Fax: [phone] Follow up with LEATHA Dodson,at [address] [facility]   Monday Aug 31, 2020   Please promptly answer the call from your psychiatric medication managment provider, scheduled for 3:15 pm   [address] [facility]   [place], [address]   Tel:  [phone]   Fax:  [phone]       [address] [place] [address]   [place]phy Short, [address]   Tel:  [phone]   Fax:  [phone]           Sep2 ECHO COMPLETE WITH CONTRAST IF INDICATED   Wednesday Sep 2, 2020 7:45 AM   There is no prep required  Please bring your insurance cards, a form of photo ID and a list of your medications with you  Arrive 15 minutes prior to your appointment time in order to register      To schedule this appointment, please contact Central Scheduling at [phone] Cardiology   [address] [place] [id number] [phone]            HOLTER   Wednesday Sep 2, 2020 8:20 AM Reggie Rota by 8:05 AM)  [address] [place] [id number] [phone]    Sep28 OFFICE VISIT with Carolina Porras PA-C   Monday Sep 28, 2020 9:20 AM (Arrive by 9:05 AM)  [address] [place] [address] Sean Snyder   [phone]        Mental Status at time of Discharge:     Appearance:  casually dressed   Behavior:  normal   Speech:  normal pitch and normal volume   Mood:  normal   Affect:  normal   Thought Process:  normal   Thought Content:  normal   Perceptual Disturbances: None   Risk Potential: Patient denies any suicidal or homicidal ideation     Sensorium:  person, place, time/date and situation   Cognition:  recent and remote memory grossly intact   Consciousness:  alert and awake    Attention: attention span and concentration were age appropriate Insight:  good   Judgment: good   Gait/Station: normal gait/station and normal balance   Motor Activity: no abnormal movements     Admission Diagnosis: Anxiety [F41 9]  Migraine without aura and without status migrainosus, not intractable [G43 009]  Suicidal ideations [R45 851]  Psychiatric complaint [F69]  Depression [F32 9]    Discharge Diagnosis:   Principal Problem:    Bipolar 1 disorder, depressed, severe ([facility] )  Active Problems:    Migraine without aura and without status migrainosus, not intractable    Lightheaded    Medical clearance for psychiatric admission    Borderline personality disorder ([facility] )    Essential hypertension    Gastroesophageal reflux disease without esophagitis  Resolved Problems:    * No resolved hospital problems   *        Lab results:  Admission on 08/18/2020, Discharged on 08/24/2020   Component Date Value    Amph/Meth UR 08/18/2020 Negative     Barbiturate Ur 08/18/2020 Negative     Benzodiazepine Urine 08/18/2020 Positive*    Cocaine Urine 08/18/2020 Negative     Methadone Urine 08/18/2020 Negative     Opiate Urine 08/18/2020 Negative     PCP Ur 08/18/2020 Negative     THC Urine 08/18/2020 Negative     Oxycodone Urine 08/18/2020 Negative     EXT PREG TEST UR (Ref: N* 08/18/2020 negative     Control 08/18/2020 negative     EXTBreath Alcohol 08/18/2020 0 000     SARS-CoV-2 08/18/2020 Negative     Cholesterol 08/20/2020 140     Triglycerides 08/20/2020 129     HDL, Direct 08/20/2020 50     LDL Calculated 08/20/2020 64     Non-HDL-Chol (CHOL-HDL) 08/20/2020 90     WBC 08/20/2020 10 10     RBC 08/20/2020 4 80     Hemoglobin 08/20/2020 15 5     Hematocrit 08/20/2020 45 4     MCV 08/20/2020 95     MCH 08/20/2020 32 2     MCHC 08/20/2020 34 1     RDW 08/20/2020 14 5     MPV 08/20/2020 7 9*    Platelets 13/30/8871 239     Neutrophils Relative 08/20/2020 61     Lymphocytes Relative 08/20/2020 28     Monocytes Relative 08/20/2020 9     Eosinophils Relative 08/20/2020 1     Basophils Relative 08/20/2020 1     Neutrophils Absolute 08/20/2020 6 20     Lymphocytes Absolute 08/20/2020 2 80     Monocytes Absolute 08/20/2020 0 90     Eosinophils Absolute 08/20/2020 0 10     Basophils Absolute 08/20/2020 0 10     TSH 3RD GENERATON 08/20/2020 1 260     Sodium 08/20/2020 139     Potassium 08/20/2020 3 8     Chloride 08/20/2020 106     CO2 08/20/2020 25     ANION GAP 08/20/2020 8     BUN 08/20/2020 14     Creatinine 08/20/2020 0 72     Glucose 08/20/2020 97     Glucose, Fasting 08/20/2020 97     Calcium 08/20/2020 9 0     AST 08/20/2020 9*    ALT 08/20/2020 18     Alkaline Phosphatase 08/20/2020 45     Total Protein 08/20/2020 6 2*    Albumin 08/20/2020 3 8     Total Bilirubin 08/20/2020 0 40     eGFR 08/20/2020 104        Discharge Medications:  Discharge Medication List as of 8/24/2020 11:06 AM      START taking these medications    Details   hydrOXYzine HCL (ATARAX) 25 mg tablet Take 1 tablet (25 mg total) by mouth 3 (three) times a day, Starting Mon 8/24/2020, Normal      melatonin 12 MG TABS Take 12 mg by mouth daily at bedtime, Starting Mon 8/24/2020, Until Wed 9/23/2020, Normal      rOPINIRole (REQUIP) 0 5 mg tablet Take 1 tablet (0 5 mg total) by mouth daily at bedtime, Starting Mon 8/24/2020, Until Wed 9/23/2020, Normal            Discharge Medication List as of 8/24/2020 11:06 AM      STOP taking these medications       cariprazine (Vraylar) 3 MG capsule Comments:   Reason for Stopping:         zolpidem (AMBIEN) 10 mg tablet Comments:   Reason for Stopping:              Discharge Medication List as of 8/24/2020 11:06 AM      CONTINUE these medications which have CHANGED    Details   ALPRAZolam (XANAX) 1 mg tablet Take 1 tablet (1 mg total) by mouth daily at bedtime After 3 weeks, decrease to 1/2 tablet before bed , Starting Mon 8/24/2020, Until Wed 9/23/2020, Normal            Discharge Medication List as of 8/24/2020 11:06 AM      CONTINUE these medications which have NOT CHANGED    Details   linaCLOtide (LINZESS PO) Take 290 mcg by mouth daily before breakfast, Historical Med      lisinopril (ZESTRIL) 10 mg tablet Take 10 mg by mouth daily, Historical Med      medroxyPROGESTERone (DEPO-PROVERA) 150 mg/mL injection medroxyprogesterone 150 mg/mL intramuscular suspension, Historical Med      ondansetron (ZOFRAN-ODT) 4 mg disintegrating tablet Take 1 tablet (4 mg total) by mouth every 8 (eight) hours as needed for nausea or vomiting, Starting Thu 4/11/2019, Print      pantoprazole (PROTONIX) 40 mg tablet Take 40 mg by mouth 2 (two) times a day  , Historical Med      pregabalin (LYRICA) 150 mg capsule Take 150 mg by mouth daily Daily at 1600, Historical Med      valACYclovir (VALTREX) 500 mg tablet Take 500 mg by mouth daily, Historical Med      vilazodone (VIIBRYD) 10 mg tablet Take 1 tablet (10 mg total) by mouth daily with breakfast, Starting Wed 3/11/2020, Normal      PROAIR  (90 Base) MCG/ACT inhaler Inhale every 4 (four) hours as needed (as needed)  , Starting Mon 1/22/2018, Historical Med              Discharge instructions/Information to patient and family:   See after visit summary for information provided to patient and family  Provisions for Follow-Up Care:  See after visit summary for information related to follow-up care and any pertinent home health orders  Discharge Statement   I spent 30 minutes discharging the patient  This time was spent on the day of discharge  I had direct contact with the patient on the day of discharge  Additional documentation is required if more than 30 minutes were spent on discharge

## 2020-08-24 NOTE — PROGRESS NOTES
Patient visible on the unit  Has increased anxiety due to discharge and " so much to do when I am discharged"  Feels ready for discharge  Bright  Rated anxiety #7  Denies depression,SI,HI, or hallucinations  Medication compliant  Q7 minute safety checks maintained

## 2020-08-24 NOTE — NURSING NOTE
Patient discharged off the unit  All belongings,medications, and discharge instructions sent with patient

## 2020-08-24 NOTE — PLAN OF CARE
Problem: Alteration in Thoughts and Perception  Goal: Treatment Goal: Gain control of psychotic behaviors/thinking, reduce/eliminate presenting symptoms and demonstrate improved reality functioning upon discharge  Outcome: Completed  Goal: Verbalize thoughts and feelings  Description: Interventions:  - Promote a nonjudgmental and trusting relationship with the patient through active listening and therapeutic communication  - Assess patient's level of functioning, behavior and potential for risk  - Engage patient in 1 on 1 interactions  - Encourage patient to express fears, feelings, frustrations, and discuss symptoms    - [place] patient to reality, help patient recognize reality-based thinking   - Administer medications as ordered and assess for potential side effects  - Provide the patient education related to the signs and symptoms of the illness and desired effects of prescribed medications  Interventions:  - Assess and re-assess patient's level of risk   - Engage patient in 1:1 interactions, daily, for a minimum of 15 minutes   - Encourage patient to express feelings, fears, frustrations, hopes   Outcome: Completed  Goal: Refrain from acting on delusional thinking/internal stimuli  Description: Interventions:  - Monitor patient closely, per order   - Utilize least restrictive measures   - Set reasonable limits, give positive feedback for acceptable   - Administer medications as ordered and monitor of potential side effects  Outcome: Completed  Goal: Agree to be compliant with medication regime, as prescribed and report medication side effects  Description: Interventions:  - Offer appropriate PRN medication and supervise ingestion; conduct AIMS, as needed   Outcome: Completed  Goal: Attend and participate in unit activities, including therapeutic, recreational, and educational groups  Description: Interventions:  -Encourage Visitation and family involvement in care  Interventions:  - Provide therapeutic and educational activities daily, encourage attendance and participation, and document same in the medical record   Outcome: Completed  Goal: Recognize dysfunctional thoughts, communicate reality-based thoughts at the time of discharge  Description: Interventions:  - Provide medication and psycho-education to assist patient in compliance and developing insight into his/her illness   Outcome: Completed  Goal: Complete daily ADLs, including personal hygiene independently, as able  Description: Interventions:  - Observe, teach, and assist patient with ADLS  - Monitor and promote a balance of rest/activity, with adequate nutrition and elimination   Interventions:  - Observe, teach, and assist patient with ADLS  -  Monitor and promote a balance of rest/activity, with adequate nutrition and elimination   Outcome: Completed     Problem: Ineffective Coping  Goal: Cooperates with admission process  Description: Interventions:   - Complete admission process  Outcome: Completed  Goal: Identifies ineffective coping skills  Outcome: Completed  Goal: Identifies healthy coping skills  Outcome: Completed  Goal: Demonstrates healthy coping skills  Outcome: Completed  Goal: Participates in unit activities  Description: Interventions:  - Provide therapeutic environment   - Provide required programming   - Redirect inappropriate behaviors   Outcome: Completed  Goal: Patient/Family participate in treatment and DC plans  Description: Interventions:  - Provide therapeutic environment  Outcome: Completed  Goal: Patient/Family verbalizes awareness of resources  Outcome: Completed  Goal: Understands least restrictive measures  Description: Interventions:  - Utilize least restrictive behavior  Outcome: Completed  Goal: Free from restraint events  Description: - Utilize least restrictive measures   - Provide behavioral interventions   - Redirect inappropriate behaviors   Outcome: Completed     Problem: Risk for Self Injury/Neglect  Goal: Treatment Goal: Remain safe during length of stay, learn and adopt new coping skills, and be free of self-injurious ideation, impulses and acts at the time of discharge  Outcome: Completed  Goal: Verbalize thoughts and feelings  Description: Interventions:  - Assess and re-assess patient's lethality and potential for self-injury  - Engage patient in 1:1 interactions, daily, for a minimum of 15 minutes  - Encourage patient to express feelings, fears, frustrations, hopes  - Establish rapport/trust with patient   Outcome: Completed  Goal: Refrain from harming self  Description: Interventions:  - Monitor patient closely, per order  - Develop a trusting relationship  - Supervise medication ingestion, monitor effects and side effects   Outcome: Completed  Goal: Attend and participate in unit activities, including therapeutic, recreational, and educational groups  Description: Interventions:  - Provide therapeutic and educational activities daily, encourage attendance and participation, and document same in the medical record  - Obtain collateral information, encourage visitation and family involvement in care   Outcome: Completed  Goal: Recognize maladaptive responses and adopt new coping mechanisms  Outcome: Completed  Goal: Complete daily ADLs, including personal hygiene independently, as able  Description: Interventions:  - Observe, teach, and assist patient with ADLS  - Monitor and promote a balance of rest/activity, with adequate nutrition and elimination  Outcome: Completed     Problem: Anxiety  Goal: Anxiety is at manageable level  Description: Interventions:  - Assess and monitor patient's anxiety level  - Monitor for signs and symptoms (heart palpitations, chest pain, shortness of breath, headaches, nausea, feeling jumpy, restlessness, irritable, apprehensive)  - Collaborate with interdisciplinary team and initiate plan and interventions as ordered    - [place] patient to unit/surroundings  - Explain treatment plan  - Encourage participation in care  - Encourage verbalization of concerns/fears  - Identify coping mechanisms  - Assist in developing anxiety-reducing skills  - Administer/offer alternative therapies  - Limit or eliminate stimulants  Outcome: Completed     Problem: Ineffective Coping  Goal: Participates in unit activities  Description: Interventions:  - Provide therapeutic environment   - Provide required programming   - Redirect inappropriate behaviors   Outcome: Completed     Problem: Depression  Goal: Verbalize thoughts and feelings  Description: Interventions:  - Promote a nonjudgmental and trusting relationship with the patient through active listening and therapeutic communication  - Assess patient's level of functioning, behavior and potential for risk  - Engage patient in 1 on 1 interactions  - Encourage patient to express fears, feelings, frustrations, and discuss symptoms    - [place] patient to reality, help patient recognize reality-based thinking   - Administer medications as ordered and assess for potential side effects  - Provide the patient education related to the signs and symptoms of the illness and desired effects of prescribed medications  Interventions:  - Assess and re-assess patient's level of risk   - Engage patient in 1:1 interactions, daily, for a minimum of 15 minutes   - Encourage patient to express feelings, fears, frustrations, hopes   Outcome: Completed  Goal: Attend and participate in unit activities, including therapeutic, recreational, and educational groups  Description: Interventions:  -Encourage Visitation and family involvement in care  Interventions:  - Provide therapeutic and educational activities daily, encourage attendance and participation, and document same in the medical record   Outcome: Completed  Goal: Complete daily ADLs, including personal hygiene independently, as able  Description: Interventions:  - Observe, teach, and assist patient with ADLS  - Monitor and promote a balance of rest/activity, with adequate nutrition and elimination   Interventions:  - Observe, teach, and assist patient with ADLS  -  Monitor and promote a balance of rest/activity, with adequate nutrition and elimination   Outcome: Completed  Goal: Treatment Goal: Demonstrate behavioral control of depressive symptoms, verbalize feelings of improved mood/affect, and adopt new coping skills prior to discharge  Outcome: Completed  Goal: Refrain from harming self  Description: Interventions:  - Monitor patient closely, per order   - Supervise medication ingestion, monitor effects and side effects   Outcome: Completed  Goal: Refrain from isolation  Description: Interventions:  - Develop a trusting relationship   - Encourage socialization   Outcome: Completed  Goal: Refrain from self-neglect  Outcome: Completed     Problem: Depression  Goal: Verbalize thoughts and feelings  Description: Interventions:  - Promote a nonjudgmental and trusting relationship with the patient through active listening and therapeutic communication  - Assess patient's level of functioning, behavior and potential for risk  - Engage patient in 1 on 1 interactions  - Encourage patient to express fears, feelings, frustrations, and discuss symptoms    - [place] patient to reality, help patient recognize reality-based thinking   - Administer medications as ordered and assess for potential side effects  - Provide the patient education related to the signs and symptoms of the illness and desired effects of prescribed medications  Interventions:  - Assess and re-assess patient's level of risk   - Engage patient in 1:1 interactions, daily, for a minimum of 15 minutes   - Encourage patient to express feelings, fears, frustrations, hopes   Outcome: Completed  Goal: Attend and participate in unit activities, including therapeutic, recreational, and educational groups  Description: Interventions:  -Encourage Visitation and family involvement in care  Interventions:  - Provide therapeutic and educational activities daily, encourage attendance and participation, and document same in the medical record   Outcome: Completed  Goal: Complete daily ADLs, including personal hygiene independently, as able  Description: Interventions:  - Observe, teach, and assist patient with ADLS  - Monitor and promote a balance of rest/activity, with adequate nutrition and elimination   Interventions:  - Observe, teach, and assist patient with ADLS  -  Monitor and promote a balance of rest/activity, with adequate nutrition and elimination   Outcome: Completed  Goal: Treatment Goal: Demonstrate behavioral control of depressive symptoms, verbalize feelings of improved mood/affect, and adopt new coping skills prior to discharge  Outcome: Completed  Goal: Refrain from harming self  Description: Interventions:  - Monitor patient closely, per order   - Supervise medication ingestion, monitor effects and side effects   Outcome: Completed  Goal: Refrain from isolation  Description: Interventions:  - Develop a trusting relationship   - Encourage socialization   Outcome: Completed  Goal: Refrain from self-neglect  Outcome: Completed     Problem: DISCHARGE PLANNING - CARE MANAGEMENT  Goal: Discharge to post-acute care or home with appropriate resources  Description: INTERVENTIONS:  - Conduct assessment to determine patient/family and health care team treatment goals, and need for post-acute services based on payer coverage, community resources, and patient preferences, and barriers to discharge  - Address psychosocial, clinical, and financial barriers to discharge as identified in assessment in conjunction with the patient/family and health care team  - Arrange appropriate level of post-acute services according to patients   needs and preference and payer coverage in collaboration with the physician and health care team  - Communicate with and update the patient/family, physician, and health care team regarding progress on the discharge plan  - Arrange appropriate transportation to post-acute venues  Outcome: Completed

## 2020-08-24 NOTE — PROGRESS NOTES
Patient has been sleeping well through the night  No changes or problems  Continuous safety checks maintained

## 2020-08-24 NOTE — DISCHARGE INSTR - APPOINTMENTS
The treatment recommends that you continue receipt of ongoing psychiatric medication management, individual therapy, and targeted case management sevices with your current providers  A telehealth phone appointment has been scheduled in your behalf on Monday, August 31, 2020 at 8:00 am, with your therapist, Alex Glover, from [address] [place] [address], [address] [place] [address], [place], [address]; Phone: [phone]; [phone]  Please promptly answer Jillian's call to you  Your summary of care will be faxed to this provider for continuity of care  A telehealth phone appointment has been scheduled in your behalf on Monday, August 31, 2020 3:15 pm, with your psychiatric medication management provider, Jair Mathur , from [address] [place] [address], [address] [place] [address], [place], [address]; Phone: [phone]; [phone]  Please promptly answer Jillian's call  Your summary of care will be faxed to this provider for continuity of care  A telehealth phone appointment has been scheduled in your behalf on Wednesday, August 26, 2020 at 9:00 am, with your targeted , (TCM), Grace Torres, from [facility], [address] [place] [address], [place], [address]; Phone:  [phone]; Fax:  [phone]  Please promptly answer Nayeli's call to you  Your summary of care will be faxed to this provider for continuity of care  You declined that an appointment be scheduled in your behalf with your primary care physician, Dr Gucci Rod, [address], [address], [place], [address]; Phone:  [phone]; Fax:  [phone]  At your request, your summary of care will be faxed to this provider for continuity of care  Albania Truong RN, our [employer], will be calling you after your discharge, on the phone number that you provided  She will be available as an additional support, if needed     If you wish to speak with her, you may contact Tyrell Perez at [phone]

## 2020-08-24 NOTE — PROGRESS NOTES
08/24/20 0900   Team Meeting   Meeting Type Daily Rounds   Initial Conference Date 08/24/20   Team Members Present   Team Members Present Physician;Nurse;   Physician Team Member Jair [facility]   Nursing Team Member Trinity Nguyễn RN   Social Work Team Member CAROLA Cruz; [place], [place]   Patient/Family Present   Patient Present No   Patient's Family Present No     Discharge today   at 1200  Follow up with Pablo outpatient

## 2020-08-24 NOTE — BH TRANSITION RECORD
Contact Information: If you have any questions, concerns, pended studies, tests and/or procedures, or emergencies regarding your inpatient behavioral health visit  Please contact Kat Jonas" [facility] behavioral health unit [phone] and ask to speak to a , nurse or physician  A contact is available 24 hours/ 7 days a week at this number  Summary of Procedures Performed During your Stay:  Below is a list of major procedures performed during your hospital stay and a summary of results:  - No major procedures performed  Pending Studies (From admission, onward)    None        If studies are pending at discharge, follow up with your PCP and/or referring provider

## 2020-08-24 NOTE — DISCHARGE INSTR - OTHER ORDERS
You will discharge home to [facility] [place], [place], [address]; [phone]  Crisis Plan:    Triggers you identified during your hospital stay that led to panic attacks and anxiety included:  Breakup with fiancee; employment difficulties; financial stressors; and increased anxiety and depression  Coping skills you identified during your hospital stay include:  Music, walks, Natan Shells activities, and playing with her cat  After discharge, if you find your coping skills are not effective and you continue to feel distressed, please talk with trusted persons and / or contact your Eth[employer] therapist     If that is not effective and you feel you are a danger to yourself or others, please contact [address] [place] [address]: [phone], [address] [place] [address]:  [phone], Peer Support Talk Line (Seven days a week, 1:00 PM  9:00 PM) Call: [phone] or Text: [phone], Alcohol Anonymous: 511.787.8184, [place] [facility]: [phone], Lou on [id number] [place] [address] ([facility]) [employer]: [phone]/Email: www [employer]  org, or Substance Abuse and Mental Health Services Administration([facility]) [employer], which is a confidential, free, 24-hour-a-day, 365-day-a-year, information service for individuals and family members facing mental health and/or substance use disorders  This service provides referrals to local treatment facilities, support groups, and community-based organizations  Callers can also order free publications and other information  Call 9-144.996.8100/NJVLJ: www [facility] gov    What you need to know aboutcoronavirus disease 2019 (COVID-19)     What is coronavirus disease 2019 (COVID-19)? Coronavirus disease 2019 (COVID-19) is a respiratory illness that can spread from person to person  The virus that causes COVID-19 is a novel coronavirus that was first identified during an investigation into an outbreak in [place], [place]    Can people in [place]  get COVID[place]19? Yes  COVID-19 is spreading from person to person in parts of the [place]  Risk of infection with COVID-19 is higher for people who are close contacts of someone known to have COVID-19, for example healthcare workers, or household members  Other people at higher risk for infection are those who live in or have recently been in an area with ongoing spread of COVID-19  Learn more about places with ongoing spread at   [facility]  html#geographic  Have there been cases of COVID-19 in the [place] ?   Yes  The first case of COVID-19 in the [place] was reported on January 21, 2020  The current count of cases of COVID-19 in the [place] is available on [employer] at [email or web address]  How does COVID-19 spread? The virus that causes COVID-19 probably emerged from an animal source, but is now spreading from person to person  The virus is thought to spread mainly between people who are in close contact with one another (within about 6 feet) through respiratory droplets produced when an infected person coughs or sneezes  It also may be possible that a person can get COVID-19 by touching a surface or object that has the virus on it and then touching their own mouth, nose, or possibly their eyes, but this is not thought to be the main way the virus spreads  Learn what is known about the spread of newly emerged coronaviruses at [facility]  What are the symptoms of COVID-19? Patients with COVID-19 have had mild to severe respiratory illness with symptoms of   fever   cough   shortness of breath  What are severe complications from this virus? Some patients have pneumonia in both lungs, multi-organ failure and in some cases death  How can I help protect myself?    People can help protect themselves from respiratory illness with everyday preventive actions  Avoid close contact with people who are sick  Avoid touching your eyes, nose, and mouth withunwashed hands  Wash your hands often with soap and water for at least 20 seconds  Use an alcohol-based hand  that contains at least 60% alcohol if soap and water are not available  If you are sick, to keep from spreading respiratory illness to others, you should   Stay home when you are sick  Cover your cough or sneeze with a tissue, then throw the tissue in the trash  Clean and disinfect frequently touched objectsand surfaces  What should I do if I recently traveled from an area with ongoing spread of COVID-19? If you have traveled from an affected area, there may be restrictions on your movements for up to 2 weeks  If you develop symptoms during that period (fever, cough, trouble breathing), seek medical advice  Call the office of your health care provider before you go, and tell them about your travel and your symptoms  They will give you instructions on how to get care without exposing other people to your illness  While sick, avoid contact with people, don't go out and delay any travel to reduce the possibility of spreading illness to others  Is there a vaccine? There is currently no vaccine to protect against COVID-19  The best way to prevent infection is to take everyday preventive actions, like avoiding close contact with people who are sick and washing your hands often  Is there a treatment? There is no specific antiviral treatment for COVID-19  People with COVID-19 can seek medical care to helprelieve symptoms  For more information: www cdc gov/NODVH43OD 062543-H 03/03/2020     What to do if you are sick withcoronavirus disease 2019 (COVID-19)     If you are sick with COVID-19 or suspect you are infected with the virus that causes COVID-19, follow the steps below to help prevent the disease from spreading to people in your home and community     Stay home except to get medical care   You should restrict activities outside your home, except for getting medical care  Do not go to work, school, or public areas  Avoid using public transportation, ride-sharing, or taxis  Separate yourself from other people and animals inyour home  People: As much as possible, you should stay in a specific room and away from other people in your home  Also, you should use a separate bathroom, if available  Animals: Do not handle pets or other animals while sick  See COVID-19 and Animals for more information  Call ahead before visiting your doctor   If you have a medical appointment, call the healthcare provider and tell them that you have or may have COVID-19  This will help the healthcare provider's office take steps to keep other people from getting infected or exposed  Wear a facemask  You should wear a facemask when you are around other people (e g , sharing a room or vehicle) or pets and before you enter a healthcare provider's office  If you are not able to wear a facemask (for example, because it causes trouble breathing), then people who live with you should not stay in the same room with you, or they should wear a facemask if they enteryour room  Cover your coughs and sneezes   Cover your mouth and nose with a tissue when you cough or sneeze  Throw used tissues in a lined trash can; immediately wash your hands with soap and water for at least 20 seconds or clean your hands with an alcohol-based hand  that contains at least 60 to 95% alcohol, covering all surfaces of your hands and rubbing them together until they feel dry  Soap and water should be used preferentially if hands are visibly dirty  Avoid sharing personal household items   You should not share dishes, drinking glasses, cups, eating utensils, towels, or bedding with other people or pets in your home  After using these items, they should be washed thoroughly with soap and water     Clean your hands often  Wash your hands often with soap and water for at least 20 seconds  If soap and water are not available, clean your hands with an alcohol-based hand  that contains at least 60% alcohol, covering all surfaces of your hands and rubbing them together until they feel dry  Soap and water should be used preferentially if hands are visibly dirty  Avoid touching your eyes, nose, and mouth with unwashed hands  Clean all "high-touch" surfaces every day  High touch surfaces include counters, tabletops, doorknobs, bathroom fixtures, toilets, phones, keyboards, tablets, and bedside tables  Also, clean any surfaces that may have blood, stool, or body fluids on them  Use a household cleaning spray or wipe, according to the label instructions  Labels contain instructions for safe and effective use of the cleaning product including precautions you should take when applying the product, such as wearing gloves and making sure you have good ventilation during use of the product  Monitor your symptoms  Seek prompt medical attention if your illness is worsening (e g , difficulty breathing)  Before seeking care, call your healthcare provider and tell them that you have, or are being evaluated for, COVID-19  Put on a facemask before you enter the facility  These steps will help the healthcare provider's office to keep other people in the office or waiting room from getting infectedor exposed  Ask your healthcare provider to call the local or state health department  Persons who are placed under active monitoring or facilitated self-monitoring should follow instructions provided by their local health department or occupational health professionals, as appropriate  If you have a medical emergency and need to call 911, notify the dispatch personnel that you have, or are being evaluated for COVID-19  If possible, put on a facemask before emergency medical services arrive    Discontinuing home isolation  Patients with confirmed COVID-19 should remain under home isolation precautions until the risk of secondary transmission to others is thought to be low  The decision to discontinue home isolation precautions should be made on a case-by-case basis, in consultation with healthcare providers and state and local health departments  For more information: www cdc gov/IEZIZ99MV 099243-J 02/24/2020     Stay home when you are sick,except to get medical care  Wash your hands often with soap and water for at least 20 seconds  Cover your cough or sneeze with a tissue, then throw the tissue in the trash  Clean and disinfect frequently touched objects and surfaces  Avoid touching your eyes, nose, and mouth  STOP THE SPREAD OF GERMS  For more information: www cdc gov/COVID19 Avoid close contact with people who are sick  Help prevent the spread of respiratory diseases like COVID-19

## 2020-09-02 ENCOUNTER — PROCEDURE VISIT (OUTPATIENT)
Dept: CARDIOLOGY CLINIC | Facility: CLINIC | Age: 45
End: 2020-09-02
Payer: COMMERCIAL

## 2020-09-02 ENCOUNTER — HOSPITAL ENCOUNTER (OUTPATIENT)
Dept: NON INVASIVE DIAGNOSTICS | Facility: CLINIC | Age: 45
Discharge: HOME | End: 2020-09-02
Payer: COMMERCIAL

## 2020-09-02 DIAGNOSIS — R42 LIGHTHEADEDNESS: ICD-10-CM

## 2020-09-02 DIAGNOSIS — R00.1 BRADYCARDIA: Primary | ICD-10-CM

## 2020-09-02 DIAGNOSIS — R42 LIGHTHEADED: ICD-10-CM

## 2020-09-02 DIAGNOSIS — R00.1 SYMPTOMATIC BRADYCARDIA: ICD-10-CM

## 2020-09-02 PROCEDURE — 0298T PR EXT ECG > 48HR TO 21 DAY REVIEW AND INTERPRETATN: CPT | Performed by: INTERNAL MEDICINE

## 2020-09-02 PROCEDURE — 93306 TTE W/DOPPLER COMPLETE: CPT | Performed by: INTERNAL MEDICINE

## 2020-09-02 PROCEDURE — 93306 TTE W/DOPPLER COMPLETE: CPT

## 2020-09-09 ENCOUNTER — HOSPITAL ENCOUNTER (OUTPATIENT)
Dept: NON INVASIVE DIAGNOSTICS | Facility: CLINIC | Age: 45
Discharge: HOME | End: 2020-09-09
Payer: COMMERCIAL

## 2020-09-09 ENCOUNTER — TELEPHONE (OUTPATIENT)
Dept: CARDIOLOGY CLINIC | Facility: CLINIC | Age: 45
End: 2020-09-09

## 2020-09-09 DIAGNOSIS — R42 LIGHTHEADED: ICD-10-CM

## 2020-09-09 DIAGNOSIS — R42 LIGHTHEADED: Primary | ICD-10-CM

## 2020-09-09 DIAGNOSIS — R00.1 SYMPTOMATIC BRADYCARDIA: ICD-10-CM

## 2020-09-09 PROCEDURE — 93225 XTRNL ECG REC<48 HRS REC: CPT

## 2020-09-09 PROCEDURE — 93226 XTRNL ECG REC<48 HR SCAN A/R: CPT

## 2020-09-09 NOTE — TELEPHONE ENCOUNTER
Dr ricardo's office will need to be updated once mariah has her holter and whether or not she will be cleared to shoulder arthroscopy   Fax # [phone]

## 2020-09-14 ENCOUNTER — CLINICAL SUPPORT (OUTPATIENT)
Dept: CARDIOLOGY CLINIC | Facility: CLINIC | Age: 45
End: 2020-09-14
Payer: COMMERCIAL

## 2020-09-14 DIAGNOSIS — R42 LIGHTHEADED: ICD-10-CM

## 2020-09-14 DIAGNOSIS — R00.1 SYMPTOMATIC BRADYCARDIA: ICD-10-CM

## 2020-09-14 PROCEDURE — 93227 XTRNL ECG REC<48 HR R&I: CPT | Performed by: INTERNAL MEDICINE

## 2020-09-14 PROCEDURE — 0298T PR EXT ECG > 48HR TO 21 DAY REVIEW AND INTERPRETATN: CPT | Performed by: INTERNAL MEDICINE

## 2020-09-14 NOTE — RESULT ENCOUNTER NOTE
Sinus throughout with  a min HR of 51 bpm, max HR of 159 bpm, and avg HR of 87  bpm    Isolated SVEs were rare (<1 0%), SVE Couplets were rare (<1 0%), and no SVE Triplets  were present  Isolated VEs were rare (<1 0%), VE Couplets were rare  and no VE Triplets were present  During patient triggered event, no change in rate or rhythm          Richard Elliott MD

## 2020-09-15 ENCOUNTER — TELEPHONE (OUTPATIENT)
Dept: CARDIOLOGY CLINIC | Facility: CLINIC | Age: 45
End: 2020-09-15

## 2020-09-15 NOTE — TELEPHONE ENCOUNTER
Will fax note as clearance to dr ricardo's offfice, will also check with tyler diggs where he would like it faxed

## 2020-09-15 NOTE — TELEPHONE ENCOUNTER
Amparo called, she had her zio patch that you read  Soledad Hernandez saw her and ordered it  She is on vacation now and amparo said the surgeon's office needs to know today if she is cleared  She is having her surgery on Thursday  Dr ricardo's office will need to be updated once amparo has her holter and whether or not she will be cleared to shoulder arthroscopy   Fax # [phone]

## 2020-10-07 ENCOUNTER — OFFICE VISIT (OUTPATIENT)
Dept: CARDIOLOGY CLINIC | Facility: CLINIC | Age: 45
End: 2020-10-07
Payer: COMMERCIAL

## 2020-10-07 VITALS
BODY MASS INDEX: 37.65 KG/M2 | WEIGHT: 226 LBS | HEART RATE: 76 BPM | SYSTOLIC BLOOD PRESSURE: 128 MMHG | HEIGHT: 65 IN | DIASTOLIC BLOOD PRESSURE: 90 MMHG

## 2020-10-07 DIAGNOSIS — R00.1 SYMPTOMATIC BRADYCARDIA: Primary | ICD-10-CM

## 2020-10-07 PROCEDURE — 99213 OFFICE O/P EST LOW 20 MIN: CPT | Performed by: PHYSICIAN ASSISTANT

## 2020-10-07 RX ORDER — CARIPRAZINE 3 MG/1
9 CAPSULE, GELATIN COATED ORAL DAILY
COMMUNITY
Start: 2020-09-14

## 2020-10-07 RX ORDER — ASCORBIC ACID 500 MG
TABLET ORAL DAILY
COMMUNITY
Start: 2020-09-17 | End: 2021-02-22 | Stop reason: SDUPTHER

## 2020-10-07 RX ORDER — HYDROXYZINE PAMOATE 25 MG/1
CAPSULE ORAL DAILY
COMMUNITY
Start: 2020-09-23 | End: 2021-02-22 | Stop reason: SDUPTHER

## 2020-10-27 ENCOUNTER — OFFICE VISIT (OUTPATIENT)
Dept: URGENT CARE | Facility: CLINIC | Age: 45
End: 2020-10-27
Payer: COMMERCIAL

## 2020-10-27 VITALS
HEIGHT: 65 IN | TEMPERATURE: 96.5 F | BODY MASS INDEX: 37.49 KG/M2 | WEIGHT: 225 LBS | HEART RATE: 97 BPM | OXYGEN SATURATION: 100 % | RESPIRATION RATE: 18 BRPM

## 2020-10-27 DIAGNOSIS — R35.0 URINARY FREQUENCY: Primary | ICD-10-CM

## 2020-10-27 DIAGNOSIS — R30.0 DYSURIA: ICD-10-CM

## 2020-10-27 LAB
SL AMB  POCT GLUCOSE, UA: NEGATIVE
SL AMB LEUKOCYTE ESTERASE,UA: NEGATIVE
SL AMB POCT BILIRUBIN,UA: NEGATIVE
SL AMB POCT BLOOD,UA: NEGATIVE
SL AMB POCT CLARITY,UA: CLEAR
SL AMB POCT COLOR,UA: YELLOW
SL AMB POCT KETONES,UA: NEGATIVE
SL AMB POCT NITRITE,UA: NEGATIVE
SL AMB POCT PH,UA: 5
SL AMB POCT SPECIFIC GRAVITY,UA: 1.01
SL AMB POCT URINE PROTEIN: NEGATIVE
SL AMB POCT UROBILINOGEN: 0.2

## 2020-10-27 PROCEDURE — G0463 HOSPITAL OUTPT CLINIC VISIT: HCPCS | Performed by: PHYSICIAN ASSISTANT

## 2020-10-27 PROCEDURE — 99203 OFFICE O/P NEW LOW 30 MIN: CPT | Performed by: PHYSICIAN ASSISTANT

## 2020-10-27 PROCEDURE — 87086 URINE CULTURE/COLONY COUNT: CPT | Performed by: PHYSICIAN ASSISTANT

## 2020-10-28 LAB — BACTERIA UR CULT: NORMAL

## 2020-11-05 ENCOUNTER — OFFICE VISIT (OUTPATIENT)
Dept: ENDOCRINOLOGY | Facility: CLINIC | Age: 45
End: 2020-11-05
Payer: COMMERCIAL

## 2020-11-05 VITALS
BODY MASS INDEX: 40.57 KG/M2 | DIASTOLIC BLOOD PRESSURE: 68 MMHG | TEMPERATURE: 97.6 F | HEART RATE: 88 BPM | SYSTOLIC BLOOD PRESSURE: 152 MMHG | WEIGHT: 243.8 LBS

## 2020-11-05 DIAGNOSIS — R79.89 ABNORMAL TSH: Primary | ICD-10-CM

## 2020-11-05 PROCEDURE — 99213 OFFICE O/P EST LOW 20 MIN: CPT | Performed by: INTERNAL MEDICINE

## 2020-11-30 ENCOUNTER — OFFICE VISIT (OUTPATIENT)
Dept: URGENT CARE | Facility: CLINIC | Age: 45
End: 2020-11-30
Payer: COMMERCIAL

## 2020-11-30 VITALS
HEIGHT: 65 IN | OXYGEN SATURATION: 97 % | RESPIRATION RATE: 18 BRPM | TEMPERATURE: 97.2 F | SYSTOLIC BLOOD PRESSURE: 127 MMHG | BODY MASS INDEX: 41.15 KG/M2 | DIASTOLIC BLOOD PRESSURE: 83 MMHG | HEART RATE: 77 BPM | WEIGHT: 247 LBS

## 2020-11-30 DIAGNOSIS — H69.83 EUSTACHIAN TUBE DYSFUNCTION, BILATERAL: Primary | ICD-10-CM

## 2020-11-30 DIAGNOSIS — H60.501 ACUTE OTITIS EXTERNA OF RIGHT EAR, UNSPECIFIED TYPE: ICD-10-CM

## 2020-11-30 DIAGNOSIS — J30.2 SEASONAL ALLERGIC RHINITIS, UNSPECIFIED TRIGGER: ICD-10-CM

## 2020-11-30 PROCEDURE — G0463 HOSPITAL OUTPT CLINIC VISIT: HCPCS | Performed by: NURSE PRACTITIONER

## 2020-11-30 PROCEDURE — 99213 OFFICE O/P EST LOW 20 MIN: CPT | Performed by: NURSE PRACTITIONER

## 2020-11-30 RX ORDER — OFLOXACIN 3 MG/ML
5 SOLUTION/ DROPS OPHTHALMIC 2 TIMES DAILY
Qty: 5 ML | Refills: 0 | Status: SHIPPED | OUTPATIENT
Start: 2020-11-30 | End: 2020-12-07

## 2020-11-30 RX ORDER — FLUTICASONE PROPIONATE 50 MCG
2 SPRAY, SUSPENSION (ML) NASAL DAILY
Qty: 3 BOTTLE | Refills: 1 | Status: SHIPPED | OUTPATIENT
Start: 2020-11-30 | End: 2021-05-29

## 2020-12-01 ENCOUNTER — OFFICE VISIT (OUTPATIENT)
Dept: URGENT CARE | Facility: CLINIC | Age: 45
End: 2020-12-01
Payer: COMMERCIAL

## 2020-12-01 VITALS
HEIGHT: 65 IN | OXYGEN SATURATION: 97 % | RESPIRATION RATE: 18 BRPM | TEMPERATURE: 97.7 F | HEART RATE: 78 BPM | BODY MASS INDEX: 41.15 KG/M2 | WEIGHT: 247 LBS

## 2020-12-01 DIAGNOSIS — H60.501 ACUTE OTITIS EXTERNA OF RIGHT EAR, UNSPECIFIED TYPE: ICD-10-CM

## 2020-12-01 DIAGNOSIS — H69.93 DISORDER OF BOTH EUSTACHIAN TUBES: ICD-10-CM

## 2020-12-01 DIAGNOSIS — R05.9 COUGH: Primary | ICD-10-CM

## 2020-12-01 PROCEDURE — G0463 HOSPITAL OUTPT CLINIC VISIT: HCPCS | Performed by: PHYSICIAN ASSISTANT

## 2020-12-01 PROCEDURE — 99213 OFFICE O/P EST LOW 20 MIN: CPT | Performed by: PHYSICIAN ASSISTANT

## 2020-12-01 PROCEDURE — U0003 INFECTIOUS AGENT DETECTION BY NUCLEIC ACID (DNA OR RNA); SEVERE ACUTE RESPIRATORY SYNDROME CORONAVIRUS 2 (SARS-COV-2) (CORONAVIRUS DISEASE [COVID-19]), AMPLIFIED PROBE TECHNIQUE, MAKING USE OF HIGH THROUGHPUT TECHNOLOGIES AS DESCRIBED BY CMS-2020-01-R: HCPCS | Performed by: PHYSICIAN ASSISTANT

## 2020-12-01 RX ORDER — OXYCODONE HYDROCHLORIDE AND ACETAMINOPHEN 5; 325 MG/1; MG/1
TABLET ORAL
COMMUNITY
Start: 2020-09-17 | End: 2021-02-22 | Stop reason: SDUPTHER

## 2020-12-01 RX ORDER — ALPRAZOLAM 0.25 MG/1
0.25 TABLET ORAL
COMMUNITY
Start: 2020-09-14 | End: 2022-04-25

## 2020-12-01 RX ORDER — PREGABALIN 50 MG/1
CAPSULE ORAL
Status: ON HOLD | COMMUNITY
Start: 2020-11-12 | End: 2021-06-02 | Stop reason: ALTCHOICE

## 2020-12-01 RX ORDER — TOPIRAMATE 50 MG/1
50 TABLET, FILM COATED ORAL DAILY
Status: ON HOLD | COMMUNITY
Start: 2020-11-27 | End: 2021-06-02 | Stop reason: ALTCHOICE

## 2020-12-03 LAB — SARS-COV-2 RNA SPEC QL NAA+PROBE: NOT DETECTED

## 2020-12-28 ENCOUNTER — HOSPITAL ENCOUNTER (EMERGENCY)
Facility: HOSPITAL | Age: 45
Discharge: HOME | End: 2020-12-28
Attending: EMERGENCY MEDICINE | Admitting: EMERGENCY MEDICINE
Payer: COMMERCIAL

## 2020-12-28 ENCOUNTER — APPOINTMENT (EMERGENCY)
Dept: CT IMAGING | Facility: HOSPITAL | Age: 45
End: 2020-12-28
Payer: COMMERCIAL

## 2020-12-28 VITALS
HEIGHT: 65 IN | OXYGEN SATURATION: 98 % | DIASTOLIC BLOOD PRESSURE: 81 MMHG | SYSTOLIC BLOOD PRESSURE: 151 MMHG | TEMPERATURE: 98.8 F | HEART RATE: 75 BPM | RESPIRATION RATE: 18 BRPM | WEIGHT: 250 LBS | BODY MASS INDEX: 41.65 KG/M2

## 2020-12-28 DIAGNOSIS — K29.70 GASTRITIS: Primary | ICD-10-CM

## 2020-12-28 LAB
ALBUMIN SERPL BCP-MCNC: 4.3 G/DL (ref 3.5–5.7)
ALP SERPL-CCNC: 72 U/L (ref 40–150)
ALT SERPL W P-5'-P-CCNC: 18 U/L (ref 7–52)
ANION GAP SERPL CALCULATED.3IONS-SCNC: 10 MMOL/L (ref 4–13)
APTT PPP: 29 SECONDS (ref 23–37)
AST SERPL W P-5'-P-CCNC: 12 U/L (ref 13–39)
BILIRUB SERPL-MCNC: 0.3 MG/DL (ref 0.2–1)
BILIRUB UR QL STRIP: NEGATIVE
BUN SERPL-MCNC: 16 MG/DL (ref 7–25)
CALCIUM SERPL-MCNC: 9 MG/DL (ref 8.6–10.5)
CHLORIDE SERPL-SCNC: 106 MMOL/L (ref 98–107)
CLARITY UR: CLEAR
CO2 SERPL-SCNC: 21 MMOL/L (ref 21–31)
COLOR UR: YELLOW
CREAT SERPL-MCNC: 0.82 MG/DL (ref 0.6–1.2)
ERYTHROCYTE [DISTWIDTH] IN BLOOD BY AUTOMATED COUNT: 13.8 % (ref 11.5–14.5)
EXT PREG TEST URINE: NEGATIVE
EXT. CONTROL ED NAV: NORMAL
GFR SERPL CREATININE-BSD FRML MDRD: 88 ML/MIN/1.73SQ M
GIANT PLATELETS BLD QL SMEAR: PRESENT
GLUCOSE SERPL-MCNC: 139 MG/DL (ref 65–99)
GLUCOSE UR STRIP-MCNC: NEGATIVE MG/DL
HCT VFR BLD AUTO: 43.4 % (ref 42–47)
HGB BLD-MCNC: 14.5 G/DL (ref 12–16)
HGB UR QL STRIP.AUTO: NEGATIVE
INR PPP: 0.91 (ref 0.84–1.19)
KETONES UR STRIP-MCNC: NEGATIVE MG/DL
LEUKOCYTE ESTERASE UR QL STRIP: NEGATIVE
LIPASE SERPL-CCNC: 31 U/L (ref 11–82)
LYMPHOCYTES # BLD AUTO: 2.63 THOUSAND/UL (ref 0.6–4.47)
LYMPHOCYTES # BLD AUTO: 21 % (ref 20–51)
MCH RBC QN AUTO: 29.6 PG (ref 26–34)
MCHC RBC AUTO-ENTMCNC: 33.4 G/DL (ref 31–37)
MCV RBC AUTO: 89 FL (ref 81–99)
METAMYELOCYTES NFR BLD MANUAL: 1 % (ref 0–1)
MONOCYTES # BLD AUTO: 0.38 THOUSAND/UL (ref 0–1.22)
MONOCYTES NFR BLD AUTO: 3 % (ref 4–12)
MYELOCYTES NFR BLD MANUAL: 5 % (ref 0–1)
NEUTS SEG # BLD: 8.75 THOUSAND/UL (ref 1.81–6.82)
NEUTS SEG NFR BLD AUTO: 70 % (ref 42–75)
NITRITE UR QL STRIP: NEGATIVE
PH UR STRIP.AUTO: 6 [PH]
PLATELET # BLD AUTO: 297 THOUSANDS/UL (ref 149–390)
PLATELET BLD QL SMEAR: ADEQUATE
PMV BLD AUTO: 7.9 FL (ref 8.6–11.7)
POTASSIUM SERPL-SCNC: 4 MMOL/L (ref 3.5–5.5)
PROT SERPL-MCNC: 7 G/DL (ref 6.4–8.9)
PROT UR STRIP-MCNC: NEGATIVE MG/DL
PROTHROMBIN TIME: 12.2 SECONDS (ref 11.6–14.5)
RBC # BLD AUTO: 4.9 MILLION/UL (ref 3.9–5.2)
RBC MORPH BLD: NORMAL
SODIUM SERPL-SCNC: 137 MMOL/L (ref 134–143)
SP GR UR STRIP.AUTO: 1.02 (ref 1–1.03)
TOTAL CELLS COUNTED SPEC: 100
TROPONIN I SERPL-MCNC: <0.03 NG/ML
UROBILINOGEN UR QL STRIP.AUTO: 0.2 E.U./DL
WBC # BLD AUTO: 12.5 THOUSAND/UL (ref 4.8–10.8)

## 2020-12-28 PROCEDURE — 99284 EMERGENCY DEPT VISIT MOD MDM: CPT

## 2020-12-28 PROCEDURE — 85610 PROTHROMBIN TIME: CPT | Performed by: PHYSICIAN ASSISTANT

## 2020-12-28 PROCEDURE — 36415 COLL VENOUS BLD VENIPUNCTURE: CPT | Performed by: PHYSICIAN ASSISTANT

## 2020-12-28 PROCEDURE — 99285 EMERGENCY DEPT VISIT HI MDM: CPT | Performed by: EMERGENCY MEDICINE

## 2020-12-28 PROCEDURE — G1004 CDSM NDSC: HCPCS

## 2020-12-28 PROCEDURE — 74177 CT ABD & PELVIS W/CONTRAST: CPT

## 2020-12-28 PROCEDURE — 81025 URINE PREGNANCY TEST: CPT | Performed by: PHYSICIAN ASSISTANT

## 2020-12-28 PROCEDURE — 81003 URINALYSIS AUTO W/O SCOPE: CPT | Performed by: PHYSICIAN ASSISTANT

## 2020-12-28 PROCEDURE — 85730 THROMBOPLASTIN TIME PARTIAL: CPT | Performed by: PHYSICIAN ASSISTANT

## 2020-12-28 PROCEDURE — 93005 ELECTROCARDIOGRAM TRACING: CPT

## 2020-12-28 PROCEDURE — 96375 TX/PRO/DX INJ NEW DRUG ADDON: CPT

## 2020-12-28 PROCEDURE — 80053 COMPREHEN METABOLIC PANEL: CPT | Performed by: PHYSICIAN ASSISTANT

## 2020-12-28 PROCEDURE — 84484 ASSAY OF TROPONIN QUANT: CPT | Performed by: PHYSICIAN ASSISTANT

## 2020-12-28 PROCEDURE — 96374 THER/PROPH/DIAG INJ IV PUSH: CPT

## 2020-12-28 PROCEDURE — 83690 ASSAY OF LIPASE: CPT | Performed by: PHYSICIAN ASSISTANT

## 2020-12-28 PROCEDURE — 85027 COMPLETE CBC AUTOMATED: CPT | Performed by: PHYSICIAN ASSISTANT

## 2020-12-28 PROCEDURE — 96361 HYDRATE IV INFUSION ADD-ON: CPT

## 2020-12-28 PROCEDURE — 85007 BL SMEAR W/DIFF WBC COUNT: CPT | Performed by: PHYSICIAN ASSISTANT

## 2020-12-28 RX ORDER — ONDANSETRON 2 MG/ML
4 INJECTION INTRAMUSCULAR; INTRAVENOUS ONCE
Status: COMPLETED | OUTPATIENT
Start: 2020-12-28 | End: 2020-12-28

## 2020-12-28 RX ORDER — SUCRALFATE 1 G/1
1 TABLET ORAL ONCE
Status: COMPLETED | OUTPATIENT
Start: 2020-12-28 | End: 2020-12-28

## 2020-12-28 RX ORDER — MAGNESIUM HYDROXIDE/ALUMINUM HYDROXICE/SIMETHICONE 120; 1200; 1200 MG/30ML; MG/30ML; MG/30ML
30 SUSPENSION ORAL ONCE
Status: COMPLETED | OUTPATIENT
Start: 2020-12-28 | End: 2020-12-28

## 2020-12-28 RX ORDER — KETOROLAC TROMETHAMINE 30 MG/ML
15 INJECTION, SOLUTION INTRAMUSCULAR; INTRAVENOUS ONCE
Status: COMPLETED | OUTPATIENT
Start: 2020-12-28 | End: 2020-12-28

## 2020-12-28 RX ORDER — SUCRALFATE ORAL 1 G/10ML
1 SUSPENSION ORAL 4 TIMES DAILY
Qty: 420 ML | Refills: 0 | Status: ON HOLD | OUTPATIENT
Start: 2020-12-28 | End: 2021-06-02 | Stop reason: ALTCHOICE

## 2020-12-28 RX ADMIN — SUCRALFATE 1 G: 1 TABLET ORAL at 19:00

## 2020-12-28 RX ADMIN — KETOROLAC TROMETHAMINE 15 MG: 30 INJECTION, SOLUTION INTRAMUSCULAR; INTRAVENOUS at 20:03

## 2020-12-28 RX ADMIN — ONDANSETRON 4 MG: 2 INJECTION INTRAMUSCULAR; INTRAVENOUS at 18:58

## 2020-12-28 RX ADMIN — ALUMINUM HYDROXIDE, MAGNESIUM HYDROXIDE, AND SIMETHICONE 30 ML: 200; 200; 20 SUSPENSION ORAL at 19:02

## 2020-12-28 RX ADMIN — IOHEXOL 100 ML: 350 INJECTION, SOLUTION INTRAVENOUS at 20:31

## 2020-12-28 RX ADMIN — FAMOTIDINE 20 MG: 10 INJECTION INTRAVENOUS at 18:58

## 2020-12-28 RX ADMIN — SODIUM CHLORIDE 1000 ML: 0.9 INJECTION, SOLUTION INTRAVENOUS at 18:57

## 2020-12-29 LAB
ATRIAL RATE: 63 BPM
P AXIS: 63 DEGREES
PR INTERVAL: 128 MS
QRS AXIS: 32 DEGREES
QRSD INTERVAL: 80 MS
QT INTERVAL: 406 MS
QTC INTERVAL: 415 MS
T WAVE AXIS: 53 DEGREES
VENTRICULAR RATE: 63 BPM

## 2020-12-29 PROCEDURE — 93010 ELECTROCARDIOGRAM REPORT: CPT | Performed by: INTERNAL MEDICINE

## 2021-02-22 ENCOUNTER — TELEPHONE (OUTPATIENT)
Dept: BARIATRICS | Facility: CLINIC | Age: 46
End: 2021-02-22

## 2021-02-22 RX ORDER — CARIPRAZINE 1.5 MG/1
CAPSULE, GELATIN COATED ORAL
COMMUNITY
End: 2022-04-25

## 2021-02-22 RX ORDER — MECLIZINE HYDROCHLORIDE 25 MG/1
TABLET ORAL
Status: ON HOLD | COMMUNITY
End: 2021-06-02 | Stop reason: ALTCHOICE

## 2021-02-22 RX ORDER — CELECOXIB 200 MG/1
CAPSULE ORAL
Status: ON HOLD | COMMUNITY
End: 2021-06-02 | Stop reason: ALTCHOICE

## 2021-02-22 RX ORDER — FAMOTIDINE 40 MG/1
40 TABLET, FILM COATED ORAL
COMMUNITY
Start: 2021-01-19

## 2021-02-22 RX ORDER — PREDNISONE 10 MG/1
TABLET ORAL
Status: ON HOLD | COMMUNITY
Start: 2020-12-22 | End: 2021-06-02 | Stop reason: ALTCHOICE

## 2021-02-22 RX ORDER — DIPHENOXYLATE HYDROCHLORIDE AND ATROPINE SULFATE 2.5; .025 MG/1; MG/1
TABLET ORAL
Status: ON HOLD | COMMUNITY
End: 2021-06-02 | Stop reason: ALTCHOICE

## 2021-02-22 RX ORDER — CYCLOBENZAPRINE HCL 5 MG
TABLET ORAL
COMMUNITY
End: 2022-04-25

## 2021-02-22 RX ORDER — NAPROXEN 500 MG/1
TABLET ORAL
Status: ON HOLD | COMMUNITY
End: 2021-06-02 | Stop reason: ALTCHOICE

## 2021-02-22 RX ORDER — OFLOXACIN 3 MG/ML
SOLUTION AURICULAR (OTIC)
Status: ON HOLD | COMMUNITY
Start: 2020-11-30 | End: 2021-06-02 | Stop reason: ALTCHOICE

## 2021-02-22 RX ORDER — BUTALBITAL, ACETAMINOPHEN AND CAFFEINE 50; 325; 40 MG/1; MG/1; MG/1
TABLET ORAL
COMMUNITY
End: 2022-04-25

## 2021-02-22 RX ORDER — PRUCALOPRIDE 2 MG/1
TABLET, FILM COATED ORAL
Status: ON HOLD | COMMUNITY
End: 2021-06-02 | Stop reason: ALTCHOICE

## 2021-02-22 RX ORDER — INFLUENZA A VIRUS A/SINGAPORE/GP1908/2015 IVR-180A (H1N1) ANTIGEN (PROPIOLACTONE INACTIVATED), INFLUENZA A VIRUS A/HONG KONG/4801/2014 X-263B (H3N2) ANTIGEN (PROPIOLACTONE INACTIVATED), INFLUENZA B VIRUS B/BRISBANE/46/2015 ANTIGEN (PROPIOLACTONE INACTIVATED), AND INFLUENZA B VIRUS B/PHUKET/3073/2013 BVR-1B ANTIGEN (PROPIOLACTONE INACTIVATED) 15; 15; 15; 15 UG/.5ML; UG/.5ML; UG/.5ML; UG/.5ML
INJECTION, SUSPENSION INTRAMUSCULAR
Status: ON HOLD | COMMUNITY
End: 2021-06-02 | Stop reason: ALTCHOICE

## 2021-02-22 RX ORDER — ROPINIROLE 2 MG/1
TABLET, FILM COATED ORAL
COMMUNITY
Start: 2021-01-11 | End: 2022-04-25

## 2021-02-22 RX ORDER — CIPROFLOXACIN 500 MG/1
TABLET, FILM COATED ORAL
Status: ON HOLD | COMMUNITY
End: 2021-06-02 | Stop reason: ALTCHOICE

## 2021-02-22 RX ORDER — DOXYCYCLINE HYCLATE 100 MG/1
CAPSULE ORAL
Status: ON HOLD | COMMUNITY
End: 2021-06-02 | Stop reason: ALTCHOICE

## 2021-02-22 RX ORDER — AMOXICILLIN AND CLAVULANATE POTASSIUM 875; 125 MG/1; MG/1
TABLET, FILM COATED ORAL
Status: ON HOLD | COMMUNITY
End: 2021-06-02 | Stop reason: ALTCHOICE

## 2021-02-22 NOTE — TELEPHONE ENCOUNTER
Called patient to inform her that unfortunately she does not qualify for surgery due to her mental health diagnosis of Borderline Personality Disorder  Patient was upset and stated that she did not understand  Informed patient with her condition and the medications that she will need to take to manage her condition it is very difficult after surgery  Offered JAMEL  Notified  to call to schedule   HC LCSW

## 2021-03-04 ENCOUNTER — TRANSCRIBE ORDERS (OUTPATIENT)
Dept: ADMINISTRATIVE | Facility: HOSPITAL | Age: 46
End: 2021-03-04

## 2021-03-04 DIAGNOSIS — R10.816 EPIGASTRIC DIRECT ABDOMINAL TENDERNESS: Primary | ICD-10-CM

## 2021-03-16 ENCOUNTER — HOSPITAL ENCOUNTER (OUTPATIENT)
Dept: CT IMAGING | Facility: HOSPITAL | Age: 46
Discharge: HOME | End: 2021-03-16
Attending: PHYSICIAN ASSISTANT
Payer: COMMERCIAL

## 2021-03-16 DIAGNOSIS — R10.816 EPIGASTRIC DIRECT ABDOMINAL TENDERNESS: ICD-10-CM

## 2021-03-16 PROCEDURE — 74177 CT ABD & PELVIS W/CONTRAST: CPT

## 2021-03-16 PROCEDURE — G1004 CDSM NDSC: HCPCS

## 2021-03-16 RX ADMIN — IOHEXOL 100 ML: 350 INJECTION, SOLUTION INTRAVENOUS at 07:40

## 2021-03-25 RX ORDER — HYLAN G-F 20 16MG/2ML
SYRINGE (ML) INTRAARTICULAR
Status: ON HOLD | COMMUNITY
Start: 2021-03-03 | End: 2021-06-02 | Stop reason: ALTCHOICE

## 2021-03-25 RX ORDER — OXYCODONE HYDROCHLORIDE 5 MG/1
TABLET ORAL
Status: ON HOLD | COMMUNITY
Start: 2021-02-26 | End: 2021-06-02 | Stop reason: ALTCHOICE

## 2021-03-25 RX ORDER — DICYCLOMINE HYDROCHLORIDE 10 MG/1
CAPSULE ORAL
Status: ON HOLD | COMMUNITY
Start: 2021-03-04 | End: 2021-06-02 | Stop reason: ALTCHOICE

## 2021-04-01 ENCOUNTER — OFFICE VISIT (OUTPATIENT)
Dept: BARIATRICS | Facility: CLINIC | Age: 46
End: 2021-04-01
Payer: COMMERCIAL

## 2021-04-01 VITALS
BODY MASS INDEX: 41.15 KG/M2 | DIASTOLIC BLOOD PRESSURE: 80 MMHG | TEMPERATURE: 98.2 F | RESPIRATION RATE: 12 BRPM | HEIGHT: 66 IN | HEART RATE: 79 BPM | WEIGHT: 256.06 LBS | SYSTOLIC BLOOD PRESSURE: 114 MMHG

## 2021-04-01 DIAGNOSIS — Z91.89 AT RISK FOR SLEEP APNEA: ICD-10-CM

## 2021-04-01 DIAGNOSIS — F31.4 BIPOLAR 1 DISORDER, DEPRESSED, SEVERE (HCC): ICD-10-CM

## 2021-04-01 DIAGNOSIS — E66.01 MORBID OBESITY WITH BMI OF 40.0-44.9, ADULT (HCC): Primary | ICD-10-CM

## 2021-04-01 DIAGNOSIS — I10 ESSENTIAL HYPERTENSION: ICD-10-CM

## 2021-04-01 DIAGNOSIS — K21.9 GASTROESOPHAGEAL REFLUX DISEASE WITHOUT ESOPHAGITIS: ICD-10-CM

## 2021-04-01 PROCEDURE — 99215 OFFICE O/P EST HI 40 MIN: CPT | Performed by: PHYSICIAN ASSISTANT

## 2021-04-01 RX ORDER — HYDROXYZINE PAMOATE 25 MG/1
CAPSULE ORAL
COMMUNITY
Start: 2021-03-22 | End: 2021-04-01

## 2021-04-01 NOTE — ASSESSMENT & PLAN NOTE
-Discussed options of HealthyCORE-Intensive Lifestyle Intervention Program, Very Low Calorie Diet-VLCD, Conservative Program, Luis Felipe-En-Y Gastric Bypass and Vertical Sleeve Gastrectomy and the role of weight loss medications   -Initial weight loss goal of 5-10% weight loss for improved health  -Screening labs   Recommend checking lab coverage before having labs drawn   -cmp, tsh, a1c reviewed from 10/6/20 all within acceptable limits except for prediabetic a1c  - STOP BANG-4/8  -Patient is interested in pursuing Very Low Calorie Diet-VLCD   -patient asked me to speak with SW about qualifying for surgery     VLCD Review:  Contraindications: no  Labs ordered: reviewed BMP (2/16/20) magnesium (8/1/20) and acceptable to start   EKG ordered: reviewed 12/29/20 and acceptable to start   HTN meds addressed: stop Lisinopril the day of start   DM2 meds addressed: n/a  VLCD time restriction based on BMI: no  LMP/OCP:  Will need urine preg day of

## 2021-04-01 NOTE — PROGRESS NOTES
Assessment/Plan: Morbid obesity with BMI of 40 0-44 9, adult ([facility] )  -Discussed options of HealthyCORE-Intensive Lifestyle Intervention Program, Very Low Calorie Diet-VLCD, Conservative Program, Luis Felipe-En-Y Gastric Bypass and Vertical Sleeve Gastrectomy and the role of weight loss medications   -Initial weight loss goal of 5-10% weight loss for improved health  -Screening labs  Recommend checking lab coverage before having labs drawn   -cmp, tsh, a1c reviewed from 10/6/20 all within acceptable limits except for prediabetic a1c  - STOP BANG-  -Patient is interested in pursuing Very Low Calorie Diet-VLCD   -patient asked me to speak with SW about qualifying for surgery     VLCD Review:  Contraindications: no  Labs ordered: reviewed BMP (20) magnesium (20) and acceptable to start   EKG ordered: reviewed 20 and acceptable to start   HTN meds addressed: stop Lisinopril the day of start   DM2 meds addressed: n/a  VLCD time restriction based on BMI: no  LMP/OCP:  Will need urine preg day of     At risk for sleep apnea  STOP BAN/8  - Sleep medicine referral placed   -Discussed risks of untreated sleep apnea such as sudden cardiac death by arrhythmia, uncontrolled hypertension, difficulty with weight loss, decreased quality sleep, increased insulin resistance, and stroke  -Should improve with dietary and lifestyle changes      Gastroesophageal reflux disease without esophagitis  Taking dexilant and pepcid  -should improve with weight loss, dietary, and lifestyle changes  -continue management with prescribing provider      Bipolar 1 disorder, depressed, severe ([facility] )  Taking xanax, vrylar,   -continue management with prescribing provider      Essential hypertension  Taking lisinopril  -should improve with weight loss, dietary, and lifestyle changes  -continue management with prescribing provider        Follow up in approximately 2 weeks with Non-Surgical Dietician      Diagnoses and all orders for this visit:    Morbid obesity with BMI of 40 0-44 9, adult [facility])  -     Ambulatory referral to Sleep Medicine; Future    At risk for sleep apnea  -     Ambulatory referral to Sleep Medicine; Future    Gastroesophageal reflux disease without esophagitis  -     Ambulatory referral to Sleep Medicine; Future    Bipolar 1 disorder, depressed, severe ([facility] )  -     Ambulatory referral to Sleep Medicine; Future    Essential hypertension  -     Ambulatory referral to Sleep Medicine; Future    Other orders  -     Discontinue: hydrOXYzine pamoate (VISTARIL) 25 mg capsule          Subjective:   Chief Complaint   Patient presents with    Consult     for MWM with Courtlynn BMI 41 64 Stopbang 4/8       Patient ID: Luna Montanez  is a 39 y o  female with excess weight/obesity here to pursue weight management  Past Medical History:   Diagnosis Date    Anxiety     Arthritis     Bipolar 1 disorder ([facility])     with bordeline personality    Borderline personality disorder ([facility] )     Chronic headaches     Depression     GERD (gastroesophageal reflux disease)     Hypertension     Incontinence     Migraine     Morbid obesity with BMI of 40 0-44 9, adult ([facility])     Small bowel obstruction ([facility] ) 6/27/2019    Suicide attempt ([place] [address] )     Symptomatic bradycardia     Urinary tract infection        HPI:  Obesity/Excess Weight:  Severity: Severe  Onset:   For the last year     Modifiers: Diet and Exercise and Prescription Weight Loss MedicationsTopamax and it didn't help   Contributing factors: Medications, bordem   Associated symptoms: comorbid conditions, increased joint pain, decreased exercise capacity, decreased self esteem, increased shortness of breath, decreased mobility and inability to do certain activities    Goals: 150-160 lbs   Hydration: 1/2 -1 gallon per day  Alcohol: 0-4 drinks per week     Colonoscopy-Not applicable    The following portions of the patient's history were reviewed and updated as appropriate: allergies, current medications, past family history, past medical history, past social history, past surgical history and problem list     Review of Systems   HENT: Negative for sore throat  Respiratory: Positive for shortness of breath (needs pcp notes it since she gained weight and only occasional )  Negative for cough  Cardiovascular: Negative for chest pain and palpitations  Gastrointestinal: Positive for abdominal pain (gastritis ) and constipation (on linzess)  Negative for diarrhea, nausea and vomiting         + GERD--controlled with meds    Endocrine: Negative for cold intolerance and heat intolerance  Genitourinary: Negative for dysuria  Musculoskeletal: Positive for back pain  Negative for arthralgias  Skin: Negative for rash  Neurological: Negative for headaches  Psychiatric/Behavioral: Negative for suicidal ideas (denies HI)  + Depression and anxiety-controlled with meds        Objective:    /80 (BP Location: Left arm, Patient Position: Sitting, Cuff Size: Large)   Pulse 79   Temp 98 2 °F (36 8 °C)   Resp 12   Ht 5' 5 75" (1 67 m)   Wt 116 kg (256 lb 1 oz)   BMI 41 64 kg/m²     Physical Exam  Vitals signs and nursing note reviewed  Constitutional   General appearance: Abnormal   well developed and morbidly obese  Eyes No conjunctival pallor  Ears, Nose, Mouth, and Throat Bilateral external ears- no discharge, edema, erythema   Pulmonary   Respiratory effort: No increased work of breathing or signs of respiratory distress  Auscultation of lungs: Clear to auscultation, equal breath sounds bilaterally, no wheezes, no rales, no rhonci  Cardiovascular   Auscultation of heart: Normal rate and rhythm, normal S1 and S2, without murmurs  Examination of extremities for edema and/or varicosities: Normal   no edema  Abdomen   Abdomen: Abnormal   The abdomen was obese  Bowel sounds were normal  The abdomen was soft and nontender     Musculoskeletal   Gait and station: Normal     Psychiatric   Orientation to person, place and time: Normal     Affect: appropriate

## 2021-04-01 NOTE — ASSESSMENT & PLAN NOTE
Taking dexilant and pepcid  -should improve with weight loss, dietary, and lifestyle changes  -continue management with prescribing provider

## 2021-04-05 ENCOUNTER — CLINICAL SUPPORT (OUTPATIENT)
Dept: BARIATRICS | Facility: CLINIC | Age: 46
End: 2021-04-05

## 2021-04-05 ENCOUNTER — OFFICE VISIT (OUTPATIENT)
Dept: BARIATRICS | Facility: CLINIC | Age: 46
End: 2021-04-05

## 2021-04-05 VITALS — BODY MASS INDEX: 40.98 KG/M2 | WEIGHT: 255 LBS | HEIGHT: 66 IN | TEMPERATURE: 97 F

## 2021-04-05 DIAGNOSIS — R63.5 ABNORMAL WEIGHT GAIN: ICD-10-CM

## 2021-04-05 DIAGNOSIS — Z01.818 PREOPERATIVE TESTING: Primary | ICD-10-CM

## 2021-04-05 LAB — SL AMB POCT URINE HCG: NEGATIVE

## 2021-04-05 PROCEDURE — RECHECK

## 2021-04-05 PROCEDURE — WMBAR

## 2021-04-05 PROCEDURE — VLCD

## 2021-04-05 PROCEDURE — WMBVRGE

## 2021-04-05 PROCEDURE — 81025 URINE PREGNANCY TEST: CPT

## 2021-04-08 ENCOUNTER — TELEPHONE (OUTPATIENT)
Dept: BARIATRICS | Facility: CLINIC | Age: 46
End: 2021-04-08

## 2021-04-12 ENCOUNTER — TELEPHONE (OUTPATIENT)
Dept: BARIATRICS | Facility: CLINIC | Age: 46
End: 2021-04-12

## 2021-04-12 NOTE — TELEPHONE ENCOUNTER
Amparo started VLCD last week and reports that she has lost 6 lbs overall, but this morning when she weighed herself, she gainged 2 lbs  She reports taht she has been weighing herself "a few times everyday "  I recommend that she do not weigh herself everyday, and no more that one time per day as weigh does and can fluctuate daily

## 2021-04-15 NOTE — PROGRESS NOTES
Weight Management Medical Nutrition Assessment  Amparo was here today for her 2 week VLCD follow-up  Today she weighs 242 3 lbs giving her a loss of 12 8 lbs in the last 2 week  She admits that she struggled with a headache for the first few days but feel fine now  She is tolerating product without difficulty and has no constipation at this time  She reports that she is not hungry, but "just craves food sometimes "  She plans to continue at this time  Labs ordered, and BP taken  Patient seen by Medical Provider in past 6 months:  yes  Requested to schedule appointment with Medical Provider: No      Anthropometric Measurements  Start Weight (#): 256 1  Current Weight (#): 424 2  TBW % Change from start weight: 5%  Ideal Body Weight (#):128 75  Goal Weight (#):180    Weight Loss History  Previous weight loss attempts: Commercial Programs (Weight Watchers, [employer] [place], etc )    Food and Nutrition Related History  Wake up: 6:30  Bed Time: 10 - 11    Food Recall  Breakfast: shake   Snack:  Lunch:shake  Snack:  Dinner:shake  Snack:160/15/13      Beverages: water  Volume of beverage intake: 80 oz    Weekends: Same  Cravings: "just real food"  Trouble area of day: late afternoon    Frequency of Eating out: irregularly  Food restrictions:none  Cooking: self   Food Shopping: self    Physical Activity Intake  Activity:none currently  Frequency:infrequently  Physical limitations/barriers to exercise: none    Estimated Needs  Energy    [place] Energy Needs: BMR : 8715 1-2# loss weekly sedentary:  1120 - 1620       1-2# loss weekly lightly active: 1429 - 1929  Maintenance calories for sedentary activity level: 2120  Protein:70 - 88      (1 2-1 5g/kg IBW)  Fluid: 69     (35mL/kg IBW)    Nutrition Diagnosis  Yes;     Overweight/obesity  related to Excess energy intake as evidenced by  BMI more than normative standard for age and sex (obesity-grade III 36+)       Nutrition Intervention    Nutrition Prescription  Calories:760  Protein:96  Fluid:80    Meal Plan (Rom/Pro/Carb)  Breakfast: 200/27/10  Snack:  Lunch:200/27/10  Snack: 160/15/13  Dinner:200/27/10  Snack:    Nutrition Education:    Calorie controlled menu  Lean protein food choices  Healthy snack options  Food journaling tips      Nutrition Counseling:  Strategies: meal planning, portion sizes, healthy snack choices, hydration, fiber intake, protein intake, exercise, food journal      Monitoring and Evaluation:  Evaluation criteria:  Energy Intake  Meet protein needs  Maintain adequate hydration  Monitor weekly weight  Meal planning/preparation  Food journal   Decreased portions at mealtimes and snacks  Physical activity     Barriers to learning:none  Readiness to change: Action:  (Changing behavior)  Comprehension: good  Expected Compliance: good

## 2021-04-19 ENCOUNTER — OFFICE VISIT (OUTPATIENT)
Dept: BARIATRICS | Facility: CLINIC | Age: 46
End: 2021-04-19

## 2021-04-19 VITALS
HEIGHT: 66 IN | DIASTOLIC BLOOD PRESSURE: 82 MMHG | SYSTOLIC BLOOD PRESSURE: 124 MMHG | BODY MASS INDEX: 38.92 KG/M2 | WEIGHT: 242.2 LBS

## 2021-04-19 DIAGNOSIS — R63.5 ABNORMAL WEIGHT GAIN: Primary | ICD-10-CM

## 2021-04-19 PROCEDURE — WMBVRGE

## 2021-04-19 PROCEDURE — VLCD

## 2021-04-19 PROCEDURE — RECHECK

## 2021-04-19 PROCEDURE — WMBAR

## 2021-05-03 ENCOUNTER — OFFICE VISIT (OUTPATIENT)
Dept: BARIATRICS | Facility: CLINIC | Age: 46
End: 2021-05-03

## 2021-05-03 VITALS
WEIGHT: 233.8 LBS | SYSTOLIC BLOOD PRESSURE: 130 MMHG | DIASTOLIC BLOOD PRESSURE: 100 MMHG | BODY MASS INDEX: 37.57 KG/M2 | HEIGHT: 66 IN

## 2021-05-03 DIAGNOSIS — R63.5 ABNORMAL WEIGHT GAIN: ICD-10-CM

## 2021-05-03 PROCEDURE — VLCD

## 2021-05-03 PROCEDURE — RECHECK

## 2021-05-03 NOTE — PROGRESS NOTES
Weight Management Medical Nutrition Assessment  Amparo was here today for her 4 week VLCD follow-up  Today she weighs 233 8 lbs giving her a loss of 8 4 lbs in the last 2 weeks  She is tolerating product without difficulty and has no constipation at this time  She has been following the plan exactly, and is consuming 80 - 100 oz of water each day  She plans to continue at this time  BP taken        Patient seen by Medical Provider in past 6 months:  yes  Requested to schedule appointment with Medical Provider: No        Anthropometric Measurements  Start Weight (#): 256 1  Current Weight (#): 233 8  TBW % Change from start weight: 9%  Ideal Body Weight (#):128 75  Goal Weight (#):180     Weight Loss History  Previous weight loss attempts: Commercial Programs ([employer], [facility], etc )     Food and Nutrition Related History  Wake up: 6:30  Bed Time: 10 - 11     Food Recall  Breakfast: shake           Snack:  Lunch:shake  Snack:  Dinner:shake  Snack:160/15/13        Beverages: water  Volume of beverage intake: 80 oz     Weekends: Same  Cravings: "just real food"  Trouble area of day: late afternoon     Frequency of Eating out: irregularly  Food restrictions:none  Cooking: self   Food Shopping: self     Physical Activity Intake  Activity:none currently  Frequency:infrequently  Physical limitations/barriers to exercise: none     Estimated Needs  Energy     [employer] [place] Energy Needs: BMR : 4065   1-2# loss weekly sedentary:  1074 - 5893       1-2# loss weekly lightly active: 1376- 1876  Maintenance calories for sedentary activity level: 2074  Protein:70 - 88      (1 2-1 5g/kg IBW)  Fluid: 69     (35mL/kg IBW)     Nutrition Diagnosis  Yes;     Overweight/obesity  related to Excess energy intake as evidenced by  BMI more than normative standard for age and sex (obesity-grade III 36+)     Nutrition Intervention     Nutrition Prescription  Calories:760  Protein:96  Fluid:80     Meal Plan (Rom/Pro/Carb)  Breakfast: 200/27/10  Snack:  Lunch:200/27/10  Snack: 160/15/13  Dinner:200/27/10  Snack:     Nutrition Education:    Calorie controlled menu  Lean protein food choices  Healthy snack options  Food journaling tips        Nutrition Counseling:  Strategies: meal planning, portion sizes, healthy snack choices, hydration, fiber intake, protein intake, exercise, food journal        Monitoring and Evaluation:  Evaluation criteria:  Energy Intake  Meet protein needs  Maintain adequate hydration  Monitor weekly weight  Meal planning/preparation  Food journal   Decreased portions at mealtimes and snacks  Physical activity      Barriers to learning:none  Readiness to change: Action:  (Changing behavior)  Comprehension: good  Expected Compliance: good

## 2021-05-12 ENCOUNTER — TELEPHONE (OUTPATIENT)
Dept: BARIATRICS | Facility: CLINIC | Age: 46
End: 2021-05-12

## 2021-05-12 NOTE — TELEPHONE ENCOUNTER
----- Message from Jerry Lee PA-C sent at 5/12/2021  9:39 AM EDT -----  Please call patient and inform her that her magnesium and cmp are acceptable to continue vlcd

## 2021-05-12 NOTE — TELEPHONE ENCOUNTER
Left a message on patient's voicemail indicating her labs are ok to continue with VLCD and leaving my contact info in case she had specific questions

## 2021-05-20 ENCOUNTER — OFFICE VISIT (OUTPATIENT)
Dept: BARIATRICS | Facility: CLINIC | Age: 46
End: 2021-05-20
Payer: COMMERCIAL

## 2021-05-20 VITALS
SYSTOLIC BLOOD PRESSURE: 128 MMHG | BODY MASS INDEX: 36.32 KG/M2 | DIASTOLIC BLOOD PRESSURE: 86 MMHG | HEIGHT: 66 IN | WEIGHT: 226 LBS

## 2021-05-20 DIAGNOSIS — R63.5 ABNORMAL WEIGHT GAIN: ICD-10-CM

## 2021-05-20 PROCEDURE — VLCD

## 2021-05-20 PROCEDURE — RECHECK

## 2021-05-20 NOTE — PROGRESS NOTES
Weight Management Medical Nutrition Assessment  Amapro was here today for her 6 week VLCD follow-up   Today she weighs 226 0 lbs giving her a loss of 7 8 lbs in the last 2 weeks  Gisselle Pratt is tolerating product without difficulty and has no constipation at this time  Gisselle Pratt has been following the plan exactly, and is consuming 80 - 100 oz of water each day    She plans to continue at this time  BP taken        Patient seen by Medical Provider in past 6 months:  yes  Requested to schedule appointment with Medical Provider: No        Anthropometric Measurements  Start Weight (#): 256 1  Current Weight (#): 226 0  TBW % Change from start weight:11%  Ideal Body Weight (#):128 75  Goal Weight (#):180     Weight Loss History  Previous weight loss attempts: Commercial Programs ([employer], [id number], etc )     Food and Nutrition Related History  Wake up: 6:30  Bed Time: 10 - 11     Food Recall  Breakfast: shake           Snack:  Lunch:shake  Snack:  Dinner:shake  Snack:160/15/13        Beverages: water  Volume of beverage intake: 80 oz     Weekends: Same  Cravings: "just real food"  Trouble area of day: late afternoon     Frequency of Eating out: irregularly  Food restrictions:none  Cooking: self   Food Shopping: self     Physical Activity Intake  Activity:none currently  Frequency:infrequently  Physical limitations/barriers to exercise: none     Estimated Needs  Energy     [employer] [place] Energy Needs:  BMR : 2939   1-2# loss weekly sedentary:  1031 - 1531       1-2# loss weekly lightly active: 1376- 1876  Maintenance calories for sedentary activity level: 2031  Protein:70 - 88      (1 2-1 5g/kg IBW)  Fluid: 69     (35mL/kg IBW)     Nutrition Diagnosis  Yes;    Overweight/obesity  related to Excess energy intake as evidenced by  BMI more than normative standard for age and sex (obesity-grade III 36+)     Nutrition Intervention     Nutrition Prescription  Calories:760  Protein:96  Fluid:80     Meal Plan (Rom/Pro/Carb)  Breakfast: 200/27/10  Snack:  Lunch:200/27/10  Snack: 160/15/13  Dinner:200/27/10  Snack:     Nutrition Education:    Calorie controlled menu  Lean protein food choices  Healthy snack options  Food journaling tips        Nutrition Counseling:  Strategies: meal planning, portion sizes, healthy snack choices, hydration, fiber intake, protein intake, exercise, food journal        Monitoring and Evaluation:  Evaluation criteria:  Energy Intake  Meet protein needs  Maintain adequate hydration  Monitor weekly weight  Meal planning/preparation  Food journal   Decreased portions at mealtimes and snacks  Physical activity      Barriers to learning:none  Readiness to change: Action:  (Changing behavior)  Comprehension: good  Expected Compliance: good

## 2021-06-02 ENCOUNTER — HOSPITAL ENCOUNTER (INPATIENT)
Facility: HOSPITAL | Age: 46
LOS: 3 days | Discharge: HOME | DRG: 390 | End: 2021-06-05
Attending: EMERGENCY MEDICINE | Admitting: SPECIALIST
Payer: COMMERCIAL

## 2021-06-02 ENCOUNTER — APPOINTMENT (EMERGENCY)
Dept: CT IMAGING | Facility: HOSPITAL | Age: 46
DRG: 390 | End: 2021-06-02
Payer: COMMERCIAL

## 2021-06-02 DIAGNOSIS — R10.9 ABDOMINAL PAIN: Primary | ICD-10-CM

## 2021-06-02 DIAGNOSIS — K56.609 BOWEL OBSTRUCTION (HCC): ICD-10-CM

## 2021-06-02 DIAGNOSIS — E66.01 MORBID OBESITY WITH BMI OF 40.0-44.9, ADULT (HCC): ICD-10-CM

## 2021-06-02 DIAGNOSIS — I10 ESSENTIAL HYPERTENSION: ICD-10-CM

## 2021-06-02 PROBLEM — D72.829 LEUKOCYTOSIS: Status: ACTIVE | Noted: 2021-06-02

## 2021-06-02 LAB
ALBUMIN SERPL BCP-MCNC: 4.6 G/DL (ref 3.5–5.7)
ALP SERPL-CCNC: 99 U/L (ref 40–150)
ALT SERPL W P-5'-P-CCNC: 25 U/L (ref 7–52)
ANION GAP SERPL CALCULATED.3IONS-SCNC: 12 MMOL/L (ref 4–13)
AST SERPL W P-5'-P-CCNC: 17 U/L (ref 13–39)
BACTERIA UR QL AUTO: ABNORMAL /HPF
BASOPHILS # BLD AUTO: 0 THOUSANDS/ΜL (ref 0–0.1)
BASOPHILS NFR BLD AUTO: 0 % (ref 0–2)
BILIRUB SERPL-MCNC: 0.5 MG/DL (ref 0.2–1)
BILIRUB UR QL STRIP: ABNORMAL
BUN SERPL-MCNC: 21 MG/DL (ref 7–25)
CALCIUM SERPL-MCNC: 9.8 MG/DL (ref 8.6–10.5)
CHLORIDE SERPL-SCNC: 102 MMOL/L (ref 98–107)
CLARITY UR: ABNORMAL
CO2 SERPL-SCNC: 24 MMOL/L (ref 21–31)
COLOR UR: YELLOW
CREAT SERPL-MCNC: 0.86 MG/DL (ref 0.6–1.2)
EOSINOPHIL # BLD AUTO: 0.1 THOUSAND/ΜL (ref 0–0.61)
EOSINOPHIL NFR BLD AUTO: 1 % (ref 0–5)
ERYTHROCYTE [DISTWIDTH] IN BLOOD BY AUTOMATED COUNT: 15.3 % (ref 11.5–14.5)
EXT PREG TEST URINE: NORMAL
EXT. CONTROL ED NAV: NORMAL
GFR SERPL CREATININE-BSD FRML MDRD: 83 ML/MIN/1.73SQ M
GLUCOSE SERPL-MCNC: 123 MG/DL (ref 65–99)
GLUCOSE UR STRIP-MCNC: NEGATIVE MG/DL
HCT VFR BLD AUTO: 48.6 % (ref 42–47)
HGB BLD-MCNC: 16.3 G/DL (ref 12–16)
HGB UR QL STRIP.AUTO: ABNORMAL
KETONES UR STRIP-MCNC: ABNORMAL MG/DL
LACTATE SERPL-SCNC: 0.9 MMOL/L (ref 0.5–2)
LEUKOCYTE ESTERASE UR QL STRIP: NEGATIVE
LIPASE SERPL-CCNC: 14 U/L (ref 11–82)
LYMPHOCYTES # BLD AUTO: 1.7 THOUSANDS/ΜL (ref 0.6–4.47)
LYMPHOCYTES NFR BLD AUTO: 15 % (ref 21–51)
MCH RBC QN AUTO: 29.4 PG (ref 26–34)
MCHC RBC AUTO-ENTMCNC: 33.5 G/DL (ref 31–37)
MCV RBC AUTO: 88 FL (ref 81–99)
MONOCYTES # BLD AUTO: 0.8 THOUSAND/ΜL (ref 0.17–1.22)
MONOCYTES NFR BLD AUTO: 8 % (ref 2–12)
NEUTROPHILS # BLD AUTO: 8.4 THOUSANDS/ΜL (ref 1.4–6.5)
NEUTS SEG NFR BLD AUTO: 76 % (ref 42–75)
NITRITE UR QL STRIP: NEGATIVE
NON-SQ EPI CELLS URNS QL MICRO: ABNORMAL /HPF
PH UR STRIP.AUTO: 6 [PH]
PLATELET # BLD AUTO: 348 THOUSANDS/UL (ref 149–390)
PMV BLD AUTO: 8.1 FL (ref 8.6–11.7)
POTASSIUM SERPL-SCNC: 3.8 MMOL/L (ref 3.5–5.5)
PROT SERPL-MCNC: 8.1 G/DL (ref 6.4–8.9)
PROT UR STRIP-MCNC: ABNORMAL MG/DL
RBC # BLD AUTO: 5.54 MILLION/UL (ref 3.9–5.2)
RBC #/AREA URNS AUTO: ABNORMAL /HPF
SARS-COV-2 RNA RESP QL NAA+PROBE: NEGATIVE
SODIUM SERPL-SCNC: 138 MMOL/L (ref 134–143)
SP GR UR STRIP.AUTO: 1.02 (ref 1–1.03)
UROBILINOGEN UR QL STRIP.AUTO: 0.2 E.U./DL
WBC # BLD AUTO: 11.1 THOUSAND/UL (ref 4.8–10.8)
WBC #/AREA URNS AUTO: ABNORMAL /HPF

## 2021-06-02 PROCEDURE — 74177 CT ABD & PELVIS W/CONTRAST: CPT

## 2021-06-02 PROCEDURE — 36415 COLL VENOUS BLD VENIPUNCTURE: CPT | Performed by: PHYSICIAN ASSISTANT

## 2021-06-02 PROCEDURE — 99252 IP/OBS CONSLTJ NEW/EST SF 35: CPT | Performed by: NURSE PRACTITIONER

## 2021-06-02 PROCEDURE — U0003 INFECTIOUS AGENT DETECTION BY NUCLEIC ACID (DNA OR RNA); SEVERE ACUTE RESPIRATORY SYNDROME CORONAVIRUS 2 (SARS-COV-2) (CORONAVIRUS DISEASE [COVID-19]), AMPLIFIED PROBE TECHNIQUE, MAKING USE OF HIGH THROUGHPUT TECHNOLOGIES AS DESCRIBED BY CMS-2020-01-R: HCPCS

## 2021-06-02 PROCEDURE — 87040 BLOOD CULTURE FOR BACTERIA: CPT | Performed by: PHYSICIAN ASSISTANT

## 2021-06-02 PROCEDURE — 81025 URINE PREGNANCY TEST: CPT | Performed by: PHYSICIAN ASSISTANT

## 2021-06-02 PROCEDURE — 80053 COMPREHEN METABOLIC PANEL: CPT | Performed by: PHYSICIAN ASSISTANT

## 2021-06-02 PROCEDURE — 96361 HYDRATE IV INFUSION ADD-ON: CPT

## 2021-06-02 PROCEDURE — 99222 1ST HOSP IP/OBS MODERATE 55: CPT | Performed by: SPECIALIST

## 2021-06-02 PROCEDURE — 96375 TX/PRO/DX INJ NEW DRUG ADDON: CPT

## 2021-06-02 PROCEDURE — 83690 ASSAY OF LIPASE: CPT | Performed by: PHYSICIAN ASSISTANT

## 2021-06-02 PROCEDURE — U0005 INFEC AGEN DETEC AMPLI PROBE: HCPCS

## 2021-06-02 PROCEDURE — 81001 URINALYSIS AUTO W/SCOPE: CPT | Performed by: PHYSICIAN ASSISTANT

## 2021-06-02 PROCEDURE — 99285 EMERGENCY DEPT VISIT HI MDM: CPT | Performed by: PHYSICIAN ASSISTANT

## 2021-06-02 PROCEDURE — 99285 EMERGENCY DEPT VISIT HI MDM: CPT

## 2021-06-02 PROCEDURE — 96374 THER/PROPH/DIAG INJ IV PUSH: CPT

## 2021-06-02 PROCEDURE — 83605 ASSAY OF LACTIC ACID: CPT | Performed by: PHYSICIAN ASSISTANT

## 2021-06-02 PROCEDURE — 96376 TX/PRO/DX INJ SAME DRUG ADON: CPT

## 2021-06-02 PROCEDURE — 85025 COMPLETE CBC W/AUTO DIFF WBC: CPT | Performed by: PHYSICIAN ASSISTANT

## 2021-06-02 PROCEDURE — C9113 INJ PANTOPRAZOLE SODIUM, VIA: HCPCS | Performed by: SPECIALIST

## 2021-06-02 RX ORDER — LIDOCAINE HYDROCHLORIDE 10 MG/ML
10 INJECTION, SOLUTION EPIDURAL; INFILTRATION; INTRACAUDAL; PERINEURAL ONCE
Status: COMPLETED | OUTPATIENT
Start: 2021-06-02 | End: 2021-06-02

## 2021-06-02 RX ORDER — FENTANYL CITRATE 50 UG/ML
25 INJECTION, SOLUTION INTRAMUSCULAR; INTRAVENOUS ONCE
Status: COMPLETED | OUTPATIENT
Start: 2021-06-02 | End: 2021-06-02

## 2021-06-02 RX ORDER — ONDANSETRON 2 MG/ML
4 INJECTION INTRAMUSCULAR; INTRAVENOUS ONCE
Status: COMPLETED | OUTPATIENT
Start: 2021-06-02 | End: 2021-06-02

## 2021-06-02 RX ORDER — VILAZODONE HYDROCHLORIDE 10 MG/1
10 TABLET ORAL
Status: DISCONTINUED | OUTPATIENT
Start: 2021-06-03 | End: 2021-06-02

## 2021-06-02 RX ORDER — MORPHINE SULFATE 4 MG/ML
4 INJECTION, SOLUTION INTRAMUSCULAR; INTRAVENOUS EVERY 2 HOUR PRN
Status: DISCONTINUED | OUTPATIENT
Start: 2021-06-02 | End: 2021-06-05 | Stop reason: HOSPADM

## 2021-06-02 RX ORDER — DEXTROSE, SODIUM CHLORIDE, AND POTASSIUM CHLORIDE 5; .45; .15 G/100ML; G/100ML; G/100ML
75 INJECTION INTRAVENOUS CONTINUOUS
Status: DISCONTINUED | OUTPATIENT
Start: 2021-06-02 | End: 2021-06-05 | Stop reason: HOSPADM

## 2021-06-02 RX ORDER — PREGABALIN 75 MG/1
150 CAPSULE ORAL DAILY
Status: DISCONTINUED | OUTPATIENT
Start: 2021-06-02 | End: 2021-06-05 | Stop reason: HOSPADM

## 2021-06-02 RX ORDER — PANTOPRAZOLE SODIUM 40 MG/1
40 INJECTION, POWDER, FOR SOLUTION INTRAVENOUS
Status: DISCONTINUED | OUTPATIENT
Start: 2021-06-02 | End: 2021-06-05 | Stop reason: HOSPADM

## 2021-06-02 RX ORDER — ROPINIROLE 1 MG/1
2 TABLET, FILM COATED ORAL
Status: DISCONTINUED | OUTPATIENT
Start: 2021-06-02 | End: 2021-06-05

## 2021-06-02 RX ORDER — ONDANSETRON 2 MG/ML
4 INJECTION INTRAMUSCULAR; INTRAVENOUS EVERY 6 HOURS PRN
Status: DISCONTINUED | OUTPATIENT
Start: 2021-06-02 | End: 2021-06-05 | Stop reason: HOSPADM

## 2021-06-02 RX ORDER — HYDRALAZINE HYDROCHLORIDE 20 MG/ML
5 INJECTION INTRAMUSCULAR; INTRAVENOUS EVERY 6 HOURS PRN
Status: DISCONTINUED | OUTPATIENT
Start: 2021-06-02 | End: 2021-06-05 | Stop reason: HOSPADM

## 2021-06-02 RX ORDER — VILAZODONE HYDROCHLORIDE 10 MG/1
10 TABLET ORAL
Status: DISCONTINUED | OUTPATIENT
Start: 2021-06-03 | End: 2021-06-05

## 2021-06-02 RX ORDER — LORAZEPAM 2 MG/ML
0.5 INJECTION INTRAMUSCULAR EVERY 6 HOURS PRN
Status: DISCONTINUED | OUTPATIENT
Start: 2021-06-02 | End: 2021-06-05 | Stop reason: HOSPADM

## 2021-06-02 RX ADMIN — CARIPRAZINE 6 MG: 4.5 CAPSULE, GELATIN COATED ORAL at 16:53

## 2021-06-02 RX ADMIN — DEXTROSE, SODIUM CHLORIDE, AND POTASSIUM CHLORIDE 125 ML/HR: 5; .45; .15 INJECTION INTRAVENOUS at 15:52

## 2021-06-02 RX ADMIN — PREGABALIN 150 MG: 75 CAPSULE ORAL at 16:13

## 2021-06-02 RX ADMIN — ONDANSETRON 4 MG: 2 INJECTION INTRAMUSCULAR; INTRAVENOUS at 11:59

## 2021-06-02 RX ADMIN — MORPHINE SULFATE 4 MG: 4 INJECTION INTRAVENOUS at 23:39

## 2021-06-02 RX ADMIN — SODIUM CHLORIDE 1000 ML: 0.9 INJECTION, SOLUTION INTRAVENOUS at 09:48

## 2021-06-02 RX ADMIN — DEXTROSE, SODIUM CHLORIDE, AND POTASSIUM CHLORIDE 125 ML/HR: 5; .45; .15 INJECTION INTRAVENOUS at 23:45

## 2021-06-02 RX ADMIN — LORAZEPAM 0.5 MG: 2 INJECTION INTRAMUSCULAR; INTRAVENOUS at 22:00

## 2021-06-02 RX ADMIN — MORPHINE SULFATE 2 MG: 2 INJECTION, SOLUTION INTRAMUSCULAR; INTRAVENOUS at 09:44

## 2021-06-02 RX ADMIN — ROPINIROLE HYDROCHLORIDE 2 MG: 1 TABLET, FILM COATED ORAL at 22:28

## 2021-06-02 RX ADMIN — IOHEXOL 100 ML: 350 INJECTION, SOLUTION INTRAVENOUS at 10:15

## 2021-06-02 RX ADMIN — LIDOCAINE HYDROCHLORIDE 10 ML: 10 INJECTION, SOLUTION EPIDURAL; INFILTRATION; INTRACAUDAL; PERINEURAL at 11:25

## 2021-06-02 RX ADMIN — ONDANSETRON 4 MG: 2 INJECTION INTRAMUSCULAR; INTRAVENOUS at 09:44

## 2021-06-02 RX ADMIN — FENTANYL CITRATE 25 MCG: 50 INJECTION INTRAMUSCULAR; INTRAVENOUS at 11:59

## 2021-06-02 RX ADMIN — PANTOPRAZOLE SODIUM 40 MG: 40 INJECTION, POWDER, FOR SOLUTION INTRAVENOUS at 15:53

## 2021-06-02 RX ADMIN — SODIUM CHLORIDE 1000 ML: 0.9 INJECTION, SOLUTION INTRAVENOUS at 12:16

## 2021-06-02 RX ADMIN — Medication 1 SPRAY: at 16:56

## 2021-06-02 RX ADMIN — MORPHINE SULFATE 4 MG: 4 INJECTION INTRAVENOUS at 16:12

## 2021-06-02 NOTE — PLAN OF CARE
Problem: PAIN - ADULT  Goal: Verbalizes/displays adequate comfort level or baseline comfort level  Description: Interventions:  - Encourage patient to monitor pain and request assistance  - Assess pain using appropriate pain scale  - Administer analgesics based on type and severity of pain and evaluate response  - Implement non-pharmacological measures as appropriate and evaluate response  - Consider cultural and social influences on pain and pain management  - Notify physician/advanced practitioner if interventions unsuccessful or patient reports new pain  Outcome: Progressing     Problem: INFECTION - ADULT  Goal: Absence or prevention of progression during hospitalization  Description: INTERVENTIONS:  - Assess and monitor for signs and symptoms of infection  - Monitor lab/diagnostic results  - Monitor all insertion sites, i e  indwelling lines, tubes, and drains  - Monitor endotracheal if appropriate and nasal secretions for changes in amount and color  - [place] appropriate cooling/warming therapies per order  - Administer medications as ordered  - Instruct and encourage patient and family to use good hand hygiene technique  - Identify and instruct in appropriate isolation precautions for identified infection/condition  Outcome: Progressing  Goal: Absence of fever/infection during neutropenic period  Description: INTERVENTIONS:  - Monitor WBC    Outcome: Progressing     Problem: SAFETY ADULT  Goal: Patient will remain free of falls  Description: INTERVENTIONS:  - Assess patient frequently for physical needs  -  Identify cognitive and physical deficits and behaviors that affect risk of falls    -  [place] fall precautions as indicated by assessment   - Educate patient/family on patient safety including physical limitations  - Instruct patient to call for assistance with activity based on assessment  - Modify environment to reduce risk of injury  - Consider OT/PT consult to assist with strengthening/mobility  Outcome: Progressing  Goal: Maintain or return to baseline ADL function  Description: INTERVENTIONS:  -  Assess patient's ability to carry out ADLs; assess patient's baseline for ADL function and identify physical deficits which impact ability to perform ADLs (bathing, care of mouth/teeth, toileting, grooming, dressing, etc )  - Assess/evaluate cause of self-care deficits   - Assess range of motion  - Assess patient's mobility; develop plan if impaired  - Assess patient's need for assistive devices and provide as appropriate  - Encourage maximum independence but intervene and supervise when necessary  - Involve family in performance of ADLs  - Assess for home care needs following discharge   - Consider OT consult to assist with ADL evaluation and planning for discharge  - Provide patient education as appropriate  Outcome: Progressing  Goal: Maintain or return mobility status to optimal level  Description: INTERVENTIONS:  - Assess patient's baseline mobility status (ambulation, transfers, stairs, etc )    - Identify cognitive and physical deficits and behaviors that affect mobility  - Identify mobility aids required to assist with transfers and/or ambulation (gait belt, sit-to-stand, lift, walker, cane, etc )  - [place] fall precautions as indicated by assessment  - Record patient progress and toleration of activity level on Mobility SBAR; progress patient to next Phase/Stage  - Instruct patient to call for assistance with activity based on assessment  - Consider rehabilitation consult to assist with strengthening/weightbearing, etc   Outcome: Progressing     Problem: DISCHARGE PLANNING  Goal: Discharge to home or other facility with appropriate resources  Description: INTERVENTIONS:  - Identify barriers to discharge w/patient and caregiver  - Arrange for needed discharge resources and transportation as appropriate  - Identify discharge learning needs (meds, wound care, etc )  - Arrange for interpretive services to assist at discharge as needed  - Refer to Case Management Department for coordinating discharge planning if the patient needs post-hospital services based on physician/advanced practitioner order or complex needs related to functional status, cognitive ability, or social support system  Outcome: Progressing     Problem: Knowledge Deficit  Goal: Patient/family/caregiver demonstrates understanding of disease process, treatment plan, medications, and discharge instructions  Description: Complete learning assessment and assess knowledge base    Interventions:  - Provide teaching at level of understanding  - Provide teaching via preferred learning methods  Outcome: Progressing

## 2021-06-02 NOTE — PLAN OF CARE
Problem: PAIN - ADULT  Goal: Verbalizes/displays adequate comfort level or baseline comfort level  Description: Interventions:  - Encourage patient to monitor pain and request assistance  - Assess pain using appropriate pain scale  - Administer analgesics based on type and severity of pain and evaluate response  - Implement non-pharmacological measures as appropriate and evaluate response  - Consider cultural and social influences on pain and pain management  - Notify physician/advanced practitioner if interventions unsuccessful or patient reports new pain  6/2/2021 1527 by Bj Arteaga RN  Outcome: Progressing  6/2/2021 1458 by Bj Arteaga RN  Outcome: Progressing     Problem: INFECTION - ADULT  Goal: Absence or prevention of progression during hospitalization  Description: INTERVENTIONS:  - Assess and monitor for signs and symptoms of infection  - Monitor lab/diagnostic results  - Monitor all insertion sites, i e  indwelling lines, tubes, and drains  - Monitor endotracheal if appropriate and nasal secretions for changes in amount and color  - [place] appropriate cooling/warming therapies per order  - Administer medications as ordered  - Instruct and encourage patient and family to use good hand hygiene technique  - Identify and instruct in appropriate isolation precautions for identified infection/condition  6/2/2021 1527 by Bj Arteaga RN  Outcome: Progressing  6/2/2021 1458 by Bj Arteaga RN  Outcome: Progressing  Goal: Absence of fever/infection during neutropenic period  Description: INTERVENTIONS:  - Monitor WBC    6/2/2021 1527 by Bj Arteaga RN  Outcome: Progressing  6/2/2021 1458 by Bj Arteaga RN  Outcome: Progressing     Problem: SAFETY ADULT  Goal: Patient will remain free of falls  Description: INTERVENTIONS:  - Assess patient frequently for physical needs  -  Identify cognitive and physical deficits and behaviors that affect risk of falls    -  [place] fall precautions as indicated by assessment   - Educate patient/family on patient safety including physical limitations  - Instruct patient to call for assistance with activity based on assessment  - Modify environment to reduce risk of injury  - Consider OT/PT consult to assist with strengthening/mobility  6/2/2021 1527 by Indu Monte RN  Outcome: Progressing  6/2/2021 1458 by Indu Monte RN  Outcome: Progressing  Goal: Maintain or return to baseline ADL function  Description: INTERVENTIONS:  -  Assess patient's ability to carry out ADLs; assess patient's baseline for ADL function and identify physical deficits which impact ability to perform ADLs (bathing, care of mouth/teeth, toileting, grooming, dressing, etc )  - Assess/evaluate cause of self-care deficits   - Assess range of motion  - Assess patient's mobility; develop plan if impaired  - Assess patient's need for assistive devices and provide as appropriate  - Encourage maximum independence but intervene and supervise when necessary  - Involve family in performance of ADLs  - Assess for home care needs following discharge   - Consider OT consult to assist with ADL evaluation and planning for discharge  - Provide patient education as appropriate  6/2/2021 1527 by Indu Monte RN  Outcome: Progressing  6/2/2021 1458 by Indu Monte RN  Outcome: Progressing  Goal: Maintain or return mobility status to optimal level  Description: INTERVENTIONS:  - Assess patient's baseline mobility status (ambulation, transfers, stairs, etc )    - Identify cognitive and physical deficits and behaviors that affect mobility  - Identify mobility aids required to assist with transfers and/or ambulation (gait belt, sit-to-stand, lift, walker, cane, etc )  - [place] fall precautions as indicated by assessment  - Record patient progress and toleration of activity level on Mobility SBAR; progress patient to next Phase/Stage  - Instruct patient to call for assistance with activity based on assessment  - Consider rehabilitation consult to assist with strengthening/weightbearing, etc   6/2/2021 1527 by Wolfgang Mackey RN  Outcome: Progressing  6/2/2021 1458 by Wolfgang Mackey RN  Outcome: Progressing     Problem: DISCHARGE PLANNING  Goal: Discharge to home or other facility with appropriate resources  Description: INTERVENTIONS:  - Identify barriers to discharge w/patient and caregiver  - Arrange for needed discharge resources and transportation as appropriate  - Identify discharge learning needs (meds, wound care, etc )  - Arrange for interpretive services to assist at discharge as needed  - Refer to Case Management Department for coordinating discharge planning if the patient needs post-hospital services based on physician/advanced practitioner order or complex needs related to functional status, cognitive ability, or social support system  6/2/2021 1527 by Wolfgang Mackey RN  Outcome: Progressing  6/2/2021 1458 by Wolfgang Mackey RN  Outcome: Progressing     Problem: Knowledge Deficit  Goal: Patient/family/caregiver demonstrates understanding of disease process, treatment plan, medications, and discharge instructions  Description: Complete learning assessment and assess knowledge base    Interventions:  - Provide teaching at level of understanding  - Provide teaching via preferred learning methods  6/2/2021 1527 by Wolfgang Mackey RN  Outcome: Progressing  6/2/2021 1458 by Wolfgang Mackey RN  Outcome: Progressing     Problem: GASTROINTESTINAL - ADULT  Goal: Minimal or absence of nausea and/or vomiting  Description: INTERVENTIONS:  - Administer IV fluids if ordered to ensure adequate hydration  - Maintain NPO status until nausea and vomiting are resolved  - Nasogastric tube if ordered  - Administer ordered antiemetic medications as needed  - Provide nonpharmacologic comfort measures as appropriate  - Advance diet as tolerated, if ordered  - Consider nutrition services referral to assist patient with adequate nutrition and appropriate food choices  Outcome: Progressing  Goal: Maintains or returns to baseline bowel function  Description: INTERVENTIONS:  - Assess bowel function  - Encourage oral fluids to ensure adequate hydration  - Administer IV fluids if ordered to ensure adequate hydration  - Administer ordered medications as needed  - Encourage mobilization and activity  - Consider nutritional services referral to assist patient with adequate nutrition and appropriate food choices  Outcome: Progressing  Goal: Maintains adequate nutritional intake  Description: INTERVENTIONS:  - Monitor percentage of each meal consumed  - Identify factors contributing to decreased intake, treat as appropriate  - Assist with meals as needed  - Monitor I&O, weight, and lab values if indicated  - Obtain nutrition services referral as needed  Outcome: Progressing  Goal: Establish and maintain optimal ostomy function  Description: INTERVENTIONS:  - Assess bowel function  - Encourage oral fluids to ensure adequate hydration  - Administer IV fluids if ordered to ensure adequate hydration   - Administer ordered medications as needed  - Encourage mobilization and activity  - Nutrition services referral to assist patient with appropriate food choices  - Assess stoma site  - Consider wound care consult   Outcome: Progressing     Problem: METABOLIC, FLUID AND ELECTROLYTES - ADULT  Goal: Electrolytes maintained within normal limits  Description: INTERVENTIONS:  - Monitor labs and assess patient for signs and symptoms of electrolyte imbalances  - Administer electrolyte replacement as ordered  - Monitor response to electrolyte replacements, including repeat lab results as appropriate  - Instruct patient on fluid and nutrition as appropriate  Outcome: Progressing  Goal: Fluid balance maintained  Description: INTERVENTIONS:  - Monitor labs   - Monitor I/O and WT  - Instruct patient on fluid and nutrition as appropriate  - Assess for signs & symptoms of volume excess or deficit  Outcome: Progressing  Goal: Glucose maintained within target range  Description: INTERVENTIONS:  - Monitor Blood Glucose as ordered  - Assess for signs and symptoms of hyperglycemia and hypoglycemia  - Administer ordered medications to maintain glucose within target range  - Assess nutritional intake and initiate nutrition service referral as needed  Outcome: Progressing     Problem: HEMATOLOGIC - ADULT  Goal: Maintains hematologic stability  Description: INTERVENTIONS  - Assess for signs and symptoms of bleeding or hemorrhage  - Monitor labs  - Administer supportive blood products/factors as ordered and appropriate  Outcome: Progressing

## 2021-06-02 NOTE — ASSESSMENT & PLAN NOTE
· BP appears to be relatively well controlled  · Will hold off on lisinopril for now  · Will add p r n   Hydralazine with holding parameters

## 2021-06-02 NOTE — CONSULTS
LULY Viktoria Sullivan [address] [facility] 1975, 39 y o  female MRN: [id number]  Unit/Bed#: -01 Encounter: [id number]  Primary Care Provider: Marvin Luo DO   Date and time admitted to hospital: 6/2/2021  9:29 AM    Inpatient consult to Internal Medicine  Consult performed by: LEATHA Good  Consult ordered by: Margairta Rosen MD          * Small bowel obstruction [facility])  Assessment & Plan  · CT abdomen and pelvis shows high-grade small-bowel obstruction with mild mesenteric edema indicative of ischemia  · Recurrent small-bowel obstruction  · Will continue treatment per primary team  · Keep NPO  · Aggressive IV fluid  · NGT  · Supportive measures Zofran    Leukocytosis  Assessment & Plan  · Likely due to small bowel obstruction  · The patient is afebrile  · Follow-up with blood work in the a m  Gastroesophageal reflux disease without esophagitis  Assessment & Plan  · Continue with PPI IV    Essential hypertension  Assessment & Plan  · BP appears to be relatively well controlled  · Will hold off on lisinopril for now  · Will add p r n  Hydralazine with holding parameters    Bipolar 1 disorder, depressed, severe ([facility] [address] )  Assessment & Plan  · Surgical team is okay for the patient to continue receiving her medications via NGT  · Will continue with Vraylar and Vibryd       Recommendations for Discharge:  · Per Primary Service    Thank you for letting us participate in the patient's care  Internal Medicine will continue to follow the patient along with you  History of Present Illness:  Suleman Mahmood is a 39 y o  female who is originally admitted to the surgery service due to recurrent small-bowel obstruction  We are consulted for medical management  The patient presented with an acute onset of abdominal pain, nausea, vomiting and nonbloody loose stool this a m     The patient with past medical history of recurrent small-bowel obstruction    CT of abdomen and pelvis shows high-grade small-bowel obstruction and subsequently admitted under surgical service  The patient was seen examined in her bed  She states that nausea is much improved currently however the patient continues to have cramping pain on her abdomen  She is concerned about not be able to take medications for her bipolar  She denies any chest pain, palpitation, lightheadedness or dizziness  Denies any urinary symptoms       Review of Systems:  Review of Systems   Constitutional: Negative for activity change, appetite change, chills, diaphoresis and fever  HENT: Negative for congestion, ear pain, hearing loss, tinnitus and trouble swallowing  Eyes: Negative for photophobia, pain, discharge, itching and visual disturbance  Respiratory: Negative for cough, shortness of breath, wheezing and stridor  Cardiovascular: Negative for chest pain, palpitations and leg swelling  Gastrointestinal: Positive for abdominal pain, diarrhea, nausea and vomiting  Negative for blood in stool and constipation  Endocrine: Negative for cold intolerance, heat intolerance, polydipsia, polyphagia and polyuria  Genitourinary: Negative for difficulty urinating, dysuria, frequency, hematuria and urgency  Musculoskeletal: Negative for back pain, gait problem and neck stiffness  Skin: Negative for pallor, rash and wound  Allergic/Immunologic: Negative for environmental allergies, food allergies and immunocompromised state  Neurological: Negative for dizziness, tremors, speech difficulty, weakness, light-headedness, numbness and headaches  Hematological: Negative for adenopathy  Does not bruise/bleed easily  Psychiatric/Behavioral: Negative for confusion, hallucinations and sleep disturbance         Past Medical and Surgical History:   Past Medical History:   Diagnosis Date    Anxiety     Arthritis     Bipolar 1 disorder ([facility] )     with bordeline personality    Borderline personality disorder ([facility] )     Chronic headaches     Depression     GERD (gastroesophageal reflux disease)     Hypertension     Incontinence     Migraine     Morbid obesity with BMI of 40 0-44 9, adult (HCC)     Small bowel obstruction ([facility] [address] ) 6/27/2019    Suicide attempt ([facility] [address] )     Symptomatic bradycardia     Urinary tract infection        Past Surgical History:   Procedure Laterality Date    CHOLECYSTECTOMY  05/24/2018    DENTAL SURGERY      ELBOW SURGERY      EXPLORATORY LAPAROTOMY      exlap    FOOT SURGERY Right     KNEE ARTHROSCOPY Right 11/15/2018    total of three procedures with meniscal surgery    MULTIPLE TOOTH EXTRACTIONS  11/02/2018    OVARIAN CYST REMOVAL      REDUCTION MAMMAPLASTY      reduction     SINUS SURGERY Bilateral 04/19/2019    bilateral maxillary antrostomies - Dr Cl Davis - [facility]       Meds/Allergies:  all medications and allergies reviewed    Allergies: Allergies   Allergen Reactions    Celecoxib Hives    Lamotrigine Rash and Hives     Other reaction(s): rash and itching  Other reaction(s): rash and itching  Other reaction(s): rash and itching       Social History:     Marital Status: Single    Substance Use History:   Social History     Substance and Sexual Activity   Alcohol Use Yes    Frequency: Never    Comment: rarely     Social History     Tobacco Use   Smoking Status Never Smoker   Smokeless Tobacco Never Used     Social History     Substance and Sexual Activity   Drug Use Never       Family History:  I have reviewed the patients family history    Physical Exam:   Vitals:   Blood Pressure: 129/81 (06/02/21 1533)  Pulse: 65 (06/02/21 1533)  Temperature: (!) 96 9 °F (36 1 °C) (06/02/21 1533)  Temp Source: Temporal (06/02/21 1533)  Respirations: 18 (06/02/21 1533)  Height: 5' 5" (165 1 cm) (06/02/21 1411)  Weight - Scale: 103 kg (227 lb 1 2 oz) (06/02/21 1411)  SpO2: 95 % (06/02/21 1533)    Physical Exam  Vitals signs and nursing note reviewed     Constitutional:       General: She is not in acute distress  Appearance: Normal appearance  HENT:      Head: Normocephalic and atraumatic  Right Ear: External ear normal       Left Ear: External ear normal       Nose: Nose normal  No rhinorrhea  Mouth/Throat:      Mouth: Mucous membranes are moist       Pharynx: Oropharynx is clear  Eyes:      General:         Right eye: No discharge  Left eye: No discharge  Pupils: Pupils are equal, round, and reactive to light  Neck:      Musculoskeletal: Normal range of motion and neck supple  No muscular tenderness  Cardiovascular:      Rate and Rhythm: Normal rate and regular rhythm  Pulses: Normal pulses  Heart sounds: Normal heart sounds  No murmur  Pulmonary:      Effort: Pulmonary effort is normal  No respiratory distress  Breath sounds: Normal breath sounds  Abdominal:      General: Bowel sounds are normal  There is no distension  Palpations: Abdomen is soft  There is no mass  Tenderness: There is abdominal tenderness  Comments: NGT in left nare- low intermittent wall suction    Musculoskeletal: Normal range of motion  General: No swelling or tenderness  Skin:     General: Skin is warm and dry  Capillary Refill: Capillary refill takes less than 2 seconds  Findings: No erythema or rash  Neurological:      General: No focal deficit present  Mental Status: She is alert and oriented to person, place, and time  Mental status is at baseline  Psychiatric:         Mood and Affect: Mood normal          Behavior: Behavior normal          Thought Content: Thought content normal          Judgment: Judgment normal          Additional Data:   Lab Results: I have personally reviewed pertinent reports        Results from last 7 days   Lab Units 06/02/21  0935   WBC Thousand/uL 11 10*   HEMOGLOBIN g/dL 16 3*   HEMATOCRIT % 48 6*   PLATELETS Thousands/uL 348   NEUTROS PCT % 76*   LYMPHS PCT % 15*   MONOS PCT % 8   EOS PCT % 1     Results from last 7 days   Lab Units 06/02/21  0935   SODIUM mmol/L 138   POTASSIUM mmol/L 3 8   CHLORIDE mmol/L 102   CO2 mmol/L 24   BUN mg/dL 21   CREATININE mg/dL 0 86   CALCIUM mg/dL 9 8   ALK PHOS U/L 99   ALT U/L 25   AST U/L 17             Lab Results   Component Value Date/Time    HGBA1C 5 8 (H) 10/06/2020 08:03 AM    HGBA1C 5 2 02/21/2020 10:22 AM           Imaging: I have personally reviewed pertinent reports  CT abdomen pelvis with contrast   Final Result by Maryalice Goltz, MD (06/02 1106)      High-grade small bowel obstruction, with mild mesenteric edema indicative of ischemia  No evidence of bowel pneumatosis or portal venous gas  I personally discussed this study with Ebony Adkins on 6/2/2021 at 11:07 AM                    Workstation performed: [id number]             EKG, Pathology, and Other Studies Reviewed on Admission:   · EKG:  Normal sinus rhythm heart rate 63    ** Please Note: This note has been constructed using a voice recognition system   **

## 2021-06-02 NOTE — ED PROVIDER NOTES
History  Chief Complaint   Patient presents with    Abdominal Pain     According to the aptient she has had abdominal tenderness umbilical region since 7am with N/V/D     This is a 53-year-old female patient who has a history of small-bowel obstruction and adhesions  Patient started with intermittent cramping pain of her lower abdomen from 0700 hours last night  The pain is more periumbilical and not localize right or left  This is accompanied with nonbloody diarrhea  She has had bouts of nausea but she told me no vomiting  She told the nurse positive vomiting  Nothing makes it better or worse she has tried nothing over-the-counter  Denies any fever chills headache blurred vision double vision chest pain or shortness of breath  No urgency frequency or dysuria  No vaginal bleeding or discharge  Differential diagnosis includes not limited to bowel obstruction, appendicitis, diverticulitis, gastroenteritis, colitis less likely, ectopic pregnancy less likely, PID less likely  Prior to Admission Medications   Prescriptions Last Dose Informant Patient Reported? Taking?    ALPRAZolam (XANAX) 0 25 mg tablet 2021 at Unknown time Self Yes Yes   Sig: Take 0 25 mg by mouth As needed daily   PROAIR  (90 Base) MCG/ACT inhaler 2021 at Unknown time Self Yes Yes   Sig: Inhale every 4 (four) hours as needed (as needed)     Vraylar 3 MG capsule 2021 at Unknown time Self Yes Yes   Si mg daily 9 mgms daily   butalbital-acetaminophen-caffeine (FIORICET,ESGIC) -40 mg per tablet Unknown at Unknown time  Yes No   Sig: butalbital-acetaminophen-caffeine 50 mg-325 mg-40 mg tablet   cariprazine (Vraylar) 1 5 MG capsule   Yes No   cariprazine (Vraylar) 6 MG capsule 2021 at Unknown time  Yes Yes   Sig: Take 6 mg by mouth daily   cyclobenzaprine (FLEXERIL) 5 mg tablet Unknown at Unknown time  Yes No   Sig: As needed   dexlansoprazole (Dexilant) 60 MG capsule 2021 at Unknown time  Yes Yes Sig: daily    famotidine (PEPCID) 40 MG tablet 2021 at Unknown time  Yes Yes   Sig: Take 40 mg by mouth bedtime   fluticasone (FLONASE) 50 mcg/act nasal spray  Self No No   Si sprays into each nostril daily   Patient taking differently: 2 sprays into each nostril daily As needed   linaCLOtide (LINZESS PO) 2021 at Unknown time Self Yes Yes   Sig: Take 290 mcg by mouth daily before breakfast   lisinopril (ZESTRIL) 10 mg tablet 2021 at Unknown time Self Yes Yes   Sig: Take 10 mg by mouth daily   ondansetron (ZOFRAN-ODT) 4 mg disintegrating tablet 2021 at Unknown time Self No Yes   Sig: Take 1 tablet (4 mg total) by mouth every 8 (eight) hours as needed for nausea or vomiting   pregabalin (LYRICA) 150 mg capsule 2021 at Unknown time Self Yes Yes   Sig: Take 150 mg by mouth daily Daily at bedtime   rOPINIRole (REQUIP) 2 mg tablet 2021 at Unknown time  Yes Yes   valACYclovir (VALTREX) 500 mg tablet 2021 at Unknown time Self Yes Yes   Sig: Take 500 mg by mouth daily   vilazodone (VIIBRYD) 10 mg tablet 2021 at Unknown time Self No Yes   Sig: Take 1 tablet (10 mg total) by mouth daily with breakfast   Patient taking differently: Take 20 mg by mouth daily with breakfast       Facility-Administered Medications: None       Past Medical History:   Diagnosis Date    Anxiety     Arthritis     Bipolar 1 disorder (HCC)     with bordeline personality    Borderline personality disorder ([facility] [address] )     Chronic headaches     Depression     GERD (gastroesophageal reflux disease)     Hypertension     Incontinence     Migraine     Morbid obesity with BMI of 40 0-44 9, adult (HCC)     Small bowel obstruction ([facility] [address] ) 2019    Suicide attempt ([facility] [address] )     Symptomatic bradycardia     Urinary tract infection        Past Surgical History:   Procedure Laterality Date    CHOLECYSTECTOMY  2018    DENTAL SURGERY      ELBOW SURGERY      EXPLORATORY LAPAROTOMY      exlap    FOOT SURGERY Right  KNEE ARTHROSCOPY Right 11/15/2018    total of three procedures with meniscal surgery    MULTIPLE TOOTH EXTRACTIONS  11/02/2018    OVARIAN CYST REMOVAL      REDUCTION MAMMAPLASTY      reduction     SINUS SURGERY Bilateral 04/19/2019    bilateral maxillary antrostomies - Dr Alonzo Sadler - [facility]       Family History   Problem Relation Age of Onset    Hyperlipidemia Father     Hypertension Father     Migraines Mother     Depression Family     Heart disease Maternal Aunt     Heart disease Maternal Uncle     Diabetes Paternal Aunt     Diabetes Paternal Uncle     Breast cancer Maternal Grandmother     Diabetes Paternal Grandmother     Diabetes Paternal Grandfather     Stroke Neg Hx     Thyroid disease Neg Hx      I have reviewed and agree with the history as documented  E-Cigarette/Vaping    E-Cigarette Use Never User      E-Cigarette/Vaping Substances    Nicotine No     THC No     CBD No     Flavoring No     Other No     Unknown No      Social History     Tobacco Use    Smoking status: Never Smoker    Smokeless tobacco: Never Used   Substance Use Topics    Alcohol use: Yes     Frequency: Never     Comment: rarely    Drug use: Never       Review of Systems   Constitutional: Negative for fatigue and fever  HENT: Negative for congestion and hearing loss  Eyes: Negative for photophobia and pain  Respiratory: Negative for cough and chest tightness  Cardiovascular: Negative for chest pain and leg swelling  Gastrointestinal: Positive for abdominal pain and diarrhea  Negative for abdominal distention, anal bleeding, blood in stool, constipation, nausea, rectal pain and vomiting  Endocrine: Negative for polydipsia and polyphagia  Genitourinary: Negative for dysuria and frequency  Musculoskeletal: Negative for arthralgias and gait problem  Skin: Negative for pallor and rash  Allergic/Immunologic: Negative for environmental allergies and food allergies     Neurological: Negative for dizziness and headaches  Psychiatric/Behavioral: Negative for agitation and confusion  Physical Exam  Physical Exam  Vitals signs and nursing note reviewed  Constitutional:       General: She is not in acute distress  Appearance: She is well-developed  She is obese  She is not ill-appearing, toxic-appearing or diaphoretic  HENT:      Head: Normocephalic and atraumatic  Right Ear: Tympanic membrane, ear canal and external ear normal       Left Ear: Tympanic membrane, ear canal and external ear normal       Nose: Nose normal       Mouth/Throat:      Mouth: Mucous membranes are moist       Pharynx: Oropharynx is clear  No oropharyngeal exudate or posterior oropharyngeal erythema  Eyes:      Conjunctiva/sclera: Conjunctivae normal       Pupils: Pupils are equal, round, and reactive to light  Neck:      Musculoskeletal: Normal range of motion and neck supple  Cardiovascular:      Rate and Rhythm: Normal rate and regular rhythm  Pulmonary:      Effort: Pulmonary effort is normal       Breath sounds: Normal breath sounds  Abdominal:      General: Bowel sounds are normal       Palpations: Abdomen is soft  Tenderness: There is abdominal tenderness in the periumbilical area  There is guarding  There is no rebound  Musculoskeletal: Normal range of motion  Right lower leg: No edema  Left lower leg: No edema  Comments: Patient has old scars on rib from self-mutilation when she was younger  Skin:     General: Skin is warm  Capillary Refill: Capillary refill takes less than 2 seconds  Neurological:      General: No focal deficit present  Mental Status: She is alert and oriented to person, place, and time  Mental status is at baseline     Psychiatric:         Behavior: Behavior normal          Vital Signs  ED Triage Vitals [06/02/21 0908]   Temperature Pulse Respirations Blood Pressure SpO2   (!) 97 4 °F (36 3 °C) 96 18 130/94 98 %      Temp Source Heart Rate Source Patient Position - Orthostatic VS BP Location FiO2 (%)   Tympanic Monitor Lying Right arm --      Pain Score       8           Vitals:    06/02/21 1300 06/02/21 1330 06/02/21 1411 06/02/21 1533   BP: 130/79 135/71 138/89 129/81   Pulse: 55 69 58 65   Patient Position - Orthostatic VS: Lying  Lying Lying         Visual Acuity      ED Medications  Medications   dextrose 5 % and sodium chloride 0 45 % with KCl 20 mEq/L infusion (125 mL/hr Intravenous New Bag 6/2/21 1552)   ondansetron (ZOFRAN) injection 4 mg (has no administration in time range)   enoxaparin (LOVENOX) subcutaneous injection 40 mg (40 mg Subcutaneous Not Given 6/2/21 1553)   morphine (PF) 4 mg/mL injection 4 mg (4 mg Intravenous Given 6/2/21 1612)   pantoprazole (PROTONIX) injection 40 mg (40 mg Intravenous Given 6/2/21 1553)   phenol (CHLORASEPTIC) 1 4 % mucosal liquid 1 spray (1 spray Mouth/Throat Given 6/2/21 1656)   pregabalin (LYRICA) capsule 150 mg (150 mg Oral Given 6/2/21 1613)   rOPINIRole (REQUIP) tablet 2 mg (has no administration in time range)   vilazodone (VIIBRYD) tablet 10 mg (has no administration in time range)   cariprazine (VRAYLAR) capsule 6 mg (6 mg Per NG Tube Given 6/2/21 1653)   hydrALAZINE (APRESOLINE) injection 5 mg (has no administration in time range)   LORazepam (ATIVAN) injection 0 5 mg (has no administration in time range)   sodium chloride 0 9 % bolus 1,000 mL (0 mL Intravenous Stopped 6/2/21 1048)   morphine injection 2 mg (2 mg Intravenous Given 6/2/21 0944)   ondansetron (ZOFRAN) injection 4 mg (4 mg Intravenous Given 6/2/21 0944)   iohexol (OMNIPAQUE) 350 MG/ML injection (MULTI-DOSE) 100 mL (100 mL Intravenous Given 6/2/21 1015)   lidocaine (PF) (XYLOCAINE-MPF) 1 % injection 10 mL (10 mL Infiltration Given 6/2/21 1125)   sodium chloride 0 9 % bolus 1,000 mL (0 mL Intravenous Stopped 6/2/21 1316)   ondansetron (ZOFRAN) injection 4 mg (4 mg Intravenous Given 6/2/21 4285)   fentanyl citrate (PF) 100 MCG/2ML 25 mcg (25 mcg Intravenous Given 6/2/21 1159)       Diagnostic Studies  Results Reviewed     Procedure Component Value Units Date/Time    Novel Coronavirus Dolores [facility]AS [facility] [[id number]]  (Normal) Collected: 06/02/21 1233    Lab Status: Final result Specimen: Nares from Nasopharyngeal Swab Updated: 06/02/21 1333     SARS-CoV-2 Negative    Narrative: The specimen collection materials, transport medium, and/or testing methodology utilized in the production of these test results have been proven to be reliable in a limited validation with an abbreviated program under the Emergency Utilization Authorization provided by the FDA  Testing reported as "Presumptive positive" will be confirmed with secondary testing to ensure result accuracy  Clinical caution and judgement should be used with the interpretation of these results with consideration of the clinical impression and other laboratory testing  Testing reported as "Positive" or "Negative" has been proven to be accurate according to standard laboratory validation requirements  All testing is performed with control materials showing appropriate reactivity at standard intervals  Blood culture #1 [[id number]] Collected: 06/02/21 0935    Lab Status: Preliminary result Specimen: Blood from Arm, Left Updated: 06/02/21 1301     Blood Culture Received in Microbiology Lab  Culture in Progress  Blood culture #2 [[id number]] Collected: 06/02/21 0935    Lab Status: Preliminary result Specimen: Blood from Arm, Right Updated: 06/02/21 1301     Blood Culture Received in Microbiology Lab  Culture in Progress      Urine Microscopic [[id number]]  (Abnormal) Collected: 06/02/21 0943    Lab Status: Final result Specimen: Urine, Clean Catch Updated: 06/02/21 1008     RBC, UA 1-2 /hpf      WBC, UA 1-2 /hpf      Epithelial Cells Moderate /hpf      Bacteria, UA Moderate /hpf     Comprehensive metabolic panel [[id number]]  (Abnormal) Collected: 06/02/21 0935    Lab Status: Final result Specimen: Blood from Arm, Left Updated: 06/02/21 1003     Sodium 138 mmol/L      Potassium 3 8 mmol/L      Chloride 102 mmol/L      CO2 24 mmol/L      ANION GAP 12 mmol/L      BUN 21 mg/dL      Creatinine 0 86 mg/dL      Glucose 123 mg/dL      Calcium 9 8 mg/dL      AST 17 U/L      ALT 25 U/L      Alkaline Phosphatase 99 U/L      Total Protein 8 1 g/dL      Albumin 4 6 g/dL      Total Bilirubin 0 50 mg/dL      eGFR 83 ml/min/1 73sq m     Narrative:      [place] guidelines for Chronic Kidney Disease (CKD):     Stage 1 with normal or high GFR (GFR > 90 mL/min/1 73 square meters)    Stage 2 Mild CKD (GFR = 60-89 mL/min/1 73 square meters)    Stage 3A Moderate CKD (GFR = 45-59 mL/min/1 73 square meters)    Stage 3B Moderate CKD (GFR = 30-44 mL/min/1 73 square meters)    Stage 4 Severe CKD (GFR = 15-29 mL/min/1 73 square meters)    Stage 5 End Stage CKD (GFR <15 mL/min/1 73 square meters)  Note: GFR calculation is accurate only with a steady state creatinine    UA w Reflex to Microscopic w Reflex to Culture [[id number]]  (Abnormal) Collected: 06/02/21 0943    Lab Status: Final result Specimen: Urine, Clean Catch Updated: 06/02/21 1003     Color, UA Yellow     Clarity, UA Slightly Cloudy     Specific Gravity, UA 1 025     pH, UA 6 0     Leukocytes, UA Negative     Nitrite, UA Negative     Protein, UA Trace mg/dl      Glucose, UA Negative mg/dl      Ketones, UA Trace mg/dl      Urobilinogen, UA 0 2 E U /dl      Bilirubin, UA 1+     Blood, UA 2+    Lipase [[id number]]  (Normal) Collected: 06/02/21 0935    Lab Status: Final result Specimen: Blood from Arm, Left Updated: 06/02/21 1002     Lipase 14 u/L     Lactic acid [[id number]]  (Normal) Collected: 06/02/21 0935    Lab Status: Final result Specimen: Blood from Arm, Left Updated: 06/02/21 1002     LACTIC ACID 0 9 mmol/L     Narrative:      Result may be elevated if tourniquet was used during collection      POCT pregnancy, urine [[id number]] (Normal) Resulted: 06/02/21 0948    Lab Status: Final result Updated: 06/02/21 0950     EXT PREG TEST UR (Ref: Negative) negative lot:[id number] exp:10/31/22     Control valid    CBC and differential [[id number]]  (Abnormal) Collected: 06/02/21 0935    Lab Status: Final result Specimen: Blood from Arm, Left Updated: 06/02/21 0944     WBC 11 10 Thousand/uL      RBC 5 54 Million/uL      Hemoglobin 16 3 g/dL      Hematocrit 48 6 %      MCV 88 fL      MCH 29 4 pg      MCHC 33 5 g/dL      RDW 15 3 %      MPV 8 1 fL      Platelets 597 Thousands/uL      Neutrophils Relative 76 %      Lymphocytes Relative 15 %      Monocytes Relative 8 %      Eosinophils Relative 1 %      Basophils Relative 0 %      Neutrophils Absolute 8 40 Thousands/µL      Lymphocytes Absolute 1 70 Thousands/µL      Monocytes Absolute 0 80 Thousand/µL      Eosinophils Absolute 0 10 Thousand/µL      Basophils Absolute 0 00 Thousands/µL                  CT abdomen pelvis with contrast   Final Result by Ngozi Zuniga MD (06/02 1107)      High-grade small bowel obstruction, with mild mesenteric edema indicative of ischemia  No evidence of bowel pneumatosis or portal venous gas  I personally discussed this study with Jim Collins on 6/2/2021 at 11:07 AM                    Workstation performed: [id number]                    Procedures  Procedures         ED Course  ED Course as of Jun 02 1959   Wed Jun 02, 2021   1106 Received call from Dr Vickie cintron patient has high-grade obstruction with possible mid abdominal transition point  Mild edema however lactic acid is normal   Not vomiting    Tiger texted surgery patient will be admitted                                              MDM    Disposition  Final diagnoses:   Abdominal pain   Bowel obstruction ([address] )     Time reflects when diagnosis was documented in both MDM as applicable and the Disposition within this note     Time User Action Codes Description Comment    6/2/2021 12:25 PM Morenita Perez [R10 9] Abdominal pain     6/2/2021 12:26 PM Audie Santos Add [K56 609] Bowel obstruction ([facility] )     6/2/2021  3:10 PM Nati Mendez Add [E66 01,  Z68 41] Morbid obesity with BMI of 40 0-44 9, adult ([facility] )     6/2/2021  3:10 PM Nati Mendez Add [I10] Essential hypertension       ED Disposition     ED Disposition Condition Date/Time Comment    Admit Stable Wed Jun 2, 2021 12:25 PM Case was discussed with Dr Asha Saxena and the patient's admission status was agreed to be Admission Status: inpatient status to the service of Dr Jorge Alberto Haney   Follow-up Information    None         Current Discharge Medication List      CONTINUE these medications which have NOT CHANGED    Details   ALPRAZolam (XANAX) 0 25 mg tablet Take 0 25 mg by mouth As needed daily      !! cariprazine (Vraylar) 6 MG capsule Take 6 mg by mouth daily      dexlansoprazole (Dexilant) 60 MG capsule daily       famotidine (PEPCID) 40 MG tablet Take 40 mg by mouth bedtime      linaCLOtide (LINZESS PO) Take 290 mcg by mouth daily before breakfast      lisinopril (ZESTRIL) 10 mg tablet Take 10 mg by mouth daily      ondansetron (ZOFRAN-ODT) 4 mg disintegrating tablet Take 1 tablet (4 mg total) by mouth every 8 (eight) hours as needed for nausea or vomiting  Qty: 20 tablet, Refills: 0    Associated Diagnoses: Nausea      pregabalin (LYRICA) 150 mg capsule Take 150 mg by mouth daily Daily at bedtime      PROAIR  (90 Base) MCG/ACT inhaler Inhale every 4 (four) hours as needed (as needed)        rOPINIRole (REQUIP) 2 mg tablet       valACYclovir (VALTREX) 500 mg tablet Take 500 mg by mouth daily      vilazodone (VIIBRYD) 10 mg tablet Take 1 tablet (10 mg total) by mouth daily with breakfast  Qty: 30 tablet, Refills: 0    Associated Diagnoses: Severe episode of recurrent major depressive disorder, without psychotic features ([facility] )      ! ! Vraylar 3 MG capsule 9 mg daily 9 mgms daily      butalbital-acetaminophen-caffeine (FIORICET,ESGIC) -40 mg per tablet butalbital-acetaminophen-caffeine 50 mg-325 mg-40 mg tablet      !! cariprazine (Vraylar) 1 5 MG capsule       cyclobenzaprine (FLEXERIL) 5 mg tablet As needed      fluticasone (FLONASE) 50 mcg/act nasal spray 2 sprays into each nostril daily  Qty: 3 Bottle, Refills: 1    Associated Diagnoses: Seasonal allergic rhinitis, unspecified trigger; Eustachian tube dysfunction, bilateral       !! - Potential duplicate medications found  Please discuss with provider  No discharge procedures on file      PDMP Review       Value Time User    PDMP Reviewed  Yes 8/18/2020  2:59 PM Rosamaria Jackson MD          ED Provider  Electronically Signed by           Amarilis Pinto PA-C  06/02/21 1959

## 2021-06-02 NOTE — ASSESSMENT & PLAN NOTE
· Likely due to small bowel obstruction  · The patient is afebrile  · Follow-up with blood work in the a m

## 2021-06-02 NOTE — ASSESSMENT & PLAN NOTE
· CT abdomen and pelvis shows high-grade small-bowel obstruction with mild mesenteric edema indicative of ischemia    · Recurrent small-bowel obstruction  · Will continue treatment per primary team  · Keep NPO  · Aggressive IV fluid  · NGT  · Supportive measures Zofran

## 2021-06-02 NOTE — ASSESSMENT & PLAN NOTE
· Surgical team is okay for the patient to continue receiving her medications via NGT  · Will continue with Jona Schaeffer and Brii Kimbrough

## 2021-06-02 NOTE — H&P
H&P Exam - General Surgery   Kevyn Lawson 39 y o  female MRN: [id number]  Unit/Bed#: -01 Encounter: [id number]    Assessment/Plan     Assessment:  The patient is a 26-year-old white female presenting with recurrent small-bowel obstruction  The patient has surgical history significant for exploratory laparotomy, a gastrotomy and removal of an ingested foreign body several decades prior  She has also had prior cholecystectomy, and ovarian surgery  Her present complaints began as crampy abdominal pain, and progressed to abdominal distension and intractable pain  Plan:  Patient is responded in the past to nasogastric decompression and IV hydration  Patient requests that her antidepressants be administered via the nasogastric tube, as they were held during her last hospitalization and resulted in a prolonged episode of depressive symptoms  History of Present Illness   HPI:  Kevyn Lawson is a 39 y o  female who presents with intractable abdominal pain, and CT scan consistent with high-grade partial small-bowel obstruction  This is her 3rd episode within the last 18 months  The patient has had some relief after placement of a nasogastric tube in the emergency room  She relates that her last bowel movement was this morning, and was unremarkable, and occurred after the onset of symptoms  She denies any hematemesis, any melena or bright red blood per rectum  Review of Systems   Constitutional: Negative for chills and fever  HENT: Negative for trouble swallowing  Eyes: Negative  Respiratory: Negative  Cardiovascular: Negative  Gastrointestinal: Positive for abdominal distention, abdominal pain, constipation and nausea  Negative for anal bleeding, blood in stool, diarrhea and vomiting  Endocrine: Negative  Genitourinary: Positive for difficulty urinating  Musculoskeletal: Negative  Skin: Negative  Neurological: Negative  Hematological: Negative      Psychiatric/Behavioral: Self-injury: history of self injury  The patient is nervous/anxious  Historical Information   Past Medical History:   Diagnosis Date    Anxiety     Arthritis     Bipolar 1 disorder ([facility] )     with bordeline personality    Borderline personality disorder ([facility] )     Chronic headaches     Depression     GERD (gastroesophageal reflux disease)     Hypertension     Incontinence     Migraine     Morbid obesity with BMI of 40 0-44 9, adult (HCC)     Small bowel obstruction ([facility] ) 6/27/2019    Suicide attempt ([place] [address] )     Symptomatic bradycardia     Urinary tract infection      Past Surgical History:   Procedure Laterality Date    CHOLECYSTECTOMY  05/24/2018    DENTAL SURGERY      ELBOW SURGERY      EXPLORATORY LAPAROTOMY      exlap    FOOT SURGERY Right     KNEE ARTHROSCOPY Right 11/15/2018    total of three procedures with meniscal surgery    MULTIPLE TOOTH EXTRACTIONS  11/02/2018    OVARIAN CYST REMOVAL      REDUCTION MAMMAPLASTY      reduction     SINUS SURGERY Bilateral 04/19/2019    bilateral maxillary antrostomies - Dr Mike [facility] - [facility]     Social History   Social History     Substance and Sexual Activity   Alcohol Use Yes    Frequency: Never    Comment: rarely     Social History     Substance and Sexual Activity   Drug Use Never     Social History     Tobacco Use   Smoking Status Never Smoker   Smokeless Tobacco Never Used     E-Cigarette/Vaping    E-Cigarette Use Never User      E-Cigarette/Vaping Substances    Nicotine No     THC No     CBD No     Flavoring No     Other No     Unknown No      Family History: non-contributory    Meds/Allergies   PTA meds:   Prior to Admission Medications   Prescriptions Last Dose Informant Patient Reported? Taking?    ALPRAZolam (XANAX) 0 25 mg tablet 6/1/2021 at Unknown time Self Yes Yes   Sig: Take 0 25 mg by mouth As needed daily   PROAIR  (90 Base) MCG/ACT inhaler 6/1/2021 at Unknown time Self Yes Yes   Sig: Inhale every 4 (four) hours as needed (as needed)     Vraylar 3 MG capsule 2021 at Unknown time Self Yes Yes   Si mg daily 9 mgms daily   butalbital-acetaminophen-caffeine (FIORICET,ESGIC) -40 mg per tablet Unknown at Unknown time  Yes No   Sig: butalbital-acetaminophen-caffeine 50 mg-325 mg-40 mg tablet   cariprazine (Vraylar) 1 5 MG capsule   Yes No   cariprazine (Vraylar) 6 MG capsule 2021 at Unknown time  Yes Yes   Sig: Take 6 mg by mouth daily   cyclobenzaprine (FLEXERIL) 5 mg tablet Unknown at Unknown time  Yes No   Sig: As needed   dexlansoprazole (Dexilant) 60 MG capsule 2021 at Unknown time  Yes Yes   Sig: daily    famotidine (PEPCID) 40 MG tablet 2021 at Unknown time  Yes Yes   Sig: Take 40 mg by mouth bedtime   fluticasone (FLONASE) 50 mcg/act nasal spray  Self No No   Si sprays into each nostril daily   Patient taking differently: 2 sprays into each nostril daily As needed   linaCLOtide (LINZESS PO) 2021 at Unknown time Self Yes Yes   Sig: Take 290 mcg by mouth daily before breakfast   lisinopril (ZESTRIL) 10 mg tablet 2021 at Unknown time Self Yes Yes   Sig: Take 10 mg by mouth daily   ondansetron (ZOFRAN-ODT) 4 mg disintegrating tablet 2021 at Unknown time Self No Yes   Sig: Take 1 tablet (4 mg total) by mouth every 8 (eight) hours as needed for nausea or vomiting   pregabalin (LYRICA) 150 mg capsule 2021 at Unknown time Self Yes Yes   Sig: Take 150 mg by mouth daily Daily at bedtime   rOPINIRole (REQUIP) 2 mg tablet 2021 at Unknown time  Yes Yes   valACYclovir (VALTREX) 500 mg tablet 2021 at Unknown time Self Yes Yes   Sig: Take 500 mg by mouth daily   vilazodone (VIIBRYD) 10 mg tablet 2021 at Unknown time Self No Yes   Sig: Take 1 tablet (10 mg total) by mouth daily with breakfast   Patient taking differently: Take 20 mg by mouth daily with breakfast       Facility-Administered Medications: None     Allergies   Allergen Reactions    Celecoxib Hives    Lamotrigine Rash and Hives     Other reaction(s): rash and itching  Other reaction(s): rash and itching  Other reaction(s): rash and itching       Objective   First Vitals:   Blood Pressure: 130/94 (06/02/21 0908)  Pulse: 96 (06/02/21 0908)  Temperature: (!) 97 4 °F (36 3 °C) (06/02/21 0908)  Temp Source: Tympanic (06/02/21 0908)  Respirations: 18 (06/02/21 0908)  Height: 5' 5" (165 1 cm) (06/02/21 0908)  Weight - Scale: 103 kg (228 lb) (06/02/21 0908)  SpO2: 98 % (06/02/21 0908)    Current Vitals:   Blood Pressure: 129/81 (06/02/21 1533)  Pulse: 65 (06/02/21 1533)  Temperature: (!) 96 9 °F (36 1 °C) (06/02/21 1533)  Temp Source: Temporal (06/02/21 1533)  Respirations: 18 (06/02/21 1533)  Height: 5' 5" (165 1 cm) (06/02/21 1411)  Weight - Scale: 103 kg (227 lb 1 2 oz) (06/02/21 1411)  SpO2: 95 % (06/02/21 1533)      Intake/Output Summary (Last 24 hours) at 6/2/2021 1535  Last data filed at 6/2/2021 1316  Gross per 24 hour   Intake 2000 ml   Output --   Net 2000 ml       Invasive Devices     Peripheral Intravenous Line            Peripheral IV 06/02/21 Left Antecubital less than 1 day          Drain            NG/OG/Enteral Tube Nasogastric 8 Fr Left nares less than 1 day                Physical Exam  Constitutional:       General: She is not in acute distress  Appearance: She is ill-appearing  HENT:      Head: Normocephalic and atraumatic  Eyes:      General: No scleral icterus  Pupils: Pupils are equal, round, and reactive to light  Neck:      Musculoskeletal: Neck supple  Cardiovascular:      Rate and Rhythm: Normal rate  Heart sounds: Normal heart sounds  Pulmonary:      Effort: Pulmonary effort is normal       Breath sounds: Normal breath sounds  Abdominal:      General: There is distension  Palpations: There is no mass  Tenderness: There is abdominal tenderness  There is no guarding  Comments:  With hypoactive bowel sounds   Genitourinary:     Comments: deferred  Musculoskeletal: General: No swelling or deformity  Skin:     General: Skin is warm and dry  Comments: Scars from prior self injury   Neurological:      General: No focal deficit present  Mental Status: She is alert and oriented to person, place, and time  Psychiatric:         Mood and Affect: Mood normal          Behavior: Behavior normal          Lab Results:   I have personally reviewed pertinent lab results  , CBC:   Lab Results   Component Value Date    WBC 11 10 (H) 06/02/2021    HGB 16 3 (H) 06/02/2021    HCT 48 6 (H) 06/02/2021    MCV 88 06/02/2021     06/02/2021    MCH 29 4 06/02/2021    MCHC 33 5 06/02/2021    RDW 15 3 (H) 06/02/2021    MPV 8 1 (L) 06/02/2021   , CMP:   Lab Results   Component Value Date    SODIUM 138 06/02/2021    K 3 8 06/02/2021     06/02/2021    CO2 24 06/02/2021    BUN 21 06/02/2021    CREATININE 0 86 06/02/2021    CALCIUM 9 8 06/02/2021    AST 17 06/02/2021    ALT 25 06/02/2021    ALKPHOS 99 06/02/2021    EGFR 83 06/02/2021   , Urinalysis:   Lab Results   Component Value Date    COLORU Yellow 06/02/2021    CLARITYU Slightly Cloudy (A) 06/02/2021    SPECGRAV 1 025 06/02/2021    PHUR 6 0 06/02/2021    LEUKOCYTESUR Negative 06/02/2021    NITRITE Negative 06/02/2021    GLUCOSEU Negative 06/02/2021    KETONESU Trace (A) 06/02/2021    BILIRUBINUR 1+ (A) 06/02/2021    BLOODU 2+ (A) 06/02/2021   , Lipase:   Lab Results   Component Value Date    LIPASE 14 06/02/2021     Imaging: I have personally reviewed pertinent reports  and I have personally reviewed pertinent films in PACS  EKG, Pathology, and Other Studies: I have personally reviewed pertinent reports  Code Status: Level 1 - Full Code  Advance Directive and Living Will:      Power of :    POLST:      Counseling / Coordination of Care  Total floor / unit time spent today 30 minutes  Greater than 50% of total time was spent with the patient and / or family counseling and / or coordination of care    A description of the counseling / coordination of care: Including discussion with ER attending

## 2021-06-03 ENCOUNTER — APPOINTMENT (INPATIENT)
Dept: RADIOLOGY | Facility: HOSPITAL | Age: 46
DRG: 390 | End: 2021-06-03
Payer: COMMERCIAL

## 2021-06-03 LAB
ANION GAP SERPL CALCULATED.3IONS-SCNC: 7 MMOL/L (ref 4–13)
BASOPHILS # BLD AUTO: 0 THOUSANDS/ΜL (ref 0–0.1)
BASOPHILS NFR BLD AUTO: 1 % (ref 0–2)
BUN SERPL-MCNC: 12 MG/DL (ref 7–25)
CALCIUM SERPL-MCNC: 8.2 MG/DL (ref 8.6–10.5)
CHLORIDE SERPL-SCNC: 107 MMOL/L (ref 98–107)
CO2 SERPL-SCNC: 25 MMOL/L (ref 21–31)
CREAT SERPL-MCNC: 0.59 MG/DL (ref 0.6–1.2)
EOSINOPHIL # BLD AUTO: 0.2 THOUSAND/ΜL (ref 0–0.61)
EOSINOPHIL NFR BLD AUTO: 2 % (ref 0–5)
ERYTHROCYTE [DISTWIDTH] IN BLOOD BY AUTOMATED COUNT: 14.9 % (ref 11.5–14.5)
GFR SERPL CREATININE-BSD FRML MDRD: 113 ML/MIN/1.73SQ M
GLUCOSE SERPL-MCNC: 106 MG/DL (ref 65–99)
HCT VFR BLD AUTO: 41.2 % (ref 42–47)
HGB BLD-MCNC: 13.7 G/DL (ref 12–16)
LYMPHOCYTES # BLD AUTO: 1.7 THOUSANDS/ΜL (ref 0.6–4.47)
LYMPHOCYTES NFR BLD AUTO: 23 % (ref 21–51)
MCH RBC QN AUTO: 29.5 PG (ref 26–34)
MCHC RBC AUTO-ENTMCNC: 33.3 G/DL (ref 31–37)
MCV RBC AUTO: 88 FL (ref 81–99)
MONOCYTES # BLD AUTO: 0.7 THOUSAND/ΜL (ref 0.17–1.22)
MONOCYTES NFR BLD AUTO: 10 % (ref 2–12)
NEUTROPHILS # BLD AUTO: 4.6 THOUSANDS/ΜL (ref 1.4–6.5)
NEUTS SEG NFR BLD AUTO: 64 % (ref 42–75)
PLATELET # BLD AUTO: 263 THOUSANDS/UL (ref 149–390)
PMV BLD AUTO: 8.4 FL (ref 8.6–11.7)
POTASSIUM SERPL-SCNC: 3.8 MMOL/L (ref 3.5–5.5)
RBC # BLD AUTO: 4.66 MILLION/UL (ref 3.9–5.2)
SODIUM SERPL-SCNC: 139 MMOL/L (ref 134–143)
WBC # BLD AUTO: 7.2 THOUSAND/UL (ref 4.8–10.8)

## 2021-06-03 PROCEDURE — 74022 RADEX COMPL AQT ABD SERIES: CPT

## 2021-06-03 PROCEDURE — 99232 SBSQ HOSP IP/OBS MODERATE 35: CPT | Performed by: SPECIALIST

## 2021-06-03 PROCEDURE — 85025 COMPLETE CBC W/AUTO DIFF WBC: CPT | Performed by: SPECIALIST

## 2021-06-03 PROCEDURE — 80048 BASIC METABOLIC PNL TOTAL CA: CPT | Performed by: SPECIALIST

## 2021-06-03 PROCEDURE — C9113 INJ PANTOPRAZOLE SODIUM, VIA: HCPCS | Performed by: SPECIALIST

## 2021-06-03 RX ADMIN — CARIPRAZINE 6 MG: 4.5 CAPSULE, GELATIN COATED ORAL at 08:36

## 2021-06-03 RX ADMIN — ROPINIROLE HYDROCHLORIDE 2 MG: 1 TABLET, FILM COATED ORAL at 22:30

## 2021-06-03 RX ADMIN — ONDANSETRON 4 MG: 2 INJECTION INTRAMUSCULAR; INTRAVENOUS at 08:55

## 2021-06-03 RX ADMIN — DEXTROSE, SODIUM CHLORIDE, AND POTASSIUM CHLORIDE 125 ML/HR: 5; .45; .15 INJECTION INTRAVENOUS at 18:43

## 2021-06-03 RX ADMIN — LORAZEPAM 0.5 MG: 2 INJECTION INTRAMUSCULAR; INTRAVENOUS at 22:30

## 2021-06-03 RX ADMIN — MORPHINE SULFATE 4 MG: 4 INJECTION INTRAVENOUS at 15:33

## 2021-06-03 RX ADMIN — PREGABALIN 150 MG: 75 CAPSULE ORAL at 08:36

## 2021-06-03 RX ADMIN — PANTOPRAZOLE SODIUM 40 MG: 40 INJECTION, POWDER, FOR SOLUTION INTRAVENOUS at 08:36

## 2021-06-03 RX ADMIN — VILAZODONE HYDROCHLORIDE 10 MG: 10 TABLET ORAL at 08:36

## 2021-06-03 RX ADMIN — DEXTROSE, SODIUM CHLORIDE, AND POTASSIUM CHLORIDE 125 ML/HR: 5; .45; .15 INJECTION INTRAVENOUS at 08:35

## 2021-06-03 RX ADMIN — Medication 1 SPRAY: at 08:51

## 2021-06-03 RX ADMIN — MORPHINE SULFATE 4 MG: 4 INJECTION INTRAVENOUS at 08:36

## 2021-06-03 RX ADMIN — MORPHINE SULFATE 4 MG: 4 INJECTION INTRAVENOUS at 06:48

## 2021-06-03 RX ADMIN — MORPHINE SULFATE 4 MG: 4 INJECTION INTRAVENOUS at 18:43

## 2021-06-03 NOTE — PROGRESS NOTES
[facility]  Progress Note - Kat Lindo 1975, 39 y o  female MRN: [id number]  Unit/Bed#: -Jessica Encounter: [id number]  Primary Care Provider: Flex Tse DO   Date and time admitted to hospital: 6/2/2021  9:29 AM    * Small bowel obstruction [facility])  Assessment & Plan  65-year-old female history bipolar depression hospital day 2 with recurrent high-grade small-bowel obstruction confirmed by CT  Ongoing pain no, no active nausea or vomiting, pain medication and NG tube decompression helping  Abdomen flat and soft, normoactive bowel sounds, TTP in upper abdomen, voluntary guarding around scar  /71 (BP Location: Right arm)   Pulse 69   Temp (!) 97 1 °F (36 2 °C) (Temporal)   Resp 18   Ht 5' 5" (1 651 m)   Wt 103 kg (227 lb 1 2 oz)   SpO2 98%   BMI 37 79 kg/m²   WBC 7 2, Hgb 13 7, Plt 263, BUN 12, Cr 0 59, K 3 8    CT abd/pelvis 6/2  High-grade small bowel obstruction, with mild mesenteric edema indicative of ischemia  Assessment:  High-grade small-bowel obstruction, likely secondary to adhesions from prior surgery    Plan:  Continue NG tube to suction, NPO status  Maintenance IV fluids, antiemetics, pain control with morphine 4 mg Q2 p r n  Follow-up obstruction series today  Repeat labs and physical exam tomorrow morning  Continue non-operative management at this time  Discussed with Dr Michaelle Win  Subjective/Objective   Chief Complaint: "ok"    Subjective:  Ongoing pain, reports pain medication has worn off (records indicate last dose 830 this morning), no active nausea or vomiting since placement of the NG tube (canister noted be full)  Currently about to ambulate to the restroom to urinate  Tearful and upset regarding diagnosis of her parents with COVID-19 requiring admission to the same hospital     Objective:  No events recorded      Blood pressure 106/71, pulse 69, temperature (!) 97 1 °F (36 2 °C), temperature source Temporal, resp  rate 18, height 5' 5" (1 651 m), weight 103 kg (227 lb 1 2 oz), SpO2 98 %, not currently breastfeeding  ,Body mass index is 37 79 kg/m²  Intake/Output Summary (Last 24 hours) at 6/3/2021 1535  Last data filed at 6/3/2021 1501  Gross per 24 hour   Intake --   Output 1850 ml   Net -1850 ml       Invasive Devices     Peripheral Intravenous Line            Peripheral IV 06/02/21 Left Antecubital 1 day          Drain            NG/OG/Enteral Tube Nasogastric 8 Fr Left nares 1 day                Physical Exam  Vitals signs and nursing note reviewed  Constitutional:       General: She is not in acute distress  Appearance: She is well-developed  She is not diaphoretic  HENT:      Head: Normocephalic and atraumatic  Eyes:      Conjunctiva/sclera: Conjunctivae normal       Pupils: Pupils are equal, round, and reactive to light  Neck:      Musculoskeletal: Normal range of motion  Pulmonary:      Effort: No respiratory distress  Abdominal:      General: Abdomen is flat  Bowel sounds are normal  There is no distension  Palpations: Abdomen is soft  Tenderness: There is abdominal tenderness  There is guarding  Comments: Abdomen flat and soft, normoactive bowel sounds noted, tenderness to palpation in the left upper, periumbilical, epigastric, right upper quadrant  Voluntary guarding around the area of the periumbilical midline laparotomy incision scar  No rigidity  Musculoskeletal: Normal range of motion  Skin:     General: Skin is warm and dry  Capillary Refill: Capillary refill takes less than 2 seconds  Neurological:      Mental Status: She is alert and oriented to person, place, and time  Psychiatric:         Behavior: Behavior normal       Comments: Anxious, tearful           Lab, Imaging and other studies:  I have personally reviewed pertinent lab results    , CBC:   Lab Results   Component Value Date    WBC 7 20 06/03/2021    HGB 13 7 06/03/2021    HCT 41 2 (L) 06/03/2021    MCV 88 06/03/2021     06/03/2021    MCH 29 5 06/03/2021    MCHC 33 3 06/03/2021    RDW 14 9 (H) 06/03/2021    MPV 8 4 (L) 06/03/2021   , CMP:   Lab Results   Component Value Date    SODIUM 139 06/03/2021    K 3 8 06/03/2021     06/03/2021    CO2 25 06/03/2021    BUN 12 06/03/2021    CREATININE 0 59 (L) 06/03/2021    CALCIUM 8 2 (L) 06/03/2021    EGFR 113 06/03/2021     VTE Pharmacologic Prophylaxis: Enoxaparin (Lovenox)  VTE Mechanical Prophylaxis: sequential compression device

## 2021-06-03 NOTE — ASSESSMENT & PLAN NOTE
57-year-old female history bipolar depression hospital day 2 with recurrent high-grade small-bowel obstruction confirmed by CT  Ongoing pain no, no active nausea or vomiting, pain medication and NG tube decompression helping  Abdomen flat and soft, normoactive bowel sounds, TTP in upper abdomen, voluntary guarding around scar  /71 (BP Location: Right arm)   Pulse 69   Temp (!) 97 1 °F (36 2 °C) (Temporal)   Resp 18   Ht 5' 5" (1 651 m)   Wt 103 kg (227 lb 1 2 oz)   SpO2 98%   BMI 37 79 kg/m²   WBC 7 2, Hgb 13 7, Plt 263, BUN 12, Cr 0 59, K 3 8    CT abd/pelvis 6/2  High-grade small bowel obstruction, with mild mesenteric edema indicative of ischemia  Assessment:  High-grade small-bowel obstruction, likely secondary to adhesions from prior surgery    Plan:  Continue NG tube to suction, NPO status  Maintenance IV fluids, antiemetics, pain control with morphine 4 mg Q2 p r n  Follow-up obstruction series today  Repeat labs and physical exam tomorrow morning  Continue non-operative management at this time  Discussed with Dr Berg Covert

## 2021-06-03 NOTE — PLAN OF CARE
Problem: PAIN - ADULT  Goal: Verbalizes/displays adequate comfort level or baseline comfort level  Description: Interventions:  - Encourage patient to monitor pain and request assistance  - Assess pain using appropriate pain scale  - Administer analgesics based on type and severity of pain and evaluate response  - Implement non-pharmacological measures as appropriate and evaluate response  - Consider cultural and social influences on pain and pain management  - Notify physician/advanced practitioner if interventions unsuccessful or patient reports new pain  Outcome: Progressing     Problem: INFECTION - ADULT  Goal: Absence or prevention of progression during hospitalization  Description: INTERVENTIONS:  - Assess and monitor for signs and symptoms of infection  - Monitor lab/diagnostic results  - Monitor all insertion sites, i e  indwelling lines, tubes, and drains  - Monitor endotracheal if appropriate and nasal secretions for changes in amount and color  - [place] appropriate cooling/warming therapies per order  - Administer medications as ordered  - Instruct and encourage patient and family to use good hand hygiene technique  - Identify and instruct in appropriate isolation precautions for identified infection/condition  Outcome: Progressing  Goal: Absence of fever/infection during neutropenic period  Description: INTERVENTIONS:  - Monitor WBC    Outcome: Progressing     Problem: SAFETY ADULT  Goal: Patient will remain free of falls  Description: INTERVENTIONS:  - Assess patient frequently for physical needs  -  Identify cognitive and physical deficits and behaviors that affect risk of falls    -  [place] fall precautions as indicated by assessment   - Educate patient/family on patient safety including physical limitations  - Instruct patient to call for assistance with activity based on assessment  - Modify environment to reduce risk of injury  - Consider OT/PT consult to assist with strengthening/mobility  Outcome: Progressing  Goal: Maintain or return to baseline ADL function  Description: INTERVENTIONS:  -  Assess patient's ability to carry out ADLs; assess patient's baseline for ADL function and identify physical deficits which impact ability to perform ADLs (bathing, care of mouth/teeth, toileting, grooming, dressing, etc )  - Assess/evaluate cause of self-care deficits   - Assess range of motion  - Assess patient's mobility; develop plan if impaired  - Assess patient's need for assistive devices and provide as appropriate  - Encourage maximum independence but intervene and supervise when necessary  - Involve family in performance of ADLs  - Assess for home care needs following discharge   - Consider OT consult to assist with ADL evaluation and planning for discharge  - Provide patient education as appropriate  Outcome: Progressing  Goal: Maintain or return mobility status to optimal level  Description: INTERVENTIONS:  - Assess patient's baseline mobility status (ambulation, transfers, stairs, etc )    - Identify cognitive and physical deficits and behaviors that affect mobility  - Identify mobility aids required to assist with transfers and/or ambulation (gait belt, sit-to-stand, lift, walker, cane, etc )  - [place] fall precautions as indicated by assessment  - Record patient progress and toleration of activity level on Mobility SBAR; progress patient to next Phase/Stage  - Instruct patient to call for assistance with activity based on assessment  - Consider rehabilitation consult to assist with strengthening/weightbearing, etc   Outcome: Progressing     Problem: DISCHARGE PLANNING  Goal: Discharge to home or other facility with appropriate resources  Description: INTERVENTIONS:  - Identify barriers to discharge w/patient and caregiver  - Arrange for needed discharge resources and transportation as appropriate  - Identify discharge learning needs (meds, wound care, etc )  - Arrange for interpretive services to assist at discharge as needed  - Refer to Case Management Department for coordinating discharge planning if the patient needs post-hospital services based on physician/advanced practitioner order or complex needs related to functional status, cognitive ability, or social support system  Outcome: Progressing     Problem: Knowledge Deficit  Goal: Patient/family/caregiver demonstrates understanding of disease process, treatment plan, medications, and discharge instructions  Description: Complete learning assessment and assess knowledge base    Interventions:  - Provide teaching at level of understanding  - Provide teaching via preferred learning methods  Outcome: Progressing     Problem: GASTROINTESTINAL - ADULT  Goal: Minimal or absence of nausea and/or vomiting  Description: INTERVENTIONS:  - Administer IV fluids if ordered to ensure adequate hydration  - Maintain NPO status until nausea and vomiting are resolved  - Nasogastric tube if ordered  - Administer ordered antiemetic medications as needed  - Provide nonpharmacologic comfort measures as appropriate  - Advance diet as tolerated, if ordered  - Consider nutrition services referral to assist patient with adequate nutrition and appropriate food choices  Outcome: Progressing  Goal: Maintains or returns to baseline bowel function  Description: INTERVENTIONS:  - Assess bowel function  - Encourage oral fluids to ensure adequate hydration  - Administer IV fluids if ordered to ensure adequate hydration  - Administer ordered medications as needed  - Encourage mobilization and activity  - Consider nutritional services referral to assist patient with adequate nutrition and appropriate food choices  Outcome: Progressing  Goal: Maintains adequate nutritional intake  Description: INTERVENTIONS:  - Monitor percentage of each meal consumed  - Identify factors contributing to decreased intake, treat as appropriate  - Assist with meals as needed  - Monitor I&O, weight, and lab values if indicated  - Obtain nutrition services referral as needed  Outcome: Progressing  Goal: Establish and maintain optimal ostomy function  Description: INTERVENTIONS:  - Assess bowel function  - Encourage oral fluids to ensure adequate hydration  - Administer IV fluids if ordered to ensure adequate hydration   - Administer ordered medications as needed  - Encourage mobilization and activity  - Nutrition services referral to assist patient with appropriate food choices  - Assess stoma site  - Consider wound care consult   Outcome: Progressing     Problem: METABOLIC, FLUID AND ELECTROLYTES - ADULT  Goal: Electrolytes maintained within normal limits  Description: INTERVENTIONS:  - Monitor labs and assess patient for signs and symptoms of electrolyte imbalances  - Administer electrolyte replacement as ordered  - Monitor response to electrolyte replacements, including repeat lab results as appropriate  - Instruct patient on fluid and nutrition as appropriate  Outcome: Progressing  Goal: Fluid balance maintained  Description: INTERVENTIONS:  - Monitor labs   - Monitor I/O and WT  - Instruct patient on fluid and nutrition as appropriate  - Assess for signs & symptoms of volume excess or deficit  Outcome: Progressing  Goal: Glucose maintained within target range  Description: INTERVENTIONS:  - Monitor Blood Glucose as ordered  - Assess for signs and symptoms of hyperglycemia and hypoglycemia  - Administer ordered medications to maintain glucose within target range  - Assess nutritional intake and initiate nutrition service referral as needed  Outcome: Progressing     Problem: HEMATOLOGIC - ADULT  Goal: Maintains hematologic stability  Description: INTERVENTIONS  - Assess for signs and symptoms of bleeding or hemorrhage  - Monitor labs  - Administer supportive blood products/factors as ordered and appropriate  Outcome: Progressing

## 2021-06-03 NOTE — UTILIZATION REVIEW
Initial Clinical Review    Admission: Date/Time/Statement:   Admission Orders (From admission, onward)     Ordered        06/02/21 1232  Inpatient Admission  Once                   Orders Placed This Encounter   Procedures    Inpatient Admission     Standing Status:   Standing     Number of Occurrences:   1     Order Specific Question:   Level of Care     Answer:   Med Surg [16]     Order Specific Question:   Estimated length of stay     Answer:   More than 2 Midnights     Order Specific Question:   Certification     Answer:   I certify that inpatient services are medically necessary for this patient for a duration of greater than two midnights  See H&P and MD Progress Notes for additional information about the patient's course of treatment  ED Arrival Information     Expected Arrival Acuity Means of Arrival Escorted By Service Admission Type    - 6/2/2021 08:58 Urgent Walk-In Self Surgery-General Urgent    Arrival Complaint    abdominal pain        Chief Complaint   Patient presents with    Abdominal Pain     According to the patient she has had abdominal tenderness umbilical region since 7am with N/V/D       Initial Presentation:     39year old female presents to ed from home for evaluation and treatment of abdominal pain  PMHX: small bowel obstruction with adhesions  Patient reports cramping , non bloody diarrhea, and nausea without vomiting  Clinical assessment significant for guarding, abdominal tenderness  US : mod bacteria, WBC 11 10, imaging shows small bowel obstruction with edema indicating ischemia  Treated in ed with iv ns bolus, iv mso4, iv zofran x2,  iv fentanyl  NGT placement  Admit to inpatient medical surgical for small bowel obstruction  Plan includes : consult internal medicine for medical management, NPO, NGT decompression, iv protonix  and iv hydration             ED Triage Vitals [06/02/21 0908]   Temperature Pulse Respirations Blood Pressure SpO2   (!) 97 4 °F (36 3 °C) 96 18 130/94 98 %      Tympanic Monitor           Pain Score       8          06/02/21 103 kg (227 lb 1 2 oz)     Additional Vital Signs:       Date/  Time  Temp  Pulse  Resp  BP  MAP (mmHg)  SpO2  O2 Device    06/03/21 0642  97 1 °F (36 2 °C)  Abnormal   69  18  106/71  --  98 %  None (Room air)    06/02/21 2330  96 4 °F (35 8 °C)  Abnormal   67  18  116/84  --  92 %  None (Room air)    06/02/21 1533  96 9 °F (36 1 °C)  Abnormal   65  18  129/81  99  95 %  None (Room air)    06/02/21 1411  97 6 °F (36 4 °C)  58  18  138/89  --  98 %  None (Room air)    06/02/21 1330  --  69  21  135/71  97  95 %  None (Room air)    06/02/21 1300  --  55  20  130/79  100  96 %  None (Room air)    06/02/21 1230  --  61  20  117/75  89  96 %  None (Room air)    06/02/21 1200  --  67  18  130/75  98  96 %  --    06/02/21 1145  --  50Abnormal   32Abnormal   93/52  64  94 %  --    06/02/21 1115  --  72  18  143/70  100  96 %  None (Room air)    06/02/21 1000  --  75  18  128/93  106  96 %  None (Room air)              Pertinent Labs/Diagnostic Test Results:     CT abdomen pelvis with contrast   Final (06/02 1107)      High-grade small bowel obstruction, with mild mesenteric edema indicative of ischemia  No evidence of bowel pneumatosis or portal venous gas          Results from last 7 days   Lab Units 06/02/21  1233   SARS-COV-2  Negative     Results from last 7 days   Lab Units 06/03/21  0453 06/02/21  0935   WBC Thousand/uL 7 20 11 10*   HEMOGLOBIN g/dL 13 7 16 3*   HEMATOCRIT % 41 2* 48 6*   PLATELETS Thousands/uL 263 348   NEUTROS ABS Thousands/µL 4 60 8 40*         Results from last 7 days   Lab Units 06/03/21  0453 06/02/21  0935   SODIUM mmol/L 139 138   POTASSIUM mmol/L 3 8 3 8   CHLORIDE mmol/L 107 102   CO2 mmol/L 25 24   ANION GAP mmol/L 7 12   BUN mg/dL 12 21   CREATININE mg/dL 0 59* 0 86   EGFR ml/min/1 73sq m 113 83   CALCIUM mg/dL 8 2* 9 8     Results from last 7 days   Lab Units 06/02/21  0935   AST U/L 17   ALT U/L 25   ALK PHOS U/L 99   TOTAL PROTEIN g/dL 8 1   ALBUMIN g/dL 4 6   TOTAL BILIRUBIN mg/dL 0 50         Results from last 7 days   Lab Units 06/03/21  0453 06/02/21  0935   GLUCOSE RANDOM mg/dL 106* 123*       Results from last 7 days   Lab Units 06/02/21  0935   LACTIC ACID mmol/L 0 9       Results from last 7 days   Lab Units 06/02/21  0935   LIPASE u/L 14     Results from last 7 days   Lab Units 06/02/21  0943   CLARITY UA  Slightly Cloudy*   COLOR UA  Yellow   SPEC GRAV UA  1 025   PH UA  6 0   GLUCOSE UA mg/dl Negative   KETONES UA mg/dl Trace*   BLOOD UA  2+*   PROTEIN UA mg/dl Trace*   NITRITE UA  Negative   BILIRUBIN UA  1+*   UROBILINOGEN UA E U /dl 0 2   LEUKOCYTES UA  Negative   WBC UA /hpf 1-2   RBC UA /hpf 1-2   BACTERIA UA /hpf Moderate*   EPITHELIAL CELLS WET PREP /hpf Moderate*       Results from last 7 days   Lab Units 06/02/21  0935   BLOOD CULTURE  Received in Microbiology Lab  Culture in Progress  Received in Microbiology Lab  Culture in Progress         ED Treatment:   Medication Administration from 06/02/2021 0858 to 06/02/2021 1408       Date/Time Order Dose Route Action     06/02/2021 0948 sodium chloride 0 9 % bolus 1,000 mL 1,000 mL Intravenous New Bag     06/02/2021 0944 morphine injection 2 mg 2 mg Intravenous Given     06/02/2021 0944 ondansetron (ZOFRAN) injection 4 mg 4 mg Intravenous Given     06/02/2021 1125 lidocaine (PF) (XYLOCAINE-MPF) 1 % injection 10 mL 10 mL Infiltration Given     06/02/2021 1216 sodium chloride 0 9 % bolus 1,000 mL 1,000 mL Intravenous New Bag     06/02/2021 1159 ondansetron (ZOFRAN) injection 4 mg 4 mg Intravenous Given     06/02/2021 1159 fentanyl citrate (PF) 100 MCG/2ML 25 mcg 25 mcg Intravenous Given        Past Medical History:   Diagnosis Date    Anxiety     Arthritis     Bipolar 1 disorder (HCC)     with bordeline personality    Borderline personality disorder ([facility] [address] )     Chronic headaches     Depression     GERD (gastroesophageal reflux disease)     Hypertension     Incontinence     Migraine     Morbid obesity with BMI of 40 0-44 9, adult (HCC)     Small bowel obstruction ([facility] [address] ) 6/27/2019    [facility]     Symptomatic bradycardia     Urinary tract infection      Present on Admission:   Small bowel obstruction (HCC)   Bipolar 1 disorder, depressed, severe (HCC)   Gastroesophageal reflux disease without esophagitis   Essential hypertension      Admitting Diagnosis:     Bowel obstruction (HCC) [K56 609]  Abdominal pain [R10 9]    Age/Sex: 39 y o  female    Scheduled Medications:  cariprazine, 6 mg, Per NG Tube, Daily  enoxaparin, 40 mg, Subcutaneous, Daily  pantoprazole, 40 mg, Intravenous, Q24H ADAM  pregabalin, 150 mg, Oral, Daily  rOPINIRole, 2 mg, Per NG Tube, HS  vilazodone, 10 mg, Per NG Tube, Daily With Breakfast      Continuous IV Infusions:  dextrose 5 % and sodium chloride 0 45 % with KCl 20 mEq/L, 125 mL/hr, Intravenous, Continuous      PRN Meds:  hydrALAZINE, 5 mg, Intravenous, Q6H PRN  LORazepam, 0 5 mg, Intravenous, Q6H PRN  morphine injection, 4 mg, Intravenous, Q2H PRN  ondansetron, 4 mg, Intravenous, Q6H PRN  phenol, 1 spray, Mouth/Throat, Q2H PRN        IP CONSULT TO INTERNAL MEDICINE    Network Utilization Review Department  ATTENTION: Please call with any questions or concerns to [phone] and carefully listen to the prompts so that you are directed to the right person  All voicemails are confidential   [facility] all requests for admission clinical reviews, approved or denied determinations and any other requests to dedicated fax number below belonging to the campus where the patient is receiving treatment   List of dedicated fax numbers for the Facilities:  [address] [place] [address] DENIALS (Administrative/Medical Necessity) [phone]   [address]  (Maternity/NICU/Pediatrics) [phone]   [address] [phone]   [address] [phone]     [address] [address] [place] [address] Kul[address] [address] [place] [id number] [phone]

## 2021-06-03 NOTE — PLAN OF CARE
Problem: GASTROINTESTINAL - ADULT  Goal: Minimal or absence of nausea and/or vomiting  Description: INTERVENTIONS:  - Administer IV fluids if ordered to ensure adequate hydration  - Maintain NPO status until nausea and vomiting are resolved  - Nasogastric tube if ordered  - Administer ordered antiemetic medications as needed  - Provide nonpharmacologic comfort measures as appropriate  - Advance diet as tolerated, if ordered  - Consider nutrition services referral to assist patient with adequate nutrition and appropriate food choices  Outcome: Progressing  Goal: Maintains or returns to baseline bowel function  Description: INTERVENTIONS:  - Assess bowel function  - Encourage oral fluids to ensure adequate hydration  - Administer IV fluids if ordered to ensure adequate hydration  - Administer ordered medications as needed  - Encourage mobilization and activity  - Consider nutritional services referral to assist patient with adequate nutrition and appropriate food choices  Outcome: Progressing

## 2021-06-03 NOTE — CASE MANAGEMENT
LOS: 1 day, 15 GREEN, pt is not a 30 day readmission or bundle pt, pt wa admitted for SBO, pt lives with he fiance in a 1st floor apartment, no steps inside and 11 steps outside, pt has no hx of HHC, DME: walker, crutches, shower chiar, bp cuff, RX Plan [employer], pt has hx of depression, pt denies smoking and states she drinks alcohol on occasion, pt drove here and she plans to drive home, I made her aware we need the doctor's permission, pt is npo and has ng tube, testing , IV fluids, pt denies any d/c needs cm will continue to follow and assess for any additional d/c needs   CM reviewed d/c planning process including the following: identifying help at home, patient preference for d/c planning needs, availability of treatment team to discuss questions or concerns patient and/or family may have regarding understanding medications and recognizing signs and symptoms once discharged  CM also encouraged patient to follow up with all recommended appointments after discharge  Patient advised of importance for patient and family to participate in managing patients medical well being

## 2021-06-04 ENCOUNTER — APPOINTMENT (INPATIENT)
Dept: RADIOLOGY | Facility: HOSPITAL | Age: 46
DRG: 390 | End: 2021-06-04
Payer: COMMERCIAL

## 2021-06-04 LAB
ANION GAP SERPL CALCULATED.3IONS-SCNC: 7 MMOL/L (ref 4–13)
BASOPHILS # BLD AUTO: 0 THOUSANDS/ΜL (ref 0–0.1)
BASOPHILS NFR BLD AUTO: 0 % (ref 0–2)
BUN SERPL-MCNC: 5 MG/DL (ref 7–25)
CALCIUM SERPL-MCNC: 8.5 MG/DL (ref 8.6–10.5)
CHLORIDE SERPL-SCNC: 104 MMOL/L (ref 98–107)
CO2 SERPL-SCNC: 28 MMOL/L (ref 21–31)
CREAT SERPL-MCNC: 0.62 MG/DL (ref 0.6–1.2)
EOSINOPHIL # BLD AUTO: 0.1 THOUSAND/ΜL (ref 0–0.61)
EOSINOPHIL NFR BLD AUTO: 2 % (ref 0–5)
ERYTHROCYTE [DISTWIDTH] IN BLOOD BY AUTOMATED COUNT: 15 % (ref 11.5–14.5)
GFR SERPL CREATININE-BSD FRML MDRD: 111 ML/MIN/1.73SQ M
GLUCOSE SERPL-MCNC: 97 MG/DL (ref 65–99)
HCT VFR BLD AUTO: 41.8 % (ref 42–47)
HGB BLD-MCNC: 14 G/DL (ref 12–16)
LYMPHOCYTES # BLD AUTO: 2 THOUSANDS/ΜL (ref 0.6–4.47)
LYMPHOCYTES NFR BLD AUTO: 27 % (ref 21–51)
MCH RBC QN AUTO: 29.2 PG (ref 26–34)
MCHC RBC AUTO-ENTMCNC: 33.4 G/DL (ref 31–37)
MCV RBC AUTO: 88 FL (ref 81–99)
MONOCYTES # BLD AUTO: 0.7 THOUSAND/ΜL (ref 0.17–1.22)
MONOCYTES NFR BLD AUTO: 9 % (ref 2–12)
NEUTROPHILS # BLD AUTO: 4.7 THOUSANDS/ΜL (ref 1.4–6.5)
NEUTS SEG NFR BLD AUTO: 62 % (ref 42–75)
PLATELET # BLD AUTO: 260 THOUSANDS/UL (ref 149–390)
PMV BLD AUTO: 8.2 FL (ref 8.6–11.7)
POTASSIUM SERPL-SCNC: 3.9 MMOL/L (ref 3.5–5.5)
RBC # BLD AUTO: 4.77 MILLION/UL (ref 3.9–5.2)
SODIUM SERPL-SCNC: 139 MMOL/L (ref 134–143)
WBC # BLD AUTO: 7.6 THOUSAND/UL (ref 4.8–10.8)

## 2021-06-04 PROCEDURE — 74250 X-RAY XM SM INT 1CNTRST STD: CPT

## 2021-06-04 PROCEDURE — 85025 COMPLETE CBC W/AUTO DIFF WBC: CPT | Performed by: SPECIALIST

## 2021-06-04 PROCEDURE — 80048 BASIC METABOLIC PNL TOTAL CA: CPT | Performed by: SPECIALIST

## 2021-06-04 PROCEDURE — 99232 SBSQ HOSP IP/OBS MODERATE 35: CPT | Performed by: SPECIALIST

## 2021-06-04 PROCEDURE — C9113 INJ PANTOPRAZOLE SODIUM, VIA: HCPCS | Performed by: SPECIALIST

## 2021-06-04 RX ORDER — MAGNESIUM CARB/ALUMINUM HYDROX 105-160MG
30 TABLET,CHEWABLE ORAL
Status: DISCONTINUED | OUTPATIENT
Start: 2021-06-04 | End: 2021-06-04

## 2021-06-04 RX ORDER — MAGNESIUM CARB/ALUMINUM HYDROX 105-160MG
30 TABLET,CHEWABLE ORAL ONCE
Status: COMPLETED | OUTPATIENT
Start: 2021-06-04 | End: 2021-06-04

## 2021-06-04 RX ADMIN — CARIPRAZINE 6 MG: 4.5 CAPSULE, GELATIN COATED ORAL at 09:21

## 2021-06-04 RX ADMIN — MINERAL OIL 30 ML: 1000 SOLUTION ORAL at 14:19

## 2021-06-04 RX ADMIN — MORPHINE SULFATE 4 MG: 4 INJECTION INTRAVENOUS at 01:14

## 2021-06-04 RX ADMIN — VILAZODONE HYDROCHLORIDE 10 MG: 10 TABLET ORAL at 09:20

## 2021-06-04 RX ADMIN — LORAZEPAM 0.5 MG: 2 INJECTION INTRAMUSCULAR; INTRAVENOUS at 21:22

## 2021-06-04 RX ADMIN — MORPHINE SULFATE 4 MG: 4 INJECTION INTRAVENOUS at 06:18

## 2021-06-04 RX ADMIN — ENOXAPARIN SODIUM 40 MG: 40 INJECTION SUBCUTANEOUS at 09:20

## 2021-06-04 RX ADMIN — PANTOPRAZOLE SODIUM 40 MG: 40 INJECTION, POWDER, FOR SOLUTION INTRAVENOUS at 09:20

## 2021-06-04 RX ADMIN — ONDANSETRON 4 MG: 2 INJECTION INTRAMUSCULAR; INTRAVENOUS at 12:04

## 2021-06-04 RX ADMIN — DIATRIZOATE MEGLUMINE AND DIATRIZOATE SODIUM 120 ML: 660; 100 LIQUID ORAL; RECTAL at 08:55

## 2021-06-04 RX ADMIN — PREGABALIN 150 MG: 75 CAPSULE ORAL at 09:20

## 2021-06-04 RX ADMIN — DEXTROSE, SODIUM CHLORIDE, AND POTASSIUM CHLORIDE 125 ML/HR: 5; .45; .15 INJECTION INTRAVENOUS at 04:21

## 2021-06-04 RX ADMIN — ROPINIROLE HYDROCHLORIDE 2 MG: 1 TABLET, FILM COATED ORAL at 21:06

## 2021-06-04 NOTE — PROGRESS NOTES
Progress Note - General Surgery   Dk Darnell 39 y o  female MRN: [id number]  Unit/Bed#: -Jessica Encounter: [id number]    Assessment:  Gastrografin Challenge in progress, but patient vomited, and the tube was returned to wall suction  States no BM or flatus, and crampy abdominal pain continues  Plan:  Continue with Gastrografin challenge  Clamp NG intermittently   Encourage OOB, ambulate with NG clamped    Subjective/Objective   Chief Complaint: "It still hurts"    Subjective: Appears aprehensive    Objective:     Blood pressure 128/84, pulse 69, temperature 98 1 °F (36 7 °C), temperature source Temporal, resp  rate 18, height 5' 5" (1 651 m), weight 103 kg (227 lb 1 2 oz), SpO2 92 %, not currently breastfeeding  ,Body mass index is 37 79 kg/m²  Intake/Output Summary (Last 24 hours) at 6/4/2021 1143  Last data filed at 6/4/2021 8719  Gross per 24 hour   Intake --   Output 2575 ml   Net -2575 ml    cc    Invasive Devices     Peripheral Intravenous Line            Peripheral IV 06/02/21 Left Antecubital 2 days    Peripheral IV 06/04/21 Distal;Right;Ventral (anterior) Forearm less than 1 day          Drain            NG/OG/Enteral Tube Nasogastric 8 Fr Left nares 1 day                Physical Exam:   Abd:  Soft, tender in epigastrium, without guarding or rebound  Bowel sounds normal in character      Lab, Imaging and other studies:  CBC:   Lab Results   Component Value Date    WBC 7 60 06/04/2021    HGB 14 0 06/04/2021    HCT 41 8 (L) 06/04/2021    MCV 88 06/04/2021     06/04/2021    MCH 29 2 06/04/2021    MCHC 33 4 06/04/2021    RDW 15 0 (H) 06/04/2021    MPV 8 2 (L) 06/04/2021   , CMP:   Lab Results   Component Value Date    SODIUM 139 06/04/2021    K 3 9 06/04/2021     06/04/2021    CO2 28 06/04/2021    BUN 5 (L) 06/04/2021    CREATININE 0 62 06/04/2021    CALCIUM 8 5 (L) 06/04/2021    EGFR 111 06/04/2021     VTE Pharmacologic Prophylaxis: Enoxaparin (Lovenox)  VTE Mechanical Prophylaxis: sequential compression device

## 2021-06-04 NOTE — UTILIZATION REVIEW
Continued Stay Review    Date:  6-4-21                      Current Patient Class: inpatient  Current Level of Care: med surg    HPI:45 y o  female initially admitted on 6-2-21 for small bowel obstruction    Assessment/Plan:     Patient reports no bowel movement or flatus  WBC improved  Abdomen softer and bowel sounds are hypoactive  Ischemia no longer a concern  NGT clamped  Encouraged to ambulate  If she moves her bowels spontaneously, tube will be removed and clear liquid diet will be started  If no bowel activity, plan for gastrografin challenge  Continue iv fluids and iv jprotonix           Vital Signs:     Date/  Time  Temp  Pulse  Resp  BP  MAP (mmHg)  SpO2  O2 Device   06/03/21 2245  97 8 °F (36 6°C)  61  18  102/54  71  91 %  None (Room air)   06/03/21 1542  97 6 °F (36 4°C)  69  18  119/80  94  91 %  None (Room air)   06/03/21 0642  97 1 °F (36 2°C)  Abnormal   69  18  106/71  --  98 %  None (Room air)             Pertinent Labs/Diagnostic Results:   Results from last 7 days   Lab Units 06/02/21  1233   SARS-COV-2  Negative     Results from last 7 days   Lab Units 06/04/21  0431 06/03/21  0453 06/02/21  0935   WBC Thousand/uL 7 60 7 20 11 10*   HEMOGLOBIN g/dL 14 0 13 7 16 3*   HEMATOCRIT % 41 8* 41 2* 48 6*   PLATELETS Thousands/uL 260 263 348   NEUTROS ABS Thousands/µL 4 70 4 60 8 40*         Results from last 7 days   Lab Units 06/04/21  0431 06/03/21  0453 06/02/21  0935   SODIUM mmol/L 139 139 138   POTASSIUM mmol/L 3 9 3 8 3 8   CHLORIDE mmol/L 104 107 102   CO2 mmol/L 28 25 24   ANION GAP mmol/L 7 7 12   BUN mg/dL 5* 12 21   CREATININE mg/dL 0 62 0 59* 0 86   EGFR ml/min/1 73sq m 111 113 83   CALCIUM mg/dL 8 5* 8 2* 9 8     Results from last 7 days   Lab Units 06/02/21  0935   AST U/L 17   ALT U/L 25   ALK PHOS U/L 99   TOTAL PROTEIN g/dL 8 1   ALBUMIN g/dL 4 6   TOTAL BILIRUBIN mg/dL 0 50         Results from last 7 days   Lab Units 06/04/21  0431 06/03/21  0453 06/02/21  0935   GLUCOSE RANDOM mg/dL 97 106* 123*       Results from last 7 days   Lab Units 06/02/21  0935   LACTIC ACID mmol/L 0 9       Results from last 7 days   Lab Units 06/02/21  0935   LIPASE u/L 14       Results from last 7 days   Lab Units 06/02/21  0943   CLARITY UA  Slightly Cloudy*   COLOR UA  Yellow   SPEC GRAV UA  1 025   PH UA  6 0   GLUCOSE UA mg/dl Negative   KETONES UA mg/dl Trace*   BLOOD UA  2+*   PROTEIN UA mg/dl Trace*   NITRITE UA  Negative   BILIRUBIN UA  1+*   UROBILINOGEN UA E U /dl 0 2   LEUKOCYTES UA  Negative   WBC UA /hpf 1-2   RBC UA /hpf 1-2   BACTERIA UA /hpf Moderate*   EPITHELIAL CELLS WET PREP /hpf Moderate*       Results from last 7 days   Lab Units 06/02/21  0935   BLOOD CULTURE  No Growth at 24 hrs  No Growth at 24 hrs  Medications:     cariprazine, 6 mg, Per NG Tube, Daily  enoxaparin, 40 mg, Subcutaneous, Daily  pantoprazole, 40 mg, Intravenous, Q24H ADAM  pregabalin, 150 mg, Oral, Daily  rOPINIRole, 2 mg, Per NG Tube, HS  vilazodone, 10 mg, Per NG Tube, Daily With Breakfast      Continuous IV Infusions:  dextrose 5 % and sodium chloride 0 45 % with KCl 20 mEq/L, 125 mL/hr, Intravenous, Continuous      PRN Meds:  hydrALAZINE, 5 mg, Intravenous, Q6H PRN  LORazepam, 0 5 mg, Intravenous, Q6H PRN  morphine injection, 4 mg, Intravenous, Q2H PRN  ondansetron, 4 mg, Intravenous, Q6H PRN  phenol, 1 spray, Mouth/Throat, Q2H PRN        Discharge Plan: to be determined     Network Utilization Review Department  ATTENTION: Please call with any questions or concerns to [phone] and carefully listen to the prompts so that you are directed to the right person  All voicemails are confidential   [facility] all requests for admission clinical reviews, approved or denied determinations and any other requests to dedicated fax number below belonging to the campus where the patient is receiving treatment   List of dedicated fax numbers for the Facilities:  FACILITY NAME UR FAX NUMBER   ADMISSION DENIALS (Administrative/Medical Necessity) [phone]   [address] (Maternity/NICU/Pediatrics) [address] [facility] [facility] 40 125 [facility]  [phone]   Judie Rojas [address] [address] [place] [address] [address] [facility] [address] [facility] [phone]

## 2021-06-05 VITALS
OXYGEN SATURATION: 97 % | DIASTOLIC BLOOD PRESSURE: 81 MMHG | SYSTOLIC BLOOD PRESSURE: 119 MMHG | HEIGHT: 65 IN | WEIGHT: 227.07 LBS | RESPIRATION RATE: 18 BRPM | TEMPERATURE: 97.5 F | BODY MASS INDEX: 37.83 KG/M2 | HEART RATE: 75 BPM

## 2021-06-05 LAB
ANION GAP SERPL CALCULATED.3IONS-SCNC: 6 MMOL/L (ref 4–13)
BASOPHILS # BLD AUTO: 0 THOUSANDS/ΜL (ref 0–0.1)
BASOPHILS NFR BLD AUTO: 1 % (ref 0–2)
BUN SERPL-MCNC: 4 MG/DL (ref 7–25)
CALCIUM SERPL-MCNC: 9.1 MG/DL (ref 8.6–10.5)
CHLORIDE SERPL-SCNC: 105 MMOL/L (ref 98–107)
CO2 SERPL-SCNC: 28 MMOL/L (ref 21–31)
CREAT SERPL-MCNC: 0.65 MG/DL (ref 0.6–1.2)
EOSINOPHIL # BLD AUTO: 0.2 THOUSAND/ΜL (ref 0–0.61)
EOSINOPHIL NFR BLD AUTO: 2 % (ref 0–5)
ERYTHROCYTE [DISTWIDTH] IN BLOOD BY AUTOMATED COUNT: 14.8 % (ref 11.5–14.5)
GFR SERPL CREATININE-BSD FRML MDRD: 109 ML/MIN/1.73SQ M
GLUCOSE SERPL-MCNC: 107 MG/DL (ref 65–99)
HCT VFR BLD AUTO: 43.1 % (ref 42–47)
HGB BLD-MCNC: 14.3 G/DL (ref 12–16)
LYMPHOCYTES # BLD AUTO: 2.1 THOUSANDS/ΜL (ref 0.6–4.47)
LYMPHOCYTES NFR BLD AUTO: 30 % (ref 21–51)
MCH RBC QN AUTO: 29 PG (ref 26–34)
MCHC RBC AUTO-ENTMCNC: 33.1 G/DL (ref 31–37)
MCV RBC AUTO: 88 FL (ref 81–99)
MONOCYTES # BLD AUTO: 0.6 THOUSAND/ΜL (ref 0.17–1.22)
MONOCYTES NFR BLD AUTO: 9 % (ref 2–12)
NEUTROPHILS # BLD AUTO: 4 THOUSANDS/ΜL (ref 1.4–6.5)
NEUTS SEG NFR BLD AUTO: 58 % (ref 42–75)
PLATELET # BLD AUTO: 269 THOUSANDS/UL (ref 149–390)
PMV BLD AUTO: 8.2 FL (ref 8.6–11.7)
POTASSIUM SERPL-SCNC: 4.1 MMOL/L (ref 3.5–5.5)
RBC # BLD AUTO: 4.92 MILLION/UL (ref 3.9–5.2)
SODIUM SERPL-SCNC: 139 MMOL/L (ref 134–143)
WBC # BLD AUTO: 7 THOUSAND/UL (ref 4.8–10.8)

## 2021-06-05 PROCEDURE — 85025 COMPLETE CBC W/AUTO DIFF WBC: CPT | Performed by: SPECIALIST

## 2021-06-05 PROCEDURE — C9113 INJ PANTOPRAZOLE SODIUM, VIA: HCPCS | Performed by: SPECIALIST

## 2021-06-05 PROCEDURE — 80048 BASIC METABOLIC PNL TOTAL CA: CPT | Performed by: SPECIALIST

## 2021-06-05 PROCEDURE — NC001 PR NO CHARGE: Performed by: PHYSICIAN ASSISTANT

## 2021-06-05 PROCEDURE — 99238 HOSP IP/OBS DSCHRG MGMT 30/<: CPT | Performed by: PHYSICIAN ASSISTANT

## 2021-06-05 RX ORDER — ROPINIROLE 1 MG/1
2 TABLET, FILM COATED ORAL
Status: DISCONTINUED | OUTPATIENT
Start: 2021-06-05 | End: 2021-06-05 | Stop reason: HOSPADM

## 2021-06-05 RX ORDER — VILAZODONE HYDROCHLORIDE 10 MG/1
10 TABLET ORAL
Status: DISCONTINUED | OUTPATIENT
Start: 2021-06-06 | End: 2021-06-05 | Stop reason: HOSPADM

## 2021-06-05 RX ADMIN — CARIPRAZINE 6 MG: 4.5 CAPSULE, GELATIN COATED ORAL at 09:16

## 2021-06-05 RX ADMIN — MORPHINE SULFATE 4 MG: 4 INJECTION INTRAVENOUS at 02:19

## 2021-06-05 RX ADMIN — LORAZEPAM 0.5 MG: 2 INJECTION INTRAMUSCULAR; INTRAVENOUS at 12:03

## 2021-06-05 RX ADMIN — PANTOPRAZOLE SODIUM 40 MG: 40 INJECTION, POWDER, FOR SOLUTION INTRAVENOUS at 09:04

## 2021-06-05 RX ADMIN — VILAZODONE HYDROCHLORIDE 10 MG: 10 TABLET ORAL at 09:03

## 2021-06-05 RX ADMIN — DEXTROSE, SODIUM CHLORIDE, AND POTASSIUM CHLORIDE 125 ML/HR: 5; .45; .15 INJECTION INTRAVENOUS at 07:34

## 2021-06-05 NOTE — NURSING NOTE
Patient given all discharge instructions  All medications and instructions gone over  Patient aware of all medication doses she takes  All belongings with patient  IVs removed to left and right arm with tip intact  Patient assisted down to car by PCA

## 2021-06-05 NOTE — PLAN OF CARE
Problem: PAIN - ADULT  Goal: Verbalizes/displays adequate comfort level or baseline comfort level  Description: Interventions:  - Encourage patient to monitor pain and request assistance  - Assess pain using appropriate pain scale  - Administer analgesics based on type and severity of pain and evaluate response  - Implement non-pharmacological measures as appropriate and evaluate response  - Consider cultural and social influences on pain and pain management  - Notify physician/advanced practitioner if interventions unsuccessful or patient reports new pain  Outcome: Progressing     Problem: INFECTION - ADULT  Goal: Absence or prevention of progression during hospitalization  Description: INTERVENTIONS:  - Assess and monitor for signs and symptoms of infection  - Monitor lab/diagnostic results  - Monitor all insertion sites, i e  indwelling lines, tubes, and drains  - Monitor endotracheal if appropriate and nasal secretions for changes in amount and color  - [place] appropriate cooling/warming therapies per order  - Administer medications as ordered  - Instruct and encourage patient and family to use good hand hygiene technique  - Identify and instruct in appropriate isolation precautions for identified infection/condition  Outcome: Progressing  Goal: Absence of fever/infection during neutropenic period  Description: INTERVENTIONS:  - Monitor WBC    Outcome: Progressing     Problem: SAFETY ADULT  Goal: Patient will remain free of falls  Description: INTERVENTIONS:  - Assess patient frequently for physical needs  -  Identify cognitive and physical deficits and behaviors that affect risk of falls    -  [place] fall precautions as indicated by assessment   - Educate patient/family on patient safety including physical limitations  - Instruct patient to call for assistance with activity based on assessment  - Modify environment to reduce risk of injury  - Consider OT/PT consult to assist with strengthening/mobility  Outcome: Progressing  Goal: Maintain or return to baseline ADL function  Description: INTERVENTIONS:  -  Assess patient's ability to carry out ADLs; assess patient's baseline for ADL function and identify physical deficits which impact ability to perform ADLs (bathing, care of mouth/teeth, toileting, grooming, dressing, etc )  - Assess/evaluate cause of self-care deficits   - Assess range of motion  - Assess patient's mobility; develop plan if impaired  - Assess patient's need for assistive devices and provide as appropriate  - Encourage maximum independence but intervene and supervise when necessary  - Involve family in performance of ADLs  - Assess for home care needs following discharge   - Consider OT consult to assist with ADL evaluation and planning for discharge  - Provide patient education as appropriate  Outcome: Progressing  Goal: Maintain or return mobility status to optimal level  Description: INTERVENTIONS:  - Assess patient's baseline mobility status (ambulation, transfers, stairs, etc )    - Identify cognitive and physical deficits and behaviors that affect mobility  - Identify mobility aids required to assist with transfers and/or ambulation (gait belt, sit-to-stand, lift, walker, cane, etc )  - [place] fall precautions as indicated by assessment  - Record patient progress and toleration of activity level on Mobility SBAR; progress patient to next Phase/Stage  - Instruct patient to call for assistance with activity based on assessment  - Consider rehabilitation consult to assist with strengthening/weightbearing, etc   Outcome: Progressing     Problem: GASTROINTESTINAL - ADULT  Goal: Minimal or absence of nausea and/or vomiting  Description: INTERVENTIONS:  - Administer IV fluids if ordered to ensure adequate hydration  - Maintain NPO status until nausea and vomiting are resolved  - Nasogastric tube if ordered  - Administer ordered antiemetic medications as needed  - Provide nonpharmacologic comfort measures as appropriate  - Advance diet as tolerated, if ordered  - Consider nutrition services referral to assist patient with adequate nutrition and appropriate food choices  Outcome: Progressing  Goal: Maintains or returns to baseline bowel function  Description: INTERVENTIONS:  - Assess bowel function  - Encourage oral fluids to ensure adequate hydration  - Administer IV fluids if ordered to ensure adequate hydration  - Administer ordered medications as needed  - Encourage mobilization and activity  - Consider nutritional services referral to assist patient with adequate nutrition and appropriate food choices  Outcome: Progressing  Goal: Maintains adequate nutritional intake  Description: INTERVENTIONS:  - Monitor percentage of each meal consumed  - Identify factors contributing to decreased intake, treat as appropriate  - Assist with meals as needed  - Monitor I&O, weight, and lab values if indicated  - Obtain nutrition services referral as needed  Outcome: Progressing  Goal: Establish and maintain optimal ostomy function  Description: INTERVENTIONS:  - Assess bowel function  - Encourage oral fluids to ensure adequate hydration  - Administer IV fluids if ordered to ensure adequate hydration   - Administer ordered medications as needed  - Encourage mobilization and activity  - Nutrition services referral to assist patient with appropriate food choices  - Assess stoma site  - Consider wound care consult   Outcome: Progressing     Problem: METABOLIC, FLUID AND ELECTROLYTES - ADULT  Goal: Electrolytes maintained within normal limits  Description: INTERVENTIONS:  - Monitor labs and assess patient for signs and symptoms of electrolyte imbalances  - Administer electrolyte replacement as ordered  - Monitor response to electrolyte replacements, including repeat lab results as appropriate  - Instruct patient on fluid and nutrition as appropriate  Outcome: Progressing  Goal: Fluid balance maintained  Description: INTERVENTIONS:  - Monitor labs   - Monitor I/O and WT  - Instruct patient on fluid and nutrition as appropriate  - Assess for signs & symptoms of volume excess or deficit  Outcome: Progressing  Goal: Glucose maintained within target range  Description: INTERVENTIONS:  - Monitor Blood Glucose as ordered  - Assess for signs and symptoms of hyperglycemia and hypoglycemia  - Administer ordered medications to maintain glucose within target range  - Assess nutritional intake and initiate nutrition service referral as needed  Outcome: Progressing

## 2021-06-05 NOTE — PROGRESS NOTES
[address] [facility]  Progress Note - Gely Guzman 1975, 39 y o  female MRN: [id number]  Unit/Bed#: -01 Encounter: [id number]  Primary Care Provider: Marck Cuellar DO   Date and time admitted to hospital: 6/2/2021  9:29 AM    * Small bowel obstruction [facility])  Assessment & Plan  28-year-old female history bipolar depression hospital day 4 with recurrent high-grade small-bowel obstruction confirmed by CT  Ongoing pain, improved since admission but still severe, no vomiting, tolerating clears, + flatus, ambulating  Abdomen soft, TTP in periumbilical region/upper abdomen with guarding  /81 (BP Location: Right arm)   Pulse 75   Temp 97 5 °F (36 4 °C) (Temporal)   Resp 18   Ht 5' 5" (1 651 m)   Wt 103 kg (227 lb 1 2 oz)   SpO2 97%   BMI 37 79 kg/m²   CBC/BMP noted  CT abd/pelvis 6/2  High-grade small bowel obstruction, with mild mesenteric edema indicative of ischemia  XR Gastrografin challenge 6/4  Contrast to descending colon at 3 hours  Assessment:  High-grade small-bowel obstruction, likely secondary to adhesions from prior surgery    Plan:  Taper IV fluids  Check AM labs including electrolytes  Consider advancing diet today, fulls? Discuss with Dr Kayla Sullivan  Subjective/Objective   Chief Complaint: "still hurts"    Subjective: ongoing pain, around the umbilicus/epigastrium, mild intermittent nausea, no vomiting, flatus without BM, ambulating and urinating  Denies fever, chills, chest pain, dyspnea  Objective: No overnight events  Blood pressure 119/81, pulse 75, temperature 97 5 °F (36 4 °C), temperature source Temporal, resp  rate 18, height 5' 5" (1 651 m), weight 103 kg (227 lb 1 2 oz), SpO2 97 %, not currently breastfeeding  ,Body mass index is 37 79 kg/m²      No intake or output data in the 24 hours ending 06/05/21 0758    Invasive Devices     Peripheral Intravenous Line            Peripheral IV 06/02/21 Left Antecubital 2 days Peripheral IV 06/04/21 Distal;Right;Ventral (anterior) Forearm 1 day                Physical Exam  Vitals signs and nursing note reviewed  Constitutional:       General: She is not in acute distress  Appearance: She is well-developed  She is not diaphoretic  HENT:      Head: Normocephalic and atraumatic  Eyes:      Conjunctiva/sclera: Conjunctivae normal       Pupils: Pupils are equal, round, and reactive to light  Neck:      Musculoskeletal: Normal range of motion  Pulmonary:      Effort: No respiratory distress  Abdominal:      General: Abdomen is flat  There is no distension  Palpations: Abdomen is soft  Tenderness: There is abdominal tenderness  There is guarding  Comments: Hypoactive bowel sounds (fasting)  Mod-severe TTP around scar in upper midline with some voluntary guarding  Musculoskeletal: Normal range of motion  Skin:     General: Skin is warm and dry  Capillary Refill: Capillary refill takes less than 2 seconds  Neurological:      Mental Status: She is alert and oriented to person, place, and time  Psychiatric:         Behavior: Behavior normal            Lab, Imaging and other studies:  I have personally reviewed pertinent lab results    , CBC:   Lab Results   Component Value Date    WBC 7 00 06/05/2021    HGB 14 3 06/05/2021    HCT 43 1 06/05/2021    MCV 88 06/05/2021     06/05/2021    MCH 29 0 06/05/2021    MCHC 33 1 06/05/2021    RDW 14 8 (H) 06/05/2021    MPV 8 2 (L) 06/05/2021   , CMP:   Lab Results   Component Value Date    SODIUM 139 06/05/2021    K 4 1 06/05/2021     06/05/2021    CO2 28 06/05/2021    BUN 4 (L) 06/05/2021    CREATININE 0 65 06/05/2021    CALCIUM 9 1 06/05/2021    EGFR 109 06/05/2021     VTE Pharmacologic Prophylaxis: Enoxaparin (Lovenox)  VTE Mechanical Prophylaxis: sequential compression device

## 2021-06-05 NOTE — DISCHARGE SUMMARY
[facility]  Discharge- Severo Art 1975, 39 y o  female MRN: [id number]  Unit/Bed#: -Jessica Encounter: [id number]  Primary Care Provider: Evangelina Haley DO   Date and time admitted to hospital: 6/2/2021  9:29 AM    * Small bowel obstruction [facility])  Assessment & Plan  26-year-old female history bipolar depression hospital day 4 with recurrent high-grade small-bowel obstruction confirmed by CT  Ongoing pain, improved since admission but still severe, no vomiting, tolerating clears, + flatus, ambulating  Abdomen soft, TTP in periumbilical region/upper abdomen with guarding  /81 (BP Location: Right arm)   Pulse 75   Temp 97 5 °F (36 4 °C) (Temporal)   Resp 18   Ht 5' 5" (1 651 m)   Wt 103 kg (227 lb 1 2 oz)   SpO2 97%   BMI 37 79 kg/m²   CBC/BMP noted  CT abd/pelvis 6/2  High-grade small bowel obstruction, with mild mesenteric edema indicative of ischemia  XR Gastrografin challenge 6/4  Contrast to descending colon at 3 hours  Assessment:  High-grade small-bowel obstruction, likely secondary to adhesions from prior surgery    Plan:  Taper IV fluids  Check AM labs including electrolytes  Consider advancing diet today, fulls? Discuss with Dr Leslie Yepez  Addendum:  Patient diet advanced and tolerated well, patient motivated to go home  Following evaluation by Dr Leslie Yepez patient stable for discharge home  Return precautions reviewed and she is agreeable  Resolved Problems  Date Reviewed: 6/2/2021    None          Admission Date:   Admission Orders (From admission, onward)     Ordered        06/02/21 1232  Inpatient Admission  Once                     Admitting Diagnosis: Bowel obstruction ([address] ) [K56 609]  Abdominal pain [R10 9]    HPI: Pleasant 38 yo F hx prior ex lap and SBO admitted with s/sx SBO  Procedures Performed: No orders of the defined types were placed in this encounter        Summary of Hospital Course: Admitted to general surgery service following work-up in ER  Made NPO, NG tube placed for N/V, IVF initiated HD#1  HD#2 gastrografin completed showing contrast to colon in 3 hours  NG removed and clear liquids started  HD#3 tolerating clears, advanced to fulls with crackers, tolerated well and discharged to home    Significant Findings, Care, Treatment and Services Provided: non-op management of SBO likely 2/2 adhesions    Complications: none    Condition at Discharge: stable         Discharge instructions/Information to patient and family:   See after visit summary for information provided to patient and family  Provisions for Follow-Up Care:  See after visit summary for information related to follow-up care and any pertinent home health orders  PCP: Estella Alvarez DO    Disposition: Home    Planned Readmission: No    Discharge Statement   I spent 5 minutes discharging the patient  This time was spent on the day of discharge  I had direct contact with the patient on the day of discharge  Additional documentation is required if more than 30 minutes were spent on discharge  Discharge Medications:  See after visit summary for reconciled discharge medications provided to patient and family

## 2021-06-05 NOTE — ASSESSMENT & PLAN NOTE
68-year-old female history bipolar depression hospital day 4 with recurrent high-grade small-bowel obstruction confirmed by CT  Ongoing pain, improved since admission but still severe, no vomiting, tolerating clears, + flatus, ambulating  Abdomen soft, TTP in periumbilical region/upper abdomen with guarding  /81 (BP Location: Right arm)   Pulse 75   Temp 97 5 °F (36 4 °C) (Temporal)   Resp 18   Ht 5' 5" (1 651 m)   Wt 103 kg (227 lb 1 2 oz)   SpO2 97%   BMI 37 79 kg/m²   CBC/BMP noted  CT abd/pelvis 6/2  High-grade small bowel obstruction, with mild mesenteric edema indicative of ischemia  XR Gastrografin challenge 6/4  Contrast to descending colon at 3 hours  Assessment:  High-grade small-bowel obstruction, likely secondary to adhesions from prior surgery    Plan:  Taper IV fluids  Check AM labs including electrolytes  Consider advancing diet today, fulls? Discuss with Dr Maci Vinson  UC contaminated, spoke with patient, will rpt UC with PCP

## 2021-06-05 NOTE — ASSESSMENT & PLAN NOTE
22-year-old female history bipolar depression hospital day 4 with recurrent high-grade small-bowel obstruction confirmed by CT  Ongoing pain, improved since admission but still severe, no vomiting, tolerating clears, + flatus, ambulating  Abdomen soft, TTP in periumbilical region/upper abdomen with guarding  /81 (BP Location: Right arm)   Pulse 75   Temp 97 5 °F (36 4 °C) (Temporal)   Resp 18   Ht 5' 5" (1 651 m)   Wt 103 kg (227 lb 1 2 oz)   SpO2 97%   BMI 37 79 kg/m²   CBC/BMP noted  CT abd/pelvis 6/2  High-grade small bowel obstruction, with mild mesenteric edema indicative of ischemia  XR Gastrografin challenge 6/4  Contrast to descending colon at 3 hours  Assessment:  High-grade small-bowel obstruction, likely secondary to adhesions from prior surgery    Plan:  Taper IV fluids  Check AM labs including electrolytes  Consider advancing diet today, fulls? Discuss with Dr Myrtle Clifford  Addendum:  Patient diet advanced and tolerated well, patient motivated to go home  Following evaluation by Dr Myrtle Clifford patient stable for discharge home  Return precautions reviewed and she is agreeable

## 2021-06-05 NOTE — NURSING NOTE
Patient with concerns with AM meds and doses  Patient states she told nurses they aren't the right doses she takes at home  Pharmacy also made aware of change of lyrica to later in the evening per patient request  Patient states she will discuss with doctor her dose of her psych meds  Patient resting at this time

## 2021-06-05 NOTE — PLAN OF CARE
Problem: PAIN - ADULT  Goal: Verbalizes/displays adequate comfort level or baseline comfort level  Description: Interventions:  - Encourage patient to monitor pain and request assistance  - Assess pain using appropriate pain scale  - Administer analgesics based on type and severity of pain and evaluate response  - Implement non-pharmacological measures as appropriate and evaluate response  - Consider cultural and social influences on pain and pain management  - Notify physician/advanced practitioner if interventions unsuccessful or patient reports new pain  6/5/2021 1414 by Ken Meyer RN  Outcome: Adequate for Discharge  6/5/2021 1210 by Ken Meyer RN  Outcome: Progressing     Problem: INFECTION - ADULT  Goal: Absence or prevention of progression during hospitalization  Description: INTERVENTIONS:  - Assess and monitor for signs and symptoms of infection  - Monitor lab/diagnostic results  - Monitor all insertion sites, i e  indwelling lines, tubes, and drains  - Monitor endotracheal if appropriate and nasal secretions for changes in amount and color  - [place] appropriate cooling/warming therapies per order  - Administer medications as ordered  - Instruct and encourage patient and family to use good hand hygiene technique  - Identify and instruct in appropriate isolation precautions for identified infection/condition  6/5/2021 1414 by Ken Meyer RN  Outcome: Adequate for Discharge  6/5/2021 1210 by Ken Meyer RN  Outcome: Progressing  Goal: Absence of fever/infection during neutropenic period  Description: INTERVENTIONS:  - Monitor WBC    6/5/2021 1414 by Ken Meyer RN  Outcome: Adequate for Discharge  6/5/2021 1210 by Ken Meyer RN  Outcome: Progressing     Problem: SAFETY ADULT  Goal: Patient will remain free of falls  Description: INTERVENTIONS:  - Assess patient frequently for physical needs  -  Identify cognitive and physical deficits and behaviors that affect risk of falls    -  [place] fall precautions as indicated by assessment   - Educate patient/family on patient safety including physical limitations  - Instruct patient to call for assistance with activity based on assessment  - Modify environment to reduce risk of injury  - Consider OT/PT consult to assist with strengthening/mobility  6/5/2021 1414 by Tyrone Balderas RN  Outcome: Adequate for Discharge  6/5/2021 1210 by Tyrone Balderas RN  Outcome: Progressing  Goal: Maintain or return to baseline ADL function  Description: INTERVENTIONS:  -  Assess patient's ability to carry out ADLs; assess patient's baseline for ADL function and identify physical deficits which impact ability to perform ADLs (bathing, care of mouth/teeth, toileting, grooming, dressing, etc )  - Assess/evaluate cause of self-care deficits   - Assess range of motion  - Assess patient's mobility; develop plan if impaired  - Assess patient's need for assistive devices and provide as appropriate  - Encourage maximum independence but intervene and supervise when necessary  - Involve family in performance of ADLs  - Assess for home care needs following discharge   - Consider OT consult to assist with ADL evaluation and planning for discharge  - Provide patient education as appropriate  6/5/2021 1414 by Tyrone Balderas RN  Outcome: Adequate for Discharge  6/5/2021 1210 by Tyrone Balderas RN  Outcome: Progressing  Goal: Maintain or return mobility status to optimal level  Description: INTERVENTIONS:  - Assess patient's baseline mobility status (ambulation, transfers, stairs, etc )    - Identify cognitive and physical deficits and behaviors that affect mobility  - Identify mobility aids required to assist with transfers and/or ambulation (gait belt, sit-to-stand, lift, walker, cane, etc )  - [place] fall precautions as indicated by assessment  - Record patient progress and toleration of activity level on Mobility SBAR; progress patient to next Phase/Stage  - Instruct patient to call for assistance with activity based on assessment  - Consider rehabilitation consult to assist with strengthening/weightbearing, etc   6/5/2021 1414 by Carlos A Jackson RN  Outcome: Adequate for Discharge  6/5/2021 1210 by Carlos A Jackson RN  Outcome: Progressing     Problem: DISCHARGE PLANNING  Goal: Discharge to home or other facility with appropriate resources  Description: INTERVENTIONS:  - Identify barriers to discharge w/patient and caregiver  - Arrange for needed discharge resources and transportation as appropriate  - Identify discharge learning needs (meds, wound care, etc )  - Arrange for interpretive services to assist at discharge as needed  - Refer to Case Management Department for coordinating discharge planning if the patient needs post-hospital services based on physician/advanced practitioner order or complex needs related to functional status, cognitive ability, or social support system  6/5/2021 1414 by Carlos A Jackson RN  Outcome: Adequate for Discharge  6/5/2021 1210 by Carlos A Jackson RN  Outcome: Progressing     Problem: Knowledge Deficit  Goal: Patient/family/caregiver demonstrates understanding of disease process, treatment plan, medications, and discharge instructions  Description: Complete learning assessment and assess knowledge base    Interventions:  - Provide teaching at level of understanding  - Provide teaching via preferred learning methods  6/5/2021 1414 by Carlos A Jackson RN  Outcome: Adequate for Discharge  6/5/2021 1210 by Carlos A Jackson RN  Outcome: Progressing     Problem: GASTROINTESTINAL - ADULT  Goal: Minimal or absence of nausea and/or vomiting  Description: INTERVENTIONS:  - Administer IV fluids if ordered to ensure adequate hydration  - Maintain NPO status until nausea and vomiting are resolved  - Nasogastric tube if ordered  - Administer ordered antiemetic medications as needed  - Provide nonpharmacologic comfort measures as appropriate  - Advance diet as tolerated, if ordered  - Consider nutrition services referral to assist patient with adequate nutrition and appropriate food choices  6/5/2021 1414 by Gina Massey RN  Outcome: Adequate for Discharge  6/5/2021 1210 by Gina Massey RN  Outcome: Progressing  Goal: Maintains or returns to baseline bowel function  Description: INTERVENTIONS:  - Assess bowel function  - Encourage oral fluids to ensure adequate hydration  - Administer IV fluids if ordered to ensure adequate hydration  - Administer ordered medications as needed  - Encourage mobilization and activity  - Consider nutritional services referral to assist patient with adequate nutrition and appropriate food choices  6/5/2021 1414 by Gina Massey RN  Outcome: Adequate for Discharge  6/5/2021 1210 by Gina Massey RN  Outcome: Progressing  Goal: Maintains adequate nutritional intake  Description: INTERVENTIONS:  - Monitor percentage of each meal consumed  - Identify factors contributing to decreased intake, treat as appropriate  - Assist with meals as needed  - Monitor I&O, weight, and lab values if indicated  - Obtain nutrition services referral as needed  6/5/2021 1414 by Gina Massey RN  Outcome: Adequate for Discharge  6/5/2021 1210 by Gina Massey RN  Outcome: Progressing  Goal: Establish and maintain optimal ostomy function  Description: INTERVENTIONS:  - Assess bowel function  - Encourage oral fluids to ensure adequate hydration  - Administer IV fluids if ordered to ensure adequate hydration   - Administer ordered medications as needed  - Encourage mobilization and activity  - Nutrition services referral to assist patient with appropriate food choices  - Assess stoma site  - Consider wound care consult   6/5/2021 1414 by Gina Massey RN  Outcome: Adequate for Discharge  6/5/2021 1210 by Jonathan Dunham Schoenberger, RN  Outcome: Progressing

## 2021-06-05 NOTE — NURSING NOTE
Patient stated she saw surgeon but did not mention med doses  She said she will stay on doses ordered for now because she will discharged tomorrow

## 2021-06-07 LAB
BACTERIA BLD CULT: NORMAL
BACTERIA BLD CULT: NORMAL

## 2021-06-15 NOTE — UTILIZATION REVIEW
Continued Stay Review    Date:  6-4-21                      Current Patient Class: inpatient  Current Level of Care: med surg    HPI:45 y o  female initially admitted on 6-2-21 for small bowel obstruction    Assessment/Plan:     Patient reports no bowel movement or flatus  WBC improved  Abdomen softer and bowel sounds are hypoactive  Ischemia no longer a concern  NGT clamped  Encouraged to ambulate  If she moves her bowels spontaneously, tube will be removed and clear liquid diet will be started  If no bowel activity, plan for gastrografin challenge  Continue iv fluids and iv jprotonix  Vital Signs:     Date/  Time  Temp  Pulse  Resp  BP  MAP (mmHg)  SpO2  O2 Device   06/03/21 2245  97 8 °F (36 6°C)  61  18  102/54  71  91 %  None (Room air)   06/03/21 1542  97 6 °F (36 4°C)  69  18  119/80  94  91 %  None (Room air)   06/03/21 0642  97 1 °F (36 2°C)  Abnormal   69  18  106/71  --  98 %  None (Room air)             Pertinent Labs/Diagnostic Results:                                                           Medications:     No current facility-administered medications for this encounter  Continuous IV Infusions:  No current facility-administered medications for this encounter  PRN Meds:  No current facility-administered medications for this encounter  Discharge Plan: to be determined     Network Utilization Review Department  ATTENTION: Please call with any questions or concerns to [phone] and carefully listen to the prompts so that you are directed to the right person  All voicemails are confidential   Ty [facility]imb all requests for admission clinical reviews, approved or denied determinations and any other requests to dedicated fax number below belonging to the campus where the patient is receiving treatment   List of dedicated fax numbers for the Facilities:  [address] [place] [address] DENIALS (Administrative/Medical Necessity) [phone]   [address] [place] [address] (Maternity/NICU/Pediatrics) [address] [facility] [facility] [address] [facility] [address] [place] [address] [address] [place] [address] [address] [facility] [address] [facility] [phone]

## 2021-06-15 NOTE — UTILIZATION REVIEW
Initial Clinical Review    Admission: Date/Time/Statement:   Admission Orders (From admission, onward)     Ordered        06/02/21 1232  Inpatient Admission  Once                   Orders Placed This Encounter   Procedures    Inpatient Admission     Standing Status:   Standing     Number of Occurrences:   1     Order Specific Question:   Level of Care     Answer:   Med Surg [16]     Order Specific Question:   Estimated length of stay     Answer:   More than 2 Midnights     Order Specific Question:   Certification     Answer:   I certify that inpatient services are medically necessary for this patient for a duration of greater than two midnights  See H&P and MD Progress Notes for additional information about the patient's course of treatment  ED Arrival Information     Expected Arrival Acuity Means of Arrival Escorted By Service Admission Type    - 6/2/2021 08:58 Urgent Walk-In Self Surgery-General Urgent    Arrival Complaint    abdominal pain        Chief Complaint   Patient presents with    Abdominal Pain     According to the patient she has had abdominal tenderness umbilical region since 7am with N/V/D       Initial Presentation:     39year old female presents to ed from home for evaluation and treatment of abdominal pain  PMHX: small bowel obstruction with adhesions  Patient reports cramping , non bloody diarrhea, and nausea without vomiting  Clinical assessment significant for guarding, abdominal tenderness  US : mod bacteria, WBC 11 10, imaging shows small bowel obstruction with edema indicating ischemia  Treated in ed with iv ns bolus, iv mso4, iv zofran x2,  iv fentanyl  NGT placement  Admit to inpatient medical surgical for small bowel obstruction  Plan includes : consult internal medicine for medical management, NPO, NGT decompression, iv protonix  and iv hydration             ED Triage Vitals [06/02/21 0908]   Temperature Pulse Respirations Blood Pressure SpO2   (!) 97 4 °F (36 3 °C) 96 18 130/94 98 %      Tympanic Monitor           Pain Score       8          06/02/21 103 kg (227 lb 1 2 oz)     Additional Vital Signs:       Date/  Time  Temp  Pulse  Resp  BP  MAP (mmHg)  SpO2  O2 Device    06/03/21 0642  97 1 °F (36 2 °C)  Abnormal   69  18  106/71  --  98 %  None (Room air)    06/02/21 2330  96 4 °F (35 8 °C)  Abnormal   67  18  116/84  --  92 %  None (Room air)    06/02/21 1533  96 9 °F (36 1 °C)  Abnormal   65  18  129/81  99  95 %  None (Room air)    06/02/21 1411  97 6 °F (36 4 °C)  58  18  138/89  --  98 %  None (Room air)    06/02/21 1330  --  69  21  135/71  97  95 %  None (Room air)    06/02/21 1300  --  55  20  130/79  100  96 %  None (Room air)    06/02/21 1230  --  61  20  117/75  89  96 %  None (Room air)    06/02/21 1200  --  67  18  130/75  98  96 %  --    06/02/21 1145  --  50Abnormal   32Abnormal   93/52  64  94 %  --    06/02/21 1115  --  72  18  143/70  100  96 %  None (Room air)    06/02/21 1000  --  75  18  128/93  106  96 %  None (Room air)              Pertinent Labs/Diagnostic Test Results:     CT abdomen pelvis with contrast   Final (06/02 1107)      High-grade small bowel obstruction, with mild mesenteric edema indicative of ischemia  No evidence of bowel pneumatosis or portal venous gas                                                              ED Treatment:   Medication Administration from 06/02/2021 0858 to 06/02/2021 1408       Date/Time Order Dose Route Action     06/02/2021 0948 sodium chloride 0 9 % bolus 1,000 mL 1,000 mL Intravenous New Bag     06/02/2021 0944 morphine injection 2 mg 2 mg Intravenous Given     06/02/2021 0944 ondansetron (ZOFRAN) injection 4 mg 4 mg Intravenous Given     06/02/2021 1125 lidocaine (PF) (XYLOCAINE-MPF) 1 % injection 10 mL 10 mL Infiltration Given     06/02/2021 1216 sodium chloride 0 9 % bolus 1,000 mL 1,000 mL Intravenous New Bag     06/02/2021 1159 ondansetron (ZOFRAN) injection 4 mg 4 mg Intravenous Given     06/02/2021 1150 fentanyl citrate (PF) 100 MCG/2ML 25 mcg 25 mcg Intravenous Given        Past Medical History:   Diagnosis Date    Anxiety     Arthritis     Bipolar 1 disorder ([facility])     with bordeline personality    [facility]line personality disorder ([facility] )     Chronic headaches     Depression     GERD (gastroesophageal reflux disease)     Hypertension     Incontinence     Migraine     Morbid obesity with BMI of 40 0-44 9, adult (HCC)     Small bowel obstruction ([facility] ) 6/27/2019    Suicide attempt ([facility] )     Symptomatic bradycardia     Urinary tract infection      Present on Admission:   Small bowel obstruction (HCC)   [facility] depressed[facility]   Gastroesophageal reflux disease without esophagitis   Essential hypertension      Admitting Diagnosis:     Bowel obstruction ([facility]) [K56 609]  Abdominal pain [R10 9]    Age/Sex: 39 y o  female    Scheduled Medications:  No current facility-administered medications for this encounter  Continuous IV Infusions:  No current facility-administered medications for this encounter  PRN Meds:  No current facility-administered medications for this encounter  IP CONSULT TO INTERNAL MEDICINE    Network Utilization Review Department  ATTENTION: Please call with any questions or concerns to [phone] and carefully listen to the prompts so that you are directed to the right person  All voicemails are confidential   Jann Carcamo all requests for admission clinical reviews, approved or denied determinations and any other requests to dedicated fax number below belonging to the campus where the patient is receiving treatment   List of dedicated fax numbers for the Facilities:  [address] DENIALS (Administrative/Medical Necessity) [phone]   [address] [place] [address] (Maternity/NICU/Pediatrics) 261 [facility],[address] Denis Rojas [address]11 [address] [place] [address] Kuldeep Everett 1481 [address] [place] [address] [phone]

## 2021-06-15 NOTE — UTILIZATION REVIEW
Inpatient Admission Authorization Request   NOTIFICATION OF INPATIENT ADMISSION/INPATIENT AUTHORIZATION REQUEST   SERVICING FACILITY:   [facility], [address] [facility], [address]  Tax ID: [id number]  NPI: [id number]  Place of Service: Inpatient [address]  Place of Service Code: 24     ATTENDING PROVIDER:  Attending Name and NPI#: Lashanda Cueto Md [[id number]]  Address: [facility] [address] [facility], [address]  Phone: [phone]     UTILIZATION REVIEW CONTACT:  Jatin Calero, Utilization   Network Utilization Review Department  Phone: [phone]  Fax [phone]  Email: [email or web address]     PHYSICIAN ADVISORY SERVICES:  FOR AYRT-HV-OQFD REVIEW - MEDICAL NECESSITY DENIAL  Phone: [phone]  Fax: [phone]  Email: [email or web address]     TYPE OF REQUEST:  Inpatient Status     ADMISSION INFORMATION:  ADMISSION DATE/TIME: 6/2/21 12:27 PM  PATIENT DIAGNOSIS CODE/DESCRIPTION:  Bowel obstruction ([address] ) [Y80 474]  Abdominal pain [R10 9]  DISCHARGE DATE/TIME: 6/5/2021  2:32 PM  DISCHARGE DISPOSITION (IF DISCHARGED): Home/Self Care     IMPORTANT INFORMATION:  Please contact the Jatin Calero directly with any questions or concerns regarding this request  Department voicemails are confidential     Send requests for admission clinical reviews, concurrent reviews, approvals, and administrative denials due to lack of clinical to fax [phone]

## 2021-06-15 NOTE — UTILIZATION REVIEW
[facility] FAXED ON 06-04 @ 841 AM - NO AUTH ON FILE AS OF 06-15 @ 1128 AM - REFAXING [facility] - YOU ARE SECONDARY - QUESTIONS CALL NERI @ [phone]

## 2021-06-28 NOTE — UTILIZATION REVIEW
Inpatient Admission Authorization Request   NOTIFICATION OF INPATIENT ADMISSION/INPATIENT AUTHORIZATION REQUEST   SERVICING FACILITY:   [address]  [facility] 89, [address] [facility], [address]  Tax ID: [id number]  NPI: [id number]  Place of Service: Inpatient [address]  Place of Service Code: 24     ATTENDING PROVIDER:  Attending Name and NPI#: Sanam Brennan Md [[id number]]  Address: [facility] 89 [address] [facility], [address]  Phone: [phone]     UTILIZATION REVIEW CONTACT:  Melody Claudio, Utilization   Network Utilization Review Department  Phone: [phone]  Fax [phone]  Email: [email or web address]     PHYSICIAN ADVISORY SERVICES:  FOR FCLJ-JW-JTKJ REVIEW - MEDICAL NECESSITY DENIAL  Phone: [phone]  Fax: [phone]  Email: [email or web address]     TYPE OF REQUEST:  Inpatient Status     ADMISSION INFORMATION:  ADMISSION DATE/TIME: 6/2/21 12:27 PM  PATIENT DIAGNOSIS CODE/DESCRIPTION:  Bowel obstruction ([address] ) [G41 143]  Abdominal pain [R10 9]  DISCHARGE DATE/TIME: 6/5/2021  2:32 PM  DISCHARGE DISPOSITION (IF DISCHARGED): Home/Self Care     IMPORTANT INFORMATION:  Please contact the Melody Claudio directly with any questions or concerns regarding this request  Department voicemails are confidential     Send requests for admission clinical reviews, concurrent reviews, approvals, and administrative denials due to lack of clinical to fax [phone]

## 2021-06-28 NOTE — UTILIZATION REVIEW
Initial Clinical Review    Admission: Date/Time/Statement:   Admission Orders (From admission, onward)     Ordered        06/02/21 1232  Inpatient Admission  Once                   Orders Placed This Encounter   Procedures    Inpatient Admission     Standing Status:   Standing     Number of Occurrences:   1     Order Specific Question:   Level of Care     Answer:   Med Surg [16]     Order Specific Question:   Estimated length of stay     Answer:   More than 2 Midnights     Order Specific Question:   Certification     Answer:   I certify that inpatient services are medically necessary for this patient for a duration of greater than two midnights  See H&P and MD Progress Notes for additional information about the patient's course of treatment  ED Arrival Information     Expected Arrival Acuity Means of Arrival Escorted By Service Admission Type    - 6/2/2021 08:58 Urgent Walk-In Self Surgery-General Urgent    Arrival Complaint    abdominal pain        Chief Complaint   Patient presents with    Abdominal Pain     According to the patient she has had abdominal tenderness umbilical region since 7am with N/V/D       Initial Presentation:     39year old female presents to ed from home for evaluation and treatment of abdominal pain  PMHX: small bowel obstruction with adhesions  Patient reports cramping , non bloody diarrhea, and nausea without vomiting  Clinical assessment significant for guarding, abdominal tenderness  US : mod bacteria, WBC 11 10, imaging shows small bowel obstruction with edema indicating ischemia  Treated in ed with iv ns bolus, iv mso4, iv zofran x2,  iv fentanyl  NGT placement  Admit to inpatient medical surgical for small bowel obstruction  Plan includes : consult internal medicine for medical management, NPO, NGT decompression, iv protonix  and iv hydration             ED Triage Vitals [06/02/21 0908]   Temperature Pulse Respirations Blood Pressure SpO2   (!) 97 4 °F (36 3 °C) 96 18 130/94 98 %      Tympanic Monitor           Pain Score       8          06/02/21 103 kg (227 lb 1 2 oz)     Additional Vital Signs:       Date/  Time  Temp  Pulse  Resp  BP  MAP (mmHg)  SpO2  O2 Device    06/03/21 0642  97 1 °F (36 2 °C)  Abnormal   69  18  106/71  --  98 %  None (Room air)    06/02/21 2330  96 4 °F (35 8 °C)  Abnormal   67  18  116/84  --  92 %  None (Room air)    06/02/21 1533  96 9 °F (36 1 °C)  Abnormal   65  18  129/81  99  95 %  None (Room air)    06/02/21 1411  97 6 °F (36 4 °C)  58  18  138/89  --  98 %  None (Room air)    06/02/21 1330  --  69  21  135/71  97  95 %  None (Room air)    06/02/21 1300  --  55  20  130/79  100  96 %  None (Room air)    06/02/21 1230  --  61  20  117/75  89  96 %  None (Room air)    06/02/21 1200  --  67  18  130/75  98  96 %  --    06/02/21 1145  --  50Abnormal   32Abnormal   93/52  64  94 %  --    06/02/21 1115  --  72  18  143/70  100  96 %  None (Room air)    06/02/21 1000  --  75  18  128/93  106  96 %  None (Room air)              Pertinent Labs/Diagnostic Test Results:     CT abdomen pelvis with contrast   Final (06/02 1107)      High-grade small bowel obstruction, with mild mesenteric edema indicative of ischemia  No evidence of bowel pneumatosis or portal venous gas                                                              ED Treatment:   Medication Administration from 06/02/2021 0858 to 06/02/2021 1408       Date/Time Order Dose Route Action     06/02/2021 0948 sodium chloride 0 9 % bolus 1,000 mL 1,000 mL Intravenous New Bag     06/02/2021 0944 morphine injection 2 mg 2 mg Intravenous Given     06/02/2021 0944 ondansetron (ZOFRAN) injection 4 mg 4 mg Intravenous Given     06/02/2021 1125 lidocaine (PF) (XYLOCAINE-MPF) 1 % injection 10 mL 10 mL Infiltration Given     06/02/2021 1216 sodium chloride 0 9 % bolus 1,000 mL 1,000 mL Intravenous New Bag     06/02/2021 1159 ondansetron (ZOFRAN) injection 4 mg 4 mg Intravenous Given     06/02/2021 1156 fentanyl citrate (PF) 100 MCG/2ML 25 mcg 25 mcg Intravenous Given        Past Medical History:   Diagnosis Date    Anxiety     Arthritis     B[facility] 1 disorder ([facility])     with bordeline personality    [facility]     Chronic headaches     Depression     GERD (gastroesophageal reflux disease)     Hypertension     Incontinence     Migraine     Morbid obesity with BMI of 40 0-44 9, adult (HCC)     Small bowel obstruction ([facility] ) 6/27/2019    Suicide attempt ([facility] )     Symptomatic bradycardia     Urinary tract infection      Present on Admission:   Small bowel obstruction (HCC)   Bipolar 1 disorder, depressed, severe ([facility] )   Gastroesophageal reflux disease without esophagitis   Essential hypertension      Admitting Diagnosis:     Bowel obstruction ([facility]) [K56 609]  Abdominal pain [R10 9]    Age/Sex: 39 y o  female    Scheduled Medications:  No current facility-administered medications for this encounter  Continuous IV Infusions:  No current facility-administered medications for this encounter  PRN Meds:  No current facility-administered medications for this encounter  IP CONSULT TO INTERNAL MEDICINE    Network Utilization Review Department  ATTENTION: Please call with any questions or concerns to [phone] and carefully listen to the prompts so that you are directed to the right person  All voicemails are confidential   Veronique Doing all requests for admission clinical reviews, approved or denied determinations and any other requests to dedicated fax number below belonging to the campus where the patient is receiving treatment   List of dedicated fax numbers for the Facilities:  [address] DENIALS (Administrative/Medical Necessity) [phone]   [address] [place] [address] (Maternity/NICU/Pediatrics) 261 [facility] [address] Denis Rojas [address] [place] [address] Kuldeep Everett 1481 [address] [facility] [place] [address] [phone]

## 2021-06-29 ENCOUNTER — CONSULT (OUTPATIENT)
Dept: SURGERY | Facility: CLINIC | Age: 46
End: 2021-06-29
Payer: COMMERCIAL

## 2021-06-29 VITALS
TEMPERATURE: 98.5 F | BODY MASS INDEX: 39.15 KG/M2 | HEIGHT: 65 IN | WEIGHT: 235 LBS | DIASTOLIC BLOOD PRESSURE: 88 MMHG | SYSTOLIC BLOOD PRESSURE: 124 MMHG | HEART RATE: 64 BPM

## 2021-06-29 DIAGNOSIS — K56.609 SMALL BOWEL OBSTRUCTION (HCC): Primary | ICD-10-CM

## 2021-06-29 PROCEDURE — 99213 OFFICE O/P EST LOW 20 MIN: CPT | Performed by: SURGERY

## 2021-06-29 NOTE — PROGRESS NOTES
Assessment/Plan:    Small bowel obstruction ([facility] )    Recent admission for recurrent small-bowel obstruction successfully resolved without surgical intervention  Patient came in inquiring about removal of scar tissue to prevent future obstructions  A detailed conversation and Depacon discussion had about the risks and benefits of exploratory surgery for lysis of adhesions  Patient realizes the scar tissue will come back but she felt that because it took 20 years for to curve the 1st time she would have some improvement  Based on CT scan and there were several loops are plastered against the anterior abdominal wall  I explained her there is significant risk for potential causing injury to the bowel, recurrent obstruction, bleeding, and potential enterocutaneous fistula  I  Explained her as well that the you know if this continues to happen there is potential that this could be undertaken while she is in the hospital if a recurrent obstruction occurs  However he no trying to avoid surgery at all cost to prevent additional scar tissue is her best bet  She currently is doing well without any abdominal pain and so this is not a chronic pain or inability to tolerate diet  I did encourage her to seek a 2nd opinion with 1 of my colleagues other Dr Waqas Gonsales or Dr Rogelio Marrufo  For now she will return as an as-needed basis  Diagnoses and all orders for this visit:    Small bowel obstruction ([facility] )          Subjective:      Patient ID: Herbert Judge is a 39 y o  female  28-year-old female with a history of exploratory laparotomy for ingestion of foreign body, cholecystectomy, recurrent small-bowel obstruction presents today   In consultation for evaluation of recurrent small-bowel obstruction of potential lysis adhesions  Patient states she has had 3 bowel obstructions the last year and half    After speaking with her gastroenterologist it was suggested that she should have exploratory laparotomy to remove scar tissue  Patient states she understands that the scar tissue will come back but she feels that since her last surgery was  20 years ago that lysis adhesions with by her more time  Currently denies any nausea vomiting  No fevers or chills  No abdominal pain  No changes in bowel or bladder habits  She is tolerating regular diet  The following portions of the patient's history were reviewed and updated as appropriate:   She  has a past medical history of Anxiety, Arthritis, Bipolar 1 disorder ([facility] ), Borderline personality disorder ([facility] ), Chronic headaches, Depression, GERD (gastroesophageal reflux disease), Hypertension, Incontinence, Migraine, Morbid obesity with BMI of 40 0-44 9, adult ([facility] ), Small bowel obstruction ([facility] ) (6/27/2019), Suicide attempt ([facility] ), Symptomatic bradycardia, and Urinary tract infection  She   Patient Active Problem List    Diagnosis Date Noted    Leukocytosis 06/02/2021    Morbid obesity with BMI of 40 0-44 9, adult ([facility] )     At risk for sleep apnea     Abnormal TSH 11/05/2020    Essential hypertension 08/19/2020    Gastroesophageal reflux disease without esophagitis 08/19/2020    Borderline personality disorder ([facility] )     Lightheaded 08/17/2020    Symptomatic bradycardia 07/31/2020    Bipolar disorder, in partial remission, most recent episode [facility] ) 03/12/2020    Bipolar 1 disorder, depressed, severe ([facility] ) 07/24/2019    Panic disorder 07/24/2019    [facility] bowel obstruction ([facility] ) 06/27/2019    Recurrent UTI (urinary tract infection) 01/17/2019    Stress incontinence 10/17/2018    Sensation of cold in lower extremity 10/04/2018    Excessive daytime sleepiness 07/26/2018    Migraine without aura and without status migrainosus, not intractable 07/26/2018    Tension headache 07/26/2018     She  has a past surgical history that includes Foot surgery (Right); Ovarian cyst removal; Cholecystectomy (05/24/2018);  Dental surgery; Knee arthroscopy (Right, 11/15/2018); Multiple tooth extractions (11/02/2018); Exploratory laparotomy; Reduction mammaplasty; Sinus surgery (Bilateral, 04/19/2019); and Elbow surgery  Her family history includes Breast cancer in her maternal grandmother; Depression in her family; Diabetes in her paternal aunt, paternal grandfather, paternal grandmother, and paternal uncle; Heart disease in her maternal aunt and maternal uncle; Hyperlipidemia in her father; Hypertension in her father; Migraines in her mother  She  reports that she has never smoked  She has never used smokeless tobacco  She reports current alcohol use  She reports that she does not use drugs    Current Outpatient Medications   Medication Sig Dispense Refill    ALPRAZolam (XANAX) 0 25 mg tablet Take 0 25 mg by mouth As needed daily      butalbital-acetaminophen-caffeine (FIORICET,ESGIC) -40 mg per tablet butalbital-acetaminophen-caffeine 50 mg-325 mg-40 mg tablet      cariprazine (Vraylar) 1 5 MG capsule       cariprazine (Vraylar) 6 MG capsule Take 6 mg by mouth daily      cyclobenzaprine (FLEXERIL) 5 mg tablet As needed      dexlansoprazole (Dexilant) 60 MG capsule daily       famotidine (PEPCID) 40 MG tablet Take 40 mg by mouth bedtime      linaCLOtide (LINZESS PO) Take 290 mcg by mouth daily before breakfast      lisinopril (ZESTRIL) 10 mg tablet Take 10 mg by mouth daily      ondansetron (ZOFRAN-ODT) 4 mg disintegrating tablet Take 1 tablet (4 mg total) by mouth every 8 (eight) hours as needed for nausea or vomiting 20 tablet 0    pregabalin (LYRICA) 150 mg capsule Take 150 mg by mouth daily Daily at bedtime      PROAIR  (90 Base) MCG/ACT inhaler Inhale every 4 (four) hours as needed (as needed)        rOPINIRole (REQUIP) 2 mg tablet       valACYclovir (VALTREX) 500 mg tablet Take 500 mg by mouth daily      vilazodone (VIIBRYD) 10 mg tablet Take 1 tablet (10 mg total) by mouth daily with breakfast (Patient taking differently: Take 20 mg by mouth daily with breakfast ) 30 tablet 0    Vraylar 3 MG capsule 9 mg daily 9 mgms daily      fluticasone (FLONASE) 50 mcg/act nasal spray 2 sprays into each nostril daily (Patient taking differently: 2 sprays into each nostril daily As needed) 3 Bottle 1     No current facility-administered medications for this visit  She is allergic to celecoxib and lamotrigine       Review of Systems        Review systems completed, all negative except as noted above HPI  Objective:      /88   Pulse 64   Temp 98 5 °F (36 9 °C)   Ht 5' 5" (1 651 m)   Wt 107 kg (235 lb)   BMI 39 11 kg/m²          Physical Exam  Vitals reviewed  Constitutional:       General: She is not in acute distress  Appearance: She is not ill-appearing, toxic-appearing or diaphoretic  HENT:      Head: Normocephalic and atraumatic  Left Ear: External ear normal    Eyes:      General: No scleral icterus  Right eye: No discharge  Left eye: No discharge  Cardiovascular:      Rate and Rhythm: Normal rate  Pulmonary:      Effort: Pulmonary effort is normal  No respiratory distress  Musculoskeletal:         General: No tenderness or signs of injury  Normal range of motion  Cervical back: Normal range of motion  Right lower leg: No edema  Left lower leg: No edema  Skin:     General: Skin is dry  Coloration: Skin is not jaundiced or pale  Findings: No bruising or erythema  Neurological:      General: No focal deficit present  Mental Status: She is alert and oriented to person, place, and time  Cranial Nerves: No cranial nerve deficit  Psychiatric:         Mood and Affect: Mood normal          Behavior: Behavior normal          Thought Content: Thought content normal          Judgment: Judgment normal              Imaging:     CT imaging reviewed personally by me

## 2021-06-29 NOTE — ASSESSMENT & PLAN NOTE
Recent admission for recurrent small-bowel obstruction successfully resolved without surgical intervention  Patient came in inquiring about removal of scar tissue to prevent future obstructions  A detailed conversation and [place] discussion had about the risks and benefits of exploratory surgery for lysis of adhesions  Patient realizes the scar tissue will come back but she felt that because it took 20 years for to curve the 1st time she would have some improvement  Based on CT scan and there were several loops are plastered against the anterior abdominal wall  I explained her there is significant risk for potential causing injury to the bowel, recurrent obstruction, bleeding, and potential enterocutaneous fistula  I  Explained her as well that the you know if this continues to happen there is potential that this could be undertaken while she is in the hospital if a recurrent obstruction occurs  However he no trying to avoid surgery at all cost to prevent additional scar tissue is her best bet  She currently is doing well without any abdominal pain and so this is not a chronic pain or inability to tolerate diet  I did encourage her to seek a 2nd opinion with 1 of my colleagues other Dr Estephania Hernandes or Dr Rubin Nazario  For now she will return as an as-needed basis

## 2021-09-20 NOTE — NURSING NOTE
Pt reviewed After Visit Summary with staff and belongings were inventoried and returned  Pt left unit accompanied by staff  Performed Resulted

## 2022-01-31 ENCOUNTER — HOSPITAL ENCOUNTER (OUTPATIENT)
Dept: MRI IMAGING | Facility: HOSPITAL | Age: 47
Discharge: HOME | End: 2022-01-31
Payer: COMMERCIAL

## 2022-01-31 DIAGNOSIS — M76.62 ACHILLES TENDINITIS, LEFT LEG: ICD-10-CM

## 2022-01-31 PROCEDURE — G1004 CDSM NDSC: HCPCS

## 2022-01-31 PROCEDURE — 73721 MRI JNT OF LWR EXTRE W/O DYE: CPT

## 2022-02-06 NOTE — PROGRESS NOTES
Progress Note - General Surgery   Mildred Phillips 41 y o  female MRN: [id number]  Unit/Bed#: -01 Encounter: [id number]    Assessment:  Hospital day 3 with resolving small bowel obstruction  The patient reports feeling well at rest   She reports passage of flatus and stool per rectum  On physical examination she is awake alert and oriented  She is afebrile stable vital signs  Her abdomen is soft and nontender to palpation  Plan:  1  Discontinue nasogastric tube  2  Advanced to clear liquid diet  3  Repeat physical examination on the morning of Monday July 1, 2019    Subjective/Objective   Chief Complaint:  Small-bowel obstruction    Subjective:  Patient reports feeling better    Objective:  Abdomen soft and nontender to palpation    Blood pressure 121/74, pulse 72, temperature 99 °F (37 2 °C), temperature source Temporal, resp  rate 18, height 5' 5" (1 651 m), weight 74 kg (163 lb 2 3 oz), SpO2 97 %, not currently breastfeeding  ,Body mass index is 27 15 kg/m²  Intake/Output Summary (Last 24 hours) at 6/30/2019 1114  Last data filed at 6/30/2019 0459  Gross per 24 hour   Intake --   Output 450 ml   Net -450 ml       Invasive Devices     Peripheral Intravenous Line            Peripheral IV 06/27/19 Left Hand 2 days          Drain            NG/OG/Enteral Tube Nasogastric 16 Fr Left nares 2 days                Physical Exam: /74 (BP Location: Left arm)   Pulse 72   Temp 99 °F (37 2 °C) (Temporal)   Resp 18   Ht 5' 5" (1 651 m)   Wt 74 kg (163 lb 2 3 oz)   SpO2 97%   Breastfeeding?  No   BMI 27 15 kg/m²     General Appearance:    Alert, cooperative, no distress, appears stated age   Head:    Normocephalic, without obvious abnormality, atraumatic   Eyes:    PERRL, conjunctiva/corneas clear, EOM's intact, fundi     benign, both eyes   Ears:    Normal TM's and external ear canals, both ears   Nose:   Nares normal, septum midline, mucosa normal, no drainage    or sinus tenderness   Throat: Lips, mucosa, and tongue normal; teeth and gums normal   Neck:   Supple, symmetrical, trachea midline, no adenopathy;     thyroid:  no enlargement/tenderness/nodules; no carotid    bruit or JVD   Back:     Symmetric, no curvature, ROM normal, no CVA tenderness   Lungs:     Clear to auscultation bilaterally, respirations unlabored   Chest Wall:    No tenderness or deformity    Heart:    Regular rate and rhythm, S1 and S2 normal, no murmur, rub   or gallop                   Extremities:   Extremities normal, atraumatic, no cyanosis or edema   Pulses:   2+ and symmetric all extremities   Skin:   Skin color, texture, turgor normal, no rashes or lesions   Lymph nodes:   Cervical, supraclavicular, and axillary nodes normal   Neurologic:   CNII-XII intact, normal strength, sensation and reflexes     throughout       Lab, Imaging and other studies:I have personally reviewed pertinent lab results  [facility]

## 2022-02-21 ENCOUNTER — OFFICE VISIT (OUTPATIENT)
Dept: URGENT CARE | Facility: CLINIC | Age: 47
End: 2022-02-21
Payer: COMMERCIAL

## 2022-02-21 VITALS — RESPIRATION RATE: 18 BRPM | TEMPERATURE: 97.3 F | HEART RATE: 100 BPM | OXYGEN SATURATION: 95 %

## 2022-02-21 DIAGNOSIS — H93.8X3 CONGESTION OF BOTH EARS: ICD-10-CM

## 2022-02-21 DIAGNOSIS — J02.9 SORE THROAT: ICD-10-CM

## 2022-02-21 DIAGNOSIS — R05.1 ACUTE COUGH: Primary | ICD-10-CM

## 2022-02-21 LAB — S PYO AG THROAT QL: NEGATIVE

## 2022-02-21 PROCEDURE — 87070 CULTURE OTHR SPECIMN AEROBIC: CPT

## 2022-02-21 PROCEDURE — 87880 STREP A ASSAY W/OPTIC: CPT

## 2022-02-21 PROCEDURE — 99213 OFFICE O/P EST LOW 20 MIN: CPT

## 2022-02-21 PROCEDURE — G0463 HOSPITAL OUTPT CLINIC VISIT: HCPCS

## 2022-02-21 PROCEDURE — 87636 SARSCOV2 & INF A&B AMP PRB: CPT

## 2022-02-21 RX ORDER — BENZONATATE 200 MG/1
200 CAPSULE ORAL 3 TIMES DAILY PRN
Qty: 20 CAPSULE | Refills: 0 | Status: SHIPPED | OUTPATIENT
Start: 2022-02-21 | End: 2022-04-25

## 2022-02-21 RX ORDER — FLUTICASONE PROPIONATE 50 MCG
2 SPRAY, SUSPENSION (ML) NASAL DAILY
Qty: 15.8 ML | Refills: 0 | Status: SHIPPED | OUTPATIENT
Start: 2022-02-21 | End: 2022-04-25

## 2022-02-21 NOTE — PROGRESS NOTES
[facility]        NAME: Duke Mejia is a 42 y o  female  : 1975    MRN: [id number]  DATE: 2022  TIME: 11:20 AM      Assessment and Plan     Congestion of both ears [H93 8X3]  1  Congestion of both ears  fluticasone (FLONASE) 50 mcg/act nasal spray   2  Sore throat  POCT rapid strepA    Throat culture   3  Acute cough  Covid19 and INFLUENZA A/B PCR    benzonatate (TESSALON) 200 MG capsule     poct strep negative  Throat culture  Will hold on antibiotics at this time d/t lower centor- presence of cough and normal throat examination  Patient Instructions     Recommend throat lozenges and gargling warm salt water for throat irritation  Use benzonatate as directed as needed for cough  Hydration and rest   Acetaminophen and ibuprofen for pain and fever  COVID/influenza testing  Use the [facility] MyChart to obtain lab results  Home isolation  Wear your mask and wash hands often  PCP follow up in 3-5 days  Go to an emergency department if difficulty breathing occurs or if symptoms worsen  Recommended supplements for potential COVID-19 is the following: Vitamin D3 2000 IU  daily ,  Vitamin C 1g  every 12 hours , Multivitamin Daily   Chief Complaint     Chief Complaint   Patient presents with    Sore Throat     headache, ear pain in both ears, fatigue, coughing phlem yellow  x3-4 days         History of Present Illness     Patient is a 82-year-old female who presents with cough, congestion, ear pressure, fatigue, sneezing, body aches, and sore throat for the past 3-4 days  Denies fever or chills  States she had nausea last night, with resolved with zofran  Denies vomiting or diarrhea  Denies SOB or wheezing  Reports hx of exercise of history induced asthma  States she does work in a , states an eye infection and stomach bug are going around  Review of Systems     Review of Systems   Constitutional: Positive for fatigue  Negative for chills and fever     HENT: Positive for congestion (mild), ear pain (pressure), sneezing (occasionally) and sore throat  Negative for rhinorrhea and sinus pain  Respiratory: Positive for cough (yellow/thick)  Negative for shortness of breath and wheezing  Gastrointestinal: Positive for nausea (took zofran last night with relief)  Negative for diarrhea and vomiting  Musculoskeletal: Positive for myalgias  Skin: Negative for rash  Neurological: Positive for headaches (7/10, worse with cough)  All other systems reviewed and are negative          Current Medications       Current Outpatient Medications:     cariprazine (Vraylar) 1 5 MG capsule, , Disp: , Rfl:     cariprazine (Vraylar) 6 MG capsule, Take 6 mg by mouth daily, Disp: , Rfl:     dexlansoprazole (Dexilant) 60 MG capsule, daily , Disp: , Rfl:     famotidine (PEPCID) 40 MG tablet, Take 40 mg by mouth bedtime, Disp: , Rfl:     linaCLOtide (LINZESS PO), Take 290 mcg by mouth daily before breakfast, Disp: , Rfl:     lisinopril (ZESTRIL) 10 mg tablet, Take 10 mg by mouth daily, Disp: , Rfl:     ondansetron (ZOFRAN-ODT) 4 mg disintegrating tablet, Take 1 tablet (4 mg total) by mouth every 8 (eight) hours as needed for nausea or vomiting, Disp: 20 tablet, Rfl: 0    pregabalin (LYRICA) 150 mg capsule, Take 150 mg by mouth daily Daily at bedtime, Disp: , Rfl:     valACYclovir (VALTREX) 500 mg tablet, Take 500 mg by mouth daily, Disp: , Rfl:     Vraylar 3 MG capsule, 9 mg daily 9 mgms daily, Disp: , Rfl:     ALPRAZolam (XANAX) 0 25 mg tablet, Take 0 25 mg by mouth As needed daily (Patient not taking: Reported on 2/21/2022 ), Disp: , Rfl:     benzonatate (TESSALON) 200 MG capsule, Take 1 capsule (200 mg total) by mouth 3 (three) times a day as needed for cough, Disp: 20 capsule, Rfl: 0    butalbital-acetaminophen-caffeine (FIORICET,ESGIC) -40 mg per tablet, butalbital-acetaminophen-caffeine 50 mg-325 mg-40 mg tablet (Patient not taking: Reported on 2/21/2022), Disp: , Rfl:     cyclobenzaprine (FLEXERIL) 5 mg tablet, As needed (Patient not taking: Reported on 2/21/2022), Disp: , Rfl:     fluticasone (FLONASE) 50 mcg/act nasal spray, 2 sprays into each nostril daily (Patient taking differently: 2 sprays into each nostril daily As needed), Disp: 3 Bottle, Rfl: 1    fluticasone (FLONASE) 50 mcg/act nasal spray, 2 sprays into each nostril daily, Disp: 15 8 mL, Rfl: 0    PROAIR  (90 Base) MCG/ACT inhaler, Inhale every 4 (four) hours as needed (as needed)   (Patient not taking: Reported on 2/21/2022 ), Disp: , Rfl:     rOPINIRole (REQUIP) 2 mg tablet, , Disp: , Rfl:     vilazodone (VIIBRYD) 10 mg tablet, Take 1 tablet (10 mg total) by mouth daily with breakfast (Patient not taking: Reported on 2/21/2022 ), Disp: 30 tablet, Rfl: 0    Current Allergies     Allergies as of 02/21/2022 - Reviewed 02/21/2022   Allergen Reaction Noted    Celecoxib Hives 11/11/2019    Lamotrigine Rash and Hives 02/24/2010              The following portions of the patient's history were reviewed and updated as appropriate: allergies, current medications, past family history, past medical history, past social history, past surgical history and problem list      Past Medical History:   Diagnosis Date    Anxiety     Arthritis     Bipolar 1 disorder (HCC)     with bordeline personality    Borderline personality disorder ([address] )     Chronic headaches     Depression     GERD (gastroesophageal reflux disease)     Hypertension     Incontinence     Migraine     Morbid obesity with BMI of 40 0-44 9, adult ([address] )     Small bowel obstruction ([address] ) 6/27/2019    Suicide attempt ([address] )     Symptomatic bradycardia     Urinary tract infection        Past Surgical History:   Procedure Laterality Date    CHOLECYSTECTOMY  05/24/2018    DENTAL SURGERY      ELBOW SURGERY      EXPLORATORY LAPAROTOMY      exlap    FOOT SURGERY Right     KNEE ARTHROSCOPY Right 11/15/2018    total of three procedures with meniscal surgery    MULTIPLE TOOTH EXTRACTIONS  11/02/2018    OVARIAN CYST REMOVAL      REDUCTION MAMMAPLASTY      reduction     SINUS SURGERY Bilateral 04/19/2019    bilateral maxillary antrostomies - Dr Harrell Lisset - [facility]       Family History   Problem Relation Age of Onset    Hyperlipidemia Father     Hypertension Father     Migraines Mother     Depression Family     Heart disease Maternal Aunt     Heart disease Maternal Uncle     Diabetes Paternal Aunt     Diabetes Paternal Uncle     Breast cancer Maternal Grandmother     Diabetes Paternal Grandmother     Diabetes Paternal Grandfather     Stroke Neg Hx     Thyroid disease Neg Hx          Medications have been verified  Objective     Pulse 100   Temp (!) 97 3 °F (36 3 °C)   Resp 18   SpO2 95%   No LMP recorded  Patient has had an injection  Physical Exam     Physical Exam  Vitals and nursing note reviewed  Constitutional:       General: She is awake  She is not in acute distress  Appearance: She is well-developed  She is not ill-appearing, toxic-appearing or diaphoretic  HENT:      Right Ear: Ear canal and external ear normal  A middle ear effusion is present  Tympanic membrane is not injected or erythematous  Left Ear: Ear canal and external ear normal  A middle ear effusion is present  Tympanic membrane is not injected or erythematous  Nose: Nose normal       Right Sinus: No maxillary sinus tenderness or frontal sinus tenderness  Left Sinus: No maxillary sinus tenderness or frontal sinus tenderness  Mouth/Throat:      Lips: Pink  Mouth: Mucous membranes are moist       Pharynx: Oropharynx is clear  Uvula midline  No pharyngeal swelling, oropharyngeal exudate, posterior oropharyngeal erythema or uvula swelling  Tonsils: No tonsillar exudate or tonsillar abscesses  1+ on the right  1+ on the left  Cardiovascular:      Rate and Rhythm: Normal rate  Pulses: Normal pulses        Heart sounds: Normal heart sounds, S1 normal and S2 normal    Pulmonary:      Effort: Pulmonary effort is normal       Breath sounds: Normal breath sounds and air entry  No stridor  Musculoskeletal:      Cervical back: Neck supple  Lymphadenopathy:      Cervical: Cervical adenopathy present  Skin:     General: Skin is warm  Capillary Refill: Capillary refill takes less than 2 seconds  Neurological:      Mental Status: She is alert  Psychiatric:         Mood and Affect: Mood normal          Behavior: Behavior normal          Thought Content:  Thought content normal          Judgment: Judgment normal

## 2022-02-21 NOTE — PATIENT INSTRUCTIONS
Recommend throat lozenges and gargling warm salt water for throat irritation  Use benzonatate as directed as needed for cough  Use flonase as directed  Hydration and rest   Acetaminophen and ibuprofen for pain and fever  COVID/influenza testing  Use the [facility] MyChart to obtain lab results  Home isolation  Wear your mask and wash hands often  PCP follow up in 3-5 days  Go to an emergency department if difficulty breathing occurs or if symptoms worsen  Recommended supplements for potential COVID-19 is the following: Vitamin D3 2000 IU  daily ,  Vitamin C 1g  every 12 hours , Multivitamin Daily     Acute Cough   WHAT YOU NEED TO KNOW:   An acute cough can last up to 3 weeks  Common causes of an acute cough include a cold, allergies, or a lung infection  DISCHARGE INSTRUCTIONS:   Return to the emergency department if:   · You have trouble breathing or feel short of breath  · You cough up blood, or you see blood in your mucus  · You faint or feel weak or dizzy  · You have chest pain when you cough or take a deep breath  · You have new wheezing  Contact your healthcare provider if:   · You have a fever  · Your cough lasts longer than 4 weeks  · Your symptoms do not improve with treatment  · You have questions or concerns about your condition or care  Medicines:   · Medicines  may be needed to stop the cough, decrease swelling in your airways, or help open your airways  Medicine may also be given to help you cough up mucus  Ask your healthcare provider what over-the-counter medicines you can take  If you have an infection caused by bacteria, you may need antibiotics  · Take your medicine as directed  Contact your healthcare provider if you think your medicine is not helping or if you have side effects  Tell him or her if you are allergic to any medicine  Keep a list of the medicines, vitamins, and herbs you take  Include the amounts, and when and why you take them   Bring the list or the pill bottles to follow-up visits  Carry your medicine list with you in case of an emergency  Manage your symptoms:   · Do not smoke and stay away from others who smoke  Nicotine and other chemicals in cigarettes and cigars can cause lung damage and make your cough worse  Ask your healthcare provider for information if you currently smoke and need help to quit  E-cigarettes or smokeless tobacco still contain nicotine  Talk to your healthcare provider before you use these products  · Drink extra liquids as directed  Liquids will help thin and loosen mucus so you can cough it up  Liquids will also help prevent dehydration  Examples of good liquids to drink include water, fruit juice, and broth  Do not drink liquids that contain caffeine  Caffeine can increase your risk for dehydration  Ask your healthcare provider how much liquid to drink each day  · Rest as directed  Do not do activities that make your cough worse, such as exercise  · Use a humidifier or vaporizer  Use a cool mist humidifier or a vaporizer to increase air moisture in your home  This may make it easier for you to breathe and help decrease your cough  · Eat 2 to 5 mL of honey 2 times each day  Honey can help thin mucus and decrease your cough  · Use cough drops or lozenges  These can help decrease throat irritation and your cough  Follow up with your healthcare provider as directed:  Write down your questions so you remember to ask them during your visits  © Copyright [employer] 2021 Information is for End User's use only and may not be sold, redistributed or otherwise used for commercial purposes  All illustrations and images included in CareNotes® are the copyrighted property of A [employer] A M [employer] Inc  or [facility]   The above information is an  only  It is not intended as medical advice for individual conditions or treatments   Talk to your doctor, nurse or pharmacist before following any medical regimen to see if it is safe and effective for you

## 2022-02-21 NOTE — LETTER
February 21, 2022     Patient: Gosia Hamilton   YOB: 1975   Date of Visit: 2/21/2022       To Whom It May Concern:      Amparo SAPNA Canasmarshall was seen and evaluated at our [facility]  Please note if Covid/influenza test is negative, they may return to work when fever free for 24 hours without the use of a fever reducing agent  If Covid/influenza test is positive, they may return to work on 02/23/22, as this is 5 days from the onset of symptoms  Upon return, they must then adhere to strict masking for an additional 5 days  If you have any questions or concerns, please don't hesitate to call           Sincerely,        LEATHA Bonilla    CC: No Recipients

## 2022-02-21 NOTE — UTILIZATION REVIEW
Initial Clinical Review    Admission: Date/Time/Statement: Admission Orders (From admission, onward)     Ordered        07/31/20 1333  Place in Observation  Once                   Orders Placed This Encounter   Procedures    Place in Observation     Standing Status:   Standing     Number of Occurrences:   1     Order Specific Question:   Admitting Physician     Answer:   Briseida Weiss     Order Specific Question:   Level of Care     Answer:   Med Surg [16]     ED Arrival Information     Expected Arrival Acuity Means of Arrival Escorted By Service Admission Type    - 7/31/2020 07:53 Urgent Walk-In Self General Medicine Urgent    Arrival Complaint    dizzy nausea        Chief Complaint   Patient presents with    Dizziness     Assessment/Plan:     45year old female presents to ed from home for evaluation and treatment of lightheadedness,dizziness and nausea  PMHX:  GERD, HTN, migraine, bipolar On arrival patient reports onset of symptoms was 3 days ago which she thought was dehydration due to moving by herself on the weekend  She also states she felt the room spinning - worse with movement of lying down  Symptoms are associated with nausea and mild headache  Clinical assessment significant for leftward beating nystagmus/ fatigable, temp 96 9, bradycardia 42 to 47, TSH / T3  0 360 (low), urine slightly cloudy,  EKG shows marked sinus bradycardia and QT shortened  Treated in ed with iv zofran, iv  9% ns 1L bolus x2, antivert x1  Admit to observation for symptomatic bradycardia ( previous baseline 70s)  Plan includes: hold atenolol, telemetry, consult cardiology, obtain UDS, obtain orthostatic blood pressure, neuro checks q4 hr, continue iv fluids     Addendum : UDS  Positive Benzos    Consult cardiology    Plan:  -patient presented with dizziness, heart rate found to be in the 40s  Atenolol recently started for palpitations was held and her heart rate is now in the 50s    No arrhythmias on Subjective:      Nanette Leyva is a 21 m.o. female here with mother who provides history. Patient brought in for   Fever      History of Present Illness:  Nanette developed a fever to 100.4 today at school. She has had an increase in rhinorrhea.  Teacher noted that she also seemed to have pain in her  area vs ?while urinating  She does have a diaper rash. They used a bath bomb earlier in the week which was a jonas's gift at school  She has hx of constipation but had a very soft BM today - has been on miralax, used ex-lax x the last 2 days  No vomiting. Normal appetite. Just had course of CTX for AOMs      Review of Systems   Constitutional: Positive for fever.   HENT: Positive for rhinorrhea.    Skin: Positive for rash.       Objective:     Vitals:    02/21/22 1557   Pulse: (!) 154   Temp: 98.8 °F (37.1 °C)       Physical Exam  Vitals reviewed.   Constitutional:       General: She is active.   HENT:      Right Ear: Tympanic membrane normal.      Left Ear: Tympanic membrane normal.      Mouth/Throat:      Mouth: Mucous membranes are moist.   Cardiovascular:      Rate and Rhythm: Normal rate and regular rhythm.      Heart sounds: No murmur heard.  Pulmonary:      Effort: Pulmonary effort is normal.      Breath sounds: Normal breath sounds.   Abdominal:      General: Abdomen is flat.      Palpations: Abdomen is soft.      Tenderness: There is no abdominal tenderness. There is no guarding or rebound.   Genitourinary:     Comments: +very red papular rash on bilateral labia majora and groin   Skin:     General: Skin is warm.      Capillary Refill: Capillary refill takes less than 2 seconds.   Neurological:      Mental Status: She is alert.         Assessment:        1. Acute febrile illness in pediatric patient    2. Candidal diaper dermatitis       Suspect fever due to viral URI, lower suspicion at the moment for UTI (particularly since she just had multiple abx courses including CTX which would cover most common urinary  pathogens). COVID neg. Clear source of irritation from diaper derm.  Plan:     Nystatin ointment as prescribed, discussed general care of diaper rashes  Avoid bath bombs and scented soaps. Can use baking soda bath prn.  Supportive care for URI sx   Call if fever persists or she develops vomiting, abd pain or other concerns.       Angella Aragon MD  2/21/2022         telemetry  She does have significant PACs    Tele monitor shows sinus bradycardia with heart rate in the 50s with occasional PACs which are asymptomatic at this time   -agree with continuing to hold atenolol; anxiety/depression also contribute to her palpitations; will defer management of this to primary team   -we will plan for outpatient transthoracic echocardiogram, event monitor and cardiology follow-up to assess LV/RV function, assess burden of PACs/PVCs to see if that may be causing her symptoms  -she is otherwise stable for discharge     ED Triage Vitals   07/31/20 0803 07/31/20 0803 07/31/20 0803 07/31/20 0803 07/31/20 0803   (!) 96 9 °F (36 1 °C) (!) 47 18 130/70 96 %      Temporal Monitor         Pain Score       2       07/31/20 89 8 kg (198 lb)     Additional Vital Signs:      Date/Time  Temp  Pulse  Resp  BP  MAP (mmHg)  SpO2  O2 Device  Patient Position - Orthostatic VS   08/01/20 0728  97 6 °F (36 4 °C)  53Abnormal   16  129/80  --  94 %  None (Room air)     08/01/20 0343  97 4 °F (36 3 °C)  Abnormal   50Abnormal   16  105/59  --  98 %  None (Room air)     07/31/20 2246  98 °F (36 7 °C)  50Abnormal   17  118/65  87  97 %  --     07/31/20 2149  98 °F (36 7 °C)  50Abnormal   17  118/65  87  97 %  --     07/31/20 1906  98 °F (36 7 °C)  69  19  129/66  --  96 %  --  Standing for 3 minutes - Orthostatic VS   07/31/20 1903  98 °F (36 7 °C)  55  16  129/60  --  96 %  --  Sitting - Orthostatic VS   07/31/20 1900  98 °F (36 7 °C)  57  16  111/57  90  95 %  --  Lying - Orthostatic VS   07/31/20 1421  96 8 °F (36 °C) Abnormal   44Abnormal   20  132/72  --  95 %  None (Room air)  Lying   07/31/20 1030  --  46Abnormal   25Abnormal   126/74  94  95 %  --     07/31/20 1000  --  42Abnormal   20  137/72  96  95 %  --     07/31/20 0930  --  47Abnormal   22  126/75  96  95 %  --     07/31/20 0845  --  41Abnormal   20  124/81  99  93 %  --     07/31/20 0813  --  56  --  138/46  Abnormal   --  --  --  Standing for 3 minutes - Orthostatic VS   07/31/20 0809  --  54Abnormal   --  148/92  --  --  --  Standing - Orthostatic VS   07/31/20 0808  --  46Abnormal   18  133/84  --  --  --  Sitting - Orthostatic VS           Pertinent Labs/Diagnostic Test Results:     Collection Time Result Time Vent R Atrial R VA Int  QRSD Int  QT Int  QTC Int  P Axis QRS Axis T Wave Ax    07/31/20 08:44:58 07/31/20 09:27:58 41 41 126 86 460 379 48 25 56         Marked sinus bradycardia  Abnormal ECG  When compared with ECG of 09-MAR-2020 04:58,  Vent  rate has decreased BY  37 BPM  QT has shortened    CT head without contrast   Final  (07/31 1316)      No acute intracranial abnormality                 Results from last 7 days   Lab Units 08/01/20  0529 07/31/20  0833   WBC Thousand/uL 6 60 8 30   HEMOGLOBIN g/dL 13 6 14 5   HEMATOCRIT % 39 3* 42 6   PLATELETS Thousands/uL 191 224   NEUTROS ABS Thousands/µL  --  4 90         Results from last 7 days   Lab Units 08/01/20  0529 07/31/20  0833   SODIUM mmol/L 142 141   POTASSIUM mmol/L 3 8 3 5   CHLORIDE mmol/L 111* 109*   CO2 mmol/L 24 23   ANION GAP mmol/L 7 9   BUN mg/dL 8 8   CREATININE mg/dL 0 82 0 88   EGFR ml/min/1 73sq m 89 81   CALCIUM mg/dL 8 4* 9 0   MAGNESIUM mg/dL 2 0  --      Results from last 7 days   Lab Units 07/31/20  0833   AST U/L 25   ALT U/L 32   ALK PHOS U/L 50   TOTAL PROTEIN g/dL 6 3*   ALBUMIN g/dL 4 1   TOTAL BILIRUBIN mg/dL 0 40         Results from last 7 days   Lab Units 08/01/20  0529 07/31/20  0833   GLUCOSE RANDOM mg/dL 97 119*     Results from last 7 days   Lab Units 07/31/20  0833   TROPONIN I ng/mL <0 03     Results from last 7 days   Lab Units 07/31/20  0833   TSH 3RD GENERATON uIU/mL 0 360*     Results from last 7 days   Lab Units 07/31/20  0833   CLARITY UA  Slightly Cloudy*   COLOR UA  Yellow   SPEC GRAV UA  1 015   PH UA  6 0   GLUCOSE UA mg/dl Negative   KETONES UA mg/dl Negative   BLOOD UA  Negative   PROTEIN UA mg/dl Negative   NITRITE UA  Negative   BILIRUBIN UA Negative   UROBILINOGEN UA E U /dl 0 2   LEUKOCYTES UA  Negative             Results from last 7 days   Lab Units 08/01/20  0338   AMPH/METH  Negative   BARBITURATE UR  Negative   BENZODIAZEPINE UR  Positive*   COCAINE UR  Negative   METHADONE URINE  Negative   OPIATE UR  Negative   PCP UR  Negative   THC UR  Negative     ED Treatment:   Medication Administration from 07/31/2020 0753 to 07/31/2020 1414       Date/Time Order Dose Route     07/31/2020 0834 ondansetron (ZOFRAN) injection 4 mg 4 mg Intravenous     07/31/2020 1012 sodium chloride 0 9 % bolus 1,000 mL 0 mL Intravenous     07/31/2020 0834 sodium chloride 0 9 % bolus 1,000 mL 1,000 mL Intravenous     07/31/2020 0839 meclizine (ANTIVERT) tablet 25 mg 25 mg Oral     07/31/2020 1013 sodium chloride 0 9 % bolus 1,000 mL 1,000 mL Intravenous        Past Medical History:   Diagnosis    Arthritis    Bipolar 1 disorder ([facility])    with bordeline personality    Borderline personality disorder ([facility] [address] )    Chronic headaches    Depression    Depression    GERD (gastroesophageal reflux disease)    Hypertension    Incontinence    Migraine    Urinary tract infection     Present on Admission:   Migraine without aura and without status migrainosus, not intractable   Major depressive disorder, recurrent episode, severe ([facility])      Admitting Diagnosis:   Dizziness [R42]  Lightheadedness [R42]  Symptomatic bradycardia [R00 1]      Age/Sex: 45 y o  female     Admission Orders:    Scheduled Medications:    Medications:  ALPRAZolam 1 mg Oral HS   enoxaparin 40 mg Subcutaneous Q24H [address]   lisinopril 10 mg Oral Daily   meclizine 25 mg Oral Q8H [address]   pantoprazole 40 mg Oral BID   valACYclovir 500 mg Oral Daily   vilazodone 10 mg Oral Daily With Breakfast     Continuous IV Infusions:    sodium chloride 100 mL/hr Intravenous Continuous     PRN Meds:    acetaminophen 650 mg Oral 4x Daily PRN   albuterol 1 puff Inhalation Q4H PRN   ALPRAZolam 0 25 mg Oral 4x Daily PRN   zolpidem 10 mg Oral HS PRN       IP CONSULT TO CASE MANAGEMENT  IP CONSULT TO CARDIOLOGY    Network Utilization Review Department  [email or web address]  ATTENTION: Please call with any questions or concerns to [phone] and carefully listen to the prompts so that you are directed to the right person  All voicemails are confidential   Charito Bee all requests for admission clinical reviews, approved or denied determinations and any other requests to dedicated fax number below belonging to the campus where the patient is receiving treatment   List of dedicated fax numbers for the Facilities:  [address] DENIALS (Administrative/Medical Necessity) [phone]   [address] (Maternity/NICU/Pediatrics) [phone]   Hardy Robb [phone]   Katty Alonso [phone]   Bay Morales [phone]   Connor Tyler [phone]   [address] [place] [address] [phone]   [facility]  [phone]   [address] [place] [address], [address]  [address] [address] [facility] [phone]

## 2022-02-22 LAB
FLUAV RNA RESP QL NAA+PROBE: NEGATIVE
FLUBV RNA RESP QL NAA+PROBE: NEGATIVE
SARS-COV-2 RNA RESP QL NAA+PROBE: NEGATIVE

## 2022-02-23 LAB — BACTERIA THROAT CULT: NORMAL

## 2022-03-21 ENCOUNTER — OFFICE VISIT (OUTPATIENT)
Dept: URGENT CARE | Facility: CLINIC | Age: 47
End: 2022-03-21
Payer: COMMERCIAL

## 2022-03-21 VITALS
WEIGHT: 235 LBS | TEMPERATURE: 98.6 F | RESPIRATION RATE: 18 BRPM | OXYGEN SATURATION: 96 % | BODY MASS INDEX: 39.11 KG/M2 | HEART RATE: 80 BPM

## 2022-03-21 DIAGNOSIS — J02.9 SORETHROAT: Primary | ICD-10-CM

## 2022-03-21 DIAGNOSIS — H10.13 ALLERGIC CONJUNCTIVITIS OF BOTH EYES: ICD-10-CM

## 2022-03-21 LAB — S PYO AG THROAT QL: NEGATIVE

## 2022-03-21 PROCEDURE — 87880 STREP A ASSAY W/OPTIC: CPT | Performed by: NURSE PRACTITIONER

## 2022-03-21 PROCEDURE — 99213 OFFICE O/P EST LOW 20 MIN: CPT | Performed by: NURSE PRACTITIONER

## 2022-03-21 PROCEDURE — G0463 HOSPITAL OUTPT CLINIC VISIT: HCPCS | Performed by: NURSE PRACTITIONER

## 2022-03-21 PROCEDURE — 87070 CULTURE OTHR SPECIMN AEROBIC: CPT | Performed by: NURSE PRACTITIONER

## 2022-03-21 RX ORDER — SCOLOPAMINE TRANSDERMAL SYSTEM 1 MG/1
PATCH, EXTENDED RELEASE TRANSDERMAL
COMMUNITY
Start: 2022-01-28 | End: 2022-04-25

## 2022-03-21 RX ORDER — HYDROXYZINE 50 MG/1
TABLET, FILM COATED ORAL
COMMUNITY
Start: 2022-03-08

## 2022-03-21 RX ORDER — AMOXICILLIN 875 MG/1
875 TABLET, COATED ORAL 2 TIMES DAILY
Qty: 20 TABLET | Refills: 0 | Status: SHIPPED | OUTPATIENT
Start: 2022-03-21 | End: 2022-03-31

## 2022-03-21 RX ORDER — PRAMIPEXOLE DIHYDROCHLORIDE 0.5 MG/1
TABLET ORAL
COMMUNITY
Start: 2022-02-27

## 2022-03-21 RX ORDER — OLOPATADINE HYDROCHLORIDE 1 MG/ML
1 SOLUTION/ DROPS OPHTHALMIC 2 TIMES DAILY
Qty: 5 ML | Refills: 2 | Status: SHIPPED | OUTPATIENT
Start: 2022-03-21 | End: 2022-04-25

## 2022-03-21 RX ORDER — DOXEPIN HYDROCHLORIDE 50 MG/1
CAPSULE ORAL
COMMUNITY
Start: 2022-03-18

## 2022-03-21 RX ORDER — PRAZOSIN HYDROCHLORIDE 2 MG/1
CAPSULE ORAL
COMMUNITY
Start: 2022-03-18

## 2022-03-21 RX ORDER — TOPIRAMATE 25 MG/1
TABLET ORAL
COMMUNITY
Start: 2021-12-17 | End: 2022-04-25

## 2022-03-22 NOTE — PROGRESS NOTES
[facility]        NAME: Neelam Gregory is a 42 y o  female  : 1975    MRN: [id number]  DATE: 2022  TIME: 8:12 PM      Assessment and Plan     Sorethroat [J02 9]  1  Sorethroat  POCT rapid strepA    Throat culture    amoxicillin (AMOXIL) 875 mg tablet   2  Allergic conjunctivitis of both eyes  olopatadine (PATANOL) 0 1 % ophthalmic solution         Patient Instructions     Patient Instructions   Take the amoxicillin as ordered until completed  Eat yogurt or take a probiotic to restore good bacteria to your gut; this helps prevent stomach irritation/diarrhea while on an antibiotic  Try the allergy eye drops for your eyes; they look more like an allergy eye than a bacterial "pink eye" although both are types of conjunctivitis  Strep Throat   AMBULATORY CARE:   Strep throat  is a throat infection caused by bacteria  It is easily spread from person to person  Common symptoms include the following:   · Sore, red, and swollen throat    · Fever and headache     · Upset stomach, abdominal pain, or vomiting    · White or yellow patches or blisters in the back of your throat    · Tender, swollen lumps on the sides of your neck or jaw    · Throat pain when you swallow    Call 911 for any of the following:   · You have trouble breathing  Seek care immediately if:   · You have new symptoms like a bad headache, stiff neck, chest pain, or vomiting  · You are drooling because you cannot swallow your spit  Contact your healthcare provider if:   · You have a fever  · You have a rash or ear pain  · You have green, yellow-brown, or bloody mucus when you cough or blow your nose  · You are unable to drink anything  · You have questions or concerns about your condition or care  Treatment for strep throat  may include antibiotic medicine to treat your strep throat  You should feel better within 2 to 3 days after you start antibiotics   You may return to work or school 24 hours after you start antibiotics  Manage strep throat:   · Use lozenges, ice, soft foods, or popsicles  to soothe your throat  · Drink juice, milk shakes, or soup  if your throat is too sore to eat solid food  Drinking liquids can also help prevent dehydration  · Gargle with salt water  Mix ¼ teaspoon salt in a glass of warm water and gargle  This may help reduce swelling in your throat  · Do not smoke  Nicotine and other chemicals in cigarettes and cigars can cause lung damage and make your symptoms worse  Ask your healthcare provider for information if you currently smoke and need help to quit  E-cigarettes or smokeless tobacco still contain nicotine  Talk to your healthcare provider before you use these products  Prevent the spread of strep throat:   · Wash your hands often  Use soap and water  Wash your hands after you use the bathroom, change a child's diapers, or sneeze  Wash your hands before you prepare or eat food  · Do not share food or drinks  Replace your toothbrush after you have taken antibiotics for 24 hours  Follow up with your doctor as directed:  Write down your questions so you remember to ask them during your visits  © Copyright [employer] 2022 Information is for End User's use only and may not be sold, redistributed or otherwise used for commercial purposes  All illustrations and images included in CareNotes® are the copyrighted property of [employer]  or [employer]   The above information is an  only  It is not intended as medical advice for individual conditions or treatments  Talk to your doctor, nurse or pharmacist before following any medical regimen to see if it is safe and effective for you  Conjunctivitis   AMBULATORY CARE:   Conjunctivitis,  or pink eye, is inflammation of your conjunctiva  The conjunctiva is a thin tissue that covers the front of your eye and the back of your eyelids   The conjunctiva helps protect your eye and keep it moist  Conjunctivitis may be caused by bacteria, allergies, or a virus  If your conjunctivitis is caused by bacteria, it may get better on its own in about 7 days  Viral conjunctivitis can last up to 3 weeks  Common symptoms may include any of the following: You will usually have symptoms in both eyes if your conjunctivitis is caused by allergies  You may also have other allergic symptoms, such as a rash or runny nose  Symptoms will usually start in 1 eye if your conjunctivitis is caused by a virus or bacteria  · Redness in the whites of your eye    · Itching in your eye or around your eye    · Feeling like there is something in your eye    · Watery or thick, sticky discharge    · Crusty eyelids when you wake up in the morning    · Burning, stinging, or swelling in your eye    · Pain when you see bright light    Seek care immediately if:   · You have worsening eye pain  · The swelling in your eye gets worse, even after treatment  · Your vision suddenly becomes worse or you cannot see at all  Contact your healthcare provider if:   · You develop a fever and ear pain  · You have tiny bumps or spots of blood on your eye  · You have questions or concerns about your condition or care  Treatment  will depend on the cause of your conjunctivitis  You may need antibiotics or allergy medicine as a pill, eye drop, or eye ointment  Manage your symptoms:   · Apply a cool compress  Wet a washcloth with cold water and place it on your eye  This will help decrease itching and irritation  · Do not wear contact lenses  They can irritate your eye  Throw away the pair you are using and ask when you can wear them again  Use a new pair of lenses when your healthcare provider says it is okay  · Avoid irritants  Stay away from smoke filled areas  Shield your eyes from wind and sun  · Flush your eye  You may need to flush your eye with saline to help decrease your symptoms   Ask for more information on how to flush your eye  Medicines:  Treatment depends on what is causing your conjunctivitis  You may be given any of the following:  · Allergy medicine  helps decrease itchy, red, swollen eyes caused by allergies  It may be given as a pill, eye drops, or nasal spray  · Antibiotics  may be needed if your conjunctivitis is caused by bacteria  This medicine may be given as a pill, eye drops, or eye ointment  · Take your medicine as directed  Contact your healthcare provider if you think your medicine is not helping or if you have side effects  Tell him or her if you are allergic to any medicine  Keep a list of the medicines, vitamins, and herbs you take  Include the amounts, and when and why you take them  Bring the list or the pill bottles to follow-up visits  Carry your medicine list with you in case of an emergency  Prevent the spread of conjunctivitis:   · Wash your hands with soap and water often  Wash your hands before and after you touch your eyes  Also wash your hands before you prepare or eat food and after you use the bathroom or change a diaper  · Avoid allergens  Try to avoid the things that cause your allergies, such as pets, dust, or grass  · Avoid contact with others  Do not share towels or washcloths  Try to stay away from others as much as possible  Ask when you can return to work or school  · Throw away eye makeup  The bacteria that caused your conjunctivitis can stay in eye makeup  Throw away mascara and other eye makeup  © Copyright [employer] 2022 Information is for End User's use only and may not be sold, redistributed or otherwise used for commercial purposes  All illustrations and images included in CareNotes® are the copyrighted property of [employer]  or [facility] [place]   The above information is an  only  It is not intended as medical advice for individual conditions or treatments   Talk to your doctor, nurse or pharmacist before following any medical regimen to see if it is safe and effective for you  Follow up with PCP in 3-5 days  Proceed to  ER if symptoms worsen  Chief Complaint     Chief Complaint   Patient presents with    Laryngitis     x 5 days    Fatigue         History of Present Illness     Was here 2/21 and was directed to take flonase; she has continued on that  She started taking tylenol cold and flu last week when her sore throat started  Started with laryngitis middle of last week  Friday she only had a whisper; now is coming back a little bit     She has multiple swollen lymph nodes in neck that are getting worse not better  She works in a  where there are multiple sick kids right now including + strep  Review of Systems     Review of Systems   Constitutional: Positive for fatigue  HENT: Positive for sore throat, trouble swallowing and voice change  Eyes: Positive for redness (mild)  Negative for photophobia, pain, discharge, itching and visual disturbance  Hematological: Positive for adenopathy  All other systems reviewed and are negative          Current Medications       Current Outpatient Medications:     cariprazine (Vraylar) 6 MG capsule, Take 6 mg by mouth daily, Disp: , Rfl:     dexlansoprazole (Dexilant) 60 MG capsule, daily , Disp: , Rfl:     Diclofenac Sodium (VOLTAREN) 1 %, Apply 1 application topically 4 (four) times a day, Disp: , Rfl:     doxepin (SINEquan) 50 mg capsule, , Disp: , Rfl:     enoxaparin (LOVENOX) 40 mg/0 4 mL, , Disp: , Rfl:     famotidine (PEPCID) 40 MG tablet, Take 40 mg by mouth bedtime, Disp: , Rfl:     fluticasone (FLONASE) 50 mcg/act nasal spray, 2 sprays into each nostril daily, Disp: 15 8 mL, Rfl: 0    hydrOXYzine HCL (ATARAX) 50 mg tablet, , Disp: , Rfl:     linaCLOtide (LINZESS PO), Take 290 mcg by mouth daily before breakfast, Disp: , Rfl:     lisinopril (ZESTRIL) 10 mg tablet, Take 10 mg by mouth daily, Disp: , Rfl:     pramipexole (MIRAPEX) 0 5 mg tablet, , Disp: , Rfl:     prazosin (MINIPRESS) 2 mg capsule, , Disp: , Rfl:     pregabalin (LYRICA) 150 mg capsule, Take 150 mg by mouth daily Daily at bedtime, Disp: , Rfl:     scopolamine (TRANSDERM-SCOP) 1 mg/3 days TD 72 hr patch, , Disp: , Rfl:     topiramate (TOPAMAX) 25 mg tablet, , Disp: , Rfl:     valACYclovir (VALTREX) 500 mg tablet, Take 500 mg by mouth daily, Disp: , Rfl:     Vraylar 3 MG capsule, 9 mg daily 9 mgms daily, Disp: , Rfl:     ALPRAZolam (XANAX) 0 25 mg tablet, Take 0 25 mg by mouth As needed daily (Patient not taking: Reported on 2/21/2022 ), Disp: , Rfl:     amoxicillin (AMOXIL) 875 mg tablet, Take 1 tablet (875 mg total) by mouth 2 (two) times a day for 10 days, Disp: 20 tablet, Rfl: 0    benzonatate (TESSALON) 200 MG capsule, Take 1 capsule (200 mg total) by mouth 3 (three) times a day as needed for cough (Patient not taking: Reported on 3/21/2022 ), Disp: 20 capsule, Rfl: 0    butalbital-acetaminophen-caffeine (FIORICET,ESGIC) -40 mg per tablet, butalbital-acetaminophen-caffeine 50 mg-325 mg-40 mg tablet (Patient not taking: Reported on 2/21/2022), Disp: , Rfl:     cariprazine (Vraylar) 1 5 MG capsule, , Disp: , Rfl:     cyclobenzaprine (FLEXERIL) 5 mg tablet, As needed (Patient not taking: Reported on 2/21/2022), Disp: , Rfl:     fluticasone (FLONASE) 50 mcg/act nasal spray, 2 sprays into each nostril daily (Patient taking differently: 2 sprays into each nostril daily As needed), Disp: 3 Bottle, Rfl: 1    olopatadine (PATANOL) 0 1 % ophthalmic solution, Administer 1 drop to both eyes 2 (two) times a day, Disp: 5 mL, Rfl: 2    ondansetron (ZOFRAN-ODT) 4 mg disintegrating tablet, Take 1 tablet (4 mg total) by mouth every 8 (eight) hours as needed for nausea or vomiting (Patient not taking: Reported on 3/21/2022 ), Disp: 20 tablet, Rfl: 0    PROAIR  (90 Base) MCG/ACT inhaler, Inhale every 4 (four) hours as needed (as needed)   (Patient not taking: Reported on 2/21/2022 ), Disp: , Rfl:     rOPINIRole (REQUIP) 2 mg tablet, , Disp: , Rfl:     vilazodone (VIIBRYD) 10 mg tablet, Take 1 tablet (10 mg total) by mouth daily with breakfast (Patient not taking: Reported on 2/21/2022 ), Disp: 30 tablet, Rfl: 0    Current Allergies     Allergies as of 03/21/2022 - Reviewed 03/21/2022   Allergen Reaction Noted    Celecoxib Hives 11/11/2019    Lamotrigine Rash and Hives 02/24/2010              The following portions of the patient's history were reviewed and updated as appropriate: allergies, current medications, past family history, past medical history, past social history, past surgical history and problem list      Past Medical History:   Diagnosis Date    Anxiety     Arthritis     Bipolar 1 disorder ([address] )     with bordeline personality    Borderline [facility] disorder [facility] [address] [facility]     Chronic headaches     Depression     GERD (gastroesophageal reflux disease)     Hypertension     Incontinence     Migraine     Morbid obesity with BMI of 40 0-44 9, adult ([facility] [address] )     Small bowel obstruction ([facility] [address] ) 6/27/2019    Suicide attempt ([facility] [address] )     Symptomatic bradycardia     Urinary tract infection        Past Surgical History:   Procedure Laterality Date    CHOLECYSTECTOMY  05/24/2018    DENTAL SURGERY      ELBOW SURGERY      EXPLORATORY LAPAROTOMY      exlap    FOOT SURGERY Right     KNEE ARTHROSCOPY Right 11/15/2018    total of three procedures with meniscal surgery    MULTIPLE TOOTH EXTRACTIONS  11/02/2018    OVARIAN CYST REMOVAL      REDUCTION MAMMAPLASTY      reduction     SINUS SURGERY Bilateral 04/19/2019    bilateral maxillary antrostomies - Dr Demi Shaver - [facility]       Family History   Problem Relation Age of Onset    Hyperlipidemia Father     Hypertension Father     Migraines Mother     Depression Family     Heart disease Maternal Aunt     Heart disease Maternal Uncle     Diabetes Paternal Aunt     Diabetes Paternal Uncle     Breast cancer Maternal Grandmother     Diabetes Paternal Grandmother     Diabetes Paternal Grandfather     Stroke Neg Hx     Thyroid disease Neg Hx          Medications have been verified  Objective     Pulse 80   Temp 98 6 °F (37 °C)   Resp 18   Wt 107 kg (235 lb)   SpO2 96%   BMI 39 11 kg/m²   No LMP recorded  Patient has had an injection  Physical Exam     Physical Exam  Vitals and nursing note reviewed  Constitutional:       General: She is not in acute distress  Appearance: Normal appearance  She is well-developed and well-groomed  She is ill-appearing  She is not toxic-appearing or diaphoretic  HENT:      Head: Normocephalic and atraumatic  Mouth/Throat:      Mouth: Mucous membranes are moist       Pharynx: Oropharynx is clear  Uvula midline  Posterior oropharyngeal erythema present  No oropharyngeal exudate  Tonsils: Tonsillar exudate present  No tonsillar abscesses  2+ on the right  2+ on the left  Eyes:      General: Lids are normal  Vision grossly intact  Gaze aligned appropriately  Conjunctiva/sclera:      Right eye: Right conjunctiva is injected (mild)  Chemosis present  No exudate  Left eye: Left conjunctiva is injected (mild)  Chemosis present  No exudate  Pupils: Pupils are equal, round, and reactive to light  Pulmonary:      Effort: Pulmonary effort is normal  No respiratory distress  Abdominal:      General: There is no distension  Palpations: Abdomen is soft  Musculoskeletal:         General: Normal range of motion  Cervical back: Normal range of motion and neck supple  Lymphadenopathy:      Cervical: Cervical adenopathy present  Skin:     General: Skin is warm and dry  Capillary Refill: Capillary refill takes less than 2 seconds  Neurological:      General: No focal deficit present  Mental Status: She is alert and oriented to person, place, and time  Psychiatric:         Behavior: Behavior normal  Behavior is cooperative  Thought Content:  Thought content normal          Judgment: Judgment normal

## 2022-03-22 NOTE — PATIENT INSTRUCTIONS
Take the amoxicillin as ordered until completed  Eat yogurt or take a probiotic to restore good bacteria to your gut; this helps prevent stomach irritation/diarrhea while on an antibiotic  Try the allergy eye drops for your eyes; they look more like an allergy eye than a bacterial "pink eye" although both are types of conjunctivitis  Strep Throat   AMBULATORY CARE:   Strep throat  is a throat infection caused by bacteria  It is easily spread from person to person  Common symptoms include the following:   · Sore, red, and swollen throat    · Fever and headache     · Upset stomach, abdominal pain, or vomiting    · White or yellow patches or blisters in the back of your throat    · Tender, swollen lumps on the sides of your neck or jaw    · Throat pain when you swallow    Call 911 for any of the following:   · You have trouble breathing  Seek care immediately if:   · You have new symptoms like a bad headache, stiff neck, chest pain, or vomiting  · You are drooling because you cannot swallow your spit  Contact your healthcare provider if:   · You have a fever  · You have a rash or ear pain  · You have green, yellow-brown, or bloody mucus when you cough or blow your nose  · You are unable to drink anything  · You have questions or concerns about your condition or care  Treatment for strep throat  may include antibiotic medicine to treat your strep throat  You should feel better within 2 to 3 days after you start antibiotics  You may return to work or school 24 hours after you start antibiotics  Manage strep throat:   · Use lozenges, ice, soft foods, or popsicles  to soothe your throat  · Drink juice, milk shakes, or soup  if your throat is too sore to eat solid food  Drinking liquids can also help prevent dehydration  · Gargle with salt water  Mix ¼ teaspoon salt in a glass of warm water and gargle  This may help reduce swelling in your throat  · Do not smoke    Nicotine and other chemicals in cigarettes and cigars can cause lung damage and make your symptoms worse  Ask your healthcare provider for information if you currently smoke and need help to quit  E-cigarettes or smokeless tobacco still contain nicotine  Talk to your healthcare provider before you use these products  Prevent the spread of strep throat:   · Wash your hands often  Use soap and water  Wash your hands after you use the bathroom, change a child's diapers, or sneeze  Wash your hands before you prepare or eat food  · Do not share food or drinks  Replace your toothbrush after you have taken antibiotics for 24 hours  Follow up with your doctor as directed:  Write down your questions so you remember to ask them during your visits  © Copyright [employer] 2022 Information is for End User's use only and may not be sold, redistributed or otherwise used for commercial purposes  All illustrations and images included in CareNotes® are the copyrighted property of [employer]  or [employer]   The above information is an  only  It is not intended as medical advice for individual conditions or treatments  Talk to your doctor, nurse or pharmacist before following any medical regimen to see if it is safe and effective for you  Conjunctivitis   AMBULATORY CARE:   Conjunctivitis,  or pink eye, is inflammation of your conjunctiva  The conjunctiva is a thin tissue that covers the front of your eye and the back of your eyelids  The conjunctiva helps protect your eye and keep it moist  Conjunctivitis may be caused by bacteria, allergies, or a virus  If your conjunctivitis is caused by bacteria, it may get better on its own in about 7 days  Viral conjunctivitis can last up to 3 weeks  Common symptoms may include any of the following: You will usually have symptoms in both eyes if your conjunctivitis is caused by allergies   You may also have other allergic symptoms, such as a rash or runny nose  Symptoms will usually start in 1 eye if your conjunctivitis is caused by a virus or bacteria  · Redness in the whites of your eye    · Itching in your eye or around your eye    · Feeling like there is something in your eye    · Watery or thick, sticky discharge    · Crusty eyelids when you wake up in the morning    · Burning, stinging, or swelling in your eye    · Pain when you see bright light    Seek care immediately if:   · You have worsening eye pain  · The swelling in your eye gets worse, even after treatment  · Your vision suddenly becomes worse or you cannot see at all  Contact your healthcare provider if:   · You develop a fever and ear pain  · You have tiny bumps or spots of blood on your eye  · You have questions or concerns about your condition or care  Treatment  will depend on the cause of your conjunctivitis  You may need antibiotics or allergy medicine as a pill, eye drop, or eye ointment  Manage your symptoms:   · Apply a cool compress  Wet a washcloth with cold water and place it on your eye  This will help decrease itching and irritation  · Do not wear contact lenses  They can irritate your eye  Throw away the pair you are using and ask when you can wear them again  Use a new pair of lenses when your healthcare provider says it is okay  · Avoid irritants  Stay away from smoke filled areas  Shield your eyes from wind and sun  · Flush your eye  You may need to flush your eye with saline to help decrease your symptoms  Ask for more information on how to flush your eye  Medicines:  Treatment depends on what is causing your conjunctivitis  You may be given any of the following:  · Allergy medicine  helps decrease itchy, red, swollen eyes caused by allergies  It may be given as a pill, eye drops, or nasal spray  · Antibiotics  may be needed if your conjunctivitis is caused by bacteria   This medicine may be given as a pill, eye drops, or eye ointment  · Take your medicine as directed  Contact your healthcare provider if you think your medicine is not helping or if you have side effects  Tell him or her if you are allergic to any medicine  Keep a list of the medicines, vitamins, and herbs you take  Include the amounts, and when and why you take them  Bring the list or the pill bottles to follow-up visits  Carry your medicine list with you in case of an emergency  Prevent the spread of conjunctivitis:   · Wash your hands with soap and water often  Wash your hands before and after you touch your eyes  Also wash your hands before you prepare or eat food and after you use the bathroom or change a diaper  · Avoid allergens  Try to avoid the things that cause your allergies, such as pets, dust, or grass  · Avoid contact with others  Do not share towels or washcloths  Try to stay away from others as much as possible  Ask when you can return to work or school  · Throw away eye makeup  The bacteria that caused your conjunctivitis can stay in eye makeup  Throw away mascara and other eye makeup  © Copyright [employer] 2022 Information is for End User's use only and may not be sold, redistributed or otherwise used for commercial purposes  All illustrations and images included in CareNotes® are the copyrighted property of [employer]  or [facility] [place]  The above information is an  only  It is not intended as medical advice for individual conditions or treatments  Talk to your doctor, nurse or pharmacist before following any medical regimen to see if it is safe and effective for you

## 2022-03-23 LAB — BACTERIA THROAT CULT: NORMAL

## 2022-04-25 ENCOUNTER — OFFICE VISIT (OUTPATIENT)
Dept: URGENT CARE | Facility: CLINIC | Age: 47
End: 2022-04-25
Payer: COMMERCIAL

## 2022-04-25 ENCOUNTER — APPOINTMENT (OUTPATIENT)
Dept: RADIOLOGY | Facility: CLINIC | Age: 47
End: 2022-04-25
Payer: COMMERCIAL

## 2022-04-25 VITALS — OXYGEN SATURATION: 96 % | RESPIRATION RATE: 18 BRPM | TEMPERATURE: 99.9 F | HEART RATE: 86 BPM

## 2022-04-25 DIAGNOSIS — R05.9 COUGH: ICD-10-CM

## 2022-04-25 DIAGNOSIS — J18.9 PNEUMONIA OF RIGHT MIDDLE LOBE DUE TO INFECTIOUS ORGANISM: Primary | ICD-10-CM

## 2022-04-25 DIAGNOSIS — J45.21 MILD INTERMITTENT ASTHMA WITH EXACERBATION: ICD-10-CM

## 2022-04-25 DIAGNOSIS — R05.1 ACUTE COUGH: ICD-10-CM

## 2022-04-25 PROCEDURE — 99213 OFFICE O/P EST LOW 20 MIN: CPT

## 2022-04-25 PROCEDURE — 87636 SARSCOV2 & INF A&B AMP PRB: CPT

## 2022-04-25 PROCEDURE — 71046 X-RAY EXAM CHEST 2 VIEWS: CPT

## 2022-04-25 PROCEDURE — G0463 HOSPITAL OUTPT CLINIC VISIT: HCPCS

## 2022-04-25 RX ORDER — ALBUTEROL SULFATE 90 UG/1
2 AEROSOL, METERED RESPIRATORY (INHALATION) EVERY 6 HOURS PRN
Qty: 8.5 G | Refills: 0 | Status: SHIPPED | OUTPATIENT
Start: 2022-04-25

## 2022-04-25 RX ORDER — PREDNISONE 10 MG/1
10 TABLET ORAL DAILY
Qty: 30 TABLET | Refills: 0 | Status: SHIPPED | OUTPATIENT
Start: 2022-04-25

## 2022-04-25 NOTE — PROGRESS NOTES
[facility]        NAME: Vicky Starks is a 42 y o  female  : 1975    MRN: [id number]  DATE: 2022  TIME: 7:53 PM    Assessment and Plan   No primary diagnosis found  No diagnosis found  Patient Instructions       Follow up with PCP in 3-5 days  Proceed to  ER if symptoms worsen  Chief Complaint     Chief Complaint   Patient presents with    Cold Like Symptoms     x3 days: occas SOB with exertion, cough, fatigue, mucous, chest congestion  History of Present Illness     Amparo ALEJO Fartun Alford is a 42 y o  female who presents with complaint of shortness of breath with exertion, cough, fatigue, mucus production, and chest congestion for the past 3 days  She reports wheezing sitting in her chair  Review of Systems   Review of Systems   Constitutional: Positive for fatigue and fever  Negative for chills  HENT: Positive for congestion, postnasal drip, rhinorrhea, sinus pressure, sore throat and trouble swallowing  Negative for ear discharge, ear pain and sinus pain  Respiratory: Positive for cough, shortness of breath and wheezing  Cardiovascular: Negative for chest pain and palpitations  Gastrointestinal: Negative for abdominal pain, constipation, diarrhea, nausea and vomiting  Musculoskeletal: Negative for myalgias  Neurological: Negative for headaches           Current Medications       Current Outpatient Medications:     cariprazine (Vraylar) 6 MG capsule, Take 6 mg by mouth daily, Disp: , Rfl:     dexlansoprazole (Dexilant) 60 MG capsule, daily , Disp: , Rfl:     Diclofenac Sodium (VOLTAREN) 1 %, Apply 1 application topically 4 (four) times a day, Disp: , Rfl:     doxepin (SINEquan) 50 mg capsule, , Disp: , Rfl:     famotidine (PEPCID) 40 MG tablet, Take 40 mg by mouth bedtime, Disp: , Rfl:     hydrOXYzine HCL (ATARAX) 50 mg tablet, , Disp: , Rfl:     linaCLOtide (LINZESS PO), Take 290 mcg by mouth daily before breakfast, Disp: , Rfl:     lisinopril (ZESTRIL) 10 mg tablet, Take 10 mg by mouth daily, Disp: , Rfl:     pramipexole (MIRAPEX) 0 5 mg tablet, , Disp: , Rfl:     prazosin (MINIPRESS) 2 mg capsule, , Disp: , Rfl:     pregabalin (LYRICA) 150 mg capsule, Take 150 mg by mouth daily Daily at bedtime, Disp: , Rfl:     valACYclovir (VALTREX) 500 mg tablet, Take 500 mg by mouth daily, Disp: , Rfl:     vilazodone (VIIBRYD) 10 mg tablet, Take 1 tablet (10 mg total) by mouth daily with breakfast, Disp: 30 tablet, Rfl: 0    Vraylar 3 MG capsule, 9 mg daily 9 mgms daily, Disp: , Rfl:     fluticasone (FLONASE) 50 mcg/act nasal spray, 2 sprays into each nostril daily (Patient taking differently: 2 sprays into each nostril daily As needed), Disp: 3 Bottle, Rfl: 1    Current Allergies     Allergies as of 04/25/2022 - Reviewed 04/25/2022   Allergen Reaction Noted    Celecoxib Hives 11/11/2019    Lamotrigine Rash and Hives 02/24/2010            The following portions of the patient's history were reviewed and updated as appropriate: allergies, current medications, past family history, past medical history, past social history, past surgical history and problem list      Past Medical History:   Diagnosis Date    Anxiety     Arthritis     Bipolar 1 disorder ([address] )     with bordeline personality    Borderline [facility] [address] [facility]     Chronic headaches     Depression     GERD (gastroesophageal reflux disease)     Hypertension     Incontinence     Migraine     Morbid obesity with BMI of 40 0-44 9, adult ([address] )     Small bowel obstruction ([address] ) 6/27/2019    Suicide attempt ([facility] [address] )     Symptomatic bradycardia     Urinary tract infection        Past Surgical History:   Procedure Laterality Date    CHOLECYSTECTOMY  05/24/2018    DENTAL SURGERY      ELBOW SURGERY      EXPLORATORY LAPAROTOMY      exlap    FOOT SURGERY Right     KNEE ARTHROSCOPY Right 11/15/2018    total of three procedures with meniscal surgery    MULTIPLE TOOTH EXTRACTIONS 11/02/2018    OVARIAN CYST REMOVAL      REDUCTION MAMMAPLASTY      reduction     SINUS SURGERY Bilateral 04/19/2019    bilateral maxillary antrostomies - Dr Saini Adrienne - [facility]       Family History   Problem Relation Age of Onset    Hyperlipidemia Father     Hypertension Father     Migraines Mother     Depression Family     Heart disease Maternal Aunt     Heart disease Maternal Uncle     Diabetes Paternal Aunt     Diabetes Paternal Uncle     Breast cancer Maternal Grandmother     Diabetes Paternal Grandmother     Diabetes Paternal Grandfather     Stroke Neg Hx     Thyroid disease Neg Hx          Medications have been verified  Objective   Pulse 86   Temp 99 9 °F (37 7 °C)   Resp 18   SpO2 96%   No LMP recorded  Patient has had an injection  Physical Exam     Physical Exam  Vitals reviewed  Constitutional:       General: She is not in acute distress  Appearance: Normal appearance  She is obese  She is ill-appearing and diaphoretic  She is not toxic-appearing  HENT:      Head: Normocephalic and atraumatic  Right Ear: Hearing, tympanic membrane, ear canal and external ear normal  No tenderness  There is no impacted cerumen  Tympanic membrane is not injected, perforated or erythematous  Left Ear: Hearing, tympanic membrane, ear canal and external ear normal  No tenderness  There is no impacted cerumen  Tympanic membrane is not injected, perforated or erythematous  Nose: Congestion present  No rhinorrhea  Mouth/Throat:      Lips: Pink  Mouth: Mucous membranes are moist       Pharynx: Oropharynx is clear  Uvula midline  Posterior oropharyngeal erythema and uvula swelling present  No oropharyngeal exudate  Tonsils: No tonsillar exudate or tonsillar abscesses  Eyes:      General:         Right eye: No discharge  Left eye: No discharge  Extraocular Movements: Extraocular movements intact        Conjunctiva/sclera: Conjunctivae normal       Pupils: Pupils are equal, round, and reactive to light  Cardiovascular:      Rate and Rhythm: Normal rate and regular rhythm  Heart sounds: Normal heart sounds  No murmur heard  No friction rub  No gallop  Pulmonary:      Effort: Pulmonary effort is normal       Breath sounds: Examination of the right-upper field reveals wheezing  Examination of the left-upper field reveals wheezing  Examination of the right-lower field reveals decreased breath sounds and wheezing  Examination of the left-lower field reveals wheezing  Decreased breath sounds and wheezing present  No rhonchi or rales  Comments: Decreased lung sounds in lower right, however breath sounds were present in all lung fields  Neurological:      Mental Status: She is alert  Chest x-ray:   Preliminary review reveals no tracheal deviation, no pleural lines indicating no pneumothorax, costovertebral angle not blunted, cardiac silhouette not enlarged    Densities appear normal

## 2022-04-26 RX ORDER — DOXYCYCLINE 100 MG/1
100 TABLET ORAL 2 TIMES DAILY
Qty: 20 TABLET | Refills: 0 | Status: SHIPPED | OUTPATIENT
Start: 2022-04-26 | End: 2022-05-06

## 2022-04-26 NOTE — PATIENT INSTRUCTIONS
Acute asthma exacerbation from viral infection  - use inhaler, take 2 puffs every 4-6 hours as needed for cough, wheezing, shortness of breath  - Take prednisone one time a day for 10 days as a taper: 5 tabs for 2 days, 4 tabs for 2 days, 3 tabs for 2 days, 2 tabs for 2 days, then 1 tab for 2 days  Write on the bottle 5, 5, 4, 4, 3, 3, 2, 2, 1, 1  You can cross off the numbers for each day you take the medication   - Do not take at night as it can cause insomnia  - Potential side effects include: weight gain, swelling in extremities, insomnia, mood/behavior changes, hyperglycemia, increased blood pressure  - Take Vitamin D3 200IU daily, Vitamin C, and zinc  Rest and drink electrolyte rich fluids  Tylenol and ibuprofen as needed for fevers and aches following package dosing instructions  Chicken noodle soup  - Sore throat: Throat lozenges and/or salt water gurgles  - Congestion relief with mucinex 600mg 1 tablet every 12 hours  You can use afrin or flonase for nasal congestion as directed on their packaging  Warm or cool air mist humidifiers    - Nighttime cough relief with Mucinex DM or NyQuil   - Go to the ED if you have trouble breathing or shortness of breath at rest, chest pain or pressure that lasts longer than 5 minutes, become confused or hard to wake, your lips or face are blue, you have a fever of 104°F (40°C) or higher   - Follow up with Family doctor as needed, however your symptoms may persists for 2-3 weeks from onset

## 2022-04-27 ENCOUNTER — APPOINTMENT (EMERGENCY)
Dept: RADIOLOGY | Facility: HOSPITAL | Age: 47
End: 2022-04-27
Payer: COMMERCIAL

## 2022-04-27 ENCOUNTER — HOSPITAL ENCOUNTER (EMERGENCY)
Facility: HOSPITAL | Age: 47
Discharge: HOME | End: 2022-04-27
Attending: EMERGENCY MEDICINE
Payer: COMMERCIAL

## 2022-04-27 VITALS
WEIGHT: 225 LBS | SYSTOLIC BLOOD PRESSURE: 149 MMHG | HEART RATE: 75 BPM | HEIGHT: 65 IN | BODY MASS INDEX: 37.49 KG/M2 | RESPIRATION RATE: 21 BRPM | TEMPERATURE: 98.4 F | OXYGEN SATURATION: 96 % | DIASTOLIC BLOOD PRESSURE: 84 MMHG

## 2022-04-27 DIAGNOSIS — J18.9 PNEUMONIA: Primary | ICD-10-CM

## 2022-04-27 LAB
ALBUMIN SERPL BCP-MCNC: 3.9 G/DL (ref 3.5–5)
ALP SERPL-CCNC: 97 U/L (ref 34–104)
ALT SERPL W P-5'-P-CCNC: 23 U/L (ref 7–52)
ANION GAP SERPL CALCULATED.3IONS-SCNC: 8 MMOL/L (ref 4–13)
APTT PPP: 29 SECONDS (ref 23–37)
AST SERPL W P-5'-P-CCNC: 13 U/L (ref 13–39)
ATRIAL RATE: 75 BPM
BASOPHILS # BLD AUTO: 0.04 THOUSANDS/ΜL (ref 0–0.1)
BASOPHILS NFR BLD AUTO: 1 % (ref 0–1)
BILIRUB SERPL-MCNC: 0.18 MG/DL (ref 0.2–1)
BUN SERPL-MCNC: 18 MG/DL (ref 5–25)
CALCIUM SERPL-MCNC: 8.9 MG/DL (ref 8.4–10.2)
CARDIAC TROPONIN I PNL SERPL HS: 3 NG/L
CHLORIDE SERPL-SCNC: 105 MMOL/L (ref 96–108)
CO2 SERPL-SCNC: 23 MMOL/L (ref 21–32)
CREAT SERPL-MCNC: 0.68 MG/DL (ref 0.6–1.3)
EOSINOPHIL # BLD AUTO: 0.02 THOUSAND/ΜL (ref 0–0.61)
EOSINOPHIL NFR BLD AUTO: 0 % (ref 0–6)
ERYTHROCYTE [DISTWIDTH] IN BLOOD BY AUTOMATED COUNT: 15.6 % (ref 11.6–15.1)
GFR SERPL CREATININE-BSD FRML MDRD: 106 ML/MIN/1.73SQ M
GLUCOSE SERPL-MCNC: 123 MG/DL (ref 65–140)
HCT VFR BLD AUTO: 39.9 % (ref 34.8–46.1)
HGB BLD-MCNC: 13 G/DL (ref 11.5–15.4)
IMM GRANULOCYTES # BLD AUTO: 0.04 THOUSAND/UL (ref 0–0.2)
IMM GRANULOCYTES NFR BLD AUTO: 1 % (ref 0–2)
INR PPP: 0.92 (ref 0.84–1.19)
LYMPHOCYTES # BLD AUTO: 2.27 THOUSANDS/ΜL (ref 0.6–4.47)
LYMPHOCYTES NFR BLD AUTO: 34 % (ref 14–44)
MCH RBC QN AUTO: 27.5 PG (ref 26.8–34.3)
MCHC RBC AUTO-ENTMCNC: 32.6 G/DL (ref 31.4–37.4)
MCV RBC AUTO: 84 FL (ref 82–98)
MONOCYTES # BLD AUTO: 0.42 THOUSAND/ΜL (ref 0.17–1.22)
MONOCYTES NFR BLD AUTO: 6 % (ref 4–12)
NEUTROPHILS # BLD AUTO: 3.89 THOUSANDS/ΜL (ref 1.85–7.62)
NEUTS SEG NFR BLD AUTO: 58 % (ref 43–75)
NRBC BLD AUTO-RTO: 0 /100 WBCS
P AXIS: 16 DEGREES
PLATELET # BLD AUTO: 325 THOUSANDS/UL (ref 149–390)
PMV BLD AUTO: 9.7 FL (ref 8.9–12.7)
POTASSIUM SERPL-SCNC: 3.7 MMOL/L (ref 3.5–5.3)
PR INTERVAL: 124 MS
PROT SERPL-MCNC: 6.8 G/DL (ref 6.4–8.4)
PROTHROMBIN TIME: 12.3 SECONDS (ref 11.6–14.5)
QRS AXIS: 33 DEGREES
QRSD INTERVAL: 80 MS
QT INTERVAL: 404 MS
QTC INTERVAL: 451 MS
RBC # BLD AUTO: 4.73 MILLION/UL (ref 3.81–5.12)
SODIUM SERPL-SCNC: 136 MMOL/L (ref 135–147)
T WAVE AXIS: 51 DEGREES
VENTRICULAR RATE: 75 BPM
WBC # BLD AUTO: 6.68 THOUSAND/UL (ref 4.31–10.16)

## 2022-04-27 PROCEDURE — 85610 PROTHROMBIN TIME: CPT | Performed by: EMERGENCY MEDICINE

## 2022-04-27 PROCEDURE — 85025 COMPLETE CBC W/AUTO DIFF WBC: CPT | Performed by: EMERGENCY MEDICINE

## 2022-04-27 PROCEDURE — 84484 ASSAY OF TROPONIN QUANT: CPT | Performed by: EMERGENCY MEDICINE

## 2022-04-27 PROCEDURE — 85730 THROMBOPLASTIN TIME PARTIAL: CPT | Performed by: EMERGENCY MEDICINE

## 2022-04-27 PROCEDURE — 99285 EMERGENCY DEPT VISIT HI MDM: CPT | Performed by: EMERGENCY MEDICINE

## 2022-04-27 PROCEDURE — 96360 HYDRATION IV INFUSION INIT: CPT

## 2022-04-27 PROCEDURE — 71045 X-RAY EXAM CHEST 1 VIEW: CPT

## 2022-04-27 PROCEDURE — 80053 COMPREHEN METABOLIC PANEL: CPT | Performed by: EMERGENCY MEDICINE

## 2022-04-27 PROCEDURE — 99285 EMERGENCY DEPT VISIT HI MDM: CPT

## 2022-04-27 PROCEDURE — 93005 ELECTROCARDIOGRAM TRACING: CPT

## 2022-04-27 PROCEDURE — 36415 COLL VENOUS BLD VENIPUNCTURE: CPT | Performed by: EMERGENCY MEDICINE

## 2022-04-27 PROCEDURE — 94640 AIRWAY INHALATION TREATMENT: CPT

## 2022-04-27 PROCEDURE — 93010 ELECTROCARDIOGRAM REPORT: CPT | Performed by: INTERNAL MEDICINE

## 2022-04-27 RX ORDER — ALBUTEROL SULFATE 90 UG/1
2 AEROSOL, METERED RESPIRATORY (INHALATION) EVERY 4 HOURS PRN
Qty: 18 G | Refills: 0 | Status: SHIPPED | OUTPATIENT
Start: 2022-04-27

## 2022-04-27 RX ORDER — ALBUTEROL SULFATE 2.5 MG/3ML
5 SOLUTION RESPIRATORY (INHALATION) ONCE
Status: COMPLETED | OUTPATIENT
Start: 2022-04-27 | End: 2022-04-27

## 2022-04-27 RX ADMIN — IPRATROPIUM BROMIDE 0.5 MG: 0.5 SOLUTION RESPIRATORY (INHALATION) at 20:41

## 2022-04-27 RX ADMIN — ALBUTEROL SULFATE 5 MG: 2.5 SOLUTION RESPIRATORY (INHALATION) at 20:41

## 2022-04-27 RX ADMIN — SODIUM CHLORIDE 1000 ML: 0.9 INJECTION, SOLUTION INTRAVENOUS at 20:51

## 2022-04-27 NOTE — Clinical Note
Brandie Lee was seen and treated in our emergency department on 4/27/2022  Diagnosis: pneumonia    Amparo  may return to work on return date  She may return on this date: 04/29/2022         If you have any questions or concerns, please don't hesitate to call        Jen Hughes DO    ______________________________           _______________          _______________  Hospital Representative                              Date                                Time

## 2022-04-28 NOTE — ED PROVIDER NOTES
History  Chief Complaint   Patient presents with    Shortness of Breath     Pt reports she was seen at urgent care  and diagnosed with pneumonia; reports SOB and dizziness with coughing     Patient is a 29-year-old female with a history of bipolar, depression, hypertension, who presents for evaluation of a cough, shortness of breath  Patient says she was diagnosed with pneumonia on   She was started on doxycycline and azithromycin  Patient says that she is having a persistent cough and feeling more short of breath  She says that she had a low-grade fever today  She says that she also feels a tightness in her chest   She says that she has some dizziness only when she coughs  On exam, the patient has mild wheezing  She is in no respiratory distress  Her vitals are within normal limits  Prior to Admission Medications   Prescriptions Last Dose Informant Patient Reported? Taking?    Diclofenac Sodium (VOLTAREN) 1 %   Yes No   Sig: Apply 1 application topically 4 (four) times a day   Vraylar 3 MG capsule  Self Yes No   Si mg daily 9 mgms daily   albuterol (ProAir HFA) 90 mcg/act inhaler   No No   Sig: Inhale 2 puffs every 6 (six) hours as needed for wheezing or shortness of breath   cariprazine (Vraylar) 6 MG capsule   Yes No   Sig: Take 6 mg by mouth daily   dexlansoprazole (Dexilant) 60 MG capsule   Yes No   Sig: daily    doxepin (SINEquan) 50 mg capsule   Yes No   doxycycline (ADOXA) 100 MG tablet   No No   Sig: Take 1 tablet (100 mg total) by mouth 2 (two) times a day for 10 days   famotidine (PEPCID) 40 MG tablet   Yes No   Sig: Take 40 mg by mouth bedtime   fluticasone (FLONASE) 50 mcg/act nasal spray  Self No No   Si sprays into each nostril daily   Patient taking differently: 2 sprays into each nostril daily As needed   hydrOXYzine HCL (ATARAX) 50 mg tablet   Yes No   linaCLOtide (LINZESS PO)  Self Yes No   Sig: Take 290 mcg by mouth daily before breakfast   lisinopril (ZESTRIL) 10 mg tablet  Self Yes No   Sig: Take 10 mg by mouth daily   pramipexole (MIRAPEX) 0 5 mg tablet   Yes No   prazosin (MINIPRESS) 2 mg capsule   Yes No   predniSONE 10 mg tablet   No No   Sig: Take 1 tablet (10 mg total) by mouth daily   pregabalin (LYRICA) 150 mg capsule  Self Yes No   Sig: Take 150 mg by mouth daily Daily at bedtime   valACYclovir (VALTREX) 500 mg tablet  Self Yes No   Sig: Take 500 mg by mouth daily   vilazodone (VIIBRYD) 10 mg tablet  Self No No   Sig: Take 1 tablet (10 mg total) by mouth daily with breakfast      Facility-Administered Medications: None       Past Medical History:   Diagnosis Date    Anxiety     Arthritis     Bipolar 1 disorder ([facility])     with bordeline personality    Borderline [facility] disorder ([facility] [address] )     Chronic headaches     Depression     GERD (gastroesophageal reflux disease)     Hypertension     Incontinence     Migraine     Morbid obesity with BMI of 40 0-44 9, adult ([facility])     Small bowel obstruction ([facility] [address] ) 6/27/2019    Suicide attempt ([facility] [address] )     Symptomatic bradycardia     Urinary tract infection        Past Surgical History:   Procedure Laterality Date    CHOLECYSTECTOMY  05/24/2018    DENTAL SURGERY      ELBOW SURGERY      EXPLORATORY LAPAROTOMY      exlap    FOOT SURGERY Right     KNEE ARTHROSCOPY Right 11/15/2018    total of three procedures with meniscal surgery    MULTIPLE TOOTH EXTRACTIONS  11/02/2018    OVARIAN CYST REMOVAL      REDUCTION MAMMAPLASTY      reduction     SINUS SURGERY Bilateral 04/19/2019    bilateral maxillary antrostomies - Dr Mark Man - [facility]       Family History   Problem Relation Age of Onset    Hyperlipidemia Father     Hypertension Father     Migraines Mother     Depression Family     Heart disease Maternal Aunt     Heart disease Maternal Uncle     Diabetes Paternal Aunt     Diabetes Paternal Uncle     Breast cancer Maternal Grandmother     Diabetes Paternal Grandmother     Diabetes Paternal Grandfather     Stroke Neg Hx     Thyroid disease Neg Hx      I have reviewed and agree with the history as documented  E-Cigarette/Vaping    E-Cigarette Use Never User      E-Cigarette/Vaping Substances    Nicotine No     THC No     CBD No     Flavoring No     Other No     Unknown No      Social History     Tobacco Use    Smoking status: Never Smoker    Smokeless tobacco: Never Used   Vaping Use    Vaping Use: Never used   Substance Use Topics    Alcohol use: Not Currently     Comment: rarely    Drug use: Never       Review of Systems   Constitutional: Negative for chills, diaphoresis and fever  HENT: Negative for congestion, sinus pressure, sore throat and trouble swallowing  Eyes: Negative for pain, discharge and itching  Respiratory: Positive for cough, chest tightness and shortness of breath  Negative for wheezing  Cardiovascular: Negative for chest pain, palpitations and leg swelling  Gastrointestinal: Negative for abdominal distention, abdominal pain, blood in stool, diarrhea, nausea and vomiting  Endocrine: Negative for polyphagia and polyuria  Genitourinary: Negative for difficulty urinating, dysuria, flank pain, hematuria, pelvic pain and vaginal bleeding  Musculoskeletal: Negative for arthralgias and back pain  Skin: Negative for color change and rash  Neurological: Negative for dizziness, syncope, weakness, light-headedness and headaches  Physical Exam  Physical Exam  Vitals and nursing note reviewed  Constitutional:       General: She is not in acute distress  Appearance: She is well-developed  HENT:      Head: Normocephalic and atraumatic  Right Ear: External ear normal       Left Ear: External ear normal       Nose: Nose normal       Mouth/Throat:      Mouth: Mucous membranes are moist       Pharynx: No oropharyngeal exudate  Eyes:      Conjunctiva/sclera: Conjunctivae normal       Pupils: Pupils are equal, round, and reactive to light  Cardiovascular:      Rate and Rhythm: Normal rate and regular rhythm  Heart sounds: Normal heart sounds  No murmur heard  No friction rub  No gallop  Pulmonary:      Effort: Pulmonary effort is normal  No respiratory distress  Breath sounds: Wheezing (mild) present  No rales  Abdominal:      General: There is no distension  Palpations: Abdomen is soft  Tenderness: There is no abdominal tenderness  There is no guarding  Musculoskeletal:         General: No swelling, tenderness or deformity  Normal range of motion  Cervical back: Normal range of motion and neck supple  Lymphadenopathy:      Cervical: No cervical adenopathy  Skin:     General: Skin is warm and dry  Neurological:      General: No focal deficit present  Mental Status: She is alert and oriented to person, place, and time  Mental status is at baseline  Cranial Nerves: No cranial nerve deficit  Sensory: No sensory deficit  Motor: No weakness or abnormal muscle tone        Coordination: Coordination normal          Vital Signs  ED Triage Vitals [04/27/22 2011]   Temperature Pulse Respirations Blood Pressure SpO2   98 4 °F (36 9 °C) 76 20 152/77 97 %      Temp Source Heart Rate Source Patient Position - Orthostatic VS BP Location FiO2 (%)   Oral Monitor Lying Left arm --      Pain Score       No Pain           Vitals:    04/27/22 2011   BP: 152/77   Pulse: 76   Patient Position - Orthostatic VS: Lying         Visual Acuity      ED Medications  Medications   sodium chloride 0 9 % bolus 1,000 mL (1,000 mL Intravenous New Bag 4/27/22 2051)   albuterol inhalation solution 5 mg (5 mg Nebulization Given 4/27/22 2041)   ipratropium (ATROVENT) 0 02 % inhalation solution 0 5 mg (0 5 mg Nebulization Given 4/27/22 2041)       Diagnostic Studies  Results Reviewed     Procedure Component Value Units Date/Time    HS Troponin 0hr (reflex protocol) [[id number]]  (Normal) Collected: 04/27/22 2047    Lab Status: Final result Specimen: Blood from Arm, Left Updated: 04/27/22 2124     hs TnI 0hr 3 ng/L     Comprehensive metabolic panel [[id number]]  (Abnormal) Collected: 04/27/22 2047    Lab Status: Final result Specimen: Blood from Arm, Left Updated: 04/27/22 2117     Sodium 136 mmol/L      Potassium 3 7 mmol/L      Chloride 105 mmol/L      CO2 23 mmol/L      ANION GAP 8 mmol/L      BUN 18 mg/dL      Creatinine 0 68 mg/dL      Glucose 123 mg/dL      Calcium 8 9 mg/dL      AST 13 U/L      ALT 23 U/L      Alkaline Phosphatase 97 U/L      Total Protein 6 8 g/dL      Albumin 3 9 g/dL      Total Bilirubin 0 18 mg/dL      eGFR 106 ml/min/1 73sq m     Narrative:      [place] guidelines for Chronic Kidney Disease (CKD):     Stage 1 with normal or high GFR (GFR > 90 mL/min/1 73 square meters)    Stage 2 Mild CKD (GFR = 60-89 mL/min/1 73 square meters)    Stage 3A Moderate CKD (GFR = 45-59 mL/min/1 73 square meters)    Stage 3B Moderate CKD (GFR = 30-44 mL/min/1 73 square meters)    Stage 4 Severe CKD (GFR = 15-29 mL/min/1 73 square meters)    Stage 5 End Stage CKD (GFR <15 mL/min/1 73 square meters)  Note: GFR calculation is accurate only with a steady state creatinine    Protime-INR [[id number]]  (Normal) Collected: 04/27/22 2047    Lab Status: Final result Specimen: Blood from Arm, Left Updated: 04/27/22 2112     Protime 12 3 seconds      INR 0 92    APTT [[id number]]  (Normal) Collected: 04/27/22 2047    Lab Status: Final result Specimen: Blood from Arm, Left Updated: 04/27/22 2112     PTT 29 seconds     CBC and differential [[id number]]  (Abnormal) Collected: 04/27/22 2047    Lab Status: Final result Specimen: Blood from Arm, Left Updated: 04/27/22 2104     WBC 6 68 Thousand/uL      RBC 4 73 Million/uL      Hemoglobin 13 0 g/dL      Hematocrit 39 9 %      MCV 84 fL      MCH 27 5 pg      MCHC 32 6 g/dL      RDW 15 6 %      MPV 9 7 fL      Platelets 038 Thousands/uL      nRBC 0 /100 WBCs      Neutrophils Relative 58 %      Immat GRANS % 1 %      Lymphocytes Relative 34 %      Monocytes Relative 6 %      Eosinophils Relative 0 %      Basophils Relative 1 %      Neutrophils Absolute 3 89 Thousands/µL      Immature Grans Absolute 0 04 Thousand/uL      Lymphocytes Absolute 2 27 Thousands/µL      Monocytes Absolute 0 42 Thousand/µL      Eosinophils Absolute 0 02 Thousand/µL      Basophils Absolute 0 04 Thousands/µL                  XR chest 1 view portable   ED Interpretation by Montel Ahumada, DO (04/27 2055)   Persistent R middle lobe infiltrate                 Procedures  ECG 12 Lead Documentation Only    Date/Time: 4/27/2022 9:17 PM  Performed by: Montel Ahumada, DO  Authorized by: Montel Ahumada, DO     Indications / Diagnosis:  Sob, cough  ECG reviewed by me, the ED Provider: yes    Patient location:  ED  Previous ECG:     Previous ECG:  Unavailable    Comparison to cardiac monitor: Yes    Interpretation:     Interpretation: normal    Rate:     ECG rate:  75    ECG rate assessment: normal    Rhythm:     Rhythm: sinus rhythm    Ectopy:     Ectopy: none    QRS:     QRS axis:  Normal  Conduction:     Conduction: normal    ST segments:     ST segments:  Normal  T waves:     T waves: normal               ED Course                               SBIRT 20yo+      Most Recent Value   SBIRT (24 yo +)    In order to provide better care to our patients, we are screening all of our patients for alcohol and drug use  Would it be okay to ask you these screening questions? Yes Filed at: 04/27/2022 2015   Initial Alcohol Screen: US AUDIT-C     1  How often do you have a drink containing alcohol? 0 Filed at: 04/27/2022 2015   2  How many drinks containing alcohol do you have on a typical day you are drinking? 0 Filed at: 04/27/2022 2015   3a  Male UNDER 65: How often do you have five or more drinks on one occasion? 0 Filed at: 04/27/2022 2015   3b  FEMALE Any Age, or MALE 65+:  How often do you have 4 or more drinks on one occassion? 0 Filed at: 04/27/2022 2015   Audit-C Score 0 Filed at: 04/27/2022 2015   NARCISA: How many times in the past year have you    Used an illegal drug or used a prescription medication for non-medical reasons? Never Filed at: 04/27/2022 2015                    MDM  Number of Diagnoses or Management Options  Diagnosis management comments: 61-year-old female presenting for evaluation of cough, shortness of breath  Diagnosed with pneumonia 2 days ago  Started on antibiotics  Admits to low-grade fever at home  Vitals within normal limits  Mild wheezing on exam   Will obtain basic lab work, will give IV fluids  Will obtain chest x-ray  Will give breathing treatment      Disposition  Final diagnoses:   None     ED Disposition     None      Follow-up Information    None         Patient's Medications   Discharge Prescriptions    No medications on file       No discharge procedures on file      PDMP Review       Value Time User    PDMP Reviewed  Yes 8/18/2020  2:59 PM Tatyana Amaro MD          ED Provider  Electronically Signed by           Mario Mack DO  04/27/22 [id number]

## 2022-04-28 NOTE — DISCHARGE INSTRUCTIONS
Continue to take the antibiotics that were prescribed to you  Is important he follow up with family doctor to make sure symptoms are improving    If you develop worsening shortness of breath, return to ED

## 2022-04-28 NOTE — PROGRESS NOTES
[facility]        NAME: Vinicius Gonzalez is a 42 y o  female  : 1975    MRN: [id number]  DATE: 2022  TIME: 8:48 AM    Assessment and Plan   Pneumonia of right middle lobe due to infectious organism [J18 9]  1  Pneumonia of right middle lobe due to infectious organism  doxycycline (ADOXA) 100 MG tablet   2  Cough  XR chest pa & lateral   3  Acute cough  Cov/Flu-Collected at [facility]   4  Mild intermittent asthma with exacerbation  predniSONE 10 mg tablet    albuterol (ProAir HFA) 90 mcg/act inhaler         Patient Instructions     Acute asthma exacerbation from viral infection  - use inhaler, take 2 puffs every 4-6 hours as needed for cough, wheezing, shortness of breath  - Take prednisone one time a day for 10 days as a taper: 5 tabs for 2 days, 4 tabs for 2 days, 3 tabs for 2 days, 2 tabs for 2 days, then 1 tab for 2 days  Write on the bottle 5, 5, 4, 4, 3, 3, 2, 2, 1, 1  You can cross off the numbers for each day you take the medication   - Do not take at night as it can cause insomnia  - Potential side effects include: weight gain, swelling in extremities, insomnia, mood/behavior changes, hyperglycemia, increased blood pressure  - Take Vitamin D3 200IU daily, Vitamin C, and zinc  Rest and drink electrolyte rich fluids  Tylenol and ibuprofen as needed for fevers and aches following package dosing instructions  Chicken noodle soup  - Sore throat: Throat lozenges and/or salt water gurgles  - Congestion relief with mucinex 600mg 1 tablet every 12 hours  You can use afrin or flonase for nasal congestion as directed on their packaging   Warm or cool air mist humidifiers    - Nighttime cough relief with Mucinex DM or NyQuil   - Go to the ED if you have trouble breathing or shortness of breath at rest, chest pain or pressure that lasts longer than 5 minutes, become confused or hard to wake, your lips or face are blue, you have a fever of 104°F (40°C) or higher   - Follow up with Family doctor as needed, however your symptoms may persists for 2-3 weeks from onset  Pneumonia   - phone call to patient 12 hours after initial visit when radiology report came in, patient was told:  - take 1 tablet of doxycycline 2 times a day for the next 10 days  - side effects doxycycline include skin sensitivity, so avoid excessive sun exposure, and make sure you wear sunscreen when going on to the sunlight  - You have been given an antibiotic, which a common side effect is diarrhea  Eat yogurt, fresh fruits and veggies, increase daily fiber intake, take probiotics, or try kambucha  Follow up with PCP in 3-5 days  Proceed to  ER if symptoms worsen  Chief Complaint     Chief Complaint   Patient presents with    Cold Like Symptoms     x3 days: occas SOB with exertion, cough, fatigue, mucous, chest congestion  History of Present Illness     Amparo ALEJO Gail Ryan is a 42 y o  female who presents with complaint of shortness of breath with exertion, cough, fatigue, mucus production, and chest congestion for the past 3 days  She reports wheezing sitting in her chair  She reports fevers, fatigue  She denies nausea, vomiting, diarrhea, chest pain  She denies lower extremity swelling, orthopnea, paroxysmal nocturnal dyspnea, nocturia  Review of Systems   Review of Systems   Constitutional: Positive for fatigue and fever  Negative for chills  HENT: Positive for congestion, postnasal drip, rhinorrhea, sinus pressure, sore throat and trouble swallowing  Negative for ear discharge, ear pain and sinus pain  Respiratory: Positive for cough, shortness of breath and wheezing  Cardiovascular: Negative for chest pain and palpitations  Gastrointestinal: Negative for abdominal pain, constipation, diarrhea, nausea and vomiting  Musculoskeletal: Negative for myalgias  Neurological: Negative for headaches           Current Medications       Current Outpatient Medications:    cariprazine (Vraylar) 6 MG capsule, Take 6 mg by mouth daily, Disp: , Rfl:     dexlansoprazole (Dexilant) 60 MG capsule, daily , Disp: , Rfl:     Diclofenac Sodium (VOLTAREN) 1 %, Apply 1 application topically 4 (four) times a day, Disp: , Rfl:     doxepin (SINEquan) 50 mg capsule, , Disp: , Rfl:     famotidine (PEPCID) 40 MG tablet, Take 40 mg by mouth bedtime, Disp: , Rfl:     hydrOXYzine HCL (ATARAX) 50 mg tablet, , Disp: , Rfl:     linaCLOtide (LINZESS PO), Take 290 mcg by mouth daily before breakfast, Disp: , Rfl:     lisinopril (ZESTRIL) 10 mg tablet, Take 10 mg by mouth daily, Disp: , Rfl:     pramipexole (MIRAPEX) 0 5 mg tablet, , Disp: , Rfl:     prazosin (MINIPRESS) 2 mg capsule, , Disp: , Rfl:     pregabalin (LYRICA) 150 mg capsule, Take 150 mg by mouth daily Daily at bedtime, Disp: , Rfl:     valACYclovir (VALTREX) 500 mg tablet, Take 500 mg by mouth daily, Disp: , Rfl:     vilazodone (VIIBRYD) 10 mg tablet, Take 1 tablet (10 mg total) by mouth daily with breakfast, Disp: 30 tablet, Rfl: 0    Vraylar 3 MG capsule, 9 mg daily 9 mgms daily, Disp: , Rfl:     albuterol (ProAir HFA) 90 mcg/act inhaler, Inhale 2 puffs every 6 (six) hours as needed for wheezing or shortness of breath, Disp: 8 5 g, Rfl: 0    albuterol (ProAir HFA) 90 mcg/act inhaler, Inhale 2 puffs every 4 (four) hours as needed for wheezing or shortness of breath, Disp: 18 g, Rfl: 0    doxycycline (ADOXA) 100 MG tablet, Take 1 tablet (100 mg total) by mouth 2 (two) times a day for 10 days, Disp: 20 tablet, Rfl: 0    fluticasone (FLONASE) 50 mcg/act nasal spray, 2 sprays into each nostril daily (Patient taking differently: 2 sprays into each nostril daily As needed), Disp: 3 Bottle, Rfl: 1    predniSONE 10 mg tablet, Take 1 tablet (10 mg total) by mouth daily, Disp: 30 tablet, Rfl: 0  No current facility-administered medications for this visit      Current Allergies     Allergies as of 04/25/2022 - Reviewed 04/25/2022   Allergen Reaction Noted    Celecoxib Hives 11/11/2019    Lamotrigine Rash and Hives 02/24/2010            The following portions of the patient's history were reviewed and updated as appropriate: allergies, current medications, past family history, past medical history, past social history, past surgical history and problem list      Past Medical History:   Diagnosis Date    Anxiety     Arthritis     Bipolar 1 disorder ([place] [id number] )     with bordeline personality    Borderline personality disorder ([place] [address] )     Chronic headaches     Depression     GERD (gastroesophageal reflux disease)     Hypertension     Incontinence     Migraine     Morbid obesity with BMI of 40 0-44 9, adult ([place] [address] )     Small bowel obstruction ([place] [address] ) 6/27/2019    Suicide attempt ([place] [address] )     Symptomatic bradycardia     Urinary tract infection        Past Surgical History:   Procedure Laterality Date    CHOLECYSTECTOMY  05/24/2018    DENTAL SURGERY      ELBOW SURGERY      EXPLORATORY LAPAROTOMY      exlap    FOOT SURGERY Right     KNEE ARTHROSCOPY Right 11/15/2018    total of three procedures with meniscal surgery    MULTIPLE TOOTH EXTRACTIONS  11/02/2018    OVARIAN CYST REMOVAL      REDUCTION MAMMAPLASTY      reduction     SINUS SURGERY Bilateral 04/19/2019    bilateral maxillary antrostomies - Dr Cloe Maldonado - [facility]       Family History   Problem Relation Age of Onset    Hyperlipidemia Father     Hypertension Father     Migraines Mother     Depression Family     Heart disease Maternal Aunt     Heart disease Maternal Uncle     Diabetes Paternal Aunt     Diabetes Paternal Uncle     Breast cancer Maternal Grandmother     Diabetes Paternal Grandmother     Diabetes Paternal Grandfather     Stroke Neg Hx     Thyroid disease Neg Hx          Medications have been verified  Objective   Pulse 86   Temp 99 9 °F (37 7 °C)   Resp 18   LMP 04/06/2022   SpO2 96%   Patient's last menstrual period was 04/06/2022         Physical Exam Physical Exam  Vitals reviewed  Constitutional:       General: She is not in acute distress  Appearance: Normal appearance  She is obese  She is ill-appearing and diaphoretic  She is not toxic-appearing  HENT:      Head: Normocephalic and atraumatic  Right Ear: Hearing, tympanic membrane, ear canal and external ear normal  No tenderness  There is no impacted cerumen  Tympanic membrane is not injected, perforated or erythematous  Left Ear: Hearing, tympanic membrane, ear canal and external ear normal  No tenderness  There is no impacted cerumen  Tympanic membrane is not injected, perforated or erythematous  Nose: Congestion present  No rhinorrhea  Mouth/Throat:      Lips: Pink  Mouth: Mucous membranes are moist       Pharynx: Oropharynx is clear  Uvula midline  Posterior oropharyngeal erythema and uvula swelling present  No oropharyngeal exudate  Tonsils: No tonsillar exudate or tonsillar abscesses  Eyes:      General:         Right eye: No discharge  Left eye: No discharge  Extraocular Movements: Extraocular movements intact  Conjunctiva/sclera: Conjunctivae normal       Pupils: Pupils are equal, round, and reactive to light  Cardiovascular:      Rate and Rhythm: Normal rate and regular rhythm  Heart sounds: Normal heart sounds  No murmur heard  No friction rub  No gallop  Pulmonary:      Effort: Pulmonary effort is normal       Breath sounds: Examination of the right-upper field reveals wheezing  Examination of the left-upper field reveals wheezing  Examination of the right-lower field reveals decreased breath sounds and wheezing  Examination of the left-lower field reveals wheezing  Decreased breath sounds and wheezing present  No rhonchi or rales  Comments: Decreased lung sounds in lower right, however breath sounds were present in all lung fields  Neurological:      Mental Status: She is alert           Chest x-ray:   Preliminary review reveals no tracheal deviation, no pleural lines indicating no pneumothorax, costovertebral angle not blunted, cardiac silhouette not enlarged    Densities appear normal

## 2022-08-12 ENCOUNTER — OFFICE VISIT (OUTPATIENT)
Dept: URGENT CARE | Facility: CLINIC | Age: 47
End: 2022-08-12
Payer: COMMERCIAL

## 2022-08-12 VITALS
RESPIRATION RATE: 18 BRPM | BODY MASS INDEX: 37.44 KG/M2 | SYSTOLIC BLOOD PRESSURE: 129 MMHG | HEART RATE: 77 BPM | WEIGHT: 225 LBS | OXYGEN SATURATION: 96 % | DIASTOLIC BLOOD PRESSURE: 64 MMHG | TEMPERATURE: 97.9 F

## 2022-08-12 DIAGNOSIS — S61.451A CAT BITE OF MULTIPLE SITES OF RIGHT HAND AND FINGERS WITH INFECTION, INITIAL ENCOUNTER: Primary | ICD-10-CM
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
## 2022-08-12 DIAGNOSIS — S61.259A CAT BITE OF MULTIPLE SITES OF RIGHT HAND AND FINGERS WITH INFECTION, INITIAL ENCOUNTER: Primary | ICD-10-CM

## 2022-08-12 DIAGNOSIS — L08.9 CAT BITE OF MULTIPLE SITES OF RIGHT HAND AND FINGERS WITH INFECTION, INITIAL ENCOUNTER: Primary | ICD-10-CM

## 2022-08-12 PROCEDURE — G0382 LEV 3 HOSP TYPE B ED VISIT: HCPCS | Performed by: NURSE PRACTITIONER

## 2022-08-12 RX ORDER — HYDROCODONE BITARTRATE AND ACETAMINOPHEN 5; 325 MG/1; MG/1
TABLET ORAL
COMMUNITY
Start: 2022-08-08

## 2022-08-12 RX ORDER — CYCLOBENZAPRINE HCL 10 MG
10 TABLET ORAL 3 TIMES DAILY PRN
COMMUNITY
Start: 2022-08-08 | End: 2022-09-07

## 2022-08-12 RX ORDER — AMOXICILLIN AND CLAVULANATE POTASSIUM 875; 125 MG/1; MG/1
1 TABLET, FILM COATED ORAL EVERY 12 HOURS SCHEDULED
Qty: 20 TABLET | Refills: 0 | Status: SHIPPED | OUTPATIENT
Start: 2022-08-12 | End: 2022-08-22

## 2022-08-12 NOTE — PATIENT INSTRUCTIONS
Take the Augmentin as ordered until completed  Eat yogurt or take a probiotic to restore good bacteria to your gut; this helps prevent stomach irritation/diarrhea while on an antibiotic  After 24 hours, symptoms should not get worse  It may take a few days to start to see improvement  If symptoms are worsening after 24 hours or if you are not seeing improvement, you should be seen again  Animal Bite   AMBULATORY CARE:   Animal bite  injuries range from shallow cuts to deep, life-threatening wounds  Animal bites occur more often on the hands, arms, legs, and face  Bites from dogs and cats are the most common injuries  Common symptoms include the following:   Cut or punctured skin     Skin torn from your body    Swollen or bruised skin, even if the skin is not broken    Seek care immediately if:   You have a fever  Your wound is red, swollen, and draining pus  You see red streaks on the skin around the wound  You can no longer move the bitten area  Your heartbeat and breathing are much faster than usual     You feel dizzy and confused  Contact your healthcare provider if:   Your pain does not get better, even after you take pain medicine  You have nightmares or flashbacks about the animal bite  You have questions or concerns about your condition or care  Treatment for an animal bite  may include any of the following:  Irrigation and debridement  may be needed to clean out your wound  Dead, damaged, or infected tissue may be cut away to help your wound heal     Medicines:      Antibiotics  prevent or treat a bacterial infection  Prescription pain medicine  may be given  Ask how to take this medicine safely  A tetanus vaccine  may be needed to prevent tetanus  Tetanus is a life-threatening bacterial infection that affects the nerves and muscles  The bacteria can be spread through animal bites  A rabies vaccine  may be needed to prevent rabies   Rabies is a life-threatening viral infection  The virus can be spread through animal bites  Stitches  may be needed if your wound is large and not infected  Surgery  may be needed to repair deep injuries or severe wounds  Manage your symptoms:   Apply antibiotic ointment as directed  This helps prevent infection in minor skin wounds  Antibiotic ointment is available without a prescription  Keep the wound clean and covered  Wash the wound every day with soap and water or germ-killing cleanser  Ask what kinds of bandages to use  Apply ice to your wound  Ice helps prevent tissue damage and decreases swelling and pain  Use an ice pack, or put crushed ice in a plastic bag  Cover it with a towel  Apply ice on your wound for 15 to 20 minutes every hour or as directed  Elevate your wound  Raise your wound above the level of your heart as often as you can  This will help decrease swelling and pain  Prop your wound on pillows or blankets to keep it elevated comfortably  Prevent another animal bite:   Learn to recognize the signs of a scared pet  Avoid quick, sudden movements  Do not step between animals that are fighting  Do not leave a pet alone with a young child  Do not disturb an animal while it eats, sleeps, or cares for its young  Do not approach an animal you do not know, especially one that is tied up or caged  Stay away from animals that seem sick or act strangely  Do not feed or capture wild animals  Follow up with your doctor as directed:  Write down your questions so you remember to ask them during your visits  © [employer] 2022 Information is for End User's use only and may not be sold, redistributed or otherwise used for commercial purposes  All illustrations and images included in CareNotes® are the copyrighted property of [employer] D A M [employer] Inc  or Tammi Dennis  The above information is an  only   It is not intended as medical advice for individual conditions or treatments  Talk to your doctor, nurse or pharmacist before following any medical regimen to see if it is safe and effective for you  Cellulitis   AMBULATORY CARE:   Cellulitis  is a skin infection caused by bacteria  Cellulitis is common and can become severe  Cellulitis usually appears on the lower legs  It can also appear on the arms, face, and other areas  Cellulitis develops when bacteria enter a crack or break in your skin, such as a scratch, bite, or cut  Common signs and symptoms:  Signs and symptoms usually appear on one side of your body  You may have any of the following:  A fever    A red, warm, swollen area on your skin    Pain when the area is touched    Red spots, bumps, or blisters that may drain pus    Bumpy, raised skin that feels like an orange peel    Seek care immediately if:   Your wound gets larger and more painful  You feel a crackling under your skin when you touch it  You have purple dots or bumps on your skin, or you see bleeding under your skin  You see red streaks coming from the infected area  Call your doctor if:   The red, warm, swollen area gets larger  Your fever or pain does not go away or gets worse  The area does not get smaller after 3 days of antibiotics  You have questions or concerns about your condition or care  Treatment:  You should start to see improvement in 3 days  If your cellulitis is severe, you may need IV antibiotics in the hospital  If cellulitis is not treated, the infection can spread through your body and become life-threatening  You may need any of the following medicines:  Antibiotics  help treat the bacterial infection  Acetaminophen  decreases pain and fever  It is available without a doctor's order  Ask how much to take and how often to take it  Follow directions   Read the labels of all other medicines you are using to see if they also contain acetaminophen, or ask your doctor or pharmacist  Acetaminophen can cause liver damage if not taken correctly  Do not use more than 4 grams (4,000 milligrams) total of acetaminophen in one day  NSAIDs , such as ibuprofen, help decrease swelling, pain, and fever  This medicine is available with or without a doctor's order  NSAIDs can cause stomach bleeding or kidney problems in certain people  If you take blood thinner medicine, always ask your healthcare provider if NSAIDs are safe for you  Always read the medicine label and follow directions  Take your medicine as directed  Contact your healthcare provider if you think your medicine is not helping or if you have side effects  Tell him or her if you are allergic to any medicine  Keep a list of the medicines, vitamins, and herbs you take  Include the amounts, and when and why you take them  Bring the list or the pill bottles to follow-up visits  Carry your medicine list with you in case of an emergency  Self-care:   Wash the area with soap and water every day  Gently pat dry  Use bandages if directed by your healthcare provider  Elevate the area above the level of your heart  as often as you can  This will help decrease swelling and pain  Prop the area on pillows or blankets to keep it elevated comfortably  Place a cool, damp cloth on the area  Use clean cloths and clean water  You can do this as often as you need to  Cool, damp cloths may help decrease pain  Apply cream or ointment as directed  These help protect the area  Most over-the-counter products, such as petroleum jelly, are good to use  Ask your healthcare provider about specific creams or ointments you should use  Prevent cellulitis:   Do not scratch bug bites or areas of injury  You increase your risk for cellulitis by scratching these areas  Do not share personal items, such as towels, clothing, and razors  Clean exercise equipment  with germ-killing  before and after you use it  Treat athlete's foot    This can help prevent the spread of a bacterial skin infection  Wash your hands often  Use soap and water  Wash your hands after you use the bathroom, change a child's diapers, or sneeze  Wash your hands before you prepare or eat food  Use lotion to prevent dry, cracked skin  Follow up with your doctor within 3 days, or as directed:  He or she will check if your cellulitis is getting better  Write down your questions so you remember to ask them during your visits  © Copyright [employer] 2022 Information is for End User's use only and may not be sold, redistributed or otherwise used for commercial purposes  All illustrations and images included in CareNotes® are the copyrighted property of A D A M , Inc  or [facility]   The above information is an  only  It is not intended as medical advice for individual conditions or treatments  Talk to your doctor, nurse or pharmacist before following any medical regimen to see if it is safe and effective for you

## 2022-08-12 NOTE — PROGRESS NOTES
[facility]        NAME: vE Garcia is a 42 y o  female  : 1975    MRN: [id number]  DATE: 2022  TIME: 3:43 PM      Assessment and Plan     Cat bite of multiple sites of right hand and fingers with infection, initial encounter [S61 451A, S61 259A, L08 9, W55 01XA]  1  Cat bite of multiple sites of right hand and fingers with infection, initial encounter  amoxicillin-clavulanate (AUGMENTIN) 875-125 mg per tablet         Patient Instructions   Patient Instructions     Take the Augmentin as ordered until completed  Eat yogurt or take a probiotic to restore good bacteria to your gut; this helps prevent stomach irritation/diarrhea while on an antibiotic  After 24 hours, symptoms should not get worse  It may take a few days to start to see improvement  If symptoms are worsening after 24 hours or if you are not seeing improvement, you should be seen again  Animal Bite   AMBULATORY CARE:   Animal bite  injuries range from shallow cuts to deep, life-threatening wounds  Animal bites occur more often on the hands, arms, legs, and face  Bites from dogs and cats are the most common injuries  Common symptoms include the following:   · Cut or punctured skin     · Skin torn from your body    · Swollen or bruised skin, even if the skin is not broken    Seek care immediately if:   · You have a fever  · Your wound is red, swollen, and draining pus  · You see red streaks on the skin around the wound  · You can no longer move the bitten area  · Your heartbeat and breathing are much faster than usual     · You feel dizzy and confused  Contact your healthcare provider if:   · Your pain does not get better, even after you take pain medicine  · You have nightmares or flashbacks about the animal bite  · You have questions or concerns about your condition or care  Treatment for an animal bite  may include any of the followin   Irrigation and debridement  may be needed to clean out your wound  Dead, damaged, or infected tissue may be cut away to help your wound heal     2  Medicines:      ? Antibiotics  prevent or treat a bacterial infection  ? Prescription pain medicine  may be given  Ask how to take this medicine safely  ? A tetanus vaccine  may be needed to prevent tetanus  Tetanus is a life-threatening bacterial infection that affects the nerves and muscles  The bacteria can be spread through animal bites  ? A rabies vaccine  may be needed to prevent rabies  Rabies is a life-threatening viral infection  The virus can be spread through animal bites  3  Stitches  may be needed if your wound is large and not infected  4  Surgery  may be needed to repair deep injuries or severe wounds  Manage your symptoms:   · Apply antibiotic ointment as directed  This helps prevent infection in minor skin wounds  Antibiotic ointment is available without a prescription  · Keep the wound clean and covered  Wash the wound every day with soap and water or germ-killing cleanser  Ask what kinds of bandages to use  · Apply ice to your wound  Ice helps prevent tissue damage and decreases swelling and pain  Use an ice pack, or put crushed ice in a plastic bag  Cover it with a towel  Apply ice on your wound for 15 to 20 minutes every hour or as directed  · Elevate your wound  Raise your wound above the level of your heart as often as you can  This will help decrease swelling and pain  Prop your wound on pillows or blankets to keep it elevated comfortably  Prevent another animal bite:   · Learn to recognize the signs of a scared pet  Avoid quick, sudden movements  · Do not step between animals that are fighting  · Do not leave a pet alone with a young child  · Do not disturb an animal while it eats, sleeps, or cares for its young  · Do not approach an animal you do not know, especially one that is tied up or caged      · Stay away from animals that seem sick or act strangely  · Do not feed or capture wild animals  Follow up with your doctor as directed:  Write down your questions so you remember to ask them during your visits  © Copyright [employer] 2022 Information is for End User's use only and may not be sold, redistributed or otherwise used for commercial purposes  All illustrations and images included in CareNotes® are the copyrighted property of A D A M , Inc  or Tammi Dennis  The above information is an  only  It is not intended as medical advice for individual conditions or treatments  Talk to your doctor, nurse or pharmacist before following any medical regimen to see if it is safe and effective for you  Cellulitis   AMBULATORY CARE:   Cellulitis  is a skin infection caused by bacteria  Cellulitis is common and can become severe  Cellulitis usually appears on the lower legs  It can also appear on the arms, face, and other areas  Cellulitis develops when bacteria enter a crack or break in your skin, such as a scratch, bite, or cut  Common signs and symptoms:  Signs and symptoms usually appear on one side of your body  You may have any of the following:  · A fever    · A red, warm, swollen area on your skin    · Pain when the area is touched    · Red spots, bumps, or blisters that may drain pus    · Bumpy, raised skin that feels like an orange peel    Seek care immediately if:   · Your wound gets larger and more painful  · You feel a crackling under your skin when you touch it  · You have purple dots or bumps on your skin, or you see bleeding under your skin  · You see red streaks coming from the infected area  Call your doctor if:   · The red, warm, swollen area gets larger  · Your fever or pain does not go away or gets worse  · The area does not get smaller after 3 days of antibiotics  · You have questions or concerns about your condition or care      Treatment:  You should start to see improvement in 3 days  If your cellulitis is severe, you may need IV antibiotics in the hospital  If cellulitis is not treated, the infection can spread through your body and become life-threatening  You may need any of the following medicines:  · Antibiotics  help treat the bacterial infection  · Acetaminophen  decreases pain and fever  It is available without a doctor's order  Ask how much to take and how often to take it  Follow directions  Read the labels of all other medicines you are using to see if they also contain acetaminophen, or ask your doctor or pharmacist  Acetaminophen can cause liver damage if not taken correctly  Do not use more than 4 grams (4,000 milligrams) total of acetaminophen in one day  · NSAIDs , such as ibuprofen, help decrease swelling, pain, and fever  This medicine is available with or without a doctor's order  NSAIDs can cause stomach bleeding or kidney problems in certain people  If you take blood thinner medicine, always ask your healthcare provider if NSAIDs are safe for you  Always read the medicine label and follow directions  · Take your medicine as directed  Contact your healthcare provider if you think your medicine is not helping or if you have side effects  Tell him or her if you are allergic to any medicine  Keep a list of the medicines, vitamins, and herbs you take  Include the amounts, and when and why you take them  Bring the list or the pill bottles to follow-up visits  Carry your medicine list with you in case of an emergency  Self-care:   · Wash the area with soap and water every day  Gently pat dry  Use bandages if directed by your healthcare provider  · Elevate the area above the level of your heart  as often as you can  This will help decrease swelling and pain  Prop the area on pillows or blankets to keep it elevated comfortably  · Place a cool, damp cloth on the area  Use clean cloths and clean water  You can do this as often as you need to   Cool, damp cloths may help decrease pain  · Apply cream or ointment as directed  These help protect the area  Most over-the-counter products, such as petroleum jelly, are good to use  Ask your healthcare provider about specific creams or ointments you should use  Prevent cellulitis:   · Do not scratch bug bites or areas of injury  You increase your risk for cellulitis by scratching these areas  · Do not share personal items, such as towels, clothing, and razors  · Clean exercise equipment  with germ-killing  before and after you use it  · Treat athlete's foot  This can help prevent the spread of a bacterial skin infection  · Wash your hands often  Use soap and water  Wash your hands after you use the bathroom, change a child's diapers, or sneeze  Wash your hands before you prepare or eat food  Use lotion to prevent dry, cracked skin  Follow up with your doctor within 3 days, or as directed:  He or she will check if your cellulitis is getting better  Write down your questions so you remember to ask them during your visits  © Copyright [employer] 2022 Information is for End User's use only and may not be sold, redistributed or otherwise used for commercial purposes  All illustrations and images included in CareNotes® are the copyrighted property of A D A M , Inc  or [address] [place] [address]  The above information is an  only  It is not intended as medical advice for individual conditions or treatments  Talk to your doctor, nurse or pharmacist before following any medical regimen to see if it is safe and effective for you  Follow up with PCP in 3-5 days  Proceed to  ER if symptoms worsen  Chief Complaint     No chief complaint on file  History of Present Illness     Patient presents accompanied by her   Her cat and dog got into a fight last night    She went to check on her cat to see he was hurt, he was still scared and he bit her--punctures over the DIPJ of the right 3rd digit and over the tip of the distal phalanx 4th digit  She states that her 3rd finger swelled up almost right away  Last tetanus shot was 5 years ago  It is now hard to bend 3rd finger (very swollen), but initially ROM was normal       Review of Systems     Review of Systems   Constitutional: Negative for chills and fever  Musculoskeletal: Positive for joint swelling  Skin: Positive for color change and wound  All other systems reviewed and are negative          Current Medications       Current Outpatient Medications:     albuterol (ProAir HFA) 90 mcg/act inhaler, Inhale 2 puffs every 6 (six) hours as needed for wheezing or shortness of breath, Disp: 8 5 g, Rfl: 0    albuterol (ProAir HFA) 90 mcg/act inhaler, Inhale 2 puffs every 4 (four) hours as needed for wheezing or shortness of breath, Disp: 18 g, Rfl: 0    amoxicillin-clavulanate (AUGMENTIN) 875-125 mg per tablet, Take 1 tablet by mouth every 12 (twelve) hours for 10 days, Disp: 20 tablet, Rfl: 0    cariprazine (VRAYLAR) 6 MG capsule, Take 6 mg by mouth daily, Disp: , Rfl:     cyclobenzaprine (FLEXERIL) 10 mg tablet, Take 10 mg by mouth Three times daily as needed, Disp: , Rfl:     dexlansoprazole (Dexilant) 60 MG capsule, daily , Disp: , Rfl:     Diclofenac Sodium (VOLTAREN) 1 %, Apply 1 application topically 4 (four) times a day, Disp: , Rfl:     doxepin (SINEquan) 50 mg capsule, , Disp: , Rfl:     famotidine (PEPCID) 40 MG tablet, Take 40 mg by mouth bedtime, Disp: , Rfl:     HYDROcodone-acetaminophen (NORCO) 5-325 mg per tablet, , Disp: , Rfl:     hydrOXYzine HCL (ATARAX) 50 mg tablet, , Disp: , Rfl:     linaCLOtide (LINZESS PO), Take 290 mcg by mouth daily before breakfast, Disp: , Rfl:     lisinopril (ZESTRIL) 10 mg tablet, Take 10 mg by mouth daily, Disp: , Rfl:     pramipexole (MIRAPEX) 0 5 mg tablet, , Disp: , Rfl:     prazosin (MINIPRESS) 2 mg capsule, , Disp: , Rfl:     pregabalin (LYRICA) 150 mg capsule, Take 150 mg by mouth daily Daily at bedtime, Disp: , Rfl:     valACYclovir (VALTREX) 500 mg tablet, Take 500 mg by mouth daily, Disp: , Rfl:     vilazodone (VIIBRYD) 10 mg tablet, Take 1 tablet (10 mg total) by mouth daily with breakfast, Disp: 30 tablet, Rfl: 0    Vraylar 3 MG capsule, 9 mg daily 9 mgms daily, Disp: , Rfl:     predniSONE 10 mg tablet, Take 1 tablet (10 mg total) by mouth daily (Patient not taking: Reported on 8/12/2022), Disp: 30 tablet, Rfl: 0    Current Allergies     Allergies as of 08/12/2022 - Reviewed 08/12/2022   Allergen Reaction Noted    Celecoxib Hives 11/11/2019    Lamotrigine Rash and Hives 02/24/2010              The following portions of the patient's history were reviewed and updated as appropriate: allergies, current medications, past family history, past medical history, past social history, past surgical history and problem list      Past Medical History:   Diagnosis Date    Anxiety     Arthritis     Bipolar 1 disorder (HCC)     with bordeline personality    Borderline personality disorder ([address] )     Chronic headaches     Depression     GERD (gastroesophageal reflux disease)     Hypertension     Incontinence     Migraine     Morbid obesity with BMI of 40 0-44 9, adult ([address] )     Small bowel obstruction ([address] ) 6/27/2019    Su[facility]     Symptomatic bradycardia     Urinary tract infection        Past Surgical History:   Procedure Laterality Date    CHOLECYSTECTOMY  05/24/2018    DENTAL SURGERY      ELBOW SURGERY      EXPLORATORY LAPAROTOMY      exlap    FOOT SURGERY Right     KNEE ARTHROSCOPY Right 11/15/2018    total of three procedures with meniscal surgery    MULTIPLE TOOTH EXTRACTIONS  11/02/2018    OVARIAN CYST REMOVAL      REDUCTION MAMMAPLASTY      reduction     SINUS SURGERY Bilateral 04/19/2019    bilateral maxillary antrostomies - Dr Mele Salazar - [facility]       Family History   Problem Relation Age of Onset    Hyperlipidemia Father  Hypertension Father     Migraines Mother     Depression Family     Heart disease Maternal Aunt     Heart disease Maternal Uncle     Diabetes Paternal Aunt     Diabetes Paternal Uncle     Breast cancer Maternal Grandmother     Diabetes Paternal Grandmother     Diabetes Paternal Grandfather     Stroke Neg Hx     Thyroid disease Neg Hx          Medications have been verified  Objective     /64   Pulse 77   Temp 97 9 °F (36 6 °C)   Resp 18   Wt 102 kg (225 lb)   SpO2 96%   BMI 37 44 kg/m²   No LMP recorded  Physical Exam     Physical Exam  Vitals and nursing note reviewed  Constitutional:       General: She is not in acute distress  Appearance: Normal appearance  She is well-developed  She is not ill-appearing, toxic-appearing or diaphoretic  HENT:      Head: Normocephalic and atraumatic  Pulmonary:      Effort: Pulmonary effort is normal  No respiratory distress  Abdominal:      General: There is no distension  Palpations: Abdomen is soft  Musculoskeletal:         General: Normal range of motion  Skin:     General: Skin is warm and dry  Capillary Refill: Capillary refill takes less than 2 seconds  Findings: Erythema and wound present  Comments: Right 3rd finger with 2 pin prick (1 mm or less in diameter) bite marks on top and bottom of finger around the DIPJ; +1 to + 2 swelling of that finger with erythema streaking partway up the hand  Right 4th digit distal phalanx tip with 2 bite marks, approx 2-3 mm long and approx 5-6 mm long, narrow, irregular shape, well approximated with more mild localized redness and swelling  Neurological:      General: No focal deficit present  Mental Status: She is alert and oriented to person, place, and time  Psychiatric:         Mood and Affect: Mood normal          Behavior: Behavior normal          Thought Content:  Thought content normal          Judgment: Judgment normal

## 2022-09-07 ENCOUNTER — APPOINTMENT (OUTPATIENT)
Dept: RADIOLOGY | Facility: CLINIC | Age: 47
End: 2022-09-07
Payer: COMMERCIAL

## 2022-09-07 ENCOUNTER — OFFICE VISIT (OUTPATIENT)
Dept: URGENT CARE | Facility: CLINIC | Age: 47
End: 2022-09-07
Payer: COMMERCIAL

## 2022-09-07 VITALS — RESPIRATION RATE: 18 BRPM | OXYGEN SATURATION: 95 % | HEART RATE: 87 BPM | TEMPERATURE: 97.2 F

## 2022-09-07 DIAGNOSIS — J18.9 PNEUMONIA OF RIGHT MIDDLE LOBE DUE TO INFECTIOUS ORGANISM: Primary | ICD-10-CM

## 2022-09-07 DIAGNOSIS — R05.1 ACUTE COUGH: ICD-10-CM

## 2022-09-07 DIAGNOSIS — J45.901 ASTHMA WITH ACUTE EXACERBATION, UNSPECIFIED ASTHMA SEVERITY, UNSPECIFIED WHETHER PERSISTENT: ICD-10-CM

## 2022-09-07 LAB
SARS-COV-2 AG UPPER RESP QL IA: NEGATIVE
VALID CONTROL: NORMAL

## 2022-09-07 PROCEDURE — 71046 X-RAY EXAM CHEST 2 VIEWS: CPT

## 2022-09-07 PROCEDURE — 87811 SARS-COV-2 COVID19 W/OPTIC: CPT | Performed by: PHYSICIAN ASSISTANT

## 2022-09-07 PROCEDURE — G0382 LEV 3 HOSP TYPE B ED VISIT: HCPCS | Performed by: PHYSICIAN ASSISTANT

## 2022-09-07 RX ORDER — PREDNISONE 10 MG/1
TABLET ORAL
Qty: 26 TABLET | Refills: 0 | Status: SHIPPED | OUTPATIENT
Start: 2022-09-07

## 2022-09-07 RX ORDER — ALBUTEROL SULFATE 2.5 MG/3ML
2.5 SOLUTION RESPIRATORY (INHALATION) EVERY 6 HOURS PRN
Qty: 90 ML | Refills: 1 | Status: SHIPPED | OUTPATIENT
Start: 2022-09-07

## 2022-09-07 RX ORDER — AZITHROMYCIN 500 MG/1
TABLET, FILM COATED ORAL
Qty: 5 TABLET | Refills: 0 | Status: SHIPPED | OUTPATIENT
Start: 2022-09-07 | End: 2022-09-11

## 2022-09-07 NOTE — LETTER
September 7, 2022     Patient: Lucila Landis   YOB: 1975   Date of Visit: 9/7/2022       To Whom It May Concern: It is my medical opinion that Amparo Sanders should not return to [facility] until 9/12/22  If you have any questions or concerns, please don't hesitate to call           Sincerely,        NICHOLE MARCELO    CC: No Recipients

## 2022-09-07 NOTE — PROGRESS NOTES
[facility]    NAME: Vinicius Harper is a 42 y o  female  : 1975    MRN: [id number]  DATE: 2022  TIME: 11:35 AM    Assessment and Plan   Pneumonia of right middle lobe due to infectious organism [J18 9]  1  Pneumonia of right middle lobe due to infectious organism  Poct Covid 19 Rapid Antigen Test    XR chest pa & lateral    azithromycin (ZITHROMAX) 500 MG tablet    predniSONE 10 mg tablet   2  Asthma with acute exacerbation, unspecified asthma severity, unspecified whether persistent  albuterol (2 5 mg/3 mL) 0 083 % nebulizer solution       Patient Instructions     Patient Instructions   Zithromax as directed  Prednisone  Albuterol neb or inhaler every 4-6 hours as needed  Chief Complaint     Chief Complaint   Patient presents with    Cough     Cough, congestion, headache, fever and wheezing for four days  History of Present Illness   44-year-old female here with complaint cough, nasal congestion low-grade fever today  Symptoms started 4 days ago  She is feeling short of breath and tight in her chest   Has been using albuterol inhaler with minimal relief of symptoms  Home COVID test 2 days ago was negative  Review of Systems   Review of Systems   Constitutional: Positive for fever  Negative for appetite change and chills  HENT: Positive for congestion  Negative for ear discharge, ear pain, facial swelling, postnasal drip, sinus pressure, sneezing and sore throat  Respiratory: Positive for cough and shortness of breath  Negative for wheezing  Neurological: Negative for headaches         Current Medications     Current Outpatient Medications:     albuterol (2 5 mg/3 mL) 0 083 % nebulizer solution, Take 3 mL (2 5 mg total) by nebulization every 6 (six) hours as needed for wheezing or shortness of breath, Disp: 90 mL, Rfl: 1    azithromycin (ZITHROMAX) 500 MG tablet, Take 1 tablet daily for 5 days, Disp: 5 tablet, Rfl: 0    predniSONE 10 mg tablet, Take 3 tabs BID X 2 days, 2 tabs BID X 2 days, 1 tab BID X 2 days, 1 tab daily X 2 days, Disp: 26 tablet, Rfl: 0    albuterol (ProAir HFA) 90 mcg/act inhaler, Inhale 2 puffs every 6 (six) hours as needed for wheezing or shortness of breath, Disp: 8 5 g, Rfl: 0    albuterol (ProAir HFA) 90 mcg/act inhaler, Inhale 2 puffs every 4 (four) hours as needed for wheezing or shortness of breath, Disp: 18 g, Rfl: 0    cariprazine (VRAYLAR) 6 MG capsule, Take 6 mg by mouth daily, Disp: , Rfl:     cyclobenzaprine (FLEXERIL) 10 mg tablet, Take 10 mg by mouth Three times daily as needed, Disp: , Rfl:     dexlansoprazole (Dexilant) 60 MG capsule, daily , Disp: , Rfl:     Diclofenac Sodium (VOLTAREN) 1 %, Apply 1 application topically 4 (four) times a day, Disp: , Rfl:     doxepin (SINEquan) 50 mg capsule, , Disp: , Rfl:     famotidine (PEPCID) 40 MG tablet, Take 40 mg by mouth bedtime, Disp: , Rfl:     HYDROcodone-acetaminophen (NORCO) 5-325 mg per tablet, , Disp: , Rfl:     hydrOXYzine HCL (ATARAX) 50 mg tablet, , Disp: , Rfl:     linaCLOtide (LINZESS PO), Take 290 mcg by mouth daily before breakfast, Disp: , Rfl:     lisinopril (ZESTRIL) 10 mg tablet, Take 10 mg by mouth daily, Disp: , Rfl:     pramipexole (MIRAPEX) 0 5 mg tablet, , Disp: , Rfl:     prazosin (MINIPRESS) 2 mg capsule, , Disp: , Rfl:     predniSONE 10 mg tablet, Take 1 tablet (10 mg total) by mouth daily (Patient not taking: Reported on 8/12/2022), Disp: 30 tablet, Rfl: 0    pregabalin (LYRICA) 150 mg capsule, Take 150 mg by mouth daily Daily at bedtime, Disp: , Rfl:     valACYclovir (VALTREX) 500 mg tablet, Take 500 mg by mouth daily, Disp: , Rfl:     vilazodone (VIIBRYD) 10 mg tablet, Take 1 tablet (10 mg total) by mouth daily with breakfast, Disp: 30 tablet, Rfl: 0    Vraylar 3 MG capsule, 9 mg daily 9 mgms daily, Disp: , Rfl:     Current Allergies     Allergies as of 09/07/2022 - Reviewed 08/12/2022   Allergen Reaction Noted    Celecoxib Hives 11/11/2019    Lamotrigine Rash and Hives 02/24/2010          The following portions of the patient's history were reviewed and updated as appropriate: allergies, current medications, past family history, past medical history, past social history, past surgical history and problem list    Past Medical History:   Diagnosis Date    Anxiety     Arthritis     Bipolar 1 disorder ([place] [address] )     with bordeline personality    Borderline personality disorder ([place] [address] )     Chronic headaches     Depression     GERD (gastroesophageal reflux disease)     Hypertension     Incontinence     Migraine     Morbid obesity with BMI of 40 0-44 9, adult ([place] [address] )     Small bowel obstruction ([place] [address] ) 6/27/2019    Suicide attempt ([place] [address] )     Symptomatic bradycardia     Urinary tract infection      Past Surgical History:   Procedure Laterality Date    CHOLECYSTECTOMY  05/24/2018    DENTAL SURGERY      ELBOW SURGERY      EXPLORATORY LAPAROTOMY      exlap    FOOT SURGERY Right     KNEE ARTHROSCOPY Right 11/15/2018    total of three procedures with meniscal surgery    MULTIPLE TOOTH EXTRACTIONS  11/02/2018    OVARIAN CYST REMOVAL      REDUCTION MAMMAPLASTY      reduction     SINUS SURGERY Bilateral 04/19/2019    bilateral maxillary antrostomies - Dr Shimon Alcala - [facility]     Family History   Problem Relation Age of Onset    Hyperlipidemia Father     Hypertension Father     Migraines Mother     Depression Family     Heart disease Maternal Aunt     Heart disease Maternal Uncle     Diabetes Paternal Aunt     Diabetes Paternal Uncle     Breast cancer Maternal Grandmother     Diabetes Paternal Grandmother     Diabetes Paternal Grandfather     Stroke Neg Hx     Thyroid disease Neg Hx      Social History     Socioeconomic History    Marital status: Single     Spouse name: Not on file    Number of children: Not on file    Years of education: Not on file    Highest education level: Not on file   Occupational History    Not on file Tobacco Use    Smoking status: Never Smoker    Smokeless tobacco: Never Used   Vaping Use    Vaping Use: Never used   Substance and Sexual Activity    Alcohol use: Not Currently     Comment: rarely    Drug use: Never    Sexual activity: Not Currently   Other Topics Concern    Not on file   Social History Narrative    Daily caffeine use- 1 cup of coffee     Social Determinants of Health     Financial Resource Strain: Not on file   Food Insecurity: Not on file   Transportation Needs: Not on file   Physical Activity: Not on file   Stress: Not on file   Social Connections: Not on file   Intimate Partner Violence: Not on file   Housing Stability: Not on file     Medications have been verified  Objective   Pulse 87   Temp (!) 97 2 °F (36 2 °C)   Resp 18   SpO2 95%      Physical Exam   Physical Exam  Vitals and nursing note reviewed  Constitutional:       General: She is not in acute distress  Appearance: She is well-developed  HENT:      Head: Normocephalic and atraumatic  Right Ear: Tympanic membrane normal       Left Ear: Tympanic membrane normal       Nose: Congestion present  No mucosal edema  Right Sinus: No maxillary sinus tenderness or frontal sinus tenderness  Left Sinus: No maxillary sinus tenderness or frontal sinus tenderness  Mouth/Throat:      Pharynx: Posterior oropharyngeal erythema present  No oropharyngeal exudate  Eyes:      Conjunctiva/sclera: Conjunctivae normal    Cardiovascular:      Rate and Rhythm: Normal rate and regular rhythm  Heart sounds: Normal heart sounds  No murmur heard  Pulmonary:      Effort: Pulmonary effort is normal       Breath sounds: Wheezing present

## 2022-12-13 ENCOUNTER — OFFICE VISIT (OUTPATIENT)
Dept: URGENT CARE | Facility: CLINIC | Age: 47
End: 2022-12-13

## 2022-12-13 VITALS — OXYGEN SATURATION: 98 % | RESPIRATION RATE: 18 BRPM | TEMPERATURE: 96.7 F | HEART RATE: 93 BPM

## 2022-12-13 DIAGNOSIS — J02.9 SORE THROAT: ICD-10-CM

## 2022-12-13 DIAGNOSIS — J06.9 VIRAL UPPER RESPIRATORY TRACT INFECTION: Primary | ICD-10-CM

## 2022-12-13 DIAGNOSIS — R51.9 ACUTE INTRACTABLE HEADACHE, UNSPECIFIED HEADACHE TYPE: ICD-10-CM

## 2022-12-13 LAB — S PYO AG THROAT QL: NEGATIVE

## 2022-12-13 RX ORDER — KETOROLAC TROMETHAMINE 30 MG/ML
30 INJECTION, SOLUTION INTRAMUSCULAR; INTRAVENOUS ONCE
Status: COMPLETED | OUTPATIENT
Start: 2022-12-13 | End: 2022-12-13

## 2022-12-13 RX ADMIN — KETOROLAC TROMETHAMINE 30 MG: 30 INJECTION, SOLUTION INTRAMUSCULAR; INTRAVENOUS at 16:30

## 2022-12-13 NOTE — LETTER
[place] [address]  Dept: [phone]    December 13, 2022    Patient: Hayes Herrera  YOB: 1975    Hayes Herrera was seen and evaluated at our [facility]  Please note if Covid and Flu tests are negative, they may return to work on 12/15/2022 when fever free for 24 hours without the use of a fever reducing agent  If Covid or Flu test is positive, they may return to work on 12/17/2022, as this is 5 days from the onset of symptoms  Upon return, they must then adhere to strict masking for an additional 5 days      Sincerely,    Brett Holter, PA-C

## 2022-12-13 NOTE — PROGRESS NOTES
[facility]        NAME: Hayes Herrera is a 41 y o  female  : 1975    MRN: [id number]  DATE: 2022  TIME: 4:30 PM    Assessment and Plan   Viral upper respiratory tract infection [J06 9]  1  Viral upper respiratory tract infection        2  Sore throat  POCT rapid strepA      3  Acute intractable headache, unspecified headache type  ketorolac (TORADOL) injection 30 mg            Patient Instructions     Vitamin D3 2000 IU daily  Vitamin C 1000mg twice per day  Multivitamin daily  Fluids and rest  Over the counter cold medication as needed (EX: Coricidin HBP, tylenol/motrin)  Follow up with PCP in 3-5 days  Proceed to ER if symptoms worsen  Chief Complaint     Chief Complaint   Patient presents with   • Cough   • Headache   • Fever   • Fatigue     Works at  not feeling well         History of Present Illness       Patient is a 29-year-old female with no significant PMH presenting in the clinic today for cold symptoms x 2 days  Admits headaches, body aches, sore throat, chills, right ear fullness, rhinorrhea, cough, nausea, and photophobia  Denies fever, congestion, sinus pain/pressure, chest pain, SOB, abdominal pain, vomiting, diarrhea, eye pain, blurred vision, double vision  Admits the use of Zofran and [place] cold and flu with mild symptom relief  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  Patient is not vaccinated for covid or flu  Review of Systems   Review of Systems   Constitutional: Positive for chills  Negative for fatigue and fever  HENT: Positive for ear pain, rhinorrhea and sore throat  Negative for congestion, postnasal drip, sinus pressure and sinus pain  Respiratory: Positive for cough  Negative for shortness of breath  Cardiovascular: Negative for chest pain  Gastrointestinal: Positive for nausea  Negative for abdominal pain, diarrhea and vomiting  Musculoskeletal: Positive for myalgias     Skin: Negative for rash  Neurological: Positive for headaches           Current Medications       Current Outpatient Medications:   •  albuterol (2 5 mg/3 mL) 0 083 % nebulizer solution, Take 3 mL (2 5 mg total) by nebulization every 6 (six) hours as needed for wheezing or shortness of breath, Disp: 90 mL, Rfl: 1  •  albuterol (ProAir HFA) 90 mcg/act inhaler, Inhale 2 puffs every 6 (six) hours as needed for wheezing or shortness of breath, Disp: 8 5 g, Rfl: 0  •  albuterol (ProAir HFA) 90 mcg/act inhaler, Inhale 2 puffs every 4 (four) hours as needed for wheezing or shortness of breath, Disp: 18 g, Rfl: 0  •  cariprazine (VRAYLAR) 6 MG capsule, Take 6 mg by mouth daily, Disp: , Rfl:   •  dexlansoprazole (Dexilant) 60 MG capsule, daily , Disp: , Rfl:   •  Diclofenac Sodium (VOLTAREN) 1 %, Apply 1 application topically 4 (four) times a day, Disp: , Rfl:   •  doxepin (SINEquan) 50 mg capsule, , Disp: , Rfl:   •  famotidine (PEPCID) 40 MG tablet, Take 40 mg by mouth bedtime, Disp: , Rfl:   •  HYDROcodone-acetaminophen (NORCO) 5-325 mg per tablet, , Disp: , Rfl:   •  hydrOXYzine HCL (ATARAX) 50 mg tablet, , Disp: , Rfl:   •  linaCLOtide (LINZESS PO), Take 290 mcg by mouth daily before breakfast, Disp: , Rfl:   •  lisinopril (ZESTRIL) 10 mg tablet, Take 10 mg by mouth daily, Disp: , Rfl:   •  pramipexole (MIRAPEX) 0 5 mg tablet, , Disp: , Rfl:   •  prazosin (MINIPRESS) 2 mg capsule, , Disp: , Rfl:   •  predniSONE 10 mg tablet, Take 1 tablet (10 mg total) by mouth daily (Patient not taking: Reported on 8/12/2022), Disp: 30 tablet, Rfl: 0  •  predniSONE 10 mg tablet, Take 3 tabs BID X 2 days, 2 tabs BID X 2 days, 1 tab BID X 2 days, 1 tab daily X 2 days, Disp: 26 tablet, Rfl: 0  •  pregabalin (LYRICA) 150 mg capsule, Take 150 mg by mouth daily Daily at bedtime, Disp: , Rfl:   •  valACYclovir (VALTREX) 500 mg tablet, Take 500 mg by mouth daily, Disp: , Rfl:   •  vilazodone (VIIBRYD) 10 mg tablet, Take 1 tablet (10 mg total) by mouth daily with breakfast, Disp: 30 tablet, Rfl: 0  •  Vraylar 3 MG capsule, 9 mg daily 9 mgms daily, Disp: , Rfl:     Current Facility-Administered Medications:   •  ketorolac (TORADOL) injection 30 mg, 30 mg, Intramuscular, Once, Caterina Monteiro PA-C    Current Allergies     Allergies as of 12/13/2022 - Reviewed 12/13/2022   Allergen Reaction Noted   • Celecoxib Hives 11/11/2019   • Lamotrigine Rash and Hives 02/24/2010            The following portions of the patient's history were reviewed and updated as appropriate: allergies, current medications, past family history, past medical history, past social history, past surgical history and problem list      Past Medical History:   Diagnosis Date   • Anxiety    • Arthritis    • B[facility] disorder ([facility] )     with bordeline personality   • Borderline personality disorder ([facility] )    • Chronic headaches    • Depression    • GERD (gastroesophageal reflux disease)    • Hypertension    • Incontinence    • Migraine    • Morbid obesity with BMI of 40 0-44 9, adult ([facility] )    • Small bowel obstruction ([facility] ) 6/27/2019   • Suicide attempt ([facility] )    • Symptomatic bradycardia    • Urinary tract infection        Past Surgical History:   Procedure Laterality Date   • CHOLECYSTECTOMY  05/24/2018   • DENTAL SURGERY     • ELBOW SURGERY     • EXPLORATORY LAPAROTOMY      exlap   • FOOT SURGERY Right    • KNEE ARTHROSCOPY Right 11/15/2018    total of three procedures with meniscal surgery   • MULTIPLE TOOTH EXTRACTIONS  11/02/2018   • OVARIAN CYST REMOVAL     • REDUCTION MAMMAPLASTY      reduction    • SINUS SURGERY Bilateral 04/19/2019    bilateral maxillary antrostomies - Dr Trevor Virgen - [facility]       Family History   Problem Relation Age of Onset   • Hyperlipidemia Father    • Hypertension Father    • Migraines Mother    • Depression Family    • Heart disease Maternal Aunt    • Heart disease Maternal Uncle    • Diabetes Paternal Aunt    • Diabetes Paternal Uncle    • Breast cancer Maternal Grandmother    • Diabetes Paternal Grandmother    • Diabetes Paternal Grandfather    • Stroke Neg Hx    • Thyroid disease Neg Hx          Medications have been verified  Objective   Pulse 93   Temp (!) 96 7 °F (35 9 °C)   Resp 18   SpO2 98%        Physical Exam     Physical Exam  Vitals reviewed  Constitutional:       General: She is not in acute distress  Appearance: Normal appearance  She is normal weight  She is not ill-appearing  HENT:      Head: Normocephalic  Right Ear: Hearing, tympanic membrane, ear canal and external ear normal  No middle ear effusion  There is no impacted cerumen  Tympanic membrane is not erythematous or bulging  Left Ear: Hearing, tympanic membrane, ear canal and external ear normal   No middle ear effusion  There is no impacted cerumen  Tympanic membrane is not erythematous or bulging  Nose: Congestion present  No rhinorrhea  Right Sinus: Maxillary sinus tenderness present  No frontal sinus tenderness  Left Sinus: Maxillary sinus tenderness present  No frontal sinus tenderness  Mouth/Throat:      Lips: Pink  Mouth: Mucous membranes are moist       Pharynx: Oropharynx is clear  Uvula midline  Posterior oropharyngeal erythema present  No pharyngeal swelling or oropharyngeal exudate  Tonsils: No tonsillar exudate or tonsillar abscesses  1+ on the right  1+ on the left  Eyes:      General:         Right eye: No discharge  Left eye: No discharge  Extraocular Movements: Extraocular movements intact  Conjunctiva/sclera: Conjunctivae normal       Pupils: Pupils are equal, round, and reactive to light  Cardiovascular:      Rate and Rhythm: Normal rate and regular rhythm  Pulses: Normal pulses  Heart sounds: Normal heart sounds  No murmur heard  No friction rub  No gallop  Pulmonary:      Effort: Pulmonary effort is normal       Breath sounds: Normal breath sounds  No wheezing, rhonchi or rales  Musculoskeletal:      Cervical back: Normal range of motion and neck supple  No tenderness  Lymphadenopathy:      Cervical: No cervical adenopathy  Skin:     General: Skin is warm  Neurological:      Mental Status: She is alert     Psychiatric:         Mood and Affect: Mood normal          Behavior: Behavior normal

## 2022-12-13 NOTE — PATIENT INSTRUCTIONS
Vitamin D3 2000 IU daily  Vitamin C 1000mg twice per day  Multivitamin daily  Fluids and rest  Over the counter cold medication as needed (EX: Coricidin HBP, tylenol/motrin)  Follow up with PCP in 3-5 days  Proceed to ER if symptoms worsen

## 2022-12-14 LAB — SARS-COV-2 RNA RESP QL NAA+PROBE: NEGATIVE

## 2023-02-24 ENCOUNTER — OFFICE VISIT (OUTPATIENT)
Dept: URGENT CARE | Facility: CLINIC | Age: 48
End: 2023-02-24

## 2023-02-24 VITALS
RESPIRATION RATE: 20 BRPM | TEMPERATURE: 97.2 F | WEIGHT: 235.2 LBS | DIASTOLIC BLOOD PRESSURE: 84 MMHG | HEIGHT: 65 IN | BODY MASS INDEX: 39.18 KG/M2 | SYSTOLIC BLOOD PRESSURE: 134 MMHG | HEART RATE: 112 BPM | OXYGEN SATURATION: 97 %

## 2023-02-24 DIAGNOSIS — J02.9 SORE THROAT: Primary | ICD-10-CM

## 2023-02-24 DIAGNOSIS — R52 BODY ACHES: ICD-10-CM

## 2023-02-24 LAB
S PYO AG THROAT QL: NEGATIVE
SARS-COV-2 AG UPPER RESP QL IA: NEGATIVE
VALID CONTROL: NORMAL

## 2023-02-24 RX ORDER — LANSOPRAZOLE 30 MG/1
30 CAPSULE, DELAYED RELEASE ORAL DAILY
COMMUNITY
Start: 2023-01-04

## 2023-02-24 RX ORDER — HYDROXYCHLOROQUINE SULFATE 200 MG/1
200 TABLET, FILM COATED ORAL 2 TIMES DAILY
COMMUNITY
Start: 2022-11-01

## 2023-02-24 RX ORDER — VILAZODONE HYDROCHLORIDE 20 MG/1
TABLET ORAL
COMMUNITY
Start: 2023-01-29

## 2023-02-24 RX ORDER — LANSOPRAZOLE 30 MG/1
CAPSULE, DELAYED RELEASE ORAL
COMMUNITY
Start: 2023-01-04

## 2023-02-24 RX ORDER — HYDROXYCHLOROQUINE SULFATE 200 MG/1
TABLET, FILM COATED ORAL
COMMUNITY
Start: 2023-01-29

## 2023-02-24 RX ORDER — METHOCARBAMOL 500 MG/1
TABLET, FILM COATED ORAL
COMMUNITY
Start: 2023-02-22

## 2023-02-24 RX ORDER — CYCLOBENZAPRINE HCL 10 MG
TABLET ORAL
COMMUNITY
Start: 2022-12-13

## 2023-02-24 RX ORDER — AMOXICILLIN 875 MG/1
875 TABLET, COATED ORAL 2 TIMES DAILY
Qty: 20 TABLET | Refills: 0 | Status: SHIPPED | OUTPATIENT
Start: 2023-02-24 | End: 2023-03-06

## 2023-02-24 NOTE — LETTER
February 24, 2023     Patient: Cesilia Leavitt   YOB: 1975   Date of Visit: 2/24/2023       To Whom It May Concern: It is my medical opinion that Karen Arce may return to work on 2/27/23  Please excuse absence today 2/24/23  If you have any questions or concerns, please don't hesitate to call           Sincerely,        Roya Lee PA-C    CC: No Recipients

## 2023-02-24 NOTE — PROGRESS NOTES
[facility]    NAME: Patric Lin is a 41 y o  female  : 1975    MRN: [id number]  DATE: 2023  TIME: 9:57 AM    Assessment and Plan   Sore throat [J02 9]  1  Sore throat  Throat culture    amoxicillin (AMOXIL) 875 mg tablet      2  Body aches  POCT rapid strepA    Poct Covid 19 Rapid Antigen Test    COVID Only -Office Collect          Patient Instructions     Patient Instructions   I have prescribed an antibiotic for the infection  Please take the antibiotic as prescribed and finish the entire prescription  I recommend that the patient takes an over the counter probiotic or eats yogurt with live cultures in it [place]) to keep good bacteria in the gut and help prevent diarrhea  Wash hands frequently to prevent the spread of infection  Can use over the counter cough and cold medications to help with symptoms  Ibuprofen and/or tylenol as needed for pain or fever  If not improving over the next 7-10 days, follow up with PCP  Chief Complaint     Chief Complaint   Patient presents with   • Generalized Body Aches     For 4 days   • Sore Throat     For 4d, works at        History of Present Illness   41year old female here with complaint of back ache, body aches, dry and sore throat for the past 4 days  No cough or other cold symptoms  Review of Systems   Review of Systems   Constitutional: Positive for fatigue  Negative for appetite change, chills and fever  HENT: Positive for sore throat  Negative for congestion, ear discharge, ear pain, facial swelling, postnasal drip, sinus pressure and sneezing  Respiratory: Negative for cough, shortness of breath and wheezing  Musculoskeletal: Positive for back pain and myalgias  Neurological: Negative for headaches  All other systems reviewed and are negative        Current Medications     Current Outpatient Medications:   •  albuterol (2 5 mg/3 mL) 0 083 % nebulizer solution, Take 3 mL (2 5 mg total) by nebulization every 6 (six) hours as needed for wheezing or shortness of breath, Disp: 90 mL, Rfl: 1  •  albuterol (ProAir HFA) 90 mcg/act inhaler, Inhale 2 puffs every 6 (six) hours as needed for wheezing or shortness of breath, Disp: 8 5 g, Rfl: 0  •  amoxicillin (AMOXIL) 875 mg tablet, Take 1 tablet (875 mg total) by mouth 2 (two) times a day for 10 days, Disp: 20 tablet, Rfl: 0  •  cariprazine (VRAYLAR) 6 MG capsule, Take 6 mg by mouth daily, Disp: , Rfl:   •  cyclobenzaprine (FLEXERIL) 10 mg tablet, , Disp: , Rfl:   •  doxepin (SINEquan) 50 mg capsule, , Disp: , Rfl:   •  famotidine (PEPCID) 40 MG tablet, Take 40 mg by mouth bedtime, Disp: , Rfl:   •  HYDROcodone-acetaminophen (NORCO) 5-325 mg per tablet, , Disp: , Rfl:   •  hydroxychloroquine (PLAQUENIL) 200 mg tablet, , Disp: , Rfl:   •  hydroxychloroquine (PLAQUENIL) 200 mg tablet, Take 200 mg by mouth 2 (two) times a day, Disp: , Rfl:   •  lansoprazole (PREVACID) 30 mg capsule, , Disp: , Rfl:   •  lisinopril (ZESTRIL) 10 mg tablet, Take 10 mg by mouth daily, Disp: , Rfl:   •  methocarbamol (ROBAXIN) 500 mg tablet, , Disp: , Rfl:   •  pramipexole (MIRAPEX) 0 5 mg tablet, , Disp: , Rfl:   •  prazosin (MINIPRESS) 2 mg capsule, , Disp: , Rfl:   •  pregabalin (LYRICA) 150 mg capsule, Take 150 mg by mouth daily Daily at bedtime, Disp: , Rfl:   •  valACYclovir (VALTREX) 500 mg tablet, Take 500 mg by mouth daily, Disp: , Rfl:   •  vilazodone (VIIBRYD) 10 mg tablet, Take 1 tablet (10 mg total) by mouth daily with breakfast, Disp: 30 tablet, Rfl: 0  •  vilazodone (VIIBRYD) 20 mg tablet, , Disp: , Rfl:   •  Vraylar 3 MG capsule, 9 mg daily 9 mgms daily, Disp: , Rfl:   •  albuterol (ProAir HFA) 90 mcg/act inhaler, Inhale 2 puffs every 4 (four) hours as needed for wheezing or shortness of breath (Patient not taking: Reported on 2/24/2023), Disp: 18 g, Rfl: 0  •  dexlansoprazole (Dexilant) 60 MG capsule, daily  (Patient not taking: Reported on 2/24/2023), Disp: , Rfl:   • Diclofenac Sodium (VOLTAREN) 1 %, Apply 1 application topically 4 (four) times a day, Disp: , Rfl:   •  hydrOXYzine HCL (ATARAX) 50 mg tablet, , Disp: , Rfl:   •  lansoprazole (PREVACID) 30 mg capsule, Take 30 mg by mouth daily (Patient not taking: Reported on 2/24/2023), Disp: , Rfl:   •  linaCLOtide (LINZESS PO), Take 290 mcg by mouth daily before breakfast (Patient not taking: Reported on 2/24/2023), Disp: , Rfl:   •  predniSONE 10 mg tablet, Take 1 tablet (10 mg total) by mouth daily (Patient not taking: Reported on 8/12/2022), Disp: 30 tablet, Rfl: 0  •  predniSONE 10 mg tablet, Take 3 tabs BID X 2 days, 2 tabs BID X 2 days, 1 tab BID X 2 days, 1 tab daily X 2 days (Patient not taking: Reported on 2/24/2023), Disp: 26 tablet, Rfl: 0    Current Allergies     Allergies as of 02/24/2023 - Reviewed 02/24/2023   Allergen Reaction Noted   • Celecoxib Hives 11/11/2019   • Lamotrigine Rash, Hives, and Itching 02/24/2010          The following portions of the patient's history were reviewed and updated as appropriate: allergies, current medications, past family history, past medical history, past social history, past surgical history and problem list    Past Medical History:   Diagnosis Date   • Anxiety    • Arthritis    • Bipolar 1 disorder ([facility] )     with bordeline personality   • Borderline personality disorder ([facility] )    • Chronic headaches    • Depression    • GERD (gastroesophageal reflux disease)    • Hypertension    • Incontinence    • Migraine    • Morbid obesity with BMI of 40 0-44 9, adult ([facility] )    • Small bowel obstruction ([facility] ) 6/27/2019   • Suicide attempt [facility])    • Symptomatic bradycardia    • Urinary tract infection      Past Surgical History:   Procedure Laterality Date   • CHOLECYSTECTOMY  05/24/2018   • DENTAL SURGERY     • ELBOW SURGERY     • EXPLORATORY LAPAROTOMY      exlap   • FOOT SURGERY Right    • KNEE ARTHROSCOPY Right 11/15/2018    total of three procedures with meniscal surgery   • MULTIPLE TOOTH EXTRACTIONS  11/02/2018   • OVARIAN CYST REMOVAL     • REDUCTION MAMMAPLASTY      reduction    • SINUS SURGERY Bilateral 04/19/2019    bilateral maxillary antrostomies - Dr Monik Jara - [facility]     Family History   Problem Relation Age of Onset   • Hyperlipidemia Father    • Hypertension Father    • Migraines Mother    • Depression Family    • Heart disease Maternal Aunt    • Heart disease Maternal Uncle    • Diabetes Paternal Aunt    • Diabetes Paternal Uncle    • Breast cancer Maternal Grandmother    • Diabetes Paternal Grandmother    • Diabetes Paternal Grandfather    • Stroke Neg Hx    • Thyroid disease Neg Hx      Social History     Socioeconomic History   • Marital status: Single     Spouse name: Not on file   • Number of children: Not on file   • Years of education: Not on file   • Highest education level: Not on file   Occupational History   • Not on file   Tobacco Use   • Smoking status: Never   • Smokeless tobacco: Never   Vaping Use   • Vaping Use: Never used   Substance and Sexual Activity   • Alcohol use: Not Currently     Comment: rarely   • Drug use: Never   • Sexual activity: Not Currently   Other Topics Concern   • Not on file   Social History Narrative    Daily caffeine use- 1 cup of coffee     Social Determinants of Health     Financial Resource Strain: Not on file   Food Insecurity: Not on file   Transportation Needs: Not on file   Physical Activity: Not on file   Stress: Not on file   Social Connections: Not on file   Intimate Partner Violence: Not on file   Housing Stability: Not on file     Medications have been verified  Objective   /84   Pulse (!) 112   Temp (!) 97 2 °F (36 2 °C)   Resp 20   Ht 5' 5" (1 651 m)   Wt 107 kg (235 lb 3 2 oz)   SpO2 97%   BMI 39 14 kg/m²      Physical Exam   Physical Exam  Vitals and nursing note reviewed  Constitutional:       General: She is not in acute distress  Appearance: She is well-developed     HENT:      Head: Normocephalic and atraumatic  Right Ear: Tympanic membrane normal       Left Ear: Tympanic membrane normal       Nose: Nose normal  No mucosal edema or rhinorrhea  Right Sinus: No maxillary sinus tenderness or frontal sinus tenderness  Left Sinus: No maxillary sinus tenderness or frontal sinus tenderness  Mouth/Throat:      Pharynx: Pharyngeal swelling, oropharyngeal exudate and posterior oropharyngeal erythema present  Tonsils: Tonsillar exudate present  2+ on the right  2+ on the left  Eyes:      Conjunctiva/sclera: Conjunctivae normal    Cardiovascular:      Rate and Rhythm: Normal rate and regular rhythm  Heart sounds: Normal heart sounds  No murmur heard

## 2023-02-25 LAB — SARS-COV-2 RNA RESP QL NAA+PROBE: NEGATIVE

## 2023-02-26 LAB — BACTERIA THROAT CULT: ABNORMAL

## 2023-09-01 ENCOUNTER — OFFICE VISIT (OUTPATIENT)
Dept: URGENT CARE | Facility: CLINIC | Age: 48
End: 2023-09-01
Payer: COMMERCIAL

## 2023-09-01 VITALS
OXYGEN SATURATION: 96 % | DIASTOLIC BLOOD PRESSURE: 84 MMHG | HEART RATE: 100 BPM | TEMPERATURE: 97.4 F | SYSTOLIC BLOOD PRESSURE: 166 MMHG | RESPIRATION RATE: 18 BRPM

## 2023-09-01 DIAGNOSIS — J02.9 ACUTE PHARYNGITIS, UNSPECIFIED ETIOLOGY: ICD-10-CM

## 2023-09-01 DIAGNOSIS — J04.0 ACUTE LARYNGITIS: Primary | ICD-10-CM

## 2023-09-01 DIAGNOSIS — R05.1 ACUTE COUGH: ICD-10-CM

## 2023-09-01 LAB
S PYO AG THROAT QL: NEGATIVE
SARS-COV-2 AG UPPER RESP QL IA: NEGATIVE
VALID CONTROL: NORMAL

## 2023-09-01 PROCEDURE — 87811 SARS-COV-2 COVID19 W/OPTIC: CPT

## 2023-09-01 PROCEDURE — 99213 OFFICE O/P EST LOW 20 MIN: CPT

## 2023-09-01 PROCEDURE — 87880 STREP A ASSAY W/OPTIC: CPT

## 2023-09-01 PROCEDURE — 87070 CULTURE OTHR SPECIMN AEROBIC: CPT

## 2023-09-01 RX ORDER — DULOXETIN HYDROCHLORIDE 30 MG/1
CAPSULE, DELAYED RELEASE ORAL
COMMUNITY
Start: 2023-08-28

## 2023-09-01 RX ORDER — GABAPENTIN 100 MG/1
CAPSULE ORAL
COMMUNITY
Start: 2023-08-29

## 2023-09-01 RX ORDER — ONDANSETRON 8 MG/1
TABLET, ORALLY DISINTEGRATING ORAL
COMMUNITY
Start: 2023-08-21

## 2023-09-01 RX ORDER — PREDNISONE 50 MG/1
50 TABLET ORAL DAILY
Qty: 5 TABLET | Refills: 0 | Status: SHIPPED | OUTPATIENT
Start: 2023-09-01 | End: 2023-09-06

## 2023-09-01 RX ORDER — BENZONATATE 200 MG/1
200 CAPSULE ORAL 3 TIMES DAILY PRN
Qty: 30 CAPSULE | Refills: 0 | Status: SHIPPED | OUTPATIENT
Start: 2023-09-01

## 2023-09-01 RX ORDER — RABEPRAZOLE SODIUM 20 MG/1
20 TABLET, DELAYED RELEASE ORAL DAILY
COMMUNITY
Start: 2023-07-17

## 2023-09-01 RX ORDER — CARBIDOPA AND LEVODOPA 50; 200 MG/1; MG/1
TABLET, EXTENDED RELEASE ORAL
COMMUNITY
Start: 2023-08-14

## 2023-09-01 NOTE — PATIENT INSTRUCTIONS
Rest   Encourage fluids  Warm salt water gargles  May use chloraseptic spray  Throat lozenges  Throat Coat Tea  Tsp of honey  Warm tea with honey. May use lemon    Wash hands frequently  Don't share drinks    Tylenol/Ibuprofen for pain/fever    If you develop a prolonged high fever, difficulty breathing, trouble swallowing, worsening pain, difficulty managing secretions, feel like your throat is closing, decreased fluid intake, or urination, any new or concerning symptoms please proceed ER.

## 2023-09-01 NOTE — PROGRESS NOTES
[facility]        NAME: Katty Otero is a 41 y.o. female  : 1975    MRN: [id number]  DATE: 2023  TIME: 7:40 PM    Assessment and Plan   Acute laryngitis [J04.0]  1. Acute laryngitis  predniSONE 50 mg tablet      2. Acute pharyngitis, unspecified etiology  POCT rapid strepA      3. Acute cough  Poct Covid 19 Rapid Antigen Test    benzonatate (TESSALON) 200 MG capsule        POCT COVID 19 Rapid Antigen Test: Negative    POCT rapid strepA: Negative    Will send out for culture. If positive will treat with antibiotics. Patient Instructions   Rest   Encourage fluids  Warm salt water gargles  May use chloraseptic spray  Throat lozenges  Throat Coat Tea  Tsp of honey  Warm tea with honey. May use lemon    Wash hands frequently  Don't share drinks    Tylenol/Ibuprofen for pain/fever    If you develop a prolonged high fever, difficulty breathing, trouble swallowing, worsening pain, difficulty managing secretions, feel like your throat is closing, decreased fluid intake, or urination, any new or concerning symptoms please proceed ER. Follow up with PCP in 3-5 days. Chief Complaint     Chief Complaint   Patient presents with   • Sore Throat     Pt with sore throat, loss of voice, cough, headaches x5 days. No fevers. Claritin with no relief.      History of Present Illness       HPI    Review of Systems   Review of Systems      Current Medications       Current Outpatient Medications:   •  albuterol (2.5 mg/3 mL) 0.083 % nebulizer solution, Take 3 mL (2.5 mg total) by nebulization every 6 (six) hours as needed for wheezing or shortness of breath, Disp: 90 mL, Rfl: 1  •  albuterol (ProAir HFA) 90 mcg/act inhaler, Inhale 2 puffs every 6 (six) hours as needed for wheezing or shortness of breath, Disp: 8.5 g, Rfl: 0  •  albuterol (ProAir HFA) 90 mcg/act inhaler, Inhale 2 puffs every 4 (four) hours as needed for wheezing or shortness of breath, Disp: 18 g, Rfl: 0  •  benzonatate (TESSALON) 200 MG capsule, Take 1 capsule (200 mg total) by mouth 3 (three) times a day as needed for cough, Disp: 30 capsule, Rfl: 0  •  carbidopa-levodopa (SINEMET CR)  mg per tablet, , Disp: , Rfl:   •  cyclobenzaprine (FLEXERIL) 10 mg tablet, , Disp: , Rfl:   •  Diclofenac Sodium (VOLTAREN) 1 %, Apply 1 application topically 4 (four) times a day, Disp: , Rfl:   •  doxepin (SINEquan) 50 mg capsule, , Disp: , Rfl:   •  DULoxetine (CYMBALTA) 30 mg delayed release capsule, , Disp: , Rfl:   •  famotidine (PEPCID) 40 MG tablet, Take 40 mg by mouth bedtime, Disp: , Rfl:   •  HYDROcodone-acetaminophen (NORCO) 5-325 mg per tablet, , Disp: , Rfl:   •  hydroxychloroquine (PLAQUENIL) 200 mg tablet, , Disp: , Rfl:   •  hydroxychloroquine (PLAQUENIL) 200 mg tablet, Take 200 mg by mouth 2 (two) times a day, Disp: , Rfl:   •  hydrOXYzine HCL (ATARAX) 50 mg tablet, , Disp: , Rfl:   •  linaCLOtide (LINZESS PO), Take 290 mcg by mouth daily before breakfast, Disp: , Rfl:   •  ondansetron (ZOFRAN-ODT) 8 mg disintegrating tablet, , Disp: , Rfl:   •  prazosin (MINIPRESS) 2 mg capsule, , Disp: , Rfl:   •  predniSONE 50 mg tablet, Take 1 tablet (50 mg total) by mouth daily for 5 days, Disp: 5 tablet, Rfl: 0  •  pregabalin (LYRICA) 150 mg capsule, Take 150 mg by mouth daily Daily at bedtime, Disp: , Rfl:   •  RABEprazole (ACIPHEX) 20 MG tablet, Take 20 mg by mouth daily, Disp: , Rfl:   •  valACYclovir (VALTREX) 500 mg tablet, Take 500 mg by mouth daily, Disp: , Rfl:   •  gabapentin (NEURONTIN) 100 mg capsule, , Disp: , Rfl:   •  Valbenazine Tosylate 80 MG CAPS, Take 80 mg by mouth daily, Disp: , Rfl:     Current Allergies     Allergies as of 09/01/2023 - Reviewed 09/01/2023   Allergen Reaction Noted   • Celecoxib Hives 11/11/2019   • Lamotrigine Rash, Hives, and Itching 02/24/2010            The following portions of the patient's history were reviewed and updated as appropriate: allergies, current medications, past family history, past medical history, past social history, past surgical history and problem list.     Past Medical History:   Diagnosis Date   • Anxiety    • Arthritis    • Bipolar 1 disorder ([address])     with bordeline personality   • Borderline personality disorder ([address])    • Chronic headaches    • Depression    • GERD (gastroesophageal reflux disease)    • Hypertension    • Incontinence    • Migraine    • Morbid obesity with BMI of 40.0-44.9, adult ([address])    • Small bowel obstruction ([address]) 6/27/2019   • Suicide attempt ([address])    • Symptomatic bradycardia    • Urinary tract infection        Past Surgical History:   Procedure Laterality Date   • CHOLECYSTECTOMY  05/24/2018   • DENTAL SURGERY     • ELBOW SURGERY     • EXPLORATORY LAPAROTOMY      exlap   • FOOT SURGERY Right    • KNEE ARTHROSCOPY Right 11/15/2018    total of three procedures with meniscal surgery   • MULTIPLE TOOTH EXTRACTIONS  11/02/2018   • OVARIAN CYST REMOVAL     • REDUCTION MAMMAPLASTY      reduction    • SINUS SURGERY Bilateral 04/19/2019    bilateral maxillary antrostomies - Dr. Sienna Mcgarryus - [facility]   • TOTAL KNEE ARTHROPLASTY REVISION Right 2023       Family History   Problem Relation Age of Onset   • Hyperlipidemia Father    • Hypertension Father    • Migraines Mother    • Depression Family    • Heart disease Maternal Aunt    • Heart disease Maternal Uncle    • Diabetes Paternal Aunt    • Diabetes Paternal Uncle    • Breast cancer Maternal Grandmother    • Diabetes Paternal Grandmother    • Diabetes Paternal Grandfather    • Stroke Neg Hx    • Thyroid disease Neg Hx          Medications have been verified.         Objective   /84   Pulse 100   Temp (!) 97.4 °F (36.3 °C)   Resp 18   LMP 08/18/2023 (Approximate)   SpO2 96%        Physical Exam     Physical Exam OVARIAN CYST REMOVAL     • REDUCTION MAMMAPLASTY      reduction    • SINUS SURGERY Bilateral 04/19/2019    bilateral maxillary antrostomies - Dr. Moy Greek - [facility]   • TOTAL KNEE ARTHROPLASTY REVISION Right 2023       Family History   Problem Relation Age of Onset   • Hyperlipidemia Father    • Hypertension Father    • Migraines Mother    • Depression Family    • Heart disease Maternal Aunt    • Heart disease Maternal Uncle    • Diabetes Paternal Aunt    • Diabetes Paternal Uncle    • Breast cancer Maternal Grandmother    • Diabetes Paternal Grandmother    • Diabetes Paternal Grandfather    • Stroke Neg Hx    • Thyroid disease Neg Hx          Medications have been verified. Objective   /84   Pulse 100   Temp (!) 97.4 °F (36.3 °C)   Resp 18   LMP 08/18/2023 (Approximate)   SpO2 96%        Physical Exam     Physical Exam  Vitals and nursing note reviewed. Constitutional:       General: She is not in acute distress. Appearance: She is well-developed. She is not ill-appearing, toxic-appearing or diaphoretic. HENT:      Head: Normocephalic. Right Ear: Tympanic membrane and ear canal normal.      Left Ear: Tympanic membrane and ear canal normal.      Nose: No congestion or rhinorrhea. Mouth/Throat:      Mouth: Mucous membranes are moist.      Pharynx: Uvula midline. Posterior oropharyngeal erythema (slight) present. Tonsils: No tonsillar exudate. Cardiovascular:      Rate and Rhythm: Normal rate and regular rhythm. Heart sounds: Normal heart sounds. No murmur heard. Pulmonary:      Effort: Pulmonary effort is normal. No respiratory distress. Breath sounds: Normal breath sounds. No stridor. No wheezing, rhonchi or rales. Chest:      Chest wall: No tenderness. Musculoskeletal:      Cervical back: Normal range of motion. Skin:     General: Skin is warm. Neurological:      Mental Status: She is alert.

## 2023-09-03 LAB — BACTERIA THROAT CULT: NORMAL

## 2023-09-25 ENCOUNTER — OFFICE VISIT (OUTPATIENT)
Dept: URGENT CARE | Facility: CLINIC | Age: 48
End: 2023-09-25
Payer: COMMERCIAL

## 2023-09-25 VITALS
TEMPERATURE: 97.9 F | DIASTOLIC BLOOD PRESSURE: 83 MMHG | HEART RATE: 102 BPM | SYSTOLIC BLOOD PRESSURE: 131 MMHG | OXYGEN SATURATION: 96 %

## 2023-09-25 DIAGNOSIS — H10.33 ACUTE BACTERIAL CONJUNCTIVITIS OF BOTH EYES: Primary | ICD-10-CM

## 2023-09-25 PROCEDURE — 99213 OFFICE O/P EST LOW 20 MIN: CPT | Performed by: PHYSICIAN ASSISTANT

## 2023-09-25 RX ORDER — TOBRAMYCIN 3 MG/ML
1 SOLUTION/ DROPS OPHTHALMIC
Qty: 1.8 ML | Refills: 0 | Status: SHIPPED | OUTPATIENT
Start: 2023-09-25 | End: 2023-10-02

## 2023-09-25 NOTE — LETTER
September 25, 2023     Patient: Farhan Quinones   YOB: 1975   Date of Visit: 9/25/2023       To Whom It May Concern: It is my medical opinion that Anu Ch should not return to work until 09/27/2023. If you have any questions or concerns, please don't hesitate to call.          Sincerely,        Emma Rios PA-C    CC: No Recipients

## 2023-09-25 NOTE — PROGRESS NOTES
[facility]        NAME: Ashley Palmer is a 41 y.o. female  : 1975    MRN: [id number]  DATE: 2023  TIME: 1:40 PM    Assessment and Plan   Acute bacterial conjunctivitis of both eyes [H10.33]  1. Acute bacterial conjunctivitis of both eyes  tobramycin (Tobrex) 0.3 % SOLN            Patient Instructions       Follow up with PCP in 3-5 days. Proceed to  ER if symptoms worsen. Chief Complaint     Chief Complaint   Patient presents with   • Eye Problem     Redness swollen eyes since yesterday eyes itchy, right eye cloudy  works in a           History of Present Illness       Patient is a 40 y/o/f [facility] with bilateral eye redness and drainage/itching and pain. Patient reports symptoms began yesterday. Pt does work a  and was exposed to pink eye last week. Patient w/ headache but denies fever. Eye Problem   Both eyes are affected. This is a new problem. The current episode started yesterday. The problem occurs constantly. The problem has been gradually worsening. There was no injury mechanism. Associated symptoms include an eye discharge, eye redness and itching. Pertinent negatives include no fever or vomiting. Review of Systems   Review of Systems   Constitutional: Negative for chills and fever. HENT: Negative for ear pain and sore throat. Eyes: Positive for pain, discharge, redness and itching. Negative for visual disturbance. Respiratory: Negative for cough and shortness of breath. Cardiovascular: Negative for chest pain and palpitations. Gastrointestinal: Negative for abdominal pain and vomiting. Genitourinary: Negative for dysuria and hematuria. Musculoskeletal: Negative for arthralgias and back pain. Skin: Negative for color change and rash. Neurological: Negative for seizures and syncope. All other systems reviewed and are negative.         Current Medications       Current Outpatient Medications:   •  tobramycin (Tobrex) 0.3 % SOLN, Administer 1 drop to both eyes every 4 (four) hours while awake for 7 days, Disp: 1.8 mL, Rfl: 0  •  albuterol (2.5 mg/3 mL) 0.083 % nebulizer solution, Take 3 mL (2.5 mg total) by nebulization every 6 (six) hours as needed for wheezing or shortness of breath, Disp: 90 mL, Rfl: 1  •  albuterol (ProAir HFA) 90 mcg/act inhaler, Inhale 2 puffs every 6 (six) hours as needed for wheezing or shortness of breath, Disp: 8.5 g, Rfl: 0  •  albuterol (ProAir HFA) 90 mcg/act inhaler, Inhale 2 puffs every 4 (four) hours as needed for wheezing or shortness of breath, Disp: 18 g, Rfl: 0  •  benzonatate (TESSALON) 200 MG capsule, Take 1 capsule (200 mg total) by mouth 3 (three) times a day as needed for cough, Disp: 30 capsule, Rfl: 0  •  carbidopa-levodopa (SINEMET CR)  mg per tablet, , Disp: , Rfl:   •  cyclobenzaprine (FLEXERIL) 10 mg tablet, , Disp: , Rfl:   •  Diclofenac Sodium (VOLTAREN) 1 %, Apply 1 application topically 4 (four) times a day, Disp: , Rfl:   •  doxepin (SINEquan) 50 mg capsule, , Disp: , Rfl:   •  DULoxetine (CYMBALTA) 30 mg delayed release capsule, , Disp: , Rfl:   •  famotidine (PEPCID) 40 MG tablet, Take 40 mg by mouth bedtime, Disp: , Rfl:   •  gabapentin (NEURONTIN) 100 mg capsule, , Disp: , Rfl:   •  HYDROcodone-acetaminophen (NORCO) 5-325 mg per tablet, , Disp: , Rfl:   •  hydroxychloroquine (PLAQUENIL) 200 mg tablet, , Disp: , Rfl:   •  hydroxychloroquine (PLAQUENIL) 200 mg tablet, Take 200 mg by mouth 2 (two) times a day, Disp: , Rfl:   •  hydrOXYzine HCL (ATARAX) 50 mg tablet, , Disp: , Rfl:   •  linaCLOtide (LINZESS PO), Take 290 mcg by mouth daily before breakfast, Disp: , Rfl:   •  ondansetron (ZOFRAN-ODT) 8 mg disintegrating tablet, , Disp: , Rfl:   •  prazosin (MINIPRESS) 2 mg capsule, , Disp: , Rfl:   •  pregabalin (LYRICA) 150 mg capsule, Take 150 mg by mouth daily Daily at bedtime, Disp: , Rfl:   •  RABEprazole (ACIPHEX) 20 MG tablet, Take 20 mg by mouth daily, Disp: , Rfl:   •  valACYclovir (VALTREX) 500 mg tablet, Take 500 mg by mouth daily, Disp: , Rfl:   •  Valbenazine Tosylate 80 MG CAPS, Take 80 mg by mouth daily, Disp: , Rfl:     Current Allergies     Allergies as of 09/25/2023 - Reviewed 09/25/2023   Allergen Reaction Noted   • Celecoxib Hives 11/11/2019   • Lamotrigine Rash, Hives, and Itching 02/24/2010            The following portions of the patient's history were reviewed and updated as appropriate: allergies, current medications, past family history, past medical history, past social history, past surgical history and problem list.     Past Medical History:   Diagnosis Date   • Anxiety    • Arthritis    • Bipolar 1 disorder ([address])     with bordeline personality   • Borderline personality disorder ([address])    • Chronic headaches    • Depression    • GERD (gastroesophageal reflux disease)    • Hypertension    • Incontinence    • Migraine    • Morbid obesity with BMI of 40.0-44.9, adult ([address])    • Small bowel obstruction ([address]) 6/27/2019   • Suicide attempt ([address])    • Symptomatic bradycardia    • Urinary tract infection        Past Surgical History:   Procedure Laterality Date   • CHOLECYSTECTOMY  05/24/2018   • DENTAL SURGERY     • ELBOW SURGERY     • EXPLORATORY LAPAROTOMY      exlap   • FOOT SURGERY Right    • KNEE ARTHROSCOPY Right 11/15/2018    total of three procedures with meniscal surgery   • MULTIPLE TOOTH EXTRACTIONS  11/02/2018   • OVARIAN CYST REMOVAL     • REDUCTION MAMMAPLASTY      reduction    • SINUS SURGERY Bilateral 04/19/2019    bilateral maxillary antrostomies - Dr. Betty Mendoza - [facility]   • TOTAL KNEE ARTHROPLASTY REVISION Right 2023       Family History   Problem Relation Age of Onset   • Hyperlipidemia Father    • Hypertension Father    • Migraines Mother    • Depression Family    • Heart disease Maternal Aunt    • Heart disease Maternal Uncle    • Diabetes Paternal Aunt    • Diabetes Paternal Uncle    • Breast cancer Maternal Grandmother    • Diabetes Paternal Grandmother    • Diabetes Paternal Grandfather    • Stroke Neg Hx    • Thyroid disease Neg Hx          Medications have been verified. Objective   /83   Pulse 102   Temp 97.9 °F (36.6 °C)   LMP 08/18/2023 (Approximate)   SpO2 96%   Patient's last menstrual period was 08/18/2023 (approximate). Physical Exam     Physical Exam  Constitutional:       Appearance: Normal appearance. HENT:      Head: Normocephalic and atraumatic. Nose: Nose normal.      Mouth/Throat:      Mouth: Mucous membranes are moist.   Eyes:      General:         Right eye: Discharge present. Left eye: Discharge present. Extraocular Movements: Extraocular movements intact. Conjunctiva/sclera:      Right eye: Right conjunctiva is injected. Left eye: Left conjunctiva is injected. Pupils: Pupils are equal, round, and reactive to light. Cardiovascular:      Rate and Rhythm: Normal rate. Pulmonary:      Effort: Pulmonary effort is normal.   Musculoskeletal:         General: Normal range of motion. Cervical back: Normal range of motion and neck supple. Skin:     General: Skin is warm and dry. Capillary Refill: Capillary refill takes less than 2 seconds. Neurological:      General: No focal deficit present. Mental Status: She is alert and oriented to person, place, and time.    Psychiatric:         Mood and Affect: Mood normal.         Behavior: Behavior normal.

## 2023-10-08 ENCOUNTER — OFFICE VISIT (OUTPATIENT)
Dept: URGENT CARE | Facility: CLINIC | Age: 48
End: 2023-10-08
Payer: COMMERCIAL

## 2023-10-08 VITALS
RESPIRATION RATE: 18 BRPM | TEMPERATURE: 97 F | DIASTOLIC BLOOD PRESSURE: 82 MMHG | HEART RATE: 92 BPM | OXYGEN SATURATION: 96 % | SYSTOLIC BLOOD PRESSURE: 143 MMHG

## 2023-10-08 DIAGNOSIS — J45.909 ASTHMA, UNSPECIFIED ASTHMA SEVERITY, UNSPECIFIED WHETHER COMPLICATED, UNSPECIFIED WHETHER PERSISTENT: ICD-10-CM

## 2023-10-08 DIAGNOSIS — J20.9 ACUTE BRONCHITIS, UNSPECIFIED ORGANISM: Primary | ICD-10-CM

## 2023-10-08 DIAGNOSIS — H10.9 CONJUNCTIVITIS OF BOTH EYES, UNSPECIFIED CONJUNCTIVITIS TYPE: ICD-10-CM

## 2023-10-08 LAB
S PYO AG THROAT QL: NEGATIVE
SARS-COV-2 AG UPPER RESP QL IA: NEGATIVE
VALID CONTROL: NORMAL

## 2023-10-08 PROCEDURE — 87880 STREP A ASSAY W/OPTIC: CPT | Performed by: PHYSICIAN ASSISTANT

## 2023-10-08 PROCEDURE — 87811 SARS-COV-2 COVID19 W/OPTIC: CPT | Performed by: PHYSICIAN ASSISTANT

## 2023-10-08 PROCEDURE — 87070 CULTURE OTHR SPECIMN AEROBIC: CPT | Performed by: PHYSICIAN ASSISTANT

## 2023-10-08 PROCEDURE — 99213 OFFICE O/P EST LOW 20 MIN: CPT | Performed by: PHYSICIAN ASSISTANT

## 2023-10-08 RX ORDER — METHYLPREDNISOLONE 4 MG/1
TABLET ORAL
Qty: 1 EACH | Refills: 0 | Status: SHIPPED | OUTPATIENT
Start: 2023-10-08

## 2023-10-08 RX ORDER — TOBRAMYCIN 3 MG/ML
1 SOLUTION/ DROPS OPHTHALMIC
Qty: 1.8 ML | Refills: 0 | Status: SHIPPED | OUTPATIENT
Start: 2023-10-08 | End: 2023-10-15

## 2023-10-08 RX ORDER — BENZONATATE 200 MG/1
200 CAPSULE ORAL 3 TIMES DAILY PRN
Qty: 20 CAPSULE | Refills: 0 | Status: SHIPPED | OUTPATIENT
Start: 2023-10-08

## 2023-10-08 NOTE — PROGRESS NOTES
[facility]        NAME: Nilo Peres is a 41 y.o. female  : 1975    MRN: [id number]  DATE: 2023  TIME: 6:15 PM    Assessment and Plan   Acute bronchitis, unspecified organism [J20.9]  1. Acute bronchitis, unspecified organism  POCT rapid strepA    methylPREDNISolone 4 MG tablet therapy pack    benzonatate (TESSALON) 200 MG capsule    Throat culture    Poct Covid 19 Rapid Antigen Test      2. Asthma, unspecified asthma severity, unspecified whether complicated, unspecified whether persistent  Throat culture    Poct Covid 19 Rapid Antigen Test      3. Conjunctivitis of both eyes, unspecified conjunctivitis type  tobramycin (Tobrex) 0.3 % SOLN            Patient Instructions       Follow up with PCP in 3-5 days. Proceed to  ER if symptoms worsen. Chief Complaint     Chief Complaint   Patient presents with   • Cough     X 4 days, dry barking cough, thick phlegm, works in a , croup is going around, taking mucienex and flonase   • Sore Throat         History of Present Illness       Patient is a 42 y/o/f presenting [facility] with cough, sore throat and a hoarse voice. Patient reports symptoms began 4 days ago . Pt does work in a , croup migrating at facility. Patient has taken Mucinex night/day and Flonase. Patient has a hx of asthma. Pt has used inhaler as needed. Cough  This is a new problem. The current episode started in the past 7 days. The problem has been gradually worsening. The problem occurs every few minutes. The cough is productive of sputum. Associated symptoms include nasal congestion, rhinorrhea and a sore throat. Pertinent negatives include no chest pain, chills, ear pain, fever, rash or shortness of breath. Sore Throat   Associated symptoms include congestion and coughing. Pertinent negatives include no abdominal pain, ear pain, shortness of breath or vomiting.        Review of Systems   Review of Systems   Constitutional: Negative for chills and fever. HENT: Positive for congestion, rhinorrhea and sore throat. Negative for ear pain. Eyes: Negative for pain and visual disturbance. Respiratory: Positive for cough. Negative for shortness of breath. Cardiovascular: Negative for chest pain and palpitations. Gastrointestinal: Negative for abdominal pain and vomiting. Genitourinary: Negative for dysuria and hematuria. Musculoskeletal: Negative for arthralgias and back pain. Skin: Negative for color change and rash. Neurological: Negative for seizures and syncope. All other systems reviewed and are negative.         Current Medications       Current Outpatient Medications:   •  benzonatate (TESSALON) 200 MG capsule, Take 1 capsule (200 mg total) by mouth 3 (three) times a day as needed for cough, Disp: 20 capsule, Rfl: 0  •  methylPREDNISolone 4 MG tablet therapy pack, Use as directed on package, Disp: 1 each, Rfl: 0  •  tobramycin (Tobrex) 0.3 % SOLN, Administer 1 drop to both eyes every 4 (four) hours while awake for 7 days, Disp: 1.8 mL, Rfl: 0  •  albuterol (2.5 mg/3 mL) 0.083 % nebulizer solution, Take 3 mL (2.5 mg total) by nebulization every 6 (six) hours as needed for wheezing or shortness of breath, Disp: 90 mL, Rfl: 1  •  albuterol (ProAir HFA) 90 mcg/act inhaler, Inhale 2 puffs every 6 (six) hours as needed for wheezing or shortness of breath, Disp: 8.5 g, Rfl: 0  •  albuterol (ProAir HFA) 90 mcg/act inhaler, Inhale 2 puffs every 4 (four) hours as needed for wheezing or shortness of breath, Disp: 18 g, Rfl: 0  •  benzonatate (TESSALON) 200 MG capsule, Take 1 capsule (200 mg total) by mouth 3 (three) times a day as needed for cough, Disp: 30 capsule, Rfl: 0  •  carbidopa-levodopa (SINEMET CR)  mg per tablet, , Disp: , Rfl:   •  cyclobenzaprine (FLEXERIL) 10 mg tablet, , Disp: , Rfl:   •  Diclofenac Sodium (VOLTAREN) 1 %, Apply 1 application topically 4 (four) times a day, Disp: , Rfl:   •  doxepin (SINEquan) 50 mg capsule, , Disp: , Rfl:   •  DULoxetine (CYMBALTA) 30 mg delayed release capsule, , Disp: , Rfl:   •  famotidine (PEPCID) 40 MG tablet, Take 40 mg by mouth bedtime, Disp: , Rfl:   •  gabapentin (NEURONTIN) 100 mg capsule, , Disp: , Rfl:   •  HYDROcodone-acetaminophen (NORCO) 5-325 mg per tablet, , Disp: , Rfl:   •  hydroxychloroquine (PLAQUENIL) 200 mg tablet, , Disp: , Rfl:   •  hydroxychloroquine (PLAQUENIL) 200 mg tablet, Take 200 mg by mouth 2 (two) times a day, Disp: , Rfl:   •  hydrOXYzine HCL (ATARAX) 50 mg tablet, , Disp: , Rfl:   •  linaCLOtide (LINZESS PO), Take 290 mcg by mouth daily before breakfast, Disp: , Rfl:   •  ondansetron (ZOFRAN-ODT) 8 mg disintegrating tablet, , Disp: , Rfl:   •  prazosin (MINIPRESS) 2 mg capsule, , Disp: , Rfl:   •  pregabalin (LYRICA) 150 mg capsule, Take 150 mg by mouth daily Daily at bedtime, Disp: , Rfl:   •  RABEprazole (ACIPHEX) 20 MG tablet, Take 20 mg by mouth daily, Disp: , Rfl:   •  valACYclovir (VALTREX) 500 mg tablet, Take 500 mg by mouth daily, Disp: , Rfl:   •  Valbenazine Tosylate 80 MG CAPS, Take 80 mg by mouth daily, Disp: , Rfl:     Current Allergies     Allergies as of 10/08/2023 - Reviewed 10/08/2023   Allergen Reaction Noted   • Celecoxib Hives 11/11/2019   • Lamotrigine Rash, Hives, and Itching 02/24/2010            The following portions of the patient's history were reviewed and updated as appropriate: allergies, current medications, past family history, past medical history, past social history, past surgical history and problem list.     Past Medical History:   Diagnosis Date   • Anxiety    • Arthritis    • Bipolar 1 disorder ([address])     with bordeline personality   • Borderline personality disorder ([address])    • Chronic headaches    • Depression    • GERD (gastroesophageal reflux disease)    • Hypertension    • Incontinence    • Migraine    • Morbid obesity with BMI of 40.0-44.9, adult ([address])    • Small bowel obstruction ([address]) 6/27/2019   • Suicide attempt ([address])    • Symptomatic bradycardia    • Urinary tract infection        Past Surgical History:   Procedure Laterality Date   • CHOLECYSTECTOMY  05/24/2018   • DENTAL SURGERY     • ELBOW SURGERY     • EXPLORATORY LAPAROTOMY      exlap   • FOOT SURGERY Right    • KNEE ARTHROSCOPY Right 11/15/2018    total of three procedures with meniscal surgery   • MULTIPLE TOOTH EXTRACTIONS  11/02/2018   • OVARIAN CYST REMOVAL     • REDUCTION MAMMAPLASTY      reduction    • SINUS SURGERY Bilateral 04/19/2019    bilateral maxillary antrostomies - Dr. Bushra Mario - [facility]   • TOTAL KNEE ARTHROPLASTY REVISION Right 2023       Family History   Problem Relation Age of Onset   • Hyperlipidemia Father    • Hypertension Father    • Migraines Mother    • Depression Family    • Heart disease Maternal Aunt    • Heart disease Maternal Uncle    • Diabetes Paternal Aunt    • Diabetes Paternal Uncle    • Breast cancer Maternal Grandmother    • Diabetes Paternal Grandmother    • Diabetes Paternal Grandfather    • Stroke Neg Hx    • Thyroid disease Neg Hx          Medications have been verified. Objective   /82   Pulse 92   Temp (!) 97 °F (36.1 °C)   Resp 18   SpO2 96%   No LMP recorded. Physical Exam     Physical Exam  Constitutional:       Appearance: Normal appearance. HENT:      Head: Normocephalic and atraumatic. Nose: Congestion present. Mouth/Throat:      Mouth: Mucous membranes are moist.   Eyes:      Extraocular Movements: Extraocular movements intact. Conjunctiva/sclera:      Right eye: Right conjunctiva is injected. Left eye: Left conjunctiva is injected. Pupils: Pupils are equal, round, and reactive to light. Cardiovascular:      Rate and Rhythm: Normal rate. Heart sounds: No murmur heard. No friction rub. No gallop. Pulmonary:      Effort: Pulmonary effort is normal.      Breath sounds: Wheezing present. No rhonchi or rales. Musculoskeletal:         General: Normal range of motion. Cervical back: Normal range of motion and neck supple. Skin:     General: Skin is warm and dry. Capillary Refill: Capillary refill takes less than 2 seconds. Neurological:      General: No focal deficit present. Mental Status: She is alert and oriented to person, place, and time.    Psychiatric:         Mood and Affect: Mood normal.         Behavior: Behavior normal.

## 2023-10-10 LAB — BACTERIA THROAT CULT: NORMAL

## 2023-12-18 ENCOUNTER — OFFICE VISIT (OUTPATIENT)
Dept: OBGYN CLINIC | Facility: CLINIC | Age: 48
End: 2023-12-18
Payer: COMMERCIAL

## 2023-12-18 ENCOUNTER — APPOINTMENT (OUTPATIENT)
Dept: RADIOLOGY | Facility: CLINIC | Age: 48
End: 2023-12-18
Payer: COMMERCIAL

## 2023-12-18 VITALS
TEMPERATURE: 98.4 F | SYSTOLIC BLOOD PRESSURE: 131 MMHG | BODY MASS INDEX: 39.14 KG/M2 | HEIGHT: 65 IN | HEART RATE: 93 BPM | DIASTOLIC BLOOD PRESSURE: 83 MMHG

## 2023-12-18 DIAGNOSIS — S42.442A CLOSED DISPLACED AVULSION FRACTURE OF MEDIAL EPICONDYLE OF LEFT HUMERUS, INITIAL ENCOUNTER: ICD-10-CM

## 2023-12-18 DIAGNOSIS — S52.125A CLOSED NONDISPLACED FRACTURE OF HEAD OF LEFT RADIUS, INITIAL ENCOUNTER: ICD-10-CM

## 2023-12-18 DIAGNOSIS — S52.125A CLOSED NONDISPLACED FRACTURE OF HEAD OF LEFT RADIUS, INITIAL ENCOUNTER: Primary | ICD-10-CM

## 2023-12-18 PROCEDURE — 99204 OFFICE O/P NEW MOD 45 MIN: CPT | Performed by: FAMILY MEDICINE

## 2023-12-18 PROCEDURE — 73080 X-RAY EXAM OF ELBOW: CPT

## 2023-12-18 NOTE — PROGRESS NOTES
[facility] SPECIAL[facility] [address] [id number] [phone]      Assessment:  1. Closed nondisplaced fracture of head of left radius, initial encounter  -     XR elbow 3+ vw left; Future; Expected date: 12/18/2023  -     Ambulatory Referral to Orthopedic Surgery; Future    2. Closed displaced avulsion fracture of medial epicondyle of left humerus, initial encounter  -     Ambulatory Referral to Orthopedic Surgery; Future        Plan:  Patient Instructions   F/u here as needed  Discussed with Dr. De Luna recommended referral to Dr. Carroll  Hold herapy  Avoid lifting/twisting.  Icing/heat/OTC pain meds as needed.     Return for referral to Ortho- Dr. Charles, [place].    Chief Complaint:  Chief Complaint   Patient presents with    Left Arm - Follow-up, Pain       Subjective:   HPI    Patient ID: Amparo Dobbs is a 48 y.o. female     Here c/o L elbow pain  She fell on 11/13/23- at the hospital and tripped coming out of the elevator and fell but can't remember how she fell. She saw Dr. Cazares a few days later  XR/MRI done  Can't fully extend/flex elbow  Still having pain- getting better  Hurts to twist/open door  She wore a brace for 2 wks  Ibuprofen  Pain is less at rest  Worse with extending- sharp pain  Cracks loudly at times.  RHD      MRI elbow left wo contrast  Order: [id number]  Impression    Impression:    1.  Radial head fracture with mild step-off.  2.  Partial tear of the common extensor tendon in the background of tendinosis,  similar/recurrent compared to April 2021.  3.  Large joint effusion with evidence of synovitis.    Review of Systems   Constitutional:  Negative for fatigue and fever.   Respiratory:  Negative for shortness of breath.    Cardiovascular:  Negative for chest pain.   Gastrointestinal:  Negative for abdominal pain and nausea.   Genitourinary:  Negative for dysuria.   Musculoskeletal:  Positive for arthralgias.   Skin:   "Negative for rash and wound.   Neurological:  Negative for weakness and headaches.       Objective:  Vitals:  /83 (BP Location: Left arm, Patient Position: Sitting, Cuff Size: Standard)   Pulse 93   Temp 98.4 °F (36.9 °C) (Tympanic)   Ht 5' 5\" (1.651 m)   BMI 39.14 kg/m²     The following portions of the patient's history were reviewed and updated as appropriate: allergies, current medications, past family history, past medical history, past social history, past surgical history, and problem list.    Physical exam:  Physical Exam  Ortho Exam    I have personally reviewed pertinent films in PACS and my interpretation is XR-  L elbow- nondisplaced radial head fx and avulsion fx from the medial epicondyle.      Jose Luis Clemente MD   "

## 2023-12-18 NOTE — PATIENT INSTRUCTIONS
F/u here as needed  Discussed with Dr. De Luna recommended referral to Dr. Carroll  Hold herapy  Avoid lifting/twisting.  Icing/heat/OTC pain meds as needed.

## 2023-12-18 NOTE — LETTER
December 18, 2023     Patient: Amparo Dobbs  YOB: 1975  Date of Visit: 12/18/2023      To Whom it May Concern:    Amparo Dobbs is under my professional care. Amparo was seen in my office on 12/18/2023. Amparo may return to work with limitations -  limited use of L hand.  No lifting/pushing/pulling >5 lbs.    If you have any questions or concerns, please don't hesitate to call.         Sincerely,          Jose Luis Clemente MD        CC: No Recipients

## 2023-12-19 ENCOUNTER — TELEPHONE (OUTPATIENT)
Age: 48
End: 2023-12-19

## 2023-12-19 NOTE — TELEPHONE ENCOUNTER
TT Dr Clemente to notify of xray report ready to view in EPIC.     Called Amparo from radiology and she stated the report was ready in [facility] for review of the left elbow and she wanted to notify the clinical team that it was ready.

## 2023-12-19 NOTE — TELEPHONE ENCOUNTER
Doctor:  Chyna Walls Name: Amparo in [facility]  CB#:[phone]     Radiology called to speak to clinical team regarding MRI ordered by provider.  The report is ready to be read on Epic, any questions please call Amparo at number provided.

## 2023-12-27 ENCOUNTER — OFFICE VISIT (OUTPATIENT)
Dept: OBGYN CLINIC | Facility: CLINIC | Age: 48
End: 2023-12-27
Payer: COMMERCIAL

## 2023-12-27 VITALS
HEIGHT: 65 IN | SYSTOLIC BLOOD PRESSURE: 141 MMHG | WEIGHT: 249.8 LBS | BODY MASS INDEX: 41.62 KG/M2 | HEART RATE: 94 BPM | DIASTOLIC BLOOD PRESSURE: 98 MMHG

## 2023-12-27 DIAGNOSIS — S42.442A CLOSED DISPLACED AVULSION FRACTURE OF MEDIAL EPICONDYLE OF LEFT HUMERUS, INITIAL ENCOUNTER: ICD-10-CM

## 2023-12-27 DIAGNOSIS — S52.125A CLOSED NONDISPLACED FRACTURE OF HEAD OF LEFT RADIUS, INITIAL ENCOUNTER: Primary | ICD-10-CM

## 2023-12-27 PROCEDURE — 99204 OFFICE O/P NEW MOD 45 MIN: CPT | Performed by: ORTHOPAEDIC SURGERY

## 2023-12-27 NOTE — LETTER
December 27, 2023     Patient: Amparo Dobbs  YOB: 1975  Date of Visit: 12/27/2023      To Whom it May Concern:    Amparo Dobbs is under my professional care. Amparo was seen in my office on 12/27/2023. Amparo may work with restrictions, no lifting over 5lb, no overhead work.     If you have any questions or concerns, please don't hesitate to call.         Sincerely,          June Charles DO        CC: No Recipients

## 2023-12-27 NOTE — PROGRESS NOTES
[facility]THO [facility] ORTHOPEDIC SPECIALISTS [address] [id number] [phone]       Amparo Maxigley  [id number]  1975    ORTHOPAEDIC SURGERY OUTPATIENT NOTE  12/27/2023      HISTORY:  48 y.o. female  presents for initial evaluation of her left elbow. She reports an injury when she fell in November of this year. She was seen by Dr. Cazares and had an MRI. She reports a history of left medial epicondylitis/flexor tendon repair in 2021 with Dr. Porras. She is RHD. She reports pain with using the elbow and leaning on the elbow. She denies numbness or tingling of the hand.     Past Medical History:   Diagnosis Date    Anxiety     Arthritis     Bipolar 1 disorder (HCC)     with bordeline personality    Borderline personality disorder (HCC)     Chronic headaches     Depression     GERD (gastroesophageal reflux disease)     Hypertension     Incontinence     Migraine     Morbid obesity with BMI of 40.0-44.9, adult (HCC)     Small bowel obstruction (HCC) 6/27/2019    Suicide attempt ([facility])     Symptomatic bradycardia     Urinary tract infection        Past Surgical History:   Procedure Laterality Date    CHOLECYSTECTOMY  05/24/2018    DENTAL SURGERY      ELBOW SURGERY      EXPLORATORY LAPAROTOMY      exlap    FOOT SURGERY Right     KNEE ARTHROSCOPY Right 11/15/2018    total of three procedures with meniscal surgery    MULTIPLE TOOTH EXTRACTIONS  11/02/2018    OVARIAN CYST REMOVAL      REDUCTION MAMMAPLASTY      reduction     SINUS SURGERY Bilateral 04/19/2019    bilateral maxillary antrostomies - Dr. Barajas - [facility]    TOTAL KNEE ARTHROPLASTY REVISION Right 2023       Social History     Socioeconomic History    Marital status: /Civil Union     Spouse name: Not on file    Number of children: Not on file    Years of education: Not on file    Highest education level: Not on file   Occupational History    Not on file   Tobacco Use    Smoking status: Never     Smokeless tobacco: Never   Vaping Use    Vaping status: Never Used   Substance and Sexual Activity    Alcohol use: Not Currently     Comment: rarely    Drug use: Never    Sexual activity: Not Currently   Other Topics Concern    Not on file   Social History Narrative    Daily caffeine use- 1 cup of coffee     Social Determinants of Health     Financial Resource Strain: Medium Risk (10/3/2023)    Received from [facility]    Overall Financial Resource Strain (CARDIA)     Difficulty of Paying Living Expenses: Somewhat hard   Food Insecurity: Food Insecurity Present (10/3/2023)    Received from [facility]    Hunger Vital Sign     Worried About Running Out of Food in the Last Year: Sometimes true     Ran Out of Food in the Last Year: Never true   Transportation Needs: No Transportation Needs (10/3/2023)    Received from [facility]    PRAPARE - Transportation     Lack of Transportation (Medical): No     Lack of Transportation (Non-Medical): No   Physical Activity: Inactive (10/3/2023)    Received from [facility]    Exercise Vital Sign     Days of Exercise per Week: 0 days     Minutes of Exercise per Session: 0 min   Stress: Stress Concern Present (10/3/2023)    Received from [facility]    [employer] [place] [employer] - Occupational Stress Questionnaire     Feeling of Stress : Very much   Social Connections: Socially Integrated (10/3/2023)    Received from [facility]    Social Connection and Isolation Panel [NHANES]     Frequency of Communication with Friends and Family: More than three times a week     Frequency of Social Gatherings with Friends and Family: Twice a week     Attends Jain Services: More than 4 times per year     Active Member of Clubs or Organizations: Yes     Attends Club or Organization Meetings: 1 to 4 times per year     Marital Status:    Intimate Partner Violence: Not At  Risk (10/3/2023)    Received from [facility]    Humiliation, Afraid, Rape, and Kick questionnaire     Fear of Current or Ex-Partner: No     Emotionally Abused: No     Physically Abused: No     Sexually Abused: No   Housing Stability: Low Risk  (10/3/2023)    Received from [facility]    Housing Stability Vital Sign     Unable to Pay for Housing in the Last Year: No     Number of Places Lived in the Last Year: 1     Unstable Housing in the Last Year: No       Family History   Problem Relation Age of Onset    Hyperlipidemia Father     Hypertension Father     Migraines Mother     Depression Family     Heart disease Maternal Aunt     Heart disease Maternal Uncle     Diabetes Paternal Aunt     Diabetes Paternal Uncle     Breast cancer Maternal Grandmother     Diabetes Paternal Grandmother     Diabetes Paternal Grandfather     Stroke Neg Hx     Thyroid disease Neg Hx         Patient's Medications   New Prescriptions    No medications on file   Previous Medications    ALBUTEROL (2.5 MG/3 ML) 0.083 % NEBULIZER SOLUTION    Take 3 mL (2.5 mg total) by nebulization every 6 (six) hours as needed for wheezing or shortness of breath    ALBUTEROL (PROAIR HFA) 90 MCG/ACT INHALER    Inhale 2 puffs every 6 (six) hours as needed for wheezing or shortness of breath    ALBUTEROL (PROAIR HFA) 90 MCG/ACT INHALER    Inhale 2 puffs every 4 (four) hours as needed for wheezing or shortness of breath    BENZONATATE (TESSALON) 200 MG CAPSULE    Take 1 capsule (200 mg total) by mouth 3 (three) times a day as needed for cough    BENZONATATE (TESSALON) 200 MG CAPSULE    Take 1 capsule (200 mg total) by mouth 3 (three) times a day as needed for cough    CARBIDOPA-LEVODOPA (SINEMET CR)  MG PER TABLET        CYCLOBENZAPRINE (FLEXERIL) 10 MG TABLET        DICLOFENAC SODIUM (VOLTAREN) 1 %    Apply 1 application topically 4 (four) times a day    DOXEPIN (SINEQUAN) 50 MG CAPSULE        DULOXETINE (CYMBALTA) 30 MG  "DELAYED RELEASE CAPSULE        FAMOTIDINE (PEPCID) 40 MG TABLET    Take 40 mg by mouth bedtime    GABAPENTIN (NEURONTIN) 100 MG CAPSULE        HYDROCODONE-ACETAMINOPHEN (NORCO) 5-325 MG PER TABLET        HYDROXYCHLOROQUINE (PLAQUENIL) 200 MG TABLET        HYDROXYCHLOROQUINE (PLAQUENIL) 200 MG TABLET    Take 200 mg by mouth 2 (two) times a day    HYDROXYZINE HCL (ATARAX) 50 MG TABLET        LINACLOTIDE (LINZESS PO)    Take 290 mcg by mouth daily before breakfast    METHYLPREDNISOLONE 4 MG TABLET THERAPY PACK    Use as directed on package    ONDANSETRON (ZOFRAN-ODT) 8 MG DISINTEGRATING TABLET        PRAZOSIN (MINIPRESS) 2 MG CAPSULE        PREGABALIN (LYRICA) 150 MG CAPSULE    Take 150 mg by mouth daily Daily at bedtime    RABEPRAZOLE (ACIPHEX) 20 MG TABLET    Take 20 mg by mouth daily    VALACYCLOVIR (VALTREX) 500 MG TABLET    Take 500 mg by mouth daily    VALBENAZINE TOSYLATE 80 MG CAPS    Take 80 mg by mouth daily   Modified Medications    No medications on file   Discontinued Medications    No medications on file       Allergies   Allergen Reactions    Celecoxib Hives    Lamotrigine Rash, Hives and Itching     Other reaction(s): rash and itching  Other reaction(s): rash and itching  Other reaction(s): rash and itching        /98 (BP Location: Left arm, Patient Position: Sitting, Cuff Size: Standard)   Pulse 94   Ht 5' 5\" (1.651 m)   Wt 113 kg (249 lb 12.8 oz)   BMI 41.57 kg/m²      REVIEW OF SYSTEMS:  Constitutional: Negative.    HEENT: Negative.    Respiratory: Negative.    Skin: Negative.    Neurological: Negative.    Psychiatric/Behavioral: Negative.  Musculoskeletal: Negative except for that mentioned in the HPI.    /98 (BP Location: Left arm, Patient Position: Sitting, Cuff Size: Standard)   Pulse 94   Ht 5' 5\" (1.651 m)   Wt 113 kg (249 lb 12.8 oz)   BMI 41.57 kg/m²   Gen: No acute distress, resting comfortably in bed  HEENT: Eyes clear, moist mucus membranes, hearing intact  Respiratory: " No audible wheezing or stridor  Cardiovascular: Well Perfused peripherally, 2+ distal pulse  Abdomen: nondistended, no peritoneal signs     PHYSICAL EXAM:    LEFT ELBOW:    Appearance: well healed incision medial elbow    Flexion: 120 degrees  Extension: 10 degrees  Pronation: 80 degrees  Supination: 75 degrees    TTP Lateral Epicondyle: negative  TTP Medial Epicondyle: negative  TTP Olecranon: negative  TTP Radial Head: negative  TTP Biceps Tendon: negative    Strength:  Flexion: 5/5  Extension: 5/5  Pronation: 5/5  Supination: 4/5    Pain with resisted wrist extension: positive  Pain with resisted 3rd finger extension: negative  Pain with resisted wrist flexion: positive    Varus laxity: negative  Painful varus stress  Unable to perform tricep dip  Milking maneuver: negative  Moving valgus stress test: negative    Cubital tunnel Tinel's: negative    Radial/median/ulnar nerve intact    <2 sec cap refill        IMAGING:  XR of the left elbow available for review from 12/18. This was reviewed and demonstrates calcification distal to the medial epicondyle, subacute radial head fracture    MRI of the left elbow reviewed, this demonstrates radial head fracture, effusion, intact common flexor tendon and common extensor tendons.     ASSESSMENT AND PLAN:  48 y.o. female with left elbow injury.  Discussed with the patient recommend physical therapy to work on range of motion and proximal forearm strengthening.  She does have a history of medial epicondylar repair/common flexor tendon repair 2 years ago.  She does have pain with varus stress.  We will send a message to our musculoskeletal radiologist to have them review the MRI from [facility].  Will see her back in 4 weeks.  Given a work note with restrictions.    Scribe Attestation      I,:  Juve Pryor PA-C am acting as a scribe while in the presence of the attending physician.:       I,:  June Charles personally performed the services described in this  documentation    as scribed in my presence.:

## 2023-12-29 ENCOUNTER — TELEPHONE (OUTPATIENT)
Dept: OBGYN CLINIC | Facility: HOSPITAL | Age: 48
End: 2023-12-29

## 2023-12-29 ENCOUNTER — TELEPHONE (OUTPATIENT)
Dept: OBGYN CLINIC | Facility: CLINIC | Age: 48
End: 2023-12-29

## 2023-12-29 NOTE — TELEPHONE ENCOUNTER
Caller: Patient    Doctor: Melvin    Reason for call: patient is asking if she should still go to her OT appointment the day before she see's you which would be Tuesday 01/2/2024.     If patient does not answer please leave her a voicemail.    Call back#: [phone]

## 2023-12-29 NOTE — TELEPHONE ENCOUNTER
Called and LVM for pt to return call and sched and appt to rev MRI results. Let pt know Dr. Charles is in the office on Wednesday (1/3/2024) and Thursday (1/4/2024) next week. Included our phone # for patient to call us back.     ----- Message from Juve Pryor PA-C sent at 12/29/2023  8:28 AM EST -----  Can we call this patient and have her come in? Thanks.   ----- Message -----  From: Brennen Becerra MD  Sent: 12/28/2023   4:58 PM EST  To: Juve Pryor PA-C    Hi Juve,  I think the radial collateral ligament is torn. Basically I don't see that ligament at all.     Nathanael Becerra  ----- Message -----  From: Juve Pryor PA-C  Sent: 12/27/2023  10:26 AM EST  To: Brennen Becerra MD; June Charles, DO    Dr. Becerra    Can you review the MRI of the left elbow for This patient Amparo Dobbs? It was done at [facility]. She has a history of common flexor tendon repair. We have a concern for possible LCL tear. Thank you    Praful Pryor PA-C

## 2024-01-02 ENCOUNTER — OFFICE VISIT (OUTPATIENT)
Dept: URGENT CARE | Facility: CLINIC | Age: 49
End: 2024-01-02
Payer: COMMERCIAL

## 2024-01-02 VITALS
DIASTOLIC BLOOD PRESSURE: 94 MMHG | OXYGEN SATURATION: 96 % | SYSTOLIC BLOOD PRESSURE: 164 MMHG | HEART RATE: 94 BPM | TEMPERATURE: 97.7 F | RESPIRATION RATE: 18 BRPM

## 2024-01-02 DIAGNOSIS — G89.18 PAIN AT SURGICAL SITE: Primary | ICD-10-CM

## 2024-01-02 PROCEDURE — 99213 OFFICE O/P EST LOW 20 MIN: CPT | Performed by: PHYSICIAN ASSISTANT

## 2024-01-02 NOTE — LETTER
January 2, 2024     Patient: Amparo Dobbs   YOB: 1975   Date of Visit: 1/2/2024       To Whom It May Concern:    It is my medical opinion that Amparo Dobbs should remain out of work until 01/04/2024.    If you have any questions or concerns, please don't hesitate to call.         Sincerely,        Lashonda Israel PA-C    CC: No Recipients

## 2024-01-02 NOTE — PROGRESS NOTES
[facility]        NAME: Amparo Dobbs is a 48 y.o. female  : 1975    MRN: [id number]  DATE: 2024  TIME: 12:30 PM    Assessment and Plan   Pain at surgical site [G89.18]  1. Pain at surgical site          Wound cleansed and bandaged appropriately.  Pt surgeon prescribed oral antibiotic for patient to begin taking.  Pt instructed to f/u w/ Surgeon if symptoms worsen.    Patient Instructions       Follow up with Surgeon as scheduled AND sooner if symptoms worsen.  Continue to keep wound clean and covered.  Proceed to  ER if symptoms worsen.    Chief Complaint     Chief Complaint   Patient presents with    Wound Infection     On left foot heel had plantar fascia surgery red swollen tender           History of Present Illness       Patient is a 48 year old female presenting to [facility] with wound infection to left foot surgical site.  Patient had a left foot open plantar fasciotomy on 2024 performed at [facility].  Patient reports infection for the past 4 days.  Patient contacted surgeon's office and was informed to place antibiotic ointment and to keep wound covered.  While in clinic today patient received notification the surgeon called in an oral antibiotic for patient to begin taking.  Patient denies fever.        Review of Systems   Review of Systems   Constitutional:  Negative for chills and fever.   HENT:  Negative for ear pain and sore throat.    Eyes:  Negative for pain and visual disturbance.   Respiratory:  Negative for cough and shortness of breath.    Cardiovascular:  Negative for chest pain and palpitations.   Gastrointestinal:  Negative for abdominal pain and vomiting.   Genitourinary:  Negative for dysuria and hematuria.   Musculoskeletal:  Negative for arthralgias and back pain.   Skin:  Positive for wound. Negative for color change and rash.   Neurological:  Negative for seizures and syncope.   All other systems reviewed and are negative.        Current Medications        Current Outpatient Medications:     albuterol (2.5 mg/3 mL) 0.083 % nebulizer solution, Take 3 mL (2.5 mg total) by nebulization every 6 (six) hours as needed for wheezing or shortness of breath, Disp: 90 mL, Rfl: 1    albuterol (ProAir HFA) 90 mcg/act inhaler, Inhale 2 puffs every 6 (six) hours as needed for wheezing or shortness of breath, Disp: 8.5 g, Rfl: 0    albuterol (ProAir HFA) 90 mcg/act inhaler, Inhale 2 puffs every 4 (four) hours as needed for wheezing or shortness of breath, Disp: 18 g, Rfl: 0    benzonatate (TESSALON) 200 MG capsule, Take 1 capsule (200 mg total) by mouth 3 (three) times a day as needed for cough, Disp: 30 capsule, Rfl: 0    benzonatate (TESSALON) 200 MG capsule, Take 1 capsule (200 mg total) by mouth 3 (three) times a day as needed for cough, Disp: 20 capsule, Rfl: 0    carbidopa-levodopa (SINEMET CR)  mg per tablet, , Disp: , Rfl:     cyclobenzaprine (FLEXERIL) 10 mg tablet, , Disp: , Rfl:     Diclofenac Sodium (VOLTAREN) 1 %, Apply 1 application topically 4 (four) times a day (Patient not taking: Reported on 12/18/2023), Disp: , Rfl:     doxepin (SINEquan) 50 mg capsule, , Disp: , Rfl:     DULoxetine (CYMBALTA) 30 mg delayed release capsule, , Disp: , Rfl:     famotidine (PEPCID) 40 MG tablet, Take 40 mg by mouth bedtime, Disp: , Rfl:     gabapentin (NEURONTIN) 100 mg capsule, , Disp: , Rfl:     HYDROcodone-acetaminophen (NORCO) 5-325 mg per tablet, , Disp: , Rfl:     hydroxychloroquine (PLAQUENIL) 200 mg tablet, , Disp: , Rfl:     hydroxychloroquine (PLAQUENIL) 200 mg tablet, Take 200 mg by mouth 2 (two) times a day, Disp: , Rfl:     hydrOXYzine HCL (ATARAX) 50 mg tablet, , Disp: , Rfl:     linaCLOtide (LINZESS PO), Take 290 mcg by mouth daily before breakfast, Disp: , Rfl:     methylPREDNISolone 4 MG tablet therapy pack, Use as directed on package, Disp: 1 each, Rfl: 0    ondansetron (ZOFRAN-ODT) 8 mg disintegrating tablet, , Disp: , Rfl:     prazosin (MINIPRESS) 2 mg  capsule, , Disp: , Rfl:     pregabalin (LYRICA) 150 mg capsule, Take 150 mg by mouth daily Daily at bedtime, Disp: , Rfl:     RABEprazole (ACIPHEX) 20 MG tablet, Take 20 mg by mouth daily, Disp: , Rfl:     valACYclovir (VALTREX) 500 mg tablet, Take 500 mg by mouth daily, Disp: , Rfl:     Valbenazine Tosylate 80 MG CAPS, Take 80 mg by mouth daily, Disp: , Rfl:     Current Allergies     Allergies as of 01/02/2024 - Reviewed 01/02/2024   Allergen Reaction Noted    Celecoxib Hives 11/11/2019    Lamotrigine Rash, Hives, and Itching 02/24/2010            The following portions of the patient's history were reviewed and updated as appropriate: allergies, current medications, past family history, past medical history, past social history, past surgical history and problem list.     Past Medical History:   Diagnosis Date    Anxiety     Arthritis     Bipolar 1 disorder (HCC)     with bordeline personality    Borderline personality disorder (HCC)     Chronic headaches     Depression     GERD (gastroesophageal reflux disease)     Hypertension     Incontinence     Migraine     Morbid obesity with BMI of 40.0-44.9, adult ([facility])     Small bowel obstruction ([facility]) 6/27/2019    Suicide attempt ([facility])     Symptomatic bradycardia     Urinary tract infection        Past Surgical History:   Procedure Laterality Date    CHOLECYSTECTOMY  05/24/2018    DENTAL SURGERY      ELBOW SURGERY      EXPLORATORY LAPAROTOMY      exlap    FOOT SURGERY Right     KNEE ARTHROSCOPY Right 11/15/2018    total of three procedures with meniscal surgery    MULTIPLE TOOTH EXTRACTIONS  11/02/2018    OVARIAN CYST REMOVAL      REDUCTION MAMMAPLASTY      reduction     SINUS SURGERY Bilateral 04/19/2019    bilateral maxillary antrostomies - Dr. Barajas - [facility]    TOTAL KNEE ARTHROPLASTY REVISION Right 2023       Family History   Problem Relation Age of Onset    Hyperlipidemia Father     Hypertension Father     Migraines Mother     Depression Family     Heart disease  Maternal Aunt     Heart disease Maternal Uncle     Diabetes Paternal Aunt     Diabetes Paternal Uncle     Breast cancer Maternal Grandmother     Diabetes Paternal Grandmother     Diabetes Paternal Grandfather     Stroke Neg Hx     Thyroid disease Neg Hx          Medications have been verified.        Objective   /94   Pulse 94   Temp 97.7 °F (36.5 °C)   Resp 18   SpO2 96%   No LMP recorded.       Physical Exam     Physical Exam  Constitutional:       Appearance: Normal appearance.   HENT:      Head: Normocephalic and atraumatic.      Nose: Nose normal.      Mouth/Throat:      Mouth: Mucous membranes are dry.   Eyes:      Extraocular Movements: Extraocular movements intact.      Conjunctiva/sclera: Conjunctivae normal.      Pupils: Pupils are equal, round, and reactive to light.   Cardiovascular:      Rate and Rhythm: Normal rate and regular rhythm.   Pulmonary:      Effort: Pulmonary effort is normal.   Musculoskeletal:         General: Normal range of motion.      Cervical back: Normal range of motion and neck supple.        Feet:    Feet:      Comments: Approximate 1.5cm surgical site w/ minimal surrounding erythema. No drainage appreciated.  Tender to palpation.  Distal neurovascular and sensation intact.  Skin:     General: Skin is warm and dry.      Capillary Refill: Capillary refill takes less than 2 seconds.   Neurological:      General: No focal deficit present.      Mental Status: She is alert and oriented to person, place, and time.   Psychiatric:         Mood and Affect: Mood normal.         Behavior: Behavior normal.

## 2024-01-03 ENCOUNTER — OFFICE VISIT (OUTPATIENT)
Dept: OBGYN CLINIC | Facility: CLINIC | Age: 49
End: 2024-01-03
Payer: COMMERCIAL

## 2024-01-03 VITALS
HEIGHT: 65 IN | BODY MASS INDEX: 41.55 KG/M2 | HEART RATE: 99 BPM | SYSTOLIC BLOOD PRESSURE: 164 MMHG | WEIGHT: 249.4 LBS | DIASTOLIC BLOOD PRESSURE: 107 MMHG

## 2024-01-03 DIAGNOSIS — S42.442A CLOSED DISPLACED AVULSION FRACTURE OF MEDIAL EPICONDYLE OF LEFT HUMERUS, INITIAL ENCOUNTER: Primary | ICD-10-CM

## 2024-01-03 PROCEDURE — 99213 OFFICE O/P EST LOW 20 MIN: CPT | Performed by: ORTHOPAEDIC SURGERY

## 2024-01-03 NOTE — PROGRESS NOTES
[facility]TH[facility] [address] [id number] [phone]       Amparo Maxigley  [id number]  1975    ORTHOPAEDIC SURGERY OUTPATIENT NOTE  1/3/2024      HISTORY:  48 y.o. female presented to clinic for follow-up left elbow pain.  Patient continues to have pain in the elbow after conservative treatment with formal physical therapy.  We did have our own musculoskeletal radiologist to read her MRI report and he was concerned of a radial, ligament tear.  We did bring her back to reevaluate her elbow for this tear.  However patient complains of pain on the medial side of the elbow.  She did state that she did receive an MRI in November after her injury which was unremarkable.  She received an x-ray in December which showed a possible avulsion fracture of the medial epicondyle.    Past Medical History:   Diagnosis Date    Anxiety     Arthritis     Bipolar 1 disorder (HCC)     with bordeline personality    Borderline personality disorder (HCC)     Chronic headaches     Depression     GERD (gastroesophageal reflux disease)     Hypertension     Incontinence     Migraine     Morbid obesity with BMI of 40.0-44.9, adult ([facility])     Small bowel obstruction (HCC) 6/27/2019    Suicide attempt ([facility])     Symptomatic bradycardia     Urinary tract infection        Past Surgical History:   Procedure Laterality Date    CHOLECYSTECTOMY  05/24/2018    DENTAL SURGERY      ELBOW SURGERY      EXPLORATORY LAPAROTOMY      exlap    FOOT SURGERY Right     KNEE ARTHROSCOPY Right 11/15/2018    total of three procedures with meniscal surgery    MULTIPLE TOOTH EXTRACTIONS  11/02/2018    OVARIAN CYST REMOVAL      REDUCTION MAMMAPLASTY      reduction     SINUS SURGERY Bilateral 04/19/2019    bilateral maxillary antrostomies - Dr. Barajas - [facility]    TOTAL KNEE ARTHROPLASTY REVISION Right 2023       Social History     Socioeconomic History    Marital status: /Civil  Union     Spouse name: Not on file    Number of children: Not on file    Years of education: Not on file    Highest education level: Not on file   Occupational History    Not on file   Tobacco Use    Smoking status: Never    Smokeless tobacco: Never   Vaping Use    Vaping status: Never Used   Substance and Sexual Activity    Alcohol use: Not Currently     Comment: rarely    Drug use: Never    Sexual activity: Not Currently   Other Topics Concern    Not on file   Social History Narrative    Daily caffeine use- 1 cup of coffee     Social Determinants of Health     Financial Resource Strain: Medium Risk (10/3/2023)    Received from [facility]    Overall Financial Resource Strain (CARDIA)     Difficulty of Paying Living Expenses: Somewhat hard   Food Insecurity: Food Insecurity Present (10/3/2023)    Received from [facility]    Hunger Vital Sign     Worried About Running Out of Food in the Last Year: Sometimes true     Ran Out of Food in the Last Year: Never true   Transportation Needs: No Transportation Needs (10/3/2023)    Received from [facility]    PRAPARE - Transportation     Lack of Transportation (Medical): No     Lack of Transportation (Non-Medical): No   Physical Activity: Inactive (10/3/2023)    Received from [facility]    Exercise Vital Sign     Days of Exercise per Week: 0 days     Minutes of Exercise per Session: 0 min   Stress: Stress Concern Present (10/3/2023)    Received from [facility]    [place] of Occupational Health - Occupational Stress Questionnaire     Feeling of Stress : Very much   Social Connections: Socially Integrated (10/3/2023)    Received from [facility]    Social Connection and Isolation Panel [NHANES]     Frequency of Communication with Friends and Family: More than three times a week     Frequency of Social Gatherings with Friends and Family: Twice a week     Attends  Protestant Services: More than 4 times per year     Active Member of Clubs or Organizations: Yes     Attends Club or Organization Meetings: 1 to 4 times per year     Marital Status:    Intimate Partner Violence: Not At Risk (10/3/2023)    Received from [facility]    Humiliation, Afraid, Rape, and Kick questionnaire     Fear of Current or Ex-Partner: No     Emotionally Abused: No     Physically Abused: No     Sexually Abused: No   Housing Stability: Low Risk  (10/3/2023)    Received from [facility]    Housing Stability Vital Sign     Unable to Pay for Housing in the Last Year: No     Number of Places Lived in the Last Year: 1     Unstable Housing in the Last Year: No       Family History   Problem Relation Age of Onset    Hyperlipidemia Father     Hypertension Father     Migraines Mother     Depression Family     Heart disease Maternal Aunt     Heart disease Maternal Uncle     Diabetes Paternal Aunt     Diabetes Paternal Uncle     Breast cancer Maternal Grandmother     Diabetes Paternal Grandmother     Diabetes Paternal Grandfather     Stroke Neg Hx     Thyroid disease Neg Hx         Patient's Medications   New Prescriptions    No medications on file   Previous Medications    ALBUTEROL (2.5 MG/3 ML) 0.083 % NEBULIZER SOLUTION    Take 3 mL (2.5 mg total) by nebulization every 6 (six) hours as needed for wheezing or shortness of breath    ALBUTEROL (PROAIR HFA) 90 MCG/ACT INHALER    Inhale 2 puffs every 6 (six) hours as needed for wheezing or shortness of breath    ALBUTEROL (PROAIR HFA) 90 MCG/ACT INHALER    Inhale 2 puffs every 4 (four) hours as needed for wheezing or shortness of breath    BENZONATATE (TESSALON) 200 MG CAPSULE    Take 1 capsule (200 mg total) by mouth 3 (three) times a day as needed for cough    BENZONATATE (TESSALON) 200 MG CAPSULE    Take 1 capsule (200 mg total) by mouth 3 (three) times a day as needed for cough    CARBIDOPA-LEVODOPA (SINEMET CR)   "MG PER TABLET        CYCLOBENZAPRINE (FLEXERIL) 10 MG TABLET        DICLOFENAC SODIUM (VOLTAREN) 1 %    Apply 1 application topically 4 (four) times a day    DOXEPIN (SINEQUAN) 50 MG CAPSULE        DULOXETINE (CYMBALTA) 30 MG DELAYED RELEASE CAPSULE        FAMOTIDINE (PEPCID) 40 MG TABLET    Take 40 mg by mouth bedtime    GABAPENTIN (NEURONTIN) 100 MG CAPSULE        HYDROCODONE-ACETAMINOPHEN (NORCO) 5-325 MG PER TABLET        HYDROXYCHLOROQUINE (PLAQUENIL) 200 MG TABLET        HYDROXYCHLOROQUINE (PLAQUENIL) 200 MG TABLET    Take 200 mg by mouth 2 (two) times a day    HYDROXYZINE HCL (ATARAX) 50 MG TABLET        LINACLOTIDE (LINZESS PO)    Take 290 mcg by mouth daily before breakfast    METHYLPREDNISOLONE 4 MG TABLET THERAPY PACK    Use as directed on package    ONDANSETRON (ZOFRAN-ODT) 8 MG DISINTEGRATING TABLET        PRAZOSIN (MINIPRESS) 2 MG CAPSULE        PREGABALIN (LYRICA) 150 MG CAPSULE    Take 150 mg by mouth daily Daily at bedtime    RABEPRAZOLE (ACIPHEX) 20 MG TABLET    Take 20 mg by mouth daily    VALACYCLOVIR (VALTREX) 500 MG TABLET    Take 500 mg by mouth daily    VALBENAZINE TOSYLATE 80 MG CAPS    Take 80 mg by mouth daily   Modified Medications    No medications on file   Discontinued Medications    No medications on file       Allergies   Allergen Reactions    Celecoxib Hives    Lamotrigine Rash, Hives and Itching     Other reaction(s): rash and itching  Other reaction(s): rash and itching  Other reaction(s): rash and itching        BP (!) 164/107 (BP Location: Right arm, Patient Position: Sitting, Cuff Size: Standard)   Pulse 99   Ht 5' 5\" (1.651 m)   Wt 113 kg (249 lb 6.4 oz)   BMI 41.50 kg/m²      REVIEW OF SYSTEMS:  Constitutional: Negative.    HEENT: Negative.    Respiratory: Negative.    Skin: Negative.    Neurological: Negative.    Psychiatric/Behavioral: Negative.  Musculoskeletal: Negative except for that mentioned in the HPI.    [unfilled]     PHYSICAL EXAM: Left elbow: Negative varus " stress test.  Pain with valgus stress.  Tenderness palpation over the medial epicondyle and ulnar nerve.  Pain with resisted wrist flexion.    IMAGING: MRI of the left elbow performed in November 2023: Likely tear of the radial collateral ligament off the lateral epicondyle.    ASSESSMENT AND PLAN:  48 y.o. female with chronic left elbow pain and questionable avulsion fracture of the medial epicondyle.    Given the timing of the patient's imaging and her new findings on x-ray in December without advanced imaging I like to obtain a new MRI of the left elbow to further evaluate if this is a true avulsion fracture with involvement of the ligament and tendons.  She will follow-up with the MRI results in the meantime she is to continue formal physical therapy to help with her stiffness.

## 2024-01-09 ENCOUNTER — HOSPITAL ENCOUNTER (OUTPATIENT)
Dept: MRI IMAGING | Facility: HOSPITAL | Age: 49
Discharge: HOME | End: 2024-01-09
Attending: ORTHOPAEDIC SURGERY
Payer: COMMERCIAL

## 2024-01-09 DIAGNOSIS — S42.442A CLOSED DISPLACED AVULSION FRACTURE OF MEDIAL EPICONDYLE OF LEFT HUMERUS, INITIAL ENCOUNTER: ICD-10-CM

## 2024-01-09 PROCEDURE — 73221 MRI JOINT UPR EXTREM W/O DYE: CPT

## 2024-01-09 PROCEDURE — G1004 CDSM NDSC: HCPCS

## 2024-01-15 ENCOUNTER — OFFICE VISIT (OUTPATIENT)
Dept: OBGYN CLINIC | Facility: MEDICAL CENTER | Age: 49
End: 2024-01-15
Payer: COMMERCIAL

## 2024-01-15 VITALS
SYSTOLIC BLOOD PRESSURE: 125 MMHG | HEIGHT: 65 IN | WEIGHT: 249 LBS | DIASTOLIC BLOOD PRESSURE: 82 MMHG | HEART RATE: 76 BPM | BODY MASS INDEX: 41.48 KG/M2

## 2024-01-15 DIAGNOSIS — S42.442A CLOSED DISPLACED AVULSION FRACTURE OF MEDIAL EPICONDYLE OF LEFT HUMERUS, INITIAL ENCOUNTER: Primary | ICD-10-CM

## 2024-01-15 PROCEDURE — 99214 OFFICE O/P EST MOD 30 MIN: CPT | Performed by: ORTHOPAEDIC SURGERY

## 2024-01-15 NOTE — PROGRESS NOTES
[facility]ED[facility] CARE SPECIALISTS [place]  [address]  [place] [address]       Amparo Dobbs  [id number]  1975    ORTHOPAEDIC SURGERY OUTPATIENT NOTE  1/15/2024      HISTORY:  48 y.o. female presents today follow-up for left elbow pain due to avulsion fracture of the medial epicondyle.  She is here today to discuss MRI results of the left elbow.  Patient states she continues to have pain over the medial and posterior aspect of the left elbow.  She states she has pain when putting pressure on her left elbow.  She denies any numbness or tingling.    Patient has history of left elbow medial epicondyle  repair by Dr. Whalen  at [facility] on 5/12/21     Past Medical History:   Diagnosis Date    Anxiety     Arthritis     Bipolar 1 disorder (HCC)     with bordeline personality    Borderline personality disorder (HCC)     Chronic headaches     Depression     GERD (gastroesophageal reflux disease)     Hypertension     Incontinence     Migraine     Morbid obesity with BMI of 40.0-44.9, adult (HCC)     Small bowel obstruction (HCC) 6/27/2019    Suicide attempt ([facility])     Symptomatic bradycardia     Urinary tract infection        Past Surgical History:   Procedure Laterality Date    CHOLECYSTECTOMY  05/24/2018    DENTAL SURGERY      ELBOW SURGERY      EXPLORATORY LAPAROTOMY      exlap    FOOT SURGERY Right     KNEE ARTHROSCOPY Right 11/15/2018    total of three procedures with meniscal surgery    MULTIPLE TOOTH EXTRACTIONS  11/02/2018    OVARIAN CYST REMOVAL      REDUCTION MAMMAPLASTY      reduction     SINUS SURGERY Bilateral 04/19/2019    bilateral maxillary antrostomies - Dr. Barajas - [facility]    TOTAL KNEE ARTHROPLASTY REVISION Right 2023       Social History     Socioeconomic History    Marital status: /Civil Union     Spouse name: Not on file    Number of children: Not on file    Years of education: Not on file    Highest education level: Not on file    Occupational History    Not on file   Tobacco Use    Smoking status: Never    Smokeless tobacco: Never   Vaping Use    Vaping status: Never Used   Substance and Sexual Activity    Alcohol use: Not Currently     Comment: rarely    Drug use: Never    Sexual activity: Not Currently   Other Topics Concern    Not on file   Social History Narrative    Daily caffeine use- 1 cup of coffee     Social Determinants of Health     Financial Resource Strain: Medium Risk (10/3/2023)    Received from [facility]    Overall Financial Resource Strain (CARDIA)     Difficulty of Paying Living Expenses: Somewhat hard   Food Insecurity: Food Insecurity Present (10/3/2023)    Received from [facility]    Hunger Vital Sign     Worried About Running Out of Food in the Last Year: Sometimes true     Ran Out of Food in the Last Year: Never true   Transportation Needs: No Transportation Needs (10/3/2023)    Received from [facility]    PRAPARE - Transportation     Lack of Transportation (Medical): No     Lack of Transportation (Non-Medical): No   Physical Activity: Inactive (10/3/2023)    Received from [facility]    Exercise Vital Sign     Days of Exercise per Week: 0 days     Minutes of Exercise per Session: 0 min   Stress: Stress Concern Present (10/3/2023)    Received from [facility]    Citizen of [place] of Occupational Health - Occupational Stress Questionnaire     Feeling of Stress : Very much   Social Connections: Socially Integrated (10/3/2023)    Received from [facility]    Social Connection and Isolation Panel [NHANES]     Frequency of Communication with Friends and Family: More than three times a week     Frequency of Social Gatherings with Friends and Family: Twice a week     Attends Jehovah's witness Services: More than 4 times per year     Active Member of Clubs or Organizations: Yes     Attends Club or Organization Meetings: 1 to 4  times per year     Marital Status:    Intimate Partner Violence: Not At Risk (10/3/2023)    Received from [facility]    Humiliation, Afraid, Rape, and Kick questionnaire     Fear of Current or Ex-Partner: No     Emotionally Abused: No     Physically Abused: No     Sexually Abused: No   Housing Stability: Low Risk  (10/3/2023)    Received from [facility]    Housing Stability Vital Sign     Unable to Pay for Housing in the Last Year: No     Number of Places Lived in the Last Year: 1     Unstable Housing in the Last Year: No       Family History   Problem Relation Age of Onset    Hyperlipidemia Father     Hypertension Father     Migraines Mother     Depression Family     Heart disease Maternal Aunt     Heart disease Maternal Uncle     Diabetes Paternal Aunt     Diabetes Paternal Uncle     Breast cancer Maternal Grandmother     Diabetes Paternal Grandmother     Diabetes Paternal Grandfather     Stroke Neg Hx     Thyroid disease Neg Hx         Patient's Medications   New Prescriptions    No medications on file   Previous Medications    ALBUTEROL (2.5 MG/3 ML) 0.083 % NEBULIZER SOLUTION    Take 3 mL (2.5 mg total) by nebulization every 6 (six) hours as needed for wheezing or shortness of breath    ALBUTEROL (PROAIR HFA) 90 MCG/ACT INHALER    Inhale 2 puffs every 6 (six) hours as needed for wheezing or shortness of breath    ALBUTEROL (PROAIR HFA) 90 MCG/ACT INHALER    Inhale 2 puffs every 4 (four) hours as needed for wheezing or shortness of breath    BENZONATATE (TESSALON) 200 MG CAPSULE    Take 1 capsule (200 mg total) by mouth 3 (three) times a day as needed for cough    BENZONATATE (TESSALON) 200 MG CAPSULE    Take 1 capsule (200 mg total) by mouth 3 (three) times a day as needed for cough    CARBIDOPA-LEVODOPA (SINEMET CR)  MG PER TABLET        CYCLOBENZAPRINE (FLEXERIL) 10 MG TABLET        DICLOFENAC SODIUM (VOLTAREN) 1 %    Apply 1 application topically 4 (four)  "times a day    DOXEPIN (SINEQUAN) 50 MG CAPSULE        DULOXETINE (CYMBALTA) 30 MG DELAYED RELEASE CAPSULE        FAMOTIDINE (PEPCID) 40 MG TABLET    Take 40 mg by mouth bedtime    GABAPENTIN (NEURONTIN) 100 MG CAPSULE        HYDROCODONE-ACETAMINOPHEN (NORCO) 5-325 MG PER TABLET        HYDROXYCHLOROQUINE (PLAQUENIL) 200 MG TABLET        HYDROXYCHLOROQUINE (PLAQUENIL) 200 MG TABLET    Take 200 mg by mouth 2 (two) times a day    HYDROXYZINE HCL (ATARAX) 50 MG TABLET        LINACLOTIDE (LINZESS PO)    Take 290 mcg by mouth daily before breakfast    METHYLPREDNISOLONE 4 MG TABLET THERAPY PACK    Use as directed on package    ONDANSETRON (ZOFRAN-ODT) 8 MG DISINTEGRATING TABLET        PRAZOSIN (MINIPRESS) 2 MG CAPSULE        PREGABALIN (LYRICA) 150 MG CAPSULE    Take 150 mg by mouth daily Daily at bedtime    RABEPRAZOLE (ACIPHEX) 20 MG TABLET    Take 20 mg by mouth daily    VALACYCLOVIR (VALTREX) 500 MG TABLET    Take 500 mg by mouth daily    VALBENAZINE TOSYLATE 80 MG CAPS    Take 80 mg by mouth daily   Modified Medications    No medications on file   Discontinued Medications    No medications on file       Allergies   Allergen Reactions    Celecoxib Hives    Lamotrigine Rash, Hives and Itching     Other reaction(s): rash and itching  Other reaction(s): rash and itching  Other reaction(s): rash and itching        /82   Pulse 76   Ht 5' 5\" (1.651 m)   Wt 113 kg (249 lb)   BMI 41.44 kg/m²      REVIEW OF SYSTEMS:  Constitutional: Negative.    HEENT: Negative.    Respiratory: Negative.    Skin: Negative.    Neurological: Negative.    Psychiatric/Behavioral: Negative.  Musculoskeletal: Negative except for that mentioned in the HPI.    PHYSICAL EXAM:       L elbow:  Flexion: 140 degrees  Extension: 0 degrees  Pronation: 80 degrees  Supination: 80 degrees    TTP Lateral Epicondyle: negative  TTP Medial Epicondyle: +  TTP Olecranon: negative  TTP Radial Head: negative  TTP Biceps Tendon: negative    Strength:  Flexion: " 5/5  Extension: 5/5  Pronation: 5/5  Supination: 5/5    Pain with resisted wrist extension: negative  Pain with resisted 3rd finger extension: negative  Pain with resisted wrist flexion: negative    Varus laxity: negative  Valgus laxity: negative  Milking maneuver: negative  Moving valgus stress test: negative    Cubital tunnel Tinel's: negative    Radial/median/ulnar nerve intact    <2 sec cap refill  Surgical scar well healed         IMAGING: MRI left elbow demonstrates avulsion fracture over the medial epicondyle and nearly fully healed radial head fracture    ASSESSMENT AND PLAN:  48 y.o. female with avulsion fracture left medial epicondyle    Will be ordering CT scan of the left elbow to rule out avulsion fracture of the medial upper condyle.  Patient has good strength , range of motion, and function of the left elbow but continues to have pain.  Patient is to follow-up in 2 months to discuss CT scan of the left elbow    Scribe Attestation      I,:   am acting as a scribe while in the presence of the attending physician.:       I,:   personally performed the services described in this documentation    as scribed in my presence.:

## 2024-01-22 ENCOUNTER — HOSPITAL ENCOUNTER (OUTPATIENT)
Dept: CT IMAGING | Facility: HOSPITAL | Age: 49
Discharge: HOME | End: 2024-01-22
Attending: ORTHOPAEDIC SURGERY
Payer: COMMERCIAL

## 2024-01-22 DIAGNOSIS — S42.442A CLOSED DISPLACED AVULSION FRACTURE OF MEDIAL EPICONDYLE OF LEFT HUMERUS, INITIAL ENCOUNTER: ICD-10-CM

## 2024-01-22 PROCEDURE — 73200 CT UPPER EXTREMITY W/O DYE: CPT

## 2024-01-22 PROCEDURE — G1004 CDSM NDSC: HCPCS

## 2024-02-14 ENCOUNTER — OFFICE VISIT (OUTPATIENT)
Dept: OBGYN CLINIC | Facility: CLINIC | Age: 49
End: 2024-02-14
Payer: COMMERCIAL

## 2024-02-14 ENCOUNTER — TELEPHONE (OUTPATIENT)
Age: 49
End: 2024-02-14

## 2024-02-14 ENCOUNTER — APPOINTMENT (OUTPATIENT)
Dept: LAB | Facility: HOSPITAL | Age: 49
End: 2024-02-14

## 2024-02-14 VITALS
DIASTOLIC BLOOD PRESSURE: 93 MMHG | BODY MASS INDEX: 41.48 KG/M2 | SYSTOLIC BLOOD PRESSURE: 141 MMHG | WEIGHT: 249 LBS | HEIGHT: 65 IN | HEART RATE: 99 BPM

## 2024-02-14 DIAGNOSIS — M77.02 MEDIAL EPICONDYLITIS OF ELBOW, LEFT: Primary | ICD-10-CM

## 2024-02-14 DIAGNOSIS — M77.02 MEDIAL EPICONDYLITIS OF ELBOW, LEFT: ICD-10-CM

## 2024-02-14 PROCEDURE — 99214 OFFICE O/P EST MOD 30 MIN: CPT | Performed by: ORTHOPAEDIC SURGERY

## 2024-02-14 RX ORDER — CHLORHEXIDINE GLUCONATE 4 G/100ML
SOLUTION TOPICAL DAILY PRN
OUTPATIENT
Start: 2024-02-14

## 2024-02-14 RX ORDER — ACETAMINOPHEN 325 MG/1
975 TABLET ORAL ONCE
OUTPATIENT
Start: 2024-02-14 | End: 2024-02-14

## 2024-02-14 RX ORDER — CHLORHEXIDINE GLUCONATE ORAL RINSE 1.2 MG/ML
15 SOLUTION DENTAL ONCE
OUTPATIENT
Start: 2024-02-14 | End: 2024-02-14

## 2024-02-14 RX ORDER — GABAPENTIN 100 MG/1
300 CAPSULE ORAL ONCE
OUTPATIENT
Start: 2024-02-14 | End: 2024-02-14

## 2024-02-14 NOTE — PROGRESS NOTES
[facility]TH[facility]ARE [facility] ORTHOPEDIC SPECIALISTS [address] [id number] [phone]       Amparo Dobbs  [id number]  1975    ORTHOPAEDIC SURGERY OUTPATIENT NOTE  2/14/2024      HISTORY:  48 y.o. female  follows up for left elbow pain medially. Pain is worse with use and direct pressure over medial elbow and posterior elbow. She has done PT. No numbness or tingling reported to left hand.     Past Medical History:   Diagnosis Date   • Anxiety    • Arthritis    • Bipolar 1 disorder ([facility])     with bordeline personality   • Borderline personality disorder ([facility])    • Chronic headaches    • Depression    • GERD (gastroesophageal reflux disease)    • Hypertension    • Incontinence    • Migraine    • Morbid obesity with BMI of 40.0-44.9, adult ([facility])    • Small bowel obstruction ([facility]) 6/27/2019   • Suicide attempt ([facility])    • Symptomatic bradycardia    • Urinary tract infection        Past Surgical History:   Procedure Laterality Date   • CHOLECYSTECTOMY  05/24/2018   • DENTAL SURGERY     • ELBOW SURGERY     • EXPLORATORY LAPAROTOMY      exlap   • FOOT SURGERY Right    • KNEE ARTHROSCOPY Right 11/15/2018    total of three procedures with meniscal surgery   • MULTIPLE TOOTH EXTRACTIONS  11/02/2018   • OVARIAN CYST REMOVAL     • REDUCTION MAMMAPLASTY      reduction    • SINUS SURGERY Bilateral 04/19/2019    bilateral maxillary antrostomies - Dr. Barajas - [facility]   • TOTAL KNEE ARTHROPLASTY REVISION Right 2023       Social History     Socioeconomic History   • Marital status: /Civil Union     Spouse name: Not on file   • Number of children: Not on file   • Years of education: Not on file   • Highest education level: Not on file   Occupational History   • Not on file   Tobacco Use   • Smoking status: Never   • Smokeless tobacco: Never   Vaping Use   • Vaping status: Never Used   Substance and Sexual Activity   • Alcohol use: Not Currently     Comment: rarely   •  Drug use: Never   • Sexual activity: Not Currently   Other Topics Concern   • Not on file   Social History Narrative    Daily caffeine use- 1 cup of coffee     Social Determinants of Health     Financial Resource Strain: Medium Risk (10/3/2023)    Received from [facility]    Overall Financial Resource Strain (CARDIA)    • Difficulty of Paying Living Expenses: Somewhat hard   Food Insecurity: Food Insecurity Present (10/3/2023)    Received from [facility]    Hunger Vital Sign    • Worried About Running Out of Food in the Last Year: Sometimes true    • Ran Out of Food in the Last Year: Never true   Transportation Needs: No Transportation Needs (10/3/2023)    Received from [facility]    PRAPARE - Transportation    • Lack of Transportation (Medical): No    • Lack of Transportation (Non-Medical): No   Physical Activity: Inactive (10/3/2023)    Received from [facility]    Exercise Vital Sign    • Days of Exercise per Week: 0 days    • Minutes of Exercise per Session: 0 min   Stress: Stress Concern Present (10/3/2023)    Received from [facility]    [employer] [place] [employer] - Occupational Stress Questionnaire    • Feeling of Stress : Very much   Social Connections: Socially Integrated (10/3/2023)    Received from [facility]    Social Connection and Isolation Panel [NHANES]    • Frequency of Communication with Friends and Family: More than three times a week    • Frequency of Social Gatherings with Friends and Family: Twice a week    • Attends Jew Services: More than 4 times per year    • Active Member of Clubs or Organizations: Yes    • Attends Club or Organization Meetings: 1 to 4 times per year    • Marital Status:    Intimate Partner Violence: Not At Risk (10/3/2023)    Received from [facility]    Humiliation, Afraid, Rape, and Kick questionnaire    • Fear of Current  or Ex-Partner: No    • Emotionally Abused: No    • Physically Abused: No    • Sexually Abused: No   Housing Stability: Low Risk  (10/3/2023)    Received from [facility]    Housing Stability Vital Sign    • Unable to Pay for Housing in the Last Year: No    • Number of Places Lived in the Last Year: 1    • Unstable Housing in the Last Year: No       Family History   Problem Relation Age of Onset   • Hyperlipidemia Father    • Hypertension Father    • Migraines Mother    • Depression Family    • Heart disease Maternal Aunt    • Heart disease Maternal Uncle    • Diabetes Paternal Aunt    • Diabetes Paternal Uncle    • Breast cancer Maternal Grandmother    • Diabetes Paternal Grandmother    • Diabetes Paternal Grandfather    • Stroke Neg Hx    • Thyroid disease Neg Hx         Patient's Medications   New Prescriptions    No medications on file   Previous Medications    ALBUTEROL (2.5 MG/3 ML) 0.083 % NEBULIZER SOLUTION    Take 3 mL (2.5 mg total) by nebulization every 6 (six) hours as needed for wheezing or shortness of breath    ALBUTEROL (PROAIR HFA) 90 MCG/ACT INHALER    Inhale 2 puffs every 6 (six) hours as needed for wheezing or shortness of breath    ALBUTEROL (PROAIR HFA) 90 MCG/ACT INHALER    Inhale 2 puffs every 4 (four) hours as needed for wheezing or shortness of breath    BENZONATATE (TESSALON) 200 MG CAPSULE    Take 1 capsule (200 mg total) by mouth 3 (three) times a day as needed for cough    BENZONATATE (TESSALON) 200 MG CAPSULE    Take 1 capsule (200 mg total) by mouth 3 (three) times a day as needed for cough    CARBIDOPA-LEVODOPA (SINEMET CR)  MG PER TABLET        CYCLOBENZAPRINE (FLEXERIL) 10 MG TABLET        DICLOFENAC SODIUM (VOLTAREN) 1 %    Apply 1 application topically 4 (four) times a day    DOXEPIN (SINEQUAN) 50 MG CAPSULE        DULOXETINE (CYMBALTA) 30 MG DELAYED RELEASE CAPSULE        FAMOTIDINE (PEPCID) 40 MG TABLET    Take 40 mg by mouth bedtime    GABAPENTIN  "(NEURONTIN) 100 MG CAPSULE        HYDROCODONE-ACETAMINOPHEN (NORCO) 5-325 MG PER TABLET        HYDROXYCHLOROQUINE (PLAQUENIL) 200 MG TABLET        HYDROXYCHLOROQUINE (PLAQUENIL) 200 MG TABLET    Take 200 mg by mouth 2 (two) times a day    HYDROXYZINE HCL (ATARAX) 50 MG TABLET        LINACLOTIDE (LINZESS PO)    Take 290 mcg by mouth daily before breakfast    METHYLPREDNISOLONE 4 MG TABLET THERAPY PACK    Use as directed on package    ONDANSETRON (ZOFRAN-ODT) 8 MG DISINTEGRATING TABLET        PRAZOSIN (MINIPRESS) 2 MG CAPSULE        PREGABALIN (LYRICA) 150 MG CAPSULE    Take 150 mg by mouth daily Daily at bedtime    RABEPRAZOLE (ACIPHEX) 20 MG TABLET    Take 20 mg by mouth daily    VALACYCLOVIR (VALTREX) 500 MG TABLET    Take 500 mg by mouth daily    VALBENAZINE TOSYLATE 80 MG CAPS    Take 80 mg by mouth daily   Modified Medications    No medications on file   Discontinued Medications    No medications on file       Allergies   Allergen Reactions   • Celecoxib Hives   • Lamotrigine Rash, Hives and Itching     Other reaction(s): rash and itching  Other reaction(s): rash and itching  Other reaction(s): rash and itching        /93 (BP Location: Left arm, Patient Position: Sitting, Cuff Size: Standard)   Pulse 99   Ht 5' 5\" (1.651 m)   Wt 113 kg (249 lb)   BMI 41.44 kg/m²      REVIEW OF SYSTEMS:  Constitutional: Negative.    HEENT: Negative.    Respiratory: Negative.    Skin: Negative.    Neurological: Negative.    Psychiatric/Behavioral: Negative.  Musculoskeletal: Negative except for that mentioned in the HPI.    PHYSICAL EXAM:      /93 (BP Location: Left arm, Patient Position: Sitting, Cuff Size: Standard)   Pulse 99   Ht 5' 5\" (1.651 m)   Wt 113 kg (249 lb)   BMI 41.44 kg/m²   Gen: No acute distress, resting comfortably in bed  HEENT: Eyes clear, moist mucus membranes, hearing intact  Respiratory: No audible wheezing or stridor  Cardiovascular: Well Perfused peripherally, 2+ distal pulse  Abdomen: " nondistended, no peritoneal signs    Left elbow:  Flexion: 140 degrees  Extension: 0 degrees  Pronation: 80 degrees  Supination: 80 degrees    TTP Lateral Epicondyle: negative  TTP Medial Epicondyle: positive  TTP Olecranon: negative  TTP Radial Head: negative  TTP Biceps Tendon: negative    Strength:  Flexion: 5/5  Extension: 5/5  Pronation: 5/5  Supination: 5/5    Pain with resisted wrist extension: negative  Pain with resisted 3rd finger extension: negative  Pain with resisted wrist flexion: positive    Varus laxity: negative  Valgus laxity: negative    Cubital tunnel Tinel's: negative    Radial/median/ulnar nerve intact    Hand warm perfused        Integumentary: prior medial epicondyle surgical incision well healed, large number of healed transverse linear volar forearm lacerations noted bilaterally  Bruising: -  Abrasion: -   Rash -  Laceration: -      IMAGING:  MRI left elbow shows avulsion fracture of medial epicondyle with attached portion of common extensor tendon, possible partial tearing LCL but grossly intact    ASSESSMENT AND PLAN:  48 y.o. female left medial epicondyle avulsion fracture that is painful debilitating and has not improved with conservative management including extended course of physical therapy. Plan for operative repair of common extensor tendon avulsion fracture w possible excision of bony fragment. Risks and benefits discussed and patient did decide to undergo procedure.

## 2024-02-14 NOTE — H&P (VIEW-ONLY)
[facility]THO [facility]ARE [facility] ORTHOPEDIC SPECIALISTS [address] [id number] [phone]       Amparo Dobbs  [id number]  1975    ORTHOPAEDIC SURGERY OUTPATIENT NOTE  2/14/2024      HISTORY:  48 y.o. female  follows up for left elbow pain medially. Pain is worse with use and direct pressure over medial elbow and posterior elbow. She has done PT. No numbness or tingling reported to left hand.     Past Medical History:   Diagnosis Date   • Anxiety    • Arthritis    • Bipolar 1 disorder ([facility])     with bordeline personality   • Borderline personality disorder ([facility])    • Chronic headaches    • Depression    • GERD (gastroesophageal reflux disease)    • Hypertension    • Incontinence    • Migraine    • Morbid obesity with BMI of 40.0-44.9, adult ([facility])    • Small bowel obstruction ([facility]) 6/27/2019   • Suicide attempt ([facility])    • Symptomatic bradycardia    • Urinary tract infection        Past Surgical History:   Procedure Laterality Date   • CHOLECYSTECTOMY  05/24/2018   • DENTAL SURGERY     • ELBOW SURGERY     • EXPLORATORY LAPAROTOMY      exlap   • FOOT SURGERY Right    • KNEE ARTHROSCOPY Right 11/15/2018    total of three procedures with meniscal surgery   • MULTIPLE TOOTH EXTRACTIONS  11/02/2018   • OVARIAN CYST REMOVAL     • REDUCTION MAMMAPLASTY      reduction    • SINUS SURGERY Bilateral 04/19/2019    bilateral maxillary antrostomies - Dr. Barajas - [facility]   • TOTAL KNEE ARTHROPLASTY REVISION Right 2023       Social History     Socioeconomic History   • Marital status: /Civil Union     Spouse name: Not on file   • Number of children: Not on file   • Years of education: Not on file   • Highest education level: Not on file   Occupational History   • Not on file   Tobacco Use   • Smoking status: Never   • Smokeless tobacco: Never   Vaping Use   • Vaping status: Never Used   Substance and Sexual Activity   • Alcohol use: Not Currently     Comment: rarely   •  Drug use: Never   • Sexual activity: Not Currently   Other Topics Concern   • Not on file   Social History Narrative    Daily caffeine use- 1 cup of coffee     Social Determinants of Health     Financial Resource Strain: Medium Risk (10/3/2023)    Received from [facility]    Overall Financial Resource Strain (CARDIA)    • Difficulty of Paying Living Expenses: Somewhat hard   Food Insecurity: Food Insecurity Present (10/3/2023)    Received from [facility]    Hunger Vital Sign    • Worried About Running Out of Food in the Last Year: Sometimes true    • Ran Out of Food in the Last Year: Never true   Transportation Needs: No Transportation Needs (10/3/2023)    Received from [facility]    PRAPARE - Transportation    • Lack of Transportation (Medical): No    • Lack of Transportation (Non-Medical): No   Physical Activity: Inactive (10/3/2023)    Received from [facility]    Exercise Vital Sign    • Days of Exercise per Week: 0 days    • Minutes of Exercise per Session: 0 min   Stress: Stress Concern Present (10/3/2023)    Received from [facility]    [employer] [place] [employer] - Occupational Stress Questionnaire    • Feeling of Stress : Very much   Social Connections: Socially Integrated (10/3/2023)    Received from [facility]    Social Connection and Isolation Panel [NHANES]    • Frequency of Communication with Friends and Family: More than three times a week    • Frequency of Social Gatherings with Friends and Family: Twice a week    • Attends Restorationism Services: More than 4 times per year    • Active Member of Clubs or Organizations: Yes    • Attends Club or Organization Meetings: 1 to 4 times per year    • Marital Status:    Intimate Partner Violence: Not At Risk (10/3/2023)    Received from [facility]    Humiliation, Afraid, Rape, and Kick questionnaire    • Fear of Current  or Ex-Partner: No    • Emotionally Abused: No    • Physically Abused: No    • Sexually Abused: No   Housing Stability: Low Risk  (10/3/2023)    Received [facility]    Housing Stability Vital Sign    • Unable to Pay for Housing in the Last Year: No    • Number of Places Lived in the Last Year: 1    • Unstable Housing in the Last Year: No       Family History   Problem Relation Age of Onset   • Hyperlipidemia Father    • Hypertension Father    • Migraines Mother    • Depression Family    • Heart disease Maternal Aunt    • Heart disease Maternal Uncle    • Diabetes Paternal Aunt    • Diabetes Paternal Uncle    • Breast cancer Maternal Grandmother    • Diabetes Paternal Grandmother    • Diabetes Paternal Grandfather    • Stroke Neg Hx    • Thyroid disease Neg Hx         Patient's Medications   New Prescriptions    No medications on file   Previous Medications    ALBUTEROL (2.5 MG/3 ML) 0.083 % NEBULIZER SOLUTION    Take 3 mL (2.5 mg total) by nebulization every 6 (six) hours as needed for wheezing or shortness of breath    ALBUTEROL (PROAIR HFA) 90 MCG/ACT INHALER    Inhale 2 puffs every 6 (six) hours as needed for wheezing or shortness of breath    ALBUTEROL (PROAIR HFA) 90 MCG/ACT INHALER    Inhale 2 puffs every 4 (four) hours as needed for wheezing or shortness of breath    BENZONATATE (TESSALON) 200 MG CAPSULE    Take 1 capsule (200 mg total) by mouth 3 (three) times a day as needed for cough    BENZONATATE (TESSALON) 200 MG CAPSULE    Take 1 capsule (200 mg total) by mouth 3 (three) times a day as needed for cough    CARBIDOPA-LEVODOPA (SINEMET CR)  MG PER TABLET        CYCLOBENZAPRINE (FLEXERIL) 10 MG TABLET        DICLOFENAC SODIUM (VOLTAREN) 1 %    Apply 1 application topically 4 (four) times a day    DOXEPIN (SINEQUAN) 50 MG CAPSULE        DULOXETINE (CYMBALTA) 30 MG DELAYED RELEASE CAPSULE        FAMOTIDINE (PEPCID) 40 MG TABLET    Take 40 mg by mouth bedtime    GABAPENTIN  "(NEURONTIN) 100 MG CAPSULE        HYDROCODONE-ACETAMINOPHEN (NORCO) 5-325 MG PER TABLET        HYDROXYCHLOROQUINE (PLAQUENIL) 200 MG TABLET        HYDROXYCHLOROQUINE (PLAQUENIL) 200 MG TABLET    Take 200 mg by mouth 2 (two) times a day    HYDROXYZINE HCL (ATARAX) 50 MG TABLET        LINACLOTIDE (LINZESS PO)    Take 290 mcg by mouth daily before breakfast    METHYLPREDNISOLONE 4 MG TABLET THERAPY PACK    Use as directed on package    ONDANSETRON (ZOFRAN-ODT) 8 MG DISINTEGRATING TABLET        PRAZOSIN (MINIPRESS) 2 MG CAPSULE        PREGABALIN (LYRICA) 150 MG CAPSULE    Take 150 mg by mouth daily Daily at bedtime    RABEPRAZOLE (ACIPHEX) 20 MG TABLET    Take 20 mg by mouth daily    VALACYCLOVIR (VALTREX) 500 MG TABLET    Take 500 mg by mouth daily    VALBENAZINE TOSYLATE 80 MG CAPS    Take 80 mg by mouth daily   Modified Medications    No medications on file   Discontinued Medications    No medications on file       Allergies   Allergen Reactions   • Celecoxib Hives   • Lamotrigine Rash, Hives and Itching     Other reaction(s): rash and itching  Other reaction(s): rash and itching  Other reaction(s): rash and itching        /93 (BP Location: Left arm, Patient Position: Sitting, Cuff Size: Standard)   Pulse 99   Ht 5' 5\" (1.651 m)   Wt 113 kg (249 lb)   BMI 41.44 kg/m²      REVIEW OF SYSTEMS:  Constitutional: Negative.    HEENT: Negative.    Respiratory: Negative.    Skin: Negative.    Neurological: Negative.    Psychiatric/Behavioral: Negative.  Musculoskeletal: Negative except for that mentioned in the HPI.    PHYSICAL EXAM:      /93 (BP Location: Left arm, Patient Position: Sitting, Cuff Size: Standard)   Pulse 99   Ht 5' 5\" (1.651 m)   Wt 113 kg (249 lb)   BMI 41.44 kg/m²   Gen: No acute distress, resting comfortably in bed  HEENT: Eyes clear, moist mucus membranes, hearing intact  Respiratory: No audible wheezing or stridor  Cardiovascular: Well Perfused peripherally, 2+ distal pulse  Abdomen: " nondistended, no peritoneal signs    Left elbow:  Flexion: 140 degrees  Extension: 0 degrees  Pronation: 80 degrees  Supination: 80 degrees    TTP Lateral Epicondyle: negative  TTP Medial Epicondyle: positive  TTP Olecranon: negative  TTP Radial Head: negative  TTP Biceps Tendon: negative    Strength:  Flexion: 5/5  Extension: 5/5  Pronation: 5/5  Supination: 5/5    Pain with resisted wrist extension: negative  Pain with resisted 3rd finger extension: negative  Pain with resisted wrist flexion: positive    Varus laxity: negative  Valgus laxity: negative    Cubital tunnel Tinel's: negative    Radial/median/ulnar nerve intact    Hand warm perfused        Integumentary: prior medial epicondyle surgical incision well healed, large number of healed transverse linear volar forearm lacerations noted bilaterally  Bruising: -  Abrasion: -   Rash -  Laceration: -      IMAGING:  MRI left elbow shows avulsion fracture of medial epicondyle with attached portion of common extensor tendon, possible partial tearing LCL but grossly intact    ASSESSMENT AND PLAN:  48 y.o. female left medial epicondyle avulsion fracture that is painful debilitating and has not improved with conservative management including extended course of physical therapy. Plan for operative repair of common extensor tendon avulsion fracture w possible excision of bony fragment. Risks and benefits discussed and patient did decide to undergo procedure.

## 2024-02-15 LAB
ATRIAL RATE: 89 BPM
P AXIS: 7 DEGREES
PR INTERVAL: 122 MS
QRS AXIS: 5 DEGREES
QRSD INTERVAL: 88 MS
QT INTERVAL: 366 MS
QTC INTERVAL: 445 MS
T WAVE AXIS: 26 DEGREES
VENTRICULAR RATE: 89 BPM

## 2024-02-19 ENCOUNTER — CONSULT (OUTPATIENT)
Dept: PAIN MEDICINE | Facility: CLINIC | Age: 49
End: 2024-02-19
Payer: COMMERCIAL

## 2024-02-19 VITALS
WEIGHT: 249 LBS | BODY MASS INDEX: 41.48 KG/M2 | SYSTOLIC BLOOD PRESSURE: 130 MMHG | HEIGHT: 65 IN | HEART RATE: 83 BPM | DIASTOLIC BLOOD PRESSURE: 90 MMHG

## 2024-02-19 DIAGNOSIS — G89.4 CHRONIC PAIN SYNDROME: Primary | ICD-10-CM

## 2024-02-19 DIAGNOSIS — M51.36 DDD (DEGENERATIVE DISC DISEASE), LUMBAR: ICD-10-CM

## 2024-02-19 DIAGNOSIS — M79.18 MYOFASCIAL PAIN SYNDROME: ICD-10-CM

## 2024-02-19 PROCEDURE — 99204 OFFICE O/P NEW MOD 45 MIN: CPT | Performed by: STUDENT IN AN ORGANIZED HEALTH CARE EDUCATION/TRAINING PROGRAM

## 2024-02-19 NOTE — PROGRESS NOTES
"Assessment:  1. Chronic pain syndrome    2. Myofascial pain syndrome    3. DDD (degenerative disc disease), lumbar      Portions of the record may have been created with voice recognition software. Occasional wrong word or \"sound a like\" substitutions may have occurred due to the inherent limitations of voice recognition software. Read the chart carefully and recognize, using context, where substitutions have occurred. Contact me with any questions.       Plan:  48-year-old female with past medical history of GERD, migraines, HTN, bipolar disorder, s/p R TKA, here for initial evaluation of chronic bilateral axial low back pain.  Patient notes other diffuse body pain and chronic low back pain issues for 15 years, current symptoms were exacerbated while an object impacted her low back area while she was carrying a child while working at a  in August '23. Notes intermittent spasms in low back, denies radicular pain, paresthesias, new/progressive weakness, saddle anesthesia, bbi. She was previously following with pain medicine at [facility] and underwent lumbar MBBs in Dec '23 w/o significant relief.  She has not been to physical therapy for current symptoms. Lumbar spine MRI from July '23 reviewed and shows DDD, facet arthrosis at lower levels. She is taking duloxetine 30 mg daily, lyrica 150 mg daily and is on methadone treatment for RLS and notes these medications do not help low back pain symptoms.    Will schedule for USG trigger point injections for symptom management.    Recommend course of physical therapy for core/back strengthening, myofascial release.      Follow-up in 1 month after injections.       Complete risks and benefits including bleeding, infection, tissue reaction, nerve injury and allergic reaction were discussed. The approach was demonstrated using models and literature was provided. Verbal and written consent was obtained.      History of Present Illness:    The patient is a 48 y.o. female who " presents for consultation in regards to Back Pain.  Symptoms have been present for 15 years, exacerbated in Aug '23. Pain is reported to be 2 on the numeric rating scale.  Symptoms are felt intermittently and worst in the no typical pattern.  Symptoms are characterized as burning, cramping, shooting, sharp, and pressure-like.  Symptoms are associated with no weakness.  Aggravating factors include lying down, standing, bending, sitting, walking, and coughing/sneezing.  Relieving factors include nothing.        Review of Systems:    Review of Systems   Constitutional:  Negative for chills, fatigue and fever.   HENT:  Negative for hearing loss, sinus pain, sore throat and trouble swallowing.    Eyes:  Negative for pain and visual disturbance.   Respiratory:  Negative for shortness of breath and wheezing.    Cardiovascular:  Negative for chest pain and palpitations.   Gastrointestinal:  Negative for abdominal pain, constipation and nausea.   Endocrine: Negative for polydipsia and polyuria.   Genitourinary:  Negative for difficulty urinating.   Musculoskeletal:  Negative for arthralgias, gait problem, joint swelling and myalgias.        Decreased range of motion  Joint stiffness  Pain in extremity- left knee and back   Skin:  Negative for rash.   Neurological:  Negative for dizziness, weakness and headaches.   Hematological:  Does not bruise/bleed easily.   Psychiatric/Behavioral:  Negative for dysphoric mood. The patient is not nervous/anxious.    All other systems reviewed and are negative.        Past Medical History:   Diagnosis Date    Anxiety     Arthritis     Bipolar 1 disorder ([facility])     with bordeline personality    Borderline personality disorder ([facility])     Chronic headaches     Depression     GERD (gastroesophageal reflux disease)     Hypertension     Incontinence     Migraine     Morbid obesity with BMI of 40.0-44.9, adult ([facility])     Small bowel obstruction ([facility]) 6/27/2019    Suicide attempt ([facility])      Symptomatic bradycardia     Urinary tract infection        Past Surgical History:   Procedure Laterality Date    CHOLECYSTECTOMY  05/24/2018    DENTAL SURGERY      ELBOW SURGERY      EXPLORATORY LAPAROTOMY      exlap    FOOT SURGERY Right     KNEE ARTHROSCOPY Right 11/15/2018    total of three procedures with meniscal surgery    MULTIPLE TOOTH EXTRACTIONS  11/02/2018    OVARIAN CYST REMOVAL      REDUCTION MAMMAPLASTY      reduction     SINUS SURGERY Bilateral 04/19/2019    bilateral maxillary antrostomies - Dr. Barajas - [facility]    TOTAL KNEE ARTHROPLASTY REVISION Right 2023       Family History   Problem Relation Age of Onset    Hyperlipidemia Father     Hypertension Father     Migraines Mother     Depression Family     Heart disease Maternal Aunt     Heart disease Maternal Uncle     Diabetes Paternal Aunt     Diabetes Paternal Uncle     Breast cancer Maternal Grandmother     Diabetes Paternal Grandmother     Diabetes Paternal Grandfather     Stroke Neg Hx     Thyroid disease Neg Hx        Social History     Occupational History    Not on file   Tobacco Use    Smoking status: Never    Smokeless tobacco: Never   Vaping Use    Vaping status: Never Used   Substance and Sexual Activity    Alcohol use: Not Currently     Comment: rarely    Drug use: Never    Sexual activity: Not Currently         Current Outpatient Medications:     albuterol (2.5 mg/3 mL) 0.083 % nebulizer solution, Take 3 mL (2.5 mg total) by nebulization every 6 (six) hours as needed for wheezing or shortness of breath, Disp: 90 mL, Rfl: 1    DULoxetine (CYMBALTA) 30 mg delayed release capsule, , Disp: , Rfl:     famotidine (PEPCID) 40 MG tablet, Take 40 mg by mouth bedtime, Disp: , Rfl:     HYDROcodone-acetaminophen (NORCO) 5-325 mg per tablet, , Disp: , Rfl:     hydroxychloroquine (PLAQUENIL) 200 mg tablet, Take 200 mg by mouth 2 (two) times a day, Disp: , Rfl:     hydrOXYzine HCL (ATARAX) 50 mg tablet, , Disp: , Rfl:     methylPREDNISolone 4 MG tablet  "therapy pack, Use as directed on package, Disp: 1 each, Rfl: 0    ondansetron (ZOFRAN-ODT) 8 mg disintegrating tablet, , Disp: , Rfl:     prazosin (MINIPRESS) 2 mg capsule, , Disp: , Rfl:     pregabalin (LYRICA) 150 mg capsule, Take 150 mg by mouth daily Daily at bedtime, Disp: , Rfl:     RABEprazole (ACIPHEX) 20 MG tablet, Take 20 mg by mouth daily, Disp: , Rfl:     valACYclovir (VALTREX) 500 mg tablet, Take 500 mg by mouth daily, Disp: , Rfl:     Allergies   Allergen Reactions    Celecoxib Hives    Lamotrigine Rash, Hives and Itching     Other reaction(s): rash and itching  Other reaction(s): rash and itching  Other reaction(s): rash and itching       Physical Exam:    /90   Pulse 83   Ht 5' 5\" (1.651 m)   Wt 113 kg (249 lb)   BMI 41.44 kg/m²     Constitutional: normal, well developed, well nourished, alert, in no distress and non-toxic and no overt pain behavior.  Eyes: anicteric  HEENT: grossly intact  Neck: supple, symmetric, trachea midline and no masses   Pulmonary:even and unlabored  Cardiovascular:No edema or pitting edema present  Skin:Normal without rashes or lesions and well hydrated  Psychiatric:Mood and affect appropriate  Neurologic:Cranial Nerves II-XII grossly intact  Musculoskeletal:normal gait, pain to palpation over b/l lumbar paraspinal musculature, limited lumbar ROM throughout, intact strength over BLE    Imaging    MRI lumbar spine wo contrast  Order: [id number]  Impression    Impression: Small left subarticular and foraminal disc herniation at L5-S1  causing moderate left foraminal narrowing. Mild spinal stenosis at L4-L5. There  is no significant change since the prior study.                  Workstation:[id number]  Narrative    History: Back pain, spinal stenosis    Procedure: MRI of the lumbar spine was obtained with the following sequences:  Sagittal T1, sagittal T2, sagittal STIR and axial T2 weighted images.    Comparison: Outside examination of 3/9/2018.    Findings: For the " purposes of this dictation, the lumbar vertebrae are labeled  from a caudal to cranial direction, the first vertebra with lumbar morphology is  labeled as L5.    Conus and lower thoracic cord: The conus is normal in position and signal  intensity.    Marrow: There is no abnormal focal marrow replacement on the STIR images.    Alignment: The lumbar vertebra are normal in alignment. The vertebral body  heights are maintained.    L1-L2: There is no spinal stenosis or foraminal narrowing.    L2-L3 : There is no spinal stenosis or foraminal narrowing.    L3-L4: There is mild facet arthrosis without significant spinal stenosis or  foraminal narrowing.    L4-L5: There is mild disc bulging and facet arthrosis causing mild spinal  stenosis without significant foraminal narrowing.    L5-S1: There is a mild disc bulge with a small left subarticular and foraminal  disc herniation, facet arthrosis resulting in moderate left foraminal narrowing.      Orders Placed This Encounter   Procedures    Ambulatory referral to Physical Therapy

## 2024-02-21 PROBLEM — N39.0 RECURRENT UTI (URINARY TRACT INFECTION): Status: RESOLVED | Noted: 2019-01-17 | Resolved: 2024-02-21

## 2024-02-22 RX ORDER — METHADONE HYDROCHLORIDE 5 MG/1
TABLET ORAL
COMMUNITY

## 2024-02-22 RX ORDER — HYDROXYZINE 50 MG/1
200 TABLET, FILM COATED ORAL
COMMUNITY

## 2024-02-22 NOTE — PRE-PROCEDURE INSTRUCTIONS
Pre-Surgery Instructions:   Medication Instructions    albuterol (2.5 mg/3 mL) 0.083 % nebulizer solution Uses PRN- OK to take day of surgery    DULoxetine (CYMBALTA) 30 mg delayed release capsule Take day of surgery.    famotidine (PEPCID) 40 MG tablet Take day of surgery.    HYDROcodone-acetaminophen (NORCO) 5-325 mg per tablet Uses PRN- OK to take day of surgery    hydroxychloroquine (PLAQUENIL) 200 mg tablet Take day of surgery.    hydrOXYzine HCL (ATARAX) 50 mg tablet Take night before surgery    methadone (DOLOPHINE) 5 mg tablet Take night before surgery    ondansetron (ZOFRAN-ODT) 8 mg disintegrating tablet Uses PRN- OK to take day of surgery    prazosin (MINIPRESS) 2 mg capsule Take night before surgery    pregabalin (LYRICA) 150 mg capsule Take day of surgery.    RABEprazole (ACIPHEX) 20 MG tablet Take day of surgery.    valACYclovir (VALTREX) 500 mg tablet Take day of surgery.    Medication instructions for day surgery reviewed. Please use only a sip of water to take your instructed medications. Avoid all over the counter vitamins, supplements and NSAIDS for one week prior to surgery per anesthesia guidelines. Tylenol is ok to take as needed.     You will receive a call one business day prior to surgery with an arrival time and hospital directions. If your surgery is scheduled on a Monday, the hospital will be calling you on the Friday prior to your surgery. If you have not heard from anyone by 8pm, please call the hospital supervisor through the hospital  at [phone]. ([place] [phone] or [place] [phone]).    Do not eat or drink anything after midnight the night before your surgery, including candy, mints, lifesavers, or chewing gum. Do not drink alcohol 24hrs before your surgery. Try not to smoke at least 24hrs before your surgery.       Follow the pre surgery showering instructions as listed in the “My Surgical Experience Booklet” or otherwise provided by your surgeon's office. Do  not use a blade to shave the surgical area 1 week before surgery. It is okay to use a clean electric clippers up to 24 hours before surgery. Do not apply any lotions, creams, including makeup, cologne, deodorant, or perfumes after showering on the day of your surgery. Do not use dry shampoo, hair spray, hair gel, or any type of hair products.     No contact lenses, eye make-up, or artificial eyelashes. Remove nail polish, including gel polish, and any artificial, gel, or acrylic nails if possible. Remove all jewelry including rings and body piercing jewelry.     Wear causal clothing that is easy to take on and off. Consider your type of surgery.    Keep any valuables, jewelry, piercings at home. Please bring any specially ordered equipment (sling, braces) if indicated.    Arrange for a responsible person to drive you to and from the hospital on the day of your surgery. Please confirm the visitor policy for the day of your procedure when you receive your phone call with an arrival time.     Call the surgeon's office with any new illnesses, exposures, or additional questions prior to surgery.    Please reference your “My Surgical Experience Booklet” for additional information to prepare for your upcoming surgery.    Pt instructed to stop nsaids and supplements one week prior to surgery.  Pt verbalized understanding of shower and med instructions.

## 2024-02-29 ENCOUNTER — ANESTHESIA EVENT (OUTPATIENT)
Dept: PERIOP | Facility: HOSPITAL | Age: 49
End: 2024-02-29
Payer: COMMERCIAL

## 2024-02-29 PROBLEM — M19.90 ARTHRITIS: Status: ACTIVE | Noted: 2024-02-29

## 2024-02-29 PROBLEM — T14.91XA SUICIDE ATTEMPT (HCC): Status: ACTIVE | Noted: 2024-02-29

## 2024-03-01 ENCOUNTER — HOSPITAL ENCOUNTER (OUTPATIENT)
Facility: HOSPITAL | Age: 49
Setting detail: OUTPATIENT SURGERY
Discharge: HOME | End: 2024-03-01
Attending: ORTHOPAEDIC SURGERY | Admitting: ORTHOPAEDIC SURGERY
Payer: COMMERCIAL

## 2024-03-01 ENCOUNTER — APPOINTMENT (OUTPATIENT)
Dept: RADIOLOGY | Facility: HOSPITAL | Age: 49
End: 2024-03-01
Payer: COMMERCIAL

## 2024-03-01 ENCOUNTER — ANESTHESIA (OUTPATIENT)
Dept: PERIOP | Facility: HOSPITAL | Age: 49
End: 2024-03-01
Payer: COMMERCIAL

## 2024-03-01 VITALS
OXYGEN SATURATION: 93 % | WEIGHT: 247.5 LBS | TEMPERATURE: 97.4 F | RESPIRATION RATE: 16 BRPM | DIASTOLIC BLOOD PRESSURE: 73 MMHG | HEART RATE: 70 BPM | BODY MASS INDEX: 41.23 KG/M2 | HEIGHT: 65 IN | SYSTOLIC BLOOD PRESSURE: 122 MMHG

## 2024-03-01 DIAGNOSIS — M77.02 MEDIAL EPICONDYLITIS OF ELBOW, LEFT: Primary | ICD-10-CM

## 2024-03-01 LAB
EXT PREGNANCY TEST URINE: NEGATIVE
EXT. CONTROL: NORMAL

## 2024-03-01 PROCEDURE — C1713 ANCHOR/SCREW BN/BN,TIS/BN: HCPCS | Performed by: ORTHOPAEDIC SURGERY

## 2024-03-01 PROCEDURE — 24359 REPAIR ELBOW DEB/ATTCH OPEN: CPT | Performed by: PHYSICIAN ASSISTANT

## 2024-03-01 PROCEDURE — 81025 URINE PREGNANCY TEST: CPT | Performed by: ORTHOPAEDIC SURGERY

## 2024-03-01 PROCEDURE — 73070 X-RAY EXAM OF ELBOW: CPT

## 2024-03-01 PROCEDURE — 24359 REPAIR ELBOW DEB/ATTCH OPEN: CPT | Performed by: ORTHOPAEDIC SURGERY

## 2024-03-01 PROCEDURE — C9290 INJ, BUPIVACAINE LIPOSOME: HCPCS | Performed by: GENERAL PRACTICE

## 2024-03-01 DEVICE — FIBERTAK BICEPS IMPLANT SET
Type: IMPLANTABLE DEVICE | Site: ARM | Status: FUNCTIONAL
Brand: ARTHREX®

## 2024-03-01 RX ORDER — BUPIVACAINE HYDROCHLORIDE 5 MG/ML
INJECTION, SOLUTION EPIDURAL; INTRACAUDAL
Status: COMPLETED | OUTPATIENT
Start: 2024-03-01 | End: 2024-03-01

## 2024-03-01 RX ORDER — FENTANYL CITRATE 50 UG/ML
INJECTION, SOLUTION INTRAMUSCULAR; INTRAVENOUS AS NEEDED
Status: DISCONTINUED | OUTPATIENT
Start: 2024-03-01 | End: 2024-03-01

## 2024-03-01 RX ORDER — GABAPENTIN 300 MG/1
300 CAPSULE ORAL ONCE
Status: COMPLETED | OUTPATIENT
Start: 2024-03-01 | End: 2024-03-01

## 2024-03-01 RX ORDER — CEFAZOLIN SODIUM 2 G/50ML
2000 SOLUTION INTRAVENOUS ONCE
Status: COMPLETED | OUTPATIENT
Start: 2024-03-01 | End: 2024-03-01

## 2024-03-01 RX ORDER — ALBUTEROL SULFATE 2.5 MG/3ML
2.5 SOLUTION RESPIRATORY (INHALATION) ONCE
Status: CANCELLED | OUTPATIENT
Start: 2024-03-01

## 2024-03-01 RX ORDER — CHLORHEXIDINE GLUCONATE 4 G/100ML
SOLUTION TOPICAL DAILY PRN
Status: DISCONTINUED | OUTPATIENT
Start: 2024-03-01 | End: 2024-03-01 | Stop reason: HOSPADM

## 2024-03-01 RX ORDER — SODIUM CHLORIDE, SODIUM LACTATE, POTASSIUM CHLORIDE, CALCIUM CHLORIDE 600; 310; 30; 20 MG/100ML; MG/100ML; MG/100ML; MG/100ML
75 INJECTION, SOLUTION INTRAVENOUS CONTINUOUS
Status: DISCONTINUED | OUTPATIENT
Start: 2024-03-01 | End: 2024-03-01 | Stop reason: HOSPADM

## 2024-03-01 RX ORDER — FENTANYL CITRATE/PF 50 MCG/ML
50 SYRINGE (ML) INJECTION
Status: DISCONTINUED | OUTPATIENT
Start: 2024-03-01 | End: 2024-03-01 | Stop reason: HOSPADM

## 2024-03-01 RX ORDER — SODIUM CHLORIDE, SODIUM LACTATE, POTASSIUM CHLORIDE, CALCIUM CHLORIDE 600; 310; 30; 20 MG/100ML; MG/100ML; MG/100ML; MG/100ML
INJECTION, SOLUTION INTRAVENOUS CONTINUOUS PRN
Status: DISCONTINUED | OUTPATIENT
Start: 2024-03-01 | End: 2024-03-01

## 2024-03-01 RX ORDER — DEXAMETHASONE SODIUM PHOSPHATE 10 MG/ML
INJECTION, SOLUTION INTRAMUSCULAR; INTRAVENOUS AS NEEDED
Status: DISCONTINUED | OUTPATIENT
Start: 2024-03-01 | End: 2024-03-01

## 2024-03-01 RX ORDER — ACETAMINOPHEN 325 MG/1
975 TABLET ORAL ONCE
Status: COMPLETED | OUTPATIENT
Start: 2024-03-01 | End: 2024-03-01

## 2024-03-01 RX ORDER — MIDAZOLAM HYDROCHLORIDE 2 MG/2ML
INJECTION, SOLUTION INTRAMUSCULAR; INTRAVENOUS AS NEEDED
Status: DISCONTINUED | OUTPATIENT
Start: 2024-03-01 | End: 2024-03-01

## 2024-03-01 RX ORDER — PROPOFOL 10 MG/ML
INJECTION, EMULSION INTRAVENOUS AS NEEDED
Status: DISCONTINUED | OUTPATIENT
Start: 2024-03-01 | End: 2024-03-01

## 2024-03-01 RX ORDER — ONDANSETRON 2 MG/ML
4 INJECTION INTRAMUSCULAR; INTRAVENOUS ONCE AS NEEDED
Status: DISCONTINUED | OUTPATIENT
Start: 2024-03-01 | End: 2024-03-01 | Stop reason: HOSPADM

## 2024-03-01 RX ORDER — CEPHALEXIN 500 MG/1
500 CAPSULE ORAL EVERY 6 HOURS SCHEDULED
Qty: 8 CAPSULE | Refills: 0 | Status: SHIPPED | OUTPATIENT
Start: 2024-03-01 | End: 2024-03-03

## 2024-03-01 RX ORDER — LIDOCAINE HYDROCHLORIDE 10 MG/ML
INJECTION, SOLUTION EPIDURAL; INFILTRATION; INTRACAUDAL; PERINEURAL AS NEEDED
Status: DISCONTINUED | OUTPATIENT
Start: 2024-03-01 | End: 2024-03-01

## 2024-03-01 RX ORDER — HYDROMORPHONE HCL/PF 1 MG/ML
0.5 SYRINGE (ML) INJECTION
Status: DISCONTINUED | OUTPATIENT
Start: 2024-03-01 | End: 2024-03-01 | Stop reason: HOSPADM

## 2024-03-01 RX ORDER — TRANEXAMIC ACID 10 MG/ML
1000 INJECTION, SOLUTION INTRAVENOUS ONCE
Status: COMPLETED | OUTPATIENT
Start: 2024-03-01 | End: 2024-03-01

## 2024-03-01 RX ORDER — CHLORHEXIDINE GLUCONATE ORAL RINSE 1.2 MG/ML
15 SOLUTION DENTAL ONCE
Status: COMPLETED | OUTPATIENT
Start: 2024-03-01 | End: 2024-03-01

## 2024-03-01 RX ORDER — PROMETHAZINE HYDROCHLORIDE 25 MG/ML
25 INJECTION, SOLUTION INTRAMUSCULAR; INTRAVENOUS ONCE AS NEEDED
Status: DISCONTINUED | OUTPATIENT
Start: 2024-03-01 | End: 2024-03-01 | Stop reason: HOSPADM

## 2024-03-01 RX ADMIN — BUPIVACAINE 20 ML: 13.3 INJECTION, SUSPENSION, LIPOSOMAL INFILTRATION at 12:54

## 2024-03-01 RX ADMIN — CEFAZOLIN SODIUM 2000 MG: 2 SOLUTION INTRAVENOUS at 14:55

## 2024-03-01 RX ADMIN — PROPOFOL 200 MG: 10 INJECTION, EMULSION INTRAVENOUS at 14:52

## 2024-03-01 RX ADMIN — TRANEXAMIC ACID 1000 MG: 10 INJECTION, SOLUTION INTRAVENOUS at 15:06

## 2024-03-01 RX ADMIN — MIDAZOLAM HYDROCHLORIDE 2 MG: 1 INJECTION, SOLUTION INTRAMUSCULAR; INTRAVENOUS at 12:48

## 2024-03-01 RX ADMIN — LIDOCAINE HYDROCHLORIDE 50 MG: 10 INJECTION, SOLUTION EPIDURAL; INFILTRATION; INTRACAUDAL at 14:52

## 2024-03-01 RX ADMIN — ACETAMINOPHEN 975 MG: 325 TABLET, FILM COATED ORAL at 12:44

## 2024-03-01 RX ADMIN — BUPIVACAINE HYDROCHLORIDE 7 ML: 5 INJECTION, SOLUTION EPIDURAL; INTRACAUDAL; PERINEURAL at 12:54

## 2024-03-01 RX ADMIN — CHLORHEXIDINE GLUCONATE 1 APPLICATION: 213 SOLUTION TOPICAL at 12:32

## 2024-03-01 RX ADMIN — SODIUM CHLORIDE, SODIUM LACTATE, POTASSIUM CHLORIDE, AND CALCIUM CHLORIDE 75 ML/HR: .6; .31; .03; .02 INJECTION, SOLUTION INTRAVENOUS at 12:41

## 2024-03-01 RX ADMIN — DEXAMETHASONE SODIUM PHOSPHATE 10 MG: 10 INJECTION, SOLUTION INTRAMUSCULAR; INTRAVENOUS at 15:05

## 2024-03-01 RX ADMIN — FENTANYL CITRATE 50 MCG: 50 INJECTION, SOLUTION INTRAMUSCULAR; INTRAVENOUS at 12:48

## 2024-03-01 RX ADMIN — FENTANYL CITRATE 50 MCG: 50 INJECTION, SOLUTION INTRAMUSCULAR; INTRAVENOUS at 14:52

## 2024-03-01 RX ADMIN — GABAPENTIN 300 MG: 300 CAPSULE ORAL at 12:44

## 2024-03-01 RX ADMIN — SODIUM CHLORIDE, SODIUM LACTATE, POTASSIUM CHLORIDE, AND CALCIUM CHLORIDE: .6; .31; .03; .02 INJECTION, SOLUTION INTRAVENOUS at 12:54

## 2024-03-01 RX ADMIN — CHLORHEXIDINE GLUCONATE 0.12% ORAL RINSE 15 ML: 1.2 LIQUID ORAL at 12:45

## 2024-03-01 NOTE — INTERVAL H&P NOTE
H&P reviewed. After examining the patient I find no changes in the patients condition since the H&P had been written.    Vitals:    03/01/24 1242   BP: 122/87   Pulse: 72   Resp: 18   Temp: (!) 97.4 °F (36.3 °C)   SpO2: 97%     Plan for left common flexor tendon repair today

## 2024-03-01 NOTE — ANESTHESIA PREPROCEDURE EVALUATION
Procedure:  REPAIR TENDON FOREARM, elbow common flexor tendon, excision of bone fragment and all necessary procedures (Left: Arm Lower)    Relevant Problems   CARDIO   (+) Essential hypertension   (+) Migraine without aura and without status migrainosus, not intractable      GI/HEPATIC   (+) Gastroesophageal reflux disease without esophagitis   (+) Small bowel obstruction (HCC)      MUSCULOSKELETAL   (+) Arthritis      NEURO/PSYCH   (+) Migraine without aura and without status migrainosus, not intractable   (+) Panic disorder   (+) Tension headache      Other   (+) Bipolar 1 disorder, depressed, severe (HCC)   (+) Borderline personality disorder (HCC)   (+) Morbid obesity with BMI of 40.0-44.9, adult (HCC)   (+) Suicide attempt (HCC)        Physical Exam    Airway    Mallampati score: I  TM Distance: >3 FB  Neck ROM: full     Dental    lower dentures and upper dentures    Cardiovascular      Pulmonary      Other Findings  post-pubertal.      Anesthesia Plan  ASA Score- 3     Anesthesia Type- general with ASA Monitors.         Additional Monitors:     Airway Plan:     Comment: Supraclavicular block.       Plan Factors-Exercise tolerance (METS): >4 METS.    Chart reviewed.    Patient summary reviewed.    Patient is not a current smoker.              Induction- intravenous.    Postoperative Plan- Plan for postoperative opioid use.     Informed Consent-   I personally reviewed this patient with the CRNA. Discussed and agreed on the Anesthesia Plan with the CRNA..

## 2024-03-01 NOTE — DISCHARGE INSTR - AVS FIRST PAGE
June Charles DO    Orthopedic Surgery, Shoulder/Elbow and Sports Medicine  [address], [place] [address]  Phone: [phone]    General Post-op Surgical Instructions:    Date of Procedure - 03/01/24     Procedure - Left repair common flexor tendon    Weight Bearing Status -nonweightbearing left arm    DVT Prophylaxis and Duration -not required    PT/OT Instructions -to be discussed at first postop visit    Stitches/Staples - Will be removed at 7-10 days postop; we will then place steristrips on incision site    Wound Care -maintain dressing.  Keep clean and dry.    Xray follow up - to be done at or prior to office visit follow-up if indicated    Additional Info - Please complete your course of antibiotics     Any questions or concerns please call [phone] please!

## 2024-03-01 NOTE — ANESTHESIA PROCEDURE NOTES
Peripheral Block    Patient location during procedure: holding area  Start time: 3/1/2024 12:54 PM  Reason for block: at surgeon's request and post-op pain management  Staffing  Performed by: Surinder Tran MD  Authorized by: Surinder Tran MD    Preanesthetic Checklist  Completed: patient identified, IV checked, site marked, risks and benefits discussed, surgical consent, monitors and equipment checked, pre-op evaluation and timeout performed  Peripheral Block  Prep: ChloraPrep  Patient monitoring: frequent blood pressure checks, continuous pulse oximetry and heart rate  Block type: Supraclavicular  Laterality: left  Injection technique: single-shot  Procedures: ultrasound guided, Ultrasound guidance required for the procedure to increase accuracy and safety of medication placement and decrease risk of complications.  Ultrasound permanent image savedbupivacaine (PF) (MARCAINE) 0.5 % injection 20 mL - Perineural   7 mL - 3/1/2024 12:54:00 PM  bupivacaine liposomal (EXPAREL) 1.3 % injection 20 mL - Perineural   20 mL - 3/1/2024 12:54:00 PM  Needle  Needle type: Stimuplex   Needle localization: anatomical landmarks and ultrasound guidance  Assessment  Injection assessment: incremental injection, frequent aspiration, injected with ease, negative aspiration, negative for heart rate change, no paresthesia on injection, no symptoms of intraneural/intravenous injection and needle tip visualized at all times  Paresthesia pain: none  Post-procedure:  site cleaned  patient tolerated the procedure well with no immediate complications

## 2024-03-01 NOTE — OP NOTE
OPERATIVE REPORT  PATIENT NAME: Amparo Dobbs    :  1975  MRN: [id number]  Pt Location: [facility] OR ROOM 03    SURGERY DATE: 3/1/2024    Surgeons and Role:     * June Charles,  - Primary     * Juve Pryor PA-C - Assisting    Preop Diagnosis:  Medial epicondylitis of elbow, left [M77.02]    Post-Op Diagnosis Codes:     * Medial epicondylitis of elbow, left [M77.02]    Procedure(s):  Left - REPAIR TENDON FOREARM. elbow common flexor tendon. excision of bone fragment and all necessary procedures    Specimen(s):  * No specimens in log *    Estimated Blood Loss:   Minimal    Drains:  * No LDAs found *    Anesthesia Type:   General    Operative Indications:  Medial epicondylitis of elbow, left [M77.02]  Avulsion fracture of the medial epicondyle    Operative Findings:  Chronic small avulsion fracture with displacement of the medial epicondyle involving the anterior portion of the common flexor tendon mass.    Complications:   None    Procedure and Technique:  The patient was seen in the preoperative holding area with operative extremities marked.  She was taken operating room and placed in the supine position.  Her left upper extremity was prepped and draped in usual sterile fashion.  A timeout was called patient was administered Ancef 2 g IV prior to incision.  Using a 15 blade knife incision was made over previous surgical scar over the medial epicondyle.  Subcutaneous dissection was taken down to the common flexor tendon mass.  Under fluoroscopic imaging the avulsion fragment was identified.  Using a combination of a key elevator and a 15 blade knife the fragment was skeletonized and shown to be attached to a portion of the common flexor tendon anteriorly.  This was a significantly small portion of the common flexor tendon.  Also of note the fragment was very small and only measured approximately 1 x 2 mm.  This was not able to be fixed with a screw or suture fixation.  It was decided that this can be excised  and the common flexor tendon advanced up to the medial epicondyle.  The fragment was skeletonized and excised.  The medial epicondyle anteriorly was debrided down to bleeding bone using a rongeur.  Care was taken to palpate and protect the ulnar nerve posteriorly.  A K wire was placed through the medial epicondyle under fluoroscopic imaging the show the tract they will be away from the nerve and the olecranon and coronoid fossa.  Once this was confirmed a 1.8 mm all suture fiber tack double loaded anchor was placed.  The suture was then whipstitched through the common flexor tendon and tied down to the medial epicondyle.  Nice fixation and approximation was achieved.  The wound was thoroughly irrigated with normal saline.  The wound was closed with 2-0 and 3-0 Monocryl.  Exofin was placed.  Mepilex dressings placed.  The patient was placed in regular sling.  There were no complications throughout the case.   I was present for the entire procedure., A qualified resident physician was not available., and A physician assistant was required during the procedure for retraction, tissue handling, dissection and suturing.    Patient Disposition:  PACU         SIGNATURE: June Charles DO  DATE: March 1, 2024  TIME: 4:17 PM

## 2024-03-04 ENCOUNTER — TELEPHONE (OUTPATIENT)
Age: 49
End: 2024-03-04

## 2024-03-04 NOTE — TELEPHONE ENCOUNTER
Called and spoke with the pt and relayed PAIGE Pryor msg.     Pt would like to know how long she should wear the sling for as she is NWB per AVS and if she should start PT or wait til post op visit per AVS (physical therapy had called the patient)    Please advise, thank you

## 2024-03-04 NOTE — TELEPHONE ENCOUNTER
She is currently on methadone, so no prescription narcotics would be prescribed. The tylenol, motrin and ice are appropriate. She may wear the sling for comfort to offload the elbow for soreness.

## 2024-03-04 NOTE — TELEPHONE ENCOUNTER
Caller: Self    Doctor: Melvin    Reason for call: Patient is reporting increased pain since yesterday around a 7-8/10, worse with movement. She has been alternating tylenol and ibuprofen and icing. Is there something that can be ordered for pain or something else she can do?    [place] [address]  Phone: [phone]  Fax: [phone]       Call back#: [phone]

## 2024-03-13 ENCOUNTER — OFFICE VISIT (OUTPATIENT)
Dept: OBGYN CLINIC | Facility: CLINIC | Age: 49
End: 2024-03-13

## 2024-03-13 ENCOUNTER — APPOINTMENT (OUTPATIENT)
Dept: RADIOLOGY | Facility: CLINIC | Age: 49
End: 2024-03-13
Payer: COMMERCIAL

## 2024-03-13 ENCOUNTER — OFFICE VISIT (OUTPATIENT)
Dept: URGENT CARE | Facility: CLINIC | Age: 49
End: 2024-03-13
Payer: COMMERCIAL

## 2024-03-13 VITALS
RESPIRATION RATE: 18 BRPM | HEART RATE: 94 BPM | SYSTOLIC BLOOD PRESSURE: 136 MMHG | OXYGEN SATURATION: 93 % | TEMPERATURE: 98.7 F | DIASTOLIC BLOOD PRESSURE: 65 MMHG

## 2024-03-13 VITALS
WEIGHT: 247 LBS | BODY MASS INDEX: 41.15 KG/M2 | HEIGHT: 65 IN | SYSTOLIC BLOOD PRESSURE: 156 MMHG | HEART RATE: 71 BPM | DIASTOLIC BLOOD PRESSURE: 106 MMHG

## 2024-03-13 DIAGNOSIS — Z47.89 AFTERCARE FOLLOWING SURGERY OF THE MUSCULOSKELETAL SYSTEM: Primary | ICD-10-CM

## 2024-03-13 DIAGNOSIS — S92.514A CLOSED NONDISPLACED FRACTURE OF PROXIMAL PHALANX OF LESSER TOE OF RIGHT FOOT, INITIAL ENCOUNTER: Primary | ICD-10-CM

## 2024-03-13 DIAGNOSIS — S99.921A INJURY OF RIGHT FOOT, INITIAL ENCOUNTER: ICD-10-CM

## 2024-03-13 PROCEDURE — 99024 POSTOP FOLLOW-UP VISIT: CPT | Performed by: ORTHOPAEDIC SURGERY

## 2024-03-13 PROCEDURE — 99213 OFFICE O/P EST LOW 20 MIN: CPT | Performed by: PHYSICIAN ASSISTANT

## 2024-03-13 PROCEDURE — 73630 X-RAY EXAM OF FOOT: CPT

## 2024-03-13 RX ORDER — CEPHALEXIN 500 MG/1
500 CAPSULE ORAL EVERY 6 HOURS SCHEDULED
Qty: 28 CAPSULE | Refills: 0 | Status: SHIPPED | OUTPATIENT
Start: 2024-03-13 | End: 2024-03-20

## 2024-03-13 NOTE — PROGRESS NOTES
[facility]TH[facility]ARE [facility] [address] [id number] [phone]       Amparo Maxigley  [id number]  1975    ORTHOPAEDIC SURGERY OUTPATIENT NOTE  3/13/2024      HISTORY:  48 y.o. female presents for 1 week follow-up status post left elbow medial epicondyle excision of fragment and repair of common flexor tendon.  Patient has noticed that her dressing was causing significant pruritus and was instructed to remove the dressing.  She then noticed that there was redness and swelling distal to the incision a few days later.  She is concerned of infection.  She complains of no pain.    Past Medical History:   Diagnosis Date    Anxiety     Arthritis     Bipolar 1 disorder ([facility])     with bordeline personality    Borderline personality disorder ([facility])     Chronic headaches     Depression     GERD (gastroesophageal reflux disease)     Hypertension     Incontinence     Migraine     Morbid obesity with BMI of 40.0-44.9, adult ([facility])     Sleep apnea     Small bowel obstruction ([facility]) 06/27/2019    Suicide attempt ([facility]) 2/29/2024    Symptomatic bradycardia     Urinary tract infection        Past Surgical History:   Procedure Laterality Date    CHOLECYSTECTOMY  05/24/2018    DENTAL SURGERY      ELBOW SURGERY      EXPLORATORY LAPAROTOMY      exlap    FOOT SURGERY Right     KNEE ARTHROSCOPY Right 11/15/2018    total of three procedures with meniscal surgery    MULTIPLE TOOTH EXTRACTIONS  11/02/2018    OVARIAN CYST REMOVAL      TX TNOT ELBOW LATERAL/MEDIAL DEBRIDE OPEN TDN RPR Left 3/1/2024    Procedure: REPAIR TENDON FOREARM, elbow common flexor tendon, excision of bone fragment and all necessary procedures;  Surgeon: June Charles DO;  Location:  MAIN OR;  Service: Orthopedics    REDUCTION MAMMAPLASTY      reduction     SINUS SURGERY Bilateral 04/19/2019    bilateral maxillary antrostomies - Dr. Barajas - [facility]    TOTAL KNEE ARTHROPLASTY REVISION Right 2023        Social History     Socioeconomic History    Marital status: /Civil Union     Spouse name: Not on file    Number of children: Not on file    Years of education: Not on file    Highest education level: Not on file   Occupational History    Not on file   Tobacco Use    Smoking status: Never    Smokeless tobacco: Never   Vaping Use    Vaping status: Never Used   Substance and Sexual Activity    Alcohol use: Not Currently    Drug use: Never    Sexual activity: Not Currently   Other Topics Concern    Not on file   Social History Narrative    Daily caffeine use- 1 cup of coffee     Social Determinants of Health     Financial Resource Strain: Medium Risk (10/3/2023)    Received from [facility]    Overall Financial Resource Strain (CARDIA)     Difficulty of Paying Living Expenses: Somewhat hard   Food Insecurity: Food Insecurity Present (10/3/2023)    Received from [facility]    Hunger Vital Sign     Worried About Running Out of Food in the Last Year: Sometimes true     Ran Out of Food in the Last Year: Never true   Transportation Needs: No Transportation Needs (10/3/2023)    Received from [facility]    PRAPARE - Transportation     Lack of Transportation (Medical): No     Lack of Transportation (Non-Medical): No   Physical Activity: Inactive (10/3/2023)    Received from [facility]    Exercise Vital Sign     Days of Exercise per Week: 0 days     Minutes of Exercise per Session: 0 min   Stress: Stress Concern Present (10/3/2023)    Received from [facility]    [employer] - Occupational Stress Questionnaire     Feeling of Stress : Very much   Social Connections: Socially Integrated (10/3/2023)    Received from [facility]    Social Connection and Isolation Panel [NHANES]     Frequency of Communication with Friends and Family: More than three times a week     Frequency of Social  Gatherings with Friends and Family: Twice a week     Attends Scientology Services: More than 4 times per year     Active Member of Clubs or Organizations: Yes     Attends Club or Organization Meetings: 1 to 4 times per year     Marital Status:    Intimate Partner Violence: Not At Risk (10/3/2023)    Received from [facility]    Humiliation, Afraid, Rape, and Kick questionnaire     Fear of Current or Ex-Partner: No     Emotionally Abused: No     Physically Abused: No     Sexually Abused: No   Housing Stability: Low Risk  (10/3/2023)    Received from [facility]    Housing Stability Vital Sign     Unable to Pay for Housing in the Last Year: No     Number of Places Lived in the Last Year: 1     Unstable Housing in the Last Year: No       Family History   Problem Relation Age of Onset    Hyperlipidemia Father     Hypertension Father     Migraines Mother     Depression Family     Heart disease Maternal Aunt     Heart disease Maternal Uncle     Diabetes Paternal Aunt     Diabetes Paternal Uncle     Breast cancer Maternal Grandmother     Diabetes Paternal Grandmother     Diabetes Paternal Grandfather     Stroke Neg Hx     Thyroid disease Neg Hx         Patient's Medications   New Prescriptions    CEPHALEXIN (KEFLEX) 500 MG CAPSULE    Take 1 capsule (500 mg total) by mouth every 6 (six) hours for 7 days   Previous Medications    ALBUTEROL (2.5 MG/3 ML) 0.083 % NEBULIZER SOLUTION    Take 3 mL (2.5 mg total) by nebulization every 6 (six) hours as needed for wheezing or shortness of breath    DULOXETINE (CYMBALTA) 30 MG DELAYED RELEASE CAPSULE    daily    FAMOTIDINE (PEPCID) 40 MG TABLET    Take 40 mg by mouth 2 (two) times a day bedtime    HYDROCODONE-ACETAMINOPHEN (NORCO) 5-325 MG PER TABLET    every 4 (four) hours as needed    HYDROXYCHLOROQUINE (PLAQUENIL) 200 MG TABLET    Take 200 mg by mouth 2 (two) times a day    HYDROXYZINE HCL (ATARAX) 50 MG TABLET    Take 200 mg by mouth daily  "at bedtime    METHADONE (DOLOPHINE) 5 MG TABLET    Take by mouth daily at bedtime    ONDANSETRON (ZOFRAN-ODT) 8 MG DISINTEGRATING TABLET    every 8 (eight) hours as needed    PRAZOSIN (MINIPRESS) 2 MG CAPSULE    4 mg daily at bedtime    PREGABALIN (LYRICA) 150 MG CAPSULE    Take 150 mg by mouth 2 (two) times a day    RABEPRAZOLE (ACIPHEX) 20 MG TABLET    Take 20 mg by mouth daily    VALACYCLOVIR (VALTREX) 500 MG TABLET    Take 500 mg by mouth daily   Modified Medications    No medications on file   Discontinued Medications    No medications on file       Allergies   Allergen Reactions    Celecoxib Hives    Lamotrigine Rash, Hives and Itching     Other reaction(s): rash and itching  Other reaction(s): rash and itching  Other reaction(s): rash and itching        BP (!) 156/106 (BP Location: Right arm, Patient Position: Sitting, Cuff Size: Standard)   Pulse 71   Ht 5' 5\" (1.651 m)   Wt 112 kg (247 lb)   LMP 09/11/2023   BMI 41.10 kg/m²      REVIEW OF SYSTEMS:  Constitutional: Negative.    HEENT: Negative.    Respiratory: Negative.    Skin: Negative.    Neurological: Negative.    Psychiatric/Behavioral: Negative.  Musculoskeletal: Negative except for that mentioned in the HPI.    [unfilled]     PHYSICAL EXAM: Left elbow: Incision site is intact.  There is no sign of drainage.  There is erythema and swelling at the distal end of the incision.  This measures approximately 3 x 3 cm.  There is no fluctuance.  Patient does have full range of motion of the elbow with minimal discomfort.    IMAGING: No images taken in office today    ASSESSMENT AND PLAN:  48 y.o. female 1 week status post left elbow excision of avulsion fragment of the medial epicondyle and repair of the common flexor tendon.    Patient does appear to have alysha-incisional infection developing postoperatively.  She was prescribed Keflex by mouth for 7 days.  She was instructed to keep the wound clean and dry.  She is to follow-up next week for wound " check.  She was told to come back sooner if her symptoms worsen.

## 2024-03-13 NOTE — LETTER
March 13, 2024     Patient: Amparo Dobbs  YOB: 1975  Date of Visit: 3/13/2024      To Whom it May Concern:    Amparo Dobbs is under my professional care. Amparo was seen in my office on 3/13/2024. Amparo may return to work with no restrictions .    If you have any questions or concerns, please don't hesitate to call.         Sincerely,          June Charles DO        CC: No Recipients

## 2024-03-13 NOTE — PROGRESS NOTES
[facility]    NAME: Amparo Dobbs is a 48 y.o. female  : 1975    MRN: [id number]  DATE: 2024  TIME: 9:05 AM    Assessment and Plan   Closed nondisplaced fracture of proximal phalanx of lesser toe of right foot, initial encounter [S92.514A]  1. Closed nondisplaced fracture of proximal phalanx of lesser toe of right foot, initial encounter  XR foot 3+ vw right    Ambulatory Referral to Podiatry          Patient Instructions     Patient Instructions   Post op shoe or comfortable shoe.  Ice, elevate, ibuprofen.    Follow up with podiatry      Chief Complaint     Chief Complaint   Patient presents with    Foot Injury     Right foot, happened Saturday, kicked leg of bed        History of Present Illness   48-year-old female here with complaint of right foot pain.  Stubbed her right little toe on her bed 5 days ago.  Still painful and swollen.  Ecchymotic and bruised.        Review of Systems   Review of Systems   Constitutional:  Negative for chills and fever.   Respiratory:  Negative for cough and shortness of breath.    Cardiovascular:  Negative for chest pain.   Musculoskeletal:         Right little toe pain, injury, see HPI       Current Medications     Current Outpatient Medications:     DULoxetine (CYMBALTA) 30 mg delayed release capsule, daily, Disp: , Rfl:     famotidine (PEPCID) 40 MG tablet, Take 40 mg by mouth 2 (two) times a day bedtime, Disp: , Rfl:     hydroxychloroquine (PLAQUENIL) 200 mg tablet, Take 200 mg by mouth 2 (two) times a day, Disp: , Rfl:     hydrOXYzine HCL (ATARAX) 50 mg tablet, Take 200 mg by mouth daily at bedtime, Disp: , Rfl:     methadone (DOLOPHINE) 5 mg tablet, Take by mouth daily at bedtime, Disp: , Rfl:     ondansetron (ZOFRAN-ODT) 8 mg disintegrating tablet, every 8 (eight) hours as needed, Disp: , Rfl:     prazosin (MINIPRESS) 2 mg capsule, 4 mg daily at bedtime, Disp: , Rfl:     pregabalin (LYRICA) 150 mg capsule, Take 150 mg by mouth 2 (two) times a  day, Disp: , Rfl:     RABEprazole (ACIPHEX) 20 MG tablet, Take 20 mg by mouth daily, Disp: , Rfl:     valACYclovir (VALTREX) 500 mg tablet, Take 500 mg by mouth daily, Disp: , Rfl:     albuterol (2.5 mg/3 mL) 0.083 % nebulizer solution, Take 3 mL (2.5 mg total) by nebulization every 6 (six) hours as needed for wheezing or shortness of breath (Patient not taking: Reported on 3/13/2024), Disp: 90 mL, Rfl: 1    HYDROcodone-acetaminophen (NORCO) 5-325 mg per tablet, every 4 (four) hours as needed (Patient not taking: Reported on 3/13/2024), Disp: , Rfl:     Current Allergies     Allergies as of 03/13/2024 - Reviewed 03/13/2024   Allergen Reaction Noted    Celecoxib Hives 11/11/2019    Lamotrigine Rash, Hives, and Itching 02/24/2010          The following portions of the patient's history were reviewed and updated as appropriate: allergies, current medications, past family history, past medical history, past social history, past surgical history and problem list.   Past Medical History:   Diagnosis Date    Anxiety     Arthritis     Bipolar 1 disorder ([facility])     with bordeline personality    Borderline personality disorder ([facility])     Chronic headaches     Depression     GERD (gastroesophageal reflux disease)     Hypertension     Incontinence     Migraine     Morbid obesity with BMI of 40.0-44.9, adult ([facility])     Sleep apnea     Small bowel obstruction ([facility]) 06/27/2019    Suicide attempt ([facility]) 2/29/2024    Symptomatic bradycardia     Urinary tract infection      Past Surgical History:   Procedure Laterality Date    CHOLECYSTECTOMY  05/24/2018    DENTAL SURGERY      ELBOW SURGERY      EXPLORATORY LAPAROTOMY      exlap    FOOT SURGERY Right     KNEE ARTHROSCOPY Right 11/15/2018    total of three procedures with meniscal surgery    MULTIPLE TOOTH EXTRACTIONS  11/02/2018    OVARIAN CYST REMOVAL      DE TNOT ELBOW LATERAL/MEDIAL DEBRIDE OPEN TDN RPR Left 3/1/2024    Procedure: REPAIR TENDON FOREARM, elbow common flexor tendon,  excision of bone fragment and all necessary procedures;  Surgeon: June Charles DO;  Location:  MAIN OR;  Service: Orthopedics    REDUCTION MAMMAPLASTY      reduction     SINUS SURGERY Bilateral 04/19/2019    bilateral maxillary antrostomies - Dr. Barajas - [facility]    TOTAL KNEE ARTHROPLASTY REVISION Right 2023     Family History   Problem Relation Age of Onset    Hyperlipidemia Father     Hypertension Father     Migraines Mother     Depression Family     Heart disease Maternal Aunt     Heart disease Maternal Uncle     Diabetes Paternal Aunt     Diabetes Paternal Uncle     Breast cancer Maternal Grandmother     Diabetes Paternal Grandmother     Diabetes Paternal Grandfather     Stroke Neg Hx     Thyroid disease Neg Hx      Social History     Socioeconomic History    Marital status: /Civil Union     Spouse name: Not on file    Number of children: Not on file    Years of education: Not on file    Highest education level: Not on file   Occupational History    Not on file   Tobacco Use    Smoking status: Never    Smokeless tobacco: Never   Vaping Use    Vaping status: Never Used   Substance and Sexual Activity    Alcohol use: Not Currently    Drug use: Never    Sexual activity: Not Currently   Other Topics Concern    Not on file   Social History Narrative    Daily caffeine use- 1 cup of coffee     Social Determinants of Health     Financial Resource Strain: Medium Risk (10/3/2023)    Received from [facility]    Overall Financial Resource Strain (CARDIA)     Difficulty of Paying Living Expenses: Somewhat hard   Food Insecurity: Food Insecurity Present (10/3/2023)    Received from [facility]    Hunger Vital Sign     Worried About Running Out of Food in the Last Year: Sometimes true     Ran Out of Food in the Last Year: Never true   Transportation Needs: No Transportation Needs (10/3/2023)    Received from [facility]    PRAPARE - Transportation     Lack of  Transportation (Medical): No     Lack of Transportation (Non-Medical): No   Physical Activity: Inactive (10/3/2023)    Received from [facility]    Exercise Vital Sign     Days of Exercise per Week: 0 days     Minutes of Exercise per Session: 0 min   Stress: Stress Concern Present (10/3/2023)    Received from [facility]    [employer] - Occupational Stress Questionnaire     Feeling of Stress : Very much   Social Connections: Socially Integrated (10/3/2023)    Received from [facility]    Social Connection and Isolation Panel [NHANES]     Frequency of Communication with Friends and Family: More than three times a week     Frequency of Social Gatherings with Friends and Family: Twice a week     Attends Catholic Services: More than 4 times per year     Active Member of Clubs or Organizations: Yes     Attends Club or Organization Meetings: 1 to 4 times per year     Marital Status:    Intimate Partner Violence: Not At Risk (10/3/2023)    Received from [facility]    Humiliation, Afraid, Rape, and Kick questionnaire     Fear of Current or Ex-Partner: No     Emotionally Abused: No     Physically Abused: No     Sexually Abused: No   Housing Stability: Low Risk  (10/3/2023)    Received from [facility]    Housing Stability Vital Sign     Unable to Pay for Housing in the Last Year: No     Number of Places Lived in the Last Year: 1     Unstable Housing in the Last Year: No     Medications have been verified.    Objective   /65   Pulse 94   Temp 98.7 °F (37.1 °C)   Resp 18   LMP 09/11/2023   SpO2 93%      Physical Exam   Physical Exam  Vitals and nursing note reviewed.   Constitutional:       Appearance: Normal appearance.   Cardiovascular:      Rate and Rhythm: Normal rate and regular rhythm.      Pulses: Normal pulses.      Heart sounds: No murmur heard.  Pulmonary:      Effort: Pulmonary  effort is normal.   Musculoskeletal:      Cervical back: Normal range of motion.      Right foot: Normal capillary refill. Swelling and tenderness present. Normal pulse.        Feet:    Neurological:      Mental Status: She is alert.

## 2024-03-15 ENCOUNTER — TELEPHONE (OUTPATIENT)
Dept: OBGYN CLINIC | Facility: MEDICAL CENTER | Age: 49
End: 2024-03-15

## 2024-03-15 NOTE — TELEPHONE ENCOUNTER
Caller: Patient    Doctor: Melvin    Reason for call: Patient cannot come in, in the AM hours, needs PM. Next available not until April 1rst. Patient said to leave him a messages because he was follow her infection.    Doctor goes on vacation.    Call back#: [phone]

## 2024-03-21 ENCOUNTER — OFFICE VISIT (OUTPATIENT)
Dept: OBGYN CLINIC | Facility: CLINIC | Age: 49
End: 2024-03-21

## 2024-03-21 ENCOUNTER — TELEPHONE (OUTPATIENT)
Age: 49
End: 2024-03-21

## 2024-03-21 VITALS
DIASTOLIC BLOOD PRESSURE: 96 MMHG | SYSTOLIC BLOOD PRESSURE: 134 MMHG | HEART RATE: 73 BPM | WEIGHT: 247 LBS | BODY MASS INDEX: 41.15 KG/M2 | HEIGHT: 65 IN

## 2024-03-21 DIAGNOSIS — Z47.89 AFTERCARE FOLLOWING SURGERY OF THE MUSCULOSKELETAL SYSTEM: ICD-10-CM

## 2024-03-21 DIAGNOSIS — M77.02 MEDIAL EPICONDYLITIS OF ELBOW, LEFT: Primary | ICD-10-CM

## 2024-03-21 DIAGNOSIS — S42.442A CLOSED DISPLACED AVULSION FRACTURE OF MEDIAL EPICONDYLE OF LEFT HUMERUS, INITIAL ENCOUNTER: ICD-10-CM

## 2024-03-21 PROCEDURE — 99024 POSTOP FOLLOW-UP VISIT: CPT | Performed by: PHYSICIAN ASSISTANT

## 2024-03-21 NOTE — PROGRESS NOTES
[facility]TH[facility] [address] [id number] [phone]       Amparo Maxigley  [id number]  1975    ORTHOPAEDIC SURGERY OUTPATIENT NOTE  3/21/2024      HISTORY:  48 y.o. female presents for postoperative visit wound check for her left medial epicondylar excision avulsion and repair common flexor tendon.  She reports significant improvement in pain and redness in the elbow.  Pain is much better controlled.  No numbness or tingling in the hand.  No fever or chills.    Past Medical History:   Diagnosis Date    Anxiety     Arthritis     Bipolar 1 disorder (Formerly [facility])     with bordeline personality    Borderline personality disorder (Formerly [facility])     Chronic headaches     Depression     GERD (gastroesophageal reflux disease)     Hypertension     Incontinence     Migraine     Morbid obesity with BMI of 40.0-44.9, adult (Formerly [facility])     Sleep apnea     Small bowel obstruction (Formerly [facility]) 06/27/2019    Suicide attempt (Formerly [facility]) 2/29/2024    Symptomatic bradycardia     Urinary tract infection        Past Surgical History:   Procedure Laterality Date    CHOLECYSTECTOMY  05/24/2018    DENTAL SURGERY      ELBOW SURGERY      EXPLORATORY LAPAROTOMY      exlap    FOOT SURGERY Right     KNEE ARTHROSCOPY Right 11/15/2018    total of three procedures with meniscal surgery    MULTIPLE TOOTH EXTRACTIONS  11/02/2018    OVARIAN CYST REMOVAL      IA TNOT ELBOW LATERAL/MEDIAL DEBRIDE OPEN TDN RPR Left 3/1/2024    Procedure: REPAIR TENDON FOREARM, elbow common flexor tendon, excision of bone fragment and all necessary procedures;  Surgeon: June Charles DO;  Location:  MAIN OR;  Service: Orthopedics    REDUCTION MAMMAPLASTY      reduction     SINUS SURGERY Bilateral 04/19/2019    bilateral maxillary antrostomies - Dr. Barajas - [facility]    TOTAL KNEE ARTHROPLASTY REVISION Right 2023       Social History     Socioeconomic History    Marital status: /Civil Union     Spouse name: Not  on file    Number of children: Not on file    Years of education: Not on file    Highest education level: Not on file   Occupational History    Not on file   Tobacco Use    Smoking status: Never    Smokeless tobacco: Never   Vaping Use    Vaping status: Never Used   Substance and Sexual Activity    Alcohol use: Not Currently    Drug use: Never    Sexual activity: Not Currently   Other Topics Concern    Not on file   Social History Narrative    Daily caffeine use- 1 cup of coffee     Social Determinants of Health     Financial Resource Strain: Medium Risk (10/3/2023)    Received from [facility]    Overall Financial Resource Strain (CARDIA)     Difficulty of Paying Living Expenses: Somewhat hard   Food Insecurity: Food Insecurity Present (10/3/2023)    Received from [facility]    Hunger Vital Sign     Worried About Running Out of Food in the Last Year: Sometimes true     Ran Out of Food in the Last Year: Never true   Transportation Needs: No Transportation Needs (10/3/2023)    Received from [facility]    PRAPARE - Transportation     Lack of Transportation (Medical): No     Lack of Transportation (Non-Medical): No   Physical Activity: Inactive (10/3/2023)    Received from [facility]    Exercise Vital Sign     Days of Exercise per Week: 0 days     Minutes of Exercise per Session: 0 min   Stress: Stress Concern Present (10/3/2023)    Received from [facility]    [employer] [employer] - Occupational Stress Questionnaire     Feeling of Stress : Very much   Social Connections: Socially Integrated (10/3/2023)    Received from [facility]    Social Connection and Isolation Panel [NHANES]     Frequency of Communication with Friends and Family: More than three times a week     Frequency of Social Gatherings with Friends and Family: Twice a week     Attends Ca[employer] Services: More than 4 times per year      Active Member of Clubs or Organizations: Yes     Attends Club or Organization Meetings: 1 to 4 times per year     Marital Status:    Intimate Partner Violence: Not At Risk (10/3/2023)    Received from [facility]    Humiliation, Afraid, Rape, and Kick questionnaire     Fear of Current or Ex-Partner: No     Emotionally Abused: No     Physically Abused: No     Sexually Abused: No   Housing Stability: Low Risk  (10/3/2023)    Received from [facility]    Housing Stability Vital Sign     Unable to Pay for Housing in the Last Year: No     Number of Places Lived in the Last Year: 1     Unstable Housing in the Last Year: No       Family History   Problem Relation Age of Onset    Hyperlipidemia Father     Hypertension Father     Migraines Mother     Depression Family     Heart disease Maternal Aunt     Heart disease Maternal Uncle     Diabetes Paternal Aunt     Diabetes Paternal Uncle     Breast cancer Maternal Grandmother     Diabetes Paternal Grandmother     Diabetes Paternal Grandfather     Stroke Neg Hx     Thyroid disease Neg Hx         Patient's Medications   New Prescriptions    No medications on file   Previous Medications    ALBUTEROL (2.5 MG/3 ML) 0.083 % NEBULIZER SOLUTION    Take 3 mL (2.5 mg total) by nebulization every 6 (six) hours as needed for wheezing or shortness of breath    DULOXETINE (CYMBALTA) 30 MG DELAYED RELEASE CAPSULE    daily    FAMOTIDINE (PEPCID) 40 MG TABLET    Take 40 mg by mouth 2 (two) times a day bedtime    HYDROCODONE-ACETAMINOPHEN (NORCO) 5-325 MG PER TABLET    every 4 (four) hours as needed    HYDROXYCHLOROQUINE (PLAQUENIL) 200 MG TABLET    Take 200 mg by mouth 2 (two) times a day    HYDROXYZINE HCL (ATARAX) 50 MG TABLET    Take 200 mg by mouth daily at bedtime    METHADONE (DOLOPHINE) 5 MG TABLET    Take by mouth daily at bedtime    ONDANSETRON (ZOFRAN-ODT) 8 MG DISINTEGRATING TABLET    every 8 (eight) hours as needed    PRAZOSIN (MINIPRESS) 2  "MG CAPSULE    4 mg daily at bedtime    PREGABALIN (LYRICA) 150 MG CAPSULE    Take 150 mg by mouth 2 (two) times a day    RABEPRAZOLE (ACIPHEX) 20 MG TABLET    Take 20 mg by mouth daily    VALACYCLOVIR (VALTREX) 500 MG TABLET    Take 500 mg by mouth daily   Modified Medications    No medications on file   Discontinued Medications    No medications on file       Allergies   Allergen Reactions    Celecoxib Hives    Lamotrigine Rash, Hives and Itching     Other reaction(s): rash and itching  Other reaction(s): rash and itching  Other reaction(s): rash and itching        /96 (BP Location: Right arm, Patient Position: Sitting, Cuff Size: Standard)   Pulse 73   Ht 5' 5\" (1.651 m)   Wt 112 kg (247 lb)   LMP 09/11/2023   BMI 41.10 kg/m²      REVIEW OF SYSTEMS:  Constitutional: Negative.    HEENT: Negative.    Respiratory: Negative.    Skin: Negative.    Neurological: Negative.    Psychiatric/Behavioral: Negative.  Musculoskeletal: Negative except for that mentioned in the HPI.    /96 (BP Location: Right arm, Patient Position: Sitting, Cuff Size: Standard)   Pulse 73   Ht 5' 5\" (1.651 m)   Wt 112 kg (247 lb)   LMP 09/11/2023   BMI 41.10 kg/m²   Gen: No acute distress, resting comfortably in bed  HEENT: Eyes clear, moist mucus membranes, hearing intact  Respiratory: No audible wheezing or stridor  Cardiovascular: Well Perfused peripherally, 2+ distal pulse  Abdomen: nondistended, no peritoneal signs     PHYSICAL EXAM:    Left elbow:  Incision is well-healed aside from small scab over the distal portion.  There is decreased erythema and edema.  There is no fluctuance or induration of the wound  No drainage expressed  Elbow range of motion 5 degrees short of extension, 120 degrees of flexion  Full supination and pronation  Strength not evaluated.    IMAGING: None    ASSESSMENT AND PLAN:  48 y.o. female status post left elbow medial condylar excision avulsion fracture repair, flexor tendon.  Her wound " appears much improved today.  She has completed a course of Keflex.  She will keep this clean and dry.  We discussed local wound care but to avoid ointments or lotions.  Will see her back in 1 week to assess continued wound healing.  She will start on therapy.

## 2024-03-21 NOTE — TELEPHONE ENCOUNTER
Caller: Ramírez hernandez/ [facility]     Doctor: Melvin    Reason for call: They have received a referral for Physical Therapy, but state they require it to be for Occupational Therapy instead.    Fax#: [phone] Attn [place]    Call back#: [phone]

## 2024-03-22 ENCOUNTER — OFFICE VISIT (OUTPATIENT)
Dept: PODIATRY | Facility: CLINIC | Age: 49
End: 2024-03-22
Payer: COMMERCIAL

## 2024-03-22 VITALS
DIASTOLIC BLOOD PRESSURE: 78 MMHG | HEART RATE: 81 BPM | HEIGHT: 65 IN | SYSTOLIC BLOOD PRESSURE: 128 MMHG | WEIGHT: 247 LBS | BODY MASS INDEX: 41.15 KG/M2

## 2024-03-22 DIAGNOSIS — S92.514A CLOSED NONDISPLACED FRACTURE OF PROXIMAL PHALANX OF LESSER TOE OF RIGHT FOOT, INITIAL ENCOUNTER: ICD-10-CM

## 2024-03-22 DIAGNOSIS — M79.674 TOE PAIN, RIGHT: Primary | ICD-10-CM

## 2024-03-22 PROCEDURE — 99203 OFFICE O/P NEW LOW 30 MIN: CPT | Performed by: STUDENT IN AN ORGANIZED HEALTH CARE EDUCATION/TRAINING PROGRAM

## 2024-03-22 NOTE — PATIENT INSTRUCTIONS
Foot Fracture in Adults   WHAT YOU NEED TO KNOW:   A foot fracture is a break in a bone in your foot.       DISCHARGE INSTRUCTIONS:   Call your local emergency number (911 in the [place]) if:   You suddenly feel lightheaded and short of breath.    You have chest pain when you take a deep breath or cough.    You cough up blood.    Return to the emergency department if:   The pain in your injured foot gets worse even after you rest and take pain medicine.    The skin or toes of your foot become numb, swollen, cold, white, or blue.    You have more pain or swelling than you did before a cast was put on.    Your leg feels warm, tender, and painful. It may look swollen and red.    Call your doctor if:   You have a fever.    You have new sores around your boot, cast, or splint.    You have new or worsening trouble moving your foot.    You notice a foul smell coming from under your cast.    Your boot, cast, or splint gets damaged.    You have questions or concerns about your condition or care.    Medicines:  You may need any of the following:  Antibiotics  help treat or prevent an infection caused by bacteria.    NSAIDs , such as ibuprofen, help decrease swelling, pain, and fever. NSAIDs can cause stomach bleeding or kidney problems in certain people. If you take blood thinner medicine, always ask your healthcare provider if NSAIDs are safe for you. Always read the medicine label and follow directions.    Prescription pain medicine  may be given. Ask your healthcare provider how to take this medicine safely. Some prescription pain medicines contain acetaminophen. Do not take other medicines that contain acetaminophen without talking to your healthcare provider. Too much acetaminophen may cause liver damage. Prescription pain medicine may cause constipation. Ask your healthcare provider how to prevent or treat constipation.     Take your medicine as directed.  Contact your healthcare provider if you think your medicine is not  helping or if you have side effects. Tell your provider if you are allergic to any medicine. Keep a list of the medicines, vitamins, and herbs you take. Include the amounts, and when and why you take them. Bring the list or the pill bottles to follow-up visits. Carry your medicine list with you in case of an emergency.    Self-care:   Rest  your foot and avoid activities that cause pain.    Apply ice  to decrease swelling and pain, and to prevent tissue damage. Use an ice pack, or put crushed ice in a plastic bag. Cover it with a towel before you apply it. Use ice for 15 to 20 minutes every hour or as directed.    Elevate your foot  above the level of your heart as often as you can. This will help decrease swelling and pain. Prop your foot on pillows or blankets to keep it elevated comfortably.         Physical therapy  may be needed when your foot has healed. A physical therapist can teach you exercises to help improve movement and strength, and to decrease pain.    Cast or splint care:   Ask when it is okay to take a bath or shower. Do not let your cast or splint get wet. Before you bathe, cover the cast or splint with a plastic bag. Tape the bag to your skin above the splint to seal out water. Keep your foot out of the water in case the bag leaks.    Check the skin around your splint daily for any redness or open areas.    Do not use a sharp or pointed object to scratch your skin under the splint.    Do not remove your splint unless your healthcare provider or orthopedic surgeon says it is okay.    Assistive devices:  You may be given a hard-soled shoe to wear while your foot is healing. You also may need to use crutches to help you walk while your foot heals. It is important to use your crutches correctly. Ask for more information about how to use crutches.  Follow up with your doctor or bone specialist as directed:  You may need to return to have your splint or stitches removed. You may also need to return for  tests to make sure your foot is healing. Write down your questions so you remember to ask them during your visits.  © Copyright [employer] 2023 Information is for End User's use only and may not be sold, redistributed or otherwise used for commercial purposes.  The above information is an  only. It is not intended as medical advice for individual conditions or treatments. Talk to your doctor, nurse or pharmacist before following any medical regimen to see if it is safe and effective for you.

## 2024-03-22 NOTE — PROGRESS NOTES
Assessment/Plan:    No problem-specific Assessment & Plan notes found for this encounter.       Diagnoses and all orders for this visit:    Toe pain, right    Closed nondisplaced fracture of proximal phalanx of lesser toe of right foot, initial encounter  -     Ambulatory Referral to Podiatry      Plan:     -  Patient was counseled and educated on the condition and the diagnosis.  The diagnosis, treatment options and prognosis were discussed in detail.   - Right foot xrays reviewed and I personally interpreted the x-ray finding with patient which is consistent with minimally displaced fifth proximal phalanx neck fracture.  -I recommended conservative measures at this time including weight-bear as tolerated in surgical shoe, may buddy splint to the adjacent toe, rest, ice, elevate, take over-the-counter anti-inflammatories as needed for pain control, holding off on strenuous activities as well as gym and sporting activities that involves stretching and exercises bending of the toes.  -Recent urgent care notes reviewed  -Return in 6 weeks, will obtain new set of x-rays      Subjective:      Patient ID: Amparo Dobbs is a 48 y.o. female.    48-year-old female with past medical history as below presents for evaluation of right fifth toe pain secondary to fracture sustained about a week ago.  Patient reports she accidentally stubbed her toe against her bed.  She was evaluated urgent care given a surgical shoe x-rays were obtained.  Patient reports she does not wear surgical shoe and has been wearing her flip-flops and feels okay with it.  She continues to have some discomfort however it is getting better than the initial injury.  She has no numbness or tingling to the fifth toe.  No other complaints.        The following portions of the patient's history were reviewed and updated as appropriate: She  has a past medical history of Anxiety, Arthritis, Bipolar 1 disorder (HCC), Borderline personality disorder (HCC), Chronic  headaches, Depression, GERD (gastroesophageal reflux disease), Hypertension, Incontinence, Migraine, Morbid obesity with BMI of 40.0-44.9, adult (Formerly [facility]), Sleep apnea, Small bowel obstruction (Former[facility]) (06/27/2019), Suicide attempt ([facility]) (2/29/2024), Symptomatic bradycardia, and Urinary tract infection.  She   Patient Active Problem List    Diagnosis Date Noted    Medial epicondylitis of elbow, left 03/01/2024    Arthritis 02/29/2024    Suicide attempt ([facility]) 02/29/2024    Leukocytosis 06/02/2021    Morbid obesity with BMI of 40.0-44.9, adult (Formerly [facility])     At risk for sleep apnea     Abnormal TSH 11/05/2020    Essential hypertension 08/19/2020    Gastroesophageal reflux disease without esophagitis 08/19/2020    Borderline personality disorder (Formerly [facility])     Lightheaded 08/17/2020    Symptomatic bradycardia 07/31/2020    Bipolar disorder, in partial remission, most recent episode mixed (Formerly [facility]) 03/12/2020    Bipolar 1 disorder, depressed, severe (Formerly [facility]) 07/24/2019    Panic disorder 07/24/2019    Small bowel obstruction ([facility]) 06/27/2019    Stress incontinence 10/17/2018    Sensation of cold in lower extremity 10/04/2018    Excessive daytime sleepiness 07/26/2018    Migraine without aura and without status migrainosus, not intractable 07/26/2018    Tension headache 07/26/2018     She  has a past surgical history that includes Foot surgery (Right); Ovarian cyst removal; Cholecystectomy (05/24/2018); Dental surgery; Knee arthroscopy (Right, 11/15/2018); Multiple tooth extractions (11/02/2018); Exploratory laparotomy; Reduction mammaplasty; Sinus surgery (Bilateral, 04/19/2019); Elbow surgery; TOTAL KNEE ARTHROPLASTY REVISION (Right, 2023); and pr tnot elbow lateral/medial debride open tdn rpr (Left, 3/1/2024).  Current Outpatient Medications   Medication Sig Dispense Refill    DULoxetine (CYMBALTA) 30 mg delayed release capsule daily      famotidine (PEPCID) 40 MG tablet Take 40 mg by mouth 2 (two) times a day bedtime      hydroxychloroquine  "(PLAQUENIL) 200 mg tablet Take 200 mg by mouth 2 (two) times a day      hydrOXYzine HCL (ATARAX) 50 mg tablet Take 200 mg by mouth daily at bedtime      methadone (DOLOPHINE) 5 mg tablet Take by mouth daily at bedtime      ondansetron (ZOFRAN-ODT) 8 mg disintegrating tablet every 8 (eight) hours as needed      prazosin (MINIPRESS) 2 mg capsule 4 mg daily at bedtime      pregabalin (LYRICA) 150 mg capsule Take 150 mg by mouth 2 (two) times a day      RABEprazole (ACIPHEX) 20 MG tablet Take 20 mg by mouth daily      valACYclovir (VALTREX) 500 mg tablet Take 500 mg by mouth daily      albuterol (2.5 mg/3 mL) 0.083 % nebulizer solution Take 3 mL (2.5 mg total) by nebulization every 6 (six) hours as needed for wheezing or shortness of breath (Patient not taking: Reported on 3/13/2024) 90 mL 1    HYDROcodone-acetaminophen (NORCO) 5-325 mg per tablet every 4 (four) hours as needed (Patient not taking: Reported on 3/13/2024)       No current facility-administered medications for this visit.   .    Review of Systems   All other systems reviewed and are negative.        Objective:      /78 (BP Location: Right arm, Patient Position: Sitting, Cuff Size: Large)   Pulse 81   Ht 5' 5\" (1.651 m)   Wt 112 kg (247 lb)   LMP 09/11/2023   BMI 41.10 kg/m²          Physical Exam  Vitals reviewed.   Musculoskeletal:      Right foot: Decreased range of motion. Swelling and tenderness present.      Comments: Tenderness to touch noted to the right fifth digit along with edema.  No ecchymosis or bruising present.  Light touch sensation intact.  Palpable pedal pulses.  Patient able to wiggle all her toes.  Musculoskeletal status intact.           "

## 2024-03-28 ENCOUNTER — OFFICE VISIT (OUTPATIENT)
Dept: OBGYN CLINIC | Facility: CLINIC | Age: 49
End: 2024-03-28

## 2024-03-28 VITALS
HEART RATE: 77 BPM | WEIGHT: 247 LBS | DIASTOLIC BLOOD PRESSURE: 109 MMHG | SYSTOLIC BLOOD PRESSURE: 141 MMHG | BODY MASS INDEX: 41.15 KG/M2 | HEIGHT: 65 IN

## 2024-03-28 DIAGNOSIS — Z47.89 AFTERCARE FOLLOWING SURGERY OF THE MUSCULOSKELETAL SYSTEM: Primary | ICD-10-CM

## 2024-03-28 PROCEDURE — 99024 POSTOP FOLLOW-UP VISIT: CPT | Performed by: ORTHOPAEDIC SURGERY

## 2024-03-28 NOTE — PROGRESS NOTES
ORTHO CARE [facility] [address] [id number] [phone]       Amparo Maxigley  [id number]  1975    ORTHOPAEDIC SURGERY OUTPATIENT NOTE  3/28/2024      HISTORY:  48 y.o. female patient presents today for a 1 week follow-up for a wound check of the left elbow.  She is 4 weeks status post left medial epicondylar excision avulsion and repair common flexor tendon, 3/1/2024.  Patient has been doing her wound care regiment.  She has completed her Keflex since her last visit on 3/21/2024. She reports no pain int he medial elbow.  She reports right volar wrist bump that is painful when she writes.      Past Medical History:   Diagnosis Date    Anxiety     Arthritis     Bipolar 1 disorder (Formerly [facility])     with bordeline personality    Borderline personality disorder (Formerly [facility])     Chronic headaches     Depression     GERD (gastroesophageal reflux disease)     Hypertension     Incontinence     Migraine     Morbid obesity with BMI of 40.0-44.9, adult (Formerly [facility])     Sleep apnea     Small bowel obstruction (Formerly [facility]) 06/27/2019    Suicide attempt (Formerly [facility]) 2/29/2024    Symptomatic bradycardia     Urinary tract infection        Past Surgical History:   Procedure Laterality Date    CHOLECYSTECTOMY  05/24/2018    DENTAL SURGERY      ELBOW SURGERY      EXPLORATORY LAPAROTOMY      exlap    FOOT SURGERY Right     KNEE ARTHROSCOPY Right 11/15/2018    total of three procedures with meniscal surgery    MULTIPLE TOOTH EXTRACTIONS  11/02/2018    OVARIAN CYST REMOVAL      MS TNOT ELBOW LATERAL/MEDIAL DEBRIDE OPEN TDN RPR Left 3/1/2024    Procedure: REPAIR TENDON FOREARM, elbow common flexor tendon, excision of bone fragment and all necessary procedures;  Surgeon: June Charles DO;  Location:  MAIN OR;  Service: Orthopedics    REDUCTION MAMMAPLASTY      reduction     SINUS SURGERY Bilateral 04/19/2019    bilateral maxillary antrostomies - Dr. Barajas - [facility]    TOTAL KNEE  ARTHROPLASTY REVISION Right 2023       Social History     Socioeconomic History    Marital status: /Civil Union     Spouse name: Not on file    Number of children: Not on file    Years of education: Not on file    Highest education level: Not on file   Occupational History    Not on file   Tobacco Use    Smoking status: Never    Smokeless tobacco: Never   Vaping Use    Vaping status: Never Used   Substance and Sexual Activity    Alcohol use: Not Currently    Drug use: Never    Sexual activity: Not Currently   Other Topics Concern    Not on file   Social History Narrative    Daily caffeine use- 1 cup of coffee     Social Determinants of Health     Financial Resource Strain: Medium Risk (10/3/2023)    Received from [facility]    Overall Financial Resource Strain (CARDIA)     Difficulty of Paying Living Expenses: Somewhat hard   Food Insecurity: Food Insecurity Present (10/3/2023)    Received from [facility]    Hunger Vital Sign     Worried About Running Out of Food in the Last Year: Sometimes true     Ran Out of Food in the Last Year: Never true   Transportation Needs: No Transportation Needs (10/3/2023)    Received from [facility]    PRAPARE - Transportation     Lack of Transportation (Medical): No     Lack of Transportation (Non-Medical): No   Physical Activity: Inactive (10/3/2023)    Received from [facility]    Exercise Vital Sign     Days of Exercise per Week: 0 days     Minutes of Exercise per Session: 0 min   Stress: Stress Concern Present (10/3/2023)    Received from [facility]    [employer] - Occupational Stress Questionnaire     Feeling of Stress : Very much   Social Connections: Socially Integrated (10/3/2023)    Received from [facility]    Social Connection and Isolation Panel [NHANES]     Frequency of Communication with Friends and Family: More than three times a  week     Frequency of Social Gatherings with Friends and Family: Twice a week     Attends Taoist Services: More than 4 times per year     Active Member of Clubs or Organizations: Yes     Attends Club or Organization Meetings: 1 to 4 times per year     Marital Status:    Intimate Partner Violence: Not At Risk (10/3/2023)    Received from [facility]    Humiliation, Afraid, Rape, and Kick questionnaire     Fear of Current or Ex-Partner: No     Emotionally Abused: No     Physically Abused: No     Sexually Abused: No   Housing Stability: Low Risk  (10/3/2023)    Received from [facility]    Housing Stability Vital Sign     Unable to Pay for Housing in the Last Year: No     Number of Places Lived in the Last Year: 1     Unstable Housing in the Last Year: No       Family History   Problem Relation Age of Onset    Hyperlipidemia Father     Hypertension Father     Migraines Mother     Depression Family     Heart disease Maternal Aunt     Heart disease Maternal Uncle     Diabetes Paternal Aunt     Diabetes Paternal Uncle     Breast cancer Maternal Grandmother     Diabetes Paternal Grandmother     Diabetes Paternal Grandfather     Stroke Neg Hx     Thyroid disease Neg Hx         Patient's Medications   New Prescriptions    No medications on file   Previous Medications    ALBUTEROL (2.5 MG/3 ML) 0.083 % NEBULIZER SOLUTION    Take 3 mL (2.5 mg total) by nebulization every 6 (six) hours as needed for wheezing or shortness of breath    DULOXETINE (CYMBALTA) 30 MG DELAYED RELEASE CAPSULE    daily    FAMOTIDINE (PEPCID) 40 MG TABLET    Take 40 mg by mouth 2 (two) times a day bedtime    HYDROCODONE-ACETAMINOPHEN (NORCO) 5-325 MG PER TABLET    every 4 (four) hours as needed    HYDROXYCHLOROQUINE (PLAQUENIL) 200 MG TABLET    Take 200 mg by mouth 2 (two) times a day    HYDROXYZINE HCL (ATARAX) 50 MG TABLET    Take 200 mg by mouth daily at bedtime    METHADONE (DOLOPHINE) 5 MG TABLET    Take  "by mouth daily at bedtime    ONDANSETRON (ZOFRAN-ODT) 8 MG DISINTEGRATING TABLET    every 8 (eight) hours as needed    PRAZOSIN (MINIPRESS) 2 MG CAPSULE    4 mg daily at bedtime    PREGABALIN (LYRICA) 150 MG CAPSULE    Take 150 mg by mouth 2 (two) times a day    RABEPRAZOLE (ACIPHEX) 20 MG TABLET    Take 20 mg by mouth daily    VALACYCLOVIR (VALTREX) 500 MG TABLET    Take 500 mg by mouth daily   Modified Medications    No medications on file   Discontinued Medications    No medications on file       Allergies   Allergen Reactions    Celecoxib Hives    Lamotrigine Rash, Hives and Itching     Other reaction(s): rash and itching  Other reaction(s): rash and itching  Other reaction(s): rash and itching        BP (!) 141/109 (BP Location: Right arm, Patient Position: Sitting, Cuff Size: Standard)   Pulse 77   Ht 5' 5\" (1.651 m)   Wt 112 kg (247 lb)   LMP 09/11/2023   BMI 41.10 kg/m²      REVIEW OF SYSTEMS:  Constitutional: Negative.    HEENT: Negative.    Respiratory: Negative.    Skin: Negative.    Neurological: Negative.    Psychiatric/Behavioral: Negative.  Musculoskeletal: Negative except for that mentioned in the HPI.    PHYSICAL EXAM:    LEFT ELBOW:     Well healed incision with hypertrophy of the scar over the medial elbow  Small pimple at the end of the distal end of the incision that is not open or draining  Full ROM of the elbow  Full composite fist    RIGHT Wrist:    Small mass noted over the volar aspect of the wrist on the ulnar aspect over the hamate.  Full wrist motion with no pain  No tenderness at the FCU    IMAGING:  No images reviewed at today's visit    ASSESSMENT AND PLAN:  48 y.o. female    Patient is 4 weeks status post left medial epicondylar excision avulsion and repair common flexor tendon, 3/1/2024.  Encouraged the patient to start therapy next Tuesday and perform the home exercises that her therapist shows her.  In regards to the right wrist mass, this could be a ganglion versus " retinacular cyst.  We will wait and watch this since it has been noticeable over the last 3 weeks.    Scribe Attestation      I,:  Clau Ang am acting as a scribe while in the presence of the attending physician.:       I,:  June Charles, DO personally performed the services described in this documentation    as scribed in my presence.:

## 2024-04-10 ENCOUNTER — HOSPITAL ENCOUNTER (EMERGENCY)
Facility: HOSPITAL | Age: 49
Discharge: HOME | End: 2024-04-10
Attending: EMERGENCY MEDICINE
Payer: COMMERCIAL

## 2024-04-10 VITALS
BODY MASS INDEX: 41.15 KG/M2 | WEIGHT: 247 LBS | OXYGEN SATURATION: 98 % | DIASTOLIC BLOOD PRESSURE: 80 MMHG | HEART RATE: 83 BPM | SYSTOLIC BLOOD PRESSURE: 140 MMHG | HEIGHT: 65 IN | TEMPERATURE: 98.1 F | RESPIRATION RATE: 16 BRPM

## 2024-04-10 DIAGNOSIS — G25.81 RESTLESS LEG: Primary | ICD-10-CM

## 2024-04-10 RX ORDER — ALPRAZOLAM 0.5 MG/1
1 TABLET ORAL ONCE
Status: COMPLETED | OUTPATIENT
Start: 2024-04-10 | End: 2024-04-10

## 2024-04-10 RX ADMIN — ALPRAZOLAM 1 MG: 0.5 TABLET ORAL at 09:19

## 2024-04-10 NOTE — DISCHARGE INSTRUCTIONS
Call either your primary care doctor or rheumatologist to discuss risk versus benefits of continuing Lyrica versus Xanax.

## 2024-04-10 NOTE — ED PROVIDER NOTES
History  Chief Complaint   Patient presents with    Medical Problem     Restless legs, seeing neurology, most recently on methadone for same after using all the RLS meds. Was also getting relief with xanax but stopped because she was taking lyrica also. Now reporting that she's awake over 50 hours and having symptoms more central to her chest in addition to her legs.     48-year-old female with history of restless leg syndrome diagnosed in her 20s presents with restlessness.  In all 4 extremities but mostly in her left lower leg.  Symptoms interfering with sleep over the last 2 nights.  Patient has seen neurology in the past and prescribed pramipexole, Xanax, and methadone.  Xanax was helping with symptoms but was discontinued when patient started Lyrica.  Patient trialed a course of methadone which helped initially but eventually proved nonbeneficial.  Last dose 2 to 3 weeks ago.  Patient is scheduled to see a new neurologist in October.  Denies any other symptoms.  Denies any new medications, alcohol or drug use.    Per chart review most recent thyroid studies in January 2024 within normal limits.        Prior to Admission Medications   Prescriptions Last Dose Informant Patient Reported? Taking?   DULoxetine (CYMBALTA) 30 mg delayed release capsule  Self Yes No   Sig: daily   HYDROcodone-acetaminophen (NORCO) 5-325 mg per tablet  Self Yes No   Sig: every 4 (four) hours as needed   Patient not taking: Reported on 3/13/2024   RABEprazole (ACIPHEX) 20 MG tablet  Self Yes No   Sig: Take 20 mg by mouth daily   albuterol (2.5 mg/3 mL) 0.083 % nebulizer solution  Self No No   Sig: Take 3 mL (2.5 mg total) by nebulization every 6 (six) hours as needed for wheezing or shortness of breath   Patient not taking: Reported on 3/13/2024   famotidine (PEPCID) 40 MG tablet  Self Yes No   Sig: Take 40 mg by mouth 2 (two) times a day bedtime   hydrOXYzine HCL (ATARAX) 50 mg tablet  Self Yes No   Sig: Take 200 mg by mouth daily at  bedtime   hydroxychloroquine (PLAQUENIL) 200 mg tablet  Self Yes No   Sig: Take 200 mg by mouth 2 (two) times a day   methadone (DOLOPHINE) 5 mg tablet  Self Yes No   Sig: Take by mouth daily at bedtime   ondansetron (ZOFRAN-ODT) 8 mg disintegrating tablet  Self Yes No   Sig: every 8 (eight) hours as needed   prazosin (MINIPRESS) 2 mg capsule  Self Yes No   Si mg daily at bedtime   pregabalin (LYRICA) 150 mg capsule  Self Yes No   Sig: Take 150 mg by mouth 2 (two) times a day   valACYclovir (VALTREX) 500 mg tablet  Self Yes No   Sig: Take 500 mg by mouth daily      Facility-Administered Medications: None       Past Medical History:   Diagnosis Date    Anxiety     Arthritis     Bipolar 1 disorder (Formerly [facility])     with bordeline personality    Borderline personality disorder (Formerly [facility])     Chronic headaches     Depression     GERD (gastroesophageal reflux disease)     Hypertension     Incontinence     Migraine     Morbid obesity with BMI of 40.0-44.9, adult (Formerly [facility])     Sleep apnea     Small bowel obstruction (Formerly [facility]) 2019    Suicide attempt (Formerly [facility]) 2024    Symptomatic bradycardia     Urinary tract infection        Past Surgical History:   Procedure Laterality Date    CHOLECYSTECTOMY  2018    DENTAL SURGERY      ELBOW SURGERY      EXPLORATORY LAPAROTOMY      exlap    FOOT SURGERY Right     KNEE ARTHROSCOPY Right 11/15/2018    total of three procedures with meniscal surgery    MULTIPLE TOOTH EXTRACTIONS  2018    OVARIAN CYST REMOVAL      VA TNOT ELBOW LATERAL/MEDIAL DEBRIDE OPEN TDN RPR Left 3/1/2024    Procedure: REPAIR TENDON FOREARM, elbow common flexor tendon, excision of bone fragment and all necessary procedures;  Surgeon: June Charles DO;  Location:  MAIN OR;  Service: Orthopedics    REDUCTION MAMMAPLASTY      reduction     SINUS SURGERY Bilateral 2019    bilateral maxillary antrostomies - Dr. Barajas - [facility]    TOTAL KNEE ARTHROPLASTY REVISION Right        Family History   Problem Relation Age  of Onset    Hyperlipidemia Father     Hypertension Father     Migraines Mother     Depression Family     Heart disease Maternal Aunt     Heart disease Maternal Uncle     Diabetes Paternal Aunt     Diabetes Paternal Uncle     Breast cancer Maternal Grandmother     Diabetes Paternal Grandmother     Diabetes Paternal Grandfather     Stroke Neg Hx     Thyroid disease Neg Hx      I have reviewed and agree with the history as documented.    E-Cigarette/Vaping    E-Cigarette Use Never User      E-Cigarette/Vaping Substances    Nicotine No     THC No     CBD No     Flavoring No     Other No     Unknown No      Social History     Tobacco Use    Smoking status: Never    Smokeless tobacco: Never   Vaping Use    Vaping status: Never Used   Substance Use Topics    Alcohol use: Not Currently    Drug use: Never        Review of Systems   Constitutional:  Negative for chills and fever.   HENT:  Negative for ear pain and sore throat.    Eyes:  Negative for pain and visual disturbance.   Respiratory:  Negative for cough and shortness of breath.    Cardiovascular:  Negative for chest pain and palpitations.   Gastrointestinal:  Negative for abdominal pain and vomiting.   Genitourinary:  Negative for dysuria and hematuria.   Musculoskeletal:  Negative for arthralgias and back pain.   Skin:  Negative for color change and rash.   Neurological:  Negative for seizures and syncope.   All other systems reviewed and are negative.      Physical Exam  ED Triage Vitals [04/10/24 0850]   Temperature Pulse Respirations Blood Pressure SpO2   98.1 °F (36.7 °C) 90 16 140/80 97 %      Temp Source Heart Rate Source Patient Position - Orthostatic VS BP Location FiO2 (%)   Tympanic Monitor Lying Left arm --      Pain Score       No Pain             Orthostatic Vital Signs  Vitals:    04/10/24 0850 04/10/24 0944   BP: 140/80    Pulse: 90 83   Patient Position - Orthostatic VS: Lying        Physical Exam  Vitals and nursing note reviewed.   Constitutional:        General: She is in acute distress.      Appearance: Normal appearance. She is not ill-appearing, toxic-appearing or diaphoretic.   HENT:      Head: Normocephalic and atraumatic.      Right Ear: External ear normal.      Left Ear: External ear normal.      Nose: Nose normal.      Mouth/Throat:      Mouth: Mucous membranes are moist.   Eyes:      General: No visual field deficit.        Right eye: No discharge.         Left eye: No discharge.      Extraocular Movements: Extraocular movements intact.      Conjunctiva/sclera: Conjunctivae normal.   Cardiovascular:      Rate and Rhythm: Normal rate.      Pulses: Normal pulses.   Pulmonary:      Effort: Pulmonary effort is normal. No respiratory distress.      Breath sounds: Normal breath sounds. No wheezing or rales.   Musculoskeletal:         General: Normal range of motion.      Cervical back: Normal range of motion.   Skin:     General: Skin is warm and dry.      Capillary Refill: Capillary refill takes less than 2 seconds.      Coloration: Skin is not pale.   Neurological:      Mental Status: She is alert and oriented to person, place, and time.      Cranial Nerves: No cranial nerve deficit, dysarthria or facial asymmetry.      Sensory: Sensation is intact.      Gait: Gait is intact.      Deep Tendon Reflexes:      Reflex Scores:       Patellar reflexes are 2+ on the right side and 2+ on the left side.     Comments: 5/5 strength bilateral upper and lower extremities.  Sensation intact throughout.   Psychiatric:         Attention and Perception: Attention and perception normal.         Mood and Affect: Mood normal.         Speech: Speech normal.         Behavior: Behavior normal.         Thought Content: Thought content normal.         ED Medications  Medications   ALPRAZolam (XANAX) tablet 1 mg (1 mg Oral Given 4/10/24 0919)       Diagnostic Studies  Results Reviewed       None                   No orders to display         Procedures  Procedures      ED  Course                             SBIRT 20yo+      Flowsheet Row Most Recent Value   Initial Alcohol Screen: US AUDIT-C     1. How often do you have a drink containing alcohol? 0 Filed at: 04/10/2024 0851   2. How many drinks containing alcohol do you have on a typical day you are drinking?  0 Filed at: 04/10/2024 0851   3b. FEMALE Any Age, or MALE 65+: How often do you have 4 or more drinks on one occassion? 0 Filed at: 04/10/2024 0851   Audit-C Score 0 Filed at: 04/10/2024 0851   NARCISA: How many times in the past year have you...    Used an illegal drug or used a prescription medication for non-medical reasons? Never Filed at: 04/10/2024 0851                  Medical Decision Making  48-year-old female presents with chronic complaint of restless leg syndrome.  Patient is overall well-appearing with normal neuroexam.  No suspicion for acute thyroid disease, electrolyte abnormalities, drug reaction, or any other acute illness.    Patient given dose of benzodiazepine oral and referral for [facility] neurology.    Risk  Prescription drug management.          Disposition  Final diagnoses:   Restless leg     Time reflects when diagnosis was documented in both MDM as applicable and the Disposition within this note       Time User Action Codes Description Comment    4/10/2024  9:37 AM Alexis Albright Add [G25.81] Restless leg           ED Disposition       ED Disposition   Discharge    Condition   Stable    Date/Time   Wed Apr 10, 2024 0937    Comment   Amparo Dobbs discharge to home/self care.                   Follow-up Information       Follow up With Specialties Details Why Contact Info Additional Information    Sourav Thompson DO Family Medicine   [address] [place] [address] [id number] [phone]       [facility]   [address] [place] [facility] [address]  [facility] [id number] [phone] [facility], [address] [place] [address], [facility] [address], [place], [place],  [address]     [phone]            Discharge Medication List as of 4/10/2024  9:39 AM        CONTINUE these medications which have NOT CHANGED    Details   albuterol (2.5 mg/3 mL) 0.083 % nebulizer solution Take 3 mL (2.5 mg total) by nebulization every 6 (six) hours as needed for wheezing or shortness of breath, Starting Wed 9/7/2022, Normal      DULoxetine (CYMBALTA) 30 mg delayed release capsule daily, Starting Mon 8/28/2023, Historical Med      famotidine (PEPCID) 40 MG tablet Take 40 mg by mouth 2 (two) times a day bedtime, Starting Tue 1/19/2021, Historical Med      HYDROcodone-acetaminophen (NORCO) 5-325 mg per tablet every 4 (four) hours as needed, Starting Mon 8/8/2022, Historical Med      hydroxychloroquine (PLAQUENIL) 200 mg tablet Take 200 mg by mouth 2 (two) times a day, Starting Tue 11/1/2022, Historical Med      hydrOXYzine HCL (ATARAX) 50 mg tablet Take 200 mg by mouth daily at bedtime, Historical Med      methadone (DOLOPHINE) 5 mg tablet Take by mouth daily at bedtime, Historical Med      ondansetron (ZOFRAN-ODT) 8 mg disintegrating tablet every 8 (eight) hours as needed, Starting Mon 8/21/2023, Historical Med      prazosin (MINIPRESS) 2 mg capsule 4 mg daily at bedtime, Starting Fri 3/18/2022, Historical Med      pregabalin (LYRICA) 150 mg capsule Take 150 mg by mouth 2 (two) times a day, Historical Med      RABEprazole (ACIPHEX) 20 MG tablet Take 20 mg by mouth daily, Starting Mon 7/17/2023, Historical Med      valACYclovir (VALTREX) 500 mg tablet Take 500 mg by mouth daily, Historical Med               PDMP Review         Value Time User    PDMP Reviewed  Yes 8/18/2020  2:59 PM Francisco Stone MD             ED Provider  Attending physically available and evaluated Amparo Dobbs. I managed the patient along with the ED Attending.    Electronically Signed by           Alexis Albright MD  04/10/24 0956

## 2024-04-16 ENCOUNTER — OFFICE VISIT (OUTPATIENT)
Dept: BARIATRICS | Facility: CLINIC | Age: 49
End: 2024-04-16
Payer: COMMERCIAL

## 2024-04-16 VITALS
BODY MASS INDEX: 43.16 KG/M2 | SYSTOLIC BLOOD PRESSURE: 141 MMHG | RESPIRATION RATE: 17 BRPM | WEIGHT: 252.8 LBS | HEART RATE: 78 BPM | HEIGHT: 64 IN | DIASTOLIC BLOOD PRESSURE: 82 MMHG | TEMPERATURE: 98.4 F

## 2024-04-16 DIAGNOSIS — E66.01 MORBID OBESITY WITH BMI OF 40.0-44.9, ADULT (HCC): Primary | ICD-10-CM

## 2024-04-16 DIAGNOSIS — I10 ESSENTIAL HYPERTENSION: ICD-10-CM

## 2024-04-16 DIAGNOSIS — K21.9 GASTROESOPHAGEAL REFLUX DISEASE WITHOUT ESOPHAGITIS: ICD-10-CM

## 2024-04-16 PROCEDURE — 99204 OFFICE O/P NEW MOD 45 MIN: CPT | Performed by: PHYSICIAN ASSISTANT

## 2024-04-16 RX ORDER — ALPRAZOLAM 0.5 MG/1
TABLET ORAL
COMMUNITY
Start: 2024-04-11

## 2024-04-16 NOTE — ASSESSMENT & PLAN NOTE
-Discussed options of HealthyCORE-Intensive Lifestyle Intervention Program, Very Low Calorie Diet-VLCD, Conservative Program, Luis Felipe-En-Y Gastric Bypass, and Vertical Sleeve Gastrectomy and the role of weight loss medications.  Patient is not a candidate for surgery due to personality d/o but also was not larry to have surgery prior due to adhesions  -Explained the importance of making lifestyle changes if utilizing medication to aid in weight loss  -Initial weight loss goal of 5-10% weight loss for improved health  -Screening labs and records reviewed from prior    -Patient is interested in pursuing conservative plan    Goals:  -Food log (ie.) www.[email or web address]-1500  - To drink at least 64oz of water daily.No sugary beverages.  -discussed trying to increase activity  -recommend starting to meal prep and eat meals/snacks at regular intervals.       Medication options are limited.  Due to controlled substance use cannot take phentermine.She had been on topamax prior.  Could consider wellbutrin but would need clearance from psych. She was interested in GLP-1 RA and discussed GI concerns with it including increased risk of ileus with her history of SBO.  After discussion she is willing to closely monitor side effects and start on wegovy. They have tried more than 6 months of lifestyle modifications including diet and activity changes and has had insignificant weight loss of less than 1 lb a week. Patient denies personal and family history of MCT and MEN2 tumors. Patient denies personal history of pancreatitis. Side effects discussed but not limited to diarrhea, bloating, constipation, GI upset, heartburn, increased heart rate, headache, low blood sugar, fatigue and dizziness. Titration and medication administration discussed.  Medication agreement signed    Initial Weight:252.8  Goal Weight:180lb

## 2024-04-16 NOTE — PROGRESS NOTES
Assessment/Plan:    Morbid obesity with BMI of 40.0-44.9, adult ([facility])  -Discussed options of HealthyCORE-Intensive Lifestyle Intervention Program, Very Low Calorie Diet-VLCD, Conservative Program, Luis Felipe-En-Y Gastric Bypass, and Vertical Sleeve Gastrectomy and the role of weight loss medications.  Patient is not a candidate for surgery due to personality d/o but also was not larry to have surgery prior due to adhesions  -Explained the importance of making lifestyle changes if utilizing medication to aid in weight loss  -Initial weight loss goal of 5-10% weight loss for improved health  -Screening labs and records reviewed from prior    -Patient is interested in pursuing conservative plan    Goals:  -Food log (ie.) [email or web address].com,The Daily Muse,BandPage-1500  - To drink at least 64oz of water daily.No sugary beverages.  -discussed trying to increase activity  -recommend starting to meal prep and eat meals/snacks at regular intervals.       Medication options are limited.  Due to controlled substance use cannot take phentermine.She had been on topamax prior.  Could consider wellbutrin but would need clearance from psych. She was interested in GLP-1 RA and discussed GI concerns with it including increased risk of ileus with her history of SBO.  After discussion she is willing to closely monitor side effects and start on wegovy. They have tried more than 6 months of lifestyle modifications including diet and activity changes and has had insignificant weight loss of less than 1 lb a week. Patient denies personal and family history of MCT and MEN2 tumors. Patient denies personal history of pancreatitis. Side effects discussed but not limited to diarrhea, bloating, constipation, GI upset, heartburn, increased heart rate, headache, low blood sugar, fatigue and dizziness. Titration and medication administration discussed.  Medication agreement signed    Initial Weight:252.8  Goal Weight:180lb       Gastroesophageal  reflux disease without esophagitis  On aciphex.  Discussed wegovy may increase reflux and she will contact the office with any concerns.    Essential hypertension  No longer on medication      Follow up in approximately  8 week nurse visit and 4 months  with Non-Surgical Physician/Advanced Practitioner.  I have spent a total time of 40 minutes on 04/16/24 in caring for this patient including Diagnostic results, Prognosis, Risks and benefits of tx options, Instructions for management, Patient and family education, Importance of tx compliance, Risk factor reductions, Impressions, Counseling / Coordination of care, Documenting in the medical record, Reviewing / ordering tests, medicine, procedures  , and Obtaining or reviewing history  .      Diagnoses and all orders for this visit:    Morbid obesity with BMI of 40.0-44.9, adult ([facility])  -     Semaglutide-Weight Management (WEGOVY) 0.25 MG/0.5ML; Inject 0.5 mL (0.25 mg total) under the skin once a week    Gastroesophageal reflux disease without esophagitis    Essential hypertension    Other orders  -     ALPRAZolam (XANAX) 0.5 mg tablet          Subjective:   Chief Complaint   Patient presents with    Consult     MWM-Consult; Gw-180lb; Pt has sleep Apnea       Patient ID: Amparo Dobbs  is a 48 y.o. female with excess weight/obesity here to pursue weight management.Also had been seen by endocrine and was on topamax prior    Past Medical History:   Diagnosis Date    Anxiety     Arthritis     Bipolar 1 disorder ([facility])     with bordeline personality    Borderline personality disorder ([facility])     Chronic headaches     Depression     GERD (gastroesophageal reflux disease)     Hypertension     Incontinence     Migraine     Morbid obesity with BMI of 40.0-44.9, adult ([facility])     Sleep apnea     Small bowel obstruction ([facility]) 06/27/2019    Suicide attempt ([facility]) 2/29/2024    Symptomatic bradycardia     Urinary tract infection        HPI: Here for MWM consult  Seen here 4/1/21 and did  do VLCD after. Since that time she had been hospitalized for SBO due to adhesions.  She was interested in surgery and saw Dr. Perdomo but she had too much scar tissue when he went to perform the surgery. She did lose weight with him doing topamax/phentermine and high protein diet     She was started on metformin because she wanted to start on ozempic.  Blood sugar has been normal.        Obesity/Excess Weight:  Severity:  class III  Onset:  years    Modifiers: Diet and Exercise, Physician Supervised Weight Loss Program, Commercial Weight Loss Programs-ie. Weight Watchers, Metranome, Nutrisystem, etc., and Prescription Weight Loss Medications  Contributing factors: Poor Food Choices, Insufficient Physical Activity, Stress/Emotional Eating, and Medications    Hydration:water varies 6-8 glasses a day, rare soda or juice, coffee 1 a day SF syrup  Alcohol: less than weekly   Exercise:none right now  Occupation:works in a [employer] but not as active due to injuries  Dining out/takeout:rare    Diet Recall:works 540-noon  B: greek yogurt  S:sometimes crotons  L:varies  S: sweets  D: tries to do meat, vegetable , starch    The following portions of the patient's history were reviewed and updated as appropriate: She  has a past medical history of Anxiety, Arthritis, Bipolar 1 disorder ([facility]), Borderline personality disorder ([facility]), Chronic headaches, Depression, GERD (gastroesophageal reflux disease), Hypertension, Incontinence, Migraine, Morbid obesity with BMI of 40.0-44.9, adult ([facility]), Sleep apnea, Small bowel obstruction ([facility]) (06/27/2019), Suicide attempt ([facility]) (2/29/2024), Symptomatic bradycardia, and Urinary tract infection.  She   Patient Active Problem List    Diagnosis Date Noted    Medial epicondylitis of elbow, left 03/01/2024    Arthritis 02/29/2024    Suicide attempt ([facility]) 02/29/2024    Leukocytosis 06/02/2021    Morbid obesity with BMI of 40.0-44.9, adult ([facility])     At risk for sleep apnea     Abnormal TSH  11/05/2020    Essential hypertension 08/19/2020    Gastroesophageal reflux disease without esophagitis 08/19/2020    Borderline personality disorder ([facility])     Lightheaded 08/17/2020    Symptomatic bradycardia 07/31/2020    Bipolar disorder, in partial remission, most recent episode mixed ([facility]) 03/12/2020    Bipolar 1 disorder, depressed, severe ([facility]) 07/24/2019    Panic disorder 07/24/2019    Small bowel obstruction ([facility]) 06/27/2019    Stress incontinence 10/17/2018    Sensation of cold in lower extremity 10/04/2018    Excessive daytime sleepiness 07/26/2018    Migraine without aura and without status migrainosus, not intractable 07/26/2018    Tension headache 07/26/2018     She  has a past surgical history that includes Foot surgery (Right); Ovarian cyst removal; Cholecystectomy (05/24/2018); Dental surgery; Knee arthroscopy (Right, 11/15/2018); Multiple tooth extractions (11/02/2018); Exploratory laparotomy; Reduction mammaplasty; Sinus surgery (Bilateral, 04/19/2019); Elbow surgery; TOTAL KNEE ARTHROPLASTY REVISION (Right, 2023); and pr tnot elbow lateral/medial debride open tdn rpr (Left, 3/1/2024).  Her family history includes Breast cancer in her maternal grandmother; Depression in her family; Diabetes in her paternal aunt, paternal grandfather, paternal grandmother, and paternal uncle; Heart disease in her maternal aunt and maternal uncle; Hyperlipidemia in her father; Hypertension in her father; Migraines in her mother.  She  reports that she has never smoked. She has never used smokeless tobacco. She reports that she does not currently use alcohol. She reports that she does not use drugs.  Current Outpatient Medications   Medication Sig Dispense Refill    ALPRAZolam (XANAX) 0.5 mg tablet       DULoxetine (CYMBALTA) 30 mg delayed release capsule daily      famotidine (PEPCID) 40 MG tablet Take 40 mg by mouth 2 (two) times a day bedtime      hydroxychloroquine (PLAQUENIL) 200 mg tablet Take 200 mg by mouth 2  (two) times a day      hydrOXYzine HCL (ATARAX) 50 mg tablet Take 200 mg by mouth daily at bedtime      ondansetron (ZOFRAN-ODT) 8 mg disintegrating tablet every 8 (eight) hours as needed      prazosin (MINIPRESS) 2 mg capsule 4 mg daily at bedtime      pregabalin (LYRICA) 150 mg capsule Take 150 mg by mouth 2 (two) times a day      RABEprazole (ACIPHEX) 20 MG tablet Take 20 mg by mouth daily      Semaglutide-Weight Management (WEGOVY) 0.25 MG/0.5ML Inject 0.5 mL (0.25 mg total) under the skin once a week 2 mL 0    valACYclovir (VALTREX) 500 mg tablet Take 500 mg by mouth daily      albuterol (2.5 mg/3 mL) 0.083 % nebulizer solution Take 3 mL (2.5 mg total) by nebulization every 6 (six) hours as needed for wheezing or shortness of breath (Patient not taking: Reported on 3/13/2024) 90 mL 1    HYDROcodone-acetaminophen (NORCO) 5-325 mg per tablet every 4 (four) hours as needed (Patient not taking: Reported on 3/13/2024)       No current facility-administered medications for this visit.     Current Outpatient Medications on File Prior to Visit   Medication Sig    ALPRAZolam (XANAX) 0.5 mg tablet     DULoxetine (CYMBALTA) 30 mg delayed release capsule daily    famotidine (PEPCID) 40 MG tablet Take 40 mg by mouth 2 (two) times a day bedtime    hydroxychloroquine (PLAQUENIL) 200 mg tablet Take 200 mg by mouth 2 (two) times a day    hydrOXYzine HCL (ATARAX) 50 mg tablet Take 200 mg by mouth daily at bedtime    ondansetron (ZOFRAN-ODT) 8 mg disintegrating tablet every 8 (eight) hours as needed    prazosin (MINIPRESS) 2 mg capsule 4 mg daily at bedtime    pregabalin (LYRICA) 150 mg capsule Take 150 mg by mouth 2 (two) times a day    RABEprazole (ACIPHEX) 20 MG tablet Take 20 mg by mouth daily    valACYclovir (VALTREX) 500 mg tablet Take 500 mg by mouth daily    albuterol (2.5 mg/3 mL) 0.083 % nebulizer solution Take 3 mL (2.5 mg total) by nebulization every 6 (six) hours as needed for wheezing or shortness of breath (Patient  "not taking: Reported on 3/13/2024)    HYDROcodone-acetaminophen (NORCO) 5-325 mg per tablet every 4 (four) hours as needed (Patient not taking: Reported on 3/13/2024)    [DISCONTINUED] fluticasone (FLONASE) 50 mcg/act nasal spray 2 sprays into each nostril daily (Patient not taking: Reported on 4/25/2022)    [DISCONTINUED] methadone (DOLOPHINE) 5 mg tablet Take by mouth daily at bedtime     No current facility-administered medications on file prior to visit.     She is allergic to celecoxib and lamotrigine..    Review of Systems   Constitutional:  Negative for fever.   Respiratory:  Negative for shortness of breath.    Cardiovascular:  Negative for chest pain and palpitations.   Gastrointestinal:  Negative for abdominal pain, constipation, diarrhea and vomiting.   Genitourinary:  Negative for difficulty urinating.   Musculoskeletal:  Positive for arthralgias.   Skin:  Negative for rash.   Neurological:  Negative for headaches.   Psychiatric/Behavioral:  Negative for dysphoric mood. The patient is not nervous/anxious.        Objective:    /82   Pulse 78   Temp 98.4 °F (36.9 °C)   Resp 17   Ht 5' 4\" (1.626 m)   Wt 115 kg (252 lb 12.8 oz)   BMI 43.39 kg/m²     Physical Exam  Vitals and nursing note reviewed.   Constitutional:       General: She is not in acute distress.     Appearance: She is well-developed. She is obese.   HENT:      Head: Normocephalic and atraumatic.   Eyes:      Conjunctiva/sclera: Conjunctivae normal.   Neck:      Thyroid: No thyromegaly.   Pulmonary:      Effort: Pulmonary effort is normal. No respiratory distress.   Skin:     Findings: No rash (visible).   Neurological:      Mental Status: She is alert and oriented to person, place, and time.   Psychiatric:         Mood and Affect: Mood normal.         Behavior: Behavior normal.         "

## 2024-04-16 NOTE — ASSESSMENT & PLAN NOTE
On aciphex.  Discussed wegovy may increase reflux and she will contact the office with any concerns.

## 2024-04-17 ENCOUNTER — TELEPHONE (OUTPATIENT)
Dept: BARIATRICS | Facility: CLINIC | Age: 49
End: 2024-04-17

## 2024-04-19 NOTE — TELEPHONE ENCOUNTER
PA for Wegovy    Submitted via    [x]CMM-KEY [id number]  [][employer]-Case ID #    []Faxed to plan   []Other website    []Phone call Case ID #      Office notes sent, clinical questions answered. Awaiting determination    Turnaround time for your insurance to make a decision on your Prior Authorization can take 7-21 business days.

## 2024-04-22 NOTE — TELEPHONE ENCOUNTER
PA for wegovy Denied    Reason:(Screenshot if applicable)          Message sent to office clinical pool Yes    Denial letter scanned into Media Yes    Appeal started No ( Provider will need to decide if appeal is warranted and send clinical documentation to PA team for initiation.)

## 2024-05-17 ENCOUNTER — APPOINTMENT (EMERGENCY)
Dept: CT IMAGING | Facility: HOSPITAL | Age: 49
DRG: 389 | End: 2024-05-17
Payer: COMMERCIAL

## 2024-05-17 ENCOUNTER — HOSPITAL ENCOUNTER (INPATIENT)
Facility: HOSPITAL | Age: 49
LOS: 1 days | Discharge: HOME | DRG: 389 | End: 2024-05-18
Attending: EMERGENCY MEDICINE | Admitting: SURGERY
Payer: COMMERCIAL

## 2024-05-17 ENCOUNTER — APPOINTMENT (OUTPATIENT)
Dept: RADIOLOGY | Facility: HOSPITAL | Age: 49
DRG: 389 | End: 2024-05-17
Payer: COMMERCIAL

## 2024-05-17 DIAGNOSIS — R10.9 ABDOMINAL PAIN: ICD-10-CM

## 2024-05-17 DIAGNOSIS — K56.609 SMALL BOWEL OBSTRUCTION (HCC): Primary | ICD-10-CM

## 2024-05-17 LAB
ALBUMIN SERPL BCP-MCNC: 4.5 G/DL (ref 3.5–5)
ALP SERPL-CCNC: 118 U/L (ref 34–104)
ALT SERPL W P-5'-P-CCNC: 18 U/L (ref 7–52)
ANION GAP SERPL CALCULATED.3IONS-SCNC: 12 MMOL/L (ref 4–13)
AST SERPL W P-5'-P-CCNC: 17 U/L (ref 13–39)
BASOPHILS # BLD AUTO: 0.11 THOUSANDS/ÂΜL (ref 0–0.1)
BASOPHILS NFR BLD AUTO: 1 % (ref 0–1)
BILIRUB SERPL-MCNC: 0.44 MG/DL (ref 0.2–1)
BUN SERPL-MCNC: 15 MG/DL (ref 5–25)
CALCIUM SERPL-MCNC: 9.8 MG/DL (ref 8.4–10.2)
CHLORIDE SERPL-SCNC: 104 MMOL/L (ref 96–108)
CO2 SERPL-SCNC: 21 MMOL/L (ref 21–32)
CREAT SERPL-MCNC: 0.67 MG/DL (ref 0.6–1.3)
EOSINOPHIL # BLD AUTO: 0.22 THOUSAND/ÂΜL (ref 0–0.61)
EOSINOPHIL NFR BLD AUTO: 2 % (ref 0–6)
ERYTHROCYTE [DISTWIDTH] IN BLOOD BY AUTOMATED COUNT: 15.9 % (ref 11.6–15.1)
GFR SERPL CREATININE-BSD FRML MDRD: 105 ML/MIN/1.73SQ M
GLUCOSE SERPL-MCNC: 107 MG/DL (ref 65–140)
HCT VFR BLD AUTO: 49 % (ref 34.8–46.1)
HGB BLD-MCNC: 15.6 G/DL (ref 11.5–15.4)
IMM GRANULOCYTES # BLD AUTO: 0.12 THOUSAND/UL (ref 0–0.2)
IMM GRANULOCYTES NFR BLD AUTO: 1 % (ref 0–2)
LACTATE SERPL-SCNC: 0.7 MMOL/L (ref 0.5–2)
LIPASE SERPL-CCNC: 17 U/L (ref 11–82)
LYMPHOCYTES # BLD AUTO: 3.09 THOUSANDS/ÂΜL (ref 0.6–4.47)
LYMPHOCYTES NFR BLD AUTO: 22 % (ref 14–44)
MCH RBC QN AUTO: 26.8 PG (ref 26.8–34.3)
MCHC RBC AUTO-ENTMCNC: 31.8 G/DL (ref 31.4–37.4)
MCV RBC AUTO: 84 FL (ref 82–98)
MONOCYTES # BLD AUTO: 1.05 THOUSAND/ÂΜL (ref 0.17–1.22)
MONOCYTES NFR BLD AUTO: 8 % (ref 4–12)
NEUTROPHILS # BLD AUTO: 9.46 THOUSANDS/ÂΜL (ref 1.85–7.62)
NEUTS SEG NFR BLD AUTO: 66 % (ref 43–75)
NRBC BLD AUTO-RTO: 0 /100 WBCS
PLATELET # BLD AUTO: 266 THOUSANDS/UL (ref 149–390)
PLATELET # BLD AUTO: 344 THOUSANDS/UL (ref 149–390)
PMV BLD AUTO: 9.2 FL (ref 8.9–12.7)
PMV BLD AUTO: 9.4 FL (ref 8.9–12.7)
POTASSIUM SERPL-SCNC: 4 MMOL/L (ref 3.5–5.3)
PROT SERPL-MCNC: 7.9 G/DL (ref 6.4–8.4)
RBC # BLD AUTO: 5.83 MILLION/UL (ref 3.81–5.12)
SODIUM SERPL-SCNC: 137 MMOL/L (ref 135–147)
WBC # BLD AUTO: 14.05 THOUSAND/UL (ref 4.31–10.16)

## 2024-05-17 PROCEDURE — 99291 CRITICAL CARE FIRST HOUR: CPT | Performed by: EMERGENCY MEDICINE

## 2024-05-17 PROCEDURE — 99291 CRITICAL CARE FIRST HOUR: CPT

## 2024-05-17 PROCEDURE — 85049 AUTOMATED PLATELET COUNT: CPT | Performed by: SURGERY

## 2024-05-17 PROCEDURE — 96374 THER/PROPH/DIAG INJ IV PUSH: CPT

## 2024-05-17 PROCEDURE — 96361 HYDRATE IV INFUSION ADD-ON: CPT

## 2024-05-17 PROCEDURE — 96376 TX/PRO/DX INJ SAME DRUG ADON: CPT

## 2024-05-17 PROCEDURE — 96375 TX/PRO/DX INJ NEW DRUG ADDON: CPT

## 2024-05-17 PROCEDURE — 71045 X-RAY EXAM CHEST 1 VIEW: CPT

## 2024-05-17 PROCEDURE — 74177 CT ABD & PELVIS W/CONTRAST: CPT

## 2024-05-17 PROCEDURE — 80053 COMPREHEN METABOLIC PANEL: CPT | Performed by: EMERGENCY MEDICINE

## 2024-05-17 PROCEDURE — 83605 ASSAY OF LACTIC ACID: CPT | Performed by: EMERGENCY MEDICINE

## 2024-05-17 PROCEDURE — C9113 INJ PANTOPRAZOLE SODIUM, VIA: HCPCS | Performed by: SURGERY

## 2024-05-17 PROCEDURE — 85025 COMPLETE CBC W/AUTO DIFF WBC: CPT | Performed by: EMERGENCY MEDICINE

## 2024-05-17 PROCEDURE — 36415 COLL VENOUS BLD VENIPUNCTURE: CPT | Performed by: EMERGENCY MEDICINE

## 2024-05-17 PROCEDURE — 83690 ASSAY OF LIPASE: CPT | Performed by: EMERGENCY MEDICINE

## 2024-05-17 PROCEDURE — 99222 1ST HOSP IP/OBS MODERATE 55: CPT | Performed by: SURGERY

## 2024-05-17 RX ORDER — HEPARIN SODIUM 5000 [USP'U]/ML
5000 INJECTION, SOLUTION INTRAVENOUS; SUBCUTANEOUS EVERY 8 HOURS SCHEDULED
Status: DISCONTINUED | OUTPATIENT
Start: 2024-05-17 | End: 2024-05-18 | Stop reason: HOSPADM

## 2024-05-17 RX ORDER — ACETAMINOPHEN 10 MG/ML
1000 INJECTION, SOLUTION INTRAVENOUS EVERY 6 HOURS SCHEDULED
Status: DISCONTINUED | OUTPATIENT
Start: 2024-05-17 | End: 2024-05-18 | Stop reason: HOSPADM

## 2024-05-17 RX ORDER — HYDROMORPHONE HCL IN WATER/PF 6 MG/30 ML
0.2 PATIENT CONTROLLED ANALGESIA SYRINGE INTRAVENOUS
Status: DISCONTINUED | OUTPATIENT
Start: 2024-05-17 | End: 2024-05-18

## 2024-05-17 RX ORDER — HYDROMORPHONE HCL/PF 1 MG/ML
0.5 SYRINGE (ML) INJECTION ONCE
Status: COMPLETED | OUTPATIENT
Start: 2024-05-17 | End: 2024-05-17

## 2024-05-17 RX ORDER — ONDANSETRON 2 MG/ML
4 INJECTION INTRAMUSCULAR; INTRAVENOUS ONCE
Status: COMPLETED | OUTPATIENT
Start: 2024-05-17 | End: 2024-05-17

## 2024-05-17 RX ORDER — ONDANSETRON 2 MG/ML
4 INJECTION INTRAMUSCULAR; INTRAVENOUS EVERY 4 HOURS PRN
Status: DISCONTINUED | OUTPATIENT
Start: 2024-05-17 | End: 2024-05-18 | Stop reason: HOSPADM

## 2024-05-17 RX ORDER — METOCLOPRAMIDE HYDROCHLORIDE 5 MG/ML
10 INJECTION INTRAMUSCULAR; INTRAVENOUS ONCE
Status: COMPLETED | OUTPATIENT
Start: 2024-05-17 | End: 2024-05-17

## 2024-05-17 RX ORDER — HYDROMORPHONE HCL/PF 1 MG/ML
0.5 SYRINGE (ML) INJECTION
Status: DISCONTINUED | OUTPATIENT
Start: 2024-05-17 | End: 2024-05-18

## 2024-05-17 RX ORDER — SODIUM CHLORIDE 9 MG/ML
100 INJECTION, SOLUTION INTRAVENOUS CONTINUOUS
Status: DISCONTINUED | OUTPATIENT
Start: 2024-05-17 | End: 2024-05-18 | Stop reason: HOSPADM

## 2024-05-17 RX ORDER — PANTOPRAZOLE SODIUM 40 MG/10ML
40 INJECTION, POWDER, LYOPHILIZED, FOR SOLUTION INTRAVENOUS
Status: DISCONTINUED | OUTPATIENT
Start: 2024-05-17 | End: 2024-05-18

## 2024-05-17 RX ORDER — METRONIDAZOLE 500 MG/100ML
500 INJECTION, SOLUTION INTRAVENOUS EVERY 8 HOURS
Status: DISCONTINUED | OUTPATIENT
Start: 2024-05-17 | End: 2024-05-18

## 2024-05-17 RX ORDER — CEFTRIAXONE 1 G/50ML
1000 INJECTION, SOLUTION INTRAVENOUS EVERY 24 HOURS
Status: DISCONTINUED | OUTPATIENT
Start: 2024-05-17 | End: 2024-05-18 | Stop reason: HOSPADM

## 2024-05-17 RX ORDER — KETOROLAC TROMETHAMINE 30 MG/ML
15 INJECTION, SOLUTION INTRAMUSCULAR; INTRAVENOUS EVERY 6 HOURS SCHEDULED
Status: DISCONTINUED | OUTPATIENT
Start: 2024-05-17 | End: 2024-05-18 | Stop reason: HOSPADM

## 2024-05-17 RX ORDER — ONDANSETRON 2 MG/ML
4 INJECTION INTRAMUSCULAR; INTRAVENOUS EVERY 6 HOURS PRN
Status: DISCONTINUED | OUTPATIENT
Start: 2024-05-17 | End: 2024-05-17

## 2024-05-17 RX ORDER — ACETAMINOPHEN 10 MG/ML
1000 INJECTION, SOLUTION INTRAVENOUS EVERY 6 HOURS PRN
Status: DISCONTINUED | OUTPATIENT
Start: 2024-05-17 | End: 2024-05-17

## 2024-05-17 RX ORDER — FAMOTIDINE 10 MG/ML
20 INJECTION, SOLUTION INTRAVENOUS EVERY 12 HOURS SCHEDULED
Status: DISCONTINUED | OUTPATIENT
Start: 2024-05-17 | End: 2024-05-18

## 2024-05-17 RX ADMIN — HEPARIN SODIUM 5000 UNITS: 5000 INJECTION, SOLUTION INTRAVENOUS; SUBCUTANEOUS at 06:47

## 2024-05-17 RX ADMIN — HYDROMORPHONE HYDROCHLORIDE 0.5 MG: 1 INJECTION, SOLUTION INTRAMUSCULAR; INTRAVENOUS; SUBCUTANEOUS at 03:11

## 2024-05-17 RX ADMIN — SODIUM CHLORIDE 1000 ML: 0.9 INJECTION, SOLUTION INTRAVENOUS at 03:11

## 2024-05-17 RX ADMIN — HYDROMORPHONE HYDROCHLORIDE 0.5 MG: 1 INJECTION, SOLUTION INTRAMUSCULAR; INTRAVENOUS; SUBCUTANEOUS at 03:53

## 2024-05-17 RX ADMIN — PANTOPRAZOLE SODIUM 40 MG: 40 INJECTION, POWDER, FOR SOLUTION INTRAVENOUS at 14:15

## 2024-05-17 RX ADMIN — HEPARIN SODIUM 5000 UNITS: 5000 INJECTION, SOLUTION INTRAVENOUS; SUBCUTANEOUS at 14:16

## 2024-05-17 RX ADMIN — FAMOTIDINE 20 MG: 10 INJECTION, SOLUTION INTRAVENOUS at 21:43

## 2024-05-17 RX ADMIN — KETOROLAC TROMETHAMINE 15 MG: 30 INJECTION, SOLUTION INTRAMUSCULAR; INTRAVENOUS at 18:25

## 2024-05-17 RX ADMIN — METRONIDAZOLE 500 MG: 500 INJECTION, SOLUTION INTRAVENOUS at 15:14

## 2024-05-17 RX ADMIN — ACETAMINOPHEN 1000 MG: 10 INJECTION INTRAVENOUS at 18:25

## 2024-05-17 RX ADMIN — CEFTRIAXONE 1000 MG: 1 INJECTION, SOLUTION INTRAVENOUS at 14:18

## 2024-05-17 RX ADMIN — SODIUM CHLORIDE 100 ML/HR: 0.9 INJECTION, SOLUTION INTRAVENOUS at 06:47

## 2024-05-17 RX ADMIN — METOCLOPRAMIDE 10 MG: 5 INJECTION, SOLUTION INTRAMUSCULAR; INTRAVENOUS at 08:57

## 2024-05-17 RX ADMIN — SODIUM CHLORIDE 100 ML/HR: 0.9 INJECTION, SOLUTION INTRAVENOUS at 18:27

## 2024-05-17 RX ADMIN — KETOROLAC TROMETHAMINE 15 MG: 30 INJECTION, SOLUTION INTRAMUSCULAR; INTRAVENOUS at 23:02

## 2024-05-17 RX ADMIN — ONDANSETRON 4 MG: 2 INJECTION INTRAMUSCULAR; INTRAVENOUS at 03:11

## 2024-05-17 RX ADMIN — KETOROLAC TROMETHAMINE 15 MG: 30 INJECTION, SOLUTION INTRAMUSCULAR; INTRAVENOUS at 14:15

## 2024-05-17 RX ADMIN — IOHEXOL 100 ML: 350 INJECTION, SOLUTION INTRAVENOUS at 03:28

## 2024-05-17 RX ADMIN — METRONIDAZOLE 500 MG: 500 INJECTION, SOLUTION INTRAVENOUS at 23:02

## 2024-05-17 RX ADMIN — MORPHINE SULFATE 2 MG: 2 INJECTION, SOLUTION INTRAMUSCULAR; INTRAVENOUS at 06:48

## 2024-05-17 RX ADMIN — ONDANSETRON 4 MG: 2 INJECTION INTRAMUSCULAR; INTRAVENOUS at 06:51

## 2024-05-17 RX ADMIN — FAMOTIDINE 20 MG: 10 INJECTION, SOLUTION INTRAVENOUS at 14:15

## 2024-05-17 RX ADMIN — ACETAMINOPHEN 1000 MG: 10 INJECTION INTRAVENOUS at 23:44

## 2024-05-17 RX ADMIN — ACETAMINOPHEN 1000 MG: 10 INJECTION INTRAVENOUS at 12:30

## 2024-05-17 NOTE — PLAN OF CARE
Problem: GASTROINTESTINAL - ADULT  Goal: Minimal or absence of nausea and/or vomiting  Description: INTERVENTIONS:  - Administer IV fluids if ordered to ensure adequate hydration  - Maintain NPO status until nausea and vomiting are resolved  - Nasogastric tube if ordered  - Administer ordered antiemetic medications as needed  - Provide nonpharmacologic comfort measures as appropriate  - Advance diet as tolerated, if ordered  - Consider nutrition services referral to assist patient with adequate nutrition and appropriate food choices  Monitor NGT  Outcome: Progressing  Goal: Maintains or returns to baseline bowel function  Description: INTERVENTIONS:  - Assess bowel function  - Encourage oral fluids to ensure adequate hydration  - Administer IV fluids if ordered to ensure adequate hydration  - Administer ordered medications as needed  - Encourage mobilization and activity  - Consider nutritional services referral to assist patient with adequate nutrition and appropriate food choices  Outcome: Progressing  Goal: Maintains adequate nutritional intake  Description: INTERVENTIONS:  - Monitor percentage of each meal consumed  - Identify factors contributing to decreased intake, treat as appropriate  - Assist with meals as needed  - Monitor I&O, weight, and lab values if indicated  - Obtain nutrition services referral as needed  Outcome: Progressing  Goal: Oral mucous membranes remain intact  Description: INTERVENTIONS  - Assess oral mucosa and hygiene practices  - Implement preventative oral hygiene regimen  - Implement oral medicated treatments as ordered  - Initiate Nutrition services referral as needed  Outcome: Progressing      No

## 2024-05-17 NOTE — ED PROVIDER NOTES
History  Chief Complaint   Patient presents with    Abdominal Pain     Pt reports RUQ abdominal pain since Wednesday along with nausea. Pt reports hx of bowel obstruction     48-year-old female with a history of multiple small bowel obstructions presents to the emergency department for evaluation of epigastric and right upper quadrant abdominal pain.  Pain for started on Wednesday, was more intermittent in nature, but is now more constant and severe.  Is associated with nausea, but no vomiting.  Last bowel movement was yesterday and was nonbloody in nature.  Still passing gas.  She states this feels similar to prior bowel obstructions.  No fevers or chills.  No chest pain or shortness of breath.        Prior to Admission Medications   Prescriptions Last Dose Informant Patient Reported? Taking?   ALPRAZolam (XANAX) 0.5 mg tablet   Yes No   DULoxetine (CYMBALTA) 30 mg delayed release capsule  Self Yes No   Sig: daily   HYDROcodone-acetaminophen (NORCO) 5-325 mg per tablet  Self Yes No   Sig: every 4 (four) hours as needed   Patient not taking: Reported on 3/13/2024   RABEprazole (ACIPHEX) 20 MG tablet  Self Yes No   Sig: Take 20 mg by mouth daily   Semaglutide-Weight Management (WEGOVY) 0.25 MG/0.5ML   No No   Sig: Inject 0.5 mL (0.25 mg total) under the skin once a week   albuterol (2.5 mg/3 mL) 0.083 % nebulizer solution  Self No No   Sig: Take 3 mL (2.5 mg total) by nebulization every 6 (six) hours as needed for wheezing or shortness of breath   Patient not taking: Reported on 3/13/2024   famotidine (PEPCID) 40 MG tablet  Self Yes No   Sig: Take 40 mg by mouth 2 (two) times a day bedtime   hydrOXYzine HCL (ATARAX) 50 mg tablet  Self Yes No   Sig: Take 200 mg by mouth daily at bedtime   hydroxychloroquine (PLAQUENIL) 200 mg tablet  Self Yes No   Sig: Take 200 mg by mouth 2 (two) times a day   ondansetron (ZOFRAN-ODT) 8 mg disintegrating tablet  Self Yes No   Sig: every 8 (eight) hours as needed   prazosin  (MINIPRESS) 2 mg capsule  Self Yes No   Si mg daily at bedtime   pregabalin (LYRICA) 150 mg capsule  Self Yes No   Sig: Take 150 mg by mouth 2 (two) times a day   valACYclovir (VALTREX) 500 mg tablet  Self Yes No   Sig: Take 500 mg by mouth daily      Facility-Administered Medications: None       Past Medical History:   Diagnosis Date    Anxiety     Arthritis     Bipolar 1 disorder ([facility])     with bordeline personality    Borderline personality disorder ([facility])     Chronic headaches     Depression     GERD (gastroesophageal reflux disease)     Hypertension     Incontinence     Migraine     Morbid obesity with BMI of 40.0-44.9, adult ([facility])     Sleep apnea     Small bowel obstruction ([facility]) 2019    Suicide attempt ([facility]) 2024    Symptomatic bradycardia     Urinary tract infection        Past Surgical History:   Procedure Laterality Date    CHOLECYSTECTOMY  2018    DENTAL SURGERY      ELBOW SURGERY      EXPLORATORY LAPAROTOMY      exlap    FOOT SURGERY Right     KNEE ARTHROSCOPY Right 11/15/2018    total of three procedures with meniscal surgery    MULTIPLE TOOTH EXTRACTIONS  2018    OVARIAN CYST REMOVAL      CT TNOT ELBOW LATERAL/MEDIAL DEBRIDE OPEN TDN RPR Left 3/1/2024    Procedure: REPAIR TENDON FOREARM, elbow common flexor tendon, excision of bone fragment and all necessary procedures;  Surgeon: June Charles DO;  Location:  MAIN OR;  Service: Orthopedics    REDUCTION MAMMAPLASTY      reduction     SINUS SURGERY Bilateral 2019    bilateral maxillary antrostomies - Dr. Barajas - [facility]    TOTAL KNEE ARTHROPLASTY REVISION Right        Family History   Problem Relation Age of Onset    Hyperlipidemia Father     Hypertension Father     Migraines Mother     Depression Family     Heart disease Maternal Aunt     Heart disease Maternal Uncle     Diabetes Paternal Aunt     Diabetes Paternal Uncle     Breast cancer Maternal Grandmother     Diabetes Paternal Grandmother     Diabetes Paternal  Grandfather     Stroke Neg Hx     Thyroid disease Neg Hx      I have reviewed and agree with the history as documented.    E-Cigarette/Vaping    E-Cigarette Use Never User      E-Cigarette/Vaping Substances    Nicotine No     THC No     CBD No     Flavoring No     Other No     Unknown No      Social History     Tobacco Use    Smoking status: Never    Smokeless tobacco: Never   Vaping Use    Vaping status: Never Used   Substance Use Topics    Alcohol use: Not Currently    Drug use: Never       Review of Systems   Constitutional:  Negative for chills and fever.   HENT:  Negative for ear pain and sore throat.    Eyes:  Negative for pain and visual disturbance.   Respiratory:  Negative for cough and shortness of breath.    Cardiovascular:  Negative for chest pain and palpitations.   Gastrointestinal:  Positive for abdominal pain and nausea. Negative for vomiting.   Genitourinary:  Negative for dysuria and hematuria.   Musculoskeletal:  Negative for arthralgias and back pain.   Skin:  Negative for color change and rash.   Neurological:  Negative for seizures and syncope.   All other systems reviewed and are negative.      Physical Exam  Physical Exam  Vitals and nursing note reviewed.   Constitutional:       Appearance: She is obese. She is ill-appearing.   HENT:      Head: Normocephalic and atraumatic.      Right Ear: External ear normal.      Left Ear: External ear normal.      Nose: Nose normal.      Mouth/Throat:      Mouth: Mucous membranes are moist.   Eyes:      General: No scleral icterus.     Extraocular Movements: Extraocular movements intact.      Pupils: Pupils are equal, round, and reactive to light.   Cardiovascular:      Rate and Rhythm: Normal rate and regular rhythm.      Heart sounds: Normal heart sounds.   Pulmonary:      Effort: Pulmonary effort is normal. No respiratory distress.      Breath sounds: No wheezing.   Abdominal:      General: Abdomen is flat. Bowel sounds are normal.      Palpations:  Abdomen is soft.      Tenderness: There is abdominal tenderness in the right upper quadrant and epigastric area. There is no guarding or rebound.   Musculoskeletal:         General: No deformity. Normal range of motion.      Cervical back: Normal range of motion and neck supple.   Skin:     General: Skin is warm and dry.      Capillary Refill: Capillary refill takes less than 2 seconds.   Neurological:      General: No focal deficit present.      Mental Status: She is alert and oriented to person, place, and time.   Psychiatric:         Mood and Affect: Mood normal.         Behavior: Behavior normal.         Vital Signs  ED Triage Vitals [05/17/24 0241]   Temperature Pulse Respirations Blood Pressure SpO2   (!) 97.2 °F (36.2 °C) 89 22 168/94 94 %      Temp Source Heart Rate Source Patient Position - Orthostatic VS BP Location FiO2 (%)   Temporal Monitor -- Left arm --      Pain Score       9           Vitals:    05/17/24 0241   BP: 168/94   Pulse: 89         Visual Acuity      ED Medications  Medications   HYDROmorphone (DILAUDID) injection 0.5 mg (0.5 mg Intravenous Given 5/17/24 0311)   ondansetron (ZOFRAN) injection 4 mg (4 mg Intravenous Given 5/17/24 0311)   sodium chloride 0.9 % bolus 1,000 mL (0 mL Intravenous Stopped 5/17/24 0600)   iohexol (OMNIPAQUE) 350 MG/ML injection (MULTI-DOSE) 100 mL (100 mL Intravenous Given 5/17/24 0328)   HYDROmorphone (DILAUDID) injection 0.5 mg (0.5 mg Intravenous Given 5/17/24 0353)       Diagnostic Studies  Results Reviewed       Procedure Component Value Units Date/Time    Comprehensive metabolic panel [[id number]]  (Abnormal) Collected: 05/17/24 0247    Lab Status: Final result Specimen: Blood from Arm, Right Updated: 05/17/24 0310     Sodium 137 mmol/L      Potassium 4.0 mmol/L      Chloride 104 mmol/L      CO2 21 mmol/L      ANION GAP 12 mmol/L      BUN 15 mg/dL      Creatinine 0.67 mg/dL      Glucose 107 mg/dL      Calcium 9.8 mg/dL      AST 17 U/L      ALT 18 U/L       Alkaline Phosphatase 118 U/L      Total Protein 7.9 g/dL      Albumin 4.5 g/dL      Total Bilirubin 0.44 mg/dL      eGFR 105 ml/min/1.73sq m     Narrative:      National Kidney Disease Foundation guidelines for Chronic Kidney Disease (CKD):     Stage 1 with normal or high GFR (GFR > 90 mL/min/1.73 square meters)    Stage 2 Mild CKD (GFR = 60-89 mL/min/1.73 square meters)    Stage 3A Moderate CKD (GFR = 45-59 mL/min/1.73 square meters)    Stage 3B Moderate CKD (GFR = 30-44 mL/min/1.73 square meters)    Stage 4 Severe CKD (GFR = 15-29 mL/min/1.73 square meters)    Stage 5 End Stage CKD (GFR <15 mL/min/1.73 square meters)  Note: GFR calculation is accurate only with a steady state creatinine    Lipase [[id number]]  (Normal) Collected: 05/17/24 0247    Lab Status: Final result Specimen: Blood from Arm, Right Updated: 05/17/24 0310     Lipase 17 u/L     Lactic acid, plasma (w/reflex if result > 2.0) [[id number]]  (Normal) Collected: 05/17/24 0247    Lab Status: Final result Specimen: Blood from Arm, Right Updated: 05/17/24 0310     LACTIC ACID 0.7 mmol/L     Narrative:      Result may be elevated if tourniquet was used during collection.    CBC and differential [[id number]]  (Abnormal) Collected: 05/17/24 0247    Lab Status: Final result Specimen: Blood from Arm, Right Updated: 05/17/24 0252     WBC 14.05 Thousand/uL      RBC 5.83 Million/uL      Hemoglobin 15.6 g/dL      Hematocrit 49.0 %      MCV 84 fL      MCH 26.8 pg      MCHC 31.8 g/dL      RDW 15.9 %      MPV 9.2 fL      Platelets 344 Thousands/uL      nRBC 0 /100 WBCs      Segmented % 66 %      Immature Grans % 1 %      Lymphocytes % 22 %      Monocytes % 8 %      Eosinophils Relative 2 %      Basophils Relative 1 %      Absolute Neutrophils 9.46 Thousands/µL      Absolute Immature Grans 0.12 Thousand/uL      Absolute Lymphocytes 3.09 Thousands/µL      Absolute Monocytes 1.05 Thousand/µL      Eosinophils Absolute 0.22 Thousand/µL      Basophils Absolute 0.11  Thousands/µL                    CT abdomen pelvis with contrast   Final Result by Lm Jones MD (05/17 0447)      Moderately thickened small bowel loops in the right and left upper quadrants and moderate thickening of the hepatic flexure of the colon with associated mesenteric stranding/edema which may be due to moderate to severe nonspecific enterocolitis. There    are moderately distended loops of fluid-filled small bowel with areas of mild small bowel tethering and superimposed early or partial small bowel obstruction cannot be excluded.      The study was marked in EPIC for immediate notification.      Workstation performed: [id number]                    Procedures  CriticalCare Time    Date/Time: 5/17/2024 6:00 AM    Performed by: Too Joshua MD  Authorized by: Too Joshua MD    Critical care provider statement:     Critical care time (minutes):  32    Critical care time was exclusive of:  Separately billable procedures and treating other patients and teaching time    Critical care was necessary to treat or prevent imminent or life-threatening deterioration of the following conditions:  Dehydration (Bowel obstruction)    Critical care was time spent personally by me on the following activities:  Obtaining history from patient or surrogate, development of treatment plan with patient or surrogate, discussions with consultants, evaluation of patient's response to treatment, examination of patient, ordering and performing treatments and interventions, ordering and review of laboratory studies, ordering and review of radiographic studies, re-evaluation of patient's condition and review of old charts           ED Course  ED Course as of 05/17/24 0609   Fri May 17, 2024   0330 Patient CT scan demonstrates multiple dilated loops of bowel per my interpretation concerning for possible bowel obstruction given patient's history   0512 Reached out to general surgery                               SBIRT 22yo+       Flowsheet Row Most Recent Value   Initial Alcohol Screen: US AUDIT-C     1. How often do you have a drink containing alcohol? 0 Filed at: 05/17/2024 0241   2. How many drinks containing alcohol do you have on a typical day you are drinking?  0 Filed at: 05/17/2024 0241   3a. Male UNDER 65: How often do you have five or more drinks on one occasion? 0 Filed at: 05/17/2024 0241   3b. FEMALE Any Age, or MALE 65+: How often do you have 4 or more drinks on one occassion? 0 Filed at: 05/17/2024 0241   Audit-C Score 0 Filed at: 05/17/2024 0241   NARCISA: How many times in the past year have you...    Used an illegal drug or used a prescription medication for non-medical reasons? Never Filed at: 05/17/2024 0241                      Medical Decision Making  Differential diagnosis includes but is not limited to bowel obstruction gastritis, peptic ulcer disease, pancreatitis, colitis, diverticulitis.  Will check abdominal labs, CT scan of the abdomen and pelvis.  Will treat symptomatically and reassess.    Labs remarkable for leukocytosis, CT scan concerning for small bowel obstruction.  Case discussed with general surgery who accepts for admission.    Problems Addressed:  Abdominal pain: acute illness or injury  Small bowel obstruction (HCC): acute illness or injury    Amount and/or Complexity of Data Reviewed  External Data Reviewed: labs and notes.  Labs: ordered.  Radiology: ordered and independent interpretation performed.    Risk  Prescription drug management.  Decision regarding hospitalization.             Disposition  Final diagnoses:   Small bowel obstruction (HCC)   Abdominal pain     Time reflects when diagnosis was documented in both MDM as applicable and the Disposition within this note       Time User Action Codes Description Comment    5/17/2024  5:17 AM Too Joshua Add [K56.609] Small bowel obstruction (HCC)     5/17/2024  5:17 AM Too Joshua Add [R10.9] Abdominal pain           ED Disposition       ED  Disposition   Admit    Condition   Stable    Date/Time   Fri May 17, 2024 0548    Comment   Case was discussed with surgery and the patient's admission status was agreed to be Admission Status: inpatient status to the service of Dr. Shelby Asencio   .               Follow-up Information    None         Patient's Medications   Discharge Prescriptions    No medications on file       No discharge procedures on file.    PDMP Review         Value Time User    PDMP Reviewed  Yes 4/16/2024  2:25 PM Fabiola Carreno PA-C            ED Provider  Electronically Signed by             Too Joshua MD  05/17/24 0609

## 2024-05-17 NOTE — Clinical Note
Case was discussed with surgery and the patient's admission status was agreed to be Admission Status: inpatient status to the service of Dr. Shelby Asencio   .

## 2024-05-17 NOTE — H&P
"H&P Exam - General Surgery   Amparo Dobbs 48 y.o. female MRN: [id number]  Unit/Bed#: -01 Encounter: [id number]    Assessment & Plan     Assessment:  47yo female PMH GERD, HTN, anxiety, depression presents with abdominal pain   -CT AP 5/17: \"Moderately thickened small bowel loops in the right and left upper quadrants and moderate thickening of the hepatic flexure of the colon with associated mesenteric stranding/edema which may be due to moderate to severe nonspecific enterocolitis. There   are moderately distended loops of fluid-filled small bowel with areas of mild small bowel tethering and superimposed early or partial small bowel obstruction cannot be excluded.\"   -NGT inserted, not much output, patient complaining of nausea   -abdomen is mildly distended, soft, generalized TTP   -afebrile, VSS   -WBC 14   -Hgb 15.6   -elytes unremarkable    Plan:  -no acute surgical intervention indicated  -CXR ordered to check NGT positioning, will make adjustments based on XR  -continue NGT, NPO, IVF  -nausea and pain meds prn  -monitor for bowel function  -trend labs and vitals  -will discuss with Dr Farias    History of Present Illness     HPI:  Amparo Dobbs is a 48 y.o. female who presents with abdominal pain. Patient states pain started on Wednesday. She has had small bowel obstructions in the past but the pain initially wasn't that bad. She did have a bowel movement Thursday and felt better but the pain got worse. She started to feel bloated and nauseous. She thought she may have another obstruction and came in. She says her most recent obstruction was last year and she was admitted at [facility], this resolved with conservative management. She is still nauseous this morning and complaining of abdominal pain. She has not passed gas or had a bowel movement since admission. Denies fever, chills, vomiting. Prior abdominal surgeries include cholecystectomy. She does not take blood thinners.      Review of Systems "   Constitutional: Negative.    HENT: Negative.     Respiratory: Negative.     Cardiovascular: Negative.    Gastrointestinal:  Positive for abdominal distention, abdominal pain and nausea.   Genitourinary: Negative.    Musculoskeletal: Negative.    Skin: Negative.    Neurological: Negative.    Psychiatric/Behavioral: Negative.         Historical Information   Past Medical History:   Diagnosis Date    Anxiety     Arthritis     Bipolar 1 disorder ([facility])     with bordeline personality    Borderline personality disorder ([facility])     Chronic headaches     Depression     GERD (gastroesophageal reflux disease)     Hypertension     Incontinence     Migraine     Morbid obesity with BMI of 40.0-44.9, adult ([facility])     Sleep apnea     Small bowel obstruction ([facility]) 06/27/2019    Suicide attempt ([facility]) 2/29/2024    Symptomatic bradycardia     Urinary tract infection      Past Surgical History:   Procedure Laterality Date    CHOLECYSTECTOMY  05/24/2018    DENTAL SURGERY      ELBOW SURGERY      EXPLORATORY LAPAROTOMY      exlap    FOOT SURGERY Right     KNEE ARTHROSCOPY Right 11/15/2018    total of three procedures with meniscal surgery    MULTIPLE TOOTH EXTRACTIONS  11/02/2018    OVARIAN CYST REMOVAL      AR TNOT ELBOW LATERAL/MEDIAL DEBRIDE OPEN TDN RPR Left 3/1/2024    Procedure: REPAIR TENDON FOREARM, elbow common flexor tendon, excision of bone fragment and all necessary procedures;  Surgeon: June Charles DO;  Location:  MAIN OR;  Service: Orthopedics    REDUCTION MAMMAPLASTY      reduction     SINUS SURGERY Bilateral 04/19/2019    bilateral maxillary antrostomies - Dr. Barajas - [facility]    TOTAL KNEE ARTHROPLASTY REVISION Right 2023     Social History   Social History     Substance and Sexual Activity   Alcohol Use Not Currently     Social History     Substance and Sexual Activity   Drug Use Never     Social History     Tobacco Use   Smoking Status Never   Smokeless Tobacco Never     E-Cigarette/Vaping    E-Cigarette Use Never User   "    E-Cigarette/Vaping Substances    Nicotine No     THC No     CBD No     Flavoring No     Other No     Unknown No      Family History: non-contributory    Meds/Allergies   all medications and allergies reviewed  Allergies   Allergen Reactions    Celecoxib Hives    Lamotrigine Rash, Hives and Itching     Other reaction(s): rash and itching  Other reaction(s): rash and itching  Other reaction(s): rash and itching       Objective   First Vitals:   Blood Pressure: 168/94 (05/17/24 0241)  Pulse: 89 (05/17/24 0241)  Temperature: (!) 97.2 °F (36.2 °C) (05/17/24 0241)  Temp Source: Temporal (05/17/24 0241)  Respirations: 22 (05/17/24 0241)  Height: 5' 5\" (165.1 cm) (05/17/24 0619)  Weight - Scale: 110 kg (242 lb 11.6 oz) (05/17/24 0619)  SpO2: 94 % (05/17/24 0241)    Current Vitals:   Blood Pressure: 134/83 (05/17/24 0723)  Pulse: 93 (05/17/24 0723)  Temperature: 97.8 °F (36.6 °C) (05/17/24 0723)  Temp Source: Oral (05/17/24 0619)  Respirations: 16 (05/17/24 0723)  Height: 5' 5\" (165.1 cm) (05/17/24 0619)  Weight - Scale: 110 kg (242 lb 11.6 oz) (05/17/24 0619)  SpO2: 94 % (05/17/24 0734)    No intake or output data in the 24 hours ending 05/17/24 0946    Invasive Devices       Peripheral Intravenous Line  Duration             Peripheral IV 05/17/24 Right Forearm <1 day              Drain  Duration             NG/OG Tube Nasogastric Left nare <1 day                    Physical Exam  Constitutional:       Appearance: Normal appearance. She is obese.   HENT:      Head: Normocephalic and atraumatic.      Nose: Nose normal.      Mouth/Throat:      Mouth: Mucous membranes are moist.      Pharynx: Oropharynx is clear.   Eyes:      Conjunctiva/sclera: Conjunctivae normal.      Pupils: Pupils are equal, round, and reactive to light.   Cardiovascular:      Rate and Rhythm: Normal rate and regular rhythm.   Pulmonary:      Effort: Pulmonary effort is normal.   Abdominal:      General: There is distension.      Palpations: Abdomen " is soft.      Tenderness: There is generalized abdominal tenderness.   Musculoskeletal:         General: Normal range of motion.   Skin:     General: Skin is warm and dry.   Neurological:      General: No focal deficit present.   Psychiatric:         Mood and Affect: Mood normal.         Lab Results: CBC:   Lab Results   Component Value Date    WBC 14.05 (H) 05/17/2024    HGB 15.6 (H) 05/17/2024    HCT 49.0 (H) 05/17/2024    MCV 84 05/17/2024     05/17/2024    RBC 5.83 (H) 05/17/2024    MCH 26.8 05/17/2024    MCHC 31.8 05/17/2024    RDW 15.9 (H) 05/17/2024    MPV 9.4 05/17/2024    NRBC 0 05/17/2024   , CMP:   Lab Results   Component Value Date    SODIUM 137 05/17/2024    K 4.0 05/17/2024     05/17/2024    CO2 21 05/17/2024    BUN 15 05/17/2024    CREATININE 0.67 05/17/2024    CALCIUM 9.8 05/17/2024    AST 17 05/17/2024    ALT 18 05/17/2024    ALKPHOS 118 (H) 05/17/2024    EGFR 105 05/17/2024       Code Status: Level 1 - Full Code      Counseling / Coordination of Care  Total floor / unit time spent today 20 minutes.  Greater than 50% of total time was spent with the patient and / or family counseling and / or coordination of care.     Veronica Downing PA-C

## 2024-05-17 NOTE — CASE MANAGEMENT
Case Management Discharge Planning Note    Patient name Amparo Dobbs  Location /-01 MRN [id number]  : 1975 Date 2024       Current Admission Date: 2024  Current Admission Diagnosis:Small bowel obstruction (HCC), Abdominal pain   Patient Active Problem List    Diagnosis Date Noted Date Diagnosed    Medial epicondylitis of elbow, left 2024     Arthritis 2024     Suicide attempt (HCC) 2024     Leukocytosis 2021     Morbid obesity with BMI of 40.0-44.9, adult ([facility])      At risk for sleep apnea      Abnormal TSH 2020     Essential hypertension 2020     Gastroesophageal reflux disease without esophagitis 2020     Borderline personality disorder ([facility])      Lightheaded 2020     Symptomatic bradycardia 2020     Bipolar disorder, in partial remission, most recent episode mixed ([facility]) 2020     Bipolar 1 disorder, depressed, severe ([facility]) 2019     Panic disorder 2019     Small bowel obstruction ([facility]) 2019     Stress incontinence 10/17/2018     Sensation of cold in lower extremity 10/04/2018     Excessive daytime sleepiness 2018     Migraine without aura and without status migrainosus, not intractable 2018     Tension headache 2018       LOS (days): 1  Geometric Mean LOS (GMLOS) (days):   Days to GMLOS:     OBJECTIVE:            Current admission status: Observation   Preferred Pharmacy:   [address] [address]  Phone: [phone] Fax: [phone]    Primary Care Provider: Sourav Thompson DO    Primary Insurance: [facility]  Secondary Insurance:     DISCHARGE DETAILS:      Additional Comments: Chart reviewed for discharge planning purposes. Pt has no identified CM dicharge needs at this time. CM to follow to assess for same as needed.

## 2024-05-18 VITALS
BODY MASS INDEX: 40.44 KG/M2 | DIASTOLIC BLOOD PRESSURE: 78 MMHG | HEIGHT: 65 IN | SYSTOLIC BLOOD PRESSURE: 103 MMHG | HEART RATE: 82 BPM | WEIGHT: 242.73 LBS | OXYGEN SATURATION: 96 % | TEMPERATURE: 99.3 F | RESPIRATION RATE: 18 BRPM

## 2024-05-18 LAB
ANION GAP SERPL CALCULATED.3IONS-SCNC: 8 MMOL/L (ref 4–13)
BASOPHILS # BLD AUTO: 0.06 THOUSANDS/ÂΜL (ref 0–0.1)
BASOPHILS NFR BLD AUTO: 1 % (ref 0–1)
BUN SERPL-MCNC: 7 MG/DL (ref 5–25)
CALCIUM SERPL-MCNC: 8.8 MG/DL (ref 8.4–10.2)
CHLORIDE SERPL-SCNC: 107 MMOL/L (ref 96–108)
CO2 SERPL-SCNC: 22 MMOL/L (ref 21–32)
CREAT SERPL-MCNC: 0.48 MG/DL (ref 0.6–1.3)
EOSINOPHIL # BLD AUTO: 0.19 THOUSAND/ÂΜL (ref 0–0.61)
EOSINOPHIL NFR BLD AUTO: 2 % (ref 0–6)
ERYTHROCYTE [DISTWIDTH] IN BLOOD BY AUTOMATED COUNT: 15.5 % (ref 11.6–15.1)
GFR SERPL CREATININE-BSD FRML MDRD: 118 ML/MIN/1.73SQ M
GLUCOSE P FAST SERPL-MCNC: 71 MG/DL (ref 65–99)
GLUCOSE SERPL-MCNC: 71 MG/DL (ref 65–140)
HCT VFR BLD AUTO: 42.8 % (ref 34.8–46.1)
HGB BLD-MCNC: 13.5 G/DL (ref 11.5–15.4)
IMM GRANULOCYTES # BLD AUTO: 0.07 THOUSAND/UL (ref 0–0.2)
IMM GRANULOCYTES NFR BLD AUTO: 1 % (ref 0–2)
LYMPHOCYTES # BLD AUTO: 2.11 THOUSANDS/ÂΜL (ref 0.6–4.47)
LYMPHOCYTES NFR BLD AUTO: 22 % (ref 14–44)
MAGNESIUM SERPL-MCNC: 1.9 MG/DL (ref 1.9–2.7)
MCH RBC QN AUTO: 27.2 PG (ref 26.8–34.3)
MCHC RBC AUTO-ENTMCNC: 31.5 G/DL (ref 31.4–37.4)
MCV RBC AUTO: 86 FL (ref 82–98)
MONOCYTES # BLD AUTO: 0.62 THOUSAND/ÂΜL (ref 0.17–1.22)
MONOCYTES NFR BLD AUTO: 6 % (ref 4–12)
NEUTROPHILS # BLD AUTO: 6.76 THOUSANDS/ÂΜL (ref 1.85–7.62)
NEUTS SEG NFR BLD AUTO: 68 % (ref 43–75)
NRBC BLD AUTO-RTO: 0 /100 WBCS
PHOSPHATE SERPL-MCNC: 3 MG/DL (ref 2.7–4.5)
PLATELET # BLD AUTO: 278 THOUSANDS/UL (ref 149–390)
PMV BLD AUTO: 9.5 FL (ref 8.9–12.7)
POTASSIUM SERPL-SCNC: 3.9 MMOL/L (ref 3.5–5.3)
RBC # BLD AUTO: 4.97 MILLION/UL (ref 3.81–5.12)
SODIUM SERPL-SCNC: 137 MMOL/L (ref 135–147)
WBC # BLD AUTO: 9.81 THOUSAND/UL (ref 4.31–10.16)

## 2024-05-18 PROCEDURE — 83735 ASSAY OF MAGNESIUM: CPT

## 2024-05-18 PROCEDURE — 80048 BASIC METABOLIC PNL TOTAL CA: CPT | Performed by: SURGERY

## 2024-05-18 PROCEDURE — 84100 ASSAY OF PHOSPHORUS: CPT

## 2024-05-18 PROCEDURE — 99231 SBSQ HOSP IP/OBS SF/LOW 25: CPT | Performed by: SURGERY

## 2024-05-18 PROCEDURE — 85025 COMPLETE CBC W/AUTO DIFF WBC: CPT | Performed by: SURGERY

## 2024-05-18 RX ORDER — FAMOTIDINE 20 MG/1
20 TABLET, FILM COATED ORAL 2 TIMES DAILY
Status: DISCONTINUED | OUTPATIENT
Start: 2024-05-18 | End: 2024-05-18 | Stop reason: HOSPADM

## 2024-05-18 RX ADMIN — SODIUM CHLORIDE 100 ML/HR: 0.9 INJECTION, SOLUTION INTRAVENOUS at 05:02

## 2024-05-18 RX ADMIN — KETOROLAC TROMETHAMINE 15 MG: 30 INJECTION, SOLUTION INTRAMUSCULAR; INTRAVENOUS at 05:02

## 2024-05-18 RX ADMIN — KETOROLAC TROMETHAMINE 15 MG: 30 INJECTION, SOLUTION INTRAMUSCULAR; INTRAVENOUS at 13:07

## 2024-05-18 RX ADMIN — ACETAMINOPHEN 1000 MG: 10 INJECTION INTRAVENOUS at 13:07

## 2024-05-18 RX ADMIN — METRONIDAZOLE 500 MG: 500 INJECTION, SOLUTION INTRAVENOUS at 06:01

## 2024-05-18 RX ADMIN — CEFTRIAXONE 1000 MG: 1 INJECTION, SOLUTION INTRAVENOUS at 14:54

## 2024-05-18 RX ADMIN — ACETAMINOPHEN 1000 MG: 10 INJECTION INTRAVENOUS at 05:04

## 2024-05-18 RX ADMIN — FAMOTIDINE 20 MG: 20 TABLET, FILM COATED ORAL at 10:02

## 2024-05-18 NOTE — PLAN OF CARE
Problem: INFECTION - ADULT  Goal: Absence or prevention of progression during hospitalization  Description: INTERVENTIONS:  - Assess and monitor for signs and symptoms of infection  - Monitor lab/diagnostic results  - Monitor all insertion sites, i.e. indwelling lines, tubes, and drains  - Monitor endotracheal if appropriate and nasal secretions for changes in amount and color  - [place] appropriate cooling/warming therapies per order  - Administer medications as ordered  - Instruct and encourage patient and family to use good hand hygiene technique  - Identify and instruct in appropriate isolation precautions for identified infection/condition  Continue antibiotics  Outcome: Progressing  Goal: Absence of fever/infection during neutropenic period  Description: INTERVENTIONS:  - Monitor WBC    Outcome: Progressing

## 2024-05-18 NOTE — NURSING NOTE
Patient discharged to home. Patient will follow up with PCP. Patient reports vomiting after lunch due to drinking too much and provider aware. Patient tolerating dinner and denies pain/nausea. IV removed, site wnl. Patient has all belongings.

## 2024-05-18 NOTE — UTILIZATION REVIEW
Initial Clinical Review    Admission: Date/Time/Statement:   Admission Orders (From admission, onward)       Ordered        05/18/24 1141  INPATIENT ADMISSION  Once            05/17/24 0619  Place in Observation  Once                          Orders Placed This Encounter   Procedures    INPATIENT ADMISSION     Standing Status:   Standing     Number of Occurrences:   1     Order Specific Question:   Level of Care     Answer:   Med Surg [16]     Order Specific Question:   Estimated length of stay     Answer:   More than 2 Midnights     Order Specific Question:   Certification     Answer:   I certify that inpatient services are medically necessary for this patient for a duration of greater than two midnights. See H&P and MD Progress Notes for additional information about the patient's course of treatment.     ED Arrival Information       Expected   -    Arrival   5/17/2024 02:28    Acuity   Urgent              Means of arrival   Walk-In    Escorted by   Family Member    Service   Surgery-General    Admission type   Emergency              Arrival complaint   abd pain             Chief Complaint   Patient presents with    Abdominal Pain     Pt reports RUQ abdominal pain since Wednesday along with nausea. Pt reports hx of bowel obstruction     PMH:     Prior surgeries include lap kalpana, laparotomy for removal of gastric foreign body (razor blade), possible additional surgery for obstruction, details not known, history of self mutilation, prior bowel obstructions. Does see GI as well on a regular basis, is on PPI, H2B, prior colon polyps, recent nausea, EGD was being planned as an outpatient.     Initial Presentation: 48 y.o. female  to ER from home.    H/o multiple SBO.  Reports abd pain started 5/15, intermittent -  today more constant and severe.    Assoc w/Nausea.  Last bm yesterday.  Abd soft/ distended  - tender RUQ and epigastric area.  Wbc 14.05    CTAP  shows enterocolitis and possible  early or partial SBO  NG  Tube placed - not much  output    5/17   Admit OBS status ,  MS  Level of care  for management of enterocolitis/  possible early SBO.   no acute surgical intervention indicated.  Continue serial abd exams,  NGT, NPO, IVF  (NS @  100 cc/hr)  nausea and pain meds prn;  trend labs and vitals    5/17   @  1400   Nausea/abd pain improved since admit.   Abd remains soft, tender RUQ/epigastrium    With leukocytosis and tenderness with inflammatory changes of the small and large bowel, I would recommend IV antibiotics and IV antiinflammatories. IV toradol, ceftriaxone, flagyl started. Home IV PPI and H2B added as well. Will continue serial exams. Would plan to remove NGT with return of bowel function. Will need outpatient GI follow up after discharge.       Date: 5/18       Day 2:  CHANGED To INPATIENT   STATUS,  MS  Level of  care   for ongoing  infectious workup/treatment,  symptom management,  cont monitoring for full return of bowel function.   still nauseous this morning and abd remains tender.   Passing gas this am but not BM since admit.   Minimal NGT output.  Hypoactive BS.  Abd soft/distended/ tender  upper quads.    Remians npo/ ngt/ IVF  Anticipated Length of Stay/Certification Statement: > 2 midnights     ------------------------------------------------------------------------------------------------------------------------------    ED Triage Vitals   Temperature Pulse Respirations Blood Pressure SpO2   05/17/24 0241 05/17/24 0241 05/17/24 0241 05/17/24 0241 05/17/24 0241   (!) 97.2 °F (36.2 °C) 89 22 168/94 94 %      Temp Source Heart Rate Source Patient Position - Orthostatic VS BP Location FiO2 (%)   05/17/24 0241 05/17/24 0241 05/17/24 0619 05/17/24 0241 --   Temporal Monitor Lying Left arm       Pain Score       05/17/24 0241       9          Wt Readings from Last 1 Encounters:   05/17/24 110 kg (242 lb 11.6 oz)     Additional Vital Signs:   05/18/24 10:12:44 -- 82 -- 147/91 110 97 % -- -- -- --   05/18/24  07:38:20 96.6 °F (35.9 °C) Abnormal  78 19 170/107 Abnormal  128 97 % -- -- None (Room air) Lying   05/17/24 23:48:12 98.9 °F (37.2 °C) 63 18 126/63 84 91 % -- -- -- --   05/17/24 2100 -- 67 -- -- -- -- -- -- -- --   05/17/24 1900 -- 88 -- -- -- -- -- -- -- --   05/17/24 15:30:17 97.8 °F (36.6 °C) 93 18 128/72 91 94 % -- -- -- --   05/17/24 0734 -- -- -- -- -- 94 % 28 2 L/min Nasal cannula --   05/17/24 07:23:05 97.8 °F (36.6 °C) 93 16 134/83 100 84 % Abnormal  -- -- None (Room air) --   05/17/24 06:19:33 97.5 °F (36.4 °C) 88 18 120/76 91 94 % -- --       Pertinent Labs/Diagnostic Test Results:   XR chest portable   Final Result by Sundeep Voss MD (05/17 4087)      NG tube projecting over the region of the proximal stomach. Mild central pulmonary vascular congestion.            Workstation performed: [id number]         CT abdomen pelvis with contrast   Final Result by Lm Jones MD (05/17 9777)      Moderately thickened small bowel loops in the right and left upper quadrants and moderate thickening of the hepatic flexure of the colon with associated mesenteric stranding/edema which may be due to moderate to severe nonspecific enterocolitis. There    are moderately distended loops of fluid-filled small bowel with areas of mild small bowel tethering and superimposed early or partial small bowel obstruction cannot be excluded.      The study was marked in EPIC for immediate notification.      Workstation performed: [id number]               Results from last 7 days   Lab Units 05/18/24  0451 05/17/24  0627 05/17/24  0247   WBC Thousand/uL 9.81  --  14.05*   HEMOGLOBIN g/dL 13.5  --  15.6*   HEMATOCRIT % 42.8  --  49.0*   PLATELETS Thousands/uL 278 266 344   TOTAL NEUT ABS Thousands/µL 6.76  --  9.46*         Results from last 7 days   Lab Units 05/18/24  0451 05/17/24  0247   SODIUM mmol/L 137 137   POTASSIUM mmol/L 3.9 4.0   CHLORIDE mmol/L 107 104   CO2 mmol/L 22 21   ANION GAP mmol/L 8 12   BUN mg/dL 7 15    CREATININE mg/dL 0.48* 0.67   EGFR ml/min/1.73sq m 118 105   CALCIUM mg/dL 8.8 9.8   MAGNESIUM mg/dL 1.9  --    PHOSPHORUS mg/dL 3.0  --      Results from last 7 days   Lab Units 05/17/24  0247   AST U/L 17   ALT U/L 18   ALK PHOS U/L 118*   TOTAL PROTEIN g/dL 7.9   ALBUMIN g/dL 4.5   TOTAL BILIRUBIN mg/dL 0.44         Results from last 7 days   Lab Units 05/18/24  0451 05/17/24  0247   GLUCOSE RANDOM mg/dL 71 107       Results from last 7 days   Lab Units 05/17/24  0247   LACTIC ACID mmol/L 0.7       Results from last 7 days   Lab Units 05/17/24  0247   LIPASE u/L 17       ED Treatment:   Medication Administration from 05/17/2024 0228 to 05/17/2024 0610         Date/Time Order Dose Route Action     05/17/2024 0311 EDT HYDROmorphone (DILAUDID) injection 0.5 mg 0.5 mg Intravenous Given     05/17/2024 0311 EDT ondansetron (ZOFRAN) injection 4 mg 4 mg Intravenous Given     05/17/2024 0311 EDT sodium chloride 0.9 % bolus 1,000 mL 1,000 mL Intravenous New Bag     05/17/2024 0353 EDT HYDROmorphone (DILAUDID) injection 0.5 mg 0.5 mg Intravenous Given          Past Medical History:   Diagnosis Date    Anxiety     Arthritis     Bipolar 1 disorder ([facility])     with bordeline personality    Borderline personality disorder ([facility])     Chronic headaches     Depression     GERD (gastroesophageal reflux disease)     Hypertension     Incontinence     Migraine     Morbid obesity with BMI of 40.0-44.9, adult ([facility])     Sleep apnea     Small bowel obstruction ([facility]) 06/27/2019    Suicide attempt ([facility]) 2/29/2024    Symptomatic bradycardia     Urinary tract infection        Admitting Diagnosis: Small bowel obstruction ([facility]) [K56.609]  Abdominal pain [R10.9]  Age/Sex: 48 y.o. female  Admission Orders:  see above note    Scheduled Medications:  acetaminophen, 1,000 mg, Intravenous, Q6H ADAM  cefTRIAXone, 1,000 mg, Intravenous, Q24H  famotidine, 20 mg, Oral, BID  heparin (porcine), 5,000 Units, Subcutaneous, Q8H ADAM  ketorolac, 15 mg,  Intravenous, Q6H [facility]      Continuous IV Infusions:  sodium chloride, 100 mL/hr, Intravenous, Continuous      5/17  @  0650  IV MS 2 mg x1   PRN Meds:  ondansetron, 4 mg, Intravenous, Q4H PRN  ......    5/17  @  0650          Network Utilization Review Department  ATTENTION: Please call with any questions or concerns to [phone] and carefully listen to the prompts so that you are directed to the right person. All voicemails are confidential.   For Discharge needs, contact Care Management DC Support Team at [phone] opt. 2  Send all requests for admission clinical reviews, approved or denied determinations and any other requests to dedicated fax number below belonging to the campus where the patient is receiving treatment. List of dedicated fax numbers for the Facilities:  FACILITY NAME UR FAX NUMBER   ADMISSION DENIALS (Administrative/Medical Necessity) [phone]   DISCHARGE SUPPORT TEAM (NETWORK) [phone]   PARENT CHILD HEALTH (Maternity/NICU/Pediatrics) [phone]   [facility] [phone]   [facility] [phone]   [facility] [phone]   [facility] [phone]   [facility] [phone]   [facility] [phone]   [facility] [phone]   [facility] [phone]   [facility] [phone]   [facility] [phone]   [facility] [phone]   [facility] [phone]

## 2024-05-18 NOTE — PROGRESS NOTES
"[facility]  Progress Note  Name: Amparo Dobbs I  MRN: [id number]  Unit/Bed#: -01 I Date of Admission: 5/17/2024   Date of Service: 5/18/2024 I Hospital Day: 1    Assessment & Plan   Small bowel obstruction (HCC)  Assessment & Plan  Patient reports passage of flatus.  Reports improvement in abdominal pain.    On physical examination she is well-appearing.  She has a benign abdominal examination.    Small bowel obstruction clinically resolved.    Nasogastric tube removed and her diet advanced.                     Subjective/Objective       Blood pressure 147/91, pulse 82, temperature (!) 96.6 °F (35.9 °C), temperature source Axillary, resp. rate 19, height 5' 5\" (1.651 m), weight 110 kg (242 lb 11.6 oz), SpO2 97%, not currently breastfeeding.,Body mass index is 40.39 kg/m².      Intake/Output Summary (Last 24 hours) at 5/18/2024 1452  Last data filed at 5/18/2024 1255  Gross per 24 hour   Intake 1320 ml   Output --   Net 1320 ml       Invasive Devices       Peripheral Intravenous Line  Duration             Peripheral IV 05/17/24 Right Forearm 1 day              Drain  Duration             NG/OG Tube Nasogastric Left nare 1 day                    Lab, Imaging and other studies:I have personally reviewed pertinent lab results.      "

## 2024-05-18 NOTE — ASSESSMENT & PLAN NOTE
Patient reports passage of flatus.  Reports improvement in abdominal pain.    On physical examination she is well-appearing.  She has a benign abdominal examination.    Small bowel obstruction clinically resolved.    Nasogastric tube removed and her diet advanced.

## 2024-05-21 NOTE — UTILIZATION REVIEW
NOTIFICATION OF ADMISSION DISCHARGE   This is a Notification of Discharge from [facility]. Please be advised that this patient has been discharge from our facility. Below you will find the admission and discharge date and time including the patient’s disposition.   UTILIZATION REVIEW CONTACT:  Elvia Sherman  Utilization   Network Utilization Review Department  Phone: [phone] x carefully listen to the prompts. All voicemails are confidential.  Email: [email or web address]     ADMISSION INFORMATION  PRESENTATION DATE: 5/17/2024  2:35 AM  OBERVATION ADMISSION DATE: 05/17/24 0619  INPATIENT ADMISSION DATE: 5/17/24  5:53 AM   DISCHARGE DATE: 5/18/2024  6:08 PM   DISPOSITION:Home/Self Care    Network Utilization Review Department  ATTENTION: Please call with any questions or concerns to [phone] and carefully listen to the prompts so that you are directed to the right person. All voicemails are confidential.   For Discharge needs, contact Care Management DC Support Team at [phone] opt. 2  Send all requests for admission clinical reviews, approved or denied determinations and any other requests to dedicated fax number below belonging to the campus where the patient is receiving treatment. List of dedicated fax numbers for the Facilities:  FACILITY NAME UR FAX NUMBER   ADMISSION DENIALS (Administrative/Medical Necessity) [phone]   DISCHARGE SUPPORT TEAM ([facility]) [phone]   PARENT CHILD HEALTH (Maternity/NICU/Pediatrics) [phone]   [facility] [phone]   [facility] [phone]   [facility] [phone]   [facility] [phone]   [facility] [phone]   [facility] [phone]   [facility] [phone]   [facility]  [phone]   [facility] [phone]   [facility] [phone]   [facility] [phone]   [facility] [phone]

## 2024-06-04 ENCOUNTER — OFFICE VISIT (OUTPATIENT)
Dept: URGENT CARE | Facility: CLINIC | Age: 49
End: 2024-06-04
Payer: COMMERCIAL

## 2024-06-04 ENCOUNTER — HOSPITAL ENCOUNTER (EMERGENCY)
Facility: HOSPITAL | Age: 49
Discharge: HOME | End: 2024-06-05
Attending: EMERGENCY MEDICINE
Payer: COMMERCIAL

## 2024-06-04 VITALS
RESPIRATION RATE: 20 BRPM | SYSTOLIC BLOOD PRESSURE: 168 MMHG | OXYGEN SATURATION: 96 % | WEIGHT: 250 LBS | BODY MASS INDEX: 41.65 KG/M2 | TEMPERATURE: 98.6 F | HEIGHT: 65 IN | DIASTOLIC BLOOD PRESSURE: 90 MMHG | HEART RATE: 104 BPM

## 2024-06-04 DIAGNOSIS — L03.221 CELLULITIS OF NECK: Primary | ICD-10-CM

## 2024-06-04 PROCEDURE — 99284 EMERGENCY DEPT VISIT MOD MDM: CPT | Performed by: EMERGENCY MEDICINE

## 2024-06-04 PROCEDURE — 99284 EMERGENCY DEPT VISIT MOD MDM: CPT

## 2024-06-04 PROCEDURE — 99213 OFFICE O/P EST LOW 20 MIN: CPT | Performed by: PHYSICIAN ASSISTANT

## 2024-06-04 RX ORDER — CEPHALEXIN 500 MG/1
500 CAPSULE ORAL EVERY 6 HOURS SCHEDULED
Qty: 28 CAPSULE | Refills: 0 | Status: SHIPPED | OUTPATIENT
Start: 2024-06-04 | End: 2024-06-11

## 2024-06-04 RX ORDER — MORPHINE SULFATE 4 MG/ML
4 INJECTION, SOLUTION INTRAMUSCULAR; INTRAVENOUS ONCE
Status: COMPLETED | OUTPATIENT
Start: 2024-06-04 | End: 2024-06-05

## 2024-06-04 NOTE — PROGRESS NOTES
[facility]        NAME: Amparo Dobbs is a 48 y.o. female  : 1975    MRN: [id number]  DATE: 2024  TIME: 6:14 PM    Assessment and Plan   Cellulitis of neck [L03.221]  1. Cellulitis of neck  cephalexin (KEFLEX) 500 mg capsule            Patient Instructions       Follow up with PCP in 3-5 days.  Proceed to  ER if symptoms worsen.    If tests have been performed at [facility], our office will contact you with results if changes need to be made to the care plan discussed with you at the visit.  You can review your full results on [facility] MyChart.    Chief Complaint     Chief Complaint   Patient presents with    Wound Check     Left back of neck possible insect bite or cyst developing over last 2 days, unknown if bite, with headache         History of Present Illness       Patient is a 48 year old female presenting to Urgent Care w/ pain and redness to left lateral neck.  Patient reports noticing a pimple almost 2 days ago.  Pt and  have been applying Hydrogen peroxide.  Since that time increase in size and pt reports painful to turn neck to the side.  Pt tearful in clinic room.  Pt is uncertain if bit by an insect.        Review of Systems   Review of Systems   Constitutional:  Negative for chills and fever.   HENT:  Negative for ear pain and sore throat.    Eyes:  Negative for pain and visual disturbance.   Respiratory:  Negative for cough and shortness of breath.    Cardiovascular:  Negative for chest pain and palpitations.   Gastrointestinal:  Negative for abdominal pain and vomiting.   Genitourinary:  Negative for dysuria and hematuria.   Musculoskeletal:  Positive for neck pain and neck stiffness. Negative for arthralgias and back pain.   Skin:  Positive for rash. Negative for color change.   Neurological:  Negative for seizures and syncope.   All other systems reviewed and are negative.        Current Medications       Current Outpatient Medications:     albuterol (2.5 mg/3 mL)  0.083 % nebulizer solution, Take 3 mL (2.5 mg total) by nebulization every 6 (six) hours as needed for wheezing or shortness of breath, Disp: 90 mL, Rfl: 1    ALPRAZolam (XANAX) 0.5 mg tablet, , Disp: , Rfl:     cephalexin (KEFLEX) 500 mg capsule, Take 1 capsule (500 mg total) by mouth every 6 (six) hours for 7 days, Disp: 28 capsule, Rfl: 0    DULoxetine (CYMBALTA) 30 mg delayed release capsule, daily, Disp: , Rfl:     famotidine (PEPCID) 40 MG tablet, Take 40 mg by mouth 2 (two) times a day bedtime, Disp: , Rfl:     hydroxychloroquine (PLAQUENIL) 200 mg tablet, Take 200 mg by mouth 2 (two) times a day, Disp: , Rfl:     prazosin (MINIPRESS) 2 mg capsule, 4 mg daily at bedtime, Disp: , Rfl:     pregabalin (LYRICA) 150 mg capsule, Take 150 mg by mouth 2 (two) times a day, Disp: , Rfl:     RABEprazole (ACIPHEX) 20 MG tablet, Take 20 mg by mouth daily, Disp: , Rfl:     valACYclovir (VALTREX) 500 mg tablet, Take 500 mg by mouth daily, Disp: , Rfl:     hydrOXYzine HCL (ATARAX) 50 mg tablet, Take 200 mg by mouth daily at bedtime, Disp: , Rfl:     Current Allergies     Allergies as of 06/04/2024 - Reviewed 06/04/2024   Allergen Reaction Noted    Celecoxib Hives 11/11/2019    Lamotrigine Rash, Hives, and Itching 02/24/2010            The following portions of the patient's history were reviewed and updated as appropriate: allergies, current medications, past family history, past medical history, past social history, past surgical history and problem list.     Past Medical History:   Diagnosis Date    Anxiety     Arthritis     Bipolar 1 disorder ([facility])     with bordeline personality    Borderline personality disorder ([facility])     Chronic headaches     Depression     GERD (gastroesophageal reflux disease)     Hypertension     Incontinence     Migraine     Morbid obesity with BMI of 40.0-44.9, adult ([facility])     Sleep apnea     Small bowel obstruction (HCC) 06/27/2019    Suicide attempt ([facility]) 2/29/2024    Symptomatic bradycardia      "Urinary tract infection        Past Surgical History:   Procedure Laterality Date    CHOLECYSTECTOMY  05/24/2018    DENTAL SURGERY      ELBOW SURGERY      EXPLORATORY LAPAROTOMY      exlap    FOOT SURGERY Right     KNEE ARTHROSCOPY Right 11/15/2018    total of three procedures with meniscal surgery    MULTIPLE TOOTH EXTRACTIONS  11/02/2018    OVARIAN CYST REMOVAL      GA TNOT ELBOW LATERAL/MEDIAL DEBRIDE OPEN TDN RPR Left 3/1/2024    Procedure: REPAIR TENDON FOREARM, elbow common flexor tendon, excision of bone fragment and all necessary procedures;  Surgeon: June Charles DO;  Location:  MAIN OR;  Service: Orthopedics    REDUCTION MAMMAPLASTY      reduction     SINUS SURGERY Bilateral 04/19/2019    bilateral maxillary antrostomies - Dr. Barajas - [facility]    TOTAL KNEE ARTHROPLASTY REVISION Right 2023       Family History   Problem Relation Age of Onset    Hyperlipidemia Father     Hypertension Father     Migraines Mother     Depression Family     Heart disease Maternal Aunt     Heart disease Maternal Uncle     Diabetes Paternal Aunt     Diabetes Paternal Uncle     Breast cancer Maternal Grandmother     Diabetes Paternal Grandmother     Diabetes Paternal Grandfather     Stroke Neg Hx     Thyroid disease Neg Hx          Medications have been verified.        Objective   /90   Pulse 104   Temp 98.6 °F (37 °C)   Resp 20   Ht 5' 5\" (1.651 m)   Wt 113 kg (250 lb) Comment: stated  SpO2 96%   BMI 41.60 kg/m²   No LMP recorded. Patient is perimenopausal.       Physical Exam     Physical Exam  Constitutional:       Appearance: Normal appearance.   HENT:      Head: Normocephalic and atraumatic.      Nose: Nose normal.      Mouth/Throat:      Mouth: Mucous membranes are moist.   Eyes:      Extraocular Movements: Extraocular movements intact.      Conjunctiva/sclera: Conjunctivae normal.      Pupils: Pupils are equal, round, and reactive to light.   Neck:     Cardiovascular:      Rate and Rhythm: Normal rate. "   Pulmonary:      Effort: Pulmonary effort is normal.   Musculoskeletal:         General: Normal range of motion.      Cervical back: Normal range of motion and neck supple.   Skin:     General: Skin is warm and dry.      Capillary Refill: Capillary refill takes less than 2 seconds.   Neurological:      General: No focal deficit present.      Mental Status: She is alert and oriented to person, place, and time.   Psychiatric:         Mood and Affect: Mood normal.         Behavior: Behavior normal.

## 2024-06-04 NOTE — LETTER
June 4, 2024     Patient: Amparo Dobbs   YOB: 1975   Date of Visit: 6/4/2024       To Whom It May Concern:    It is my medical opinion that Amparo Dobbs should not return to work until 06/07/2024.    If you have any questions or concerns, please don't hesitate to call.         Sincerely,        Lashonda Israel PA-C    CC: No Recipients

## 2024-06-05 ENCOUNTER — APPOINTMENT (EMERGENCY)
Dept: CT IMAGING | Facility: HOSPITAL | Age: 49
End: 2024-06-05
Payer: COMMERCIAL

## 2024-06-05 VITALS
SYSTOLIC BLOOD PRESSURE: 122 MMHG | HEART RATE: 89 BPM | WEIGHT: 250 LBS | DIASTOLIC BLOOD PRESSURE: 76 MMHG | RESPIRATION RATE: 18 BRPM | HEIGHT: 65 IN | OXYGEN SATURATION: 94 % | BODY MASS INDEX: 41.65 KG/M2 | TEMPERATURE: 98.3 F

## 2024-06-05 LAB
ALBUMIN SERPL BCP-MCNC: 4.4 G/DL (ref 3.5–5)
ALP SERPL-CCNC: 115 U/L (ref 34–104)
ALT SERPL W P-5'-P-CCNC: 24 U/L (ref 7–52)
ANION GAP SERPL CALCULATED.3IONS-SCNC: 9 MMOL/L (ref 4–13)
APTT PPP: 32 SECONDS (ref 23–37)
AST SERPL W P-5'-P-CCNC: 19 U/L (ref 13–39)
BASOPHILS # BLD AUTO: 0.08 THOUSANDS/ÂΜL (ref 0–0.1)
BASOPHILS NFR BLD AUTO: 1 % (ref 0–1)
BILIRUB SERPL-MCNC: 0.47 MG/DL (ref 0.2–1)
BUN SERPL-MCNC: 8 MG/DL (ref 5–25)
CALCIUM SERPL-MCNC: 9.3 MG/DL (ref 8.4–10.2)
CHLORIDE SERPL-SCNC: 101 MMOL/L (ref 96–108)
CO2 SERPL-SCNC: 26 MMOL/L (ref 21–32)
CREAT SERPL-MCNC: 0.73 MG/DL (ref 0.6–1.3)
EOSINOPHIL # BLD AUTO: 0.1 THOUSAND/ÂΜL (ref 0–0.61)
EOSINOPHIL NFR BLD AUTO: 1 % (ref 0–6)
ERYTHROCYTE [DISTWIDTH] IN BLOOD BY AUTOMATED COUNT: 16 % (ref 11.6–15.1)
GFR SERPL CREATININE-BSD FRML MDRD: 99 ML/MIN/1.73SQ M
GLUCOSE SERPL-MCNC: 106 MG/DL (ref 65–140)
HCG SERPL QL: NEGATIVE
HCT VFR BLD AUTO: 41.3 % (ref 34.8–46.1)
HGB BLD-MCNC: 13.4 G/DL (ref 11.5–15.4)
IMM GRANULOCYTES # BLD AUTO: 0.09 THOUSAND/UL (ref 0–0.2)
IMM GRANULOCYTES NFR BLD AUTO: 1 % (ref 0–2)
INR PPP: 1.01 (ref 0.84–1.19)
LACTATE SERPL-SCNC: 0.8 MMOL/L (ref 0.5–2)
LYMPHOCYTES # BLD AUTO: 2.31 THOUSANDS/ÂΜL (ref 0.6–4.47)
LYMPHOCYTES NFR BLD AUTO: 19 % (ref 14–44)
MCH RBC QN AUTO: 27.3 PG (ref 26.8–34.3)
MCHC RBC AUTO-ENTMCNC: 32.4 G/DL (ref 31.4–37.4)
MCV RBC AUTO: 84 FL (ref 82–98)
MONOCYTES # BLD AUTO: 1.09 THOUSAND/ÂΜL (ref 0.17–1.22)
MONOCYTES NFR BLD AUTO: 9 % (ref 4–12)
NEUTROPHILS # BLD AUTO: 8.72 THOUSANDS/ÂΜL (ref 1.85–7.62)
NEUTS SEG NFR BLD AUTO: 69 % (ref 43–75)
NRBC BLD AUTO-RTO: 0 /100 WBCS
PLATELET # BLD AUTO: 319 THOUSANDS/UL (ref 149–390)
PMV BLD AUTO: 9.7 FL (ref 8.9–12.7)
POTASSIUM SERPL-SCNC: 3.7 MMOL/L (ref 3.5–5.3)
PROCALCITONIN SERPL-MCNC: 0.07 NG/ML
PROT SERPL-MCNC: 7.8 G/DL (ref 6.4–8.4)
PROTHROMBIN TIME: 13.4 SECONDS (ref 11.6–14.5)
RBC # BLD AUTO: 4.91 MILLION/UL (ref 3.81–5.12)
SODIUM SERPL-SCNC: 136 MMOL/L (ref 135–147)
WBC # BLD AUTO: 12.39 THOUSAND/UL (ref 4.31–10.16)

## 2024-06-05 PROCEDURE — 83605 ASSAY OF LACTIC ACID: CPT | Performed by: EMERGENCY MEDICINE

## 2024-06-05 PROCEDURE — 85730 THROMBOPLASTIN TIME PARTIAL: CPT | Performed by: EMERGENCY MEDICINE

## 2024-06-05 PROCEDURE — 96365 THER/PROPH/DIAG IV INF INIT: CPT

## 2024-06-05 PROCEDURE — 70491 CT SOFT TISSUE NECK W/DYE: CPT

## 2024-06-05 PROCEDURE — 84703 CHORIONIC GONADOTROPIN ASSAY: CPT | Performed by: EMERGENCY MEDICINE

## 2024-06-05 PROCEDURE — 96361 HYDRATE IV INFUSION ADD-ON: CPT

## 2024-06-05 PROCEDURE — 85025 COMPLETE CBC W/AUTO DIFF WBC: CPT | Performed by: EMERGENCY MEDICINE

## 2024-06-05 PROCEDURE — 84145 PROCALCITONIN (PCT): CPT | Performed by: EMERGENCY MEDICINE

## 2024-06-05 PROCEDURE — 85610 PROTHROMBIN TIME: CPT | Performed by: EMERGENCY MEDICINE

## 2024-06-05 PROCEDURE — 80053 COMPREHEN METABOLIC PANEL: CPT | Performed by: EMERGENCY MEDICINE

## 2024-06-05 PROCEDURE — 36415 COLL VENOUS BLD VENIPUNCTURE: CPT | Performed by: EMERGENCY MEDICINE

## 2024-06-05 PROCEDURE — 96366 THER/PROPH/DIAG IV INF ADDON: CPT

## 2024-06-05 PROCEDURE — 96375 TX/PRO/DX INJ NEW DRUG ADDON: CPT

## 2024-06-05 RX ORDER — HYDROCODONE BITARTRATE AND ACETAMINOPHEN 5; 325 MG/1; MG/1
1 TABLET ORAL EVERY 6 HOURS PRN
Qty: 10 TABLET | Refills: 0 | Status: SHIPPED | OUTPATIENT
Start: 2024-06-05

## 2024-06-05 RX ORDER — HYDROMORPHONE HCL/PF 1 MG/ML
1 SYRINGE (ML) INJECTION ONCE
Status: COMPLETED | OUTPATIENT
Start: 2024-06-05 | End: 2024-06-05

## 2024-06-05 RX ADMIN — VANCOMYCIN HYDROCHLORIDE 1750 MG: 1 INJECTION, POWDER, LYOPHILIZED, FOR SOLUTION INTRAVENOUS at 00:55

## 2024-06-05 RX ADMIN — IOHEXOL 85 ML: 350 INJECTION, SOLUTION INTRAVENOUS at 01:10

## 2024-06-05 RX ADMIN — SODIUM CHLORIDE 1000 ML: 0.9 INJECTION, SOLUTION INTRAVENOUS at 00:01

## 2024-06-05 RX ADMIN — HYDROMORPHONE HYDROCHLORIDE 1 MG: 1 INJECTION, SOLUTION INTRAMUSCULAR; INTRAVENOUS; SUBCUTANEOUS at 01:23

## 2024-06-05 RX ADMIN — MORPHINE SULFATE 4 MG: 4 INJECTION INTRAVENOUS at 00:00

## 2024-06-05 NOTE — DISCHARGE INSTRUCTIONS
Return if any worsening symptoms:   Increased pain or redness or swelling  Signs of Systemic Infection: Fever or flu like symptoms, dizziness and malaise   Or any new and concerning symptoms      By Tomorrow night you can take the ER dressing off and begin to perform dressing changes every day.    Please apply triple antibiotic cream every 12 hours.  During this application please examine the wound for any signs of worsening infection: TRACKING REDNESS UP OR DOWN THE ARM, FIRMNESS (SIGNS OF ABSCESS FORMATION):    If any of these signs preset, please have urgent wound reevaluation        Please have WOUND CHECK in 2-3 days

## 2024-06-05 NOTE — ED PROVIDER NOTES
History  Chief Complaint   Patient presents with    Neck Pain     Left sided neck redness; was at urgent care and diagnosed with cellulitis; was given antibiotics but the pain is too bad; hard to move neck due to pain       48-YEAR-OLD FEMALE      Ceif complaitn:  Left lateral neck cellulitis/pain     Pt has been on Keflex since last night    Tmax: 100.7    Reports chillls      NO NEUROLOGICAL DEFICITS:   NO SADDLE ANESTHESIA  NO WEAKNESS OR NUMBNESS IN THE ARMS OR LEGS  NO LOSS OF BOWEL OR BLADDER  NO DIFFICULTY PASSING URINE      OTHER ASSOCIATED SYMPTOMS:  SHE DENIES CHEST PAIN OR SHORTNESS OF BREATH  SHE DENIES DIZZINESS  She has nausea, no vomiting    INTERVENTIONS:   Ibuprofen at 6pm       NO OTHER COMPLAINTS             History provided by:  Patient      Prior to Admission Medications   Prescriptions Last Dose Informant Patient Reported? Taking?   ALPRAZolam (XANAX) 0.5 mg tablet   Yes No   DULoxetine (CYMBALTA) 30 mg delayed release capsule  Self Yes No   Sig: daily   RABEprazole (ACIPHEX) 20 MG tablet  Self Yes No   Sig: Take 20 mg by mouth daily   albuterol (2.5 mg/3 mL) 0.083 % nebulizer solution  Self No No   Sig: Take 3 mL (2.5 mg total) by nebulization every 6 (six) hours as needed for wheezing or shortness of breath   cephalexin (KEFLEX) 500 mg capsule   No No   Sig: Take 1 capsule (500 mg total) by mouth every 6 (six) hours for 7 days   famotidine (PEPCID) 40 MG tablet  Self Yes No   Sig: Take 40 mg by mouth 2 (two) times a day bedtime   hydrOXYzine HCL (ATARAX) 50 mg tablet  Self Yes No   Sig: Take 200 mg by mouth daily at bedtime   hydroxychloroquine (PLAQUENIL) 200 mg tablet  Self Yes No   Sig: Take 200 mg by mouth 2 (two) times a day   prazosin (MINIPRESS) 2 mg capsule  Self Yes No   Si mg daily at bedtime   pregabalin (LYRICA) 150 mg capsule  Self Yes No   Sig: Take 150 mg by mouth 2 (two) times a day   valACYclovir (VALTREX) 500 mg tablet  Self Yes No   Sig: Take 500 mg by mouth daily       Facility-Administered Medications: None       Past Medical History:   Diagnosis Date    Anxiety     Arthritis     Bipolar 1 disorder (HCC)     with bordeline personality    Borderline personality disorder (HCC)     Chronic headaches     Depression     GERD (gastroesophageal reflux disease)     Hypertension     Incontinence     Migraine     Morbid obesity with BMI of 40.0-44.9, adult ([facility])     Sleep apnea     Small bowel obstruction ([facility]) 06/27/2019    Suicide attempt ([facility]) 2/29/2024    Symptomatic bradycardia     Urinary tract infection        Past Surgical History:   Procedure Laterality Date    CHOLECYSTECTOMY  05/24/2018    DENTAL SURGERY      ELBOW SURGERY      EXPLORATORY LAPAROTOMY      exlap    FOOT SURGERY Right     KNEE ARTHROSCOPY Right 11/15/2018    total of three procedures with meniscal surgery    MULTIPLE TOOTH EXTRACTIONS  11/02/2018    OVARIAN CYST REMOVAL      DE TNOT ELBOW LATERAL/MEDIAL DEBRIDE OPEN TDN RPR Left 3/1/2024    Procedure: REPAIR TENDON FOREARM, elbow common flexor tendon, excision of bone fragment and all necessary procedures;  Surgeon: June Charles DO;  Location:  MAIN OR;  Service: Orthopedics    REDUCTION MAMMAPLASTY      reduction     SINUS SURGERY Bilateral 04/19/2019    bilateral maxillary antrostomies - Dr. Barajas - [facility]    TOTAL KNEE ARTHROPLASTY REVISION Right 2023       Family History   Problem Relation Age of Onset    Hyperlipidemia Father     Hypertension Father     Migraines Mother     Depression Family     Heart disease Maternal Aunt     Heart disease Maternal Uncle     Diabetes Paternal Aunt     Diabetes Paternal Uncle     Breast cancer Maternal Grandmother     Diabetes Paternal Grandmother     Diabetes Paternal Grandfather     Stroke Neg Hx     Thyroid disease Neg Hx      I have reviewed and agree with the history as documented.    E-Cigarette/Vaping    E-Cigarette Use Never User      E-Cigarette/Vaping Substances    Nicotine No     THC No     CBD No     Flavoring  No     Other No     Unknown No      Social History     Tobacco Use    Smoking status: Never    Smokeless tobacco: Never   Vaping Use    Vaping status: Never Used   Substance Use Topics    Alcohol use: Not Currently    Drug use: Never       Review of Systems   Constitutional:  Negative for chills, diaphoresis, fatigue and fever.   Respiratory:  Negative for cough, shortness of breath, wheezing and stridor.    Cardiovascular:  Negative for chest pain, palpitations and leg swelling.   Gastrointestinal:  Negative for abdominal pain, blood in stool, nausea and vomiting.   Genitourinary:  Negative for difficulty urinating, dysuria, flank pain and frequency.   Musculoskeletal:  Positive for neck pain (with cellulitis, left lateral neck). Negative for arthralgias, back pain, gait problem, joint swelling, myalgias and neck stiffness.   Skin:  Negative for rash and wound.        Cellulitis, left lateral neck    Neurological:  Negative for dizziness, light-headedness and headaches.   All other systems reviewed and are negative.      Physical Exam  Physical Exam  Constitutional:       General: She is not in acute distress.     Appearance: Normal appearance. She is well-developed. She is not ill-appearing, toxic-appearing or diaphoretic.   HENT:      Head: Normocephalic and atraumatic.      Right Ear: External ear normal.      Left Ear: External ear normal.      Nose: Nose normal. No congestion or rhinorrhea.      Mouth/Throat:      Pharynx: No oropharyngeal exudate or posterior oropharyngeal erythema.   Eyes:      General: No scleral icterus.        Right eye: No discharge.         Left eye: No discharge.      Extraocular Movements: Extraocular movements intact.      Conjunctiva/sclera: Conjunctivae normal.      Pupils: Pupils are equal, round, and reactive to light.   Neck:      Vascular: No JVD.      Trachea: No tracheal deviation.   Cardiovascular:      Rate and Rhythm: Regular rhythm.      Pulses: Normal pulses.      Heart  sounds: Normal heart sounds. No murmur heard.     No friction rub. No gallop.   Pulmonary:      Effort: Pulmonary effort is normal. No respiratory distress.      Breath sounds: Normal breath sounds. No stridor. No wheezing, rhonchi or rales.   Chest:      Chest wall: No tenderness.   Abdominal:      General: Bowel sounds are normal. There is no distension.      Palpations: Abdomen is soft. There is no mass.      Tenderness: There is no abdominal tenderness. There is no right CVA tenderness, left CVA tenderness, guarding or rebound.      Hernia: No hernia is present.   Musculoskeletal:         General: Tenderness (ttp over left lateral neck, cellulitis, no crepitus, no induration or fluccuance) present. No swelling, deformity or signs of injury. Normal range of motion.      Cervical back: Normal range of motion and neck supple. No rigidity or tenderness.      Right lower leg: No edema.      Left lower leg: No edema.   Lymphadenopathy:      Cervical: No cervical adenopathy.   Skin:     General: Skin is warm.      Capillary Refill: Capillary refill takes less than 2 seconds.      Coloration: Skin is not jaundiced or pale.      Findings: Erythema (patch of Cellulitis, left lateral neck, 3 x 3 cm. no crepitus or eccymosis or induration or fluccuance) present. No bruising or rash.   Neurological:      General: No focal deficit present.      Mental Status: She is alert and oriented to person, place, and time. Mental status is at baseline.      Cranial Nerves: No cranial nerve deficit.      Sensory: No sensory deficit.      Motor: No weakness or abnormal muscle tone.      Coordination: Coordination normal.   Psychiatric:         Behavior: Behavior normal.         Thought Content: Thought content normal.         Judgment: Judgment normal.      Comments: Anxious due to pain          Vital Signs  ED Triage Vitals [06/04/24 2322]   Temperature Pulse Respirations Blood Pressure SpO2   98.7 °F (37.1 °C) (!) 117 19 131/84 93 %       Temp src Heart Rate Source Patient Position - Orthostatic VS BP Location FiO2 (%)   -- Monitor -- Left arm --      Pain Score       10 - Worst Possible Pain           Vitals:    06/04/24 2322   BP: 131/84   Pulse: (!) 117         Visual Acuity      ED Medications  Medications - No data to display    Diagnostic Studies  Results Reviewed       None                   No orders to display              Procedures  Procedures         ED Course  ED Course as of 06/05/24 0545   Wed Jun 05, 2024   0033 CBC and differential(!)   0033 LACTIC ACID: 0.8   0033 Comprehensive metabolic panel(!)   0033 PTT: 32   0033 Protime-INR   0120 Procalcitonin: 0.07  Pt back from CT  Still in severe pain    0200 Pt improved slightly    0211 CT NECK WITH CONTRAST        FINDINGS:     VISUALIZED BRAIN PARENCHYMA:  No acute intracranial pathology of the visualized brain parenchyma.     VISUALIZED ORBITS: Normal visualized orbits.     PARANASAL SINUSES: Normal visualized paranasal sinuses.     NASAL CAVITY AND NASOPHARYNX:  Normal.     SUPRAHYOID NECK:  Normal oral cavity, tongue base, tonsillar fossa and epiglottis.     INFRAHYOID NECK:  Aryepiglottic folds and piriform sinuses are normal.  Normal glottis and subglottic airway.     There is an area of moderate subcutaneous inflammatory stranding involving the posterior left neck at approximately the C1 and C2 vertebral body levels with mild associated skin thickening suspicious for cellulitis. A few scattered nonenlarged lymph   nodes are also present likely reactive. No focal fluid collection to suggest an abscess.     THYROID GLAND:  Unremarkable.     PAROTID AND SUBMANDIBULAR GLANDS:  Normal.     LYMPH NODES:  No pathologic or enlarged adenopathy.     VASCULAR STRUCTURES:  Normal enhancement of the cervical vasculature.     THORACIC INLET: Atelectatic changes are noted at the visualized upper lobes.     BONY STRUCTURES: No acute fracture or destructive osseous lesion.     IMPRESSION:      Area of subcutaneous inflammatory stranding involving the posterolateral left neck with mild associated skin thickening suspicious for a cellulitis. There is no encapsulated fluid collection to suggest an abscess.     No cervical lymphadenopathy or soft tissue neck mass.   0249   Pt understands work up results  No concerning findings    Pt feels much much better    She has no signs of life or limb threatening process    I offered further tx, I offered admission, if she felt ill, or her pain was too great, but she declined  She is ready to manage from home  She is very appreciative of her ED care and is ready to manage from home  She understands return precautions    She will f/u w/ PCP tomorrow                                               Medical Decision Making  Patient with history as above presented with Patient presents with:  Neck Pain: Left sided neck redness; was at urgent care and diagnosed with cellulitis; was given antibiotics but the pain is too bad; hard to move neck due to pain    History obtained from patient    Patient was nontoxic, stable. Ambulatory. Exam as above.      Labs reviewed.    Differential diagnosis considered. Overall presentation is consistent with acute cellulitis.   Low suspicion for Nec Fasc, abscess. Deep space infection, life or limb threatening process    Patient was treated with IV morphine, IV Vanco, IVF,  with improvement in symptoms.    No Consideration was given for admission, as the patient was stable for outpatient management.    Disposition: Discussed need to follow up with PCP  Discharged with instructions to obtain outpatient follow up of patient's symptoms and findings, with strict return precautions if patient develops new or worsening symptoms.      This medical documentation was created using an electronic medical record system with M Modal voice recognition.  Although this document has been carefully reviewed, there may still be some phonetic and typographical  errors.  These errors are purely typographical and due to imperfections of the software program, do not reflect any compromise in the patient's medical care.        Amount and/or Complexity of Data Reviewed  Labs: ordered. Decision-making details documented in ED Course.  Radiology: ordered.    Risk  OTC drugs.  Prescription drug management.             Disposition  Final diagnoses:   None     ED Disposition       None          Follow-up Information    None         Patient's Medications   Discharge Prescriptions    No medications on file       No discharge procedures on file.    PDMP Review         Value Time User    PDMP Reviewed  Yes 4/16/2024  2:25 PM Fabiola Carreno PA-C            ED Provider  Electronically Signed by             Tonia Salazar MD  06/05/24 0509

## 2024-06-26 ENCOUNTER — HOSPITAL ENCOUNTER (OUTPATIENT)
Dept: RADIOLOGY | Facility: HOSPITAL | Age: 49
Discharge: HOME | End: 2024-06-26
Attending: ORTHOPAEDIC SURGERY
Payer: COMMERCIAL

## 2024-06-26 ENCOUNTER — OFFICE VISIT (OUTPATIENT)
Dept: OBGYN CLINIC | Facility: CLINIC | Age: 49
End: 2024-06-26
Payer: COMMERCIAL

## 2024-06-26 VITALS
SYSTOLIC BLOOD PRESSURE: 127 MMHG | BODY MASS INDEX: 41.65 KG/M2 | WEIGHT: 250 LBS | DIASTOLIC BLOOD PRESSURE: 73 MMHG | HEART RATE: 69 BPM | HEIGHT: 65 IN

## 2024-06-26 DIAGNOSIS — M67.431 GANGLION CYST OF WRIST, RIGHT: ICD-10-CM

## 2024-06-26 DIAGNOSIS — M67.441 GANGLION CYST OF FINGER OF RIGHT HAND: ICD-10-CM

## 2024-06-26 DIAGNOSIS — M79.641 RIGHT HAND PAIN: Primary | ICD-10-CM

## 2024-06-26 DIAGNOSIS — M79.641 RIGHT HAND PAIN: ICD-10-CM

## 2024-06-26 PROCEDURE — 99214 OFFICE O/P EST MOD 30 MIN: CPT | Performed by: ORTHOPAEDIC SURGERY

## 2024-06-26 PROCEDURE — 20612 ASPIRATE/INJ GANGLION CYST: CPT | Performed by: ORTHOPAEDIC SURGERY

## 2024-06-26 PROCEDURE — 73130 X-RAY EXAM OF HAND: CPT

## 2024-06-26 NOTE — PROGRESS NOTES
[facility] [address] [address] [phone]       Amparo Maxigley  [id number]  1975    ORTHOPAEDIC SURGERY OUTPATIENT NOTE  6/26/2024      HISTORY:  48 y.o. female  presents today  12 weeks status post left medial epicondylar excision avulsion and repair common flexor tendon, 3/1/2024.  Patient states she is doing well she denies any pain or discomfort in her left elbow.  Pain level 0 out of 10 and her SANE score is 99%    Patient states she has a cyst over the lateral aspect of the right wrist and also right small finger that was seen on x-ray at [facility] that was ordered by her rheumatologist.  Patient states she has discomfort when she is writing.    Past Medical History:   Diagnosis Date    Anxiety     Arthritis     Bipolar 1 disorder ([facility])     with bordeline personality    Borderline personality disorder ([facility])     Chronic headaches     Depression     GERD (gastroesophageal reflux disease)     Hypertension     Incontinence     Migraine     Morbid obesity with BMI of 40.0-44.9, adult ([facility])     Sleep apnea     Small bowel obstruction ([facility]) 06/27/2019    Suicide attempt ([facility]) 2/29/2024    Symptomatic bradycardia     Urinary tract infection        Past Surgical History:   Procedure Laterality Date    CHOLECYSTECTOMY  05/24/2018    DENTAL SURGERY      ELBOW SURGERY      EXPLORATORY LAPAROTOMY      exlap    FOOT SURGERY Right     KNEE ARTHROSCOPY Right 11/15/2018    total of three procedures with meniscal surgery    MULTIPLE TOOTH EXTRACTIONS  11/02/2018    OVARIAN CYST REMOVAL      TX TNOT ELBOW LATERAL/MEDIAL DEBRIDE OPEN TDN RPR Left 3/1/2024    Procedure: REPAIR TENDON FOREARM, elbow common flexor tendon, excision of bone fragment and all necessary procedures;  Surgeon: June Charles DO;  Location:  MAIN OR;  Service: Orthopedics    REDUCTION MAMMAPLASTY      reduction     SINUS SURGERY Bilateral  04/19/2019    bilateral maxillary antrostomies - Dr. Barajas - [facility]    TOTAL KNEE ARTHROPLASTY REVISION Right 2023       Social History     Socioeconomic History    Marital status: Single     Spouse name: Not on file    Number of children: Not on file    Years of education: Not on file    Highest education level: Not on file   Occupational History    Not on file   Tobacco Use    Smoking status: Never    Smokeless tobacco: Never   Vaping Use    Vaping status: Never Used   Substance and Sexual Activity    Alcohol use: Not Currently    Drug use: Never    Sexual activity: Not Currently   Other Topics Concern    Not on file   Social History Narrative    Daily caffeine use- 1 cup of coffee     Social Determinants of Health     Financial Resource Strain: Medium Risk (6/7/2024)    Received from [facility]    Overall Financial Resource Strain (CARDIA)     Difficulty of Paying Living Expenses: Somewhat hard   Food Insecurity: No Food Insecurity (6/7/2024)    Received from [facility]    Hunger Vital Sign     Worried About Running Out of Food in the Last Year: Never true     Ran Out of Food in the Last Year: Never true   Transportation Needs: No Transportation Needs (6/7/2024)    Received from [facility]    PRAPARE - Transportation     Lack of Transportation (Medical): No     Lack of Transportation (Non-Medical): No   Physical Activity: Inactive (10/3/2023)    Received from [facility]    Exercise Vital Sign     Days of Exercise per Week: 0 days     Minutes of Exercise per Session: 0 min   Stress: Stress Concern Present (10/3/2023)    Received from [facility], [facility]    [place] [employer] - Occupational Stress Questionnaire     Feeling of Stress : Very much   Social Connections: Unknown (6/18/2024)    Received from [facility]    Social Connections     How often do you feel lonely or isolated from  those around you? (Adult - for ages 18 years and over): Not on file   Intimate Partner Violence: Not At Risk (6/7/2024)    Received from [facility]    Humiliation, Afraid, Rape, and Kick questionnaire     Fear of Current or Ex-Partner: No     Emotionally Abused: No     Physically Abused: No     Sexually Abused: No   Housing Stability: Low Risk  (6/7/2024)    Received from [facility]    Housing Stability Vital Sign     Unable to Pay for Housing in the Last Year: No     Number of Times Moved in the Last Year: 0     Homeless in the Last Year: No       Family History   Problem Relation Age of Onset    Hyperlipidemia Father     Hypertension Father     Migraines Mother     Depression Family     Heart disease Maternal Aunt     Heart disease Maternal Uncle     Diabetes Paternal Aunt     Diabetes Paternal Uncle     Breast cancer Maternal Grandmother     Diabetes Paternal Grandmother     Diabetes Paternal Grandfather     Stroke Neg Hx     Thyroid disease Neg Hx         Patient's Medications   New Prescriptions    No medications on file   Previous Medications    ALBUTEROL (2.5 MG/3 ML) 0.083 % NEBULIZER SOLUTION    Take 3 mL (2.5 mg total) by nebulization every 6 (six) hours as needed for wheezing or shortness of breath    ALPRAZOLAM (XANAX) 0.5 MG TABLET        DULOXETINE (CYMBALTA) 30 MG DELAYED RELEASE CAPSULE    daily    FAMOTIDINE (PEPCID) 40 MG TABLET    Take 40 mg by mouth 2 (two) times a day bedtime    HYDROCODONE-ACETAMINOPHEN (NORCO) 5-325 MG PER TABLET    Take 1 tablet by mouth every 6 (six) hours as needed for pain for up to 10 doses Max Daily Amount: 4 tablets    HYDROXYCHLOROQUINE (PLAQUENIL) 200 MG TABLET    Take 200 mg by mouth 2 (two) times a day    HYDROXYZINE HCL (ATARAX) 50 MG TABLET    Take 200 mg by mouth daily at bedtime    PRAZOSIN (MINIPRESS) 2 MG CAPSULE    4 mg daily at bedtime    PREGABALIN (LYRICA) 150 MG CAPSULE    Take 150 mg by mouth 2 (two) times a day     "RABEPRAZOLE (ACIPHEX) 20 MG TABLET    Take 20 mg by mouth daily    VALACYCLOVIR (VALTREX) 500 MG TABLET    Take 500 mg by mouth daily   Modified Medications    No medications on file   Discontinued Medications    No medications on file       Allergies   Allergen Reactions    Celecoxib Hives    Lamotrigine Rash, Hives and Itching     Other reaction(s): rash and itching  Other reaction(s): rash and itching  Other reaction(s): rash and itching        /73 (BP Location: Left arm, Patient Position: Sitting, Cuff Size: Standard)   Pulse 69   Ht 5' 5\" (1.651 m)   Wt 113 kg (250 lb)   BMI 41.60 kg/m²      REVIEW OF SYSTEMS:  Constitutional: Negative.    HEENT: Negative.    Respiratory: Negative.    Skin: Negative.    Neurological: Negative.    Psychiatric/Behavioral: Negative.  Musculoskeletal: Negative except for that mentioned in the HPI.      PHYSICAL EXAM:    LEFT ELBOW:    Appearance: Surgical incision well healed     Flexion: 140 degrees  Extension: 0 degrees  Pronation: 80 degrees  Supination: 80 degrees    TTP Lateral Epicondyle: negative  TTP Medial Epicondyle: negative  TTP Olecranon: negative  TTP Radial Head: negative  TTP Biceps Tendon: negative    Strength:  Flexion: 5/5  Extension: 5/5  Pronation: 5/5  Supination: 5/5    Pain with resisted wrist extension: negative  Pain with resisted 3rd finger extension: negative  Pain with resisted wrist flexion: negative    Varus laxity: negative  Valgus laxity: negative  Milking maneuver: negative  Moving valgus stress test: negative    Cubital tunnel Tinel's: negative    Radial/median/ulnar nerve intact    <2 sec cap refill      Right hand/wrist   Skin intact  No erythema   Mass over 5th PIP joint   Mass over ulna region of wrist   No erythema          IMAGING:  XR right hand demonstrates no acute osseous abnormality, swelling soft tissue welling 5th PIP joint , most likely ganglion cyst     ASSESSMENT AND PLAN:  48 y.o. female   status post left medial " "epicondylar excision avulsion and repair common flexor tendon, 3/1/2024.        XR right hand was reviewed in the office. Patient is doing well left elbow. In the office today there was an attempt was made to express ganglion cyst over the right lateral wrist and fifth PIP joint.  Be ordering ultrasound of the right hand to rule out ganglion cyst on the fifth PIP joint and also ultrasound lateral right wrist to rule out ganglion cyst ulnar aspect of the wrist.  She is to follow-up after the ultrasounds to review results    Hand/upper extremity injection  Universal Protocol:  Consent: Verbal consent obtained.  Risks and benefits: risks, benefits and alternatives were discussed  Consent given by: patient  Time out: Immediately prior to procedure a \"time out\" was called to verify the correct patient, procedure, equipment, support staff and site/side marked as required.  Timeout called at: 6/26/2024 8:30 AM.  Patient understanding: patient states understanding of the procedure being performed  Site marked: the operative site was marked  Procedure Details  Condition comment: right wrist ulna cyst, right 5th digit PIP   Aspirate amount: 0 mL  Patient tolerance: patient tolerated the procedure well with no immediate complications  Dressing:  Sterile dressing applied          Scribe Attestation      I,:  So Menjivar am acting as a scribe while in the presence of the attending physician.:       I,:  June Charles DO personally performed the services described in this documentation    as scribed in my presence.:             "

## 2024-07-02 ENCOUNTER — HOSPITAL ENCOUNTER (OUTPATIENT)
Dept: ULTRASOUND IMAGING | Facility: HOSPITAL | Age: 49
Discharge: HOME | End: 2024-07-02
Attending: ORTHOPAEDIC SURGERY
Payer: COMMERCIAL

## 2024-07-02 DIAGNOSIS — M67.441 GANGLION CYST OF FINGER OF RIGHT HAND: ICD-10-CM

## 2024-07-02 PROCEDURE — 76882 US LMTD JT/FCL EVL NVASC XTR: CPT

## 2024-07-08 ENCOUNTER — HOSPITAL ENCOUNTER (EMERGENCY)
Facility: HOSPITAL | Age: 49
Discharge: HOME | End: 2024-07-08
Attending: INTERNAL MEDICINE | Admitting: INTERNAL MEDICINE
Payer: COMMERCIAL

## 2024-07-08 VITALS
HEIGHT: 65 IN | HEART RATE: 89 BPM | TEMPERATURE: 97 F | OXYGEN SATURATION: 95 % | DIASTOLIC BLOOD PRESSURE: 92 MMHG | WEIGHT: 238 LBS | RESPIRATION RATE: 20 BRPM | BODY MASS INDEX: 39.65 KG/M2 | SYSTOLIC BLOOD PRESSURE: 137 MMHG

## 2024-07-08 DIAGNOSIS — F41.0 PANIC ATTACKS: Primary | ICD-10-CM

## 2024-07-08 PROCEDURE — 99284 EMERGENCY DEPT VISIT MOD MDM: CPT | Performed by: INTERNAL MEDICINE

## 2024-07-08 PROCEDURE — 99283 EMERGENCY DEPT VISIT LOW MDM: CPT

## 2024-07-08 RX ORDER — ALPRAZOLAM 0.25 MG/1
0.5 TABLET ORAL 4 TIMES DAILY PRN
Qty: 30 TABLET | Refills: 0 | Status: SHIPPED | OUTPATIENT
Start: 2024-07-08 | End: 2024-07-16

## 2024-07-08 NOTE — ED PROVIDER NOTES
History  Chief Complaint   Patient presents with    Anxiety     Increased anxiety. Denies SI/HI      48-year-old female presents while having a panic attack at work.  Patient states she feels overwhelmed, she has a history of in the past of depression anxiety has been admitted.  Patient states this has not been a recurrence for years she had been seen at the [facility] and is no longer therapy but has placed a phone call to patient is on Cymbalta and Xanax only at nighttime for restless legs.  Trigger for her anxiety attack panic attack was last week she found out that her  had a second phone, it had explicit explicit photos and other sites on the phone itself.  She has a meeting with a counselor on Thursday, and her  has a meeting with the same counselor following this and then a joint meeting.  She states she just was at work and just felt overwhelmed and had a panic attack that she has not had in years.  Patient states she used to take Xanax regularly for her panic attacks.  She denies any thoughts of suicide homicide.  Patient states she does not want to be admitted she does not want a 201, she states she just wants medication till she gets a hold of the [facility] and has her meeting on Thursday.        Prior to Admission Medications   Prescriptions Last Dose Informant Patient Reported? Taking?   ALPRAZolam (XANAX) 0.5 mg tablet   Yes No   DULoxetine (CYMBALTA) 30 mg delayed release capsule  Self Yes No   Sig: daily   HYDROcodone-acetaminophen (Norco) 5-325 mg per tablet   No No   Sig: Take 1 tablet by mouth every 6 (six) hours as needed for pain for up to 10 doses Max Daily Amount: 4 tablets   RABEprazole (ACIPHEX) 20 MG tablet  Self Yes No   Sig: Take 20 mg by mouth daily   albuterol (2.5 mg/3 mL) 0.083 % nebulizer solution  Self No No   Sig: Take 3 mL (2.5 mg total) by nebulization every 6 (six) hours as needed for wheezing or shortness of breath   famotidine (PEPCID) 40 MG tablet  Self Yes  No   Sig: Take 40 mg by mouth 2 (two) times a day bedtime   hydrOXYzine HCL (ATARAX) 50 mg tablet  Self Yes No   Sig: Take 200 mg by mouth daily at bedtime   hydroxychloroquine (PLAQUENIL) 200 mg tablet  Self Yes No   Sig: Take 200 mg by mouth 2 (two) times a day   prazosin (MINIPRESS) 2 mg capsule  Self Yes No   Si mg daily at bedtime   pregabalin (LYRICA) 150 mg capsule  Self Yes No   Sig: Take 150 mg by mouth 2 (two) times a day   valACYclovir (VALTREX) 500 mg tablet  Self Yes No   Sig: Take 500 mg by mouth daily      Facility-Administered Medications: None       Past Medical History:   Diagnosis Date    Anxiety     Arthritis     Bipolar 1 disorder ([facility])     with bordeline personality    Borderline personality disorder ([facility])     Chronic headaches     Depression     GERD (gastroesophageal reflux disease)     Hypertension     Incontinence     Migraine     Morbid obesity with BMI of 40.0-44.9, adult ([facility])     Sleep apnea     Small bowel obstruction ([facility]) 2019    Suicide attempt ([facility]) 2024    Symptomatic bradycardia     Urinary tract infection        Past Surgical History:   Procedure Laterality Date    CHOLECYSTECTOMY  2018    DENTAL SURGERY      ELBOW SURGERY      EXPLORATORY LAPAROTOMY      exlap    FOOT SURGERY Right     KNEE ARTHROSCOPY Right 11/15/2018    total of three procedures with meniscal surgery    MULTIPLE TOOTH EXTRACTIONS  2018    OVARIAN CYST REMOVAL      [place] TNOT ELBOW LATERAL/MEDIAL DEBRIDE OPEN TDN RPR Left 3/1/2024    Procedure: REPAIR TENDON FOREARM, elbow common flexor tendon, excision of bone fragment and all necessary procedures;  Surgeon: June Charles DO;  Location:  MAIN OR;  Service: Orthopedics    REDUCTION MAMMAPLASTY      reduction     SINUS SURGERY Bilateral 2019    bilateral maxillary antrostomies - Dr. Barajas - [facility]    TOTAL KNEE ARTHROPLASTY REVISION Right        Family History   Problem Relation Age of Onset    Hyperlipidemia Father      Hypertension Father     Migraines Mother     Depression Family     Heart disease Maternal Aunt     Heart disease Maternal Uncle     Diabetes Paternal Aunt     Diabetes Paternal Uncle     Breast cancer Maternal Grandmother     Diabetes Paternal Grandmother     Diabetes Paternal Grandfather     Stroke Neg Hx     Thyroid disease Neg Hx      I have reviewed and agree with the history as documented.    E-Cigarette/Vaping    E-Cigarette Use Never User      E-Cigarette/Vaping Substances    Nicotine No     THC No     CBD No     Flavoring No     Other No     Unknown No      Social History     Tobacco Use    Smoking status: Never    Smokeless tobacco: Never   Vaping Use    Vaping status: Never Used   Substance Use Topics    Alcohol use: Not Currently    Drug use: Never       Review of Systems   Constitutional: Negative.    Respiratory: Negative.     Cardiovascular: Negative.    Gastrointestinal: Negative.    Musculoskeletal: Negative.    Skin: Negative.    Neurological: Negative.    Psychiatric/Behavioral:  Negative for agitation, behavioral problems, confusion, decreased concentration, dysphoric mood, hallucinations, self-injury, sleep disturbance and suicidal ideas. The patient is nervous/anxious. The patient is not hyperactive.        Physical Exam  Physical Exam  Vitals and nursing note reviewed.   Constitutional:       General: She is not in acute distress.     Appearance: She is not ill-appearing.   HENT:      Head: Normocephalic and atraumatic.   Eyes:      Extraocular Movements: Extraocular movements intact.      Conjunctiva/sclera: Conjunctivae normal.      Pupils: Pupils are equal, round, and reactive to light.   Cardiovascular:      Rate and Rhythm: Normal rate and regular rhythm.   Pulmonary:      Effort: Pulmonary effort is normal.      Breath sounds: Normal breath sounds.   Abdominal:      General: Abdomen is flat.      Palpations: Abdomen is soft.   Musculoskeletal:      Cervical back: Normal range of motion  and neck supple.   Skin:     General: Skin is warm and dry.      Capillary Refill: Capillary refill takes less than 2 seconds.   Neurological:      General: No focal deficit present.      Mental Status: She is alert and oriented to person, place, and time. Mental status is at baseline.      Cranial Nerves: No cranial nerve deficit.   Psychiatric:         Behavior: Behavior normal.         Thought Content: Thought content normal.         Judgment: Judgment normal.      Comments: Patient is very anxious.  Becomes tearful easily.         Vital Signs  ED Triage Vitals [07/08/24 1454]   Temperature Pulse Respirations Blood Pressure SpO2   (!) 91 °F (32.8 °C) 89 20 137/92 95 %      Temp Source Heart Rate Source Patient Position - Orthostatic VS BP Location FiO2 (%)   Temporal Monitor -- Left arm --      Pain Score       No Pain           Vitals:    07/08/24 1454   BP: 137/92   Pulse: 89         Visual Acuity      ED Medications  Medications - No data to display    Diagnostic Studies  Results Reviewed       None                   No orders to display              Procedures  Procedures         ED Course                               SBIRT 20yo+      Flowsheet Row Most Recent Value   Initial Alcohol Screen: US AUDIT-C     1. How often do you have a drink containing alcohol? 0 Filed at: 07/08/2024 1503   2. How many drinks containing alcohol do you have on a typical day you are drinking?  0 Filed at: 07/08/2024 1503   3a. Male UNDER 65: How often do you have five or more drinks on one occasion? 0 Filed at: 07/08/2024 1503   3b. FEMALE Any Age, or MALE 65+: How often do you have 4 or more drinks on one occassion? 0 Filed at: 07/08/2024 1503   Audit-C Score 0 Filed at: 07/08/2024 1503   NARCISA: How many times in the past year have you...    Used an illegal drug or used a prescription medication for non-medical reasons? Never Filed at: 07/08/2024 1503                      Medical Decision Making  Patient comes in status post  panic attack while at work.  I agreed to prescribe her Xanax at this time she has a follow-up appointment with the those clinic as well as a counselor on Thursday.  Patient normally takes Xanax just at bedtime, she has a past history of panic disorder and depression which for which she takes Cymbalta.  Patient denies any suicidal homicidal ideation.             Disposition  Final diagnoses:   None     ED Disposition       None          Follow-up Information    None         Patient's Medications   Discharge Prescriptions    No medications on file       No discharge procedures on file.    PDMP Review         Value Time User    PDMP Reviewed  Yes 4/16/2024  2:25 PM Fabiola Carreno PA-C            ED Provider  Electronically Signed by             Parag Driver MD  07/08/24 2667

## 2024-07-10 ENCOUNTER — OFFICE VISIT (OUTPATIENT)
Dept: OBGYN CLINIC | Facility: CLINIC | Age: 49
End: 2024-07-10
Payer: COMMERCIAL

## 2024-07-10 VITALS
DIASTOLIC BLOOD PRESSURE: 94 MMHG | HEIGHT: 65 IN | SYSTOLIC BLOOD PRESSURE: 129 MMHG | BODY MASS INDEX: 39.65 KG/M2 | WEIGHT: 238 LBS | HEART RATE: 91 BPM

## 2024-07-10 DIAGNOSIS — M79.641 RIGHT HAND PAIN: Primary | ICD-10-CM

## 2024-07-10 PROCEDURE — 99213 OFFICE O/P EST LOW 20 MIN: CPT | Performed by: ORTHOPAEDIC SURGERY

## 2024-07-10 RX ORDER — OXYCODONE HYDROCHLORIDE 5 MG/1
TABLET ORAL
COMMUNITY
Start: 2024-06-10

## 2024-07-10 RX ORDER — QUETIAPINE FUMARATE 100 MG/1
100 TABLET, FILM COATED ORAL
COMMUNITY
Start: 2024-06-10 | End: 2025-06-10

## 2024-07-10 RX ORDER — ONDANSETRON 8 MG/1
TABLET, ORALLY DISINTEGRATING ORAL
COMMUNITY
Start: 2024-05-20

## 2024-07-10 NOTE — PROGRESS NOTES
[facility] [address] [address] [phone]       Amparo Maxigley  [id number]  1975    ORTHOPAEDIC SURGERY OUTPATIENT NOTE  7/10/2024      HISTORY:  48 y.o. female  who presents for follow-up evaluation of a cyst over the lateral aspect of the right wrist and hand. Patient feels this presented 2 to 3 months ago. Patient denies change in size of lump on wrist. Patient reports the lump is painful when writing. Patient also notes pain with writ extension. Patient states occasional burning and stinging of the area. Patient takes Ibuprofen as needed for pain control.    Past Medical History:   Diagnosis Date    Anxiety     Arthritis     Bipolar 1 disorder ([facility])     with bordeline personality    Borderline personality disorder ([facility])     Chronic headaches     Depression     GERD (gastroesophageal reflux disease)     Hypertension     Incontinence     Migraine     Morbid obesity with BMI of 40.0-44.9, adult ([facility])     Sleep apnea     Small bowel obstruction ([facility]) 06/27/2019    Suicide attempt ([facility]) 2/29/2024    Symptomatic bradycardia     Urinary tract infection        Past Surgical History:   Procedure Laterality Date    CHOLECYSTECTOMY  05/24/2018    DENTAL SURGERY      ELBOW SURGERY      EXPLORATORY LAPAROTOMY      exlap    FOOT SURGERY Right     KNEE ARTHROSCOPY Right 11/15/2018    total of three procedures with meniscal surgery    MULTIPLE TOOTH EXTRACTIONS  11/02/2018    OVARIAN CYST REMOVAL      [place] TNOT ELBOW LATERAL/MEDIAL DEBRIDE OPEN TDN RPR Left 3/1/2024    Procedure: REPAIR TENDON FOREARM, elbow common flexor tendon, excision of bone fragment and all necessary procedures;  Surgeon: June Charles DO;  Location:  MAIN OR;  Service: Orthopedics    REDUCTION MAMMAPLASTY      reduction     SINUS SURGERY Bilateral 04/19/2019    bilateral maxillary antrostomies - Dr. Barajas - [facility]    TOTAL KNEE ARTHROPLASTY REVISION  Right 2023       Social History     Socioeconomic History    Marital status: Single     Spouse name: Not on file    Number of children: Not on file    Years of education: Not on file    Highest education level: Not on file   Occupational History    Not on file   Tobacco Use    Smoking status: Never    Smokeless tobacco: Never   Vaping Use    Vaping status: Never Used   Substance and Sexual Activity    Alcohol use: Not Currently    Drug use: Never    Sexual activity: Not Currently   Other Topics Concern    Not on file   Social History Narrative    Daily caffeine use- 1 cup of coffee     Social Determinants of Health     Financial Resource Strain: Medium Risk (6/7/2024)    Received from [facility]    Overall Financial Resource Strain (CARDIA)     Difficulty of Paying Living Expenses: Somewhat hard   Food Insecurity: No Food Insecurity (6/7/2024)    Received from [facility]    Hunger Vital Sign     Worried About Running Out of Food in the Last Year: Never true     Ran Out of Food in the Last Year: Never true   Transportation Needs: No Transportation Needs (6/7/2024)    Received from [facility]    PRAPARE - Transportation     Lack of Transportation (Medical): No     Lack of Transportation (Non-Medical): No   Physical Activity: Inactive (10/3/2023)    Received from [facility]    Exercise Vital Sign     Days of Exercise per Week: 0 days     Minutes of Exercise per Session: 0 min   Stress: Stress Concern Present (10/3/2023)    Received from [facility], [facility]    [employer] - Occupational Stress Questionnaire     Feeling of Stress : Very much   Social Connections: Unknown (6/18/2024)    Received from [facility]    Social Connections     How often do you feel lonely or isolated from those around you? (Adult - for ages 18 years and over): Not on file   Intimate Partner Violence: Not At  Risk (6/7/2024)    Received from [facility]    Humiliation, Afraid, Rape, and Kick questionnaire     Fear of Current or Ex-Partner: No     Emotionally Abused: No     Physically Abused: No     Sexually Abused: No   Housing Stability: Low Risk  (6/7/2024)    Received from [facility]    Housing Stability Vital Sign     Unable to Pay for Housing in the Last Year: No     Number of Times Moved in the Last Year: 0     Homeless in the Last Year: No       Family History   Problem Relation Age of Onset    Hyperlipidemia Father     Hypertension Father     Migraines Mother     Depression Family     Heart disease Maternal Aunt     Heart disease Maternal Uncle     Diabetes Paternal Aunt     Diabetes Paternal Uncle     Breast cancer Maternal Grandmother     Diabetes Paternal Grandmother     Diabetes Paternal Grandfather     Stroke Neg Hx     Thyroid disease Neg Hx         Patient's Medications   New Prescriptions    No medications on file   Previous Medications    ALBUTEROL (2.5 MG/3 ML) 0.083 % NEBULIZER SOLUTION    Take 3 mL (2.5 mg total) by nebulization every 6 (six) hours as needed for wheezing or shortness of breath    ALPRAZOLAM (XANAX) 0.25 MG TABLET    Take 2 tablets (0.5 mg total) by mouth 4 (four) times a day as needed for anxiety for up to 8 days    ALPRAZOLAM (XANAX) 0.5 MG TABLET        DULOXETINE (CYMBALTA) 30 MG DELAYED RELEASE CAPSULE    daily    FAMOTIDINE (PEPCID) 40 MG TABLET    Take 40 mg by mouth 2 (two) times a day bedtime    HYDROCODONE-ACETAMINOPHEN (NORCO) 5-325 MG PER TABLET    Take 1 tablet by mouth every 6 (six) hours as needed for pain for up to 10 doses Max Daily Amount: 4 tablets    HYDROXYCHLOROQUINE (PLAQUENIL) 200 MG TABLET    Take 200 mg by mouth 2 (two) times a day    HYDROXYZINE HCL (ATARAX) 50 MG TABLET    Take 200 mg by mouth daily at bedtime    ONDANSETRON (ZOFRAN-ODT) 8 MG DISINTEGRATING TABLET        OXYCODONE (ROXICODONE) 5 IMMEDIATE RELEASE TABLET      "   PRAZOSIN (MINIPRESS) 2 MG CAPSULE    4 mg daily at bedtime    PREGABALIN (LYRICA) 150 MG CAPSULE    Take 150 mg by mouth 2 (two) times a day    QUETIAPINE (SEROQUEL) 100 MG TABLET    Take 100 mg by mouth    RABEPRAZOLE (ACIPHEX) 20 MG TABLET    Take 20 mg by mouth daily    VALACYCLOVIR (VALTREX) 500 MG TABLET    Take 500 mg by mouth daily   Modified Medications    No medications on file   Discontinued Medications    No medications on file       Allergies   Allergen Reactions    Celecoxib Hives    Lamotrigine Rash, Hives and Itching     Other reaction(s): rash and itching  Other reaction(s): rash and itching  Other reaction(s): rash and itching        /94 (BP Location: Right arm, Patient Position: Sitting, Cuff Size: Standard)   Pulse 91   Ht 5' 5\" (1.651 m)   Wt 108 kg (238 lb)   BMI 39.61 kg/m²      REVIEW OF SYSTEMS:  Constitutional: Negative.    HEENT: Negative.    Respiratory: Negative.    Skin: Negative.    Neurological: Negative.    Psychiatric/Behavioral: Negative.  Musculoskeletal: Negative except for that mentioned in the HPI.      PHYSICAL EXAM:    Right hand/wrist:  Skin intact  No erythema   Mass over 5th PIP joint   Mass over ulna region of wrist   No erythema     IMAGING:  Ultrasound of right wrist demonstrates focal calcification of lateral right wrist and small osteophyte at the PIP joint of the fifth small finger.    ASSESSMENT AND PLAN:  48 y.o. female  with calcification of the lateral right wrist and osteophyte of the right small finger at the PIP joint. Patient can continue to take over the counter NSAIDs or Tylenol as needed for pain control. Patient was advised to return to the office if pain increases or the lump changes in size. Patient will follow-up as needed.    Scribe Attestation      I,:  Miriam Becker am acting as a scribe while in the presence of the attending physician.:       I,:  June Charles DO personally performed the services described in this documentation    as " scribed in my presence.:

## 2024-09-19 ENCOUNTER — OFFICE VISIT (OUTPATIENT)
Dept: URGENT CARE | Facility: CLINIC | Age: 49
End: 2024-09-19

## 2024-09-19 VITALS
SYSTOLIC BLOOD PRESSURE: 159 MMHG | RESPIRATION RATE: 18 BRPM | DIASTOLIC BLOOD PRESSURE: 94 MMHG | HEIGHT: 65 IN | BODY MASS INDEX: 39.32 KG/M2 | WEIGHT: 236 LBS | OXYGEN SATURATION: 97 % | HEART RATE: 92 BPM | TEMPERATURE: 98.4 F

## 2024-09-19 DIAGNOSIS — J20.9 ACUTE BRONCHITIS, UNSPECIFIED ORGANISM: Primary | ICD-10-CM

## 2024-09-19 PROCEDURE — 99213 OFFICE O/P EST LOW 20 MIN: CPT | Performed by: PHYSICIAN ASSISTANT

## 2024-09-19 PROCEDURE — 96372 THER/PROPH/DIAG INJ SC/IM: CPT | Performed by: PHYSICIAN ASSISTANT

## 2024-09-19 RX ORDER — KETOROLAC TROMETHAMINE 30 MG/ML
30 INJECTION, SOLUTION INTRAMUSCULAR; INTRAVENOUS ONCE
Status: COMPLETED | OUTPATIENT
Start: 2024-09-19 | End: 2024-09-19

## 2024-09-19 RX ORDER — DOXYCYCLINE 100 MG/1
100 TABLET ORAL 2 TIMES DAILY
Qty: 14 TABLET | Refills: 0 | Status: SHIPPED | OUTPATIENT
Start: 2024-09-19 | End: 2024-09-26

## 2024-09-19 RX ORDER — ALBUTEROL SULFATE 90 UG/1
2 INHALANT RESPIRATORY (INHALATION) EVERY 6 HOURS PRN
Qty: 8.5 G | Refills: 0 | Status: SHIPPED | OUTPATIENT
Start: 2024-09-19

## 2024-09-19 RX ORDER — METHYLPREDNISOLONE 4 MG
TABLET, DOSE PACK ORAL
Qty: 1 EACH | Refills: 0 | Status: SHIPPED | OUTPATIENT
Start: 2024-09-19

## 2024-09-19 RX ADMIN — KETOROLAC TROMETHAMINE 30 MG: 30 INJECTION, SOLUTION INTRAMUSCULAR; INTRAVENOUS at 13:07

## 2024-09-19 NOTE — PROGRESS NOTES
[facility]        NAME: Amparo Dobbs is a 48 y.o. female  : 1975    MRN: [id number]  DATE: 2024  TIME: 1:02 PM    Assessment and Plan   Acute bronchitis, unspecified organism [J20.9]  1. Acute bronchitis, unspecified organism  albuterol (ProAir HFA) 90 mcg/act inhaler    methylPREDNISolone 4 MG tablet therapy pack    ketorolac (TORADOL) injection 30 mg    doxycycline (ADOXA) 100 MG tablet            Patient Instructions       Follow up with PCP in 3-5 days.  Proceed to  ER if symptoms worsen.    If tests have been performed at [facility], our office will contact you with results if changes need to be made to the care plan discussed with you at the visit.  [facility]    Chief Complaint     Chief Complaint   Patient presents with    Cough     X 2.5 weeks, negative covid test 4 days a ago at home.     Headache     Patient c/o headache and light sensitivity that started yesterday    Fatigue     X 3 days         History of Present Illness       Patient is a 48 year old female presenting to [facility] with persistent cough, fatigue, headaches and light sensitivity.  Patient reports cough began about 2.5 weeks ago.  Fatigue, headache and light sensitivity began 3 days ago.  Patient has been taking Mucinex and Robitussin for the cough.  Patient has been wheezing at times.  Pt does have a hx of exercise induced asthma.  Patient does have a hx of migraine headaches but does not currently take medications for that.    Cough  This is a new problem. The current episode started 1 to 4 weeks ago. The problem has been waxing and waning. The problem occurs every few minutes. Associated symptoms include headaches, nasal congestion, shortness of breath and wheezing. Pertinent negatives include no chest pain, chills, ear pain, fever, rash or sore throat.   Headache  Fatigue  Associated symptoms include congestion, coughing, fatigue and headaches. Pertinent  negatives include no abdominal pain, arthralgias, chest pain, chills, fever, rash, sore throat or vomiting.       Review of Systems   Review of Systems   Constitutional:  Positive for fatigue. Negative for chills and fever.   HENT:  Positive for congestion. Negative for ear pain and sore throat.    Eyes:  Positive for photophobia. Negative for pain and visual disturbance.   Respiratory:  Positive for cough, shortness of breath and wheezing.    Cardiovascular:  Negative for chest pain and palpitations.   Gastrointestinal:  Negative for abdominal pain and vomiting.   Genitourinary:  Negative for dysuria and hematuria.   Musculoskeletal:  Negative for arthralgias and back pain.   Skin:  Negative for color change and rash.   Neurological:  Positive for headaches. Negative for seizures and syncope.   All other systems reviewed and are negative.        Current Medications       Current Outpatient Medications:     albuterol (ProAir HFA) 90 mcg/act inhaler, Inhale 2 puffs every 6 (six) hours as needed for wheezing, Disp: 8.5 g, Rfl: 0    ALPRAZolam (XANAX) 0.5 mg tablet, , Disp: , Rfl:     doxycycline (ADOXA) 100 MG tablet, Take 1 tablet (100 mg total) by mouth 2 (two) times a day for 7 days, Disp: 14 tablet, Rfl: 0    DULoxetine (CYMBALTA) 30 mg delayed release capsule, daily, Disp: , Rfl:     famotidine (PEPCID) 40 MG tablet, Take 40 mg by mouth 2 (two) times a day bedtime, Disp: , Rfl:     HYDROcodone-acetaminophen (Norco) 5-325 mg per tablet, Take 1 tablet by mouth every 6 (six) hours as needed for pain for up to 10 doses Max Daily Amount: 4 tablets, Disp: 10 tablet, Rfl: 0    hydroxychloroquine (PLAQUENIL) 200 mg tablet, Take 200 mg by mouth 2 (two) times a day, Disp: , Rfl:     methylPREDNISolone 4 MG tablet therapy pack, Use as directed on package, Disp: 1 each, Rfl: 0    ondansetron (ZOFRAN-ODT) 8 mg disintegrating tablet, , Disp: , Rfl:     prazosin (MINIPRESS) 2 mg capsule, 4 mg daily at bedtime, Disp: , Rfl:      QUEtiapine (SEROquel) 100 mg tablet, Take 100 mg by mouth, Disp: , Rfl:     RABEprazole (ACIPHEX) 20 MG tablet, Take 20 mg by mouth daily, Disp: , Rfl:     valACYclovir (VALTREX) 500 mg tablet, Take 500 mg by mouth daily, Disp: , Rfl:     albuterol (2.5 mg/3 mL) 0.083 % nebulizer solution, Take 3 mL (2.5 mg total) by nebulization every 6 (six) hours as needed for wheezing or shortness of breath, Disp: 90 mL, Rfl: 1    ALPRAZolam (XANAX) 0.25 mg tablet, Take 2 tablets (0.5 mg total) by mouth 4 (four) times a day as needed for anxiety for up to 8 days, Disp: 30 tablet, Rfl: 0    hydrOXYzine HCL (ATARAX) 50 mg tablet, Take 200 mg by mouth daily at bedtime, Disp: , Rfl:     oxyCODONE (ROXICODONE) 5 immediate release tablet, , Disp: , Rfl:     pregabalin (LYRICA) 150 mg capsule, Take 150 mg by mouth 2 (two) times a day, Disp: , Rfl:     Current Facility-Administered Medications:     ketorolac (TORADOL) injection 30 mg, 30 mg, Intramuscular, Once,     Current Allergies     Allergies as of 09/19/2024 - Reviewed 09/19/2024   Allergen Reaction Noted    Celecoxib Hives 11/11/2019    Lamotrigine Rash, Hives, and Itching 02/24/2010            The following portions of the patient's history were reviewed and updated as appropriate: allergies, current medications, past family history, past medical history, past social history, past surgical history and problem list.     Past Medical History:   Diagnosis Date    Anxiety     Arthritis     Bipolar 1 disorder (HCC)     with bordeline personality    Borderline personality disorder (HCC)     Chronic headaches     Depression     GERD (gastroesophageal reflux disease)     Hypertension     Incontinence     Migraine     Morbid obesity with BMI of 40.0-44.9, adult ([facility])     Sleep apnea     Small bowel obstruction ([facility]) 06/27/2019    Suicide attempt ([facility]) 2/29/2024    Symptomatic bradycardia     Urinary tract infection        Past Surgical History:   Procedure Laterality Date     "CHOLECYSTECTOMY  05/24/2018    DENTAL SURGERY      ELBOW SURGERY      EXPLORATORY LAPAROTOMY      exlap    FOOT SURGERY Right     KNEE ARTHROSCOPY Right 11/15/2018    total of three procedures with meniscal surgery    MULTIPLE TOOTH EXTRACTIONS  11/02/2018    OVARIAN CYST REMOVAL      LA TNOT ELBOW LATERAL/MEDIAL DEBRIDE OPEN TDN RPR Left 3/1/2024    Procedure: REPAIR TENDON FOREARM, elbow common flexor tendon, excision of bone fragment and all necessary procedures;  Surgeon: June Charles DO;  Location:  MAIN OR;  Service: Orthopedics    REDUCTION MAMMAPLASTY      reduction     SINUS SURGERY Bilateral 04/19/2019    bilateral maxillary antrostomies - Dr. Barajas - [facility]    TOTAL KNEE ARTHROPLASTY REVISION Right 2023       Family History   Problem Relation Age of Onset    Hyperlipidemia Father     Hypertension Father     Migraines Mother     Depression Family     Heart disease Maternal Aunt     Heart disease Maternal Uncle     Diabetes Paternal Aunt     Diabetes Paternal Uncle     Breast cancer Maternal Grandmother     Diabetes Paternal Grandmother     Diabetes Paternal Grandfather     Stroke Neg Hx     Thyroid disease Neg Hx          Medications have been verified.        Objective   /94   Pulse 92   Temp 98.4 °F (36.9 °C) (Temporal)   Resp 18   Ht 5' 5\" (1.651 m)   Wt 107 kg (236 lb)   SpO2 97%   BMI 39.27 kg/m²   No LMP recorded. Patient is perimenopausal.       Physical Exam     Physical Exam  Constitutional:       Appearance: Normal appearance.   HENT:      Head: Normocephalic and atraumatic.      Nose: Congestion present.      Mouth/Throat:      Mouth: Mucous membranes are moist.   Eyes:      Extraocular Movements: Extraocular movements intact.      Conjunctiva/sclera: Conjunctivae normal.      Pupils: Pupils are equal, round, and reactive to light.   Cardiovascular:      Rate and Rhythm: Normal rate and regular rhythm.   Pulmonary:      Effort: Pulmonary effort is normal.      Breath sounds: " Wheezing present. No rhonchi or rales.   Musculoskeletal:         General: Normal range of motion.      Cervical back: Normal range of motion and neck supple.   Skin:     General: Skin is warm and dry.      Capillary Refill: Capillary refill takes less than 2 seconds.   Neurological:      General: No focal deficit present.      Mental Status: She is alert and oriented to person, place, and time.   Psychiatric:         Mood and Affect: Mood normal.         Behavior: Behavior normal.

## 2024-09-19 NOTE — LETTER
September 19, 2024     Patient: Amparo Dobbs   YOB: 1975   Date of Visit: 9/19/2024       To Whom It May Concern:    It is my medical opinion that Amparo Dobbs may return to work on 09/23/2024.    If you have any questions or concerns, please don't hesitate to call.         Sincerely,        Lashonda Israel PA-C    CC: No Recipients

## 2024-11-02 ENCOUNTER — APPOINTMENT (OUTPATIENT)
Dept: RADIOLOGY | Facility: CLINIC | Age: 49
End: 2024-11-02

## 2024-11-02 ENCOUNTER — OFFICE VISIT (OUTPATIENT)
Dept: URGENT CARE | Facility: CLINIC | Age: 49
End: 2024-11-02
Payer: COMMERCIAL

## 2024-11-02 VITALS
RESPIRATION RATE: 20 BRPM | TEMPERATURE: 98.4 F | DIASTOLIC BLOOD PRESSURE: 88 MMHG | SYSTOLIC BLOOD PRESSURE: 136 MMHG | WEIGHT: 236 LBS | BODY MASS INDEX: 39.32 KG/M2 | HEIGHT: 65 IN | OXYGEN SATURATION: 96 % | HEART RATE: 76 BPM

## 2024-11-02 DIAGNOSIS — J32.9 SINOBRONCHITIS: Primary | ICD-10-CM

## 2024-11-02 DIAGNOSIS — J40 SINOBRONCHITIS: Primary | ICD-10-CM

## 2024-11-02 PROCEDURE — 99214 OFFICE O/P EST MOD 30 MIN: CPT | Performed by: NURSE PRACTITIONER

## 2024-11-02 PROCEDURE — 71046 X-RAY EXAM CHEST 2 VIEWS: CPT

## 2024-11-02 RX ORDER — PREDNISONE 20 MG/1
TABLET ORAL
Qty: 21 TABLET | Refills: 0 | Status: SHIPPED | OUTPATIENT
Start: 2024-11-02

## 2024-11-02 RX ORDER — DULOXETIN HYDROCHLORIDE 60 MG/1
CAPSULE, DELAYED RELEASE ORAL
COMMUNITY
Start: 2024-09-27

## 2024-11-02 RX ORDER — FLUTICASONE PROPIONATE 110 UG/1
2 AEROSOL, METERED RESPIRATORY (INHALATION) 2 TIMES DAILY
Qty: 12 G | Refills: 1 | Status: SHIPPED | OUTPATIENT
Start: 2024-11-02 | End: 2024-11-02 | Stop reason: ALTCHOICE

## 2024-11-02 RX ORDER — PRAZOSIN HYDROCHLORIDE 5 MG/1
CAPSULE ORAL
COMMUNITY
Start: 2024-10-31

## 2024-11-02 RX ORDER — CEFDINIR 300 MG/1
300 CAPSULE ORAL EVERY 12 HOURS SCHEDULED
Qty: 20 CAPSULE | Refills: 0 | Status: SHIPPED | OUTPATIENT
Start: 2024-11-02 | End: 2024-11-12

## 2024-11-02 NOTE — PATIENT INSTRUCTIONS
"Patient Education     Acute bronchitis in adults   The Basics   Written by the doctors and editors at [facility]   What is bronchitis? -- This is an infection that causes a cough. It happens when the tubes that carry air into the lungs, called the \"bronchi,\" get infected (figure 1).  Usually, bronchitis happens after a person gets a cold or the flu. The viruses that cause the cold or flu infect the bronchi and irritate them. Antibiotics do not help bronchitis.  Bronchitis can also happen when a person gets an infection called \"whooping cough,\" but this is much less common. Whooping cough is caused by bacteria that can infect the bronchi. Most people get vaccines to prevent whooping cough, but the vaccine doesn't always work. Your doctor will be able to tell if you have whooping cough by doing an exam and listening to your cough.  This article is about \"acute\" bronchitis. This is different from \"chronic\" bronchitis, which is a lung disease that most often affects people who smoke.  What are the symptoms of bronchitis? -- The most common symptoms are:   A cough that can last up to a few weeks   Coughing up mucus that is clear, yellow, or green - Green mucus does not always mean that you have a bacterial infection.  You might also have other cold or flu symptoms, like a stuffy nose, sore throat, or headache. People with bronchitis do not usually get a fever.  Will I need tests? -- Most people with bronchitis do not need a test. But if your doctor or nurse is not sure what is causing your cough, they might do tests. For example, they might order a chest X-ray. Or if they think that you might have COVID-19, they will test you for the virus that causes the infection.  How is bronchitis treated? -- Bronchitis almost always goes away on its own. But the cough can take up to 3 weeks to get better, and sometimes even longer.  Doctors do not usually treat bronchitis with antibiotic medicines. That's because bronchitis is usually " caused by a virus, and antibiotics kill bacteria, not viruses. Also, antibiotics can actually cause other problems.  To feel better, you can treat your cold and flu symptoms. You can:   Get lots of rest, and drink plenty of liquids.   Drink hot tea.   Suck on cough drops or hard candy.   Take over-the-counter cough and cold medicines.   Breath in warm, moist air, such as in the shower, over a kettle, or from a humidifier.   Take a pain-relieving medicine if you have cold or flu symptoms like headache, muscle aches, or joint pain.  Avoid smoking or being around others who smoke. This can make your cough worse.  How can I keep from getting bronchitis again? -- You can reduce your chance of getting bronchitis again by keeping the germs that cause bronchitis out of your body. One of the best ways to do this is to wash your hands often with soap and water. If there is no sink nearby, you can use a hand gel with alcohol in it to clean your hands.  How can I keep from spreading germs? -- In addition to washing your hands often, cover your mouth with your elbow when you sneeze or cough. Using your elbow keeps you from getting germs on your hands. If you use a tissue, throw the tissue away and wash your hands.  When should I call the doctor? -- Call for advice if you have:   A fever higher than 100.4°F (38°C), or chills   Chest pain when you cough, trouble breathing, or coughing up blood   A barking cough that makes it hard to talk   Cough and weight loss that you cannot explain   Symptoms that are not getting better after 3 weeks  All topics are updated as new evidence becomes available and our peer review process is complete.  This topic retrieved from [employer] on: May 16, 2024.  Topic 91767 Version 17.0  Release: 32.4.3 - C32.135  © 2024 UpToDate, Inc. and/or its affiliates. All rights reserved.  figure 1: Normal lungs     The lungs sit in the chest, inside the ribcage. They are covered with a thin membrane called the  "\"pleura.\" The windpipe, or trachea, branches into 2 smaller airways called the left and right \"bronchi.\" The space between the lungs is called the \"mediastinum.\" Lymph nodes are located within and around the lungs and mediastinum.  Graphic 91861 Version 14.0  Consumer Information Use and Disclaimer   Disclaimer: This generalized information is a limited summary of diagnosis, treatment, and/or medication information. It is not meant to be comprehensive and should be used as a tool to help the user understand and/or assess potential diagnostic and treatment options. It does NOT include all information about conditions, treatments, medications, side effects, or risks that may apply to a specific patient. It is not intended to be medical advice or a substitute for the medical advice, diagnosis, or treatment of a health care provider based on the health care provider's examination and assessment of a patient's specific and unique circumstances. Patients must speak with a health care provider for complete information about their health, medical questions, and treatment options, including any risks or benefits regarding use of medications. This information does not endorse any treatments or medications as safe, effective, or approved for treating a specific patient. UpToDate, Inc. and its affiliates disclaim any warranty or liability relating to this information or the use thereof.The use of this information is governed by the Terms of Use, available at https://www.wolEldarionuwer.com/en/know/clinical-effectiveness-terms. 2024© UpToDate, Inc. and its affiliates and/or licensors. All rights reserved.  Copyright   © 2024 Up[employer]Date, Inc. and/or its affiliates. All rights reserved.    "

## 2024-11-02 NOTE — PROGRESS NOTES
"  [facility]        NAME: Amparo Dobbs is a 48 y.o. female  : 1975    MRN: [id number]  DATE: 2024  TIME: 5:13 PM      Assessment and Plan     Sinobronchitis [J32.9, J40]  1. Sinobronchitis  XR chest pa and lateral    XR chest pa and lateral    cefdinir (OMNICEF) 300 mg capsule    predniSONE 20 mg tablet    DISCONTINUED: fluticasone (Flovent HFA) 110 MCG/ACT inhaler        Discussed possible short term use of inhaler steroid.  No current insurance--job changes for spouse, and patient starts new job on Monday but has a waiting period before benefits.  Will proceed with longer course of oral steroids.      Patient Instructions     Patient Instructions   Patient Education     Acute bronchitis in adults   The Basics   Written by the doctors and editors at [facility]   What is bronchitis? -- This is an infection that causes a cough. It happens when the tubes that carry air into the lungs, called the \"bronchi,\" get infected (figure 1).  Usually, bronchitis happens after a person gets a cold or the flu. The viruses that cause the cold or flu infect the bronchi and irritate them. Antibiotics do not help bronchitis.  Bronchitis can also happen when a person gets an infection called \"whooping cough,\" but this is much less common. Whooping cough is caused by bacteria that can infect the bronchi. Most people get vaccines to prevent whooping cough, but the vaccine doesn't always work. Your doctor will be able to tell if you have whooping cough by doing an exam and listening to your cough.  This article is about \"acute\" bronchitis. This is different from \"chronic\" bronchitis, which is a lung disease that most often affects people who smoke.  What are the symptoms of bronchitis? -- The most common symptoms are:   A cough that can last up to a few weeks   Coughing up mucus that is clear, yellow, or green - Green mucus does not always mean that you have a bacterial infection.  You might also have other " cold or flu symptoms, like a stuffy nose, sore throat, or headache. People with bronchitis do not usually get a fever.  Will I need tests? -- Most people with bronchitis do not need a test. But if your doctor or nurse is not sure what is causing your cough, they might do tests. For example, they might order a chest X-ray. Or if they think that you might have COVID-19, they will test you for the virus that causes the infection.  How is bronchitis treated? -- Bronchitis almost always goes away on its own. But the cough can take up to 3 weeks to get better, and sometimes even longer.  Doctors do not usually treat bronchitis with antibiotic medicines. That's because bronchitis is usually caused by a virus, and antibiotics kill bacteria, not viruses. Also, antibiotics can actually cause other problems.  To feel better, you can treat your cold and flu symptoms. You can:   Get lots of rest, and drink plenty of liquids.   Drink hot tea.   Suck on cough drops or hard candy.   Take over-the-counter cough and cold medicines.   Breath in warm, moist air, such as in the shower, over a kettle, or from a humidifier.   Take a pain-relieving medicine if you have cold or flu symptoms like headache, muscle aches, or joint pain.  Avoid smoking or being around others who smoke. This can make your cough worse.  How can I keep from getting bronchitis again? -- You can reduce your chance of getting bronchitis again by keeping the germs that cause bronchitis out of your body. One of the best ways to do this is to wash your hands often with soap and water. If there is no sink nearby, you can use a hand gel with alcohol in it to clean your hands.  How can I keep from spreading germs? -- In addition to washing your hands often, cover your mouth with your elbow when you sneeze or cough. Using your elbow keeps you from getting germs on your hands. If you use a tissue, throw the tissue away and wash your hands.  When should I call the doctor? --  "Call for advice if you have:   A fever higher than 100.4°F (38°C), or chills   Chest pain when you cough, trouble breathing, or coughing up blood   A barking cough that makes it hard to talk   Cough and weight loss that you cannot explain   Symptoms that are not getting better after 3 weeks  All topics are updated as new evidence becomes available and our peer review process is complete.  This topic retrieved from [employer] on: May 16, 2024.  Topic 93072 Version 17.0  Release: 32.4.3 - C32.135  © 2024 UpToDate, Inc. and/or its affiliates. All rights reserved.  figure 1: Normal lungs     The lungs sit in the chest, inside the ribcage. They are covered with a thin membrane called the \"pleura.\" The windpipe, or trachea, branches into 2 smaller airways called the left and right \"bronchi.\" The space between the lungs is called the \"mediastinum.\" Lymph nodes are located within and around the lungs and mediastinum.  Graphic 28586 Version 14.0  Consumer Information Use and Disclaimer   Disclaimer: This generalized information is a limited summary of diagnosis, treatment, and/or medication information. It is not meant to be comprehensive and should be used as a tool to help the user understand and/or assess potential diagnostic and treatment options. It does NOT include all information about conditions, treatments, medications, side effects, or risks that may apply to a specific patient. It is not intended to be medical advice or a substitute for the medical advice, diagnosis, or treatment of a health care provider based on the health care provider's examination and assessment of a patient's specific and unique circumstances. Patients must speak with a health care provider for complete information about their health, medical questions, and treatment options, including any risks or benefits regarding use of medications. This information does not endorse any treatments or medications as safe, effective, or approved for treating " a specific patient. UpToDate, Inc. and its affiliates disclaim any warranty or liability relating to this information or the use thereof.The use of this information is governed by the Terms of Use, available at https://www.[employer].com/en/know/clinical-effectiveness-terms. 2024© UpToDate, Inc. and its affiliates and/or licensors. All rights reserved.  Copyright   © 2024 UpToDate, Inc. and/or its affiliates. All rights reserved.      Follow up with PCP in 3-5 days.  Proceed to  ER if symptoms worsen.    Chief Complaint     Chief Complaint   Patient presents with    Cough     And chest congestion for one month, taking OTC cough/cold medicine with no improvement     Headache     For one month, taking OTC Excedrin prn          History of Present Illness     Patient presents accompanied by significant other.  She has been sick for about a month and a half.  She was seen here on September 19, treated with Medrol and Doxy plus given a new albuterol inhaler.  She took these as ordered and completed with only slight improvement.  No history of smoking.  She does have a history of both bronchitis and pneumonia.  Her symptoms have been slowly worsening with persistent/worsening sinus pain and pressure, headaches, chest tightness, wheezing, shortness of breath.  She has the as needed albuterol inhaler as well as albuterol nebulizer.        Review of Systems     Review of Systems   HENT:  Positive for congestion, ear pain, sinus pressure, sinus pain and sore throat.    Respiratory:  Positive for cough, chest tightness, shortness of breath and wheezing.    Neurological:  Positive for headaches.   All other systems reviewed and are negative.        Current Medications       Current Outpatient Medications:     albuterol (ProAir HFA) 90 mcg/act inhaler, Inhale 2 puffs every 6 (six) hours as needed for wheezing, Disp: 8.5 g, Rfl: 0    ALPRAZolam (XANAX) 0.5 mg tablet, , Disp: , Rfl:     cefdinir (OMNICEF) 300 mg capsule, Take 1  capsule (300 mg total) by mouth every 12 (twelve) hours for 10 days, Disp: 20 capsule, Rfl: 0    DULoxetine (CYMBALTA) 30 mg delayed release capsule, daily, Disp: , Rfl:     DULoxetine (CYMBALTA) 60 mg delayed release capsule, , Disp: , Rfl:     famotidine (PEPCID) 40 MG tablet, Take 40 mg by mouth 2 (two) times a day bedtime, Disp: , Rfl:     hydroxychloroquine (PLAQUENIL) 200 mg tablet, Take 200 mg by mouth 2 (two) times a day, Disp: , Rfl:     ondansetron (ZOFRAN-ODT) 8 mg disintegrating tablet, , Disp: , Rfl:     prazosin (MINIPRESS) 2 mg capsule, 4 mg daily at bedtime, Disp: , Rfl:     prazosin (MINIPRESS) 5 mg capsule, , Disp: , Rfl:     predniSONE 20 mg tablet, 3 tabs daily x 4 days, 2 tabs daily x 3 days, 1 tab daily x 3 days, Disp: 21 tablet, Rfl: 0    QUEtiapine (SEROquel) 100 mg tablet, Take 100 mg by mouth, Disp: , Rfl:     RABEprazole (ACIPHEX) 20 MG tablet, Take 20 mg by mouth daily, Disp: , Rfl:     valACYclovir (VALTREX) 500 mg tablet, Take 500 mg by mouth daily, Disp: , Rfl:     ALPRAZolam (XANAX) 0.25 mg tablet, Take 2 tablets (0.5 mg total) by mouth 4 (four) times a day as needed for anxiety for up to 8 days, Disp: 30 tablet, Rfl: 0    HYDROcodone-acetaminophen (Norco) 5-325 mg per tablet, Take 1 tablet by mouth every 6 (six) hours as needed for pain for up to 10 doses Max Daily Amount: 4 tablets (Patient not taking: Reported on 11/2/2024), Disp: 10 tablet, Rfl: 0    oxyCODONE (ROXICODONE) 5 immediate release tablet, , Disp: , Rfl:     Current Allergies     Allergies as of 11/02/2024 - Reviewed 11/02/2024   Allergen Reaction Noted    Celecoxib Hives 11/11/2019    Lamotrigine Rash, Hives, and Itching 02/24/2010              The following portions of the patient's history were reviewed and updated as appropriate: allergies, current medications, past family history, past medical history, past social history, past surgical history and problem list.     Past Medical History:   Diagnosis Date    Anxiety  "    Arthritis     Bipolar 1 disorder (HCC)     with bordeline personality    Borderline personality disorder (HCC)     Chronic headaches     Depression     GERD (gastroesophageal reflux disease)     Hypertension     Incontinence     Migraine     Morbid obesity with BMI of 40.0-44.9, adult ([facility])     Sleep apnea     Small bowel obstruction ([facility]) 06/27/2019    Suicide attempt ([facility]) 2/29/2024    Symptomatic bradycardia     Urinary tract infection        Past Surgical History:   Procedure Laterality Date    CHOLECYSTECTOMY  05/24/2018    DENTAL SURGERY      ELBOW SURGERY      EXPLORATORY LAPAROTOMY      exlap    FOOT SURGERY Right     KNEE ARTHROSCOPY Right 11/15/2018    total of three procedures with meniscal surgery    MULTIPLE TOOTH EXTRACTIONS  11/02/2018    OVARIAN CYST REMOVAL      NC TNOT ELBOW LATERAL/MEDIAL DEBRIDE OPEN TDN RPR Left 3/1/2024    Procedure: REPAIR TENDON FOREARM, elbow common flexor tendon, excision of bone fragment and all necessary procedures;  Surgeon: June Charles DO;  Location:  MAIN OR;  Service: Orthopedics    REDUCTION MAMMAPLASTY      reduction     SINUS SURGERY Bilateral 04/19/2019    bilateral maxillary antrostomies - Dr. Barajas - [facility]    TOTAL KNEE ARTHROPLASTY REVISION Right 2023       Family History   Problem Relation Age of Onset    Hyperlipidemia Father     Hypertension Father     Migraines Mother     Depression Family     Heart disease Maternal Aunt     Heart disease Maternal Uncle     Diabetes Paternal Aunt     Diabetes Paternal Uncle     Breast cancer Maternal Grandmother     Diabetes Paternal Grandmother     Diabetes Paternal Grandfather     Stroke Neg Hx     Thyroid disease Neg Hx          Medications have been verified.        Objective     /88   Pulse 76   Temp 98.4 °F (36.9 °C)   Resp 20   Ht 5' 5\" (1.651 m)   Wt 107 kg (236 lb)   SpO2 96%   BMI 39.27 kg/m²   No LMP recorded. Patient is perimenopausal.         Physical Exam     Physical Exam  Vitals and " nursing note reviewed.   Constitutional:       General: She is not in acute distress.     Appearance: Normal appearance. She is well-developed and well-groomed. She is ill-appearing. She is not toxic-appearing or diaphoretic.   HENT:      Head: Normocephalic and atraumatic.      Right Ear: Tympanic membrane, ear canal and external ear normal.      Left Ear: Tympanic membrane, ear canal and external ear normal.      Nose: Mucosal edema and congestion present.      Mouth/Throat:      Mouth: Mucous membranes are moist.      Pharynx: Oropharynx is clear. Uvula midline. Posterior oropharyngeal erythema (mild) present. No oropharyngeal exudate.   Eyes:      Pupils: Pupils are equal, round, and reactive to light.   Cardiovascular:      Rate and Rhythm: Normal rate and regular rhythm. No extrasystoles are present.     Heart sounds: Normal heart sounds, S1 normal and S2 normal. No murmur heard.     No friction rub. No gallop.   Pulmonary:      Effort: Pulmonary effort is normal. No tachypnea, bradypnea, accessory muscle usage, prolonged expiration, respiratory distress or retractions.      Breath sounds: Normal air entry. No stridor or decreased air movement. Wheezing (mild exp wheezes scattered throughout) present. No decreased breath sounds, rhonchi or rales.   Abdominal:      General: Bowel sounds are normal. There is no distension.      Palpations: Abdomen is soft.      Tenderness: There is no abdominal tenderness.   Musculoskeletal:         General: Normal range of motion.      Cervical back: Normal range of motion and neck supple.   Skin:     General: Skin is warm and dry.      Capillary Refill: Capillary refill takes less than 2 seconds.   Neurological:      General: No focal deficit present.      Mental Status: She is alert and oriented to person, place, and time.   Psychiatric:         Mood and Affect: Mood normal.         Behavior: Behavior normal. Behavior is cooperative.         Thought Content: Thought content  normal.         Judgment: Judgment normal.

## 2024-11-03 ENCOUNTER — TELEPHONE (OUTPATIENT)
Dept: URGENT CARE | Facility: CLINIC | Age: 49
End: 2024-11-03

## 2024-11-03 NOTE — TELEPHONE ENCOUNTER
Patient viewed chest x-ray results in system, calls to touch base.  Reviewed that yes, possible pneumonia was seen.  No changes needed in medication ordered, proceed as scheduled.  Discussed that the ideal is always PCP follow-up after pneumonia.  Acknowledged that patient is currently without health coverage.  Told her to make sure that she does feel improvement when she finishes this antibiotic and if not to come back rather than wait more time to see if it clears.  Patient states understanding.

## 2024-12-04 ENCOUNTER — APPOINTMENT (EMERGENCY)
Dept: CT IMAGING | Facility: HOSPITAL | Age: 49
End: 2024-12-04

## 2024-12-04 ENCOUNTER — HOSPITAL ENCOUNTER (EMERGENCY)
Facility: HOSPITAL | Age: 49
Discharge: HOME | End: 2024-12-05
Attending: INTERNAL MEDICINE

## 2024-12-04 VITALS
BODY MASS INDEX: 40.82 KG/M2 | DIASTOLIC BLOOD PRESSURE: 82 MMHG | TEMPERATURE: 96.8 F | HEIGHT: 65 IN | OXYGEN SATURATION: 95 % | WEIGHT: 245 LBS | HEART RATE: 80 BPM | SYSTOLIC BLOOD PRESSURE: 132 MMHG | RESPIRATION RATE: 18 BRPM

## 2024-12-04 DIAGNOSIS — R30.0 DYSURIA: ICD-10-CM

## 2024-12-04 DIAGNOSIS — M54.50 ACUTE LOW BACK PAIN: Primary | ICD-10-CM

## 2024-12-04 LAB
ALBUMIN SERPL BCG-MCNC: 4.4 G/DL (ref 3.5–5)
ALP SERPL-CCNC: 96 U/L (ref 34–104)
ALT SERPL W P-5'-P-CCNC: 20 U/L (ref 7–52)
ANION GAP SERPL CALCULATED.3IONS-SCNC: 7 MMOL/L (ref 4–13)
AST SERPL W P-5'-P-CCNC: 29 U/L (ref 13–39)
BASOPHILS # BLD AUTO: 0.08 THOUSANDS/ÂΜL (ref 0–0.1)
BASOPHILS NFR BLD AUTO: 1 % (ref 0–1)
BILIRUB SERPL-MCNC: 0.45 MG/DL (ref 0.2–1)
BILIRUB UR QL STRIP: NEGATIVE
BUN SERPL-MCNC: 13 MG/DL (ref 5–25)
CALCIUM SERPL-MCNC: 9.2 MG/DL (ref 8.4–10.2)
CHLORIDE SERPL-SCNC: 102 MMOL/L (ref 96–108)
CLARITY UR: CLEAR
CO2 SERPL-SCNC: 27 MMOL/L (ref 21–32)
COLOR UR: YELLOW
CREAT SERPL-MCNC: 1.15 MG/DL (ref 0.6–1.3)
EOSINOPHIL # BLD AUTO: 0.23 THOUSAND/ÂΜL (ref 0–0.61)
EOSINOPHIL NFR BLD AUTO: 3 % (ref 0–6)
ERYTHROCYTE [DISTWIDTH] IN BLOOD BY AUTOMATED COUNT: 14.3 % (ref 11.6–15.1)
EXT PREGNANCY TEST URINE: NEGATIVE
EXT. CONTROL: NORMAL
GFR SERPL CREATININE-BSD FRML MDRD: 57 ML/MIN/1.73SQ M
GLUCOSE SERPL-MCNC: 95 MG/DL (ref 65–140)
GLUCOSE UR STRIP-MCNC: NEGATIVE MG/DL
HCT VFR BLD AUTO: 40.5 % (ref 34.8–46.1)
HGB BLD-MCNC: 13.3 G/DL (ref 11.5–15.4)
HGB UR QL STRIP.AUTO: NEGATIVE
IMM GRANULOCYTES # BLD AUTO: 0.08 THOUSAND/UL (ref 0–0.2)
IMM GRANULOCYTES NFR BLD AUTO: 1 % (ref 0–2)
KETONES UR STRIP-MCNC: NEGATIVE MG/DL
LEUKOCYTE ESTERASE UR QL STRIP: NEGATIVE
LYMPHOCYTES # BLD AUTO: 2.82 THOUSANDS/ÂΜL (ref 0.6–4.47)
LYMPHOCYTES NFR BLD AUTO: 35 % (ref 14–44)
MCH RBC QN AUTO: 28.5 PG (ref 26.8–34.3)
MCHC RBC AUTO-ENTMCNC: 32.8 G/DL (ref 31.4–37.4)
MCV RBC AUTO: 87 FL (ref 82–98)
MONOCYTES # BLD AUTO: 0.69 THOUSAND/ÂΜL (ref 0.17–1.22)
MONOCYTES NFR BLD AUTO: 9 % (ref 4–12)
NEUTROPHILS # BLD AUTO: 4.11 THOUSANDS/ÂΜL (ref 1.85–7.62)
NEUTS SEG NFR BLD AUTO: 51 % (ref 43–75)
NITRITE UR QL STRIP: NEGATIVE
NRBC BLD AUTO-RTO: 0 /100 WBCS
PH UR STRIP.AUTO: 8 [PH]
PLATELET # BLD AUTO: 325 THOUSANDS/UL (ref 149–390)
PMV BLD AUTO: 9.5 FL (ref 8.9–12.7)
POTASSIUM SERPL-SCNC: 4.7 MMOL/L (ref 3.5–5.3)
PROT SERPL-MCNC: 7.1 G/DL (ref 6.4–8.4)
PROT UR STRIP-MCNC: NEGATIVE MG/DL
RBC # BLD AUTO: 4.66 MILLION/UL (ref 3.81–5.12)
SODIUM SERPL-SCNC: 136 MMOL/L (ref 135–147)
SP GR UR STRIP.AUTO: 1.02
UROBILINOGEN UR QL STRIP.AUTO: 0.2 E.U./DL
WBC # BLD AUTO: 8.01 THOUSAND/UL (ref 4.31–10.16)

## 2024-12-04 PROCEDURE — 81003 URINALYSIS AUTO W/O SCOPE: CPT | Performed by: INTERNAL MEDICINE

## 2024-12-04 PROCEDURE — 85025 COMPLETE CBC W/AUTO DIFF WBC: CPT | Performed by: INTERNAL MEDICINE

## 2024-12-04 PROCEDURE — 80053 COMPREHEN METABOLIC PANEL: CPT | Performed by: INTERNAL MEDICINE

## 2024-12-04 PROCEDURE — 99284 EMERGENCY DEPT VISIT MOD MDM: CPT

## 2024-12-04 PROCEDURE — 81025 URINE PREGNANCY TEST: CPT | Performed by: INTERNAL MEDICINE

## 2024-12-04 PROCEDURE — 99285 EMERGENCY DEPT VISIT HI MDM: CPT | Performed by: INTERNAL MEDICINE

## 2024-12-04 PROCEDURE — 74176 CT ABD & PELVIS W/O CONTRAST: CPT

## 2024-12-04 PROCEDURE — 36415 COLL VENOUS BLD VENIPUNCTURE: CPT | Performed by: INTERNAL MEDICINE

## 2024-12-04 PROCEDURE — 96374 THER/PROPH/DIAG INJ IV PUSH: CPT

## 2024-12-04 RX ORDER — KETOROLAC TROMETHAMINE 30 MG/ML
30 INJECTION, SOLUTION INTRAMUSCULAR; INTRAVENOUS ONCE
Status: COMPLETED | OUTPATIENT
Start: 2024-12-04 | End: 2024-12-04

## 2024-12-04 RX ADMIN — KETOROLAC TROMETHAMINE 30 MG: 30 INJECTION, SOLUTION INTRAMUSCULAR at 23:05

## 2024-12-05 RX ORDER — CYCLOBENZAPRINE HCL 5 MG
5 TABLET ORAL 3 TIMES DAILY PRN
Qty: 30 TABLET | Refills: 0 | Status: SHIPPED | OUTPATIENT
Start: 2024-12-05

## 2024-12-05 RX ORDER — IBUPROFEN 600 MG/1
600 TABLET, FILM COATED ORAL EVERY 8 HOURS PRN
Qty: 30 TABLET | Refills: 0 | Status: SHIPPED | OUTPATIENT
Start: 2024-12-05

## 2024-12-05 NOTE — DISCHARGE INSTRUCTIONS
Follow-up with your primary care if symptoms worsen.  Take Naprosyn 500 mg twice daily.  Take Flexeril 5 mg twice daily as needed for back pain/muscle spasm.  Stretching and use heating pad.  If urinary symptoms worsen may need repeat urine and culture.

## 2024-12-05 NOTE — ED PROVIDER NOTES
Time reflects when diagnosis was documented in both MDM as applicable and the Disposition within this note       Time User Action Codes Description Comment    12/5/2024 12:30 AM Jason Moreland Add [M54.50] Acute low back pain     12/5/2024 12:30 AM Jason Moreland Add [R30.0,  Z94.83] Dysuria after pancreas transplant using bladder drainage technique (BDT) ([facility])     12/5/2024 12:30 AM Jason Moreland Remove [R30.0,  Z94.83] Dysuria after pancreas transplant using bladder drainage technique (BDT) ([facility])     12/5/2024 12:31 AM Jason Moreland Add [R30.0] Dysuria           ED Disposition       ED Disposition   Discharge    Condition   Stable    Date/Time   Thu Dec 5, 2024 12:30 AM    Comment   Amparo Dobbs discharge to home/self care.                   Assessment & Plan       Medical Decision Making  48-year-old who started with dysuria yesterday.  This continues, but now she has pain in her primarily right flank but actually goes to her left flank as well.  Notably she is status post cholecystectomy.  Denies fever or chills associated with it.  Has had no history of kidney stones in the past but her mother had several kidney stones.  No changes in bowel habits.    Differential includes pyelonephritis, kidney stone with obstruction, ureteral lithiasis without obstruction.  UTI.  Musculoskeletal strain.  She does not appear toxic.  She has no vomiting but some nausea, given her clinical exam need to rule out stone.    Amount and/or Complexity of Data Reviewed  Labs: ordered.     Details: Laboratory data is unremarkable.  Urine pregnancy is negative.  Notably urine had no blood in it.  No leukocytes.  Radiology: ordered.     Details: CT failed to reveal any significant intra-abdominal pathology, most importantly no kidney stones or disorder of the urinary symptoms on a noncontrast CT.    Risk  Prescription drug management.  Risk Details: Uncertain for the reason why patient has dysuria.  Urinalysis fails to  reveal any leukocytes or nitrates.  No blood in the urine either.  CAT scan failed to reveal any significant pathology within the urinary system or any reason for the explanation for low back pain.  Suspect for myofascial syndrome.  Will treat with anti-inflammatories as well as muscle relaxers and follow-up with primary care.  Consider physical therapy.             Medications   ketorolac (TORADOL) injection 30 mg (30 mg Intravenous Given 12/4/24 2305)       ED Risk Strat Scores                           SBIRT 22yo+      Flowsheet Row Most Recent Value   Initial Alcohol Screen: US AUDIT-C     1. How often do you have a drink containing alcohol? 2 Filed at: 12/04/2024 2306   2. How many drinks containing alcohol do you have on a typical day you are drinking?  1 Filed at: 12/04/2024 2306   3a. Male UNDER 65: How often do you have five or more drinks on one occasion? 0 Filed at: 12/04/2024 2306   3b. FEMALE Any Age, or MALE 65+: How often do you have 4 or more drinks on one occassion? 0 Filed at: 12/04/2024 2306   Audit-C Score 3 Filed at: 12/04/2024 2306   NARCISA: How many times in the past year have you...    Used an illegal drug or used a prescription medication for non-medical reasons? Never Filed at: 12/04/2024 2306                            History of Present Illness       Chief Complaint   Patient presents with    Back Pain     States of back pain different from her chronic pain.  Also states of burning with urination since yesterday.       Past Medical History:   Diagnosis Date    Anxiety     Arthritis     Bipolar 1 disorder ([facility])     with bordeline personality    Borderline personality disorder ([facility])     Chronic headaches     Depression     GERD (gastroesophageal reflux disease)     Hypertension     Incontinence     Migraine     Morbid obesity with BMI of 40.0-44.9, adult (Former[facility])     Sleep apnea     Small bowel obstruction ([facility]) 06/27/2019    Suicide attempt ([facility]ly [facility]) 2/29/2024    Symptomatic bradycardia     Urinary  tract infection       Past Surgical History:   Procedure Laterality Date    CHOLECYSTECTOMY  05/24/2018    DENTAL SURGERY      ELBOW SURGERY      EXPLORATORY LAPAROTOMY      exlap    FOOT SURGERY Right     KNEE ARTHROSCOPY Right 11/15/2018    total of three procedures with meniscal surgery    MULTIPLE TOOTH EXTRACTIONS  11/02/2018    OVARIAN CYST REMOVAL      MS TNOT ELBOW LATERAL/MEDIAL DEBRIDE OPEN TDN RPR Left 3/1/2024    Procedure: REPAIR TENDON FOREARM, elbow common flexor tendon, excision of bone fragment and all necessary procedures;  Surgeon: June Charles DO;  Location:  MAIN OR;  Service: Orthopedics    REDUCTION MAMMAPLASTY      reduction     SINUS SURGERY Bilateral 04/19/2019    bilateral maxillary antrostomies - Dr. Barajas - [facility]    TOTAL KNEE ARTHROPLASTY REVISION Right 2023      Family History   Problem Relation Age of Onset    Hyperlipidemia Father     Hypertension Father     Migraines Mother     Depression Family     Heart disease Maternal Aunt     Heart disease Maternal Uncle     Diabetes Paternal Aunt     Diabetes Paternal Uncle     Breast cancer Maternal Grandmother     Diabetes Paternal Grandmother     Diabetes Paternal Grandfather     Stroke Neg Hx     Thyroid disease Neg Hx       Social History     Tobacco Use    Smoking status: Never    Smokeless tobacco: Never   Vaping Use    Vaping status: Never Used   Substance Use Topics    Alcohol use: Not Currently    Drug use: Never      E-Cigarette/Vaping    E-Cigarette Use Never User       E-Cigarette/Vaping Substances    Nicotine No     THC No     CBD No     Flavoring No     Other No     Unknown No       I have reviewed and agree with the history as documented.     48-year-old who started with dysuria yesterday.  This continues, but now she has pain in her primarily right flank but actually goes to her left flank as well.  Notably she is status post cholecystectomy.  Denies fever or chills associated with it.  Has had no history of kidney stones  in the past but her mother had several kidney stones.  No changes in bowel habits.            Review of Systems   Constitutional:  Negative for chills and fever.   HENT:  Negative for congestion and sore throat.    Eyes:  Negative for visual disturbance.   Respiratory:  Negative for cough and shortness of breath.    Cardiovascular:  Negative for chest pain and leg swelling.   Gastrointestinal:  Negative for abdominal pain, diarrhea, nausea and vomiting.   Genitourinary:  Positive for dysuria and flank pain.   Musculoskeletal:  Positive for back pain. Negative for arthralgias and myalgias.   Skin:  Negative for rash.   Neurological:  Negative for dizziness and headaches.   All other systems reviewed and are negative.          Objective       ED Triage Vitals   Temperature Pulse Blood Pressure Respirations SpO2 Patient Position - Orthostatic VS   12/04/24 2241 12/04/24 2241 12/04/24 2241 12/04/24 2241 12/04/24 2241 --   (!) 96.8 °F (36 °C) 80 132/82 18 95 %       Temp Source Heart Rate Source BP Location FiO2 (%) Pain Score    12/04/24 2241 -- -- -- 12/04/24 2252    Temporal    5      Vitals      Date and Time Temp Pulse SpO2 Resp BP Pain Score FACES Pain Rating User   12/04/24 2305 -- -- -- -- -- 5 -- MD   12/04/24 2252 -- -- -- -- -- 5 -- ME   12/04/24 2241 96.8 °F (36 °C) 80 95 % 18 132/82 -- -- ME            Physical Exam  Vitals and nursing note reviewed.   Constitutional:       General: She is not in acute distress.     Appearance: Normal appearance. She is well-developed.   HENT:      Head: Normocephalic and atraumatic.   Eyes:      Conjunctiva/sclera: Conjunctivae normal.   Cardiovascular:      Rate and Rhythm: Normal rate and regular rhythm.      Pulses: Normal pulses.      Heart sounds: Normal heart sounds. No murmur heard.  Pulmonary:      Effort: Pulmonary effort is normal. No respiratory distress.      Breath sounds: Normal breath sounds.   Abdominal:      Palpations: Abdomen is soft.      Tenderness:  There is no abdominal tenderness.   Musculoskeletal:         General: Tenderness present. No swelling.      Cervical back: Neck supple.      Comments: Positive Alan sign on the right   Skin:     General: Skin is warm and dry.      Capillary Refill: Capillary refill takes less than 2 seconds.   Neurological:      General: No focal deficit present.      Mental Status: She is alert and oriented to person, place, and time.   Psychiatric:         Mood and Affect: Mood normal.         Results Reviewed       Procedure Component Value Units Date/Time    Comprehensive metabolic panel [[id number]] Collected: 12/04/24 2305    Lab Status: Final result Specimen: Blood from Arm, Right Updated: 12/04/24 2332     Sodium 136 mmol/L      Potassium 4.7 mmol/L      Chloride 102 mmol/L      CO2 27 mmol/L      ANION GAP 7 mmol/L      BUN 13 mg/dL      Creatinine 1.15 mg/dL      Glucose 95 mg/dL      Calcium 9.2 mg/dL      AST 29 U/L      ALT 20 U/L      Alkaline Phosphatase 96 U/L      Total Protein 7.1 g/dL      Albumin 4.4 g/dL      Total Bilirubin 0.45 mg/dL      eGFR 57 ml/min/1.73sq m     Narrative:      Slightly Hemolyzed:Results may be affected.  National Kid[employer] Disease Foundation guidelines for Chronic Kidney Disease (CKD):     Stage 1 with normal or high GFR (GFR > 90 mL/min/1.73 square meters)    Stage 2 Mild CKD (GFR = 60-89 mL/min/1.73 square meters)    Stage 3A Moderate CKD (GFR = 45-59 mL/min/1.73 square meters)    Stage 3B Moderate CKD (GFR = 30-44 mL/min/1.73 square meters)    Stage 4 Severe CKD (GFR = 15-29 mL/min/1.73 square meters)    Stage 5 End Stage CKD (GFR <15 mL/min/1.73 square meters)  Note: GFR calculation is accurate only with a steady state creatinine    UA w Reflex to Microscopic w Reflex to Culture [[id number]]  (Abnormal) Collected: 12/04/24 2259    Lab Status: Final result Specimen: Urine, Clean Catch Updated: 12/04/24 2315     Color, UA Yellow     Clarity, UA Clear     Specific Gravity, UA 1.020     pH,  UA 8.0     Leukocytes, UA Negative     Nitrite, UA Negative     Protein, UA Negative mg/dl      Glucose, UA Negative mg/dl      Ketones, UA Negative mg/dl      Urobilinogen, UA 0.2 E.U./dl      Bilirubin, UA Negative     Occult Blood, UA Negative    CBC and differential [[id number]] Collected: 12/04/24 2305    Lab Status: Final result Specimen: Blood from Arm, Right Updated: 12/04/24 2314     WBC 8.01 Thousand/uL      RBC 4.66 Million/uL      Hemoglobin 13.3 g/dL      Hematocrit 40.5 %      MCV 87 fL      MCH 28.5 pg      MCHC 32.8 g/dL      RDW 14.3 %      MPV 9.5 fL      Platelets 325 Thousands/uL      nRBC 0 /100 WBCs      Segmented % 51 %      Immature Grans % 1 %      Lymphocytes % 35 %      Monocytes % 9 %      Eosinophils Relative 3 %      Basophils Relative 1 %      Absolute Neutrophils 4.11 Thousands/µL      Absolute Immature Grans 0.08 Thousand/uL      Absolute Lymphocytes 2.82 Thousands/µL      Absolute Monocytes 0.69 Thousand/µL      Eosinophils Absolute 0.23 Thousand/µL      Basophils Absolute 0.08 Thousands/µL     POCT pregnancy, urine [[id number]]  (Normal) Collected: 12/04/24 2311    Lab Status: Final result Updated: 12/04/24 2311     EXT Preg Test, Ur Negative     Control Valid            CT renal stone study abdomen pelvis without contrast   ED Interpretation by Jason Moreland DO (12/05 0030)   No acute renal calculi.  No intra-abdominal pathology noted on this noncontrast CT.      Final Interpretation by Stefan Walters MD (12/05 0026)      No acute pathology. Specifically, no urinary tract calculi or obstructive uropathy.         Workstation performed: [id number]             Procedures    ED Medication and Procedure Management   Prior to Admission Medications   Prescriptions Last Dose Informant Patient Reported? Taking?   ALPRAZolam (XANAX) 0.25 mg tablet   No No   Sig: Take 2 tablets (0.5 mg total) by mouth 4 (four) times a day as needed for anxiety for up to 8 days   ALPRAZolam (XANAX) 0.5 mg  tablet 2024  Yes Yes   DULoxetine (CYMBALTA) 30 mg delayed release capsule Not Taking Self Yes No   Sig: daily   Patient not taking: Reported on 2024   DULoxetine (CYMBALTA) 60 mg delayed release capsule 2024  Yes Yes   HYDROcodone-acetaminophen (Norco) 5-325 mg per tablet   No No   Sig: Take 1 tablet by mouth every 6 (six) hours as needed for pain for up to 10 doses Max Daily Amount: 4 tablets   Patient not taking: Reported on 2024   QUEtiapine (SEROquel) 100 mg tablet 2024  Yes Yes   Sig: Take 100 mg by mouth   RABEprazole (ACIPHEX) 20 MG tablet 2024 Self Yes Yes   Sig: Take 20 mg by mouth daily   albuterol (ProAir HFA) 90 mcg/act inhaler Not Taking  No No   Sig: Inhale 2 puffs every 6 (six) hours as needed for wheezing   Patient not taking: Reported on 2024   famotidine (PEPCID) 40 MG tablet 2024 Self Yes Yes   Sig: Take 40 mg by mouth 2 (two) times a day bedtime   hydroxychloroquine (PLAQUENIL) 200 mg tablet 2024 Self Yes Yes   Sig: Take 200 mg by mouth 2 (two) times a day   ondansetron (ZOFRAN-ODT) 8 mg disintegrating tablet 2024  Yes Yes   oxyCODONE (ROXICODONE) 5 immediate release tablet   Yes No   Patient not taking: Reported on 2024   prazosin (MINIPRESS) 2 mg capsule Not Taking Self Yes No   Si mg daily at bedtime   Patient not taking: Reported on 2024   prazosin (MINIPRESS) 5 mg capsule 2024  Yes Yes   predniSONE 20 mg tablet Not Taking  No No   Sig: 3 tabs daily x 4 days, 2 tabs daily x 3 days, 1 tab daily x 3 days   Patient not taking: Reported on 2024   valACYclovir (VALTREX) 500 mg tablet 2024 Self Yes Yes   Sig: Take 500 mg by mouth daily      Facility-Administered Medications: None     Discharge Medication List as of 2024 12:35 AM        START taking these medications    Details   cyclobenzaprine (FLEXERIL) 5 mg tablet Take 1 tablet (5 mg total) by mouth 3 (three) times a day as needed for muscle spasms, Starting Thu  12/5/2024, Normal      ibuprofen (MOTRIN) 600 mg tablet Take 1 tablet (600 mg total) by mouth every 8 (eight) hours as needed for mild pain, Starting Thu 12/5/2024, Normal           CONTINUE these medications which have NOT CHANGED    Details   ALPRAZolam (XANAX) 0.5 mg tablet Historical Med      !! DULoxetine (CYMBALTA) 60 mg delayed release capsule Historical Med      famotidine (PEPCID) 40 MG tablet Take 40 mg by mouth 2 (two) times a day bedtime, Starting Tue 1/19/2021, Historical Med      hydroxychloroquine (PLAQUENIL) 200 mg tablet Take 200 mg by mouth 2 (two) times a day, Starting Tue 11/1/2022, Historical Med      ondansetron (ZOFRAN-ODT) 8 mg disintegrating tablet Historical Med      !! prazosin (MINIPRESS) 5 mg capsule Historical Med      QUEtiapine (SEROquel) 100 mg tablet Take 100 mg by mouth, Starting Mon 6/10/2024, Until Tue 6/10/2025 at 2359, Historical Med      RABEprazole (ACIPHEX) 20 MG tablet Take 20 mg by mouth daily, Starting Mon 7/17/2023, Historical Med      valACYclovir (VALTREX) 500 mg tablet Take 500 mg by mouth daily, Historical Med      albuterol (ProAir HFA) 90 mcg/act inhaler Inhale 2 puffs every 6 (six) hours as needed for wheezing, Starting Thu 9/19/2024, Normal      !! DULoxetine (CYMBALTA) 30 mg delayed release capsule daily, Starting Mon 8/28/2023, Historical Med      HYDROcodone-acetaminophen (Norco) 5-325 mg per tablet Take 1 tablet by mouth every 6 (six) hours as needed for pain for up to 10 doses Max Daily Amount: 4 tablets, Starting Wed 6/5/2024, Normal      oxyCODONE (ROXICODONE) 5 immediate release tablet Historical Med      !! prazosin (MINIPRESS) 2 mg capsule 4 mg daily at bedtime, Starting Fri 3/18/2022, Historical Med      predniSONE 20 mg tablet 3 tabs daily x 4 days, 2 tabs daily x 3 days, 1 tab daily x 3 days, Normal       !! - Potential duplicate medications found. Please discuss with provider.        No discharge procedures on file.  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

## 2025-01-23 ENCOUNTER — TELEPHONE (OUTPATIENT)
Age: 50
End: 2025-01-23

## 2025-01-24 ENCOUNTER — OFFICE VISIT (OUTPATIENT)
Dept: PODIATRY | Facility: CLINIC | Age: 50
End: 2025-01-24
Payer: COMMERCIAL

## 2025-01-24 ENCOUNTER — APPOINTMENT (OUTPATIENT)
Dept: RADIOLOGY | Facility: CLINIC | Age: 50
End: 2025-01-24
Payer: COMMERCIAL

## 2025-01-24 VITALS — HEIGHT: 65 IN | BODY MASS INDEX: 40.82 KG/M2 | WEIGHT: 245 LBS

## 2025-01-24 DIAGNOSIS — G57.52 TARSAL TUNNEL SYNDROME OF LEFT SIDE: ICD-10-CM

## 2025-01-24 DIAGNOSIS — M79.672 LEFT FOOT PAIN: Primary | ICD-10-CM

## 2025-01-24 DIAGNOSIS — M76.822 POSTERIOR TIBIAL TENDINITIS OF LEFT LOWER EXTREMITY: ICD-10-CM

## 2025-01-24 DIAGNOSIS — M72.2 PLANTAR FASCIITIS: ICD-10-CM

## 2025-01-24 DIAGNOSIS — M76.62 ACHILLES TENDINITIS OF LEFT LOWER EXTREMITY: ICD-10-CM

## 2025-01-24 DIAGNOSIS — M24.573 EQUINUS CONTRACTURE OF ANKLE: ICD-10-CM

## 2025-01-24 DIAGNOSIS — M76.72 PERONEAL TENDINITIS OF LEFT LOWER EXTREMITY: ICD-10-CM

## 2025-01-24 DIAGNOSIS — M79.672 LEFT FOOT PAIN: ICD-10-CM

## 2025-01-24 PROCEDURE — 99204 OFFICE O/P NEW MOD 45 MIN: CPT | Performed by: PODIATRIST

## 2025-01-24 PROCEDURE — 73630 X-RAY EXAM OF FOOT: CPT

## 2025-01-24 PROCEDURE — 20550 NJX 1 TENDON SHEATH/LIGAMENT: CPT | Performed by: PODIATRIST

## 2025-01-24 RX ORDER — TRIAMCINOLONE ACETONIDE 40 MG/ML
20 INJECTION, SUSPENSION INTRA-ARTICULAR; INTRAMUSCULAR ONCE
Status: COMPLETED | OUTPATIENT
Start: 2025-01-24 | End: 2025-01-24

## 2025-01-24 RX ORDER — MELOXICAM 15 MG/1
15 TABLET ORAL DAILY
Qty: 30 TABLET | Refills: 1 | Status: SHIPPED | OUTPATIENT
Start: 2025-01-24

## 2025-01-24 RX ORDER — LIDOCAINE HYDROCHLORIDE 10 MG/ML
1.5 INJECTION, SOLUTION EPIDURAL; INFILTRATION; INTRACAUDAL; PERINEURAL ONCE
Status: COMPLETED | OUTPATIENT
Start: 2025-01-24 | End: 2025-01-24

## 2025-01-24 RX ADMIN — TRIAMCINOLONE ACETONIDE 20 MG: 40 INJECTION, SUSPENSION INTRA-ARTICULAR; INTRAMUSCULAR at 11:31

## 2025-01-24 RX ADMIN — LIDOCAINE HYDROCHLORIDE 1.5 ML: 10 INJECTION, SOLUTION EPIDURAL; INFILTRATION; INTRACAUDAL; PERINEURAL at 11:31

## 2025-01-24 NOTE — ASSESSMENT & PLAN NOTE
Orders:    XR foot 3+ vw left; Future    meloxicam (Mobic) 15 mg tablet; Take 1 tablet (15 mg total) by mouth daily    Orthotics B/L

## 2025-01-24 NOTE — ASSESSMENT & PLAN NOTE
Orders:    meloxicam (Mobic) 15 mg tablet; Take 1 tablet (15 mg total) by mouth daily    Orthotics B/L

## 2025-01-24 NOTE — ASSESSMENT & PLAN NOTE
Orders:    meloxicam (Mobic) 15 mg tablet; Take 1 tablet (15 mg total) by mouth daily    Ambulatory referral to Physical Therapy; Future    Orthotics B/L    P.F. Night Splint    lidocaine (PF) (XYLOCAINE-MPF) 1 % injection 1.5 mL    triamcinolone acetonide (KENALOG-40) 40 mg/mL injection 20 mg

## 2025-01-24 NOTE — PROGRESS NOTES
Name: Amparo Dobbs      : 1975      MRN: [id number]  Encounter Provider: Deo Lopez DPM  Encounter Date: 2025   Encounter department: [facility] [place]  :  Assessment & Plan  Left foot pain    Orders:    XR foot 3+ vw left; Future    meloxicam (Mobic) 15 mg tablet; Take 1 tablet (15 mg total) by mouth daily    Orthotics B/L    Peroneal tendinitis of left lower extremity    Orders:    meloxicam (Mobic) 15 mg tablet; Take 1 tablet (15 mg total) by mouth daily    Orthotics B/L    Achilles tendinitis of left lower extremity    Orders:    meloxicam (Mobic) 15 mg tablet; Take 1 tablet (15 mg total) by mouth daily    Orthotics B/L    Posterior tibial tendinitis of left lower extremity    Orders:    meloxicam (Mobic) 15 mg tablet; Take 1 tablet (15 mg total) by mouth daily    Orthotics B/L    Tarsal tunnel syndrome of left side    Orders:    meloxicam (Mobic) 15 mg tablet; Take 1 tablet (15 mg total) by mouth daily    Orthotics B/L    Plantar fasciitis    Orders:    meloxicam (Mobic) 15 mg tablet; Take 1 tablet (15 mg total) by mouth daily    Ambulatory referral to Physical Therapy; Future    Orthotics B/L    P.F. Night Splint    lidocaine (PF) (XYLOCAINE-MPF) 1 % injection 1.5 mL    triamcinolone acetonide (KENALOG-40) 40 mg/mL injection 20 mg    Equinus contracture of ankle    Orders:    meloxicam (Mobic) 15 mg tablet; Take 1 tablet (15 mg total) by mouth daily    Orthotics B/L    -Her main inciting issue here is her severe equinus of the left lower extremity  - We will place her in aggressive physical therapy for management of her equinus with [employer]  - Injection of her left plantar heel as below to manage her plantar fascial pain  - Rx given for Mobic for management of her inflammatory pain  - We will send with night splint and also custom molded orthotics to physical therapy  - She is to return in 8 weeks and there is a high propensity here for multiple repeat  "injections and also do the severity of her equinus there is a possible high probability of not need for MRI and potential surgical intervention in the future  - She is to wear shoes at all times in the house and consider over-the-counter orthotics such as [employer] green orthotics for right now    History of Present Illness   Presents for evaluation and management of her left foot, she has been getting slowly severely worse over time.  She notes pain on the back of her heel pain on the inside of arch pain on her forefoot pain on the outside of the foot and also some numbness burning and tingling as the day goes on.  She has tried multiple things for this pain but is having severe unrelenting pain          Review of Systems   Constitutional:  Negative for chills and fever.   HENT:  Negative for ear pain and sore throat.    Eyes:  Negative for pain and visual disturbance.   Respiratory:  Negative for cough and shortness of breath.    Cardiovascular:  Negative for chest pain and palpitations.   Gastrointestinal:  Negative for abdominal pain and vomiting.   Genitourinary:  Negative for dysuria and hematuria.   Musculoskeletal:  Negative for arthralgias and back pain.   Skin:  Negative for color change and rash.   Neurological:  Negative for seizures and syncope.   All other systems reviewed and are negative.         Objective   Ht 5' 5\" (1.651 m)   Wt 111 kg (245 lb)   BMI 40.77 kg/m²      Physical Exam  Skin:     Comments: Severe equinus, she is unable to get to anywhere close to 90 degrees with her knee bent she is around a good 20 to 25 degrees with her knee extended improved to around 0 with her knee bent.  Pain with palpation along the peroneal insertion along the posterior tibial tendon insertion along the insertion of the Achilles and the origin of the plantar fascia.  Positive Tinel's           "

## 2025-02-23 ENCOUNTER — OFFICE VISIT (OUTPATIENT)
Dept: URGENT CARE | Facility: CLINIC | Age: 50
End: 2025-02-23
Payer: COMMERCIAL

## 2025-02-23 ENCOUNTER — APPOINTMENT (OUTPATIENT)
Dept: RADIOLOGY | Facility: CLINIC | Age: 50
End: 2025-02-23
Payer: COMMERCIAL

## 2025-02-23 VITALS
HEART RATE: 84 BPM | TEMPERATURE: 98.1 F | DIASTOLIC BLOOD PRESSURE: 70 MMHG | RESPIRATION RATE: 20 BRPM | OXYGEN SATURATION: 96 % | SYSTOLIC BLOOD PRESSURE: 118 MMHG

## 2025-02-23 DIAGNOSIS — R05.1 ACUTE COUGH: ICD-10-CM

## 2025-02-23 DIAGNOSIS — J20.9 ACUTE BRONCHITIS, UNSPECIFIED ORGANISM: Primary | ICD-10-CM

## 2025-02-23 PROCEDURE — G0383 LEV 4 HOSP TYPE B ED VISIT: HCPCS

## 2025-02-23 PROCEDURE — S9083 URGENT CARE CENTER GLOBAL: HCPCS

## 2025-02-23 PROCEDURE — 71046 X-RAY EXAM CHEST 2 VIEWS: CPT

## 2025-02-23 RX ORDER — BENZONATATE 200 MG/1
200 CAPSULE ORAL 3 TIMES DAILY PRN
Qty: 20 CAPSULE | Refills: 0 | Status: SHIPPED | OUTPATIENT
Start: 2025-02-23

## 2025-02-23 RX ORDER — ALBUTEROL SULFATE 90 UG/1
2 INHALANT RESPIRATORY (INHALATION) EVERY 6 HOURS PRN
Qty: 8.5 G | Refills: 0 | Status: SHIPPED | OUTPATIENT
Start: 2025-02-23

## 2025-02-23 RX ORDER — AZITHROMYCIN 250 MG/1
TABLET, FILM COATED ORAL
Qty: 6 TABLET | Refills: 0 | Status: SHIPPED | OUTPATIENT
Start: 2025-02-23 | End: 2025-02-27

## 2025-02-23 RX ORDER — PREDNISONE 10 MG/1
TABLET ORAL
Qty: 26 TABLET | Refills: 0 | Status: SHIPPED | OUTPATIENT
Start: 2025-02-23

## 2025-02-23 NOTE — PROGRESS NOTES
[facility]        NAME: Amparo Dobbs is a 49 y.o. female  : 1975    MRN: [id number]  DATE: 2025  TIME: 2:57 PM    Assessment and Plan   Acute bronchitis, unspecified organism [J20.9]  1. Acute bronchitis, unspecified organism  predniSONE 10 mg tablet    benzonatate (TESSALON) 200 MG capsule    albuterol (ProAir HFA) 90 mcg/act inhaler    azithromycin (ZITHROMAX) 250 mg tablet      2. Acute cough  XR chest pa and lateral        Chest XR obtained in office. Xray imaging reviewed and negative for any acute cardiopulmonary abnormality or consolidation. Pending final read from radiology.  Given length and persistence of symptoms, will treat with azithromycin.  Will also start on short course of steroids, Tessalon and albuterol as needed. Instructed patient to follow-up with PCP for no improvement or worsening of symptoms.  Patient educated on red flag symptoms and when to proceed to the ED.  Patient agreeable and understands current treatment plan.     Patient Instructions     Patient Instructions   Please take Azithromycin daily for the next 5 days.  Please start a Prednisone taper daily as directed.  Please take steroids with food and do not take any Ibuprofen or other NSAID containing medications as this may increase the risk for GI bleeding. You may take Tylenol if needed.    You may take Tessalon three times daily as needed for cough.   You may use Albuterol inhaler every 6 hours as needed for cough, wheezing, shortness of breath or chest tightness.     While sick, make sure you get lots of rest and increase your fluid intake.   You may use OTC nasal saline spray, Flonase, and Mucinex for mild congestion.   Take Tylenol for fever, mild pain, and/or body aches.  Perform salt water gargles, drink warm tea with honey, and use throat lozenges and Chloraseptic spray for sore throat.    You can also apply warm compresses over your sinuses for any sinus pressure.     While you are sick,  taking vitamins may help boost your immune system and potentially aid in recovery by supporting your body's natural defense mechanisms: Vitamin D3 2000 IU daily, Vitamin C 1000mg daily , and Multivitamin daily.    Antibiotics are medicines that treat bacterial infections by either killing bacteria or preventing them from growing. It is important to take the full course of your antibiotics as prescribed to kill the bacteria causing your infection. Stopping your antibiotics early could lead to reinfection and can also promote the spread of antibiotic resistant bacteria. Some antibiotics may cause certain side effects including: nausea, vomiting, diarrhea, bloating, indigestion, belly pain, and/or loss of appetite. Eating yogurt with live and active cultures and/or taking a probiotic and maintaining a healthy diet can help to reduce some of these side effects.        We have performed some tests on you during your visit with us. Our office will contact you with these results only if changes need to be made to the care plan previously discussed with you at your visit. You can review your results on [facility]'s [employer]. Please don't hesitate to call if you have any questions regarding your results and/or treatment.     If your symptoms do not improve with our current treatment plan or worsen, please schedule an appointment with your PCP. If you develop any high or persistent fevers, chest pain, palpitations, shortness of breath, difficulty swallowing, decreased urine output, abdominal pain, dizziness or any other concerning symptoms, please proceed to the ED.          Follow up with PCP in 3-5 days.  Proceed to  ER if symptoms worsen.    Chief Complaint     Chief Complaint   Patient presents with   • Cold Like Symptoms     Prod cough for green brown mucous, chest pain from coughing, fatigue, shortness of breath.  Started a week ago.  Taking nasal spray, cough medicine.           History of Present Illness        49-year-old female presents to the clinic for evaluation of cough x 8 days.  Patient reports the cough is moist and strong in nature.  She also reports associated symptoms including postnasal drip, runny nose, exertional shortness of breath, wheezing at nighttime, some chest pain but only when coughing, and headaches.  She does also report some constipation but states this is a chronic problem for her.  Patient reports her symptoms are gradually worsening.  She denies any fevers, chills, ear pain, sore throat, chest tightness, palpitations, nausea, vomiting, abdominal pain or dizziness.  Patient reports taking OTC decongestants and cough and cold medications with mild relief of symptoms.        Cough  This is a recurrent problem. The current episode started in the past 7 days. The problem has been rapidly worsening. The problem occurs hourly. The cough is Productive of sputum and productive of brown sputum. Associated symptoms include chest pain (only when coughing), headaches, nasal congestion, postnasal drip, rhinorrhea, shortness of breath, sweats and wheezing. Pertinent negatives include no chills, ear congestion, ear pain, fever, heartburn, hemoptysis, myalgias, sore throat or weight loss. The symptoms are aggravated by cold air and lying down.       Review of Systems   Review of Systems   Constitutional:  Negative for appetite change, chills, fever and weight loss.   HENT:  Positive for postnasal drip and rhinorrhea. Negative for congestion, ear pain and sore throat.    Respiratory:  Positive for cough, shortness of breath and wheezing. Negative for hemoptysis and chest tightness.    Cardiovascular:  Positive for chest pain (only when coughing). Negative for palpitations.   Gastrointestinal:  Positive for constipation (chronic). Negative for diarrhea, heartburn, nausea and vomiting.   Genitourinary:  Negative for decreased urine volume.   Musculoskeletal:  Negative for myalgias.   Neurological:  Positive  for headaches. Negative for dizziness and light-headedness.         Current Medications       Current Outpatient Medications:   •  albuterol (ProAir HFA) 90 mcg/act inhaler, Inhale 2 puffs every 6 (six) hours as needed for wheezing, Disp: 8.5 g, Rfl: 0  •  ALPRAZolam (XANAX) 0.5 mg tablet, , Disp: , Rfl:   •  azithromycin (ZITHROMAX) 250 mg tablet, Take 2 tablets today then 1 tablet daily x 4 days, Disp: 6 tablet, Rfl: 0  •  benzonatate (TESSALON) 200 MG capsule, Take 1 capsule (200 mg total) by mouth 3 (three) times a day as needed for cough, Disp: 20 capsule, Rfl: 0  •  cyclobenzaprine (FLEXERIL) 5 mg tablet, Take 1 tablet (5 mg total) by mouth 3 (three) times a day as needed for muscle spasms, Disp: 30 tablet, Rfl: 0  •  DULoxetine (CYMBALTA) 60 mg delayed release capsule, , Disp: , Rfl:   •  famotidine (PEPCID) 40 MG tablet, Take 40 mg by mouth 2 (two) times a day bedtime, Disp: , Rfl:   •  hydroxychloroquine (PLAQUENIL) 200 mg tablet, Take 200 mg by mouth 2 (two) times a day, Disp: , Rfl:   •  meloxicam (Mobic) 15 mg tablet, Take 1 tablet (15 mg total) by mouth daily, Disp: 30 tablet, Rfl: 1  •  prazosin (MINIPRESS) 5 mg capsule, , Disp: , Rfl:   •  predniSONE 10 mg tablet, Take 3 tablets BID x 2 days, Take 2 tablets BID x 2 days, Take 1 tablet BID x 2 days, Take 1 tablet daily x 2 days, Disp: 26 tablet, Rfl: 0  •  QUEtiapine (SEROquel) 100 mg tablet, Take 100 mg by mouth, Disp: , Rfl:   •  RABEprazole (ACIPHEX) 20 MG tablet, Take 20 mg by mouth daily, Disp: , Rfl:   •  valACYclovir (VALTREX) 500 mg tablet, Take 500 mg by mouth daily, Disp: , Rfl:   •  albuterol (ProAir HFA) 90 mcg/act inhaler, Inhale 2 puffs every 6 (six) hours as needed for wheezing (Patient not taking: Reported on 12/4/2024), Disp: 8.5 g, Rfl: 0  •  ALPRAZolam (XANAX) 0.25 mg tablet, Take 2 tablets (0.5 mg total) by mouth 4 (four) times a day as needed for anxiety for up to 8 days, Disp: 30 tablet, Rfl: 0  •  DULoxetine (CYMBALTA) 30 mg  delayed release capsule, daily (Patient not taking: Reported on 12/4/2024), Disp: , Rfl:   •  HYDROcodone-acetaminophen (Norco) 5-325 mg per tablet, Take 1 tablet by mouth every 6 (six) hours as needed for pain for up to 10 doses Max Daily Amount: 4 tablets (Patient not taking: Reported on 11/2/2024), Disp: 10 tablet, Rfl: 0  •  ibuprofen (MOTRIN) 600 mg tablet, Take 1 tablet (600 mg total) by mouth every 8 (eight) hours as needed for mild pain, Disp: 30 tablet, Rfl: 0  •  ondansetron (ZOFRAN-ODT) 8 mg disintegrating tablet, , Disp: , Rfl:   •  oxyCODONE (ROXICODONE) 5 immediate release tablet, , Disp: , Rfl:   •  prazosin (MINIPRESS) 2 mg capsule, 4 mg daily at bedtime (Patient not taking: Reported on 12/4/2024), Disp: , Rfl:   •  predniSONE 20 mg tablet, 3 tabs daily x 4 days, 2 tabs daily x 3 days, 1 tab daily x 3 days (Patient not taking: Reported on 12/4/2024), Disp: 21 tablet, Rfl: 0    Current Allergies     Allergies as of 02/23/2025 - Reviewed 02/23/2025   Allergen Reaction Noted   • Celecoxib Hives 11/11/2019   • Lamotrigine Rash, Hives, and Itching 02/24/2010            The following portions of the patient's history were reviewed and updated as appropriate: allergies, current medications, past family history, past medical history, past social history, past surgical history and problem list.     Past Medical History:   Diagnosis Date   • Anxiety    • Arthritis    • Bipolar 1 disorder (HCC)     with bordeline personality   • Borderline personality disorder (HCC)    • Chronic headaches    • Depression    • GERD (gastroesophageal reflux disease)    • Hypertension    • Incontinence    • Migraine    • Morbid obesity with BMI of 40.0-44.9, adult ([facility])    • Sleep apnea    • Small bowel obstruction ([facility]) 06/27/2019   • Suicide attempt ([facility]) 2/29/2024   • Symptomatic bradycardia    • Urinary tract infection        Past Surgical History:   Procedure Laterality Date   • CHOLECYSTECTOMY  05/24/2018   • DENTAL SURGERY      • ELBOW SURGERY     • EXPLORATORY LAPAROTOMY      exlap   • FOOT SURGERY Right    • KNEE ARTHROSCOPY Right 11/15/2018    total of three procedures with meniscal surgery   • MULTIPLE TOOTH EXTRACTIONS  11/02/2018   • OVARIAN CYST REMOVAL     • KY TNOT ELBOW LATERAL/MEDIAL DEBRIDE OPEN TDN RPR Left 3/1/2024    Procedure: REPAIR TENDON FOREARM, elbow common flexor tendon, excision of bone fragment and all necessary procedures;  Surgeon: June Charles DO;  Location:  MAIN OR;  Service: Orthopedics   • REDUCTION MAMMAPLASTY      reduction    • SINUS SURGERY Bilateral 04/19/2019    bilateral maxillary antrostomies - Dr. Barajas - [facility]   • TOTAL KNEE ARTHROPLASTY REVISION Right 2023       Family History   Problem Relation Age of Onset   • Hyperlipidemia Father    • Hypertension Father    • Migraines Mother    • Depression Family    • Heart disease Maternal Aunt    • Heart disease Maternal Uncle    • Diabetes Paternal Aunt    • Diabetes Paternal Uncle    • Breast cancer Maternal Grandmother    • Diabetes Paternal Grandmother    • Diabetes Paternal Grandfather    • Stroke Neg Hx    • Thyroid disease Neg Hx          Medications have been verified.        Objective   /70 (BP Location: Left arm)   Pulse 84   Temp 98.1 °F (36.7 °C) (Skin)   Resp 20   SpO2 96%        Physical Exam     Physical Exam  Vitals and nursing note reviewed.   Constitutional:       General: She is not in acute distress.  HENT:      Head: Normocephalic and atraumatic.      Right Ear: Tympanic membrane, ear canal and external ear normal.      Left Ear: Tympanic membrane, ear canal and external ear normal.      Nose: Congestion and rhinorrhea present.      Mouth/Throat:      Pharynx: Posterior oropharyngeal erythema (mild) present. No oropharyngeal exudate.   Eyes:      General:         Right eye: No discharge.         Left eye: No discharge.      Conjunctiva/sclera: Conjunctivae normal.   Cardiovascular:      Rate and Rhythm: Normal rate and  regular rhythm.      Pulses: Normal pulses.      Heart sounds: Normal heart sounds.   Pulmonary:      Effort: Pulmonary effort is normal.      Breath sounds: No stridor. Rhonchi (bilateral) present. No wheezing or rales.      Comments: Strong, moist cough noted on exam  Abdominal:      General: Bowel sounds are normal. There is no distension.      Palpations: Abdomen is soft.      Tenderness: There is no abdominal tenderness.   Lymphadenopathy:      Cervical: No cervical adenopathy.   Skin:     General: Skin is warm and dry.   Neurological:      General: No focal deficit present.      Mental Status: She is alert.   Psychiatric:         Mood and Affect: Mood normal.         Behavior: Behavior normal.

## 2025-02-23 NOTE — PATIENT INSTRUCTIONS
Please take Azithromycin daily for the next 5 days.  Please start a Prednisone taper daily as directed.  Please take steroids with food and do not take any Ibuprofen or other NSAID containing medications as this may increase the risk for GI bleeding. You may take Tylenol if needed.    You may take Tessalon three times daily as needed for cough.   You may use Albuterol inhaler every 6 hours as needed for cough, wheezing, shortness of breath or chest tightness.     While sick, make sure you get lots of rest and increase your fluid intake.   You may use OTC nasal saline spray, Flonase, and Mucinex for mild congestion.   Take Tylenol for fever, mild pain, and/or body aches.  Perform salt water gargles, drink warm tea with honey, and use throat lozenges and Chloraseptic spray for sore throat.    You can also apply warm compresses over your sinuses for any sinus pressure.     While you are sick, taking vitamins may help boost your immune system and potentially aid in recovery by supporting your body's natural defense mechanisms: Vitamin D3 2000 IU daily, Vitamin C 1000mg daily , and Multivitamin daily.    Antibiotics are medicines that treat bacterial infections by either killing bacteria or preventing them from growing. It is important to take the full course of your antibiotics as prescribed to kill the bacteria causing your infection. Stopping your antibiotics early could lead to reinfection and can also promote the spread of antibiotic resistant bacteria. Some antibiotics may cause certain side effects including: nausea, vomiting, diarrhea, bloating, indigestion, belly pain, and/or loss of appetite. Eating yogurt with live and active cultures and/or taking a probiotic and maintaining a healthy diet can help to reduce some of these side effects.        We have performed some tests on you during your visit with us. Our office will contact you with these results only if changes need to be made to the care plan previously  discussed with you at your visit. You can review your results on [employer] [employer]. Please don't hesitate to call if you have any questions regarding your results and/or treatment.     If your symptoms do not improve with our current treatment plan or worsen, please schedule an appointment with your PCP. If you develop any high or persistent fevers, chest pain, palpitations, shortness of breath, difficulty swallowing, decreased urine output, abdominal pain, dizziness or any other concerning symptoms, please proceed to the ED.

## 2025-03-20 NOTE — ED NOTES
Pt reports decreased pain since med intervention  Remains w/ nausea        Starr Be, MARGOT  03/12/19 2045
no

## 2025-03-26 ENCOUNTER — EVALUATION (OUTPATIENT)
Dept: PHYSICAL THERAPY | Facility: CLINIC | Age: 50
End: 2025-03-26
Payer: COMMERCIAL

## 2025-03-26 DIAGNOSIS — M72.2 PLANTAR FASCIITIS: Primary | ICD-10-CM

## 2025-03-26 PROCEDURE — 97161 PT EVAL LOW COMPLEX 20 MIN: CPT | Performed by: PHYSICAL MEDICINE & REHABILITATION

## 2025-03-26 PROCEDURE — 97110 THERAPEUTIC EXERCISES: CPT | Performed by: PHYSICAL MEDICINE & REHABILITATION

## 2025-03-26 PROCEDURE — 97760 ORTHOTIC MGMT&TRAING 1ST ENC: CPT | Performed by: PHYSICAL MEDICINE & REHABILITATION

## 2025-03-26 NOTE — PROGRESS NOTES
"PT Evaluation     Today's date: 3/26/2025  Patient name: Amparo Dobbs  : 1975  MRN: [id number]  Referring provider: Deo Lopez*  Dx:   Encounter Diagnosis     ICD-10-CM    1. Plantar fasciitis  M72.2                                  SUBJECTIVE:    Per recent DPM note:   \"Her main inciting issue here is her severe equinus of the left lower extremity  - We will place her in aggressive physical therapy for management of her equinus with [employer]  - Injection of her left plantar heel as below to manage her plantar fascial pain  - Rx given for Mobic for management of her inflammatory pain  - We will send with night splint and also custom molded orthotics to physical therapy  - She is to return in 8 weeks and there is a high propensity here for multiple repeat injections and also do the severity of her equinus there is a possible high probability of not need for MRI and potential surgical intervention in the future  - She is to wear shoes at all times in the house and consider over-the-counter orthotics such as [employer] green orthotics for right now     History of Present Illness     Presents for evaluation and management of her left foot, she has been getting slowly severely worse over time.  She notes pain on the back of her heel pain on the inside of arch pain on her forefoot pain on the outside of the foot and also some numbness burning and tingling as the day goes on.  She has tried multiple things for this pain but is having severe unrelenting pain\"    PT IE 3/26:   Chronic pain L foot 1*.   Pain t/o L foot; pain in the top and outside L foot. Pain also in L arch and heel. Hx L PF surgery per pt.   No pain R foot. Hx of \"spurs\" removed from back of R heel.   Dx with neuropathy L foot. Per pt. Does have hx of back pain and pains into L vs R LE.   Notes she will be seeing neuro for her neuropathy.   No numbness/loss of sensation.   Notes RLS that is \"really bad\" at night; has been " unable to wear night splints from DPM.  Did have CMOs before; did not help.   Hx TKA.   Wears Womens 10 standard width; Gio Rushing         OBJECTIVE:    Age: 49 y.o.  Weight: 245    Foot Posture Index Score    Right Foot  Talar dome - 0  Malleolar curve - 0   Calcaneal eversion - 0  Talonavicular bulge - 0  Medial longitudinal arch - -1  Too many toes - 0  Total Score R = -1    Left Foot  Talar dome - 0  Malleolar curve - 0   Calcaneal eversion - 0  Talonavicular bulge - 0  Medial longitudinal arch - -1  Too many toes - 0  Total Score L = -1    Reference Values  Normal =    0 to +5  Pronated =  +6 to +9 Highly Pronated =  10+  Supinated =   -1 to -4 Highly Supinated = -5 to -12      Objective      Gait Assessment:  Right Stance Phase- RF neutral at IC; RF valgus into early-mid stance; early heel rise; late stance RF valgus/ff pronation   Left Stance Phase - RF neutral vs ?mild varus at IC into LR; RF valgus into midstance with late stance RF valgus/FF pronation; early heel rise   Notes she has pain L lat forefoot with late stance to push off; tends to shift weight to outer foot to avoid pain L heel per pt.   Increased digit ext with swing phase B with lack of ankle DF B     AROM/PROM:             MMT          AROM          PROM    Ankle         R          L          R         L          R         L   PF 4 4- WFL WFL     DF.   0* 0*      DF bent knee         EHL         Inv. 4 4- WFL WFL pain top of foot      Ever. 4 4- WFL WFL pain top of foot      Great toe Extension    WFL WFL       Functional Ankle Dorsiflexion ROM:  Limited  Decreased pronation R/L foot with standing march in place and mini squat           Palpation:  TTP under L 1st MTP (mild)  Elevated ttp t/o L 5th MET laterally and dorsum of METS 3-5  TTP lateral L ankle near lat mallelous/peroneals  TTP in region of PTT and medial calcaneal tubercle L   Mild ttp R medial calcaneal tubercle     Observation:  Increased callusing R> L medial great toe and  1st MTP   Claw toe B digits 2-5     Neurological Testing:  Intact light touch R/L foot/ankle     Joint Mobility:        Right                         Left     Subtalar-                  Hypo                               Hypo  Midfoot-                   Hypo                               Hypo     Forefoot-                 Hypo                               Hypo  Talocrural-              Hypo                               Hypo  First Ray-                Hypo                               Hypo     Subtalar Neutral Assessment:  Right- See scans     Left- See scans       ORTHOTICS ASSESSMENT/PLAN:    Patient requires custom foot orthosis with B deepened heel cup, B medial longitudinal arch support, and B transverse arch support to correct altered gait mechanics contributing to pain/sx and functional limitations. Patient is not currently controlled by her motion-controlled shoe. Foot/ankle exam and Ipad scans were completed this date as noted above. Will consult with orthotist regarding patient presentation and scans. Pt will return to PT for dispensing of orthotics when they arrive. Will review orthotic fit to shoe and pt's foot, wearing schedule, gait assessment with orthotics etc at that time. Pt in agreement with plan with no questions/concerns end of session. Thank you for your referral.       Orthotic goals:  1) Patient will have custom foot orthoses fitted to her shoe.   2) Patient will be compliant with break-in period of custom foot orthoses as prescribed by PT.   3) Patient will be compliant with custom foot orthoses use as prescribed by PT.     Plan:    Planned therapy interventions: orthotic fitting/training    PT ASSESSMENT/GOALS:    Amparo SAPNA Dobbs is a 49 y.o. female who was referred to physical therapy for management of L foot pain secondary to referring noted dx.  Primary impairments include hypomobility R/L foot/ankle, decreased strength R/L foot/ankle, abnormal gait mechanics, and abnormal CKC foot/ankle  "mobility B.  Consequently, patient has difficulty completing ADLs including standing, walking, stairs, etc.  Amparo would benefit from skilled intervention to address all deficits and improve functional capability.  Patient is a good candidate for therapy, pending compliance with HEP and consistent participation in physical therapy.  Thank you for the referral and please do not hesitate to contact me with any questions or concerns regarding Amparo’s care!      Plan  Frequency:1-2x/week   Duration in weeks: 4-6 weeks   POC start date: 3/26/2025  POC end date: 5/7/25  Therapeutic exercise/activity, neuromuscular reeducation, manual therapy, and modalities.   Patient understands and agrees to plan of care.    Goals  Short Term--4 weeks  1. Patient will demonstrate 2 point decrease in pain levels.  2. Patient will demonstrate 1/2 point increase in all deficient MMT scores.  3. Pt will improve ankle DF AROM by at least 5-7* to improve gait.   4. Pt will report at least 50% improvement in standing/walking tolerance vs SOC with less foot pain/sx.     Long Term--By Discharge  1. Patient will achieve expected FOTO score.  2. Pt will be I with HEP.   3. Pt will be able to stand/walk without limitation 2* foot/ankle pain.     Patient's Goal: get rid of pain              Precautions: see chart       Re-eval Date:  5/7    Date 3/26/2025        Visit Count 1       FOTO 3/26/2025        Pain In See IE       Pain Out See IE           Manuals 3/26/2025        GIASTM L calf, PF, Achilles         Heel cord stretch as hudson, AP TC mobs, RF, FF mobs L as hudson                         Neuro Re-Ed        Foot intrinsic training:    Towel curls          Toe yoga        Toe splaying         Perturbation training                                         Ther Ex        Calf stretch KF and KE      PF stretch with towel  Seated 4x30\" ea R/L ; pt edu regarding night splint use        Great toe extension ROM       Hrs double-->single         HR with ball " between heels       Ankle tband 4 way         PF with peroneal bias   Tband                 Great toe flex with tband                         Ther Activity pt education regarding pathophysiology/pathoanatomy of present pain/sx and condition, role of PT in improving pain/sx and function                       Gait Training                        Modalities                              3/26/2025  - HEP was issued and reviewed this date for above noted exercises. Pt demonstrated understanding without incident and without questions/concerns. Will continue to update upcoming.

## 2025-03-28 ENCOUNTER — OFFICE VISIT (OUTPATIENT)
Dept: PODIATRY | Facility: CLINIC | Age: 50
End: 2025-03-28
Payer: COMMERCIAL

## 2025-03-28 VITALS — HEIGHT: 65 IN | BODY MASS INDEX: 40.82 KG/M2 | WEIGHT: 245 LBS

## 2025-03-28 DIAGNOSIS — M76.72 PERONEAL TENDINITIS OF LEFT LOWER EXTREMITY: ICD-10-CM

## 2025-03-28 DIAGNOSIS — M76.822 POSTERIOR TIBIAL TENDINITIS OF LEFT LOWER EXTREMITY: ICD-10-CM

## 2025-03-28 DIAGNOSIS — M72.2 PLANTAR FASCIITIS: ICD-10-CM

## 2025-03-28 DIAGNOSIS — M24.573 EQUINUS CONTRACTURE OF ANKLE: ICD-10-CM

## 2025-03-28 DIAGNOSIS — M76.62 ACHILLES TENDINITIS OF LEFT LOWER EXTREMITY: ICD-10-CM

## 2025-03-28 DIAGNOSIS — G57.52 TARSAL TUNNEL SYNDROME OF LEFT SIDE: ICD-10-CM

## 2025-03-28 DIAGNOSIS — M79.672 LEFT FOOT PAIN: Primary | ICD-10-CM

## 2025-03-28 PROCEDURE — 99213 OFFICE O/P EST LOW 20 MIN: CPT | Performed by: PODIATRIST

## 2025-03-28 RX ORDER — GABAPENTIN 100 MG/1
100 CAPSULE ORAL DAILY
COMMUNITY
Start: 2025-03-28

## 2025-03-28 NOTE — PROGRESS NOTES
"Name: Amparo Dobbs      : 1975      MRN: [id number]  Encounter Provider: Deo Lopez DPM  Encounter Date: 3/28/2025   Encounter department: [facility] PODIATRY [place]  :  Assessment & Plan  Left foot pain         Peroneal tendinitis of left lower extremity         Achilles tendinitis of left lower extremity         Posterior tibial tendinitis of left lower extremity         Tarsal tunnel syndrome of left side         Equinus contracture of ankle         Plantar fasciitis         -Continue PT and her exercises, PT note was reviewed  - She is to continue her Mobic but try to use it sparingly, I did discuss a second injection here today but her mechanics are pretty terrible  - I would like to hold onto her second and third potential injections until she returns to clinic in the next 6 to 8 weeks, I think that her pain is being mechanically driven and with her continued severe equinus will likely have femoral relief from her pain likely 2 to 3 weeks with this injection and I would recommend deferring it for right now  - Continue aggressive stretching, continue Nexplanon, continue shoe gear changes and await custom molded orthotics return in 2 months    History of Present Illness   HPI  Amparo Dobbs is a 49 y.o. female who presents for evaluation and management of her left foot.  She did go to physical therapy and is currently awaiting her orthotics.  She was molded.  She cannot sleep through the night and the night splint she has restless leg and she thinks that \"triggers it\".  She has been attending to use it throughout the day.  She wears, she states the injection last was around 2 weeks the meloxicam is helping her pain and feels as though she is also on steroids.      Review of Systems   Constitutional:  Negative for chills and fever.   HENT:  Negative for ear pain and sore throat.    Eyes:  Negative for pain and visual disturbance.   Respiratory:  Negative for cough and shortness of " "breath.    Cardiovascular:  Negative for chest pain and palpitations.   Gastrointestinal:  Negative for abdominal pain and vomiting.   Genitourinary:  Negative for dysuria and hematuria.   Musculoskeletal:  Negative for arthralgias and back pain.   Skin:  Negative for color change and rash.   Neurological:  Negative for seizures and syncope.   All other systems reviewed and are negative.         Objective   Ht 5' 5\" (1.651 m)   Wt 111 kg (245 lb)   BMI 40.77 kg/m²      Physical Exam  Constitutional:       Appearance: Normal appearance. She is obese.   HENT:      Head: Normocephalic and atraumatic.      Nose: Nose normal.      Mouth/Throat:      Mouth: Mucous membranes are moist.   Eyes:      Pupils: Pupils are equal, round, and reactive to light.   Pulmonary:      Effort: Pulmonary effort is normal.   Abdominal:      General: Abdomen is flat.   Musculoskeletal:      Comments: She still is having severe equinus, minimal improvement so far, continue trigger points   Skin:     Capillary Refill: Capillary refill takes less than 2 seconds.   Neurological:      General: No focal deficit present.      Mental Status: She is alert and oriented to person, place, and time. Mental status is at baseline.           "

## 2025-04-03 ENCOUNTER — OFFICE VISIT (OUTPATIENT)
Dept: PHYSICAL THERAPY | Facility: CLINIC | Age: 50
End: 2025-04-03
Payer: COMMERCIAL

## 2025-04-03 DIAGNOSIS — M72.2 PLANTAR FASCIITIS: Primary | ICD-10-CM

## 2025-04-03 PROCEDURE — 97112 NEUROMUSCULAR REEDUCATION: CPT

## 2025-04-03 PROCEDURE — 97140 MANUAL THERAPY 1/> REGIONS: CPT

## 2025-04-03 PROCEDURE — 97110 THERAPEUTIC EXERCISES: CPT

## 2025-04-03 NOTE — PROGRESS NOTES
"Daily Note     Today's date: 4/3/2025  Patient name: Amparo Dobbs  : 1975  MRN: [id number]  Referring provider: Deo Lopez*  Dx:   Encounter Diagnosis     ICD-10-CM    1. Plantar fasciitis  M72.2           Start Time: 07  Stop Time: 0750  Total time in clinic (min): 45 minutes    Subjective: \"I am about the same.  I wear the splint at night for about 5 minutes on both feet.  I haven't noticed a different yet.\"      Objective: See treatment diary below      Assessment: Tolerated treatment well. Able to complete program without incident.  Decreased intrinsic foot control R>L.  Good response to GIASTM to B foot.  No change in pain levels. Will continue to monitor and progress as able.  Patient demonstrated fatigue post treatment and would benefit from continued PT      Plan: Continue per plan of care.  Progress treatment as tolerated.       Precautions: see chart       Re-eval Date:      Date 3/26/2025  4/3      Visit Count 1 2      FOTO 3/26/2025        Pain In See IE       Pain Out See IE         Consent reviewed and signed 4/3/25  Manuals 3/26/2025  4/3      GIASTM L calf, PF, Achilles   GIASTM L calf, PF, Achilles #4 scanning, fanning, and sweeping 10'      Heel cord stretch as hudson, AP TC mobs, RF, FF mobs L as hudson                         Neuro Re-Ed        Foot intrinsic training:    Towel curls          20x/3-5\"      Toe yoga        Toe splaying   20x/3-5\"        20x/3-5\"      Perturbation training                                         Ther Ex        Calf stretch KF and KE      PF stretch with towel  Seated 4x30\" ea R/L ; pt edu regarding night splint use  Incline 4x30\"      Great toe extension ROM       Hrs double-->single   4x30\"          20x/3-5\"      HR with ball between heels       Ankle tband 4 way           Grn 2x10/3-5\"      PF with peroneal bias   Tband                 Great toe flex with tband                         Ther Activity pt education regarding " pathophysiology/pathoanatomy of present pain/sx and condition, role of PT in improving pain/sx and function                       Gait Training                        Modalities                              3/26/2025  - HEP was issued and reviewed this date for above noted exercises. Pt demonstrated understanding without incident and without questions/concerns. Will continue to update upcoming.

## 2025-04-08 ENCOUNTER — OFFICE VISIT (OUTPATIENT)
Dept: PHYSICAL THERAPY | Facility: CLINIC | Age: 50
End: 2025-04-08
Payer: COMMERCIAL

## 2025-04-08 DIAGNOSIS — M72.2 PLANTAR FASCIITIS: Primary | ICD-10-CM

## 2025-04-08 PROCEDURE — 97110 THERAPEUTIC EXERCISES: CPT | Performed by: PHYSICAL MEDICINE & REHABILITATION

## 2025-04-08 PROCEDURE — 97140 MANUAL THERAPY 1/> REGIONS: CPT | Performed by: PHYSICAL MEDICINE & REHABILITATION

## 2025-04-08 PROCEDURE — 97112 NEUROMUSCULAR REEDUCATION: CPT | Performed by: PHYSICAL MEDICINE & REHABILITATION

## 2025-04-08 NOTE — PROGRESS NOTES
"Daily Note     Today's date: 2025  Patient name: Amparo Dobbs  : 1975  MRN: [id number]  Referring provider: Deo Lopez*  Dx:   Encounter Diagnosis     ICD-10-CM    1. Plantar fasciitis  M72.2                      Subjective: Pt notes she has developed a \"lump\" L foot. Noticed it the night after vs the morning after last PT session. No pain that session or issues during/following the session. Seemed to \"just pop up\". No bruising or other swelling in the area. No new pain; notes the pain is \"the same\" and in \"the same area\" she has been having pain L foot/forefoot. More tender with walking and pressure of the shoe rubbing it. The lump itself is not tender per pt. Notes she sent DPM a message. No new sx/complaints otherwise.       Objective: See treatment diary below      Assessment: Tolerated treatment well. Modified session today 2* pt increased tenderness L foot. Tolerated modified session well without issue. Reviewed modified HEP to include NWB and non resistive foot/ankle exercises and self stretching only as tolerated until message back from DPM; understanding noted. PT notes approx dime sized lump just above mid portion L 5th MET lateral L foot. Non tender lump. No redness/bruising. No swelling in the area. No ttp in the area 5th MET/lateral foot. Maintains good AROM L foot/ankle with \"tightness\" without increase in sx. Pt notes the tenderness with WB in in the same area/the same pain she has been having L foot; no new pain per pt. PT sent message with this info to DP. Plan to hold PT for now until message back from DPM.  Pt in agreement with plan; no questions/concerns after session Patient demonstrated fatigue post treatment and would benefit from continued PT      Plan: Continue per plan of care.  Progress treatment as tolerated.       Precautions: see chart       Re-eval Date:      Date 3/26/2025  4/3 4/8     Visit Count 1 2 3     FOTO 3/26/2025        Pain In See IE       Pain " "Out See IE         Consent reviewed and signed 4/3/25  Manuals 3/26/2025  4/3 4/8     GIASTM L calf, PF, Achilles   GIASTM L calf, PF, Achilles #4 scanning, fanning, and sweeping 10' GIASTM L calf, PF, Achilles #4 scanning, fanning, and sweeping 10'     Heel cord stretch as hudson, AP TC mobs, RF, FF mobs L as hudson                         Neuro Re-Ed        Foot intrinsic training:    Towel curls          20x/3-5\"       20x/5\"      Toe yoga        Toe splaying   20x/3-5\"        20x/3-5\" 20x/5\" ea         20x/5\"     Perturbation training                                         Ther Ex        Calf stretch KF and KE      PF stretch with towel  Seated 4x30\" ea R/L ; pt edu regarding night splint use  Incline 4x30\" Incline 4x30\"     Great toe extension ROM       Hrs double-->single   4x30\"          20x/3-5\" Reviewed HEP         HELD      HR with ball between heels       Ankle tband 4 way           Grn 2x10/3-5\"           HELD      PF with peroneal bias   Tband                 Great toe flex with tband                         Ther Activity pt education regarding pathophysiology/pathoanatomy of present pain/sx and condition, role of PT in improving pain/sx and function                       Gait Training                        Modalities                              3/26/2025  - HEP was issued and reviewed this date for above noted exercises. Pt demonstrated understanding without incident and without questions/concerns. Will continue to update upcoming.            "

## 2025-04-10 ENCOUNTER — OFFICE VISIT (OUTPATIENT)
Dept: PHYSICAL THERAPY | Facility: CLINIC | Age: 50
End: 2025-04-10
Payer: COMMERCIAL

## 2025-04-10 ENCOUNTER — TELEPHONE (OUTPATIENT)
Dept: PODIATRY | Facility: CLINIC | Age: 50
End: 2025-04-10

## 2025-04-10 DIAGNOSIS — M72.2 PLANTAR FASCIITIS: Primary | ICD-10-CM

## 2025-04-10 PROCEDURE — 97140 MANUAL THERAPY 1/> REGIONS: CPT | Performed by: PHYSICAL MEDICINE & REHABILITATION

## 2025-04-10 PROCEDURE — 97110 THERAPEUTIC EXERCISES: CPT | Performed by: PHYSICAL MEDICINE & REHABILITATION

## 2025-04-10 NOTE — PROGRESS NOTES
"Daily Note     Today's date: 4/10/2025  Patient name: Amparo Dobbs  : 1975  MRN: [id number]  Referring provider: Deo Lopez*  Dx:   Encounter Diagnosis     ICD-10-CM    1. Plantar fasciitis  M72.2                      Subjective: Pt notes her feet are still sore, L > R. Cont soreness L forefoot/lateral foot area, especially the more she is on her feet at work. No new pain/sx since last session. No change in the \"cyst\" on L foot since last session. No new sx/complaints. Notes she did not hear back from DPM yet. Pt continues to note the pain in the area of the \"lump\" L foot was present/the same as before the lump formed.       Objective: See treatment diary below      Assessment: Tolerated treatment well. Continue with modified session 2* pt's L forefoot pain and \"lump\" in the area; \"lump\" is noted to be unchanged from last visit; no ttp of the lump; no noted redness/bruising, no diffuse swelling, no deformity etc. Limited WB and resisted there ex this date. Tolerated modified session well this date. No tenderness/pain of plantar fascia R/L with MT; cont with noted tenderness in area of L PTT > R. Min soreness medial foot/ankle after session; no change in L foot pain per pt. PT advised pt to cont to limit excessive stress/WB activities L foot until f/u with DPM. Pt was encouraged to call DPM's office to schedule an appt; understanding noted. Patient demonstrated fatigue post treatment and would benefit from continued PT      Plan: Continue per plan of care.  Progress treatment as tolerated.       Precautions: see chart       Re-eval Date:      Date 3/26/2025  4/3 4/8 4/10    Visit Count 1 2 3 4    FOTO 3/26/2025        Pain In See IE       Pain Out See IE         Consent reviewed and signed 4/3/25  Manuals 3/26/2025  4/3 4/8 4/10    GIASTM L calf, PF, Achilles   GIASTM L calf, PF, Achilles #4 scanning, fanning, and sweeping 10' GIASTM L calf, PF, Achilles #4 scanning, fanning, and sweeping " "10' GIASTM L calf, PF, Achilles #4 scanning, fanning, and sweeping, TFM PF insertion and PTT R/L as hudson 15'    Heel cord stretch as hudson, AP TC mobs, RF, FF mobs L as hudson                         Neuro Re-Ed        Foot intrinsic training:    Towel curls          20x/3-5\"       20x/5\"        20x/5\"    Toe yoga        Toe splaying   20x/3-5\"        20x/3-5\" 20x/5\" ea         20x/5\" 20xea 5\"        20x/5\"    Perturbation training                                         Ther Ex        Calf stretch KF and KE      PF stretch with towel  Seated 4x30\" ea R/L ; pt edu regarding night splint use  Incline 4x30\" Incline 4x30\" Incline 4x30\"    Great toe extension ROM       Hrs double-->single   4x30\"          20x/3-5\" Reviewed HEP         HELD  4x30\" ea           NP    HR with ball between heels       Ankle tband 4 way           Grn 2x10/3-5\"           HELD          NP    PF with peroneal bias   Tband                 Great toe flex with tband                         Ther Activity pt education regarding pathophysiology/pathoanatomy of present pain/sx and condition, role of PT in improving pain/sx and function                       Gait Training                        Modalities                              3/26/2025  - HEP was issued and reviewed this date for above noted exercises. Pt demonstrated understanding without incident and without questions/concerns. Will continue to update upcoming.              "

## 2025-04-10 NOTE — TELEPHONE ENCOUNTER
"----- Message from Deo Lopez DPM sent at 4/10/2025  8:14 AM EDT -----  Would  you mind getting her in for an appt, we can probably get her in Monday on the 14th?      Ronel.... Caity is on it  ----- Message -----  From: Gina Marie McArdle, PT  Sent: 4/10/2025   7:14 AM EDT  To: Deo Lopez DPM    Thank you Dr. Lopez,     She is here now and has not heard back yet from your office. Should she call for an appointment? She is saying it is the same as Tuesday..no changes.   Thank you!    Ronel  ----- Message -----  From: Deo Lopez DPM  Sent: 4/9/2025  10:00 AM EDT  To: Gina Marie McArdle, PT    Ronel, thank you for reaching out.  I am going to try to get her in for evaluation hopefully next Monday.  But looking at the image it is possible it may be a random ganglion cyst that she has had created, I may need to aspirate it versus get an MRI.  But if it is not painful it also might just go away.  I do not really think that she has any restrictions for physical therapy.    Thanks again, Rasheed  ----- Message -----  From: Gina Marie McArdle, PT  Sent: 4/8/2025   7:55 AM EDT  To: KIMO Delarosa Dr.,     I just saw Amparo today in PT. She also said she messaged you, but I figured I would chime in as well. She showed me that she has a \"lump\" lateral aspect of L foot just above the mid shaft region of the 5th MET. No specific trauma, but she noticed it the night after/morning after last PT visit. I did not treat her that visit, but I reviewed the note and the exercises were pretty benign with nothing aggressive and mostly NWB exercises. She also noted no increased pain/sx during that session or following. She notes it seemed to \"just pop up\". The lump is not tender to palpation. No bruising or swelling in the area. No \"pop/crack\" per pt. She notes no new pain since this developed and notes the lump is in the same area of where she has been having pain already. More " tender with WB and with the pressure of her shoe on the area. She is questioning if it could be a cyst (this is kind of what it looks like)? She sent you a picture. We kept things very basic in PT today and she had no worsening of sx. She is scheduled again for PT Thursday, but I wanted to give you a chance to review all this and see what you think before we continue. I am perplexed seema    Thank you!      Ronel

## 2025-04-13 ENCOUNTER — APPOINTMENT (EMERGENCY)
Dept: CT IMAGING | Facility: HOSPITAL | Age: 50
End: 2025-04-13
Payer: COMMERCIAL

## 2025-04-13 ENCOUNTER — APPOINTMENT (INPATIENT)
Dept: RADIOLOGY | Facility: HOSPITAL | Age: 50
End: 2025-04-13
Payer: COMMERCIAL

## 2025-04-13 ENCOUNTER — HOSPITAL ENCOUNTER (INPATIENT)
Facility: HOSPITAL | Age: 50
LOS: 4 days | Discharge: HOME | End: 2025-04-17
Attending: FAMILY MEDICINE | Admitting: INTERNAL MEDICINE
Payer: COMMERCIAL

## 2025-04-13 DIAGNOSIS — R11.2 NAUSEA AND VOMITING: ICD-10-CM

## 2025-04-13 DIAGNOSIS — K56.609 SMALL BOWEL OBSTRUCTION (HCC): Primary | ICD-10-CM

## 2025-04-13 DIAGNOSIS — K83.8 COMMON BILE DUCT DILATATION: ICD-10-CM

## 2025-04-13 PROBLEM — E66.01 SEVERE OBESITY (HCC): Status: ACTIVE | Noted: 2022-01-28

## 2025-04-13 PROBLEM — R73.03 PREDIABETES: Status: ACTIVE | Noted: 2022-01-26

## 2025-04-13 PROBLEM — N83.201 RIGHT OVARIAN CYST: Status: ACTIVE | Noted: 2024-01-27

## 2025-04-13 PROBLEM — G25.81 RLS (RESTLESS LEGS SYNDROME): Status: ACTIVE | Noted: 2018-04-10

## 2025-04-13 PROBLEM — K29.30 CHRONIC SUPERFICIAL GASTRITIS WITHOUT BLEEDING: Status: ACTIVE | Noted: 2021-01-27

## 2025-04-13 PROBLEM — G47.33 OSA (OBSTRUCTIVE SLEEP APNEA): Status: ACTIVE | Noted: 2023-09-29

## 2025-04-13 PROBLEM — M79.7 FIBROMYALGIA: Chronic | Status: ACTIVE | Noted: 2024-06-07

## 2025-04-13 PROBLEM — J45.21 MILD INTERMITTENT ASTHMA WITH ACUTE EXACERBATION: Status: ACTIVE | Noted: 2023-04-13

## 2025-04-13 PROBLEM — Z96.651 STATUS POST REVISION OF TOTAL REPLACEMENT OF RIGHT KNEE: Status: ACTIVE | Noted: 2023-05-01

## 2025-04-13 PROBLEM — T14.91XA SUICIDE ATTEMPT (HCC): Status: RESOLVED | Noted: 2024-02-29 | Resolved: 2025-04-13

## 2025-04-13 PROBLEM — G43.009 MIGRAINE WITHOUT AURA AND WITHOUT STATUS MIGRAINOSUS, NOT INTRACTABLE: Status: RESOLVED | Noted: 2018-07-26 | Resolved: 2025-04-13

## 2025-04-13 PROBLEM — J45.21 MILD INTERMITTENT ASTHMA WITH ACUTE EXACERBATION: Status: RESOLVED | Noted: 2023-04-13 | Resolved: 2025-04-13

## 2025-04-13 PROBLEM — F31.4 BIPOLAR 1 DISORDER, DEPRESSED, SEVERE (HCC): Status: RESOLVED | Noted: 2019-07-24 | Resolved: 2025-04-13

## 2025-04-13 PROBLEM — L82.1 SEBORRHEIC KERATOSIS: Status: ACTIVE | Noted: 2019-07-31

## 2025-04-13 PROBLEM — R06.83 SNORING: Status: ACTIVE | Noted: 2023-07-12

## 2025-04-13 PROBLEM — R00.1 SYMPTOMATIC BRADYCARDIA: Status: RESOLVED | Noted: 2020-07-31 | Resolved: 2025-04-13

## 2025-04-13 PROBLEM — K58.2 IRRITABLE BOWEL SYNDROME WITH BOTH CONSTIPATION AND DIARRHEA: Status: ACTIVE | Noted: 2019-07-31

## 2025-04-13 PROBLEM — N32.81 OAB (OVERACTIVE BLADDER): Status: ACTIVE | Noted: 2024-05-20

## 2025-04-13 PROBLEM — M62.89 PELVIC FLOOR DYSFUNCTION: Status: ACTIVE | Noted: 2020-07-20

## 2025-04-13 PROBLEM — R20.9 SENSATION OF COLD IN LOWER EXTREMITY: Status: RESOLVED | Noted: 2018-10-04 | Resolved: 2025-04-13

## 2025-04-13 LAB
ALBUMIN SERPL BCG-MCNC: 4.8 G/DL (ref 3.5–5)
ALP SERPL-CCNC: 100 U/L (ref 34–104)
ALT SERPL W P-5'-P-CCNC: 21 U/L (ref 7–52)
ANION GAP SERPL CALCULATED.3IONS-SCNC: 10 MMOL/L (ref 4–13)
AST SERPL W P-5'-P-CCNC: 20 U/L (ref 13–39)
BASOPHILS # BLD AUTO: 0.12 THOUSANDS/ÂΜL (ref 0–0.1)
BASOPHILS NFR BLD AUTO: 1 % (ref 0–1)
BILIRUB SERPL-MCNC: 0.25 MG/DL (ref 0.2–1)
BUN SERPL-MCNC: 14 MG/DL (ref 5–25)
CALCIUM SERPL-MCNC: 9.7 MG/DL (ref 8.4–10.2)
CHLORIDE SERPL-SCNC: 104 MMOL/L (ref 96–108)
CO2 SERPL-SCNC: 28 MMOL/L (ref 21–32)
CREAT SERPL-MCNC: 0.79 MG/DL (ref 0.6–1.3)
EOSINOPHIL # BLD AUTO: 0.3 THOUSAND/ÂΜL (ref 0–0.61)
EOSINOPHIL NFR BLD AUTO: 3 % (ref 0–6)
ERYTHROCYTE [DISTWIDTH] IN BLOOD BY AUTOMATED COUNT: 14.6 % (ref 11.6–15.1)
GFR SERPL CREATININE-BSD FRML MDRD: 89 ML/MIN/1.73SQ M
GLUCOSE SERPL-MCNC: 96 MG/DL (ref 65–140)
HCG SERPL QL: NEGATIVE
HCT VFR BLD AUTO: 45.4 % (ref 34.8–46.1)
HGB BLD-MCNC: 14.4 G/DL (ref 11.5–15.4)
IMM GRANULOCYTES # BLD AUTO: 0.16 THOUSAND/UL (ref 0–0.2)
IMM GRANULOCYTES NFR BLD AUTO: 2 % (ref 0–2)
LACTATE SERPL-SCNC: 1.3 MMOL/L (ref 0.5–2)
LIPASE SERPL-CCNC: 20 U/L (ref 11–82)
LYMPHOCYTES # BLD AUTO: 2.73 THOUSANDS/ÂΜL (ref 0.6–4.47)
LYMPHOCYTES NFR BLD AUTO: 27 % (ref 14–44)
MCH RBC QN AUTO: 28 PG (ref 26.8–34.3)
MCHC RBC AUTO-ENTMCNC: 31.7 G/DL (ref 31.4–37.4)
MCV RBC AUTO: 88 FL (ref 82–98)
MONOCYTES # BLD AUTO: 0.68 THOUSAND/ÂΜL (ref 0.17–1.22)
MONOCYTES NFR BLD AUTO: 7 % (ref 4–12)
NEUTROPHILS # BLD AUTO: 6 THOUSANDS/ÂΜL (ref 1.85–7.62)
NEUTS SEG NFR BLD AUTO: 60 % (ref 43–75)
NRBC BLD AUTO-RTO: 0 /100 WBCS
PLATELET # BLD AUTO: 366 THOUSANDS/UL (ref 149–390)
PMV BLD AUTO: 9.8 FL (ref 8.9–12.7)
POTASSIUM SERPL-SCNC: 3.7 MMOL/L (ref 3.5–5.3)
PROT SERPL-MCNC: 8.3 G/DL (ref 6.4–8.4)
RBC # BLD AUTO: 5.15 MILLION/UL (ref 3.81–5.12)
SODIUM SERPL-SCNC: 142 MMOL/L (ref 135–147)
WBC # BLD AUTO: 9.99 THOUSAND/UL (ref 4.31–10.16)

## 2025-04-13 PROCEDURE — 85025 COMPLETE CBC W/AUTO DIFF WBC: CPT | Performed by: PHYSICIAN ASSISTANT

## 2025-04-13 PROCEDURE — 96375 TX/PRO/DX INJ NEW DRUG ADDON: CPT

## 2025-04-13 PROCEDURE — 96374 THER/PROPH/DIAG INJ IV PUSH: CPT

## 2025-04-13 PROCEDURE — 99285 EMERGENCY DEPT VISIT HI MDM: CPT | Performed by: PHYSICIAN ASSISTANT

## 2025-04-13 PROCEDURE — 96361 HYDRATE IV INFUSION ADD-ON: CPT

## 2025-04-13 PROCEDURE — 84703 CHORIONIC GONADOTROPIN ASSAY: CPT | Performed by: PHYSICIAN ASSISTANT

## 2025-04-13 PROCEDURE — 83690 ASSAY OF LIPASE: CPT | Performed by: PHYSICIAN ASSISTANT

## 2025-04-13 PROCEDURE — 74177 CT ABD & PELVIS W/CONTRAST: CPT

## 2025-04-13 PROCEDURE — 96376 TX/PRO/DX INJ SAME DRUG ADON: CPT

## 2025-04-13 PROCEDURE — 83605 ASSAY OF LACTIC ACID: CPT | Performed by: PHYSICIAN ASSISTANT

## 2025-04-13 PROCEDURE — 36415 COLL VENOUS BLD VENIPUNCTURE: CPT | Performed by: PHYSICIAN ASSISTANT

## 2025-04-13 PROCEDURE — 80053 COMPREHEN METABOLIC PANEL: CPT | Performed by: PHYSICIAN ASSISTANT

## 2025-04-13 PROCEDURE — 74018 RADEX ABDOMEN 1 VIEW: CPT

## 2025-04-13 PROCEDURE — 99284 EMERGENCY DEPT VISIT MOD MDM: CPT

## 2025-04-13 RX ORDER — HYDROMORPHONE HCL/PF 1 MG/ML
0.5 SYRINGE (ML) INJECTION EVERY 4 HOURS PRN
Status: DISCONTINUED | OUTPATIENT
Start: 2025-04-13 | End: 2025-04-15

## 2025-04-13 RX ORDER — PRAZOSIN HYDROCHLORIDE 1 MG/1
5 CAPSULE ORAL DAILY
Status: DISCONTINUED | OUTPATIENT
Start: 2025-04-14 | End: 2025-04-17 | Stop reason: HOSPADM

## 2025-04-13 RX ORDER — HYDROMORPHONE HCL/PF 1 MG/ML
0.5 SYRINGE (ML) INJECTION ONCE
Status: COMPLETED | OUTPATIENT
Start: 2025-04-13 | End: 2025-04-13

## 2025-04-13 RX ORDER — SODIUM CHLORIDE 9 MG/ML
125 INJECTION, SOLUTION INTRAVENOUS CONTINUOUS
Status: DISCONTINUED | OUTPATIENT
Start: 2025-04-13 | End: 2025-04-15

## 2025-04-13 RX ORDER — ONDANSETRON 2 MG/ML
4 INJECTION INTRAMUSCULAR; INTRAVENOUS EVERY 6 HOURS PRN
Status: DISCONTINUED | OUTPATIENT
Start: 2025-04-13 | End: 2025-04-17 | Stop reason: HOSPADM

## 2025-04-13 RX ORDER — HYDROXYCHLOROQUINE SULFATE 200 MG/1
200 TABLET, FILM COATED ORAL 2 TIMES DAILY
Status: DISCONTINUED | OUTPATIENT
Start: 2025-04-13 | End: 2025-04-17 | Stop reason: HOSPADM

## 2025-04-13 RX ORDER — ACETAMINOPHEN 10 MG/ML
1000 INJECTION, SOLUTION INTRAVENOUS EVERY 6 HOURS PRN
Status: DISCONTINUED | OUTPATIENT
Start: 2025-04-13 | End: 2025-04-14

## 2025-04-13 RX ORDER — ALBUTEROL SULFATE 90 UG/1
2 INHALANT RESPIRATORY (INHALATION) EVERY 6 HOURS PRN
Status: DISCONTINUED | OUTPATIENT
Start: 2025-04-13 | End: 2025-04-17 | Stop reason: HOSPADM

## 2025-04-13 RX ORDER — ONDANSETRON 2 MG/ML
4 INJECTION INTRAMUSCULAR; INTRAVENOUS ONCE
Status: COMPLETED | OUTPATIENT
Start: 2025-04-13 | End: 2025-04-13

## 2025-04-13 RX ORDER — HYDROMORPHONE HCL/PF 1 MG/ML
1 SYRINGE (ML) INJECTION ONCE
Refills: 0 | Status: COMPLETED | OUTPATIENT
Start: 2025-04-13 | End: 2025-04-13

## 2025-04-13 RX ORDER — DULOXETIN HYDROCHLORIDE 60 MG/1
60 CAPSULE, DELAYED RELEASE ORAL DAILY
Status: DISCONTINUED | OUTPATIENT
Start: 2025-04-14 | End: 2025-04-17 | Stop reason: HOSPADM

## 2025-04-13 RX ORDER — QUETIAPINE FUMARATE 100 MG/1
100 TABLET, FILM COATED ORAL
Status: DISCONTINUED | OUTPATIENT
Start: 2025-04-13 | End: 2025-04-17 | Stop reason: HOSPADM

## 2025-04-13 RX ADMIN — HYDROMORPHONE HYDROCHLORIDE 0.5 MG: 1 INJECTION, SOLUTION INTRAMUSCULAR; INTRAVENOUS; SUBCUTANEOUS at 21:53

## 2025-04-13 RX ADMIN — TRIMETHOBENZAMIDE HYDROCHLORIDE 200 MG: 100 INJECTION INTRAMUSCULAR at 23:03

## 2025-04-13 RX ADMIN — SODIUM CHLORIDE 125 ML/HR: 0.9 INJECTION, SOLUTION INTRAVENOUS at 21:09

## 2025-04-13 RX ADMIN — HYDROMORPHONE HYDROCHLORIDE 0.5 MG: 1 INJECTION, SOLUTION INTRAMUSCULAR; INTRAVENOUS; SUBCUTANEOUS at 18:46

## 2025-04-13 RX ADMIN — ONDANSETRON 4 MG: 2 INJECTION INTRAMUSCULAR; INTRAVENOUS at 16:55

## 2025-04-13 RX ADMIN — ONDANSETRON 4 MG: 2 INJECTION INTRAMUSCULAR; INTRAVENOUS at 21:09

## 2025-04-13 RX ADMIN — HYDROMORPHONE HYDROCHLORIDE 1 MG: 1 INJECTION, SOLUTION INTRAMUSCULAR; INTRAVENOUS; SUBCUTANEOUS at 16:55

## 2025-04-13 RX ADMIN — ONDANSETRON 4 MG: 2 INJECTION INTRAMUSCULAR; INTRAVENOUS at 18:51

## 2025-04-13 RX ADMIN — SODIUM CHLORIDE 1000 ML: 0.9 INJECTION, SOLUTION INTRAVENOUS at 16:55

## 2025-04-13 RX ADMIN — IOHEXOL 100 ML: 350 INJECTION, SOLUTION INTRAVENOUS at 17:59

## 2025-04-13 NOTE — ED PROVIDER NOTES
"Time reflects when diagnosis was documented in both MDM as applicable and the Disposition within this note       Time User Action Codes Description Comment    4/13/2025  8:01 PM Jacque Kirk [K56.609] Small bowel obstruction (HCC)     4/13/2025  8:01 PM Jacque Kirk Add [R11.2] Nausea and vomiting     4/13/2025  8:02 PM Jacque Kirk [K83.8] Common bile duct dilatation           ED Disposition       ED Disposition   Admit    Condition   Stable    Date/Time   Sun Apr 13, 2025  8:17 PM    Comment   Case was discussed with Internal Medicine and Surgery and the patient's admission status was agreed to be Admission Status: inpatient status to the service of Dr. Black.               Assessment & Plan       Medical Decision Making  Patient is a 49-year-old female with a PMHx GERD, HTN, migraines, sleep apnea and small bowel obstruction, presenting to the ED for evaluation of central abdominal pain, nausea and vomiting x4-5 hours.     DDx including but not limited to: bowel obstruction, volvulus, ileus, choledocholithiasis, appendicitis, gastroenteritis, gastritis, PUD, pancreatitis, internal hernia.    Patient's labs are unremarkable.  CT abdomen/pelvis shows \"Evidence of high-grade small bowel obstruction with mesenteric edema involving most likely the mid jejunum. Adhesion versus internal hernia is possible though not much mesenteric twisting is seen. Transition point appears to be in the mid abdomen with fecalization present but no pneumatosis.\"  Patient continues to actively vomit after 2 doses of Zofran, will place NG tube.  Discussed with general surgery who agree with NG tube placement and admission. Patient admitted for continued management.    Amount and/or Complexity of Data Reviewed  Labs: ordered.  Radiology: ordered.  Discussion of management or test interpretation with external provider(s): Dr. Thomas (surgery)    Risk  Prescription drug management.  Decision regarding " hospitalization.        ED Course as of 04/13/25 2024   Sun Apr 13, 2025   1707 Patient had a drop in O2 after Dilaudid was administered and was placed on nasal canula with improvement. Patient evaluated at bedside and denies any shortness of breath or chest pain.        Medications   ondansetron (ZOFRAN) injection 4 mg (4 mg Intravenous Given 4/13/25 1655)   HYDROmorphone (DILAUDID) injection 1 mg (1 mg Intravenous Given 4/13/25 1655)   sodium chloride 0.9 % bolus 1,000 mL (1,000 mL Intravenous New Bag 4/13/25 1655)   iohexol (OMNIPAQUE) 350 MG/ML injection (MULTI-DOSE) 100 mL (100 mL Intravenous Given 4/13/25 1759)   HYDROmorphone (DILAUDID) injection 0.5 mg (0.5 mg Intravenous Given 4/13/25 1846)   ondansetron (ZOFRAN) injection 4 mg (4 mg Intravenous Given 4/13/25 1851)       ED Risk Strat Scores                    No data recorded        SBIRT 22yo+      Flowsheet Row Most Recent Value   Initial Alcohol Screen: US AUDIT-C     1. How often do you have a drink containing alcohol? 0 Filed at: 04/13/2025 1614   2. How many drinks containing alcohol do you have on a typical day you are drinking?  0 Filed at: 04/13/2025 1614   3a. Male UNDER 65: How often do you have five or more drinks on one occasion? 0 Filed at: 04/13/2025 1614   3b. FEMALE Any Age, or MALE 65+: How often do you have 4 or more drinks on one occassion? 0 Filed at: 04/13/2025 1614   Audit-C Score 0 Filed at: 04/13/2025 1614   NARCISA: How many times in the past year have you...    Used an illegal drug or used a prescription medication for non-medical reasons? Never Filed at: 04/13/2025 1614                            History of Present Illness       Chief Complaint   Patient presents with    Abdominal Pain     Pt reports abdominal pain that started around noon; concerned for bowel obstruction as hx of multiple        Past Medical History:   Diagnosis Date    Anxiety     Arthritis     Bipolar 1 disorder (HCC)     with bordeline personality    Borderline  personality disorder (HCC)     Chronic headaches     Depression     GERD (gastroesophageal reflux disease)     Hypertension     Incontinence     Migraine     Morbid obesity with BMI of 40.0-44.9, adult (HCC)     Sleep apnea     Small bowel obstruction ([facility]) 06/27/2019    Suicide attempt ([facility]) 2/29/2024    Symptomatic bradycardia     Urinary tract infection       Past Surgical History:   Procedure Laterality Date    CHOLECYSTECTOMY  05/24/2018    DENTAL SURGERY      ELBOW SURGERY      EXPLORATORY LAPAROTOMY      exlap    FOOT SURGERY Right     KNEE ARTHROSCOPY Right 11/15/2018    total of three procedures with meniscal surgery    MULTIPLE TOOTH EXTRACTIONS  11/02/2018    OVARIAN CYST REMOVAL      DE TNOT ELBOW LATERAL/MEDIAL DEBRIDE OPEN TDN RPR Left 3/1/2024    Procedure: REPAIR TENDON FOREARM, elbow common flexor tendon, excision of bone fragment and all necessary procedures;  Surgeon: June Charles DO;  Location:  MAIN OR;  Service: Orthopedics    REDUCTION MAMMAPLASTY      reduction     SINUS SURGERY Bilateral 04/19/2019    bilateral maxillary antrostomies - Dr. Barajas - [facility]    TOTAL KNEE ARTHROPLASTY REVISION Right 2023      Family History   Problem Relation Age of Onset    Hyperlipidemia Father     Hypertension Father     Migraines Mother     Depression Family     Heart disease Maternal Aunt     Heart disease Maternal Uncle     Diabetes Paternal Aunt     Diabetes Paternal Uncle     Breast cancer Maternal Grandmother     Diabetes Paternal Grandmother     Diabetes Paternal Grandfather     Stroke Neg Hx     Thyroid disease Neg Hx       Social History     Tobacco Use    Smoking status: Never    Smokeless tobacco: Never   Vaping Use    Vaping status: Never Used   Substance Use Topics    Alcohol use: Yes     Comment: rare    Drug use: Never      E-Cigarette/Vaping    E-Cigarette Use Never User       E-Cigarette/Vaping Substances    Nicotine No     THC No     CBD No     Flavoring No     Other No     Unknown No        I have reviewed and agree with the history as documented.     Patient is a 49-year-old female with a PMHx GERD, HTN, migraines, sleep apnea and small bowel obstruction, presenting to the ED for evaluation of central abdominal pain, nausea and vomiting x4-5 hours.  Patient states that she developed central abdominal pain today around noon that has been progressively worsening ever since.  She reports associated nausea and had 1 episode of nonbloody/nonbilious vomiting.  She also reports associated abdominal bloating. She states that she had a normal bowel movement yesterday but has not had a bowel movement today and has not been passing any gas. She denies any fevers, chills, chest pain, SOB, flank pain or urinary symptoms.  Prior abdominal surgeries including laparoscopic cholecystectomy and a laparotomy for removal of foreign body.  Patient reports a history of prior bowel obstructions that have been managed conservatively.        Review of Systems   Constitutional:  Negative for chills and fever.   HENT:  Negative for congestion, rhinorrhea and sore throat.    Eyes:  Negative for visual disturbance.   Respiratory:  Negative for cough and shortness of breath.    Cardiovascular:  Negative for chest pain, palpitations and leg swelling.   Gastrointestinal:  Positive for abdominal distention, abdominal pain, nausea and vomiting. Negative for constipation and diarrhea.   Genitourinary:  Negative for dysuria, flank pain, frequency and hematuria.   Musculoskeletal:  Negative for back pain and neck pain.   Skin:  Negative for rash.   Neurological:  Negative for dizziness, syncope, weakness and headaches.   All other systems reviewed and are negative.          Objective       ED Triage Vitals   Temperature Pulse Blood Pressure Respirations SpO2 Patient Position - Orthostatic VS   04/13/25 1615 04/13/25 1615 04/13/25 1615 04/13/25 1615 04/13/25 1615 04/13/25 1615   98 °F (36.7 °C) 85 (!) 175/107 18 97 % Sitting      Temp  src Heart Rate Source BP Location FiO2 (%) Pain Score    -- 04/13/25 1645 04/13/25 1615 -- 04/13/25 1614     Monitor Left arm  7      Vitals      Date and Time Temp Pulse SpO2 Resp BP Pain Score FACES Pain Rating User   04/13/25 1846 -- -- -- -- -- 10 - Worst Possible Pain -- AS   04/13/25 1845 -- 85 97 % -- -- 10 - Worst Possible Pain -- AS   04/13/25 1800 -- -- -- -- 170/90 -- -- AS   04/13/25 1745 -- 82 96 % 16 166/79 4 -- AS   04/13/25 1715 -- -- -- -- -- 4 -- AS   04/13/25 1709 -- 83 94 % 15 185/98 -- -- RTM   04/13/25 1707 -- 85 87 % -- -- -- -- RTM   04/13/25 1706 -- 78 86 % -- -- -- -- RTM   04/13/25 1705 -- 79 87 % -- -- -- -- RTM   04/13/25 1655 -- -- -- -- -- 8 -- AS   04/13/25 1645 -- 81 95 % 16 172/95 -- -- AS   04/13/25 1615 98 °F (36.7 °C) 85 97 % 18 175/107 -- -- TAB   04/13/25 1614 -- -- -- -- -- 7 -- TAB            Physical Exam  Vitals and nursing note reviewed.   Constitutional:       General: She is awake.      Appearance: Normal appearance. She is well-developed. She is not toxic-appearing or diaphoretic.   HENT:      Head: Normocephalic and atraumatic.      Right Ear: External ear normal.      Left Ear: External ear normal.      Nose: Nose normal.      Mouth/Throat:      Lips: Pink.      Mouth: Mucous membranes are moist.   Eyes:      General: Lids are normal. No scleral icterus.     Conjunctiva/sclera: Conjunctivae normal.      Pupils: Pupils are equal, round, and reactive to light.   Cardiovascular:      Rate and Rhythm: Normal rate and regular rhythm.      Pulses: Normal pulses.           Radial pulses are 2+ on the right side and 2+ on the left side.      Heart sounds: Normal heart sounds, S1 normal and S2 normal.   Pulmonary:      Effort: Pulmonary effort is normal. No accessory muscle usage.      Breath sounds: Normal breath sounds. No stridor. No decreased breath sounds, wheezing, rhonchi or rales.   Abdominal:      General: Abdomen is flat. There is distension.      Palpations: Abdomen  is soft.      Tenderness: There is generalized abdominal tenderness and tenderness in the periumbilical area. There is no right CVA tenderness, left CVA tenderness, guarding or rebound.   Musculoskeletal:      Cervical back: Full passive range of motion without pain and neck supple. No signs of trauma. No pain with movement.      Right lower leg: No edema.      Left lower leg: No edema.   Lymphadenopathy:      Cervical: No cervical adenopathy.   Skin:     General: Skin is warm and dry.      Capillary Refill: Capillary refill takes less than 2 seconds.      Coloration: Skin is not cyanotic, jaundiced or pale.   Neurological:      Mental Status: She is alert and oriented to person, place, and time.      GCS: GCS eye subscore is 4. GCS verbal subscore is 5. GCS motor subscore is 6.      Gait: Gait normal.   Psychiatric:         Mood and Affect: Mood normal.         Speech: Speech normal.         Behavior: Behavior is cooperative.         Results Reviewed       Procedure Component Value Units Date/Time    hCG, qualitative pregnancy [[id number]]  (Normal) Collected: 04/13/25 1641    Lab Status: Final result Specimen: Blood from Arm, Right Updated: 04/13/25 1711     Preg, Serum Negative    Comprehensive metabolic panel [[id number]] Collected: 04/13/25 1641    Lab Status: Final result Specimen: Blood from Arm, Right Updated: 04/13/25 1708     Sodium 142 mmol/L      Potassium 3.7 mmol/L      Chloride 104 mmol/L      CO2 28 mmol/L      ANION GAP 10 mmol/L      BUN 14 mg/dL      Creatinine 0.79 mg/dL      Glucose 96 mg/dL      Calcium 9.7 mg/dL      AST 20 U/L      ALT 21 U/L      Alkaline Phosphatase 100 U/L      Total Protein 8.3 g/dL      Albumin 4.8 g/dL      Total Bilirubin 0.25 mg/dL      eGFR 89 ml/min/1.73sq m     Narrative:      National Kid[employer] guidelines for Chronic Kidney Disease (CKD):     Stage 1 with normal or high GFR (GFR > 90 mL/min/1.73 square meters)    Stage 2 Mild CKD (GFR = 60-89  mL/min/1.73 square meters)    Stage 3A Moderate CKD (GFR = 45-59 mL/min/1.73 square meters)    Stage 3B Moderate CKD (GFR = 30-44 mL/min/1.73 square meters)    Stage 4 Severe CKD (GFR = 15-29 mL/min/1.73 square meters)    Stage 5 End Stage CKD (GFR <15 mL/min/1.73 square meters)  Note: GFR calculation is accurate only with a steady state creatinine    Lipase [[id number]]  (Normal) Collected: 04/13/25 1641    Lab Status: Final result Specimen: Blood from Arm, Right Updated: 04/13/25 1708     Lipase 20 u/L     Lactic acid, plasma (w/reflex if result > 2.0) [[id number]]  (Normal) Collected: 04/13/25 1641    Lab Status: Final result Specimen: Blood from Arm, Right Updated: 04/13/25 1707     LACTIC ACID 1.3 mmol/L     Narrative:      Result may be elevated if tourniquet was used during collection.    CBC and differential [[id number]]  (Abnormal) Collected: 04/13/25 1641    Lab Status: Final result Specimen: Blood from Arm, Right Updated: 04/13/25 1659     WBC 9.99 Thousand/uL      RBC 5.15 Million/uL      Hemoglobin 14.4 g/dL      Hematocrit 45.4 %      MCV 88 fL      MCH 28.0 pg      MCHC 31.7 g/dL      RDW 14.6 %      MPV 9.8 fL      Platelets 366 Thousands/uL      nRBC 0 /100 WBCs      Segmented % 60 %      Immature Grans % 2 %      Lymphocytes % 27 %      Monocytes % 7 %      Eosinophils Relative 3 %      Basophils Relative 1 %      Absolute Neutrophils 6.00 Thousands/µL      Absolute Immature Grans 0.16 Thousand/uL      Absolute Lymphocytes 2.73 Thousands/µL      Absolute Monocytes 0.68 Thousand/µL      Eosinophils Absolute 0.30 Thousand/µL      Basophils Absolute 0.12 Thousands/µL             CT abdomen pelvis with contrast   Final Interpretation by Marco Shoemaker MD (04/13 1956)      Evidence of high-grade small bowel obstruction with mesenteric edema involving most likely the mid jejunum. Adhesion versus internal hernia is possible though not much mesenteric twisting is seen. Transition point appears to be in the  mid abdomen with    fecalization present but no pneumatosis.   Surgical consultation is advised.      Common bile duct dilatation despite cholecystectomy should be correlated with LFTs. Follow-up ultrasound suggested when patient is no longer acute to see if this finding persists.      This was discussed with RAND Sargent at 754pm         Workstation performed: [id number]             Procedures    ED Medication and Procedure Management   Prior to Admission Medications   Prescriptions Last Dose Informant Patient Reported? Taking?   ALPRAZolam (XANAX) 0.25 mg tablet   No No   Sig: Take 2 tablets (0.5 mg total) by mouth 4 (four) times a day as needed for anxiety for up to 8 days   ALPRAZolam (XANAX) 0.5 mg tablet  Self Yes No   DULoxetine (CYMBALTA) 30 mg delayed release capsule  Self Yes No   Sig: daily   Patient not taking: Reported on 12/4/2024   DULoxetine (CYMBALTA) 60 mg delayed release capsule  Self Yes No   HYDROcodone-acetaminophen (Norco) 5-325 mg per tablet  Self No No   Sig: Take 1 tablet by mouth every 6 (six) hours as needed for pain for up to 10 doses Max Daily Amount: 4 tablets   Patient not taking: Reported on 11/2/2024   QUEtiapine (SEROquel) 100 mg tablet  Self Yes No   Sig: Take 100 mg by mouth   RABEprazole (ACIPHEX) 20 MG tablet  Self Yes No   Sig: Take 20 mg by mouth daily   albuterol (ProAir HFA) 90 mcg/act inhaler  Self No No   Sig: Inhale 2 puffs every 6 (six) hours as needed for wheezing   Patient not taking: Reported on 12/4/2024   albuterol (ProAir HFA) 90 mcg/act inhaler  Self No No   Sig: Inhale 2 puffs every 6 (six) hours as needed for wheezing   benzonatate (TESSALON) 200 MG capsule  Self No No   Sig: Take 1 capsule (200 mg total) by mouth 3 (three) times a day as needed for cough   cyclobenzaprine (FLEXERIL) 5 mg tablet  Self No No   Sig: Take 1 tablet (5 mg total) by mouth 3 (three) times a day as needed for muscle spasms   famotidine (PEPCID) 40 MG tablet  Self Yes No   Sig:  Take 40 mg by mouth 2 (two) times a day bedtime   gabapentin (NEURONTIN) 100 mg capsule  Self Yes No   Sig: Take 100 mg by mouth daily   hydroxychloroquine (PLAQUENIL) 200 mg tablet  Self Yes No   Sig: Take 200 mg by mouth 2 (two) times a day   ibuprofen (MOTRIN) 600 mg tablet  Self No No   Sig: Take 1 tablet (600 mg total) by mouth every 8 (eight) hours as needed for mild pain   meloxicam (Mobic) 15 mg tablet  Self No No   Sig: Take 1 tablet (15 mg total) by mouth daily   ondansetron (ZOFRAN-ODT) 8 mg disintegrating tablet  Self Yes No   Patient not taking: Reported on 2025   oxyCODONE (ROXICODONE) 5 immediate release tablet  Self Yes No   Patient not taking: Reported on 2024   prazosin (MINIPRESS) 2 mg capsule  Self Yes No   Si mg daily at bedtime   Patient not taking: Reported on 2024   prazosin (MINIPRESS) 5 mg capsule  Self Yes No   predniSONE 10 mg tablet  Self No No   Sig: Take 3 tablets BID x 2 days, Take 2 tablets BID x 2 days, Take 1 tablet BID x 2 days, Take 1 tablet daily x 2 days   predniSONE 20 mg tablet  Self No No   Sig: 3 tabs daily x 4 days, 2 tabs daily x 3 days, 1 tab daily x 3 days   Patient not taking: Reported on 2024   valACYclovir (VALTREX) 500 mg tablet  Self Yes No   Sig: Take 500 mg by mouth daily      Facility-Administered Medications: None     Patient's Medications   Discharge Prescriptions    No medications on file     No discharge procedures on file.  ED SEPSIS DOCUMENTATION   Time reflects when diagnosis was documented in both MDM as applicable and the Disposition within this note       Time User Action Codes Description Comment    2025  8:01 PM Jacque Kirk [K56.609] Small bowel obstruction (HCC)     2025  8:01 PM Jacque Kirk [R11.2] Nausea and vomiting     2025  8:02 PM Jacque Kirk [K83.8] Common bile duct dilatation                  Jacque Kirk PA-C  25

## 2025-04-13 NOTE — LETTER
[facility]  [address] [place] [address]  No information on file.    April 17, 2025     Patient: Amparo Dobbs   YOB: 1975   Date of Visit: 4/13/2025       To Whom it May Concern:    Amparo Dobbs is under my professional care. She was seen in the hospital from 4/13/2025 to 04/17/25. She may return to work on 4/21/2025 without limitations.    If you have any questions or concerns, please don't hesitate to call.         Sincerely,          Howie Looney, DO

## 2025-04-13 NOTE — Clinical Note
Case was discussed with General Surgery and the patient's admission status was agreed to be Admission Status: inpatient status to the service of Dr. Sparks.

## 2025-04-14 ENCOUNTER — APPOINTMENT (INPATIENT)
Dept: RADIOLOGY | Facility: HOSPITAL | Age: 50
End: 2025-04-14
Payer: COMMERCIAL

## 2025-04-14 ENCOUNTER — APPOINTMENT (OUTPATIENT)
Dept: PHYSICAL THERAPY | Facility: CLINIC | Age: 50
End: 2025-04-14
Payer: COMMERCIAL

## 2025-04-14 LAB
GLUCOSE SERPL-MCNC: 82 MG/DL (ref 65–140)
GLUCOSE SERPL-MCNC: 91 MG/DL (ref 65–140)

## 2025-04-14 PROCEDURE — 71045 X-RAY EXAM CHEST 1 VIEW: CPT

## 2025-04-14 PROCEDURE — 82948 REAGENT STRIP/BLOOD GLUCOSE: CPT

## 2025-04-14 PROCEDURE — 99223 1ST HOSP IP/OBS HIGH 75: CPT | Performed by: INTERNAL MEDICINE

## 2025-04-14 PROCEDURE — 74018 RADEX ABDOMEN 1 VIEW: CPT

## 2025-04-14 PROCEDURE — 99254 IP/OBS CNSLTJ NEW/EST MOD 60: CPT | Performed by: SURGERY

## 2025-04-14 RX ORDER — ACETAMINOPHEN 10 MG/ML
1000 INJECTION, SOLUTION INTRAVENOUS EVERY 6 HOURS SCHEDULED
Status: DISCONTINUED | OUTPATIENT
Start: 2025-04-14 | End: 2025-04-17 | Stop reason: HOSPADM

## 2025-04-14 RX ORDER — KETOROLAC TROMETHAMINE 30 MG/ML
15 INJECTION, SOLUTION INTRAMUSCULAR; INTRAVENOUS ONCE
Status: COMPLETED | OUTPATIENT
Start: 2025-04-14 | End: 2025-04-14

## 2025-04-14 RX ORDER — FAMOTIDINE 10 MG/ML
20 INJECTION, SOLUTION INTRAVENOUS EVERY 12 HOURS SCHEDULED
Status: DISCONTINUED | OUTPATIENT
Start: 2025-04-14 | End: 2025-04-17 | Stop reason: HOSPADM

## 2025-04-14 RX ORDER — HYDROMORPHONE HCL IN WATER/PF 6 MG/30 ML
0.2 PATIENT CONTROLLED ANALGESIA SYRINGE INTRAVENOUS
Refills: 0 | Status: DISCONTINUED | OUTPATIENT
Start: 2025-04-14 | End: 2025-04-17 | Stop reason: HOSPADM

## 2025-04-14 RX ORDER — PANTOPRAZOLE SODIUM 40 MG/10ML
40 INJECTION, POWDER, LYOPHILIZED, FOR SOLUTION INTRAVENOUS
Status: DISCONTINUED | OUTPATIENT
Start: 2025-04-14 | End: 2025-04-17 | Stop reason: HOSPADM

## 2025-04-14 RX ADMIN — FAMOTIDINE 20 MG: 10 INJECTION, SOLUTION INTRAVENOUS at 08:41

## 2025-04-14 RX ADMIN — ONDANSETRON 4 MG: 2 INJECTION INTRAMUSCULAR; INTRAVENOUS at 09:11

## 2025-04-14 RX ADMIN — ONDANSETRON 4 MG: 2 INJECTION INTRAMUSCULAR; INTRAVENOUS at 03:09

## 2025-04-14 RX ADMIN — HYDROMORPHONE HYDROCHLORIDE 0.5 MG: 1 INJECTION, SOLUTION INTRAMUSCULAR; INTRAVENOUS; SUBCUTANEOUS at 07:43

## 2025-04-14 RX ADMIN — FAMOTIDINE 20 MG: 10 INJECTION, SOLUTION INTRAVENOUS at 20:59

## 2025-04-14 RX ADMIN — ACETAMINOPHEN 1000 MG: 10 INJECTION INTRAVENOUS at 11:43

## 2025-04-14 RX ADMIN — ACETAMINOPHEN 1000 MG: 10 INJECTION INTRAVENOUS at 17:34

## 2025-04-14 RX ADMIN — PHENOL 1 SPRAY: 1.5 LIQUID ORAL at 12:28

## 2025-04-14 RX ADMIN — HYDROMORPHONE HYDROCHLORIDE 0.2 MG: 0.2 INJECTION, SOLUTION INTRAMUSCULAR; INTRAVENOUS; SUBCUTANEOUS at 09:27

## 2025-04-14 RX ADMIN — HYDROMORPHONE HYDROCHLORIDE 0.5 MG: 1 INJECTION, SOLUTION INTRAMUSCULAR; INTRAVENOUS; SUBCUTANEOUS at 11:43

## 2025-04-14 RX ADMIN — KETOROLAC TROMETHAMINE 15 MG: 30 INJECTION, SOLUTION INTRAMUSCULAR at 23:10

## 2025-04-14 RX ADMIN — ACETAMINOPHEN 1000 MG: 10 INJECTION INTRAVENOUS at 04:56

## 2025-04-14 RX ADMIN — PANTOPRAZOLE SODIUM 40 MG: 40 INJECTION, POWDER, FOR SOLUTION INTRAVENOUS at 08:35

## 2025-04-14 RX ADMIN — ONDANSETRON 4 MG: 2 INJECTION INTRAMUSCULAR; INTRAVENOUS at 16:27

## 2025-04-14 RX ADMIN — HYDROMORPHONE HYDROCHLORIDE 0.5 MG: 1 INJECTION, SOLUTION INTRAMUSCULAR; INTRAVENOUS; SUBCUTANEOUS at 03:09

## 2025-04-14 RX ADMIN — HYDROMORPHONE HYDROCHLORIDE 0.2 MG: 0.2 INJECTION, SOLUTION INTRAMUSCULAR; INTRAVENOUS; SUBCUTANEOUS at 14:26

## 2025-04-14 RX ADMIN — KETOROLAC TROMETHAMINE 15 MG: 30 INJECTION, SOLUTION INTRAMUSCULAR at 17:34

## 2025-04-14 RX ADMIN — SODIUM CHLORIDE 125 ML/HR: 0.9 INJECTION, SOLUTION INTRAVENOUS at 14:26

## 2025-04-14 RX ADMIN — TRIMETHOBENZAMIDE HYDROCHLORIDE 200 MG: 100 INJECTION INTRAMUSCULAR at 04:55

## 2025-04-14 RX ADMIN — SODIUM CHLORIDE 125 ML/HR: 0.9 INJECTION, SOLUTION INTRAVENOUS at 06:40

## 2025-04-14 RX ADMIN — FAMOTIDINE 20 MG: 10 INJECTION, SOLUTION INTRAVENOUS at 01:53

## 2025-04-14 RX ADMIN — TRIMETHOBENZAMIDE HYDROCHLORIDE 200 MG: 100 INJECTION INTRAMUSCULAR at 21:00

## 2025-04-14 NOTE — H&P
"H&P - Hospitalist   Name: Amparo Dobbs 49 y.o. female I MRN: [id number]  Unit/Bed#: -01 I Date of Admission: 4/13/2025   Date of Service: 4/14/2025 I Hospital Day: 1     Assessment & Plan  Small bowel obstruction (HCC)  Admit to medicine  CT imaging revealed \"Evidence of high-grade small bowel obstruction with mesenteric edema involving most likely the mid jejunum. Adhesion versus internal hernia is possible though not much mesenteric twisting is seen. Transition point appears to be in the mid abdomen with fecalization present but no pneumatosis\"  Patient reports multiple previous small bowel obstructions with most recent in May of last year  Patient has multiple abdominal surgeries  Case was discussed by ER provider with general surgery on-call  We will continue NG tube to low intermittent suction  Give pain control and antiemetics as needed  Hydrate with normal saline at 125 cc/h  Consult surgery in a.m.  Bipolar disorder, in partial remission, most recent episode mixed (HCC)  Continue prehospital Cymbalta 60 mg p.o. daily and Seroquel 100 mg p.o. nightly  Essential hypertension  Continue prehospital prazosin 5 mg p.o. daily  Gastroesophageal reflux disease without esophagitis  Substitute Protonix 40 mg p.o. daily for prehospital Aciphex and change prehospital Pepcid to 20 mg IV twice daily      VTE Pharmacologic Prophylaxis: VTE Score: 2 Low Risk (Score 0-2) - Encourage Ambulation.  Code Status: Level 1 - Full Code per patient  Discussion with family: Patient declined call to .     Anticipated Length of Stay: Patient will be admitted on an inpatient basis with an anticipated length of stay of greater than 2 midnights secondary to small bowel obstruction requiring bowel rest and further surgical evaluation.    History of Present Illness   Chief Complaint: Abdominal pain, nausea and vomiting x 1 day    Amparo Dobbs is a 49 y.o. female with a PMH of GERD, hypertension, small bowel obstruction " who presents with abdominal pain, nausea and vomiting x 1 day.  Patient reports that she has had multiple small bowel obstruction secondary to previous surgeries states that she has probably had 10 over her lifetime with the most recent being in May of last year.  Patient states that these of resolved in the past without requiring surgery.  Patient is complaining of diffuse sharp abdominal pain companied with multiple episodes of nausea and vomiting and CT imaging revealed high-grade small bowel obstruction with transition point.    This was discussed by ER provider with general surgery on-call and was referred for medical admission.    Review of Systems   Constitutional:  Negative for chills and fever.   HENT:  Negative for congestion and sore throat.    Respiratory:  Negative for cough, shortness of breath and wheezing.    Cardiovascular:  Negative for chest pain and palpitations.   Gastrointestinal:  Positive for abdominal pain, nausea and vomiting. Negative for diarrhea.   Genitourinary:  Negative for dysuria, frequency, hematuria and urgency.   Musculoskeletal:  Negative for arthralgias and myalgias.   Skin:  Negative for wound.   Neurological:  Negative for dizziness, syncope, light-headedness and headaches.   All other systems reviewed and are negative.      Historical Information   Past Medical History:   Diagnosis Date    Anxiety     Arthritis     Bipolar 1 disorder ([facility])     with bordeline personality    Borderline personality disorder ([facility])     Chronic headaches     Depression     GERD (gastroesophageal reflux disease)     Hypertension     Incontinence     Migraine     Morbid obesity with BMI of 40.0-44.9, adult ([facility])     Sleep apnea     Small bowel obstruction ([facility]) 06/27/2019    Suicide attempt ([facility]) 2/29/2024    Symptomatic bradycardia     Urinary tract infection      Past Surgical History:   Procedure Laterality Date    CHOLECYSTECTOMY  05/24/2018    DENTAL SURGERY      ELBOW SURGERY      EXPLORATORY  LAPAROTOMY      exlap    FOOT SURGERY Right     KNEE ARTHROSCOPY Right 11/15/2018    total of three procedures with meniscal surgery    MULTIPLE TOOTH EXTRACTIONS  11/02/2018    OVARIAN CYST REMOVAL      IA TNOT ELBOW LATERAL/MEDIAL DEBRIDE OPEN TDN RPR Left 3/1/2024    Procedure: REPAIR TENDON FOREARM, elbow common flexor tendon, excision of bone fragment and all necessary procedures;  Surgeon: June Charles DO;  Location:  MAIN OR;  Service: Orthopedics    REDUCTION MAMMAPLASTY      reduction     SINUS SURGERY Bilateral 04/19/2019    bilateral maxillary antrostomies - Dr. Barajas - [facility]    TOTAL KNEE ARTHROPLASTY REVISION Right 2023     Social History     Tobacco Use    Smoking status: Never    Smokeless tobacco: Never   Vaping Use    Vaping status: Never Used   Substance and Sexual Activity    Alcohol use: Yes     Comment: rare    Drug use: Never    Sexual activity: Not Currently     E-Cigarette/Vaping    E-Cigarette Use Never User      E-Cigarette/Vaping Substances    Nicotine No     THC No     CBD No     Flavoring No     Other No     Unknown No      Family history non-contributory  Social History:  Marital Status: /Civil Union   Occupation:   Patient Pre-hospital Living Situation: With spouse  Patient Pre-hospital Level of Mobility: walks  Patient Pre-hospital Diet Restrictions: None    Meds/Allergies   I have reviewed home medications with patient personally.  Prior to Admission medications    Medication Sig Start Date End Date Taking? Authorizing Provider   albuterol (ProAir HFA) 90 mcg/act inhaler Inhale 2 puffs every 6 (six) hours as needed for wheezing 2/23/25  Yes LEATHA Norris   ALPRAZolam (XANAX) 0.5 mg tablet  4/11/24  Yes Historical Provider, MD   DULoxetine (CYMBALTA) 60 mg delayed release capsule  9/27/24  Yes Historical Provider, MD   famotidine (PEPCID) 40 MG tablet Take 40 mg by mouth 2 (two) times a day bedtime 1/19/21  Yes Historical Provider, MD   gabapentin  (NEURONTIN) 100 mg capsule Take 100 mg by mouth daily 3/28/25  Yes Historical Provider, MD   HYDROcodone-acetaminophen (Norco) 5-325 mg per tablet Take 1 tablet by mouth every 6 (six) hours as needed for pain for up to 10 doses Max Daily Amount: 4 tablets 6/5/24  Yes Tonia Salazar MD   hydroxychloroquine (PLAQUENIL) 200 mg tablet Take 200 mg by mouth 2 (two) times a day 11/1/22  Yes Historical Provider, MD   ondansetron (ZOFRAN-ODT) 8 mg disintegrating tablet  5/20/24  Yes Historical Provider, MD   prazosin (MINIPRESS) 5 mg capsule  10/31/24  Yes Historical Provider, MD   QUEtiapine (SEROquel) 100 mg tablet Take 100 mg by mouth 6/10/24 6/10/25 Yes Historical Provider, MD   RABEprazole (ACIPHEX) 20 MG tablet Take 20 mg by mouth daily 7/17/23  Yes Historical Provider, MD   valACYclovir (VALTREX) 500 mg tablet Take 500 mg by mouth daily   Yes Historical Provider, MD   fluticasone (FLONASE) 50 mcg/act nasal spray 2 sprays into each nostril daily  Patient not taking: Reported on 4/25/2022 2/21/22 4/25/22  LEATHA Gómez     Allergies   Allergen Reactions    Celecoxib Hives    Lamotrigine Rash, Hives and Itching     Other reaction(s): rash and itching  Other reaction(s): rash and itching  Other reaction(s): rash and itching       Objective :  Temp:  [98 °F (36.7 °C)-99.2 °F (37.3 °C)] 99.2 °F (37.3 °C)  HR:  [78-85] 79  BP: (166-186)/() 186/75  Resp:  [15-18] 18  SpO2:  [86 %-97 %] 96 %  O2 Device: Nasal cannula  Nasal Cannula O2 Flow Rate (L/min):  [2 L/min] 2 L/min    Physical Exam  Vitals and nursing note reviewed.   Constitutional:       Appearance: She is well-developed. She is obese.   HENT:      Head: Normocephalic and atraumatic.      Right Ear: Tympanic membrane normal.      Left Ear: Tympanic membrane normal.      Nose: Nose normal.      Mouth/Throat:      Mouth: Mucous membranes are moist.      Pharynx: No oropharyngeal exudate.   Eyes:      General: No scleral icterus.     Pupils: Pupils are  equal, round, and reactive to light.   Neck:      Vascular: No JVD.   Cardiovascular:      Rate and Rhythm: Normal rate and regular rhythm.      Heart sounds: Normal heart sounds. No murmur heard.  Pulmonary:      Effort: Pulmonary effort is normal. No respiratory distress.      Breath sounds: Normal breath sounds. No wheezing or rales.   Abdominal:      General: Bowel sounds are normal.      Palpations: Abdomen is soft.      Tenderness: There is generalized abdominal tenderness. There is no guarding or rebound.   Musculoskeletal:         General: Normal range of motion.      Cervical back: Normal range of motion and neck supple.      Right lower leg: No edema.      Left lower leg: No edema.   Lymphadenopathy:      Cervical: No cervical adenopathy.   Skin:     General: Skin is warm and dry.      Findings: No erythema or rash.   Neurological:      Mental Status: She is alert and oriented to person, place, and time.   Psychiatric:         Behavior: Behavior normal.          Lines/Drains:  Lines/Drains/Airways       Active Status       Name Placement date Placement time Site Days    NG/OG/Enteral Tube Nasogastric 16 Fr Right nare 04/13/25 2029  Right nare  less than 1                          Lab Results: I have reviewed the following results:  Results from last 7 days   Lab Units 04/13/25  1641   WBC Thousand/uL 9.99   HEMOGLOBIN g/dL 14.4   HEMATOCRIT % 45.4   PLATELETS Thousands/uL 366   SEGS PCT % 60   LYMPHO PCT % 27   MONO PCT % 7   EOS PCT % 3     Results from last 7 days   Lab Units 04/13/25  1641   SODIUM mmol/L 142   POTASSIUM mmol/L 3.7   CHLORIDE mmol/L 104   CO2 mmol/L 28   BUN mg/dL 14   CREATININE mg/dL 0.79   ANION GAP mmol/L 10   CALCIUM mg/dL 9.7   ALBUMIN g/dL 4.8   TOTAL BILIRUBIN mg/dL 0.25   ALK PHOS U/L 100   ALT U/L 21   AST U/L 20   GLUCOSE RANDOM mg/dL 96             Lab Results   Component Value Date    HGBA1C 5.7 (H) 04/05/2025    HGBA1C 5.6 03/15/2024    HGBA1C 5.5 03/27/2023     Results  from last 7 days   Lab Units 04/13/25  1641   LACTIC ACID mmol/L 1.3       Imaging Results Review: I reviewed radiology reports from this admission including: CT abdomen/pelvis.  Other Study Results Review: No additional pertinent studies reviewed.    Administrative Statements   I have spent a total time of 60 minutes in caring for this patient on the day of the visit/encounter including Diagnostic results, Impressions, Documenting in the medical record, Reviewing/placing orders in the medical record (including tests, medications, and/or procedures), and Obtaining or reviewing history  .    ** Please Note: This note has been constructed using a voice recognition system. **

## 2025-04-14 NOTE — ASSESSMENT & PLAN NOTE
"50yo female PMH recurrent SBOs, GERD, depression presents with SBO   -CT AP 4/13: \"Evidence of high-grade small bowel obstruction with mesenteric edema involving most likely the mid jejunum. Adhesion versus internal hernia is possible though not much mesenteric twisting is seen. Transition point appears to be in the mid abdomen with   fecalization present but no pneumatosis. Surgical consultation is advised.\"   -abdomen is flat, soft, TTP epigastrum   -afebrile, VSS   -no leukocytosis   -elytes wnl    Plan:  -no acute surgical intervention indicated  -continue NGT, NPO, IVF  -okay for ice chips  -possible gastrografin challenge tomorrow pending NG tube output  -XR ordered to check NG tube positioning  -pain control  -medical management per SLIM  -discussed with Dr Farias  "

## 2025-04-14 NOTE — UTILIZATION REVIEW
Initial Clinical Review    Admission: Date/Time/Statement:   Admission Orders (From admission, onward)       Ordered        04/13/25 2033  Inpatient Admission  Once                          Orders Placed This Encounter   Procedures    Inpatient Admission     Standing Status:   Standing     Number of Occurrences:   1     Level of Care:   Med Surg [16]     Estimated length of stay:   More than 2 Midnights     Certification:   I certify that inpatient services are medically necessary for this patient for a duration of greater than two midnights. See H&P and MD Progress Notes for additional information about the patient's course of treatment.     ED Arrival Information       Expected   -    Arrival   4/13/2025 16:06    Acuity   Emergent              Means of arrival   Walk-In    Escorted by   Spouse    Service   Hospitalist    Admission type   Emergency              Arrival complaint   Stomach pain possible bowl obstruction             Chief Complaint   Patient presents with    Abdominal Pain     Pt reports abdominal pain that started around noon; concerned for bowel obstruction as hx of multiple        4/13/25 Initial Presentation: 49 y.o. female presents to the ED for complaints of central abdominal pain, nausea and vomiting 4-5 hours. Exam: + hypertensive, /98, Pain 7/10 generalized abdominal tenderness and alysha umbilical area. PMH : Bipolar, HTN and GERD. Abnormal labs/imaging: CT A/P reveals Evidence of high-grade small bowel obstruction with mesenteric edema involving most likely the mid jejunum. Common bile duct dilatation despite cholecystectomy should be correlated with LFTs. In the ED pt given IV zofran x2, Dilaudid x2 , NS 1 L. Patient continues to actively vomit after 2 doses of Zofran, will place NG tube. Discussed with general surgery who agree with NG tube placement and admission.  Plan: Admit to inpatient for SBO, general  surgery consult, NPO, IVF's, pain and anti emetics prn, trend LFT's, NG tube  to low intermittent suction.     Anticipated Length of Stay/Certification Statement: Patient will be admitted on an inpatient basis with an anticipated length of stay of greater than 2 midnights secondary to small bowel obstruction requiring bowel rest and further surgical evaluation.       Date: 4/14/25   Day 2: General surgery consult: hx of multiple SBO's. Her last SBO was last May. Exam: abdomen is soft and flat, TTP epigastric area., some relief since NG tube insertion. On going pain lowets pain score 5/10, highest 10/10. Has received IV dilaudid x4 doses thus far, IV zofran x1 dose and Tigan IM x 1 dose thus far. She does report vomiting over the tube a few times. Going for chest xray to verify NG tube placement today. Last bowel movement was yesterday morning Afebrile, VSS,on 2 L nc oxygen Spo2 95%, T max 99.2 F.  no leukocytosis, Electrolytes WNL. No surgical intervention indicated. NGT output 100 ml on 4/13, thur far today 450 ml. , yellow thick drainage. Plan: C/w NPO, IVF's, NGT to low intermittent suction, , okay for ice chips., possible gastrograffin challenge tomorrow pending NG tube output. Pain and anti emetics prn, sched IV tylenol for pain, IV Protonix Q24 and IV famotidine Q12, prn Dilaudid, Tigan and Zofran.     ED Treatment-Medication Administration from 04/13/2025 1606 to 04/13/2025 2052         Date/Time Order Dose Route Action     04/13/2025 1655 ondansetron (ZOFRAN) injection 4 mg 4 mg Intravenous Given     04/13/2025 1655 HYDROmorphone (DILAUDID) injection 1 mg 1 mg Intravenous Given     04/13/2025 1655 sodium chloride 0.9 % bolus 1,000 mL 1,000 mL Intravenous New Bag     04/13/2025 1759 iohexol (OMNIPAQUE) 350 MG/ML injection (MULTI-DOSE) 100 mL 100 mL Intravenous Given     04/13/2025 1846 HYDROmorphone (DILAUDID) injection 0.5 mg 0.5 mg Intravenous Given     04/13/2025 1851 ondansetron (ZOFRAN) injection 4 mg 4 mg Intravenous Given            Scheduled Medications:  acetaminophen, 1,000  mg, Intravenous, Q6H ADAM  DULoxetine, 60 mg, Oral, Daily  famotidine, 20 mg, Intravenous, Q12H ADAM  hydroxychloroquine, 200 mg, Oral, BID  pantoprazole, 40 mg, Intravenous, Q24H ADAM  prazosin, 5 mg, Oral, Daily  QUEtiapine, 100 mg, Oral, HS      Continuous IV Infusions:  sodium chloride, 125 mL/hr, Intravenous, Continuous      PRN Meds:  albuterol, 2 puff, Inhalation, Q6H PRN  HYDROmorphone, 0.5 mg, Intravenous, Q4H PRN x1 dose 4/13 and 3 does thus far 4/14  HYDROmorphone, 0.2 mg, Intravenous, Q3H PRN x1 dose 4/14   ondansetron, 4 mg, Intravenous, Q6H PRNx1 dose 4/13 and 2 doses 4/14   phenol, 1 spray, Mouth/Throat, Q2H PRN  trimethobenzamide, 200 mg, Intramuscular, Q6H PRN x1 dose 4/13 and 1 dose 4/14       ED Triage Vitals   Temperature Pulse Respirations Blood Pressure SpO2 Pain Score   04/13/25 1615 04/13/25 1615 04/13/25 1615 04/13/25 1615 04/13/25 1615 04/13/25 1614   98 °F (36.7 °C) 85 18 (!) 175/107 97 % 7     Weight (last 2 days)       Date/Time Weight    04/13/25 2106 110 (242.95)    04/13/25 1615 111 (245)            Vital Signs (last 3 days)       Date/Time Temp Pulse Resp BP MAP (mmHg) SpO2 Calculated FIO2 (%) - Nasal Cannula Nasal Cannula O2 Flow Rate (L/min) O2 Device Patient Position - Orthostatic VS Pain    04/14/25 1143 -- -- -- -- -- -- -- -- -- -- 7    04/14/25 0927 -- -- -- -- -- -- -- -- -- -- 5    04/14/25 0743 -- -- -- -- -- -- -- -- -- -- 8    04/14/25 07:12:50 98.8 °F (37.1 °C) 74 18 153/98 116 95 % -- -- -- -- --    04/14/25 0309 -- -- -- -- -- -- -- -- -- -- 7    04/14/25 0113 99.2 °F (37.3 °C) -- -- -- -- -- -- -- -- -- --    04/13/25 2231 -- -- -- -- -- -- -- -- -- -- 5    04/13/25 2153 -- -- -- -- -- -- -- -- -- -- 8    04/13/25 2106 -- 79 18 186/75 -- -- -- -- -- -- --    04/13/25 21:02:31 -- 79 -- -- -- 96 % -- -- -- -- --    04/13/25 1846 -- -- -- -- -- -- -- -- -- -- 10 - Worst Possible Pain    04/13/25 1845 -- 85 -- -- -- 97 % -- -- Nasal cannula -- 10 - Worst Possible Pain     04/13/25 1800 -- -- -- 170/90 119 -- -- -- -- Lying --    04/13/25 1745 -- 82 16 166/79 114 96 % 28 2 L/min Nasal cannula Sitting 4    04/13/25 1715 -- -- -- -- -- -- -- -- -- -- 4    04/13/25 1709 -- 83 15 185/98 129 94 % 28 2 L/min Nasal cannula -- --    04/13/25 1707 -- 85 -- -- -- 87 % -- -- -- -- --    04/13/25 1706 -- 78 -- -- -- 86 % -- -- -- -- --    04/13/25 1705 -- 79 -- -- -- 87 % -- -- -- -- --    04/13/25 1655 -- -- -- -- -- -- -- -- -- -- 8    04/13/25 1645 -- 81 16 172/95 127 95 % -- -- None (Room air) Sitting --    04/13/25 1615 98 °F (36.7 °C) 85 18 175/107 -- 97 % -- -- None (Room air) Sitting --    04/13/25 1614 -- -- -- -- -- -- -- -- -- -- 7              Pertinent Labs/Diagnostic Test Results:   Radiology:  XR abdomen 1 vw portable   Final Interpretation by Stefan Walters MD (04/13 2216)      NG tube terminating below the diaphragm in the region of the gastric fundus.      Dilated loops of air-filled small bowel in the partially imaged abdomen in keeping with small bowel obstruction as seen on recent CT.               Workstation performed: [id number]         CT abdomen pelvis with contrast   Final Interpretation by Marco Shoemaker MD (04/13 1956)      Evidence of high-grade small bowel obstruction with mesenteric edema involving most likely the mid jejunum. Adhesion versus internal hernia is possible though not much mesenteric twisting is seen. Transition point appears to be in the mid abdomen with    fecalization present but no pneumatosis.   Surgical consultation is advised.      Common bile duct dilatation despite cholecystectomy should be correlated with LFTs. Follow-up ultrasound suggested when patient is no longer acute to see if this finding persists.      This was discussed with RAND Sargent at 754pm         Workstation performed: [id number]         XR chest portable    (Results Pending)   XR abdomen 1 vw portable    (Results Pending)       Results from last 7 days   Lab Units  04/13/25  1641   WBC Thousand/uL 9.99   HEMOGLOBIN g/dL 14.4   HEMATOCRIT % 45.4   PLATELETS Thousands/uL 366   TOTAL NEUT ABS Thousands/µL 6.00         Results from last 7 days   Lab Units 04/13/25  1641   SODIUM mmol/L 142   POTASSIUM mmol/L 3.7   CHLORIDE mmol/L 104   CO2 mmol/L 28   ANION GAP mmol/L 10   BUN mg/dL 14   CREATININE mg/dL 0.79   EGFR ml/min/1.73sq m 89   CALCIUM mg/dL 9.7     Results from last 7 days   Lab Units 04/13/25  1641   AST U/L 20   ALT U/L 21   ALK PHOS U/L 100   TOTAL PROTEIN g/dL 8.3   ALBUMIN g/dL 4.8   TOTAL BILIRUBIN mg/dL 0.25     Results from last 7 days   Lab Units 04/14/25  1143   POC GLUCOSE mg/dl 91     Results from last 7 days   Lab Units 04/13/25  1641   GLUCOSE RANDOM mg/dL 96                           Results from last 7 days   Lab Units 04/13/25  1641   LACTIC ACID mmol/L 1.3         Results from last 7 days   Lab Units 04/13/25  1641   LIPASE u/L 20             Past Medical History:   Diagnosis Date    Anxiety     Arthritis     Bipolar 1 disorder (HCC)     with bordeline personality    Borderline personality disorder (HCC)     Chronic headaches     Depression     GERD (gastroesophageal reflux disease)     Hypertension     Incontinence     Migraine     Morbid obesity with BMI of 40.0-44.9, adult (HCC)     Sleep apnea     Small bowel obstruction (HCC) 06/27/2019    Suicide attempt (HCC) 2/29/2024    Symptomatic bradycardia     Urinary tract infection      Present on Admission:   Small bowel obstruction (HCC)   Bipolar disorder, in partial remission, most recent episode mixed (HCC)   Essential hypertension   Gastroesophageal reflux disease without esophagitis      Admitting Diagnosis: Small bowel obstruction (HCC) [K56.609]  Abdominal pain [R10.9]  Nausea and vomiting [R11.2]  Common bile duct dilatation [K83.8]  Age/Sex: 49 y.o. female    Network Utilization Review Department  ATTENTION: Please call with any questions or concerns to [phone] and carefully listen to the  prompts so that you are directed to the right person. All voicemails are confidential.   For Discharge needs, contact Care Management DC Support Team at [phone] opt. 2  Send all requests for admission clinical reviews, approved or denied determinations and any other requests to dedicated fax number below belonging to the campus where the patient is receiving treatment. List of dedicated fax numbers for the Facilities:  FACILITY NAME UR FAX NUMBER   ADMISSION DENIALS (Administrative/Medical Necessity) [phone]   DISCHARGE SUPPORT TEAM (NETWORK) [phone]   PARENT CHILD HEALTH (Maternity/NICU/Pediatrics) [phone]   [facility] [phone]   [facility] [phone]   [facility] [phone]   [facility] [phone]   [facility] [phone]   [facility] [phone]   [facility] [phone]   [facility] [phone]   [facility] [phone]   [facility] [phone]   [facility] [phone]   [facility] [phone]

## 2025-04-14 NOTE — UTILIZATION REVIEW
NOTIFICATION OF INPATIENT ADMISSION   AUTHORIZATION REQUEST   SERVICING FACILITY:   [place] [address]  Tax ID: [id number]  NPI: [id number]   ATTENDING PROVIDER:  Attending Name and NPI#: Deyanira Black Md [[id number]]  Address: [address] [place] [address]  Phone: [phone]     ADMISSION INFORMATION:  Place of Service: Inpatient Acute [facility]  Place of Service Code: 21  Inpatient Admission Date/Time: 4/13/25  8:33 PM  Discharge Date/Time: No discharge date for patient encounter.  Admitting Diagnosis Code/Description:  Small bowel obstruction (HCC) [K56.609]  Abdominal pain [R10.9]  Nausea and vomiting [R11.2]  Common bile duct dilatation [K83.8]     UTILIZATION REVIEW CONTACT:  Kimberley Castellon Utilization   Network Utilization Review Department  Phone: [phone]  Fax: [phone]  Email: [email or web address]  Contact for approvals/pending authorizations, clinical reviews, and discharge.     PHYSICIAN ADVISORY SERVICES:  Medical Necessity Denial & Wctj-qt-Apso Review  Phone: [phone]  Fax: [phone]  Email: [email or web address]     DISCHARGE SUPPORT TEAM:  For Patients Discharge Needs & Updates  Phone: [phone] opt. 2 Fax: [phone]  Email: [email or web address]

## 2025-04-14 NOTE — CONSULTS
"Consultation - Surgery-General   Name: Amparo Dobbs 49 y.o. female I MRN: [id number]  Unit/Bed#: -01 I Date of Admission: 4/13/2025   Date of Service: 4/14/2025 I Hospital Day: 1   Inpatient consult to Acute Care Surgery  Consult performed by: Veronica Downing PA-C  Consult ordered by: Erick Johnson PA-C        Physician Requesting Evaluation: Deyanira Black, *   Reason for Evaluation / Principal Problem: SBO    Assessment & Plan  Small bowel obstruction (HCC)  48yo female PMH recurrent SBOs, GERD, depression presents with SBO   -CT AP 4/13: \"Evidence of high-grade small bowel obstruction with mesenteric edema involving most likely the mid jejunum. Adhesion versus internal hernia is possible though not much mesenteric twisting is seen. Transition point appears to be in the mid abdomen with   fecalization present but no pneumatosis. Surgical consultation is advised.\"   -abdomen is flat, soft, TTP epigastrum   -afebrile, VSS   -no leukocytosis   -elytes wnl    Plan:  -no acute surgical intervention indicated  -continue NGT, NPO, IVF  -okay for ice chips  -possible gastrografin challenge tomorrow pending NG tube output  -XR ordered to check NG tube positioning  -pain control  -medical management per SLIM  -discussed with Dr Farias      History of Present Illness   Amparo Dobbs is a 49 y.o. female who presents with small bowel obstruction. Patient has had multiple SBOs in the past. She started having abdominal pain, nausea and vomiting similar to prior SBOs yesterday afternoon. She initially was doing well and had minimal pain so an NG tube was not placed. Patient started having more abdominal pain and vomiting in the evening and NG tube was placed. She has had some relief since insertion. She does report vomiting over the tube a few times. Last bowel movement was yesterday morning. Her last SBO was last May. Multiple abdominal surgeries including ex lap for foreign body removal, lap kalpana, another " ex lap for an obstruction possibly. No blood thinners.     Review of Systems   Constitutional: Negative.    HENT: Negative.     Respiratory: Negative.     Cardiovascular: Negative.    Gastrointestinal:  Positive for abdominal pain, nausea and vomiting.   Genitourinary: Negative.    Musculoskeletal: Negative.    Skin: Negative.    Neurological: Negative.    Hematological: Negative.    Psychiatric/Behavioral: Negative.       Medical History Review: I have reviewed the patient's PMH, PSH, Social History, Family History, Meds, and Allergies     Objective :  Temp:  [98 °F (36.7 °C)-99.2 °F (37.3 °C)] 98.8 °F (37.1 °C)  HR:  [74-85] 74  BP: (153-186)/() 153/98  Resp:  [15-18] 18  SpO2:  [86 %-97 %] 95 %  O2 Device: Nasal cannula  Nasal Cannula O2 Flow Rate (L/min):  [2 L/min] 2 L/min    Lines/Drains/Airways       Active Status       Name Placement date Placement time Site Days    NG/OG/Enteral Tube Nasogastric 16 Fr Right nare 04/13/25 2029  Right nare  less than 1                  Physical Exam  Constitutional:       Appearance: Normal appearance.   HENT:      Head: Normocephalic and atraumatic.      Nose: Nose normal.      Mouth/Throat:      Mouth: Mucous membranes are moist.      Pharynx: Oropharynx is clear.   Eyes:      Conjunctiva/sclera: Conjunctivae normal.      Pupils: Pupils are equal, round, and reactive to light.   Cardiovascular:      Rate and Rhythm: Normal rate.   Pulmonary:      Effort: Pulmonary effort is normal.   Abdominal:      General: Abdomen is flat.      Palpations: Abdomen is soft.      Tenderness: There is abdominal tenderness in the epigastric area.   Musculoskeletal:         General: Normal range of motion.   Skin:     General: Skin is warm and dry.   Neurological:      General: No focal deficit present.      Mental Status: She is alert.   Psychiatric:         Mood and Affect: Mood normal.         Lab Results: I have reviewed the following results:  Recent Labs     04/13/25  1641   WBC  9.99   HGB 14.4   HCT 45.4      SODIUM 142   K 3.7      CO2 28   BUN 14   CREATININE 0.79   GLUC 96   AST 20   ALT 21   ALB 4.8   TBILI 0.25   ALKPHOS 100   LACTICACID 1.3         VTE Pharmacologic Prophylaxis: per primary service   VTE Mechanical Prophylaxis: sequential compression device    Veronica Downing PA-C

## 2025-04-14 NOTE — ASSESSMENT & PLAN NOTE
"Admit to medicine  CT imaging revealed \"Evidence of high-grade small bowel obstruction with mesenteric edema involving most likely the mid jejunum. Adhesion versus internal hernia is possible though not much mesenteric twisting is seen. Transition point appears to be in the mid abdomen with fecalization present but no pneumatosis\"  Patient reports multiple previous small bowel obstructions with most recent in May of last year  Patient has multiple abdominal surgeries  Case was discussed by ER provider with general surgery on-call  We will continue NG tube to low intermittent suction  Give pain control and antiemetics as needed  Hydrate with normal saline at 125 cc/h  Consult surgery in a.m.  "

## 2025-04-14 NOTE — PLAN OF CARE
Problem: Potential for Falls  Goal: Patient will remain free of falls  Description: INTERVENTIONS:- Educate patient/family on patient safety including physical limitations- Instruct patient to call for assistance with activity - Consult OT/PT to assist with strengthening/mobility - Keep Call bell within reach- Keep bed low and locked with side rails adjusted as appropriate- Keep care items and personal belongings within reach- Initiate and maintain comfort rounds- Make Fall Risk Sign visible to staff- Offer Toileting every 2 Hours, in advance of need- Initiate/Maintain bed and chair alarm alarm- Obtain necessary fall risk management equipment: non slip socks- Apply yellow socks and bracelet for high fall risk patients- Consider moving patient to room near nurses station  INTERVENTIONS:- Educate patient/family on patient safety including physical limitations- Instruct patient to call for assistance with activity - Consult OT/PT to assist with strengthening/mobility - Keep Call bell within reach- Keep bed low and locked with side rails adjusted as appropriate- Keep care items and personal belongings within reach- Initiate and maintain comfort rounds- Make Fall Risk Sign visible to staff- Offer Toileting every 2 Hours, in advance of need- Initiate/Maintain bed and chair alarm alarm- Obtain necessary fall risk management equipment: non slip socks- Apply yellow socks and bracelet for high fall risk patients- Consider moving patient to room near nurses station  Outcome: Progressing     Problem: PAIN - ADULT  Goal: Verbalizes/displays adequate comfort level or baseline comfort level  Description: Interventions:- Encourage patient to monitor pain and request assistance- Assess pain using appropriate pain scale- Administer analgesics based on type and severity of pain and evaluate response- Implement non-pharmacological measures as appropriate and evaluate response- Consider cultural and social influences on pain and pain  management- Notify physician/advanced practitioner if interventions unsuccessful or patient reports new pain  Outcome: Progressing     Problem: INFECTION - ADULT  Goal: Absence or prevention of progression during hospitalization  Description: INTERVENTIONS:- Assess and monitor for signs and symptoms of infection- Monitor lab/diagnostic results- Monitor all insertion sites, i.e. indwelling lines, tubes, and drains- Monitor endotracheal if appropriate and nasal secretions for changes in amount and color- [place] appropriate cooling/warming therapies per order- Administer medications as ordered- Instruct and encourage patient and family to use good hand hygiene technique- Identify and instruct in appropriate isolation precautions for identified infection/condition  Outcome: Progressing  Goal: Absence of fever/infection during neutropenic period  Description: INTERVENTIONS:- Monitor WBC  Outcome: Progressing     Problem: SAFETY ADULT  Goal: Patient will remain free of falls  Description: INTERVENTIONS:- Educate patient/family on patient safety including physical limitations- Instruct patient to call for assistance with activity - Consult OT/PT to assist with strengthening/mobility - Keep Call bell within reach- Keep bed low and locked with side rails adjusted as appropriate- Keep care items and personal belongings within reach- Initiate and maintain comfort rounds- Make Fall Risk Sign visible to staff- Offer Toileting every 2 Hours, in advance of need- Initiate/Maintain bed and chair alarm alarm- Obtain necessary fall risk management equipment: non slip socks- Apply yellow socks and bracelet for high fall risk patients- Consider moving patient to room near nurses station  INTERVENTIONS:- Educate patient/family on patient safety including physical limitations- Instruct patient to call for assistance with activity - Consult OT/PT to assist with strengthening/mobility - Keep Call bell within reach- Keep bed low and  locked with side rails adjusted as appropriate- Keep care items and personal belongings within reach- Initiate and maintain comfort rounds- Make Fall Risk Sign visible to staff- Offer Toileting every 2 Hours, in advance of need- Initiate/Maintain bed and chair alarm- Obtain necessary fall risk management equipment: non slip socks- Apply yellow socks and bracelet for high fall risk patients- Consider moving patient to room near nurses station  Outcome: Progressing  Goal: Maintain or return to baseline ADL function  Description: INTERVENTIONS:-  Assess patient's ability to carry out ADLs; assess patient's baseline for ADL function and identify physical deficits which impact ability to perform ADLs (bathing, care of mouth/teeth, toileting, grooming, dressing, etc.)- Assess/evaluate cause of self-care deficits - Assess range of motion- Assess patient's mobility; develop plan if impaired- Assess patient's need for assistive devices and provide as appropriate- Encourage maximum independence but intervene and supervise when necessary- Involve family in performance of ADLs- Assess for home care needs following discharge - Consider OT consult to assist with ADL evaluation and planning for discharge- Provide patient education as appropriate  Outcome: Progressing  Goal: Maintains/Returns to pre admission functional level  Description: INTERVENTIONS:- Perform AM-PAC 6 Click Basic Mobility/ Daily Activity assessment daily.- Set and communicate daily mobility goal to care team and patient/family/caregiver. - Collaborate with rehabilitation services on mobility goals if consulted- Perform Range of Motion 2 times a day.- Reposition patient every 2 hours.- Dangle patient 3 times a day- Stand patient 3 times a day- Ambulate patient 3 times a day- Out of bed to chair 3 times a day - Out of bed for meals 3 times a day- Out of bed for toileting- Record patient progress and toleration of activity level   Outcome: Progressing     Problem:  DISCHARGE PLANNING  Goal: Discharge to home or other facility with appropriate resources  Description: INTERVENTIONS:- Identify barriers to discharge w/patient and caregiver- Arrange for needed discharge resources and transportation as appropriate- Identify discharge learning needs (meds, wound care, etc.)- Arrange for interpretive services to assist at discharge as needed- Refer to Case Management Department for coordinating discharge planning if the patient needs post-hospital services based on physician/advanced practitioner order or complex needs related to functional status, cognitive ability, or social support system  Outcome: Progressing     Problem: Knowledge Deficit  Goal: Patient/family/caregiver demonstrates understanding of disease process, treatment plan, medications, and discharge instructions  Description: Complete learning assessment and assess knowledge base.Interventions:- Provide teaching at level of understanding- Provide teaching via preferred learning methods  Outcome: Progressing

## 2025-04-15 ENCOUNTER — APPOINTMENT (INPATIENT)
Dept: RADIOLOGY | Facility: HOSPITAL | Age: 50
End: 2025-04-15
Payer: COMMERCIAL

## 2025-04-15 LAB
ALBUMIN SERPL BCG-MCNC: 3.9 G/DL (ref 3.5–5)
ALP SERPL-CCNC: 108 U/L (ref 34–104)
ALT SERPL W P-5'-P-CCNC: 64 U/L (ref 7–52)
ANION GAP SERPL CALCULATED.3IONS-SCNC: 8 MMOL/L (ref 4–13)
AST SERPL W P-5'-P-CCNC: 43 U/L (ref 13–39)
BASOPHILS # BLD AUTO: 0.06 THOUSANDS/ÂΜL (ref 0–0.1)
BASOPHILS NFR BLD AUTO: 1 % (ref 0–1)
BILIRUB SERPL-MCNC: 0.57 MG/DL (ref 0.2–1)
BUN SERPL-MCNC: 10 MG/DL (ref 5–25)
CALCIUM SERPL-MCNC: 8.8 MG/DL (ref 8.4–10.2)
CHLORIDE SERPL-SCNC: 104 MMOL/L (ref 96–108)
CO2 SERPL-SCNC: 26 MMOL/L (ref 21–32)
CREAT SERPL-MCNC: 0.58 MG/DL (ref 0.6–1.3)
EOSINOPHIL # BLD AUTO: 0.22 THOUSAND/ÂΜL (ref 0–0.61)
EOSINOPHIL NFR BLD AUTO: 2 % (ref 0–6)
ERYTHROCYTE [DISTWIDTH] IN BLOOD BY AUTOMATED COUNT: 14.5 % (ref 11.6–15.1)
GFR SERPL CREATININE-BSD FRML MDRD: 110 ML/MIN/1.73SQ M
GLUCOSE SERPL-MCNC: 78 MG/DL (ref 65–140)
HCT VFR BLD AUTO: 44.2 % (ref 34.8–46.1)
HGB BLD-MCNC: 13.7 G/DL (ref 11.5–15.4)
IMM GRANULOCYTES # BLD AUTO: 0.08 THOUSAND/UL (ref 0–0.2)
IMM GRANULOCYTES NFR BLD AUTO: 1 % (ref 0–2)
LYMPHOCYTES # BLD AUTO: 1.58 THOUSANDS/ÂΜL (ref 0.6–4.47)
LYMPHOCYTES NFR BLD AUTO: 13 % (ref 14–44)
MAGNESIUM SERPL-MCNC: 1.9 MG/DL (ref 1.9–2.7)
MCH RBC QN AUTO: 28 PG (ref 26.8–34.3)
MCHC RBC AUTO-ENTMCNC: 31 G/DL (ref 31.4–37.4)
MCV RBC AUTO: 90 FL (ref 82–98)
MONOCYTES # BLD AUTO: 0.84 THOUSAND/ÂΜL (ref 0.17–1.22)
MONOCYTES NFR BLD AUTO: 7 % (ref 4–12)
NEUTROPHILS # BLD AUTO: 9.14 THOUSANDS/ÂΜL (ref 1.85–7.62)
NEUTS SEG NFR BLD AUTO: 76 % (ref 43–75)
NRBC BLD AUTO-RTO: 0 /100 WBCS
PLATELET # BLD AUTO: 280 THOUSANDS/UL (ref 149–390)
PMV BLD AUTO: 9.2 FL (ref 8.9–12.7)
POTASSIUM SERPL-SCNC: 3.9 MMOL/L (ref 3.5–5.3)
PROT SERPL-MCNC: 6.9 G/DL (ref 6.4–8.4)
RBC # BLD AUTO: 4.9 MILLION/UL (ref 3.81–5.12)
SODIUM SERPL-SCNC: 138 MMOL/L (ref 135–147)
WBC # BLD AUTO: 11.92 THOUSAND/UL (ref 4.31–10.16)

## 2025-04-15 PROCEDURE — 80053 COMPREHEN METABOLIC PANEL: CPT | Performed by: INTERNAL MEDICINE

## 2025-04-15 PROCEDURE — 83735 ASSAY OF MAGNESIUM: CPT | Performed by: INTERNAL MEDICINE

## 2025-04-15 PROCEDURE — 74022 RADEX COMPL AQT ABD SERIES: CPT

## 2025-04-15 PROCEDURE — 85025 COMPLETE CBC W/AUTO DIFF WBC: CPT | Performed by: INTERNAL MEDICINE

## 2025-04-15 PROCEDURE — 99232 SBSQ HOSP IP/OBS MODERATE 35: CPT | Performed by: SURGERY

## 2025-04-15 PROCEDURE — 99232 SBSQ HOSP IP/OBS MODERATE 35: CPT | Performed by: INTERNAL MEDICINE

## 2025-04-15 RX ORDER — DEXTROSE MONOHYDRATE AND SODIUM CHLORIDE 5; .45 G/100ML; G/100ML
125 INJECTION, SOLUTION INTRAVENOUS CONTINUOUS
Status: DISCONTINUED | OUTPATIENT
Start: 2025-04-15 | End: 2025-04-16

## 2025-04-15 RX ORDER — ALPRAZOLAM 0.5 MG
0.5 TABLET ORAL
Status: DISCONTINUED | OUTPATIENT
Start: 2025-04-15 | End: 2025-04-17 | Stop reason: HOSPADM

## 2025-04-15 RX ORDER — DIATRIZOATE MEGLUMINE AND DIATRIZOATE SODIUM 660; 100 MG/ML; MG/ML
120 SOLUTION ORAL; RECTAL
Status: COMPLETED | OUTPATIENT
Start: 2025-04-15 | End: 2025-04-15

## 2025-04-15 RX ADMIN — TRIMETHOBENZAMIDE HYDROCHLORIDE 200 MG: 100 INJECTION INTRAMUSCULAR at 09:23

## 2025-04-15 RX ADMIN — ONDANSETRON 4 MG: 2 INJECTION INTRAMUSCULAR; INTRAVENOUS at 14:31

## 2025-04-15 RX ADMIN — HYDROMORPHONE HYDROCHLORIDE 0.2 MG: 0.2 INJECTION, SOLUTION INTRAMUSCULAR; INTRAVENOUS; SUBCUTANEOUS at 14:31

## 2025-04-15 RX ADMIN — DEXTROSE AND SODIUM CHLORIDE 125 ML/HR: 5; .45 INJECTION, SOLUTION INTRAVENOUS at 09:23

## 2025-04-15 RX ADMIN — PANTOPRAZOLE SODIUM 40 MG: 40 INJECTION, POWDER, FOR SOLUTION INTRAVENOUS at 09:23

## 2025-04-15 RX ADMIN — ACETAMINOPHEN 1000 MG: 10 INJECTION INTRAVENOUS at 11:48

## 2025-04-15 RX ADMIN — SODIUM CHLORIDE 125 ML/HR: 0.9 INJECTION, SOLUTION INTRAVENOUS at 00:42

## 2025-04-15 RX ADMIN — FAMOTIDINE 20 MG: 10 INJECTION, SOLUTION INTRAVENOUS at 09:23

## 2025-04-15 RX ADMIN — DEXTROSE AND SODIUM CHLORIDE 125 ML/HR: 5; .45 INJECTION, SOLUTION INTRAVENOUS at 20:13

## 2025-04-15 RX ADMIN — HYDROXYCHLOROQUINE SULFATE 200 MG: 200 TABLET, FILM COATED ORAL at 17:02

## 2025-04-15 RX ADMIN — ACETAMINOPHEN 1000 MG: 10 INJECTION INTRAVENOUS at 05:23

## 2025-04-15 RX ADMIN — ONDANSETRON 4 MG: 2 INJECTION INTRAMUSCULAR; INTRAVENOUS at 05:22

## 2025-04-15 RX ADMIN — ACETAMINOPHEN 1000 MG: 10 INJECTION INTRAVENOUS at 17:10

## 2025-04-15 RX ADMIN — DIATRIZOATE MEGLUMINE AND DIATRIZOATE SODIUM 120 ML: 660; 100 LIQUID ORAL; RECTAL at 13:45

## 2025-04-15 RX ADMIN — QUETIAPINE FUMARATE 100 MG: 100 TABLET ORAL at 22:00

## 2025-04-15 RX ADMIN — ALPRAZOLAM 0.5 MG: 0.5 TABLET ORAL at 23:02

## 2025-04-15 RX ADMIN — ACETAMINOPHEN 1000 MG: 10 INJECTION INTRAVENOUS at 23:02

## 2025-04-15 RX ADMIN — PRAZOSIN HYDROCHLORIDE 5 MG: 1 CAPSULE ORAL at 22:00

## 2025-04-15 RX ADMIN — FAMOTIDINE 20 MG: 10 INJECTION, SOLUTION INTRAVENOUS at 22:00

## 2025-04-15 RX ADMIN — ACETAMINOPHEN 1000 MG: 10 INJECTION INTRAVENOUS at 00:43

## 2025-04-15 NOTE — PROGRESS NOTES
"Progress Note - Surgery-General   Name: Amparo Dobbs 49 y.o. female I MRN: [id number]  Unit/Bed#: -01 I Date of Admission: 4/13/2025   Date of Service: 4/15/2025 I Hospital Day: 2    Assessment & Plan  Small bowel obstruction (HCC)  50yo female PMH recurrent SBOs, GERD, depression presents with SBO   -CT AP 4/13: \"Evidence of high-grade small bowel obstruction with mesenteric edema involving most likely the mid jejunum. Adhesion versus internal hernia is possible though not much mesenteric twisting is seen. Transition point appears to be in the mid abdomen with   fecalization present but no pneumatosis. Surgical consultation is advised.\"   -NG with 750cc output recorded, bilious   -abdomen is flat, soft, TTP lower abdomen   -afebrile, VSS   -WBC 11   -elytes wnl    Plan:  -no acute surgical intervention indicated; will re-evaluate in a few hours to assess patient's nausea and abdominal exam, possible gastrografin study this afternoon  -continue NGT, NPO, IVF  -okay for ice chips  -pain control  -medical management per SLIM  -discussed with Dr Farias        Subjective   Patient complaining of slight nausea this morning. Passed some flatus last night, no bowel movement. Abdominal pain getting better.     Objective :  Temp:  [98.1 °F (36.7 °C)-98.8 °F (37.1 °C)] 98.8 °F (37.1 °C)  HR:  [61-94] 94  BP: (135-168)/() 164/88  Resp:  [17-20] 20  SpO2:  [77 %-97 %] 94 %  O2 Device: Nasal cannula  Nasal Cannula O2 Flow Rate (L/min):  [2 L/min] 2 L/min    I/O         04/13 0701  04/14 0700 04/14 0701  04/15 0700 04/15 0701 04/16 0700    I.V. (mL/kg) 1189.6 (10.8) 1000 (9.1)     NG/GT 40 20     IV Piggyback 1000      Total Intake(mL/kg) 2229.6 (20.3) 1020 (9.3)     Urine (mL/kg/hr) 400 400 (0.2)     Emesis/NG output 1150 850     Total Output 1550 1250     Net +679.6 -230                  Lines/Drains/Airways       Active Status       Name Placement date Placement time Site Days    NG/OG/Enteral Tube Nasogastric 16 " Fr Right nare 04/13/25 2029  Right nare  1                  Physical Exam  Constitutional:       Appearance: Normal appearance.   HENT:      Mouth/Throat:      Mouth: Mucous membranes are moist.      Pharynx: Oropharynx is clear.   Cardiovascular:      Rate and Rhythm: Normal rate.   Pulmonary:      Effort: Pulmonary effort is normal.   Abdominal:      General: Abdomen is flat.      Palpations: Abdomen is soft.      Tenderness: There is abdominal tenderness.   Musculoskeletal:         General: Normal range of motion.   Skin:     General: Skin is warm and dry.   Neurological:      General: No focal deficit present.      Mental Status: She is alert.         Lab Results: I have reviewed the following results:  Recent Labs     04/13/25  1641 04/15/25  0517   WBC 9.99 11.92*   HGB 14.4 13.7   HCT 45.4 44.2    280   SODIUM 142 138   K 3.7 3.9    104   CO2 28 26   BUN 14 10   CREATININE 0.79 0.58*   GLUC 96 78   MG  --  1.9   AST 20 43*   ALT 21 64*   ALB 4.8 3.9   TBILI 0.25 0.57   ALKPHOS 100 108*   LACTICACID 1.3  --          VTE Pharmacologic Prophylaxis: Sequential compression device (Venodyne)   VTE Mechanical Prophylaxis: sequential compression device    Veronica Downing PA-C

## 2025-04-15 NOTE — PLAN OF CARE
Problem: Potential for Falls  Goal: Patient will remain free of falls  Description: INTERVENTIONS:- Educate patient/family on patient safety including physical limitations- Instruct patient to call for assistance with activity - Consult OT/PT to assist with strengthening/mobility - Keep Call bell within reach- Keep bed low and locked with side rails adjusted as appropriate- Keep care items and personal belongings within reach- Initiate and maintain comfort rounds- Make Fall Risk Sign visible to staff- Offer Toileting every 2 Hours, in advance of need- Initiate/Maintain bed alarm- Obtain necessary fall risk management equipment: yellow socks- Apply yellow socks and bracelet for high fall risk patients- Consider moving patient to room near nurses station  INTERVENTIONS:- Educate patient/family on patient safety including physical limitations- Instruct patient to call for assistance with activity - Consult OT/PT to assist with strengthening/mobility - Keep Call bell within reach- Keep bed low and locked with side rails adjusted as appropriate- Keep care items and personal belongings within reach- Initiate and maintain comfort rounds- Make Fall Risk Sign visible to staff- Offer Toileting every 2 Hours, in advance of need- Initiate/Maintain bed alarm- Obtain necessary fall risk management equipment: yellow socks- Apply yellow socks and bracelet for high fall risk patients- Consider moving patient to room near nurses station  Outcome: Progressing     Problem: PAIN - ADULT  Goal: Verbalizes/displays adequate comfort level or baseline comfort level  Description: Interventions:- Encourage patient to monitor pain and request assistance- Assess pain using appropriate pain scale- Administer analgesics based on type and severity of pain and evaluate response- Implement non-pharmacological measures as appropriate and evaluate response- Consider cultural and social influences on pain and pain management- Notify  physician/advanced practitioner if interventions unsuccessful or patient reports new pain  Outcome: Progressing     Problem: INFECTION - ADULT  Goal: Absence or prevention of progression during hospitalization  Description: INTERVENTIONS:- Assess and monitor for signs and symptoms of infection- Monitor lab/diagnostic results- Monitor all insertion sites, i.e. indwelling lines, tubes, and drains- Monitor endotracheal if appropriate and nasal secretions for changes in amount and color- [place] appropriate cooling/warming therapies per order- Administer medications as ordered- Instruct and encourage patient and family to use good hand hygiene technique- Identify and instruct in appropriate isolation precautions for identified infection/condition  Outcome: Progressing  Goal: Absence of fever/infection during neutropenic period  Description: INTERVENTIONS:- Monitor WBC  Outcome: Progressing     Problem: SAFETY ADULT  Goal: Patient will remain free of falls  Description: INTERVENTIONS:- Educate patient/family on patient safety including physical limitations- Instruct patient to call for assistance with activity - Consult OT/PT to assist with strengthening/mobility - Keep Call bell within reach- Keep bed low and locked with side rails adjusted as appropriate- Keep care items and personal belongings within reach- Initiate and maintain comfort rounds- Make Fall Risk Sign visible to staff- Offer Toileting every 2 Hours, in advance of need- Initiate/Maintain bed alarm- Obtain necessary fall risk management equipment: yellow socks- Apply yellow socks and bracelet for high fall risk patients- Consider moving patient to room near nurses station  INTERVENTIONS:- Educate patient/family on patient safety including physical limitations- Instruct patient to call for assistance with activity - Consult OT/PT to assist with strengthening/mobility - Keep Call bell within reach- Keep bed low and locked with side rails adjusted as  appropriate- Keep care items and personal belongings within reach- Initiate and maintain comfort rounds- Make Fall Risk Sign visible to staff- Offer Toileting every 2 Hours, in advance of need- Initiate/Maintain bed alarm- Obtain necessary fall risk management equipment: yellow socks- Apply yellow socks and bracelet for high fall risk patients- Consider moving patient to room near nurses station  Outcome: Progressing  Goal: Maintain or return to baseline ADL function  Description: INTERVENTIONS:-  Assess patient's ability to carry out ADLs; assess patient's baseline for ADL function and identify physical deficits which impact ability to perform ADLs (bathing, care of mouth/teeth, toileting, grooming, dressing, etc.)- Assess/evaluate cause of self-care deficits - Assess range of motion- Assess patient's mobility; develop plan if impaired- Assess patient's need for assistive devices and provide as appropriate- Encourage maximum independence but intervene and supervise when necessary- Involve family in performance of ADLs- Assess for home care needs following discharge - Consider OT consult to assist with ADL evaluation and planning for discharge- Provide patient education as appropriate  Outcome: Progressing  Goal: Maintains/Returns to pre admission functional level  Description: INTERVENTIONS:- Perform AM-PAC 6 Click Basic Mobility/ Daily Activity assessment daily.- Set and communicate daily mobility goal to care team and patient/family/caregiver. - Collaborate with rehabilitation services on mobility goals if consulted- Perform Range of Motion 3 times a day.- Reposition patient every 2 hours.- Dangle patient 3 times a day- Stand patient 3 times a day- Ambulate patient 3 times a day- Out of bed to chair 3 times a day - Out of bed for meals 3 times a day- Out of bed for toileting- Record patient progress and toleration of activity level   Outcome: Progressing     Problem: DISCHARGE PLANNING  Goal: Discharge to home or  other facility with appropriate resources  Description: INTERVENTIONS:- Identify barriers to discharge w/patient and caregiver- Arrange for needed discharge resources and transportation as appropriate- Identify discharge learning needs (meds, wound care, etc.)- Arrange for interpretive services to assist at discharge as needed- Refer to Case Management Department for coordinating discharge planning if the patient needs post-hospital services based on physician/advanced practitioner order or complex needs related to functional status, cognitive ability, or social support system  Outcome: Progressing     Problem: Knowledge Deficit  Goal: Patient/family/caregiver demonstrates understanding of disease process, treatment plan, medications, and discharge instructions  Description: Complete learning assessment and assess knowledge base.Interventions:- Provide teaching at level of understanding- Provide teaching via preferred learning methods  Outcome: Progressing

## 2025-04-15 NOTE — ASSESSMENT & PLAN NOTE
"CT imaging revealed \"Evidence of high-grade small bowel obstruction with mesenteric edema involving most likely the mid jejunum. Adhesion versus internal hernia is possible though not much mesenteric twisting is seen. Transition point appears to be in the mid abdomen with fecalization present but no pneumatosis\"  Patient reports multiple previous small bowel obstructions with most recent in May of last year  Patient has multiple abdominal surgeries  We will continue NG tube to low intermittent suction  Give pain control and antiemetics as needed  IV fluid hydration  Okay for ice chips  General Surgery following  May need Gastrografin test this afternoon  "

## 2025-04-15 NOTE — PLAN OF CARE
Problem: Potential for Falls  Goal: Patient will remain free of falls  Description: INTERVENTIONS:- Educate patient/family on patient safety including physical limitations- Instruct patient to call for assistance with activity - Consult OT/PT to assist with strengthening/mobility - Keep Call bell within reach- Keep bed low and locked with side rails adjusted as appropriate- Keep care items and personal belongings within reach- Initiate and maintain comfort rounds- Make Fall Risk Sign visible to staff- Offer Toileting every 2 Hours, in advance of need- Initiate/Maintain bed alarm- Obtain necessary fall risk management equipment: yellow socks- Apply yellow socks and bracelet for high fall risk patients- Consider moving patient to room near nurses station  INTERVENTIONS:- Educate patient/family on patient safety including physical limitations- Instruct patient to call for assistance with activity - Consult OT/PT to assist with strengthening/mobility - Keep Call bell within reach- Keep bed low and locked with side rails adjusted as appropriate- Keep care items and personal belongings within reach- Initiate and maintain comfort rounds- Make Fall Risk Sign visible to staff- Offer Toileting every 2 Hours, in advance of need- Initiate/Maintain bed alarm- Obtain necessary fall risk management equipment: yellow socks- Apply yellow socks and bracelet for high fall risk patients- Consider moving patient to room near nurses station.  Outcome: Progressing     Problem: PAIN - ADULT  Goal: Verbalizes/displays adequate comfort level or baseline comfort level  Description: Interventions:- Encourage patient to monitor pain and request assistance- Assess pain using appropriate pain scale- Administer analgesics based on type and severity of pain and evaluate response- Implement non-pharmacological measures as appropriate and evaluate response- Consider cultural and social influences on pain and pain management- Notify  physician/advanced practitioner if interventions unsuccessful or patient reports new pain.  Outcome: Progressing     Problem: INFECTION - ADULT  Goal: Absence or prevention of progression during hospitalization  Description: INTERVENTIONS:- Assess and monitor for signs and symptoms of infection- Monitor lab/diagnostic results- Monitor all insertion sites, i.e. indwelling lines, tubes, and drains- Monitor endotracheal if appropriate and nasal secretions for changes in amount and color- [place] appropriate cooling/warming therapies per order- Administer medications as ordered- Instruct and encourage patient and family to use good hand hygiene technique- Identify and instruct in appropriate isolation precautions for identified infection/condition.  Outcome: Progressing  Goal: Absence of fever/infection during neutropenic period  Description: INTERVENTIONS:- Monitor WBC.  Outcome: Progressing     Problem: SAFETY ADULT  Goal: Patient will remain free of falls  Description: INTERVENTIONS:- Educate patient/family on patient safety including physical limitations- Instruct patient to call for assistance with activity - Consult OT/PT to assist with strengthening/mobility - Keep Call bell within reach- Keep bed low and locked with side rails adjusted as appropriate- Keep care items and personal belongings within reach- Initiate and maintain comfort rounds- Make Fall Risk Sign visible to staff- Offer Toileting every 2 Hours, in advance of need- Initiate/Maintain bed alarm- Obtain necessary fall risk management equipment: yellow socks- Apply yellow socks and bracelet for high fall risk patients- Consider moving patient to room near nurses station  INTERVENTIONS:- Educate patient/family on patient safety including physical limitations- Instruct patient to call for assistance with activity - Consult OT/PT to assist with strengthening/mobility - Keep Call bell within reach- Keep bed low and locked with side rails adjusted as  appropriate- Keep care items and personal belongings within reach- Initiate and maintain comfort rounds- Make Fall Risk Sign visible to staff- Offer Toileting every 2 Hours, in advance of need- Initiate/Maintain bed alarm- Obtain necessary fall risk management equipment: yellow socks- Apply yellow socks and bracelet for high fall risk patients- Consider moving patient to room near nurses station.  Outcome: Progressing  Goal: Maintain or return to baseline ADL function  Description: INTERVENTIONS:-  Assess patient's ability to carry out ADLs; assess patient's baseline for ADL function and identify physical deficits which impact ability to perform ADLs (bathing, care of mouth/teeth, toileting, grooming, dressing, etc.)- Assess/evaluate cause of self-care deficits - Assess range of motion- Assess patient's mobility; develop plan if impaired- Assess patient's need for assistive devices and provide as appropriate- Encourage maximum independence but intervene and supervise when necessary- Involve family in performance of ADLs- Assess for home care needs following discharge - Consider OT consult to assist with ADL evaluation and planning for discharge- Provide patient education as appropriate  Outcome: Progressing  Goal: Maintains/Returns to pre admission functional level  Description: INTERVENTIONS:- Perform AM-PAC 6 Click Basic Mobility/ Daily Activity assessment daily.- Set and communicate daily mobility goal to care team and patient/family/caregiver. - Collaborate with rehabilitation services on mobility goals if consulted- Perform Range of Motion 3 times a day.- Reposition patient every 2 hours.- Dangle patient 3 times a day- Stand patient 3 times a day- Ambulate patient 3 times a day- Out of bed to chair 3 times a day - Out of bed for meals 3 times a day- Out of bed for toileting- Record patient progress and toleration of activity level   Outcome: Progressing     Problem: DISCHARGE PLANNING  Goal: Discharge to home  or other facility with appropriate resources  Description: INTERVENTIONS:- Identify barriers to discharge w/patient and caregiver- Arrange for needed discharge resources and transportation as appropriate- Identify discharge learning needs (meds, wound care, etc.)- Arrange for interpretive services to assist at discharge as needed- Refer to Case Management Department for coordinating discharge planning if the patient needs post-hospital services based on physician/advanced practitioner order or complex needs related to functional status, cognitive ability, or social support system  Outcome: Progressing     Problem: Knowledge Deficit  Goal: Patient/family/caregiver demonstrates understanding of disease process, treatment plan, medications, and discharge instructions  Description: Complete learning assessment and assess knowledge base.Interventions:- Provide teaching at level of understanding- Provide teaching via preferred learning methods  Outcome: Progressing

## 2025-04-15 NOTE — PROGRESS NOTES
"Progress Note - Hospitalist   Name: Amparo Dobbs 49 y.o. female I MRN: [id number]  Unit/Bed#: [facility] I Date of Admission: 4/13/2025   Date of Service: 4/15/2025 I Hospital Day: 2     Assessment & Plan  Small bowel obstruction (HCC)  CT imaging revealed \"Evidence of high-grade small bowel obstruction with mesenteric edema involving most likely the mid jejunum. Adhesion versus internal hernia is possible though not much mesenteric twisting is seen. Transition point appears to be in the mid abdomen with fecalization present but no pneumatosis\"  Patient reports multiple previous small bowel obstructions with most recent in May of last year  Patient has multiple abdominal surgeries  We will continue NG tube to low intermittent suction  Give pain control and antiemetics as needed  IV fluid hydration  Okay for ice chips  General Surgery following  May need Gastrografin test this afternoon  Bipolar disorder, in partial remission, most recent episode mixed (HCC)  Continue prehospital Cymbalta 60 mg p.o. daily and Seroquel 100 mg p.o. nightly  Essential hypertension  Continue prehospital prazosin 5 mg p.o. daily  Gastroesophageal reflux disease without esophagitis  Substitute Protonix 40 mg p.o. daily for prehospital Aciphex and change prehospital Pepcid to 20 mg IV twice daily    VTE Pharmacologic Prophylaxis: VTE Score: 2 Low Risk (Score 0-2) - Encourage Ambulation.    Mobility:   Basic Mobility Inpatient Raw Score: 23  JH-HLM Goal: 7: Walk 25 feet or more  JH-HLM Achieved: 7: Walk 25 feet or more  JH-HLM Goal achieved. Continue to encourage appropriate mobility.    Patient Centered Rounds: I performed bedside rounds with nursing staff today.     Discussions with Specialists or Other Care Team Provider: General Surgery    Education and Discussions with Family / Patient: Patient declined call to .     Current Length of Stay: 2 day(s)  Current Patient Status: Inpatient   Certification Statement: The patient " will continue to require additional inpatient hospital stay due to small bowel obstruction  Discharge Plan: Anticipate discharge in 24-48 hrs to home.    Code Status: Level 1 - Full Code    Subjective   Patient seen and examined at bedside.  No acute events overnight.  NG tube in place.  IV fluid hydration.  Okay for ice chips.  Possible Gastrografin test this afternoon.    Objective :  Temp:  [98.1 °F (36.7 °C)-98.8 °F (37.1 °C)] 98.8 °F (37.1 °C)  HR:  [61-94] 94  BP: (135-168)/() 164/88  Resp:  [17-20] 20  SpO2:  [77 %-97 %] 94 %  O2 Device: Nasal cannula  Nasal Cannula O2 Flow Rate (L/min):  [2 L/min] 2 L/min    Body mass index is 40.43 kg/m².     Input and Output Summary (last 24 hours):     Intake/Output Summary (Last 24 hours) at 4/15/2025 1010  Last data filed at 4/15/2025 0923  Gross per 24 hour   Intake 1020 ml   Output 1150 ml   Net -130 ml       Physical Exam  Vitals and nursing note reviewed.   Constitutional:       General: She is not in acute distress.     Appearance: She is well-developed.   HENT:      Head: Normocephalic and atraumatic.   Eyes:      Conjunctiva/sclera: Conjunctivae normal.   Cardiovascular:      Rate and Rhythm: Normal rate and regular rhythm.      Heart sounds: No murmur heard.  Pulmonary:      Effort: Pulmonary effort is normal. No respiratory distress.      Breath sounds: Normal breath sounds.   Abdominal:      Palpations: Abdomen is soft.      Tenderness: There is no abdominal tenderness.   Musculoskeletal:         General: No swelling.      Cervical back: Neck supple.   Skin:     General: Skin is warm and dry.      Capillary Refill: Capillary refill takes less than 2 seconds.   Neurological:      Mental Status: She is alert.   Psychiatric:         Mood and Affect: Mood normal.       Lines/Drains:  Lines/Drains/Airways       Active Status       Name Placement date Placement time Site Days    NG/OG/Enteral Tube Nasogastric 16 Fr Right nare 04/13/25 2029  Right nare  1                             Lab Results: I have reviewed the following results:   Results from last 7 days   Lab Units 04/15/25  0517   WBC Thousand/uL 11.92*   HEMOGLOBIN g/dL 13.7   HEMATOCRIT % 44.2   PLATELETS Thousands/uL 280   SEGS PCT % 76*   LYMPHO PCT % 13*   MONO PCT % 7   EOS PCT % 2     Results from last 7 days   Lab Units 04/15/25  0517   SODIUM mmol/L 138   POTASSIUM mmol/L 3.9   CHLORIDE mmol/L 104   CO2 mmol/L 26   BUN mg/dL 10   CREATININE mg/dL 0.58*   ANION GAP mmol/L 8   CALCIUM mg/dL 8.8   ALBUMIN g/dL 3.9   TOTAL BILIRUBIN mg/dL 0.57   ALK PHOS U/L 108*   ALT U/L 64*   AST U/L 43*   GLUCOSE RANDOM mg/dL 78         Results from last 7 days   Lab Units 04/14/25  1541 04/14/25  1143   POC GLUCOSE mg/dl 82 91         Results from last 7 days   Lab Units 04/13/25  1641   LACTIC ACID mmol/L 1.3       Recent Cultures (last 7 days):         Imaging Results Review: No pertinent imaging studies reviewed.  Other Study Results Review: No additional pertinent studies reviewed.    Last 24 Hours Medication List:     Current Facility-Administered Medications:     acetaminophen (Ofirmev) injection 1,000 mg, Q6H ADAM, Last Rate: 1,000 mg (04/15/25 0523)    albuterol (PROVENTIL HFA,VENTOLIN HFA) inhaler 2 puff, Q6H PRN    dextrose 5 % and sodium chloride 0.45 % infusion, Continuous, Last Rate: 125 mL/hr (04/15/25 0923)    DULoxetine (CYMBALTA) delayed release capsule 60 mg, Daily    Famotidine (PF) (PEPCID) injection 20 mg, Q12H ADAM    HYDROmorphone (DILAUDID) injection 0.5 mg, Q4H PRN    HYDROmorphone HCl (DILAUDID) injection 0.2 mg, Q3H PRN    hydroxychloroquine (PLAQUENIL) tablet 200 mg, BID    ondansetron (ZOFRAN) injection 4 mg, Q6H PRN    pantoprazole (PROTONIX) injection 40 mg, Q24H ADAM    phenol (CHLORASEPTIC) 1.4 % mucosal liquid 1 spray, Q2H PRN    prazosin (MINIPRESS) capsule 5 mg, Daily    QUEtiapine (SEROquel) tablet 100 mg, HS    trimethobenzamide (TIGAN) IM injection 200 mg, Q6H PRN    Administrative  Statements   Today, Patient Was Seen By: Howie Looney, DO  I have spent a total time of 35 minutes in caring for this patient on the day of the visit/encounter including Diagnostic results, Prognosis, Risks and benefits of tx options, Instructions for management, Patient and family education, Importance of tx compliance, Risk factor reductions, Impressions, Counseling / Coordination of care, Documenting in the medical record, Reviewing/placing orders in the medical record (including tests, medications, and/or procedures), Obtaining or reviewing history  , and Communicating with other healthcare professionals .    **Please Note: This note may have been constructed using a voice recognition system.**

## 2025-04-15 NOTE — ASSESSMENT & PLAN NOTE
"50yo female PMH recurrent SBOs, GERD, depression presents with SBO   -CT AP 4/13: \"Evidence of high-grade small bowel obstruction with mesenteric edema involving most likely the mid jejunum. Adhesion versus internal hernia is possible though not much mesenteric twisting is seen. Transition point appears to be in the mid abdomen with   fecalization present but no pneumatosis. Surgical consultation is advised.\"   -NG with 750cc output recorded, bilious   -abdomen is flat, soft, TTP lower abdomen   -afebrile, VSS   -WBC 11   -elytes wnl    Plan:  -no acute surgical intervention indicated; will re-evaluate in a few hours to assess patient's nausea and abdominal exam, possible gastrografin study this afternoon  -continue NGT, NPO, IVF  -okay for ice chips  -pain control  -medical management per SLIM  -discussed with Dr Farias  "

## 2025-04-15 NOTE — ASSESSMENT & PLAN NOTE
Substitute Protonix 40 mg p.o. daily for prehospital Aciphex and change prehospital Pepcid to 20 mg IV twice daily

## 2025-04-16 LAB
ANION GAP SERPL CALCULATED.3IONS-SCNC: 7 MMOL/L (ref 4–13)
BASOPHILS # BLD AUTO: 0.04 THOUSANDS/ÂΜL (ref 0–0.1)
BASOPHILS NFR BLD AUTO: 1 % (ref 0–1)
BUN SERPL-MCNC: 7 MG/DL (ref 5–25)
CALCIUM SERPL-MCNC: 8.7 MG/DL (ref 8.4–10.2)
CHLORIDE SERPL-SCNC: 106 MMOL/L (ref 96–108)
CO2 SERPL-SCNC: 29 MMOL/L (ref 21–32)
CREAT SERPL-MCNC: 0.55 MG/DL (ref 0.6–1.3)
EOSINOPHIL # BLD AUTO: 0.27 THOUSAND/ÂΜL (ref 0–0.61)
EOSINOPHIL NFR BLD AUTO: 4 % (ref 0–6)
ERYTHROCYTE [DISTWIDTH] IN BLOOD BY AUTOMATED COUNT: 14 % (ref 11.6–15.1)
GFR SERPL CREATININE-BSD FRML MDRD: 112 ML/MIN/1.73SQ M
GLUCOSE SERPL-MCNC: 111 MG/DL (ref 65–140)
HCT VFR BLD AUTO: 39.9 % (ref 34.8–46.1)
HGB BLD-MCNC: 12.7 G/DL (ref 11.5–15.4)
IMM GRANULOCYTES # BLD AUTO: 0.05 THOUSAND/UL (ref 0–0.2)
IMM GRANULOCYTES NFR BLD AUTO: 1 % (ref 0–2)
LYMPHOCYTES # BLD AUTO: 2.15 THOUSANDS/ÂΜL (ref 0.6–4.47)
LYMPHOCYTES NFR BLD AUTO: 29 % (ref 14–44)
MAGNESIUM SERPL-MCNC: 1.9 MG/DL (ref 1.9–2.7)
MCH RBC QN AUTO: 28.1 PG (ref 26.8–34.3)
MCHC RBC AUTO-ENTMCNC: 31.8 G/DL (ref 31.4–37.4)
MCV RBC AUTO: 88 FL (ref 82–98)
MONOCYTES # BLD AUTO: 0.63 THOUSAND/ÂΜL (ref 0.17–1.22)
MONOCYTES NFR BLD AUTO: 8 % (ref 4–12)
NEUTROPHILS # BLD AUTO: 4.34 THOUSANDS/ÂΜL (ref 1.85–7.62)
NEUTS SEG NFR BLD AUTO: 57 % (ref 43–75)
NRBC BLD AUTO-RTO: 0 /100 WBCS
PHOSPHATE SERPL-MCNC: 3.3 MG/DL (ref 2.7–4.5)
PLATELET # BLD AUTO: 259 THOUSANDS/UL (ref 149–390)
PMV BLD AUTO: 9.3 FL (ref 8.9–12.7)
POTASSIUM SERPL-SCNC: 3.4 MMOL/L (ref 3.5–5.3)
RBC # BLD AUTO: 4.52 MILLION/UL (ref 3.81–5.12)
SODIUM SERPL-SCNC: 142 MMOL/L (ref 135–147)
WBC # BLD AUTO: 7.48 THOUSAND/UL (ref 4.31–10.16)

## 2025-04-16 PROCEDURE — 99232 SBSQ HOSP IP/OBS MODERATE 35: CPT | Performed by: INTERNAL MEDICINE

## 2025-04-16 PROCEDURE — 80048 BASIC METABOLIC PNL TOTAL CA: CPT | Performed by: INTERNAL MEDICINE

## 2025-04-16 PROCEDURE — 99232 SBSQ HOSP IP/OBS MODERATE 35: CPT | Performed by: SURGERY

## 2025-04-16 PROCEDURE — 83735 ASSAY OF MAGNESIUM: CPT | Performed by: INTERNAL MEDICINE

## 2025-04-16 PROCEDURE — 85025 COMPLETE CBC W/AUTO DIFF WBC: CPT | Performed by: INTERNAL MEDICINE

## 2025-04-16 PROCEDURE — 84100 ASSAY OF PHOSPHORUS: CPT | Performed by: INTERNAL MEDICINE

## 2025-04-16 RX ORDER — POTASSIUM CHLORIDE 1500 MG/1
20 TABLET, EXTENDED RELEASE ORAL ONCE
Status: COMPLETED | OUTPATIENT
Start: 2025-04-16 | End: 2025-04-16

## 2025-04-16 RX ORDER — POTASSIUM CHLORIDE 14.9 MG/ML
20 INJECTION INTRAVENOUS
Status: COMPLETED | OUTPATIENT
Start: 2025-04-16 | End: 2025-04-16

## 2025-04-16 RX ADMIN — DULOXETINE HYDROCHLORIDE 60 MG: 60 CAPSULE, DELAYED RELEASE ORAL at 08:40

## 2025-04-16 RX ADMIN — PANTOPRAZOLE SODIUM 40 MG: 40 INJECTION, POWDER, FOR SOLUTION INTRAVENOUS at 08:39

## 2025-04-16 RX ADMIN — ALPRAZOLAM 0.5 MG: 0.5 TABLET ORAL at 21:57

## 2025-04-16 RX ADMIN — HYDROXYCHLOROQUINE SULFATE 200 MG: 200 TABLET, FILM COATED ORAL at 17:56

## 2025-04-16 RX ADMIN — PRAZOSIN HYDROCHLORIDE 5 MG: 1 CAPSULE ORAL at 22:00

## 2025-04-16 RX ADMIN — FAMOTIDINE 20 MG: 10 INJECTION, SOLUTION INTRAVENOUS at 08:40

## 2025-04-16 RX ADMIN — DEXTROSE AND SODIUM CHLORIDE 125 ML/HR: 5; .45 INJECTION, SOLUTION INTRAVENOUS at 08:11

## 2025-04-16 RX ADMIN — HYDROXYCHLOROQUINE SULFATE 200 MG: 200 TABLET, FILM COATED ORAL at 08:40

## 2025-04-16 RX ADMIN — FAMOTIDINE 20 MG: 10 INJECTION, SOLUTION INTRAVENOUS at 21:57

## 2025-04-16 RX ADMIN — POTASSIUM CHLORIDE 20 MEQ: 1500 TABLET, EXTENDED RELEASE ORAL at 08:40

## 2025-04-16 RX ADMIN — ACETAMINOPHEN 1000 MG: 10 INJECTION INTRAVENOUS at 12:20

## 2025-04-16 RX ADMIN — ACETAMINOPHEN 1000 MG: 10 INJECTION INTRAVENOUS at 05:35

## 2025-04-16 RX ADMIN — POTASSIUM CHLORIDE 20 MEQ: 14.9 INJECTION, SOLUTION INTRAVENOUS at 12:20

## 2025-04-16 RX ADMIN — ACETAMINOPHEN 1000 MG: 10 INJECTION INTRAVENOUS at 17:56

## 2025-04-16 RX ADMIN — QUETIAPINE FUMARATE 100 MG: 100 TABLET ORAL at 21:57

## 2025-04-16 RX ADMIN — POTASSIUM CHLORIDE 20 MEQ: 14.9 INJECTION, SOLUTION INTRAVENOUS at 10:08

## 2025-04-16 NOTE — ASSESSMENT & PLAN NOTE
"CT imaging revealed \"Evidence of high-grade small bowel obstruction with mesenteric edema involving most likely the mid jejunum. Adhesion versus internal hernia is possible though not much mesenteric twisting is seen. Transition point appears to be in the mid abdomen with fecalization present but no pneumatosis\"  Patient reports multiple previous small bowel obstructions with most recent in May of last year  Patient has multiple abdominal surgeries  Gastrografin test within normal limits  NG tube has been discontinued  Tolerating surgical diet  Pain regimen as ordered  General Surgery following  Advance diet as tolerated  "

## 2025-04-16 NOTE — PLAN OF CARE
Problem: Potential for Falls  Goal: Patient will remain free of falls  Description: INTERVENTIONS:- Educate patient/family on patient safety including physical limitations- Instruct patient to call for assistance with activity - Consult OT/PT to assist with strengthening/mobility - Keep Call bell within reach- Keep bed low and locked with side rails adjusted as appropriate- Keep care items and personal belongings within reach- Initiate and maintain comfort rounds- Make Fall Risk Sign visible to staff- Offer Toileting every 2 Hours, in advance of need- Initiate/Maintain bed alarm- Obtain necessary fall risk management equipment: yellow socks- Apply yellow socks and bracelet for high fall risk patients- Consider moving patient to room near nurses station  INTERVENTIONS:- Educate patient/family on patient safety including physical limitations- Instruct patient to call for assistance with activity - Consult OT/PT to assist with strengthening/mobility - Keep Call bell within reach- Keep bed low and locked with side rails adjusted as appropriate- Keep care items and personal belongings within reach- Initiate and maintain comfort rounds- Make Fall Risk Sign visible to staff- Offer Toileting every 2 Hours, in advance of need- Initiate/Maintain bed alarm- Obtain necessary fall risk management equipment: yellow socks- Apply yellow socks and bracelet for high fall risk patients- Consider moving patient to room near nurses station  Outcome: Progressing     Problem: PAIN - ADULT  Goal: Verbalizes/displays adequate comfort level or baseline comfort level  Description: Interventions:- Encourage patient to monitor pain and request assistance- Assess pain using appropriate pain scale- Administer analgesics based on type and severity of pain and evaluate response- Implement non-pharmacological measures as appropriate and evaluate response- Consider cultural and social influences on pain and pain management- Notify  physician/advanced practitioner if interventions unsuccessful or patient reports new pain  Outcome: Progressing     Problem: INFECTION - ADULT  Goal: Absence or prevention of progression during hospitalization  Description: INTERVENTIONS:- Assess and monitor for signs and symptoms of infection- Monitor lab/diagnostic results- Monitor all insertion sites, i.e. indwelling lines, tubes, and drains- Monitor endotracheal if appropriate and nasal secretions for changes in amount and color- [place] appropriate cooling/warming therapies per order- Administer medications as ordered- Instruct and encourage patient and family to use good hand hygiene technique- Identify and instruct in appropriate isolation precautions for identified infection/condition  Outcome: Progressing     Problem: SAFETY ADULT  Goal: Patient will remain free of falls  Description: INTERVENTIONS:- Educate patient/family on patient safety including physical limitations- Instruct patient to call for assistance with activity - Consult OT/PT to assist with strengthening/mobility - Keep Call bell within reach- Keep bed low and locked with side rails adjusted as appropriate- Keep care items and personal belongings within reach- Initiate and maintain comfort rounds- Make Fall Risk Sign visible to staff- Offer Toileting every 2 Hours, in advance of need- Initiate/Maintain bed alarm- Obtain necessary fall risk management equipment: yellow socks- Apply yellow socks and bracelet for high fall risk patients- Consider moving patient to room near nurses station  INTERVENTIONS:- Educate patient/family on patient safety including physical limitations- Instruct patient to call for assistance with activity - Consult OT/PT to assist with strengthening/mobility - Keep Call bell within reach- Keep bed low and locked with side rails adjusted as appropriate- Keep care items and personal belongings within reach- Initiate and maintain comfort rounds- Make Fall Risk Sign  visible to staff- Offer Toileting every 2 Hours, in advance of need- Initiate/Maintain bed alarm- Obtain necessary fall risk management equipment: yellow socks- Apply yellow socks and bracelet for high fall risk patients- Consider moving patient to room near nurses station  Outcome: Progressing  Goal: Maintain or return to baseline ADL function  Description: INTERVENTIONS:-  Assess patient's ability to carry out ADLs; assess patient's baseline for ADL function and identify physical deficits which impact ability to perform ADLs (bathing, care of mouth/teeth, toileting, grooming, dressing, etc.)- Assess/evaluate cause of self-care deficits - Assess range of motion- Assess patient's mobility; develop plan if impaired- Assess patient's need for assistive devices and provide as appropriate- Encourage maximum independence but intervene and supervise when necessary- Involve family in performance of ADLs- Assess for home care needs following discharge - Consider OT consult to assist with ADL evaluation and planning for discharge- Provide patient education as appropriate  Outcome: Progressing  Goal: Maintains/Returns to pre admission functional level  Description: INTERVENTIONS:- Perform AM-PAC 6 Click Basic Mobility/ Daily Activity assessment daily.- Set and communicate daily mobility goal to care team and patient/family/caregiver. - Collaborate with rehabilitation services on mobility goals if consulted- Perform Range of Motion 3 times a day.- Reposition patient every 2 hours.- Dangle patient 3 times a day- Stand patient 3 times a day- Ambulate patient 3 times a day- Out of bed to chair 3 times a day - Out of bed for meals 3 times a day- Out of bed for toileting- Record patient progress and toleration of activity level   Outcome: Progressing     Problem: DISCHARGE PLANNING  Goal: Discharge to home or other facility with appropriate resources  Description: INTERVENTIONS:- Identify barriers to discharge w/patient and  caregiver- Arrange for needed discharge resources and transportation as appropriate- Identify discharge learning needs (meds, wound care, etc.)- Refer to Case Management Department for coordinating discharge planning if the patient needs post-hospital services based on physician/advanced practitioner order or complex needs related to functional status, cognitive ability, or social support system  Outcome: Progressing     Problem: Knowledge Deficit  Goal: Patient/family/caregiver demonstrates understanding of disease process, treatment plan, medications, and discharge instructions  Description: Complete learning assessment and assess knowledge base.Interventions:- Provide teaching at level of understanding- Provide teaching via preferred learning methods  Outcome: Progressing     Problem: GASTROINTESTINAL - ADULT  Goal: Minimal or absence of nausea and/or vomiting  Description: INTERVENTIONS:- Administer IV fluids if ordered to ensure adequate hydration- Maintain NPO status until nausea and vomiting are resolved- Nasogastric tube if ordered- Administer ordered antiemetic medications as needed- Provide nonpharmacologic comfort measures as appropriate- Advance diet as tolerated, if ordered- Consider nutrition services referral to assist patient with adequate nutrition and appropriate food choices  Outcome: Progressing  Goal: Maintains or returns to baseline bowel function  Description: INTERVENTIONS:- Assess bowel function- Encourage oral fluids to ensure adequate hydration- Administer IV fluids if ordered to ensure adequate hydration- Administer ordered medications as needed- Encourage mobilization and activity- Consider nutritional services referral to assist patient with adequate nutrition and appropriate food choices  Outcome: Progressing  Goal: Maintains adequate nutritional intake  Description: INTERVENTIONS:- Monitor percentage of each meal consumed- Identify factors contributing to decreased intake, treat as  appropriate- Assist with meals as needed- Monitor I&O, weight, and lab values if indicated- Obtain nutrition services referral as needed  Outcome: Progressing

## 2025-04-16 NOTE — ASSESSMENT & PLAN NOTE
"48yo female PMH recurrent SBOs, GERD, depression presents with SBO   -CT AP 4/13: \"Evidence of high-grade small bowel obstruction with mesenteric edema involving most likely the mid jejunum. Adhesion versus internal hernia is possible though not much mesenteric twisting is seen. Transition point appears to be in the mid abdomen with   fecalization present but no pneumatosis. Surgical consultation is advised.\"   -contrast reached the colon yesterday, NG tube removed, patient tolerating clear liquids   -abdomen is flat, soft, TTP lower abdomen   -afebrile, VSS   -no leukocytosis   -elytes wnl    Plan:  -no acute surgical intervention indicated; diet advanced to full liquids with toast and crackers  -okay to restart PO home meds  -pain control  -medical management per SLIM  -discussed with Dr Farias  "

## 2025-04-16 NOTE — PROGRESS NOTES
"Progress Note - Hospitalist   Name: Amparo Dobbs 49 y.o. female I MRN: [id number]  Unit/Bed#: [facility] I Date of Admission: 4/13/2025   Date of Service: 4/16/2025 I Hospital Day: 3     Assessment & Plan  Small bowel obstruction (HCC)  CT imaging revealed \"Evidence of high-grade small bowel obstruction with mesenteric edema involving most likely the mid jejunum. Adhesion versus internal hernia is possible though not much mesenteric twisting is seen. Transition point appears to be in the mid abdomen with fecalization present but no pneumatosis\"  Patient reports multiple previous small bowel obstructions with most recent in May of last year  Patient has multiple abdominal surgeries  Gastrografin test within normal limits  NG tube has been discontinued  Tolerating surgical diet  Pain regimen as ordered  General Surgery following  Advance diet as tolerated  Bipolar disorder, in partial remission, most recent episode mixed (HCC)  Continue prehospital Cymbalta 60 mg p.o. daily and Seroquel 100 mg p.o. nightly  Essential hypertension  Continue prehospital prazosin 5 mg p.o. daily  Gastroesophageal reflux disease without esophagitis  Substitute Protonix 40 mg p.o. daily for prehospital Aciphex and change prehospital Pepcid to 20 mg IV twice daily  Morbid obesity (HCC)  Present on admission as evidenced by BMI of 40  Encourage lifestyle modifications and weight loss    VTE Pharmacologic Prophylaxis: VTE Score: 2 Low Risk (Score 0-2) - Encourage Ambulation.    Mobility:   Basic Mobility Inpatient Raw Score: 23  JH-HLM Goal: 7: Walk 25 feet or more  JH-HLM Achieved: 8: Walk 250 feet ot more  JH-HLM Goal achieved. Continue to encourage appropriate mobility.    Patient Centered Rounds: I performed bedside rounds with nursing staff today.     Discussions with Specialists or Other Care Team Provider: General Surgery    Education and Discussions with Family / Patient: Patient declined call to .     Current Length of " Stay: 3 day(s)  Current Patient Status: Inpatient   Certification Statement: The patient will continue to require additional inpatient hospital stay due to small bowel obstruction  Discharge Plan: Anticipate discharge in 24-48 hrs to home.    Code Status: Level 1 - Full Code    Subjective   Patient seen and examined at bedside.  No acute events overnight.  NG tube discontinued.  Tolerating surgical diet.  Discharge planning later today versus tomorrow once tolerating full diet.    Objective :  Temp:  [97.6 °F (36.4 °C)-98.9 °F (37.2 °C)] 98.9 °F (37.2 °C)  HR:  [61-73] 73  BP: (121-160)/(63-94) 121/63  Resp:  [18-20] 20  SpO2:  [92 %-96 %] 92 %    Body mass index is 40.43 kg/m².     Input and Output Summary (last 24 hours):     Intake/Output Summary (Last 24 hours) at 4/16/2025 1003  Last data filed at 4/16/2025 0847  Gross per 24 hour   Intake 3120 ml   Output 600 ml   Net 2520 ml       Physical Exam  Vitals and nursing note reviewed.   Constitutional:       General: She is not in acute distress.     Appearance: She is well-developed.   HENT:      Head: Normocephalic and atraumatic.   Eyes:      Conjunctiva/sclera: Conjunctivae normal.   Cardiovascular:      Rate and Rhythm: Normal rate and regular rhythm.      Heart sounds: No murmur heard.  Pulmonary:      Effort: Pulmonary effort is normal. No respiratory distress.      Breath sounds: Normal breath sounds.   Abdominal:      Palpations: Abdomen is soft.      Tenderness: There is no abdominal tenderness.   Musculoskeletal:         General: No swelling.      Cervical back: Neck supple.   Skin:     General: Skin is warm and dry.      Capillary Refill: Capillary refill takes less than 2 seconds.   Neurological:      Mental Status: She is alert.   Psychiatric:         Mood and Affect: Mood normal.       Lines/Drains:                Lab Results: I have reviewed the following results:   Results from last 7 days   Lab Units 04/16/25  0534   WBC Thousand/uL 7.48    HEMOGLOBIN g/dL 12.7   HEMATOCRIT % 39.9   PLATELETS Thousands/uL 259   SEGS PCT % 57   LYMPHO PCT % 29   MONO PCT % 8   EOS PCT % 4     Results from last 7 days   Lab Units 04/16/25  0534 04/15/25  0517   SODIUM mmol/L 142 138   POTASSIUM mmol/L 3.4* 3.9   CHLORIDE mmol/L 106 104   CO2 mmol/L 29 26   BUN mg/dL 7 10   CREATININE mg/dL 0.55* 0.58*   ANION GAP mmol/L 7 8   CALCIUM mg/dL 8.7 8.8   ALBUMIN g/dL  --  3.9   TOTAL BILIRUBIN mg/dL  --  0.57   ALK PHOS U/L  --  108*   ALT U/L  --  64*   AST U/L  --  43*   GLUCOSE RANDOM mg/dL 111 78         Results from last 7 days   Lab Units 04/14/25  1541 04/14/25  1143   POC GLUCOSE mg/dl 82 91         Results from last 7 days   Lab Units 04/13/25  1641   LACTIC ACID mmol/L 1.3       Recent Cultures (last 7 days):         Imaging Results Review: No pertinent imaging studies reviewed.  Other Study Results Review: No additional pertinent studies reviewed.    Last 24 Hours Medication List:     Current Facility-Administered Medications:     acetaminophen (Ofirmev) injection 1,000 mg, Q6H ADAM, Last Rate: 400 mL/hr at 04/16/25 0600    albuterol (PROVENTIL HFA,VENTOLIN HFA) inhaler 2 puff, Q6H PRN    ALPRAZolam (XANAX) tablet 0.5 mg, HS PRN    DULoxetine (CYMBALTA) delayed release capsule 60 mg, Daily    Famotidine (PF) (PEPCID) injection 20 mg, Q12H ADAM    HYDROmorphone HCl (DILAUDID) injection 0.2 mg, Q3H PRN    hydroxychloroquine (PLAQUENIL) tablet 200 mg, BID    ondansetron (ZOFRAN) injection 4 mg, Q6H PRN    pantoprazole (PROTONIX) injection 40 mg, Q24H ADAM    phenol (CHLORASEPTIC) 1.4 % mucosal liquid 1 spray, Q2H PRN    potassium chloride 40 mEq IVPB (premix), Once    prazosin (MINIPRESS) capsule 5 mg, Daily    QUEtiapine (SEROquel) tablet 100 mg, HS    trimethobenzamide (TIGAN) IM injection 200 mg, Q6H PRN    Administrative Statements   Today, Patient Was Seen By: Howie Looney, DO  I have spent a total time of 35 minutes in caring for this patient on the day of the  visit/encounter including Diagnostic results, Prognosis, Risks and benefits of tx options, Instructions for management, Patient and family education, Importance of tx compliance, Risk factor reductions, Impressions, Counseling / Coordination of care, Documenting in the medical record, Reviewing/placing orders in the medical record (including tests, medications, and/or procedures), Obtaining or reviewing history  , and Communicating with other healthcare professionals .    **Please Note: This note may have been constructed using a voice recognition system.**

## 2025-04-16 NOTE — PLAN OF CARE
Problem: Potential for Falls  Goal: Patient will remain free of falls  Description: INTERVENTIONS:- Educate patient/family on patient safety including physical limitations- Instruct patient to call for assistance with activity - Consult OT/PT to assist with strengthening/mobility - Keep Call bell within reach- Keep bed low and locked with side rails adjusted as appropriate- Keep care items and personal belongings within reach- Initiate and maintain comfort rounds- Make Fall Risk Sign visible to staff- Offer Toileting every 2 Hours, in advance of need- Initiate/Maintain bed alarm- Obtain necessary fall risk management equipment: yellow socks- Apply yellow socks and bracelet for high fall risk patients- Consider moving patient to room near nurses station  INTERVENTIONS:- Educate patient/family on patient safety including physical limitations- Instruct patient to call for assistance with activity - Consult OT/PT to assist with strengthening/mobility - Keep Call bell within reach- Keep bed low and locked with side rails adjusted as appropriate- Keep care items and personal belongings within reach- Initiate and maintain comfort rounds- Make Fall Risk Sign visible to staff- Offer Toileting every 2 Hours, in advance of need- Initiate/Maintain bed alarm- Obtain necessary fall risk management equipment: yellow socks- Apply yellow socks and bracelet for high fall risk patients- Consider moving patient to room near nurses station  Outcome: Progressing     Problem: PAIN - ADULT  Goal: Verbalizes/displays adequate comfort level or baseline comfort level  Description: Interventions:- Encourage patient to monitor pain and request assistance- Assess pain using appropriate pain scale- Administer analgesics based on type and severity of pain and evaluate response- Implement non-pharmacological measures as appropriate and evaluate response- Consider cultural and social influences on pain and pain management- Notify  physician/advanced practitioner if interventions unsuccessful or patient reports new pain  Outcome: Progressing     Problem: INFECTION - ADULT  Goal: Absence or prevention of progression during hospitalization  Description: INTERVENTIONS:- Assess and monitor for signs and symptoms of infection- Monitor lab/diagnostic results- Monitor all insertion sites, i.e. indwelling lines, tubes, and drains- Monitor endotracheal if appropriate and nasal secretions for changes in amount and color- [place] appropriate cooling/warming therapies per order- Administer medications as ordered- Instruct and encourage patient and family to use good hand hygiene technique- Identify and instruct in appropriate isolation precautions for identified infection/condition  Outcome: Progressing     Problem: SAFETY ADULT  Goal: Patient will remain free of falls  Description: INTERVENTIONS:- Educate patient/family on patient safety including physical limitations- Instruct patient to call for assistance with activity - Consult OT/PT to assist with strengthening/mobility - Keep Call bell within reach- Keep bed low and locked with side rails adjusted as appropriate- Keep care items and personal belongings within reach- Initiate and maintain comfort rounds- Make Fall Risk Sign visible to staff- Offer Toileting every 2 Hours, in advance of need- Initiate/Maintain bed alarm- Obtain necessary fall risk management equipment: yellow socks- Apply yellow socks and bracelet for high fall risk patients- Consider moving patient to room near nurses station  INTERVENTIONS:- Educate patient/family on patient safety including physical limitations- Instruct patient to call for assistance with activity - Consult OT/PT to assist with strengthening/mobility - Keep Call bell within reach- Keep bed low and locked with side rails adjusted as appropriate- Keep care items and personal belongings within reach- Initiate and maintain comfort rounds- Make Fall Risk Sign  visible to staff- Offer Toileting every 2 Hours, in advance of need- Initiate/Maintain bed alarm- Obtain necessary fall risk management equipment: yellow socks- Apply yellow socks and bracelet for high fall risk patients- Consider moving patient to room near nurses station  Outcome: Progressing  Goal: Maintain or return to baseline ADL function  Description: INTERVENTIONS:-  Assess patient's ability to carry out ADLs; assess patient's baseline for ADL function and identify physical deficits which impact ability to perform ADLs (bathing, care of mouth/teeth, toileting, grooming, dressing, etc.)- Assess/evaluate cause of self-care deficits - Assess range of motion- Assess patient's mobility; develop plan if impaired- Assess patient's need for assistive devices and provide as appropriate- Encourage maximum independence but intervene and supervise when necessary- Involve family in performance of ADLs- Assess for home care needs following discharge - Consider OT consult to assist with ADL evaluation and planning for discharge- Provide patient education as appropriate  Outcome: Progressing  Goal: Maintains/Returns to pre admission functional level  Description: INTERVENTIONS:- Perform AM-PAC 6 Click Basic Mobility/ Daily Activity assessment daily.- Set and communicate daily mobility goal to care team and patient/family/caregiver. - Collaborate with rehabilitation services on mobility goals if consulted- Perform Range of Motion 3 times a day.- Reposition patient every 2 hours.- Dangle patient 3 times a day- Stand patient 3 times a day- Ambulate patient 3 times a day- Out of bed to chair 3 times a day - Out of bed for meals 3 times a day- Out of bed for toileting- Record patient progress and toleration of activity level   Outcome: Progressing     Problem: DISCHARGE PLANNING  Goal: Discharge to home or other facility with appropriate resources  Description: INTERVENTIONS:- Identify barriers to discharge w/patient and  caregiver- Arrange for needed discharge resources and transportation as appropriate- Identify discharge learning needs (meds, wound care, etc.)- Refer to Case Management Department for coordinating discharge planning if the patient needs post-hospital services based on physician/advanced practitioner order or complex needs related to functional status, cognitive ability, or social support system  Outcome: Progressing     Problem: Knowledge Deficit  Goal: Patient/family/caregiver demonstrates understanding of disease process, treatment plan, medications, and discharge instructions  Description: Complete learning assessment and assess knowledge base.Interventions:- Provide teaching at level of understanding- Provide teaching via preferred learning methods  Outcome: Progressing     Problem: GASTROINTESTINAL - ADULT  Goal: Minimal or absence of nausea and/or vomiting  Description: INTERVENTIONS:- Administer IV fluids if ordered to ensure adequate hydration- Maintain NPO status until nausea and vomiting are resolved- Nasogastric tube if ordered- Administer ordered antiemetic medications as needed- Provide nonpharmacologic comfort measures as appropriate- Advance diet as tolerated, if ordered- Consider nutrition services referral to assist patient with adequate nutrition and appropriate food choices.  Outcome: Progressing  Goal: Maintains or returns to baseline bowel function  Description: INTERVENTIONS:- Assess bowel function- Encourage oral fluids to ensure adequate hydration- Administer IV fluids if ordered to ensure adequate hydration- Administer ordered medications as needed- Encourage mobilization and activity- Consider nutritional services referral to assist patient with adequate nutrition and appropriate food choices.  Outcome: Progressing  Goal: Maintains adequate nutritional intake  Description: INTERVENTIONS:- Monitor percentage of each meal consumed- Identify factors contributing to decreased intake, treat as  appropriate- Assist with meals as needed- Monitor I&O, weight, and lab values if indicated- Obtain nutrition services referral as needed.  Outcome: Progressing

## 2025-04-16 NOTE — PROGRESS NOTES
"Progress Note - Surgery-General   Name: Amparo Dobbs 49 y.o. female I MRN: [id number]  Unit/Bed#: -[facility] I Date of Admission: 4/13/2025   Date of Service: 4/16/2025 I Hospital Day: 3    Assessment & Plan  Small bowel obstruction (HCC)  50yo female PMH recurrent SBOs, GERD, depression presents with SBO   -CT AP 4/13: \"Evidence of high-grade small bowel obstruction with mesenteric edema involving most likely the mid jejunum. Adhesion versus internal hernia is possible though not much mesenteric twisting is seen. Transition point appears to be in the mid abdomen with   fecalization present but no pneumatosis. Surgical consultation is advised.\"   -contrast reached the colon yesterday, NG tube removed, patient tolerating clear liquids   -abdomen is flat, soft, TTP lower abdomen   -afebrile, VSS   -no leukocytosis   -elytes wnl    Plan:  -no acute surgical intervention indicated; diet advanced to full liquids with toast and crackers  -okay to restart PO home meds  -pain control  -medical management per SLIM  -discussed with Dr Farias      Subjective   Patient reports tolerating clear liquids with some mild abdominal discomfort. No nausea or vomiting. Would like to advance to full liquids today. Had multiple bowel movements.    Objective :  Temp:  [97.6 °F (36.4 °C)-98.9 °F (37.2 °C)] 98.9 °F (37.2 °C)  HR:  [61-73] 73  BP: (121-160)/(63-94) 121/63  Resp:  [18-20] 20  SpO2:  [92 %-96 %] 92 %    I/O         04/14 0701  04/15 0700 04/15 0701  04/16 0700 04/16 0701  04/17 0700    P.O.  460 360    I.V. (mL/kg) 1000 (9.1) 2000 (18.2)     NG/GT 20 20     IV Piggyback  300     Total Intake(mL/kg) 1020 (9.3) 2780 (25.3) 360 (3.3)    Urine (mL/kg/hr) 400 (0.2) 600 (0.2)     Emesis/NG output 850      Total Output 1250 600     Net -230 +2180 +360                   Physical Exam  Constitutional:       Appearance: Normal appearance.   HENT:      Mouth/Throat:      Mouth: Mucous membranes are moist.      Pharynx: Oropharynx is " clear.   Cardiovascular:      Rate and Rhythm: Normal rate.   Pulmonary:      Effort: Pulmonary effort is normal.   Abdominal:      General: Abdomen is flat.      Palpations: Abdomen is soft.      Tenderness: There is abdominal tenderness.   Musculoskeletal:         General: Normal range of motion.   Skin:     General: Skin is warm and dry.   Neurological:      General: No focal deficit present.      Mental Status: She is alert.         Lab Results: I have reviewed the following results:  Recent Labs     04/13/25  1641 04/13/25  1641 04/15/25  0517 04/16/25  0534   WBC 9.99  --  11.92* 7.48   HGB 14.4  --  13.7 12.7   HCT 45.4  --  44.2 39.9     --  280 259   SODIUM 142  --  138 142   K 3.7  --  3.9 3.4*     --  104 106   CO2 28  --  26 29   BUN 14  --  10 7   CREATININE 0.79  --  0.58* 0.55*   GLUC 96  --  78 111   MG  --    < > 1.9 1.9   PHOS  --   --   --  3.3   AST 20  --  43*  --    ALT 21  --  64*  --    ALB 4.8  --  3.9  --    TBILI 0.25  --  0.57  --    ALKPHOS 100  --  108*  --    LACTICACID 1.3  --   --   --     < > = values in this interval not displayed.         VTE Pharmacologic Prophylaxis: Sequential compression device (Venodyne)   VTE Mechanical Prophylaxis: sequential compression device    Veronica Downing PA-C

## 2025-04-16 NOTE — CASE MANAGEMENT
Case Management Discharge Planning Note    Patient name Amparo Dobbs  Location /-01 MRN [id number]  : 1975 Date 2025       Current Admission Date: 2025  Current Admission Diagnosis:Small bowel obstruction (HCC)   Patient Active Problem List    Diagnosis Date Noted Date Diagnosed    Plantar fasciitis 2025     Left foot pain 2025     Achilles tendinitis of left lower extremity 2025     Peroneal tendinitis of left lower extremity 2025     Tarsal tunnel syndrome of left side 2025     Equinus contracture of ankle 2025     Posterior tibial tendinitis of left lower extremity 2025     Fibromyalgia 2024     OAB (overactive bladder) 2024     Medial epicondylitis of elbow, left 2024     Arthritis 2024     Right ovarian cyst 2024     CLAUDIA (obstructive sleep apnea) 2023     Snoring 2023     Status post revision of total replacement of right knee 2023     Severe obesity (HCC) 2022     Prediabetes 2022     Leukocytosis 2021     Morbid obesity with BMI of 40.0-44.9, adult ([facility])      At risk for sleep apnea      Chronic superficial gastritis without bleeding 2021     Abnormal TSH 2020     Essential hypertension 2020     Gastroesophageal reflux disease without esophagitis 2020     Borderline personality disorder (HCC)      Lightheaded 2020     Pelvic floor dysfunction 2020     Bipolar disorder, in partial remission, most recent episode mixed ([facility]) 2020     Irritable bowel syndrome with both constipation and diarrhea 2019     Seborrheic keratosis 2019     Panic disorder 2019     Small bowel obstruction (HCC) 2019     Stress incontinence 10/17/2018     Excessive daytime sleepiness 2018     Tension headache 2018     RLS (restless legs syndrome) 04/10/2018       LOS (days): 3  Geometric Mean LOS (GMLOS) (days):   Days to  GMLOS:     OBJECTIVE:  Risk of Unplanned Readmission Score: 9.41         Current admission status: Inpatient   Preferred Pharmacy:   [address] [address]  Phone: [phone] Fax: [phone]    Primary Care Provider: Sourav Thompson DO    Primary Insurance: BLUE CROSS  Secondary Insurance:     DISCHARGE DETAILS:       Additional Comments: Chart reviewed for discharge planning purposes. Pt has no identified CM dicharge needs at this time. CM to follow to assess for same as needed.

## 2025-04-17 ENCOUNTER — APPOINTMENT (OUTPATIENT)
Dept: PHYSICAL THERAPY | Facility: CLINIC | Age: 50
End: 2025-04-17
Payer: COMMERCIAL

## 2025-04-17 VITALS
DIASTOLIC BLOOD PRESSURE: 81 MMHG | WEIGHT: 242.95 LBS | BODY MASS INDEX: 40.48 KG/M2 | SYSTOLIC BLOOD PRESSURE: 125 MMHG | OXYGEN SATURATION: 93 % | TEMPERATURE: 98 F | HEART RATE: 75 BPM | HEIGHT: 65 IN | RESPIRATION RATE: 17 BRPM

## 2025-04-17 LAB
ANION GAP SERPL CALCULATED.3IONS-SCNC: 7 MMOL/L (ref 4–13)
BASOPHILS # BLD AUTO: 0.06 THOUSANDS/ÂΜL (ref 0–0.1)
BASOPHILS NFR BLD AUTO: 1 % (ref 0–1)
BUN SERPL-MCNC: 7 MG/DL (ref 5–25)
CALCIUM SERPL-MCNC: 8.9 MG/DL (ref 8.4–10.2)
CHLORIDE SERPL-SCNC: 107 MMOL/L (ref 96–108)
CO2 SERPL-SCNC: 28 MMOL/L (ref 21–32)
CREAT SERPL-MCNC: 0.58 MG/DL (ref 0.6–1.3)
EOSINOPHIL # BLD AUTO: 0.33 THOUSAND/ÂΜL (ref 0–0.61)
EOSINOPHIL NFR BLD AUTO: 5 % (ref 0–6)
ERYTHROCYTE [DISTWIDTH] IN BLOOD BY AUTOMATED COUNT: 14.3 % (ref 11.6–15.1)
GFR SERPL CREATININE-BSD FRML MDRD: 110 ML/MIN/1.73SQ M
GLUCOSE SERPL-MCNC: 96 MG/DL (ref 65–140)
HCT VFR BLD AUTO: 38.6 % (ref 34.8–46.1)
HGB BLD-MCNC: 12.3 G/DL (ref 11.5–15.4)
IMM GRANULOCYTES # BLD AUTO: 0.05 THOUSAND/UL (ref 0–0.2)
IMM GRANULOCYTES NFR BLD AUTO: 1 % (ref 0–2)
LYMPHOCYTES # BLD AUTO: 2.36 THOUSANDS/ÂΜL (ref 0.6–4.47)
LYMPHOCYTES NFR BLD AUTO: 35 % (ref 14–44)
MAGNESIUM SERPL-MCNC: 1.9 MG/DL (ref 1.9–2.7)
MCH RBC QN AUTO: 27.9 PG (ref 26.8–34.3)
MCHC RBC AUTO-ENTMCNC: 31.9 G/DL (ref 31.4–37.4)
MCV RBC AUTO: 88 FL (ref 82–98)
MONOCYTES # BLD AUTO: 0.48 THOUSAND/ÂΜL (ref 0.17–1.22)
MONOCYTES NFR BLD AUTO: 7 % (ref 4–12)
NEUTROPHILS # BLD AUTO: 3.41 THOUSANDS/ÂΜL (ref 1.85–7.62)
NEUTS SEG NFR BLD AUTO: 51 % (ref 43–75)
NRBC BLD AUTO-RTO: 0 /100 WBCS
PHOSPHATE SERPL-MCNC: 3.7 MG/DL (ref 2.7–4.5)
PLATELET # BLD AUTO: 274 THOUSANDS/UL (ref 149–390)
PMV BLD AUTO: 9.4 FL (ref 8.9–12.7)
POTASSIUM SERPL-SCNC: 3.7 MMOL/L (ref 3.5–5.3)
RBC # BLD AUTO: 4.41 MILLION/UL (ref 3.81–5.12)
SODIUM SERPL-SCNC: 142 MMOL/L (ref 135–147)
WBC # BLD AUTO: 6.69 THOUSAND/UL (ref 4.31–10.16)

## 2025-04-17 PROCEDURE — 99239 HOSP IP/OBS DSCHRG MGMT >30: CPT | Performed by: INTERNAL MEDICINE

## 2025-04-17 PROCEDURE — 80048 BASIC METABOLIC PNL TOTAL CA: CPT | Performed by: INTERNAL MEDICINE

## 2025-04-17 PROCEDURE — 99232 SBSQ HOSP IP/OBS MODERATE 35: CPT

## 2025-04-17 PROCEDURE — 83735 ASSAY OF MAGNESIUM: CPT | Performed by: INTERNAL MEDICINE

## 2025-04-17 PROCEDURE — 84100 ASSAY OF PHOSPHORUS: CPT | Performed by: INTERNAL MEDICINE

## 2025-04-17 PROCEDURE — 85025 COMPLETE CBC W/AUTO DIFF WBC: CPT | Performed by: INTERNAL MEDICINE

## 2025-04-17 RX ADMIN — ACETAMINOPHEN 1000 MG: 10 INJECTION INTRAVENOUS at 05:30

## 2025-04-17 RX ADMIN — ACETAMINOPHEN 1000 MG: 10 INJECTION INTRAVENOUS at 12:47

## 2025-04-17 RX ADMIN — PANTOPRAZOLE SODIUM 40 MG: 40 INJECTION, POWDER, FOR SOLUTION INTRAVENOUS at 08:49

## 2025-04-17 RX ADMIN — ACETAMINOPHEN 1000 MG: 10 INJECTION INTRAVENOUS at 00:24

## 2025-04-17 RX ADMIN — DULOXETINE HYDROCHLORIDE 60 MG: 60 CAPSULE, DELAYED RELEASE ORAL at 08:49

## 2025-04-17 RX ADMIN — HYDROXYCHLOROQUINE SULFATE 200 MG: 200 TABLET, FILM COATED ORAL at 08:50

## 2025-04-17 RX ADMIN — FAMOTIDINE 20 MG: 10 INJECTION, SOLUTION INTRAVENOUS at 08:49

## 2025-04-17 NOTE — PROGRESS NOTES
"Progress Note - Surgery-General   Name: Amparo Dobbs 49 y.o. female I MRN: [id number]  Unit/Bed#: -01 I Date of Admission: 4/13/2025   Date of Service: 4/17/2025 I Hospital Day: 4    Assessment & Plan  Small bowel obstruction (HCC)  50yo female PMH recurrent SBOs, GERD, depression presents with SBO   -CT AP 4/13: \"Evidence of high-grade small bowel obstruction with mesenteric edema involving most likely the mid jejunum. Adhesion versus internal hernia is possible though not much mesenteric twisting is seen. Transition point appears to be in the mid abdomen with   fecalization present but no pneumatosis. Surgical consultation is advised.\"   -gastrograffin study negative for obstruction   -abdomen is flat, soft, nontender   -afebrile, VSS   -no leukocytosis   -elytes wnl    Plan:  -surgically stable, small bowel obstruction has clinically resolved. Continue regular diet. Stable for discharge home from a surgical perspective  -continue home meds  -pain control  -medical management per SLIM  -discussed with Dr Hatfield     24 Hour Events : none  Subjective : patient reports feeling much better. Still has some intermittent abdominal cramping at times. Tolerating diet, no n/v. Passing gas and having bowel movements    Objective :  Temp:  [98 °F (36.7 °C)-98.7 °F (37.1 °C)] 98 °F (36.7 °C)  HR:  [70-83] 75  BP: (125-153)/() 125/81  Resp:  [17] 17  SpO2:  [93 %-97 %] 93 %  O2 Device: None (Room air)     I/O         04/15 0701  04/16 0700 04/16 0701  04/17 0700 04/17 0701  04/18 0700    P.O. 460 1060     I.V. (mL/kg) 2000 (18.2)      NG/GT 20      IV Piggyback 300 300     Total Intake(mL/kg) 2780 (25.3) 1360 (12.4)     Urine (mL/kg/hr) 600 (0.2)      Emesis/NG output       Total Output 600      Net +2180 +1360                    Physical Exam  Vitals reviewed.   Constitutional:       General: She is not in acute distress.     Appearance: She is obese. She is not ill-appearing.   HENT:      Head: Normocephalic " "and atraumatic.   Cardiovascular:      Rate and Rhythm: Normal rate.   Pulmonary:      Effort: Pulmonary effort is normal. No respiratory distress.   Abdominal:      General: Abdomen is flat. There is no distension.      Palpations: Abdomen is soft.      Tenderness: There is no abdominal tenderness. There is no guarding.   Musculoskeletal:      Right lower leg: No edema.      Left lower leg: No edema.   Skin:     General: Skin is warm and dry.   Neurological:      Mental Status: She is alert. Mental status is at baseline.   Psychiatric:         Mood and Affect: Mood normal.         Behavior: Behavior normal.         Lab Results: I have reviewed the following results:  CBC with diff:   Lab Results   Component Value Date    WBC 6.69 04/17/2025    HGB 12.3 04/17/2025    HCT 38.6 04/17/2025    MCV 88 04/17/2025     04/17/2025    RBC 4.41 04/17/2025    MCH 27.9 04/17/2025    MCHC 31.9 04/17/2025    RDW 14.3 04/17/2025    MPV 9.4 04/17/2025    NRBC 0 04/17/2025   ,   BMP/CMP:  Lab Results   Component Value Date    SODIUM 142 04/17/2025    SODIUM 143 01/15/2025    K 3.7 04/17/2025    K 4 01/15/2025     04/17/2025     01/15/2025    CO2 28 04/17/2025    CO2 26 01/15/2025    ANIONGAP 12.6 05/17/2018    BUN 7 04/17/2025    BUN 16 01/15/2025    CREATININE 0.58 (L) 04/17/2025    CREATININE 0.8 01/15/2025    CALCIUM 8.9 04/17/2025    CALCIUM 9.3 01/15/2025    AST 43 (H) 04/15/2025    AST 16 01/15/2025    ALT 64 (H) 04/15/2025    ALT 23 01/15/2025    ALKPHOS 108 (H) 04/15/2025    ALKPHOS 100 01/15/2025    PROT 5.8 (L) 05/17/2018    BILITOT 0.3 05/17/2018    EGFR 110 04/17/2025    EGFR 91 01/15/2025    EGFR 90 10/06/2020   ,   Lipid Panel: No results found for: \"CHOL\",   Coags:   Lab Results   Component Value Date    PT 13.1 01/20/2022    PTT 32 06/05/2024    PTT 30.0 05/14/2018    INR 1.01 06/05/2024    INR 1.0 01/20/2022   ,   Blood Culture:   Lab Results   Component Value Date    BLOODCX No Growth After 5 " "Days. 06/02/2021    BLOODCX No Growth After 5 Days. 06/02/2021   ,   Urinalysis:   Lab Results   Component Value Date    COLORU Yellow 12/04/2024    COLORU YELLOW 05/14/2018    CLARITYU Clear 12/04/2024    CLARITYU SL CLOUDY 05/14/2018    SPECGRAV 1.020 12/04/2024    SPECGRAV >= 1.030 05/24/2018    PHUR 8.0 (A) 12/04/2024    PHUR 5.5 01/02/2019    PHUR 6.0 05/14/2018    LEUKOCYTESUR Negative 12/04/2024    LEUKOCYTESUR NEGATIVE 05/14/2018    NITRITE Negative 12/04/2024    NITRITE NEGATIVE 05/14/2018    PROTEINUA TRACE (A) 05/14/2018    GLUCOSEU Negative 12/04/2024    GLUCOSEU NEGATIVE 05/14/2018    KETONESU Negative 12/04/2024    KETONESU 1+ (A) 05/14/2018    BILIRUBINUR Negative 12/04/2024    BILIRUBINUR 1+ (A) 05/14/2018    BLOODU Negative 12/04/2024    BLOODU 3+ (A) 05/14/2018   ,   Urine Culture:   Lab Results   Component Value Date    URINECX No Growth <1000 cfu/mL 10/27/2020   ,   Wound Culure: No results found for: \"WOUNDCULT\"    Imaging Results Review: I reviewed radiology reports from this admission including: xray(s).  Other Study Results Review: No additional pertinent studies reviewed.    VTE Prophylaxis: VTE covered by:    None        Jacque Medina PA-C   "

## 2025-04-17 NOTE — PLAN OF CARE
Problem: PAIN - ADULT  Goal: Verbalizes/displays adequate comfort level or baseline comfort level  Description: Interventions:- Encourage patient to monitor pain and request assistance- Assess pain using appropriate pain scale- Administer analgesics based on type and severity of pain and evaluate response- Implement non-pharmacological measures as appropriate and evaluate response- Consider cultural and social influences on pain and pain management- Notify physician/advanced practitioner if interventions unsuccessful or patient reports new pain  Outcome: Progressing     Problem: SAFETY ADULT  Goal: Maintain or return to baseline ADL function  Description: INTERVENTIONS:-  Assess patient's ability to carry out ADLs; assess patient's baseline for ADL function and identify physical deficits which impact ability to perform ADLs (bathing, care of mouth/teeth, toileting, grooming, dressing, etc.)- Assess/evaluate cause of self-care deficits - Assess range of motion- Assess patient's mobility; develop plan if impaired- Assess patient's need for assistive devices and provide as appropriate- Encourage maximum independence but intervene and supervise when necessary- Involve family in performance of ADLs- Assess for home care needs following discharge - Consider OT consult to assist with ADL evaluation and planning for discharge- Provide patient education as appropriate  Outcome: Progressing     Problem: GASTROINTESTINAL - ADULT  Goal: Minimal or absence of nausea and/or vomiting  Description: INTERVENTIONS:- Administer IV fluids if ordered to ensure adequate hydration- Maintain NPO status until nausea and vomiting are resolved- Nasogastric tube if ordered- Administer ordered antiemetic medications as needed- Provide nonpharmacologic comfort measures as appropriate- Advance diet as tolerated, if ordered- Consider nutrition services referral to assist patient with adequate nutrition and appropriate food choices  Outcome:  Progressing     Problem: GASTROINTESTINAL - ADULT  Goal: Maintains adequate nutritional intake  Description: INTERVENTIONS:- Monitor percentage of each meal consumed- Identify factors contributing to decreased intake, treat as appropriate- Assist with meals as needed- Monitor I&O, weight, and lab values if indicated- Obtain nutrition services referral as needed  Outcome: Progressing     Problem: Knowledge Deficit  Goal: Patient/family/caregiver demonstrates understanding of disease process, treatment plan, medications, and discharge instructions  Description: Complete learning assessment and assess knowledge base.Interventions:- Provide teaching at level of understanding- Provide teaching via preferred learning methods  Outcome: Progressing     Problem: DISCHARGE PLANNING  Goal: Discharge to home or other facility with appropriate resources  Description: INTERVENTIONS:- Identify barriers to discharge w/patient and caregiver- Arrange for needed discharge resources and transportation as appropriate- Identify discharge learning needs (meds, wound care, etc.)- Refer to Case Management Department for coordinating discharge planning if the patient needs post-hospital services based on physician/advanced practitioner order or complex needs related to functional status, cognitive ability, or social support system  Outcome: Progressing

## 2025-04-17 NOTE — ASSESSMENT & PLAN NOTE
"50yo female PMH recurrent SBOs, GERD, depression presents with SBO   -CT AP 4/13: \"Evidence of high-grade small bowel obstruction with mesenteric edema involving most likely the mid jejunum. Adhesion versus internal hernia is possible though not much mesenteric twisting is seen. Transition point appears to be in the mid abdomen with   fecalization present but no pneumatosis. Surgical consultation is advised.\"   -gastrograffin study negative for obstruction   -abdomen is flat, soft, nontender   -afebrile, VSS   -no leukocytosis   -elytes wnl    Plan:  -surgically stable, small bowel obstruction has clinically resolved. Continue regular diet. Stable for discharge home from a surgical perspective  -continue home meds  -pain control  -medical management per SLIM  -discussed with Dr Hatfield   "

## 2025-04-17 NOTE — PLAN OF CARE
Problem: Potential for Falls  Goal: Patient will remain free of falls  Description: INTERVENTIONS:- Educate patient/family on patient safety including physical limitations- Instruct patient to call for assistance with activity - Consult OT/PT to assist with strengthening/mobility - Keep Call bell within reach- Keep bed low and locked with side rails adjusted as appropriate- Keep care items and personal belongings within reach- Initiate and maintain comfort rounds- Make Fall Risk Sign visible to staff- Offer Toileting every 2 Hours, in advance of need- Initiate/Maintain bed alarm- Obtain necessary fall risk management equipment: yellow socks- Apply yellow socks and bracelet for high fall risk patients- Consider moving patient to room near nurses station  INTERVENTIONS:- Educate patient/family on patient safety including physical limitations- Instruct patient to call for assistance with activity - Consult OT/PT to assist with strengthening/mobility - Keep Call bell within reach- Keep bed low and locked with side rails adjusted as appropriate- Keep care items and personal belongings within reach- Initiate and maintain comfort rounds- Make Fall Risk Sign visible to staff- Offer Toileting every 2 Hours, in advance of need- Initiate/Maintain bed alarm- Obtain necessary fall risk management equipment: yellow socks- Apply yellow socks and bracelet for high fall risk patients- Consider moving patient to room near nurses station  Outcome: Progressing     Problem: PAIN - ADULT  Goal: Verbalizes/displays adequate comfort level or baseline comfort level  Description: Interventions:- Encourage patient to monitor pain and request assistance- Assess pain using appropriate pain scale- Administer analgesics based on type and severity of pain and evaluate response- Implement non-pharmacological measures as appropriate and evaluate response- Consider cultural and social influences on pain and pain management- Notify  physician/advanced practitioner if interventions unsuccessful or patient reports new pain  Outcome: Progressing     Problem: INFECTION - ADULT  Goal: Absence or prevention of progression during hospitalization  Description: INTERVENTIONS:- Assess and monitor for signs and symptoms of infection- Monitor lab/diagnostic results- Monitor all insertion sites, i.e. indwelling lines, tubes, and drains- Monitor endotracheal if appropriate and nasal secretions for changes in amount and color- [place] appropriate cooling/warming therapies per order- Administer medications as ordered- Instruct and encourage patient and family to use good hand hygiene technique- Identify and instruct in appropriate isolation precautions for identified infection/condition  Outcome: Progressing     Problem: SAFETY ADULT  Goal: Patient will remain free of falls  Description: INTERVENTIONS:- Educate patient/family on patient safety including physical limitations- Instruct patient to call for assistance with activity - Consult OT/PT to assist with strengthening/mobility - Keep Call bell within reach- Keep bed low and locked with side rails adjusted as appropriate- Keep care items and personal belongings within reach- Initiate and maintain comfort rounds- Make Fall Risk Sign visible to staff- Offer Toileting every 2 Hours, in advance of need- Initiate/Maintain bed alarm- Obtain necessary fall risk management equipment: yellow socks- Apply yellow socks and bracelet for high fall risk patients- Consider moving patient to room near nurses station  INTERVENTIONS:- Educate patient/family on patient safety including physical limitations- Instruct patient to call for assistance with activity - Consult OT/PT to assist with strengthening/mobility - Keep Call bell within reach- Keep bed low and locked with side rails adjusted as appropriate- Keep care items and personal belongings within reach- Initiate and maintain comfort rounds- Make Fall Risk Sign  visible to staff- Offer Toileting every 2 Hours, in advance of need- Initiate/Maintain bed alarm- Obtain necessary fall risk management equipment: yellow socks- Apply yellow socks and bracelet for high fall risk patients- Consider moving patient to room near nurses station  Outcome: Progressing  Goal: Maintain or return to baseline ADL function  Description: INTERVENTIONS:-  Assess patient's ability to carry out ADLs; assess patient's baseline for ADL function and identify physical deficits which impact ability to perform ADLs (bathing, care of mouth/teeth, toileting, grooming, dressing, etc.)- Assess/evaluate cause of self-care deficits - Assess range of motion- Assess patient's mobility; develop plan if impaired- Assess patient's need for assistive devices and provide as appropriate- Encourage maximum independence but intervene and supervise when necessary- Involve family in performance of ADLs- Assess for home care needs following discharge - Consider OT consult to assist with ADL evaluation and planning for discharge- Provide patient education as appropriate  Outcome: Progressing  Goal: Maintains/Returns to pre admission functional level  Description: INTERVENTIONS:- Perform AM-PAC 6 Click Basic Mobility/ Daily Activity assessment daily.- Set and communicate daily mobility goal to care team and patient/family/caregiver. - Collaborate with rehabilitation services on mobility goals if consulted- Perform Range of Motion 3 times a day.- Reposition patient every 2 hours.- Dangle patient 3 times a day- Stand patient 3 times a day- Ambulate patient 3 times a day- Out of bed to chair 3 times a day - Out of bed for meals 3 times a day- Out of bed for toileting- Record patient progress and toleration of activity level   Outcome: Progressing     Problem: DISCHARGE PLANNING  Goal: Discharge to home or other facility with appropriate resources  Description: INTERVENTIONS:- Identify barriers to discharge w/patient and  caregiver- Arrange for needed discharge resources and transportation as appropriate- Identify discharge learning needs (meds, wound care, etc.)- Refer to Case Management Department for coordinating discharge planning if the patient needs post-hospital services based on physician/advanced practitioner order or complex needs related to functional status, cognitive ability, or social support system  Outcome: Progressing     Problem: Knowledge Deficit  Goal: Patient/family/caregiver demonstrates understanding of disease process, treatment plan, medications, and discharge instructions  Description: Complete learning assessment and assess knowledge base.Interventions:- Provide teaching at level of understanding- Provide teaching via preferred learning methods  Outcome: Progressing     Problem: GASTROINTESTINAL - ADULT  Goal: Minimal or absence of nausea and/or vomiting  Description: INTERVENTIONS:- Administer IV fluids if ordered to ensure adequate hydration- Maintain NPO status until nausea and vomiting are resolved- Nasogastric tube if ordered- Administer ordered antiemetic medications as needed- Provide nonpharmacologic comfort measures as appropriate- Advance diet as tolerated, if ordered- Consider nutrition services referral to assist patient with adequate nutrition and appropriate food choices  Outcome: Progressing  Goal: Maintains or returns to baseline bowel function  Description: INTERVENTIONS:- Assess bowel function- Encourage oral fluids to ensure adequate hydration- Administer IV fluids if ordered to ensure adequate hydration- Administer ordered medications as needed- Encourage mobilization and activity- Consider nutritional services referral to assist patient with adequate nutrition and appropriate food choices  Outcome: Progressing  Goal: Maintains adequate nutritional intake  Description: INTERVENTIONS:- Monitor percentage of each meal consumed- Identify factors contributing to decreased intake, treat as  appropriate- Assist with meals as needed- Monitor I&O, weight, and lab values if indicated- Obtain nutrition services referral as needed  Outcome: Progressing

## 2025-04-17 NOTE — DISCHARGE SUMMARY
"Discharge Summary - Hospitalist   Name: Amparo Dobbs 49 y.o. female I MRN: [id number]  Unit/Bed#: -01 I Date of Admission: 4/13/2025   Date of Service: 4/17/2025 I Hospital Day: 4     Assessment & Plan  Small bowel obstruction (HCC)  CT imaging revealed \"Evidence of high-grade small bowel obstruction with mesenteric edema involving most likely the mid jejunum. Adhesion versus internal hernia is possible though not much mesenteric twisting is seen. Transition point appears to be in the mid abdomen with fecalization present but no pneumatosis\"  Patient reports multiple previous small bowel obstructions with most recent in May of last year  Patient has multiple abdominal surgeries  Gastrografin test within normal limits  NG tube has been discontinued  Tolerating surgical diet  Pain regimen as ordered  General Surgery following  Advance diet as tolerated  Bipolar disorder, in partial remission, most recent episode mixed (HCC)  Continue prehospital Cymbalta 60 mg p.o. daily and Seroquel 100 mg p.o. nightly  Essential hypertension  Continue prehospital prazosin 5 mg p.o. daily  Gastroesophageal reflux disease without esophagitis  Substitute Protonix 40 mg p.o. daily for prehospital Aciphex and change prehospital Pepcid to 20 mg IV twice daily  Morbid obesity (HCC)  Present on admission as evidenced by BMI of 40  Encourage lifestyle modifications and weight loss     Medical Problems       Resolved Problems  Date Reviewed: 4/13/2025   None       Discharging Physician / Practitioner: Howie Looney DO  PCP: Sourav Thompson DO  Admission Date:   Admission Orders (From admission, onward)       Ordered        04/13/25 2033  Inpatient Admission  Once                          Discharge Date: 04/17/25    Consultations During Hospital Stay:  General Surgery    Procedures Performed:   Not applicable    Significant Findings / Test Results:   Not applicable    Incidental Findings:   Not applicable    Test Results Pending at " "Discharge (will require follow up):  Not applicable     Outpatient Tests Requested:  Not applicable    Complications: Not applicable    Reason for Admission: SBO    Hospital Course:   Amparo Dobbs is a 49 y.o. female with a PMH of GERD, hypertension, small bowel obstruction who presents with abdominal pain, nausea and vomiting x 1 day.  Patient reports that she has had multiple small bowel obstruction secondary to previous surgeries states that she has probably had 10 over her lifetime with the most recent being in May of last year.  Patient states that these of resolved in the past without requiring surgery.  Patient is complaining of diffuse sharp abdominal pain companied with multiple episodes of nausea and vomiting and CT imaging revealed high-grade small bowel obstruction with transition point.     This was discussed by ER provider with general surgery on-call and was referred for medical admission.    The patient's diet was advanced as MR enterography showed no evidence of obstruction.  The patient is passing stool and flatus.  Diet has been advanced to regular house.  She was strongly encouraged to follow-up with her PCP within 7-14 days and to resume all PTA medications.  This serves as a brief discharge summary.  Please see full medical record for more details.    Condition at Discharge: stable    Discharge Day Visit / Exam:   Subjective:  Patient seen and examined at bedside. No acute events overnight. Denies chest pain, SOB, diaphoresis, nausea/vomiting/diarrhea, fevers/chills.  Plan for discharge to home with close outpatient follow-up.    Vitals: Blood Pressure: 125/81 (04/17/25 0719)  Pulse: 75 (04/17/25 0719)  Temperature: 98 °F (36.7 °C) (04/17/25 0719)  Temp Source: Oral (04/16/25 2157)  Respirations: 17 (04/16/25 2157)  Height: 5' 5\" (165.1 cm) (04/13/25 2106)  Weight - Scale: 110 kg (242 lb 15.2 oz) (04/13/25 2106)  SpO2: 93 % (04/17/25 0900)    Physical Exam  Vitals and nursing note reviewed. "   Constitutional:       General: She is not in acute distress.     Appearance: She is well-developed.   HENT:      Head: Normocephalic and atraumatic.   Eyes:      Conjunctiva/sclera: Conjunctivae normal.   Cardiovascular:      Rate and Rhythm: Normal rate and regular rhythm.      Heart sounds: No murmur heard.  Pulmonary:      Effort: Pulmonary effort is normal. No respiratory distress.      Breath sounds: Normal breath sounds.   Abdominal:      Palpations: Abdomen is soft.      Tenderness: There is no abdominal tenderness.   Musculoskeletal:         General: No swelling.      Cervical back: Neck supple.   Skin:     General: Skin is warm and dry.      Capillary Refill: Capillary refill takes less than 2 seconds.   Neurological:      Mental Status: She is alert.   Psychiatric:         Mood and Affect: Mood normal.       Discussion with Family: Patient declined call to .     Discharge instructions/Information to patient and family:   See after visit summary for information provided to patient and family.      Provisions for Follow-Up Care:  See after visit summary for information related to follow-up care and any pertinent home health orders.      Mobility at time of Discharge:   Basic Mobility Inpatient Raw Score: 23  JH-HLM Goal: 7: Walk 25 feet or more  JH-HLM Achieved: 7: Walk 25 feet or more  HLM Goal achieved. Continue to encourage appropriate mobility.     Disposition:   Home    Planned Readmission: Not applicable    Discharge Medications:  See after visit summary for reconciled discharge medications provided to patient and/or family.      Administrative Statements   Discharge Statement:  I have spent a total time of 65 minutes in caring for this patient on the day of the visit/encounter. >30 minutes of time was spent on: Diagnostic results, Prognosis, Risks and benefits of tx options, Instructions for management, Patient and family education, Importance of tx compliance, Risk factor reductions,  Impressions, Counseling / Coordination of care, Documenting in the medical record, Reviewing / ordering tests, medicine, procedures  , and Communicating with other healthcare professionals .    **Please Note: This note may have been constructed using a voice recognition system**

## 2025-04-21 ENCOUNTER — TELEPHONE (OUTPATIENT)
Age: 50
End: 2025-04-21

## 2025-04-22 ENCOUNTER — EVALUATION (OUTPATIENT)
Dept: PHYSICAL THERAPY | Facility: CLINIC | Age: 50
End: 2025-04-22
Attending: PODIATRIST
Payer: COMMERCIAL

## 2025-04-22 DIAGNOSIS — M72.2 PLANTAR FASCIITIS: Primary | ICD-10-CM

## 2025-04-22 PROCEDURE — 97140 MANUAL THERAPY 1/> REGIONS: CPT | Performed by: PHYSICAL MEDICINE & REHABILITATION

## 2025-04-22 PROCEDURE — 97112 NEUROMUSCULAR REEDUCATION: CPT | Performed by: PHYSICAL MEDICINE & REHABILITATION

## 2025-04-22 PROCEDURE — 97110 THERAPEUTIC EXERCISES: CPT | Performed by: PHYSICAL MEDICINE & REHABILITATION

## 2025-04-22 NOTE — PROGRESS NOTES
"Daily Note /Re-Evaluation     Today's date: 2025  Patient name: Amparo Dobbs  : 1975  MRN: [id number]  Referring provider: Deo Lopez*  Dx:   Encounter Diagnosis     ICD-10-CM    1. Plantar fasciitis  M72.2                    SUBJECTIVE:    Pt reports she was in the hospital x 1 week for bowel obstruction. Notes she is feeling better but tired; not sleeping well.   Notes in regards to her feet, her sx are overall \"the same\". Notes her foot pain is \"no better/no worse\". Notes cont c/c of pain L > R foot. Pain 1* L lateral foot/FF and L heel/under heel. Pain R heel/back of heel. R foot pain is not as bad. Notes her pain is still worse with standing/walking/WB; her feet her more the more she is on her feet. Pain with stairs. No new pain/sx. Overall no worse per pt. Still has \"nerve sensitivity\" L heel when laying in bed or having her feet on ottoman; has to prop her heel up so it doesn't touch. No new n/t or sx/complaints. Still has the \"lump\" lateral L FF; no worse, no new sx per pt. Pt notes she got an appt to see DPM next Monday. No complaints with PT or program to date.     OBJECTIVE:    Age: 49 y.o.  Weight: 245    Foot Posture Index Score    Right Foot  Talar dome - 0  Malleolar curve - 0   Calcaneal eversion - 0  Talonavicular bulge - 0  Medial longitudinal arch - -1  Too many toes - 0  Total Score R = -1    Left Foot  Talar dome - 0  Malleolar curve - 0   Calcaneal eversion - 0  Talonavicular bulge - 0  Medial longitudinal arch - -1  Too many toes - 0  Total Score L = -1    Reference Values  Normal =    0 to +5  Pronated =  +6 to +9 Highly Pronated =  10+  Supinated =   -1 to -4 Highly Supinated = -5 to -12      Objective      Gait Assessment:  Right Stance Phase- RF neutral at IC; RF valgus into early-mid stance; early heel rise; late stance RF valgus/ff pronation   Left Stance Phase - RF neutral vs ?mild varus at IC into LR; RF valgus into midstance with late stance RF valgus/FF " pronation; early heel rise   Notes she has pain L lat forefoot with late stance to push off; tends to shift weight to outer foot to avoid pain L heel per pt.   Increased digit ext with swing phase B with lack of ankle DF B     AROM/PROM:             MMT          AROM          PROM    Ankle         R          L          R         L          R         L   PF 4 4- WFL WFL     DF.   3-5* 3-5*      DF bent knee         EHL         Inv. 4 4- WFL WFL pain top of foot      Ever. 4 4- WFL WFL pain top of foot      Great toe Extension    WFL WFL       Functional Ankle Dorsiflexion ROM:  Limited  Decreased pronation R/L foot with standing march in place and mini squat           Palpation:  TTP under L 1st MTP (mild)  Elevated ttp t/o L 5th MET laterally and dorsum of METS 3-5  TTP lateral L ankle near lat mallelous/peroneals  TTP in region of PTT and medial calcaneal tubercle L into medial L lower leg; region of scar tissue noted medial L lower leg with sensitivity to pressure/palpation noted   Mild ttp R medial calcaneal tubercle     Observation:  Increased callusing R> L medial great toe and 1st MTP   Claw toe B digits 2-5   Lump noted over dorsum of L mid shaft/above mid shaft of L 5th MET lateral foot; no ttp; no bruising, swelling etc       Neurological Testing:  Intact light touch R/L foot/ankle     Joint Mobility:        Right                         Left     Subtalar-                  Hypo                               Hypo  Midfoot-                   Hypo                               Hypo     Forefoot-                 Hypo                               Hypo  Talocrural-              Hypo                               Hypo  First Ray-                Hypo                               Hypo     Subtalar Neutral Assessment:  Right- See scans     Left- See scans       ORTHOTICS ASSESSMENT/PLAN:    Patient requires custom foot orthosis with B deepened heel cup, B medial longitudinal arch support, and B transverse arch  support to correct altered gait mechanics contributing to pain/sx and functional limitations. Patient is not currently controlled by her motion-controlled shoe. Foot/ankle exam and Ipad scans were completed this date as noted above. Will consult with orthotist regarding patient presentation and scans. Pt will return to PT for dispensing of orthotics when they arrive. Will review orthotic fit to shoe and pt's foot, wearing schedule, gait assessment with orthotics etc at that time. Pt in agreement with plan with no questions/concerns end of session. Thank you for your referral.       Orthotic goals:- progressing, awaiting arrival of CMOs  1) Patient will have custom foot orthoses fitted to her shoe.   2) Patient will be compliant with break-in period of custom foot orthoses as prescribed by PT.   3) Patient will be compliant with custom foot orthoses use as prescribed by PT.     Plan:    Planned therapy interventions: orthotic fitting/training    PT ASSESSMENT/GOALS:    Amparo has been compliant with attending PT and completing home exercise program since initial eval and reports overall min improvement in pain/sx and function since start of care.  Amparo reports and demonstrates improved flexibility and improved joint mobility since initial eval, but is still limited compared to prior level of function. She continues to demonstrate hypomobility of R/L foot /ankle and diminished flexibility R/L heel cord/calf. She continues with pain L foot > R with pain 1* L heel and lateral foot/ankle. She continues with overall supinated foot posture and decreased pronation R/L foot with stance. Awaiting arrival of CMOs to help improve [facility] foot posture/function and reduce pain/strain.  Amparo continues with above listed impairments and would benefit from additional skilled PT to address these deficits to return to prior level of function.      Plan  Frequency:1-2x/week   Duration in weeks: 4-6 weeks   POC start date: 4/22/2025  POC end  "date: 6/3/25  Therapeutic exercise/activity, neuromuscular reeducation, manual therapy, and modalities.   Patient understands and agrees to plan of care.    Goals  Short Term--4 weeks- Progressing towards all STGs   1. Patient will demonstrate 2 point decrease in pain levels.  2. Patient will demonstrate 1/2 point increase in all deficient MMT scores.  3. Pt will improve ankle DF AROM by at least 5-7* to improve gait.   4. Pt will report at least 50% improvement in standing/walking tolerance vs SOC with less foot pain/sx.     Long Term--By Discharge- Progressing towards all LTGs  1. Patient will achieve expected FOTO score.  2. Pt will be I with HEP.   3. Pt will be able to stand/walk without limitation 2* foot/ankle pain.     Patient's Goal: get rid of pain        Precautions: see chart       Re-eval Date:  6/3    Date 3/26/2025  4/3 4/8 4/10 4/22   Visit Count 1 2 3 4 5   FOTO 3/26/2025        Pain In See IE    3   Pain Out See IE    4     Consent reviewed and signed 4/3/25  Manuals 3/26/2025  4/3 4/8 4/10 4/22   GIASTM L calf, PF, Achilles   GIASTM L calf, PF, Achilles #4 scanning, fanning, and sweeping 10' GIASTM L calf, PF, Achilles #4 scanning, fanning, and sweeping 10' GIASTM L calf, PF, Achilles #4 scanning, fanning, and sweeping, TFM PF insertion and PTT R/L as hudson 15' GIASTM L calf, PF, Achilles #4 scanning, fanning, and sweeping, TFM PF insertion and PTT R/L as hudson 15'   Heel cord stretch as hudson, AP TC mobs, RF, FF mobs L as hudson                         Neuro Re-Ed        Foot intrinsic training:    Towel curls          20x/3-5\"       20x/5\"        20x/5\"       2x10/5\"    Toe yoga        Toe splaying   20x/3-5\"        20x/3-5\" 20x/5\" ea         20x/5\" 20xea 5\"        20x/5\" 2x10/5\"         2x10/5\"    Perturbation training                                         Ther Ex        Calf stretch KF and KE      PF stretch with towel  Seated 4x30\" ea R/L ; pt edu regarding night splint use  Incline 4x30\" Incline " "4x30\" Incline 4x30\" Incline 4x30\"   Great toe extension ROM       Hrs double-->single   4x30\"          20x/3-5\" Reviewed HEP         HELD  4x30\" ea           NP 4x20-30\" ea         Seated   ROM as hudson  2x10 ea 3-5\"      HR with ball between heels       Ankle tband 4 way           Grn 2x10/3-5\"           HELD          NP         Resume NV    PF with peroneal bias   Tband                 Great toe flex with tband                         Ther Activity pt education regarding pathophysiology/pathoanatomy of present pain/sx and condition, role of PT in improving pain/sx and function                       Gait Training                        Modalities                              4/22/2025  - HEP was issued and reviewed this date for above noted exercises. Pt demonstrated understanding without incident and without questions/concerns. Will continue to update upcoming.                "

## 2025-04-23 ENCOUNTER — OFFICE VISIT (OUTPATIENT)
Dept: SURGERY | Facility: CLINIC | Age: 50
End: 2025-04-23
Payer: COMMERCIAL

## 2025-04-23 VITALS
BODY MASS INDEX: 41.88 KG/M2 | HEART RATE: 102 BPM | RESPIRATION RATE: 16 BRPM | TEMPERATURE: 97.5 F | DIASTOLIC BLOOD PRESSURE: 90 MMHG | HEIGHT: 65 IN | SYSTOLIC BLOOD PRESSURE: 162 MMHG | OXYGEN SATURATION: 96 % | WEIGHT: 251.4 LBS

## 2025-04-23 DIAGNOSIS — K56.609 SMALL BOWEL OBSTRUCTION (HCC): Primary | ICD-10-CM

## 2025-04-23 PROCEDURE — 99212 OFFICE O/P EST SF 10 MIN: CPT | Performed by: SURGERY

## 2025-04-23 RX ORDER — BUDESONIDE 3 MG/1
CAPSULE, COATED PELLETS ORAL
COMMUNITY
Start: 2025-04-22

## 2025-04-23 RX ORDER — LEFLUNOMIDE 10 MG/1
10 TABLET ORAL DAILY
COMMUNITY
Start: 2025-04-21

## 2025-04-24 ENCOUNTER — TELEPHONE (OUTPATIENT)
Dept: SURGERY | Facility: CLINIC | Age: 50
End: 2025-04-24

## 2025-04-24 ENCOUNTER — OFFICE VISIT (OUTPATIENT)
Dept: PHYSICAL THERAPY | Facility: CLINIC | Age: 50
End: 2025-04-24
Payer: COMMERCIAL

## 2025-04-24 DIAGNOSIS — M72.2 PLANTAR FASCIITIS: Primary | ICD-10-CM

## 2025-04-24 PROCEDURE — 97110 THERAPEUTIC EXERCISES: CPT | Performed by: PHYSICAL MEDICINE & REHABILITATION

## 2025-04-24 PROCEDURE — 97140 MANUAL THERAPY 1/> REGIONS: CPT | Performed by: PHYSICAL MEDICINE & REHABILITATION

## 2025-04-24 NOTE — TELEPHONE ENCOUNTER
Placed call to patient and left a message to return our call.    We received forms for disability paperwork to be completed, want to verify if this is for her hospital stay from 4/13/25-4/17/25. Has she return to work and what does he do for work?    We can then evaluate to see if we are filling the forms out or if it should come her PCP vs gastroenterology.

## 2025-04-24 NOTE — PROGRESS NOTES
"Daily Note     Today's date: 2025  Patient name: Amparo Dobbs  : 1975  MRN: [id number]  Referring provider: Deo Lopez*  Dx:   Encounter Diagnosis     ICD-10-CM    1. Plantar fasciitis  M72.2                      Subjective: Pt notes no new sx/complaints. More pain in tops of her foot/ankles L > R yesterday. Seemed to be sore with seated Hrs. Cont with c/c of pain lateral L foot and L heel. R heel hurt some yesterday too. No new pain. F/u with MD Monday. Continues to note more pain the more she is walking/on her feet. No change overall per pt.       Objective: See treatment diary below      Assessment: Tolerated treatment well. No adverse reaction to tx. Kept program limited 2* pt's increased pain/sx L foot lately. No increased pain/sx with or following program per pt. Continues to maintain fair-good ROM L foot/ankle. Soreness lateral L FF with PF/inversion AROM L ankle. Cont with \"lump\" lateral L FF unchanged since onset; no new sx/complaints. Awaiting arrival of CMOs. Pt cont with elevated sx irritability L > R foot; limited ability to progress with program to date 2* sx irritability. No complaints after tx. Patient demonstrated fatigue post treatment and would benefit from continued PT      Plan: Continue per plan of care.  Progress treatment as tolerated.       Precautions: see chart       Re-eval Date:  6/3    Date 4/24 4/3 4/8 4/10 4/22   Visit Count 6 2 3 4 5   FOTO        Pain In 4    3   Pain Out 4    4     Consent reviewed and signed 4/3/25  Manuals 4/24 4/3 4/8 4/10 4/22   GIASTM L calf, PF, Achilles  GIASTM L calf, PF, Achilles #4 scanning, fanning, and sweeping, TFM PF insertion and PTT R/L as hudson 15' GIASTM L calf, PF, Achilles #4 scanning, fanning, and sweeping 10' GIASTM L calf, PF, Achilles #4 scanning, fanning, and sweeping 10' GIASTM L calf, PF, Achilles #4 scanning, fanning, and sweeping, TFM PF insertion and PTT R/L as hudson 15' GIASTM L calf, PF, Achilles #4 scanning, " "fanning, and sweeping, TFM PF insertion and PTT R/L as hudson 15'   Heel cord stretch as hudson, AP TC mobs, RF, FF mobs L as hudson                         Neuro Re-Ed        Foot intrinsic training:    Towel curls         2x10/5\"        20x/3-5\"       20x/5\"        20x/5\"       2x10/5\"    Toe yoga        Toe splaying  2x10/5\"         2x10/5\"  20x/3-5\"        20x/3-5\" 20x/5\" ea         20x/5\" 20xea 5\"        20x/5\" 2x10/5\"         2x10/5\"    Perturbation training                                         Ther Ex        Calf stretch KF and KE      PF stretch with towel  Wedge  4x30\" ea  Incline 4x30\" Incline 4x30\" Incline 4x30\" Incline 4x30\"   Great toe extension ROM       Hrs double-->single  4x20-30\" ea        Seated   ROM as hudson  2x10 ea 3-5\"   4x30\"          20x/3-5\" Reviewed HEP         HELD  4x30\" ea           NP 4x20-30\" ea         Seated   ROM as hudson  2x10 ea 3-5\"      HR with ball between heels       Ankle tband 4 way          Resume upcoming- NP today 2* pain          Grn 2x10/3-5\"           HELD          NP         Resume NV    PF with peroneal bias   Tband                 Great toe flex with tband                         Ther Activity pt education regarding pathophysiology/pathoanatomy of present pain/sx and condition, role of PT in improving pain/sx and function                       Gait Training                        Modalities                              4/22/2025  - HEP was issued and reviewed this date for above noted exercises. Pt demonstrated understanding without incident and without questions/concerns. Will continue to update upcoming.                  "

## 2025-04-28 ENCOUNTER — OFFICE VISIT (OUTPATIENT)
Dept: PODIATRY | Facility: CLINIC | Age: 50
End: 2025-04-28
Payer: COMMERCIAL

## 2025-04-28 ENCOUNTER — APPOINTMENT (OUTPATIENT)
Dept: RADIOLOGY | Facility: CLINIC | Age: 50
End: 2025-04-28
Attending: PODIATRIST
Payer: COMMERCIAL

## 2025-04-28 VITALS — WEIGHT: 251 LBS | BODY MASS INDEX: 41.82 KG/M2 | HEIGHT: 65 IN

## 2025-04-28 DIAGNOSIS — M79.672 LEFT FOOT PAIN: Primary | ICD-10-CM

## 2025-04-28 DIAGNOSIS — M67.40 GANGLION CYST: ICD-10-CM

## 2025-04-28 DIAGNOSIS — M79.672 LEFT FOOT PAIN: ICD-10-CM

## 2025-04-28 PROCEDURE — 73630 X-RAY EXAM OF FOOT: CPT

## 2025-04-28 PROCEDURE — 99213 OFFICE O/P EST LOW 20 MIN: CPT | Performed by: PODIATRIST

## 2025-04-28 RX ORDER — TIRZEPATIDE 2.5 MG/.5ML
2.5 INJECTION, SOLUTION SUBCUTANEOUS WEEKLY
COMMUNITY
Start: 2025-04-24

## 2025-04-28 NOTE — TELEPHONE ENCOUNTER
Patient calling back in and stating that she was at the hospital from 4/13-4/17 and went back to work 4/21    Patient stating that she is a

## 2025-04-28 NOTE — ASSESSMENT & PLAN NOTE
49F with possible IBD on entocort with GI follow up, recent hospitalization for a small bowel obstruction which resolved with conservative nonoperative management. She is now doing well, tolerating a diet, having regular bowel function, has seen her GI in follow up and will continue to do so. She can follow up with our team on an as needed basis in the future. She will call with any concerns.

## 2025-04-28 NOTE — PROGRESS NOTES
"Name: Amparo Dobbs      : 1975      MRN: [id number]  Encounter Provider: Deo Lopez DPM  Encounter Date: 2025   Encounter department: [facility] PODIATRY [place]  :  Assessment & Plan  Left foot pain    Orders:    XR foot 3+ vw left; Future    MRI foot/forefoot toes left w wo contrast; Future    Ganglion cyst    Orders:    MRI foot/forefoot toes left w wo contrast; Future    - We will obtain an MRI of the left foot with and without contrast to evaluate for ganglion cyst for presurgical planning.  Return following MRI  - Discussed aspiration but due to the location we will consider removal evaluation and potential removal      History of Present Illness   HPI  Amparo Dobbs is a 49 y.o. female who presents evaluation management of her left foot.  Has new onset cyst after he started physical therapy.  It is somewhat painful for her in shoes.      Review of Systems   Constitutional:  Negative for chills and fever.   HENT:  Negative for ear pain and sore throat.    Eyes:  Negative for pain and visual disturbance.   Respiratory:  Negative for cough and shortness of breath.    Cardiovascular:  Negative for chest pain and palpitations.   Gastrointestinal:  Negative for abdominal pain and vomiting.   Genitourinary:  Negative for dysuria and hematuria.   Musculoskeletal:  Negative for arthralgias and back pain.   Skin:  Negative for color change and rash.   Neurological:  Negative for seizures and syncope.   All other systems reviewed and are negative.         Objective   Ht 5' 5\" (1.651 m)   Wt 114 kg (251 lb)   BMI 41.77 kg/m²      Physical Exam  Skin:     Comments: New onset cyst of the left lateral foot, this is likely distal to the insertion of her peroneus brevis, along the shaft of the fifth metatarsal on the medial aspect.  Well-circumscribed slightly painful to palpate seems to be filled with fluid fluctuant           "

## 2025-04-28 NOTE — PROGRESS NOTES
"Name: Amparo Dobbs      : 1975      MRN: [id number]  Encounter Provider: Estelle Farias MD  Encounter Date: 2025   Encounter department: [facility] GENERAL SURGERY [place] [facility]  :  Assessment & Plan  Small bowel obstruction (HCC)  49F with possible IBD on entocort with GI follow up, recent hospitalization for a small bowel obstruction which resolved with conservative nonoperative management. She is now doing well, tolerating a diet, having regular bowel function, has seen her GI in follow up and will continue to do so. She can follow up with our team on an as needed basis in the future. She will call with any concerns.               History of Present Illness   Amparo Dobbs is a 49 y.o. female doing well since discharge, no vomiting, obstipation, constipation, or abdominal pain. Saw her GI who started her on entocort, prior scope showed possible IBD, will be monitored closely with them. Has mild nausea, taking occasional zofran.  History of Present Illness    Review of Systems   Gastrointestinal:  Positive for nausea. Negative for abdominal pain, blood in stool, constipation, diarrhea and vomiting.   All other systems reviewed and are negative.   as per HPI.       Objective   /90 (BP Location: Left arm, Patient Position: Sitting, Cuff Size: Large)   Pulse 102   Temp 97.5 °F (36.4 °C) (Temporal)   Resp 16   Ht 5' 5\" (1.651 m)   Wt 114 kg (251 lb 6.4 oz)   SpO2 96%   BMI 41.84 kg/m²      Physical Exam  Vitals reviewed.   Constitutional:       General: She is not in acute distress.     Appearance: She is not toxic-appearing.   HENT:      Head: Normocephalic.      Nose: No congestion.      Mouth/Throat:      Mouth: Mucous membranes are moist.   Eyes:      Pupils: Pupils are equal, round, and reactive to light.   Cardiovascular:      Rate and Rhythm: Normal rate.   Pulmonary:      Effort: Pulmonary effort is normal. No respiratory distress.   Abdominal:      General: There is no " distension.      Palpations: Abdomen is soft. There is no mass.      Tenderness: There is no abdominal tenderness. There is no guarding or rebound.   Musculoskeletal:         General: Normal range of motion.      Cervical back: Normal range of motion.   Skin:     General: Skin is warm.      Capillary Refill: Capillary refill takes less than 2 seconds.   Neurological:      General: No focal deficit present.      Mental Status: She is alert. Mental status is at baseline.   Psychiatric:         Mood and Affect: Mood normal.

## 2025-04-28 NOTE — TELEPHONE ENCOUNTER
Yes you can proceed using the dates she has and clear her to return to work without restriction on the day she went back.

## 2025-04-29 ENCOUNTER — APPOINTMENT (OUTPATIENT)
Dept: PHYSICAL THERAPY | Facility: CLINIC | Age: 50
End: 2025-04-29
Attending: PODIATRIST
Payer: COMMERCIAL

## 2025-05-01 ENCOUNTER — OFFICE VISIT (OUTPATIENT)
Dept: PHYSICAL THERAPY | Facility: CLINIC | Age: 50
End: 2025-05-01
Attending: PODIATRIST
Payer: COMMERCIAL

## 2025-05-01 DIAGNOSIS — M72.2 PLANTAR FASCIITIS: Primary | ICD-10-CM

## 2025-05-01 PROCEDURE — L3010 FOOT LONGITUDINAL ARCH SUPPO: HCPCS | Performed by: PHYSICAL MEDICINE & REHABILITATION

## 2025-05-01 NOTE — PROGRESS NOTES
"Daily Note     Today's date: 2025  Patient name: Amparo Dobbs  : 1975  MRN: [id number]  Referring provider: Deo Lopez*  Dx:   Encounter Diagnosis     ICD-10-CM    1. Plantar fasciitis  M72.2                      Subjective: Pt notes her foot feels \"the same\". Going for an MRI. Notes DPM feels she has a cyst L foot; may have it removed. No new sx/complaints.        Objective: See treatment diary below      Assessment: Custom orthotics fitted to patients feet in seated and standing; accurate fit/alignment/ arch support noted B. Noted good support of feet/arches as well in standing on orthotics with no abnormal positioning or alignment observed. Inlay was removed from R/L shoe and pt was instructed how to remove inlays from other shoes/sneakers at home as well. Custom orthotics were fit to pt's shoes and accurate fit was noted B. Pt noted no unusual pain/sx in standing with orthotics in shoes. Pt's gait was assessed with orthotics. Improved hindfoot and midfoot support and control were noted t/o R and L stance phases without unusual pain/sx. Pt noted overall comfort in orthotics. Reviewed and issued weaning/break in protocol with pt; instructed pt to stop wearing orthotics and contact PT if any unusual pain, increased pain/sx, redness/blisters/hot spots etc should arise; also instructed pt to call PT with any questions/concerns regarding orthotics; understanding noted/reported with no questions/concerns after session. Patient will follow up if any future problems arise. Pt to cont with HEP. Pt will resume PT next week after MRI. Will progress as hudson.         Plan: Pt to wean into orthotics as discussed today; handout given/reviewed. Pt to contact PT if any questions, concerns, or needs arise regarding the orthotics.  Cont PT per POC as hudson NV. Pt to get MRI next week and f/u with MD next Friday.      Precautions: see chart       Re-eval Date:  6/3    Date 4/24 5/1 4/8 4/10 4/22   Visit Count " "6 7 3 4 5   FOTO        Pain In 4    3   Pain Out 4    4     Consent reviewed and signed 4/3/25  Manuals 4/24 5/1 4/8 4/10 4/22   GIASTM L calf, PF, Achilles  GIASTM L calf, PF, Achilles #4 scanning, fanning, and sweeping, TFM PF insertion and PTT R/L as hudson 15' Resume GIASTM L calf, PF, Achilles #4 scanning, fanning, and sweeping 10' GIASTM L calf, PF, Achilles #4 scanning, fanning, and sweeping, TFM PF insertion and PTT R/L as hudson 15' GIASTM L calf, PF, Achilles #4 scanning, fanning, and sweeping, TFM PF insertion and PTT R/L as hudson 15'   Heel cord stretch as hudson, AP TC mobs, RF, FF mobs L as hudson                         Neuro Re-Ed        Foot intrinsic training:    Towel curls         2x10/5\"               20x/5\"        20x/5\"       2x10/5\"    Toe yoga        Toe splaying  2x10/5\"         2x10/5\"             20x/5\" ea         20x/5\" 20xea 5\"        20x/5\" 2x10/5\"         2x10/5\"    Perturbation training                                         Ther Ex        Calf stretch KF and KE      PF stretch with towel  Wedge  4x30\" ea  Reviewed HEP  Incline 4x30\" Incline 4x30\" Incline 4x30\"   Great toe extension ROM       Hrs double-->single  4x20-30\" ea        Seated   ROM as hudson  2x10 ea 3-5\"   HEP Reviewed HEP         HELD  4x30\" ea           NP 4x20-30\" ea         Seated   ROM as hudson  2x10 ea 3-5\"      HR with ball between heels       Ankle tband 4 way          Resume upcoming- NP today 2* pain          Resume NV as husdon            HELD          NP         Resume NV    PF with peroneal bias   Tband                 Great toe flex with tband                         Ther Activity pt education regarding pathophysiology/pathoanatomy of present pain/sx and condition, role of PT in improving pain/sx and function                       Gait Training                        Modalities                              4/22/2025  - HEP was issued and reviewed this date for above noted exercises. Pt demonstrated understanding without " incident and without questions/concerns. Will continue to update upcoming.

## 2025-05-06 ENCOUNTER — HOSPITAL ENCOUNTER (OUTPATIENT)
Dept: MRI IMAGING | Facility: HOSPITAL | Age: 50
Discharge: HOME | End: 2025-05-06
Attending: PODIATRIST
Payer: COMMERCIAL

## 2025-05-06 DIAGNOSIS — M67.40 GANGLION CYST: ICD-10-CM

## 2025-05-06 DIAGNOSIS — M79.672 LEFT FOOT PAIN: ICD-10-CM

## 2025-05-06 PROCEDURE — 73720 MRI LWR EXTREMITY W/O&W/DYE: CPT

## 2025-05-06 PROCEDURE — A9585 GADOBUTROL INJECTION: HCPCS | Performed by: PODIATRIST

## 2025-05-06 RX ORDER — GADOBUTROL 604.72 MG/ML
10 INJECTION INTRAVENOUS
Status: COMPLETED | OUTPATIENT
Start: 2025-05-06 | End: 2025-05-06

## 2025-05-06 RX ADMIN — GADOBUTROL 10 ML: 604.72 INJECTION INTRAVENOUS at 07:48

## 2025-05-08 ENCOUNTER — OFFICE VISIT (OUTPATIENT)
Dept: PHYSICAL THERAPY | Facility: CLINIC | Age: 50
End: 2025-05-08
Attending: PODIATRIST
Payer: COMMERCIAL

## 2025-05-08 DIAGNOSIS — M72.2 PLANTAR FASCIITIS: Primary | ICD-10-CM

## 2025-05-08 PROCEDURE — 97140 MANUAL THERAPY 1/> REGIONS: CPT | Performed by: PHYSICAL MEDICINE & REHABILITATION

## 2025-05-08 PROCEDURE — 97110 THERAPEUTIC EXERCISES: CPT | Performed by: PHYSICAL MEDICINE & REHABILITATION

## 2025-05-08 NOTE — PROGRESS NOTES
Daily Note     Today's date: 2025  Patient name: Amparo Dobbs  : 1975  MRN: [id number]  Referring provider: Deo Lopez*  Dx:   Encounter Diagnosis     ICD-10-CM    1. Plantar fasciitis  M72.2                      Subjective: Pt notes no new sx/complaints. Is up to 8 hrs of break in protocol with her CMOS; notes after 4 hrs, her feet did hurt, but notes she had no pain/issue with /following wearing CMOs for 6 hrs. Notes her heel pain has improved since wearing her CMOs. Cont with L lateral foot pain; no better/worse. Pt notes she f/u with DPM tomorrow; awaiting results of MRI; notes she was told she will have to have the cyst removed from L foot so it doesn't come back.       Objective: See treatment diary below      Assessment: Tolerated treatment well. No adverse reaction to tx. Progressed program this visit as able. Pt notes discomfort distal medial lower leg/ankle on L with Hrs and with resisted inversion; resolved with rest; PT notes area pt is reporting she has discomfort is same area the pt has scars medial lower leg. No increased pain/sx after session. Pt noting improvement in heel pain since using CMOs; to cont to monitor lateral foot pain; f/u with DPM tomorrow. Patient demonstrated fatigue post treatment and would benefit from continued PT      Plan: Continue per plan of care.  Progress treatment as tolerated.       Precautions: see chart       Re-eval Date:  6/3    Date 4/24 5/1 5/8 4/10 4/22   Visit Count 6 7 8 4 5   FOTO        Pain In 4    3   Pain Out 4    4     Consent reviewed and signed 4/3/25  Manuals 4/24 5/1 5/8 4/10 4/22   GIASTM L calf, PF, Achilles  GIASTM L calf, PF, Achilles #4 scanning, fanning, and sweeping, TFM PF insertion and PTT R/L as hudson 15' Resume GIASTM L calf, PF, Achilles #4 scanning, fanning, and sweeping, TFM PF insertion and PTT R/L as hudson 10'  GIASTM L calf, PF, Achilles #4 scanning, fanning, and sweeping, TFM PF insertion and PTT R/L as hudson 15'  "GIASTM L calf, PF, Achilles #4 scanning, fanning, and sweeping, TFM PF insertion and PTT R/L as hudson 15'   Heel cord stretch as hudson, AP TC mobs, RF, FF mobs L as hudson                         Neuro Re-Ed        Foot intrinsic training:    Towel curls         2x10/5\"               30x/5\"        20x/5\"       2x10/5\"    Toe yoga        Toe splaying  2x10/5\"         2x10/5\"             30x/5\" ea         30x/5\" 20xea 5\"        20x/5\" 2x10/5\"         2x10/5\"    Perturbation training                                         Ther Ex        Calf stretch KF and KE      PF stretch with towel  Wedge  4x30\" ea  Reviewed HEP  Incline 4x30\" Incline 4x30\" Incline 4x30\"   Great toe extension ROM       Hrs double-->single  4x20-30\" ea        Seated   ROM as hudson  2x10 ea 3-5\"   HEP 4x20-30\" ea         Standing  2x10/5\"   4x30\" ea           NP 4x20-30\" ea         Seated   ROM as hudson  2x10 ea 3-5\"      HR with ball between heels       Ankle tband 4 way          Resume upcoming- NP today 2* pain          Resume NV as hudson  NV        Green 4 way  2x10/5\" ea R/L          NP         Resume NV    PF with peroneal bias   Tband                 Great toe flex with tband                         Ther Activity pt education regarding pathophysiology/pathoanatomy of present pain/sx and condition, role of PT in improving pain/sx and function                       Gait Training                        Modalities                              4/22/2025  - HEP was issued and reviewed this date for above noted exercises. Pt demonstrated understanding without incident and without questions/concerns. Will continue to update upcoming.                      "

## 2025-05-09 ENCOUNTER — OFFICE VISIT (OUTPATIENT)
Dept: PODIATRY | Facility: CLINIC | Age: 50
End: 2025-05-09
Payer: COMMERCIAL

## 2025-05-09 VITALS — HEIGHT: 65 IN | WEIGHT: 251 LBS | BODY MASS INDEX: 41.82 KG/M2

## 2025-05-09 DIAGNOSIS — M76.72 PERONEAL TENDINITIS OF LEFT LOWER EXTREMITY: ICD-10-CM

## 2025-05-09 DIAGNOSIS — M76.822 POSTERIOR TIBIAL TENDINITIS OF LEFT LOWER EXTREMITY: ICD-10-CM

## 2025-05-09 DIAGNOSIS — M72.2 PLANTAR FASCIITIS: ICD-10-CM

## 2025-05-09 DIAGNOSIS — M76.62 ACHILLES TENDINITIS OF LEFT LOWER EXTREMITY: ICD-10-CM

## 2025-05-09 DIAGNOSIS — M24.573 EQUINUS CONTRACTURE OF ANKLE: ICD-10-CM

## 2025-05-09 DIAGNOSIS — G57.52 TARSAL TUNNEL SYNDROME OF LEFT SIDE: ICD-10-CM

## 2025-05-09 DIAGNOSIS — M67.40 GANGLION CYST: Primary | ICD-10-CM

## 2025-05-09 PROCEDURE — 99213 OFFICE O/P EST LOW 20 MIN: CPT | Performed by: PODIATRIST

## 2025-05-09 NOTE — PROGRESS NOTES
Name: Amparo Dobbs      : 1975      MRN: [id number]  Encounter Provider: Deo Lopez DPM  Encounter Date: 2025   Encounter department: [facility] [place]  :  Assessment & Plan  Ganglion cyst         Peroneal tendinitis of left lower extremity         Posterior tibial tendinitis of left lower extremity         Achilles tendinitis of left lower extremity         Plantar fasciitis         Tarsal tunnel syndrome of left side         Equinus contracture of ankle         - MRI reviewed and shows ganglion cyst and also peroneal tendinosis  - There is a tear visible on MRI but I think all of her pain here except for the ganglion cyst is being driven by her abnormal calf mechanics  - We did discuss aspiration injection or excision of the ganglion cyst, for now she is to purchase donut pads and pad this in her shoe she is to continue aggressive physical therapy management and she is to return again check for 2 months  - She does not feel as though she is appreciably better, however her mechanics are substantially improved, she is doing very good with her overall therapy and she might not be having overall pain relief yet but I think in the next 6 months she is going to see this, I think that we can avoid surgical intervention on her left lower extremity from a gastroc recession standpoint  - May need to excise ganglion cyst in future    History of Present Illness   HPI  Amparo Dobbs is a 49 y.o. female who presents  for evaluation and management of her left foot, she is having continued pain and is here to review her MRI. She has been doing PT for the past 3 months and doesn't think that she has gotten appreciably better.  She is dealing pain on the back of her ankle, the outside of her foot she is still having first step in the morning pain and also the outside of her also left foot.  The orthotics do help with the first up in the morning pain also pain on the bottom of her heel.  "The lump is also very painful when she walks.             Review of Systems   Constitutional:  Negative for chills and fever.   HENT:  Negative for ear pain and sore throat.    Eyes:  Negative for pain and visual disturbance.   Respiratory:  Negative for cough and shortness of breath.    Cardiovascular:  Negative for chest pain and palpitations.   Gastrointestinal:  Negative for abdominal pain and vomiting.   Genitourinary:  Negative for dysuria and hematuria.   Musculoskeletal:  Negative for arthralgias and back pain.   Skin:  Negative for color change and rash.   Neurological:  Negative for seizures and syncope.   All other systems reviewed and are negative.         Objective   Ht 5' 5\" (1.651 m)   Wt 114 kg (251 lb)   BMI 41.77 kg/m²      Physical Exam  Vitals reviewed.   Constitutional:       Appearance: Normal appearance. She is obese.   HENT:      Head: Normocephalic and atraumatic.      Nose: Nose normal.      Mouth/Throat:      Mouth: Mucous membranes are moist.      Pharynx: Oropharynx is clear.   Eyes:      Pupils: Pupils are equal, round, and reactive to light.   Pulmonary:      Effort: Pulmonary effort is normal.   Abdominal:      General: Abdomen is flat.   Musculoskeletal:      Comments: She has slight pain at the insertion of her left peroneus brevis tendon, no pain with palpation at the insertion of the left posterior tibial tendon, continued pain palpation of the left heel, pain on palpation of the distal insertional fibers of the left Achilles, continue positive Silfverskiold maneuver but her calf tightness has improved somewhat, she is down around negative by -10 degrees with the knee extended, around +5 degrees with knee bent.  There is still remaining a lump on the outside of her left foot.   Skin:     Capillary Refill: Capillary refill takes less than 2 seconds.   Neurological:      General: No focal deficit present.      Mental Status: She is alert and oriented to person, place, and time. " Mental status is at baseline.

## 2025-05-13 ENCOUNTER — APPOINTMENT (OUTPATIENT)
Dept: PHYSICAL THERAPY | Facility: CLINIC | Age: 50
End: 2025-05-13
Attending: PODIATRIST
Payer: COMMERCIAL

## 2025-05-13 DIAGNOSIS — M72.2 PLANTAR FASCIITIS: Primary | ICD-10-CM

## 2025-05-13 PROCEDURE — 97112 NEUROMUSCULAR REEDUCATION: CPT | Performed by: PHYSICAL MEDICINE & REHABILITATION

## 2025-05-13 PROCEDURE — 97140 MANUAL THERAPY 1/> REGIONS: CPT | Performed by: PHYSICAL MEDICINE & REHABILITATION

## 2025-05-13 PROCEDURE — 97110 THERAPEUTIC EXERCISES: CPT | Performed by: PHYSICAL MEDICINE & REHABILITATION

## 2025-05-13 NOTE — PROGRESS NOTES
Daily Note     Today's date: 2025  Patient name: Amparo Dobbs  : 1975  MRN: [id number]  Referring provider: Deo Lopez*  Dx:   Encounter Diagnosis     ICD-10-CM    1. Plantar fasciitis  M72.2                      Subjective: Pt notes no new sx/complaints. Notes overall her foot pain is about the same since SOC. Notes better mobility of her calves/better flexibility. F/u with DPM last week; Notes DPM is not going to take cyst out at this point; dx with low grade tear L peroneal tendon per pt. RTD in ~2 months. No new sx/complaints. Pt notes her orthotics are going well; not giving her any issues at this point.       Objective: See treatment diary below      Assessment: Tolerated treatment well. Progressed program to tolerance. Added standing hR with tennis ball squeeze between heels for calf and PTT strengthening; pt noted calf mm fatigue only.  Discomfort medial ankle L with traditional standing Hrs; resolved with rest. No additional sx/complaints with/following program. L ankle fatigues quickly with tband PREs; most weakness with inv/ev. No pain with PREs L /R ankle. No change in baseline sx after tx. Pt is progressing well with CMOs without issue to date. Patient demonstrated fatigue post treatment and would benefit from continued PT      Plan: Continue per plan of care.  Progress treatment as tolerated.   Cont to progress foot/ankle strengthening to tolerance.      Precautions: see chart       Re-eval Date:  6/3    Date    Visit Count 6 7 8 9 5   FOTO        Pain In 4   3 3   Pain Out 4   3 4     Consent reviewed and signed 4/3/25  Manuals    GIASTM L calf, PF, Achilles  GIASTM L calf, PF, Achilles #4 scanning, fanning, and sweeping, TFM PF insertion and PTT R/L as hudson 15' Resume GIASTM L calf, PF, Achilles #4 scanning, fanning, and sweeping, TFM PF insertion and PTT R/L as hudson 10'  GIASTM L calf, PF, Achilles #4 scanning, fanning, and  "sweeping, TFM PF insertion and PTT R/L as hudson 10'  GIASTM L calf, PF, Achilles #4 scanning, fanning, and sweeping, TFM PF insertion and PTT R/L as hudson 15'   Heel cord stretch as hudson, AP TC mobs, RF, FF mobs L as hudson                         Neuro Re-Ed        Foot intrinsic training:    Towel curls         2x10/5\"               30x/5\"        30x/5\"       2x10/5\"    Toe yoga        Toe splaying  2x10/5\"         2x10/5\"             30x/5\" ea         30x/5\" 30xea 5\"        30x/5\" 2x10/5\"         2x10/5\"    Perturbation training                                         Ther Ex        Calf stretch KF and KE      PF stretch with towel  Wedge  4x30\" ea  Reviewed HEP  Incline 4x30\" Incline 4x30\" ea Incline 4x30\"   Great toe extension ROM       Hrs double-->single  4x20-30\" ea        Seated   ROM as hudson  2x10 ea 3-5\"   HEP 4x20-30\" ea         Standing  2x10/5\"   Hep          Standing Hrs B  2x10  4x20-30\" ea         Seated   ROM as hudson  2x10 ea 3-5\"      HR with ball between heels       Ankle tband 4 way          Resume upcoming- NP today 2* pain          Resume NV as hudson  NV        Green 4 way  2x10/5\" ea R/L  2x10         Green 4 way  2x10/5\" ea R/L          Resume NV    PF with peroneal bias   Tband     NV            Great toe flex with tband                         Ther Activity pt education regarding pathophysiology/pathoanatomy of present pain/sx and condition, role of PT in improving pain/sx and function                       Gait Training                        Modalities                              4/22/2025  - HEP was issued and reviewed this date for above noted exercises. Pt demonstrated understanding without incident and without questions/concerns. Will continue to update upcoming.                        "

## 2025-05-15 ENCOUNTER — APPOINTMENT (OUTPATIENT)
Dept: PHYSICAL THERAPY | Facility: CLINIC | Age: 50
End: 2025-05-15
Attending: PODIATRIST
Payer: COMMERCIAL

## 2025-05-15 DIAGNOSIS — M72.2 PLANTAR FASCIITIS: Primary | ICD-10-CM

## 2025-05-15 PROCEDURE — 97112 NEUROMUSCULAR REEDUCATION: CPT | Performed by: PHYSICAL MEDICINE & REHABILITATION

## 2025-05-15 PROCEDURE — 97110 THERAPEUTIC EXERCISES: CPT | Performed by: PHYSICAL MEDICINE & REHABILITATION

## 2025-05-15 PROCEDURE — 97140 MANUAL THERAPY 1/> REGIONS: CPT | Performed by: PHYSICAL MEDICINE & REHABILITATION

## 2025-05-15 NOTE — PROGRESS NOTES
Daily Note     Today's date: 5/15/2025  Patient name: Amparo Dobbs  : 1975  MRN: [id number]  Referring provider: Deo Lopez*  Dx:   Encounter Diagnosis     ICD-10-CM    1. Plantar fasciitis  M72.2                      Subjective: Pt notes overall her feet are feeling better; less sore. Notes she feels she can walk more normally now with the support of the orthotics. Feet feel good with the support of the orthotics; feels they are helping. Less pain the past few days vs last week. Feels she is improving.       Objective: See treatment diary below      Assessment: Tolerated treatment well. No adverse reaction to tx. Improved tolerance for HR with addition of ball squeeze between the heels; able to increase reps with calf mm fatigue only. No complaints after session. Added tandem stance on foam; pt with decreased foot/ankle stability with quick ankle fatigue B L > R. Cont to report quick mm fatigue with PREs inv/ev L >R ankle. Noted mm fatigue after tx; no additional sx/complaints. HEP reviewed. Patient demonstrated fatigue post treatment and would benefit from continued PT      Plan: Continue per plan of care.  Progress treatment as tolerated.       Precautions: see chart       Re-eval Date:  6/3    Date 4/24 5/1 5/8 5/13 5/15   Visit Count 6 7 8 9 10   FOTO        Pain In 4   3 1   Pain Out 4   3 1     Consent reviewed and signed 4/3/25  Manuals 4/24 5/1 5/8 5/13 5/15   GIASTM L calf, PF, Achilles  GIASTM L calf, PF, Achilles #4 scanning, fanning, and sweeping, TFM PF insertion and PTT R/L as hudson 15' Resume GIASTM L calf, PF, Achilles #4 scanning, fanning, and sweeping, TFM PF insertion and PTT R/L as hudson 10'  GIASTM L calf, PF, Achilles #4 scanning, fanning, and sweeping, TFM PF insertion and PTT R/L as hudson 10'  GIASTM L calf, PF, Achilles #4 scanning, fanning, and sweeping, TFM PF insertion and PTT R/L as hudson 10'   Heel cord stretch as hudson, AP TC mobs, RF, FF mobs L as hudson                      "    Neuro Re-Ed        Foot intrinsic training:    Towel curls         2x10/5\"               30x/5\"        30x/5\"       30x/5\"   Toe yoga        Toe splaying  2x10/5\"         2x10/5\"             30x/5\" ea         30x/5\" 30xea 5\"        30x/5\" 30xea 5\"        30x/5\"   Perturbation training              Tandem stance foam  EO  3x30\" ea R/L  0-1 HHA  S                           Ther Ex        Calf stretch KF and KE      PF stretch with towel  Wedge  4x30\" ea  Reviewed HEP  Incline 4x30\" Incline 4x30\" ea Incline 4x30\"ea   Great toe extension ROM       Hrs double-->single  4x20-30\" ea        Seated   ROM as hudson  2x10 ea 3-5\"   HEP 4x20-30\" ea         Standing  2x10/5\"   Hep          Standing Hrs B  2x10  HEP          HEP     HR with ball between heels       Ankle tband 4 way          Resume upcoming- NP today 2* pain          Resume NV as hudson  NV        Green 4 way  2x10/5\" ea R/L  2x10         Green 4 way  2x10/5\" ea R/L  3x10         Green 4 way  2x12-15/5\" ea R/L    PF with peroneal bias   Tband     NV            Great toe flex with tband                         Ther Activity pt education regarding pathophysiology/pathoanatomy of present pain/sx and condition, role of PT in improving pain/sx and function                       Gait Training                        Modalities                              4/22/2025  - HEP was issued and reviewed this date for above noted exercises. Pt demonstrated understanding without incident and without questions/concerns. Will continue to update upcoming.                          "

## 2025-05-20 ENCOUNTER — OFFICE VISIT (OUTPATIENT)
Dept: PHYSICAL THERAPY | Facility: CLINIC | Age: 50
End: 2025-05-20
Attending: PODIATRIST
Payer: COMMERCIAL

## 2025-05-20 DIAGNOSIS — M72.2 PLANTAR FASCIITIS: Primary | ICD-10-CM

## 2025-05-20 PROCEDURE — 97110 THERAPEUTIC EXERCISES: CPT | Performed by: PHYSICAL MEDICINE & REHABILITATION

## 2025-05-20 PROCEDURE — 97112 NEUROMUSCULAR REEDUCATION: CPT | Performed by: PHYSICAL MEDICINE & REHABILITATION

## 2025-05-20 NOTE — PROGRESS NOTES
"Daily Note     Today's date: 2025  Patient name: Amparo Dobbs  :   MRN: [id number]  Referring provider: Deo Lopez*  Dx:   Encounter Diagnosis     ICD-10-CM    1. Plantar fasciitis  M72.2                      Subjective: Pt notes her foot pain is overall improved. No new sx/complaints. No pain at the moment.       Objective: See treatment diary below      Assessment: Tolerated treatment well. No adverse reaction to tx.  Overall improvement in pt pain/sx since SOC. Tolerating more exercise with less pain/sx. Progressing with PREs. No complaints after tx. Cont with reduced L ankle stability with balance challenges and ankle weakness with PREs inv/ev with quick mm fatigue. HEP reviewed.  Patient demonstrated fatigue post treatment and would benefit from continued PT      Plan: Continue per plan of care.  Progress treatment as tolerated.       Precautions: see chart       Re-eval Date:  6/3    Date 5/20 5/1 5/8 5/13 5/15   Visit Count 11 7 8 9 10   FOTO        Pain In 0   3 1   Pain Out 0   3 1     Consent reviewed and signed 4/3/25  Manuals 5/20 5/1 5/8 5/13 5/15   GIASTM L calf, PF, Achilles  GIASTM L calf, PF, Achilles #4 scanning, fanning, and sweeping, TFM PF insertion and PTT R/L as hudson 10' Resume GIASTM L calf, PF, Achilles #4 scanning, fanning, and sweeping, TFM PF insertion and PTT R/L as hudson 10'  GIASTM L calf, PF, Achilles #4 scanning, fanning, and sweeping, TFM PF insertion and PTT R/L as hudson 10'  GIASTM L calf, PF, Achilles #4 scanning, fanning, and sweeping, TFM PF insertion and PTT R/L as hudson 10'   Heel cord stretch as hudson, AP TC mobs, RF, FF mobs L as hudson                         Neuro Re-Ed        Foot intrinsic training:    Towel curls         3x10/5\"               30x/5\"        30x/5\"       30x/5\"   Toe yoga        Toe splaying  3x10/5\"         3x10/5\"             30x/5\" ea         30x/5\" 30xea 5\"        30x/5\" 30xea 5\"        30x/5\"   Perturbation training          " "Tandem stance foam  EO  3x30\" ea R/L  0-1 HHA  S    Tandem stance foam  EO  3x30\" ea R/L  0-1 HHA  S                           Ther Ex        Calf stretch KF and KE      PF stretch with towel  Wedge  4x30\" ea  Reviewed HEP  Incline 4x30\" Incline 4x30\" ea Incline 4x30\"ea   Great toe extension ROM       Hrs double-->single  4x20-30\" ea           HEP 4x20-30\" ea         Standing  2x10/5\"   Hep          Standing Hrs B  2x10  HEP          HEP     HR with ball between heels       Ankle tband 4 way  3x10         Green 2x15/5\" ea 4 way          Resume NV as hudson  NV        Green 4 way  2x10/5\" ea R/L  2x10         Green 4 way  2x10/5\" ea R/L  3x10         Green 4 way  2x12-15/5\" ea R/L    PF with peroneal bias   Tband     NV            Great toe flex with tband  NV                       Ther Activity                        Gait Training                        Modalities                              4/22/2025  - HEP was issued and reviewed this date for above noted exercises. Pt demonstrated understanding without incident and without questions/concerns. Will continue to update upcoming.                            "

## 2025-05-22 ENCOUNTER — OFFICE VISIT (OUTPATIENT)
Dept: PHYSICAL THERAPY | Facility: CLINIC | Age: 50
End: 2025-05-22
Attending: PODIATRIST
Payer: COMMERCIAL

## 2025-05-22 DIAGNOSIS — M72.2 PLANTAR FASCIITIS: Primary | ICD-10-CM

## 2025-05-22 PROCEDURE — 97140 MANUAL THERAPY 1/> REGIONS: CPT | Performed by: PHYSICAL MEDICINE & REHABILITATION

## 2025-05-22 PROCEDURE — 97112 NEUROMUSCULAR REEDUCATION: CPT | Performed by: PHYSICAL MEDICINE & REHABILITATION

## 2025-05-22 PROCEDURE — 97110 THERAPEUTIC EXERCISES: CPT | Performed by: PHYSICAL MEDICINE & REHABILITATION

## 2025-05-22 NOTE — PROGRESS NOTES
"Daily Note     Today's date: 2025  Patient name: Amparo Dobbs  : 1975  MRN: [id number]  Referring provider: Deo Lopez*  Dx:   Encounter Diagnosis     ICD-10-CM    1. Plantar fasciitis  M72.2                      Subjective: Pt notes discomfort anterior/ lateral L foot/ankle when she turns it a certain way or moves the wrong way. No new pain/sx otherwise . No swelling, no redness, no bruising etc. No new trauma/injury. Started yesterday; not sure what triggered it.       Objective: See treatment diary below      Assessment: Tolerated treatment well. Modified program 2* Pt report of soreness lateral /anterior L ankle; soreness increased with tandem stance on foam with the ankle \"rolling out\" and with resisted eversion; resolved at rest; held/modified these exercises today and pt tolerated modifications well without issue; no additional pain/sx/complaints with /following session. Cont with reduced L ankle stability with perturbations/balance challenges and reduced strength L ankle eversion > inversion. No complaints after session. Patient demonstrated fatigue post treatment and would benefit from continued PT      Plan: Continue per plan of care.  Progress treatment as tolerated.       Precautions: see chart       Re-eval Date:  6/3    Date 5/20 5/22 5/8 5/13 5/15   Visit Count 11 12 8 9 10   FOTO        Pain In 0   3 1   Pain Out 0   3 1     Consent reviewed and signed 4/3/25  Manuals 5/20 5/22 5/8 5/13 5/15   GIASTM L calf, PF, Achilles  GIASTM L calf, PF, Achilles #4 scanning, fanning, and sweeping, TFM PF insertion and PTT R/L as hudson 10' GIASTM L calf, PF, Achilles #4 scanning, fanning, and sweeping, TFM PF insertion and PTT R/L as hudson 10' GIASTM L calf, PF, Achilles #4 scanning, fanning, and sweeping, TFM PF insertion and PTT R/L as hudson 10'  GIASTM L calf, PF, Achilles #4 scanning, fanning, and sweeping, TFM PF insertion and PTT R/L as hudson 10'  GIASTM L calf, PF, Achilles #4 scanning, " "fanning, and sweeping, TFM PF insertion and PTT R/L as hudson 10'   Heel cord stretch as hudson, AP TC mobs, RF, FF mobs L as hudson                         Neuro Re-Ed        Foot intrinsic training:    Towel curls         3x10/5\"      3x10/5\"          30x/5\"        30x/5\"       30x/5\"   Toe yoga        Toe splaying  3x10/5\"         3x10/5\"  3x10/5\"         3x10/5\"            30x/5\" ea         30x/5\" 30xea 5\"        30x/5\" 30xea 5\"        30x/5\"   Perturbation training          Tandem stance foam  EO  3x30\" ea R/L  0-1 HHA  S Tandem stance floor  EO  3x30\" ea R/L  0-1 HHA  S   Tandem stance foam  EO  3x30\" ea R/L  0-1 HHA  S                           Ther Ex        Calf stretch KF and KE      PF stretch with towel  Wedge  4x30\" ea  Wedge  4x30\" ea  Incline 4x30\" Incline 4x30\" ea Incline 4x30\"ea   Great toe extension ROM       Hrs double-->single  4x20-30\" ea           4x20-30\" ea 4x20-30\" ea         Standing  2x10/5\"   Hep          Standing Hrs B  2x10  HEP          HEP     HR with ball between heels       Ankle tband 4 way  3x10         Green 2x15/5\" ea 4 way  3x10         Green band  NO Eversion   2x15/5\" ea   NV        Green 4 way  2x10/5\" ea R/L  2x10         Green 4 way  2x10/5\" ea R/L  3x10         Green 4 way  2x12-15/5\" ea R/L    PF with peroneal bias   Tband     NV            Great toe flex with tband  NV NV                      Ther Activity                        Gait Training                        Modalities                              4/22/2025  - HEP was issued and reviewed this date for above noted exercises. Pt demonstrated understanding without incident and without questions/concerns. Will continue to update upcoming.                              "

## 2025-05-27 ENCOUNTER — APPOINTMENT (OUTPATIENT)
Dept: PHYSICAL THERAPY | Facility: CLINIC | Age: 50
End: 2025-05-27
Attending: PODIATRIST
Payer: COMMERCIAL

## 2025-05-27 DIAGNOSIS — M72.2 PLANTAR FASCIITIS: Primary | ICD-10-CM

## 2025-05-27 PROCEDURE — 97110 THERAPEUTIC EXERCISES: CPT | Performed by: PHYSICAL MEDICINE & REHABILITATION

## 2025-05-27 NOTE — PROGRESS NOTES
Daily Note     Today's date: 2025  Patient name: Amparo Dobbs  : 1975  MRN: [id number]  Referring provider: Deo Lopez*  Dx:   Encounter Diagnosis     ICD-10-CM    1. Plantar fasciitis  M72.2                      Subjective: Pt notes no new sx/complaints. Notes her feet are still feeling better overall. Notes overall less pain. Notes she had some lateral L ankle pain the day after last tx but then nothing since then.       Objective: See treatment diary below      Assessment: Tolerated treatment well. No adverse reaction to tx. Added resisted great toe flex with tband; mm weakness /fatigue noted.  Able to resume tandem stance on foam and resisted ankle eversion L without increased overall pain/sx. Cont with reduced L ankle stability with CKC activities. Cont with noted weakness/endurance deficits L ankle with inversion/eversion. No complaints after tx. Hep reviewed. Patient demonstrated fatigue post treatment and would benefit from continued PT      Plan: Continue per plan of care.  Progress treatment as tolerated.       Precautions: see chart       Re-eval Date:  6/3    Date 5/20 5/22 5/27 5/13 5/15   Visit Count 11 12 13 9 10   FOTO        Pain In 0  0 3 1   Pain Out 0  0 3 1     Consent reviewed and signed 4/3/25  Manuals 5/20 5/22 5/27 5/13 5/15   GIASTM L calf, PF, Achilles  GIASTM L calf, PF, Achilles #4 scanning, fanning, and sweeping, TFM PF insertion and PTT R/L as hudson 10' GIASTM L calf, PF, Achilles #4 scanning, fanning, and sweeping, TFM PF insertion and PTT R/L as hudson 10' GIASTM L calf, PF, Achilles #4 scanning, fanning, and sweeping, TFM PF insertion and PTT R/L as hudson 10' GIASTM L calf, PF, Achilles #4 scanning, fanning, and sweeping, TFM PF insertion and PTT R/L as hudson 10'  GIASTM L calf, PF, Achilles #4 scanning, fanning, and sweeping, TFM PF insertion and PTT R/L as hudson 10'   Heel cord stretch as hudson, AP TC mobs, RF, FF mobs L as hudson                         Neuro Re-Ed     "    Foot intrinsic training:    Towel curls         3x10/5\"      3x10/5\"          30x/5\"        30x/5\"       30x/5\"   Toe yoga        Toe splaying  3x10/5\"         3x10/5\"  3x10/5\"         3x10/5\"            30x/5\" ea         30x/5\" 30xea 5\"        30x/5\" 30xea 5\"        30x/5\"   Perturbation training          Tandem stance foam  EO  3x30\" ea R/L  0-1 HHA  S Tandem stance floor  EO  3x30\" ea R/L  0-1 HHA  S Tandem stance foam  EO  3x30\" ea R/L  0-1 HHA  S  Tandem stance foam  EO  3x30\" ea R/L  0-1 HHA  S                           Ther Ex        Calf stretch KF and KE      PF stretch with towel  Wedge  4x30\" ea  Wedge  4x30\" ea  Wedge   incline 4x30\" ea Incline 4x30\" ea Incline 4x30\"ea   Great toe extension ROM       Hrs double-->single  4x20-30\" ea           4x20-30\" ea 4x20-30\" ea          Hep          Standing Hrs B  2x10  HEP          HEP     HR with ball between heels       Ankle tband 4 way  3x10         Green 2x15/5\" ea 4 way  3x10         Green band  NO Eversion   2x15/5\" ea   3x10        Green 4 way  2x15/5\" ea R/L  2x10         Green 4 way  2x10/5\" ea R/L  3x10         Green 4 way  2x12-15/5\" ea R/L    PF with peroneal bias   Tband     NV            Great toe flex with tband  NV NV [place]  2x10/5-10\" L   Seated                      Ther Activity                        Gait Training                        Modalities                              4/22/2025  - HEP was issued and reviewed this date for above noted exercises. Pt demonstrated understanding without incident and without questions/concerns. Will continue to update upcoming.                                "

## 2025-05-29 ENCOUNTER — APPOINTMENT (OUTPATIENT)
Dept: PHYSICAL THERAPY | Facility: CLINIC | Age: 50
End: 2025-05-29
Attending: PODIATRIST
Payer: COMMERCIAL

## 2025-05-29 DIAGNOSIS — M72.2 PLANTAR FASCIITIS: Primary | ICD-10-CM

## 2025-05-29 PROCEDURE — 97140 MANUAL THERAPY 1/> REGIONS: CPT | Performed by: PHYSICAL MEDICINE & REHABILITATION

## 2025-05-29 PROCEDURE — 97110 THERAPEUTIC EXERCISES: CPT | Performed by: PHYSICAL MEDICINE & REHABILITATION

## 2025-05-29 NOTE — PROGRESS NOTES
"Daily Note     Today's date: 2025  Patient name: Amparo Dobbs  : 1975  MRN: [id number]  Referring provider: Deo Lopez*  Dx:   Encounter Diagnosis     ICD-10-CM    1. Plantar fasciitis  M72.2                      Subjective: Pt note no new sx/complaints. Overall her foot pain is improved.       Objective: See treatment diary below      Assessment: Tolerated treatment well. No adverse reaction to tx. Making steady improvements in ankle flexibility B. Able to resume pertubation challenges and resisted eversion L ankle without increased pain/sx. Cont with quick mm fatigue with L ankle eversion with cont mm weakness. Cont with reduced L ankle stability > R with perturbation challenges. No complaints after session. Patient demonstrated fatigue post treatment and would benefit from continued PT      Plan: Continue per plan of care.  Progress treatment as tolerated.       Precautions: see chart       Re-eval Date:  6/3    Date 5/20 5/22 5/27 5/29 5/15   Visit Count 11 12 13 14 10   FOTO        Pain In 0  0 0 1   Pain Out 0  0 0 1     Consent reviewed and signed 4/3/25  Manuals 5/20 5/22 5/27 5/29 5/15   GIASTM L calf, PF, Achilles  GIASTM L calf, PF, Achilles #4 scanning, fanning, and sweeping, TFM PF insertion and PTT R/L as hudson 10' GIASTM L calf, PF, Achilles #4 scanning, fanning, and sweeping, TFM PF insertion and PTT R/L as hudson 10' GIASTM L calf, PF, Achilles #4 scanning, fanning, and sweeping, TFM PF insertion and PTT R/L as hudson 10' GIASTM L calf, PF, Achilles #4 scanning, fanning, and sweeping, TFM PF insertion and PTT R/L as hudson 10' GIASTM L calf, PF, Achilles #4 scanning, fanning, and sweeping, TFM PF insertion and PTT R/L as hudson 10'   Heel cord stretch as hudson, AP TC mobs, RF, FF mobs L as hudson                         Neuro Re-Ed        Foot intrinsic training:    Towel curls         3x10/5\"      3x10/5\"          30x/5\"        30x/5\"       30x/5\"   Toe yoga        Toe splaying  3x10/5\" " "        3x10/5\"  3x10/5\"         3x10/5\"            30x/5\" ea         30x/5\" 30xea 5\"        30x/5\" 30xea 5\"        30x/5\"   Perturbation training          Tandem stance foam  EO  3x30\" ea R/L  0-1 HHA  S Tandem stance floor  EO  3x30\" ea R/L  0-1 HHA  S Tandem stance foam  EO  3x30\" ea R/L  0-1 HHA  S Tandem stance foam  EO  3x30\" ea R/L  0-1 HHA  S Tandem stance foam  EO  3x30\" ea R/L  0-1 HHA  S                           Ther Ex        Calf stretch KF and KE      PF stretch with towel  Wedge  4x30\" ea  Wedge  4x30\" ea  Wedge   incline 4x30\" ea Wedge   incline 4x30\" ea Incline 4x30\"ea   Great toe extension ROM       Hrs double-->single  4x20-30\" ea           4x20-30\" ea 4x20-30\" ea          4x20-30\" ea          HEP          HEP     HR with ball between heels       Ankle tband 4 way  3x10         Green 2x15/5\" ea 4 way  3x10         Green band  NO Eversion   2x15/5\" ea   3x10        Green 4 way  2x15/5\" ea R/L  3x10         Green 4 way  2x15/5\" ea R/L  3x10         Green 4 way  2x12-15/5\" ea R/L    PF with peroneal bias   Tband     NV            Great toe flex with tband  NV NV [place]  2x10/5-10\" L   Seated  Orange  2x10/5-10\" L   Seated                     Ther Activity                        Gait Training                        Modalities                              4/22/2025  - HEP was issued and reviewed this date for above noted exercises. Pt demonstrated understanding without incident and without questions/concerns. Will continue to update upcoming.                                  "

## 2025-06-05 ENCOUNTER — OFFICE VISIT (OUTPATIENT)
Dept: PHYSICAL THERAPY | Facility: CLINIC | Age: 50
End: 2025-06-05
Attending: PODIATRIST
Payer: COMMERCIAL

## 2025-06-05 DIAGNOSIS — M72.2 PLANTAR FASCIITIS: Primary | ICD-10-CM

## 2025-06-05 PROCEDURE — 97112 NEUROMUSCULAR REEDUCATION: CPT | Performed by: PHYSICAL MEDICINE & REHABILITATION

## 2025-06-05 PROCEDURE — 97140 MANUAL THERAPY 1/> REGIONS: CPT | Performed by: PHYSICAL MEDICINE & REHABILITATION

## 2025-06-05 PROCEDURE — 97110 THERAPEUTIC EXERCISES: CPT | Performed by: PHYSICAL MEDICINE & REHABILITATION

## 2025-06-05 NOTE — PROGRESS NOTES
Daily Note /Re-Evaluation     Today's date: 2025  Patient name: Amparo Dobbs  : 1975  MRN: [id number]  Referring provider: Deo Lopez*  Dx:   Encounter Diagnosis     ICD-10-CM    1. Plantar fasciitis  M72.2                      SUBJECTIVE:    Pt notes overall cont'd improvement in her foot pain/sx since SOC/last RE-eval. Notes she is able to tolerate more standing/walking with less pain; notes she can get through her days now without the pain she was having. Notes her feet/ankles feel more stable with the CMOs. No new pain/sx. Still has good days/bad days. Notes cont L/R foot/ankle pain with prolonged walking/standing or walking on uneven terrain. More so pain standing in one spot for long periods. Notes cont L medial ankle/lower leg tenderness. L foot/ankle still weaker than R with PREs. RTD in July. Pleased with her progress. No new sx/complaints. Has adjusted to CMOs without issue per pt.     OBJECTIVE:    Age: 49 y.o.  Weight: 245    Foot Posture Index Score    Right Foot  Talar dome - 0  Malleolar curve - 0   Calcaneal eversion - 0  Talonavicular bulge - 0  Medial longitudinal arch - -1  Too many toes - 0  Total Score R = -1    Left Foot  Talar dome - 0  Malleolar curve - 0   Calcaneal eversion - 0  Talonavicular bulge - 0  Medial longitudinal arch - -1  Too many toes - 0  Total Score L = -1    Reference Values  Normal =    0 to +5  Pronated =  +6 to +9 Highly Pronated =  10+  Supinated =   -1 to -4 Highly Supinated = -5 to -12      Objective      Gait Assessment:  Right Stance Phase- RF neutral at IC; RF valgus into early-mid stance; early heel rise; late stance RF valgus/ff pronation   Left Stance Phase - RF neutral vs ?mild varus at IC into LR; RF valgus into midstance with late stance RF valgus/FF pronation; early heel rise   Notes she has pain L lat forefoot with late stance to push off; tends to shift weight to outer foot to avoid pain L heel per pt.   Increased digit ext with  swing phase B with lack of ankle DF B     *Improved foot/ankle alignment and stability t/o R/L stance phase with CMOs B    AROM/PROM:             MMT          AROM          PROM    Ankle         R          L          R         L          R         L   PF 4 4- WFL WFL     DF.   12-14* 8-11*      DF bent knee         EHL         Inv. 4 4--4 WFL WFL pain top of foot -mild     Ever. 4 4- WFL WFL pain top of foot - mild      Great toe Extension     Great toe flexion           4- WFL WFL       Functional Ankle Dorsiflexion ROM:  Limited  Decreased pronation R/L foot with standing march in place and mini squat - improved with CMOs           Palpation:  TTP under L 1st MTP (mild)  Elevated ttp t/o L 5th MET laterally and dorsum of METS 3-5- mild/reduced   TTP lateral L ankle near lat mallelous/peroneals- improved/reduced   TTP in region of PTT and medial calcaneal tubercle L into medial L lower leg; region of scar tissue noted medial L lower leg with sensitivity to pressure/palpation noted - continues , slightly better per pt   Mild ttp R medial calcaneal tubercle - not noted     Observation:  Increased callusing R> L medial great toe and 1st MTP   Claw toe B digits 2-5   Lump noted over dorsum of L mid shaft/above mid shaft of L 5th MET lateral foot; no ttp; no bruising, swelling etc - continues, reduced      Neurological Testing:  Intact light touch R/L foot/ankle     Joint Mobility:        Right                         Left     Subtalar-                  Hypo                               Hypo  Midfoot-                   Hypo                               Hypo     Forefoot-                 Hypo                               Hypo  Talocrural-              Hypo                               Hypo  [place]-                Hypo                               Hypo     Subtalar Neutral Assessment:  Right- See scans     Left- See scans       ORTHOTICS ASSESSMENT/PLAN:    Patient requires custom foot orthosis with B deepened  heel cup, B medial longitudinal arch support, and B transverse arch support to correct altered gait mechanics contributing to pain/sx and functional limitations. Patient is not currently controlled by her motion-controlled shoe. Foot/ankle exam and Ipad scans were completed this date as noted above. Will consult with orthotist regarding patient presentation and scans. Pt will return to PT for dispensing of orthotics when they arrive. Will review orthotic fit to shoe and pt's foot, wearing schedule, gait assessment with orthotics etc at that time. Pt in agreement with plan with no questions/concerns end of session. Thank you for your referral.       Orthotic goals:- All goals Met   1) Patient will have custom foot orthoses fitted to her shoe.   2) Patient will be compliant with break-in period of custom foot orthoses as prescribed by PT.   3) Patient will be compliant with custom foot orthoses use as prescribed by PT.     Plan:    Planned therapy interventions: orthotic fitting/training    PT ASSESSMENT/GOALS:    Amparo has been compliant with attending PT and completing home exercise program since initial eval and reports overall great improvement in pain/sx and function since start of care and last RE.  Amparo reports and demonstrates improved flexibility and improved joint mobility since initial eval last Re-eval, but is still limited compared to prior level of function. She continues to demonstrate hypomobility of R/L foot /ankle and diminished flexibility R/L heel cord/calf; this is steadily improving. She continues with pain L foot > R with pain 1* L heel and lateral foot/ankle; this is steadily reducing, especially since pt has been using CMOs and progressing with PREs. She has obtained her CMOs and had broken them in without issue; she has had improvement in her gait and foot/ankle stability with CMOs as well as significant reduction in her B foot/ankle pain. She is progressing with strengthening L foot/ankle,  however continues with noted strength deficits L > R foot/ankle with weakness ankle PF and eversion on L. Amparo continues with above listed impairments and would benefit from additional skilled PT to address these deficits to return to prior level of function.      Plan  Frequency:1-2x/week   Duration in weeks: 4-6 weeks   POC start date: .6/5/2025  POC end date: 7/17/25  Therapeutic exercise/activity, neuromuscular reeducation, manual therapy, and modalities.   Patient understands and agrees to plan of care.    Goals  Short Term--4 weeks- Progressing towards all STGs   1. Patient will demonstrate 2 point decrease in pain levels.- met   2. Patient will demonstrate 1/2 point increase in all deficient MMT scores.- progressing, cont for L ankle PF and EV  3. Pt will improve ankle DF AROM by at least 5-7* to improve gait. - MET   4. Pt will report at least 50% improvement in standing/walking tolerance vs SOC with less foot pain/sx. - MET     Long Term--By Discharge- Progressing towards all LTGs  1. Patient will achieve expected FOTO score.- progressing   2. Pt will be I with HEP. - MET   3. Pt will be able to stand/walk without limitation 2* foot/ankle pain. - progressing     Patient's Goal: get rid of pain    Precautions: see chart       Re-eval Date:  7/17    Date 5/20 5/22 5/27 5/29 6/5   Visit Count 11 12 13 14 15   FOTO        Pain In 0  0 0 0   Pain Out 0  0 0 0     Consent reviewed and signed 4/3/25  Manuals 5/20 5/22 5/27 5/29 6/5   GIASTM L calf, PF, Achilles  GIASTM L calf, PF, Achilles #4 scanning, fanning, and sweeping, TFM PF insertion and PTT R/L as hudson 10' GIASTM L calf, PF, Achilles #4 scanning, fanning, and sweeping, TFM PF insertion and PTT R/L as hudson 10' GIASTM L calf, PF, Achilles #4 scanning, fanning, and sweeping, TFM PF insertion and PTT R/L as hudson 10' GIASTM L calf, PF, Achilles #4 scanning, fanning, and sweeping, TFM PF insertion and PTT R/L as hudson 10' GIASTM L calf, PF, Achilles #4 scanning,  "fanning, and sweeping, TFM PF insertion and PTT R/L as hudson 10'   Heel cord stretch as hudson, AP TC mobs, RF, FF mobs L as hudson                         Neuro Re-Ed        Foot intrinsic training:    Towel curls         3x10/5\"      3x10/5\"          30x/5\"        30x/5\"       30x/5\"   Toe yoga        Toe splaying  3x10/5\"         3x10/5\"  3x10/5\"         3x10/5\"            30x/5\" ea         30x/5\" 30xea 5\"        30x/5\" 30xea 5\"        30x/5\"   Perturbation training          Tandem stance foam  EO  3x30\" ea R/L  0-1 HHA  S Tandem stance floor  EO  3x30\" ea R/L  0-1 HHA  S Tandem stance foam  EO  3x30\" ea R/L  0-1 HHA  S Tandem stance foam  EO  3x30\" ea R/L  0-1 HHA  S Tandem stance foam  EO  3x30\" ea R/L  0-1 HHA  S        SLS NV                    Ther Ex        Calf stretch KF and KE      PF stretch with towel  Wedge  4x30\" ea  Wedge  4x30\" ea  Wedge   incline 4x30\" ea Wedge   incline 4x30\" ea Wedge   incline 4x30\" ea   Great toe extension ROM       Hrs double-->single  4x20-30\" ea           4x20-30\" ea 4x20-30\" ea          4x20-30\" ea          4x20-30\" ea            HR with ball between heels       Ankle tband 4 way  3x10         Green 2x15/5\" ea 4 way  3x10         Green band  NO Eversion   2x15/5\" ea   3x10        Green 4 way  2x15/5\" ea R/L  3x10         Green 4 way  2x15/5\" ea R/L  3x10         Green 4 way  2x12-15/5\" ea R/L    PF with peroneal bias   Tband     NV         Single leg HR R/L standing   10x ea floor            Great toe flex with tband  NV NV [place]  2x10/5-10\" L   Seated  Orange  2x10/5-10\" L   Seated  [place]  2-3x10/5-10\" L   Seated                    Ther Activity                        Gait Training                        Modalities                              4/22/2025  - HEP was issued and reviewed this date for above noted exercises. Pt demonstrated understanding without incident and without questions/concerns. Will continue to update upcoming.                                    "

## 2025-06-12 ENCOUNTER — OFFICE VISIT (OUTPATIENT)
Dept: PHYSICAL THERAPY | Facility: CLINIC | Age: 50
End: 2025-06-12
Attending: PODIATRIST
Payer: COMMERCIAL

## 2025-06-12 DIAGNOSIS — M72.2 PLANTAR FASCIITIS: Primary | ICD-10-CM

## 2025-06-12 PROCEDURE — 97110 THERAPEUTIC EXERCISES: CPT | Performed by: PHYSICAL MEDICINE & REHABILITATION

## 2025-06-12 PROCEDURE — 97140 MANUAL THERAPY 1/> REGIONS: CPT | Performed by: PHYSICAL MEDICINE & REHABILITATION

## 2025-06-12 NOTE — PROGRESS NOTES
"Daily Note     Today's date: 2025  Patient name: Amparo Dobbs  : 1975  MRN: [id number]  Referring provider: Deo Lopez*  Dx:   Encounter Diagnosis     ICD-10-CM    1. Plantar fasciitis  M72.2                      Subjective: Pt notes she cont to have weakness L calf > R with Hrs; L calf gets sore with the single leg Hrs; can't do as many as the R. Notes the \"cyst\" on L foot was a little \"burny\" and throbbing the past day or two. No new sx/complaints. RTD in July.       Objective: See treatment diary below      Assessment: Tolerated treatment well. No adverse reaction to tx. Increasing reps with Hrs and single leg Hrs with improving calf strength. Improved tolerance for green tband with L ankle 4 way with improving Eversion strength/endurance and without increase in sx. No complaints after session. Cont with tightness of the calf/heel cords B. Cont with weakness L ankle/calf > R. Patient demonstrated fatigue post treatment and would benefit from continued PT      Plan: Continue per plan of care.  Progress treatment as tolerated.       Precautions: see chart       Re-eval Date:      Date        Visit Count 16       FOTO        Pain In 0       Pain Out 0         Consent reviewed and signed 4/3/25  Manuals        GIASTM L calf, PF, Achilles  GIASTM L calf, PF, Achilles #4 scanning, fanning, and sweeping, TFM PF insertion and PTT R/L as hudson 10'       Heel cord stretch as [place], AP TC mobs, RF, FF mobs L as [place]udson                         Neuro Re-Ed        Foot intrinsic training:    Towel curls         3x10/5\"        Toe yoga        Toe splaying  3x10/5\"         3x10/5\"        Perturbation training          Tandem stance foam  EO  3x30\" ea R/L  0-1 HHA  S        SLS NV                        Ther Ex        Calf stretch KF and KE      PF stretch with towel  Wedge   incline 4x30\" ea       Great toe extension ROM       Hrs double-->single  4x20-30\" ea                 HR with ball between " "heels       Ankle tband 4 way  3x10         Green 2x15/5\" ea 4 way        PF with peroneal bias   Tband          Single leg HR R/L standing   15-20x ea floor                Great toe flex with tband  [place]  2-3x10/5-10\" L   Seated                        Ther Activity                        Gait Training                        Modalities                              4/22/2025  - HEP was issued and reviewed this date for above noted exercises. Pt demonstrated understanding without incident and without questions/concerns. Will continue to update upcoming.                                      "

## 2025-06-19 ENCOUNTER — APPOINTMENT (OUTPATIENT)
Dept: PHYSICAL THERAPY | Facility: CLINIC | Age: 50
End: 2025-06-19
Attending: PODIATRIST
Payer: COMMERCIAL

## 2025-07-08 ENCOUNTER — HOSPITAL ENCOUNTER (EMERGENCY)
Facility: HOSPITAL | Age: 50
Discharge: HOME | End: 2025-07-08
Attending: FAMILY MEDICINE | Admitting: FAMILY MEDICINE
Payer: COMMERCIAL

## 2025-07-08 VITALS
SYSTOLIC BLOOD PRESSURE: 134 MMHG | WEIGHT: 214 LBS | DIASTOLIC BLOOD PRESSURE: 79 MMHG | HEART RATE: 72 BPM | RESPIRATION RATE: 16 BRPM | OXYGEN SATURATION: 97 % | TEMPERATURE: 97.6 F | HEIGHT: 65 IN | BODY MASS INDEX: 35.65 KG/M2

## 2025-07-08 DIAGNOSIS — L02.91 ABSCESS: Primary | ICD-10-CM

## 2025-07-08 PROCEDURE — 99284 EMERGENCY DEPT VISIT MOD MDM: CPT | Performed by: FAMILY MEDICINE

## 2025-07-08 PROCEDURE — 99282 EMERGENCY DEPT VISIT SF MDM: CPT

## 2025-07-08 RX ORDER — SULFAMETHOXAZOLE AND TRIMETHOPRIM 800; 160 MG/1; MG/1
1 TABLET ORAL 2 TIMES DAILY
Qty: 14 TABLET | Refills: 0 | Status: SHIPPED | OUTPATIENT
Start: 2025-07-08 | End: 2025-07-15

## 2025-07-08 RX ORDER — SULFAMETHOXAZOLE AND TRIMETHOPRIM 800; 160 MG/1; MG/1
1 TABLET ORAL ONCE
Status: COMPLETED | OUTPATIENT
Start: 2025-07-08 | End: 2025-07-08

## 2025-07-08 RX ADMIN — SULFAMETHOXAZOLE AND TRIMETHOPRIM 1 TABLET: 800; 160 TABLET ORAL at 17:54

## 2025-07-08 NOTE — ED PROVIDER NOTES
Time reflects when diagnosis was documented in both MDM as applicable and the Disposition within this note       Time User Action Codes Description Comment    7/8/2025  5:46 PM Sami Shields Add [L02.91] Abscess           ED Disposition       ED Disposition   Discharge    Condition   Stable    Date/Time   Tue Jul 8, 2025  5:45 PM    Comment   Amparo Dobbs discharge to home/self care.                   Assessment & Plan       Medical Decision Making    49-year-old female presented to ED with the complaint of right armpit abscess.  Patient states she noticed abscess on Sunday.  States has not been using any medication or warm compress or and wanted to get checked.  There is no drainage noted.  I did put diagnoses concerning for abscess/cellulitis less likely necrotizing fasciitis denies any trauma  Plan will start patient on Bactrim General Surgery referral is placed.  Patient offered I&D at this point we will start with antibiotics and follow-up strict question regarding return recommended continue with warm compresses recommended to return if notice any worsening symptoms including pain increased swelling redness drainage fever or chills return back to the ED.  Patient verbalized understand the plan DC home.       Medications   sulfamethoxazole-trimethoprim (BACTRIM DS) 800-160 mg per tablet 1 tablet (has no administration in time range)       ED Risk Strat Scores                    No data recorded        SBIRT 20yo+      Flowsheet Row Most Recent Value   Initial Alcohol Screen: US AUDIT-C     1. How often do you have a drink containing alcohol? 0 Filed at: 07/08/2025 1729   2. How many drinks containing alcohol do you have on a typical day you are drinking?  0 Filed at: 07/08/2025 1729   3b. FEMALE Any Age, or MALE 65+: How often do you have 4 or more drinks on one occassion? 0 Filed at: 07/08/2025 1729   Audit-C Score 0 Filed at: 07/08/2025 1729   NARCISA: How many times in the past year have you...    Used an  illegal drug or used a prescription medication for non-medical reasons? Never Filed at: 07/08/2025 1729                            History of Present Illness       Chief Complaint   Patient presents with    Arm Pain     Pt reports lump appearing in L armpit this past Sunday. Pt complains on pain and denies any drainage.       Past Medical History[1]   Past Surgical History[2]   Family History[3]   Social History[4]   E-Cigarette/Vaping    E-Cigarette Use Never User       E-Cigarette/Vaping Substances    Nicotine No     THC No     CBD No     Flavoring No     Other No     Unknown No       I have reviewed and agree with the history as documented.       Arm Pain  Associated symptoms: no abdominal pain, no chest pain, no cough, no diarrhea, no fever, no headaches, no myalgias, no nausea, no rash, no rhinorrhea, no shortness of breath, no sore throat and no vomiting        Review of Systems   Constitutional:  Negative for chills and fever.   HENT:  Negative for rhinorrhea and sore throat.    Eyes:  Negative for visual disturbance.   Respiratory:  Negative for cough and shortness of breath.    Cardiovascular:  Negative for chest pain and leg swelling.   Gastrointestinal:  Negative for abdominal pain, diarrhea, nausea and vomiting.   Genitourinary:  Negative for dysuria.   Musculoskeletal:  Negative for back pain and myalgias.   Skin:  Positive for wound. Negative for rash.   Neurological:  Negative for dizziness and headaches.   Psychiatric/Behavioral:  Negative for confusion.    All other systems reviewed and are negative.          Objective       ED Triage Vitals [07/08/25 1726]   Temperature Pulse Blood Pressure Respirations SpO2 Patient Position - Orthostatic VS   97.6 °F (36.4 °C) 72 134/79 16 97 % Lying      Temp Source Heart Rate Source BP Location FiO2 (%) Pain Score    Temporal Monitor Right arm -- 5      Vitals      Date and Time Temp Pulse SpO2 Resp BP Pain Score FACES Pain Rating User   07/08/25 1726 97.6 °F  (36.4 °C) 72 97 % 16 134/79 5 -- KA            Physical Exam  Vitals and nursing note reviewed.   Constitutional:       Appearance: She is well-developed.   HENT:      Head: Normocephalic and atraumatic.      Right Ear: External ear normal.      Left Ear: External ear normal.      Nose: Nose normal.      Mouth/Throat:      Mouth: Mucous membranes are moist.      Pharynx: No oropharyngeal exudate.     Eyes:      General: No scleral icterus.        Right eye: No discharge.         Left eye: No discharge.      Conjunctiva/sclera: Conjunctivae normal.      Pupils: Pupils are equal, round, and reactive to light.       Cardiovascular:      Rate and Rhythm: Normal rate and regular rhythm.      Pulses: Normal pulses.      Heart sounds: Normal heart sounds.   Pulmonary:      Effort: Pulmonary effort is normal. No respiratory distress.      Breath sounds: Normal breath sounds. No wheezing.   Abdominal:      General: Bowel sounds are normal.      Palpations: Abdomen is soft.     Musculoskeletal:         General: Normal range of motion.      Cervical back: Normal range of motion and neck supple.   Lymphadenopathy:      Cervical: No cervical adenopathy.     Skin:     General: Skin is warm and dry.      Capillary Refill: Capillary refill takes less than 2 seconds.      Findings: Abscess (left armpit: there is single abscess noted, no drainage noted there is no surrounding erythema noted.) present.     Neurological:      General: No focal deficit present.      Mental Status: She is alert and oriented to person, place, and time.     Psychiatric:         Mood and Affect: Mood normal.         Behavior: Behavior normal.         Results Reviewed       None            No orders to display       Procedures    ED Medication and Procedure Management   Prior to Admission Medications   Prescriptions Last Dose Informant Patient Reported? Taking?   ALPRAZolam (XANAX) 0.5 mg tablet  Self Yes No   DULoxetine (CYMBALTA) 60 mg delayed release  capsule  Self Yes No   HYDROcodone-acetaminophen (Norco) 5-325 mg per tablet  Self No No   Sig: Take 1 tablet by mouth every 6 (six) hours as needed for pain for up to 10 doses Max Daily Amount: 4 tablets   QUEtiapine (SEROquel) 100 mg tablet  Self Yes No   Sig: Take 100 mg by mouth   RABEprazole (ACIPHEX) 20 MG tablet  Self Yes No   Sig: Take 20 mg by mouth daily   Zepbound 2.5 MG/0.5ML auto-injector  Self Yes No   Sig: Inject 2.5 mg under the skin Once a week   albuterol (ProAir HFA) 90 mcg/act inhaler  Self No No   Sig: Inhale 2 puffs every 6 (six) hours as needed for wheezing   budesonide (ENTOCORT EC) 3 MG capsule  Self Yes No   famotidine (PEPCID) 40 MG tablet  Self Yes No   Sig: Take 40 mg by mouth 2 (two) times a day bedtime   gabapentin (NEURONTIN) 100 mg capsule  Self Yes No   Sig: Take 100 mg by mouth daily   hydroxychloroquine (PLAQUENIL) 200 mg tablet  Self Yes No   Sig: Take 200 mg by mouth 2 (two) times a day   leflunomide (ARAVA) 10 MG tablet  Self Yes No   Sig: Take 10 mg by mouth daily   ondansetron (ZOFRAN-ODT) 8 mg disintegrating tablet  Self Yes No   prazosin (MINIPRESS) 5 mg capsule  Self Yes No   valACYclovir (VALTREX) 500 mg tablet  Self Yes No   Sig: Take 500 mg by mouth daily      Facility-Administered Medications: None     Patient's Medications   Discharge Prescriptions    SULFAMETHOXAZOLE-TRIMETHOPRIM (BACTRIM DS) 800-160 MG PER TABLET    Take 1 tablet by mouth 2 (two) times a day for 7 days smx-tmp DS (BACTRIM) 800-160 mg tabs (1tab q12 D10)       Start Date: 7/8/2025  End Date: 7/15/2025       Order Dose: 1 tablet       Quantity: 14 tablet    Refills: 0       ED SEPSIS DOCUMENTATION   Time reflects when diagnosis was documented in both MDM as applicable and the Disposition within this note       Time User Action Codes Description Comment    7/8/2025  5:46 PM Sami Shields Add [L02.91] Abscess                    [1]   Past Medical History:  Diagnosis Date    Anxiety     Arthritis      Asthma     Bipolar 1 disorder (HCC)     with bordeline personality    Borderline personality disorder ([facility])     Chronic headaches     Dental disease     Wear dentures    Depression     Difficulty walking     Ear problems     Started having ear issues as a child    Fatigue     Fibromyalgia, primary 2019    Not sure of exact date    GERD (gastroesophageal reflux disease)     Headache(784.0)     Used to get migraines, now just tension headaches    High arches     Hypertension     Incontinence     Migraine     Morbid obesity with BMI of 40.0-44.9, adult ([facility])     Plantar fasciitis 2022    Sinusitis     Sleep apnea     Small bowel obstruction ([facility]) 06/27/2019    Suicide attempt ([facility]) 02/29/2024    Symptomatic bradycardia     Tonsillitis     Got it often as a child    Urinary tract infection    [2]   Past Surgical History:  Procedure Laterality Date    CHOLECYSTECTOMY  05/24/2018    DENTAL SURGERY      ELBOW SURGERY      EXPLORATORY LAPAROTOMY      exlap    FOOT SURGERY Right     KNEE ARTHROSCOPY Right 11/15/2018    total of three procedures with meniscal surgery    MULTIPLE TOOTH EXTRACTIONS  11/02/2018    OVARIAN CYST REMOVAL      NE TNOT ELBOW LATERAL/MEDIAL DEBRIDE OPEN TDN RPR Left 03/01/2024    Procedure: REPAIR TENDON FOREARM, elbow common flexor tendon, excision of bone fragment and all necessary procedures;  Surgeon: June Charles DO;  Location:  MAIN OR;  Service: Orthopedics    REDUCTION MAMMAPLASTY      reduction     SHOULDER SURGERY      SINUS SURGERY Bilateral 04/19/2019    bilateral maxillary antrostomies - Dr. Barajas - [facility]    TENDON REPAIR      TOTAL KNEE ARTHROPLASTY REVISION Right 2023   [3]   Family History  Problem Relation Name Age of Onset    Hyperlipidemia Father Aaron Hunter     Hypertension Father Aaron Hunter     Arthritis Father Aaron Hunter     Diabetes Father Aaron Hunter     Migraines Mother Siomara Harrison     Depression Mother Siomara Harrsion     Mental illness  Mother Siomara Hunter     Depression Family      Heart disease Maternal Aunt Taryn     Heart disease Maternal Uncle Jose Luis Vasquez     Diabetes Paternal Aunt      Diabetes Paternal Uncle      Breast cancer Maternal Grandmother Hollie Vasquez     Diabetes Paternal Grandmother Roopa Ever     Dementia Paternal Grandmother Roopa Ever     Diabetes Paternal Grandfather      Alcohol abuse Brother Aaron Harrison Jr     Depression Brother Aaron Harrison Jr     Drug abuse Brother Aaron Harrison Jr     Drug abuse Brother Aaron Harrison Jr.     Stroke Neg Hx      Thyroid disease Neg Hx     [4]   Social History  Tobacco Use    Smoking status: Never    Smokeless tobacco: Never   Vaping Use    Vaping status: Never Used   Substance Use Topics    Alcohol use: Yes     Comment: rare    Drug use: Never        Sami Shields MD  07/08/25 1061

## 2025-07-10 ENCOUNTER — CONSULT (OUTPATIENT)
Dept: SURGERY | Facility: CLINIC | Age: 50
End: 2025-07-10
Attending: FAMILY MEDICINE
Payer: COMMERCIAL

## 2025-07-10 VITALS
HEIGHT: 65 IN | HEART RATE: 110 BPM | DIASTOLIC BLOOD PRESSURE: 82 MMHG | WEIGHT: 214 LBS | BODY MASS INDEX: 35.65 KG/M2 | SYSTOLIC BLOOD PRESSURE: 120 MMHG | OXYGEN SATURATION: 96 % | TEMPERATURE: 95.4 F

## 2025-07-10 DIAGNOSIS — L02.412 ABSCESS OF LEFT AXILLA: Primary | ICD-10-CM

## 2025-07-10 DIAGNOSIS — L02.91 ABSCESS: ICD-10-CM

## 2025-07-10 PROCEDURE — 87205 SMEAR GRAM STAIN: CPT | Performed by: SURGERY

## 2025-07-10 PROCEDURE — 99242 OFF/OP CONSLTJ NEW/EST SF 20: CPT | Performed by: SURGERY

## 2025-07-10 PROCEDURE — 10060 I&D ABSCESS SIMPLE/SINGLE: CPT | Performed by: SURGERY

## 2025-07-10 PROCEDURE — 87186 SC STD MICRODIL/AGAR DIL: CPT | Performed by: SURGERY

## 2025-07-10 PROCEDURE — 87147 CULTURE TYPE IMMUNOLOGIC: CPT | Performed by: SURGERY

## 2025-07-10 PROCEDURE — 87070 CULTURE OTHR SPECIMN AEROBIC: CPT | Performed by: SURGERY

## 2025-07-10 NOTE — PROGRESS NOTES
"Incision and Drainage    Date/Time: 7/10/2025 10:45 AM    Performed by: Narayan Vieira MD  Authorized by: Narayan Vieira MD    Universal Protocol:  procedure performed by consultantConsent: Written consent obtained  Risks and benefits: risks, benefits and alternatives were discussed  Consent given by: patient  Time out: Immediately prior to procedure a \"time out\" was called to verify the correct patient, procedure, equipment, support staff and site/side marked as required.  Timeout called at: 7/10/2025 11:05 AM.  Patient understanding: patient states understanding of the procedure being performed  Site marked: the operative site was marked    Patient location:  Clinic  Location:     Type:  Abscess    Size:  2 separate sites    Location: Left axilla.  Pre-procedure details:     Skin preparation:  Betadine  Anesthesia (see MAR for exact dosages):     Anesthesia method:  Local infiltration    Local anesthetic:  Lidocaine 1% WITH epi (20 mL)  Procedure details:     Complexity:  Simple    Incision types:  Single straight (2 sites)    Scalpel blade:  15    Approach:  Open    Incision depth:  Subcutaneous    Drainage:  Purulent    Drainage amount:  Moderate    Wound treatment:  Wound left open    Packing materials:  1/4 in iodoform gauze    Amount 1/4\" iodoform:  4 inches  Post-procedure details:     Patient tolerance of procedure:  Tolerated well, no immediate complications  Comments:      Gauze dressing and ABD applied    Wound cultures taken     "

## 2025-07-11 ENCOUNTER — OFFICE VISIT (OUTPATIENT)
Dept: PODIATRY | Facility: CLINIC | Age: 50
End: 2025-07-11
Payer: COMMERCIAL

## 2025-07-11 VITALS — BODY MASS INDEX: 35.65 KG/M2 | WEIGHT: 214 LBS | HEIGHT: 65 IN

## 2025-07-11 DIAGNOSIS — M76.62 ACHILLES TENDINITIS OF LEFT LOWER EXTREMITY: ICD-10-CM

## 2025-07-11 DIAGNOSIS — M76.72 PERONEAL TENDINITIS OF LEFT LOWER EXTREMITY: ICD-10-CM

## 2025-07-11 DIAGNOSIS — M76.822 POSTERIOR TIBIAL TENDINITIS OF LEFT LOWER EXTREMITY: ICD-10-CM

## 2025-07-11 DIAGNOSIS — M67.40 GANGLION CYST: Primary | ICD-10-CM

## 2025-07-11 PROCEDURE — 99213 OFFICE O/P EST LOW 20 MIN: CPT | Performed by: PODIATRIST

## 2025-07-11 NOTE — PROGRESS NOTES
"Name: Amparo Dobbs      : 1975      MRN: [id number]  Encounter Provider: Deo Lopez DPM  Encounter Date: 2025   Encounter department: [facility] [place]  :  Assessment & Plan  Ganglion cyst       - Did pop and recur and is not painful  -I would not recommend any further intervention at this time as it is not painful it is not bothersome for her  Peroneal tendinitis of left lower extremity       As below  Posterior tibial tendinitis of left lower extremity       As below  Achilles tendinitis of left lower extremity       As below  -  Doing great currently the above 3 orthopedic issues are no longer painful for her.  She has well-maintained her equinus, she doing great with her custom orthotics and supportive shoes she is to return as needed would benefit from orthotics yearly    History of Present Illness   HPI  Amparo Dobbs is a 49 y.o. female who presents evaluation management of her left foot, having no pain very happy with the overall results      Review of Systems   Constitutional:  Negative for chills and fever.   HENT:  Negative for ear pain and sore throat.    Eyes:  Negative for pain and visual disturbance.   Respiratory:  Negative for cough and shortness of breath.    Cardiovascular:  Negative for chest pain and palpitations.   Gastrointestinal:  Negative for abdominal pain and vomiting.   Genitourinary:  Negative for dysuria and hematuria.   Musculoskeletal:  Negative for arthralgias and back pain.   Skin:  Negative for color change and rash.   Neurological:  Negative for seizures and syncope.   All other systems reviewed and are negative.         Objective   Ht 5' 5\" (1.651 m)   Wt 97.1 kg (214 lb)   BMI 35.61 kg/m²      Physical Exam    Skin:     Comments: Ganglion cyst present, we did discuss potential aspiration versus incision but not painful.  No other trigger points everything resolved           "

## 2025-07-13 LAB
BACTERIA WND AEROBE CULT: ABNORMAL
GRAM STN SPEC: ABNORMAL
GRAM STN SPEC: ABNORMAL

## 2025-07-30 ENCOUNTER — APPOINTMENT (EMERGENCY)
Dept: CT IMAGING | Facility: HOSPITAL | Age: 50
End: 2025-07-30
Payer: COMMERCIAL

## 2025-07-30 ENCOUNTER — HOSPITAL ENCOUNTER (EMERGENCY)
Facility: HOSPITAL | Age: 50
Discharge: HOME | End: 2025-07-30
Attending: EMERGENCY MEDICINE | Admitting: EMERGENCY MEDICINE
Payer: COMMERCIAL

## 2025-07-30 VITALS
HEART RATE: 61 BPM | TEMPERATURE: 98.5 F | OXYGEN SATURATION: 96 % | SYSTOLIC BLOOD PRESSURE: 129 MMHG | DIASTOLIC BLOOD PRESSURE: 79 MMHG | RESPIRATION RATE: 16 BRPM

## 2025-07-30 DIAGNOSIS — R11.0 NAUSEA: ICD-10-CM

## 2025-07-30 DIAGNOSIS — R10.13 EPIGASTRIC ABDOMINAL PAIN: Primary | ICD-10-CM

## 2025-07-30 LAB
ALBUMIN SERPL BCG-MCNC: 4.2 G/DL (ref 3.5–5)
ALP SERPL-CCNC: 75 U/L (ref 34–104)
ALT SERPL W P-5'-P-CCNC: 17 U/L (ref 7–52)
ANION GAP SERPL CALCULATED.3IONS-SCNC: 9 MMOL/L (ref 4–13)
AST SERPL W P-5'-P-CCNC: 13 U/L (ref 13–39)
BASOPHILS # BLD AUTO: 0.07 THOUSANDS/ÂΜL (ref 0–0.1)
BASOPHILS NFR BLD AUTO: 1 % (ref 0–1)
BILIRUB SERPL-MCNC: 0.34 MG/DL (ref 0.2–1)
BUN SERPL-MCNC: 18 MG/DL (ref 5–25)
CALCIUM SERPL-MCNC: 9.1 MG/DL (ref 8.4–10.2)
CHLORIDE SERPL-SCNC: 107 MMOL/L (ref 96–108)
CO2 SERPL-SCNC: 23 MMOL/L (ref 21–32)
CREAT SERPL-MCNC: 0.67 MG/DL (ref 0.6–1.3)
EOSINOPHIL # BLD AUTO: 0.12 THOUSAND/ÂΜL (ref 0–0.61)
EOSINOPHIL NFR BLD AUTO: 1 % (ref 0–6)
ERYTHROCYTE [DISTWIDTH] IN BLOOD BY AUTOMATED COUNT: 15.9 % (ref 11.6–15.1)
GFR SERPL CREATININE-BSD FRML MDRD: 105 ML/MIN/1.73SQ M
GLUCOSE SERPL-MCNC: 92 MG/DL (ref 65–140)
HCT VFR BLD AUTO: 41.1 % (ref 34.8–46.1)
HGB BLD-MCNC: 13.1 G/DL (ref 11.5–15.4)
IMM GRANULOCYTES # BLD AUTO: 0.11 THOUSAND/UL (ref 0–0.2)
IMM GRANULOCYTES NFR BLD AUTO: 1 % (ref 0–2)
LIPASE SERPL-CCNC: 25 U/L (ref 11–82)
LYMPHOCYTES # BLD AUTO: 2.44 THOUSANDS/ÂΜL (ref 0.6–4.47)
LYMPHOCYTES NFR BLD AUTO: 19 % (ref 14–44)
MCH RBC QN AUTO: 27.6 PG (ref 26.8–34.3)
MCHC RBC AUTO-ENTMCNC: 31.9 G/DL (ref 31.4–37.4)
MCV RBC AUTO: 87 FL (ref 82–98)
MONOCYTES # BLD AUTO: 1.15 THOUSAND/ÂΜL (ref 0.17–1.22)
MONOCYTES NFR BLD AUTO: 9 % (ref 4–12)
NEUTROPHILS # BLD AUTO: 8.72 THOUSANDS/ÂΜL (ref 1.85–7.62)
NEUTS SEG NFR BLD AUTO: 69 % (ref 43–75)
NRBC BLD AUTO-RTO: 0 /100 WBCS
PLATELET # BLD AUTO: 267 THOUSANDS/UL (ref 149–390)
PMV BLD AUTO: 10.9 FL (ref 8.9–12.7)
POTASSIUM SERPL-SCNC: 3.4 MMOL/L (ref 3.5–5.3)
PROT SERPL-MCNC: 6.9 G/DL (ref 6.4–8.4)
RBC # BLD AUTO: 4.75 MILLION/UL (ref 3.81–5.12)
SODIUM SERPL-SCNC: 139 MMOL/L (ref 135–147)
WBC # BLD AUTO: 12.61 THOUSAND/UL (ref 4.31–10.16)

## 2025-07-30 PROCEDURE — 85025 COMPLETE CBC W/AUTO DIFF WBC: CPT | Performed by: EMERGENCY MEDICINE

## 2025-07-30 PROCEDURE — 99285 EMERGENCY DEPT VISIT HI MDM: CPT | Performed by: EMERGENCY MEDICINE

## 2025-07-30 PROCEDURE — 36415 COLL VENOUS BLD VENIPUNCTURE: CPT | Performed by: EMERGENCY MEDICINE

## 2025-07-30 PROCEDURE — 99284 EMERGENCY DEPT VISIT MOD MDM: CPT

## 2025-07-30 PROCEDURE — 96375 TX/PRO/DX INJ NEW DRUG ADDON: CPT

## 2025-07-30 PROCEDURE — 83690 ASSAY OF LIPASE: CPT | Performed by: EMERGENCY MEDICINE

## 2025-07-30 PROCEDURE — 96374 THER/PROPH/DIAG INJ IV PUSH: CPT

## 2025-07-30 PROCEDURE — 80053 COMPREHEN METABOLIC PANEL: CPT | Performed by: EMERGENCY MEDICINE

## 2025-07-30 PROCEDURE — 74177 CT ABD & PELVIS W/CONTRAST: CPT

## 2025-07-30 RX ORDER — HYDROMORPHONE HCL/PF 1 MG/ML
0.5 SYRINGE (ML) INJECTION ONCE
Status: COMPLETED | OUTPATIENT
Start: 2025-07-30 | End: 2025-07-30

## 2025-07-30 RX ORDER — KETOROLAC TROMETHAMINE 30 MG/ML
15 INJECTION, SOLUTION INTRAMUSCULAR; INTRAVENOUS ONCE
Status: COMPLETED | OUTPATIENT
Start: 2025-07-30 | End: 2025-07-30

## 2025-07-30 RX ORDER — ONDANSETRON 2 MG/ML
4 INJECTION INTRAMUSCULAR; INTRAVENOUS ONCE
Status: COMPLETED | OUTPATIENT
Start: 2025-07-30 | End: 2025-07-30

## 2025-07-30 RX ORDER — FAMOTIDINE 10 MG/ML
20 INJECTION, SOLUTION INTRAVENOUS ONCE
Status: COMPLETED | OUTPATIENT
Start: 2025-07-30 | End: 2025-07-30

## 2025-07-30 RX ORDER — ONDANSETRON 4 MG/1
4 TABLET, FILM COATED ORAL EVERY 6 HOURS PRN
Qty: 12 TABLET | Refills: 0 | Status: SHIPPED | OUTPATIENT
Start: 2025-07-30

## 2025-07-30 RX ADMIN — IOHEXOL 100 ML: 350 INJECTION, SOLUTION INTRAVENOUS at 19:14

## 2025-07-30 RX ADMIN — HYDROMORPHONE HYDROCHLORIDE 0.5 MG: 1 INJECTION, SOLUTION INTRAMUSCULAR; INTRAVENOUS; SUBCUTANEOUS at 21:06

## 2025-07-30 RX ADMIN — FAMOTIDINE 20 MG: 10 INJECTION, SOLUTION INTRAVENOUS at 20:51

## 2025-07-30 RX ADMIN — ONDANSETRON 4 MG: 2 INJECTION INTRAMUSCULAR; INTRAVENOUS at 19:11

## 2025-07-30 RX ADMIN — KETOROLAC TROMETHAMINE 15 MG: 30 INJECTION, SOLUTION INTRAMUSCULAR at 19:11

## (undated) DEVICE — SUT FIBERWIRE #2 1/2 CIRCLE T-5 38IN AR-7200

## (undated) DEVICE — C-WIRE PAK DOUBLE ENDED ORTHOPAEDIC WIRE, SPADE, .045" (1.14 MM)
Type: IMPLANTABLE DEVICE | Site: ARM | Status: NON-FUNCTIONAL
Brand: C-WIRE
Removed: 2024-03-01

## (undated) DEVICE — PADDING CAST 4 IN  COTTON STRL

## (undated) DEVICE — SUT MONOCRYL 3-0 PS-2 18 IN Y497G

## (undated) DEVICE — 3M™ STERI-STRIP™ REINFORCED ADHESIVE SKIN CLOSURES, R1547, 1/2 IN X 4 IN (12 MM X 100 MM), 6 STRIPS/ENVELOPE: Brand: 3M™ STERI-STRIP™

## (undated) DEVICE — CHLORAPREP HI-LITE 26ML ORANGE

## (undated) DEVICE — INTENT TO BE USED WITH SUTURE MATERIAL FOR TISSUE CLOSURE: Brand: RICHARD-ALLAN® NEEDLE 1/2 CIRCLE TAPER

## (undated) DEVICE — GLOVE SRG BIOGEL 8

## (undated) DEVICE — INTENDED FOR TISSUE SEPARATION, AND OTHER PROCEDURES THAT REQUIRE A SHARP SURGICAL BLADE TO PUNCTURE OR CUT.: Brand: BARD-PARKER ® CARBON RIB-BACK BLADES

## (undated) DEVICE — GLOVE INDICATOR PI UNDERGLOVE SZ 8 BLUE

## (undated) DEVICE — ARM SLING: Brand: DEROYAL

## (undated) DEVICE — SUT MONOCRYL 2-0 SH 27 IN Y417H

## (undated) DEVICE — COBAN 4 IN STERILE

## (undated) DEVICE — DISPOSABLE EQUIPMENT COVER: Brand: SMALL TOWEL DRAPE

## (undated) DEVICE — CUFF TOURNIQUET 18 X 4 IN QUICK CONNECT DISP 1 BLADDER

## (undated) DEVICE — NEEDLE 25G X 1 1/2

## (undated) DEVICE — IMPERVIOUS STOCKINETTE: Brand: DEROYAL

## (undated) DEVICE — SYRINGE 10ML LL

## (undated) DEVICE — ADHESIVE SKIN HIGH VISCOSITY EXOFIN 1ML

## (undated) DEVICE — U-DRAPE: Brand: CONVERTORS

## (undated) DEVICE — GLOVE SRG BIOGEL 7.5

## (undated) DEVICE — SUT VICRYL 0 CT-2 18 IN J727D

## (undated) DEVICE — GROUNDING PAD UNIVERSAL SLW

## (undated) DEVICE — PLUMEPEN PRO 10FT

## (undated) DEVICE — BASIC DOUBLE BASIN 2-LF: Brand: MEDLINE INDUSTRIES, INC.

## (undated) DEVICE — PAD CAST 4 IN COTTON NON STERILE

## (undated) DEVICE — STERILE ALLENTOWN HAND PACK: Brand: CARDINAL HEALTH